# Patient Record
Sex: FEMALE | Race: OTHER | Employment: OTHER | URBAN - NONMETROPOLITAN AREA
[De-identification: names, ages, dates, MRNs, and addresses within clinical notes are randomized per-mention and may not be internally consistent; named-entity substitution may affect disease eponyms.]

---

## 2017-01-10 ENCOUNTER — OFFICE VISIT (OUTPATIENT)
Dept: PHYSICAL MEDICINE AND REHAB | Age: 52
End: 2017-01-10

## 2017-01-10 VITALS
HEIGHT: 59 IN | SYSTOLIC BLOOD PRESSURE: 110 MMHG | HEART RATE: 84 BPM | WEIGHT: 150 LBS | BODY MASS INDEX: 30.24 KG/M2 | DIASTOLIC BLOOD PRESSURE: 72 MMHG

## 2017-01-10 DIAGNOSIS — N31.9 NEUROGENIC BLADDER: ICD-10-CM

## 2017-01-10 DIAGNOSIS — G37.3 TRANSVERSE MYELITIS (HCC): Primary | ICD-10-CM

## 2017-01-10 PROCEDURE — 64642 CHEMODENERV 1 EXTREMITY 1-4: CPT | Performed by: PHYSICAL MEDICINE & REHABILITATION

## 2017-01-10 PROCEDURE — 99213 OFFICE O/P EST LOW 20 MIN: CPT | Performed by: PHYSICAL MEDICINE & REHABILITATION

## 2017-01-10 RX ORDER — GABAPENTIN 800 MG/1
800 TABLET ORAL 3 TIMES DAILY
Qty: 90 TABLET | Refills: 5 | Status: SHIPPED | OUTPATIENT
Start: 2017-01-10 | End: 2017-06-29 | Stop reason: SDUPTHER

## 2017-02-07 ENCOUNTER — TELEPHONE (OUTPATIENT)
Dept: UROLOGY | Age: 52
End: 2017-02-07

## 2017-03-22 RX ORDER — HYDROCODONE BITARTRATE AND ACETAMINOPHEN 5; 325 MG/1; MG/1
1 TABLET ORAL 2 TIMES DAILY PRN
Qty: 60 TABLET | Refills: 0 | Status: SHIPPED | OUTPATIENT
Start: 2017-03-22 | End: 2017-04-17 | Stop reason: SDUPTHER

## 2017-03-31 ENCOUNTER — OFFICE VISIT (OUTPATIENT)
Dept: UROLOGY | Age: 52
End: 2017-03-31

## 2017-03-31 VITALS
HEIGHT: 60 IN | BODY MASS INDEX: 29.45 KG/M2 | DIASTOLIC BLOOD PRESSURE: 60 MMHG | WEIGHT: 150 LBS | SYSTOLIC BLOOD PRESSURE: 100 MMHG

## 2017-03-31 DIAGNOSIS — N31.9 NEUROGENIC BLADDER: Primary | ICD-10-CM

## 2017-03-31 DIAGNOSIS — R33.9 URINARY RETENTION: ICD-10-CM

## 2017-03-31 LAB
BILIRUBIN URINE: NEGATIVE
BLOOD URINE, POC: ABNORMAL
CHARACTER, URINE: CLEAR
COLOR, URINE: YELLOW
GLUCOSE URINE: 500 MG/DL
KETONES, URINE: NEGATIVE
LEUKOCYTE CLUMPS, URINE: ABNORMAL
NITRITE, URINE: POSITIVE
PH, URINE: 5.5
POST VOID RESIDUAL (PVR): 360 ML
PROTEIN, URINE: NEGATIVE MG/DL
SPECIFIC GRAVITY, URINE: 1.02 (ref 1–1.03)
UROBILINOGEN, URINE: 0.2 EU/DL

## 2017-03-31 PROCEDURE — 51798 US URINE CAPACITY MEASURE: CPT | Performed by: NURSE PRACTITIONER

## 2017-03-31 PROCEDURE — 99213 OFFICE O/P EST LOW 20 MIN: CPT | Performed by: NURSE PRACTITIONER

## 2017-03-31 PROCEDURE — 81003 URINALYSIS AUTO W/O SCOPE: CPT | Performed by: NURSE PRACTITIONER

## 2017-03-31 ASSESSMENT — ENCOUNTER SYMPTOMS
NAUSEA: 0
VOMITING: 0
ABDOMINAL PAIN: 0

## 2017-04-03 LAB
ORGANISM: ABNORMAL
ORGANISM: ABNORMAL
URINE CULTURE, ROUTINE: ABNORMAL
URINE CULTURE, ROUTINE: ABNORMAL

## 2017-04-04 ENCOUNTER — TELEPHONE (OUTPATIENT)
Dept: UROLOGY | Age: 52
End: 2017-04-04

## 2017-04-04 RX ORDER — NITROFURANTOIN 25; 75 MG/1; MG/1
100 CAPSULE ORAL 2 TIMES DAILY
Qty: 10 CAPSULE | Refills: 0 | Status: SHIPPED | OUTPATIENT
Start: 2017-04-04 | End: 2017-04-09

## 2017-04-17 ENCOUNTER — OFFICE VISIT (OUTPATIENT)
Dept: PHYSICAL MEDICINE AND REHAB | Age: 52
End: 2017-04-17

## 2017-04-17 VITALS
SYSTOLIC BLOOD PRESSURE: 98 MMHG | HEART RATE: 77 BPM | WEIGHT: 149.91 LBS | DIASTOLIC BLOOD PRESSURE: 62 MMHG | BODY MASS INDEX: 29.43 KG/M2 | HEIGHT: 60 IN

## 2017-04-17 DIAGNOSIS — G82.20 PARAPLEGIA (HCC): Primary | ICD-10-CM

## 2017-04-17 DIAGNOSIS — G37.3 TRANSVERSE MYELITIS (HCC): ICD-10-CM

## 2017-04-17 PROCEDURE — 64642 CHEMODENERV 1 EXTREMITY 1-4: CPT | Performed by: PHYSICAL MEDICINE & REHABILITATION

## 2017-04-17 PROCEDURE — 99213 OFFICE O/P EST LOW 20 MIN: CPT | Performed by: PHYSICAL MEDICINE & REHABILITATION

## 2017-04-17 RX ORDER — HYDROCODONE BITARTRATE AND ACETAMINOPHEN 5; 325 MG/1; MG/1
1 TABLET ORAL 2 TIMES DAILY PRN
Qty: 60 TABLET | Refills: 0 | Status: SHIPPED | OUTPATIENT
Start: 2017-04-17 | End: 2017-05-02 | Stop reason: SDUPTHER

## 2017-04-17 RX ORDER — HYDROCODONE BITARTRATE AND ACETAMINOPHEN 5; 325 MG/1; MG/1
1 TABLET ORAL 2 TIMES DAILY PRN
Qty: 60 TABLET | Refills: 0 | Status: SHIPPED | OUTPATIENT
Start: 2017-04-17 | End: 2017-07-18 | Stop reason: SDUPTHER

## 2017-04-17 RX ORDER — LIFITEGRAST 50 MG/ML
SOLUTION/ DROPS OPHTHALMIC
Refills: 0 | COMMUNITY
Start: 2017-03-10 | End: 2020-03-05

## 2017-04-19 ENCOUNTER — TELEPHONE (OUTPATIENT)
Dept: PHYSICAL MEDICINE AND REHAB | Age: 52
End: 2017-04-19

## 2017-04-19 ENCOUNTER — TELEPHONE (OUTPATIENT)
Dept: UROLOGY | Age: 52
End: 2017-04-19

## 2017-04-19 DIAGNOSIS — N39.0 URINARY TRACT INFECTION WITHOUT HEMATURIA, SITE UNSPECIFIED: Primary | ICD-10-CM

## 2017-04-21 ENCOUNTER — TELEPHONE (OUTPATIENT)
Dept: UROLOGY | Age: 52
End: 2017-04-21

## 2017-04-21 DIAGNOSIS — N31.9 NEUROGENIC BLADDER: Primary | ICD-10-CM

## 2017-04-23 ENCOUNTER — TELEPHONE (OUTPATIENT)
Dept: UROLOGY | Age: 52
End: 2017-04-23

## 2017-04-23 RX ORDER — CIPROFLOXACIN 500 MG/1
500 TABLET, FILM COATED ORAL 2 TIMES DAILY
Qty: 14 TABLET | Refills: 0 | Status: SHIPPED | OUTPATIENT
Start: 2017-04-23 | End: 2017-04-30

## 2017-04-26 ENCOUNTER — TELEPHONE (OUTPATIENT)
Dept: PHYSICAL MEDICINE AND REHAB | Age: 52
End: 2017-04-26

## 2017-04-27 ENCOUNTER — TELEPHONE (OUTPATIENT)
Dept: UROLOGY | Age: 52
End: 2017-04-27

## 2017-05-02 ENCOUNTER — OFFICE VISIT (OUTPATIENT)
Dept: UROLOGY | Age: 52
End: 2017-05-02

## 2017-05-02 VITALS
BODY MASS INDEX: 29.45 KG/M2 | WEIGHT: 150 LBS | SYSTOLIC BLOOD PRESSURE: 102 MMHG | DIASTOLIC BLOOD PRESSURE: 68 MMHG | HEIGHT: 60 IN

## 2017-05-02 DIAGNOSIS — N31.9 NEUROGENIC BLADDER: Primary | ICD-10-CM

## 2017-05-02 LAB — POST VOID RESIDUAL (PVR): 11 ML

## 2017-05-02 PROCEDURE — 99213 OFFICE O/P EST LOW 20 MIN: CPT | Performed by: NURSE PRACTITIONER

## 2017-05-02 PROCEDURE — 51798 US URINE CAPACITY MEASURE: CPT | Performed by: NURSE PRACTITIONER

## 2017-06-19 RX ORDER — BACLOFEN 10 MG/1
15 TABLET ORAL 3 TIMES DAILY
Qty: 135 TABLET | Refills: 5 | Status: SHIPPED | OUTPATIENT
Start: 2017-06-19 | End: 2017-10-09 | Stop reason: SDUPTHER

## 2017-06-29 RX ORDER — GABAPENTIN 800 MG/1
800 TABLET ORAL 3 TIMES DAILY
Qty: 90 TABLET | Refills: 5 | Status: SHIPPED | OUTPATIENT
Start: 2017-06-29 | End: 2017-10-09 | Stop reason: SDUPTHER

## 2017-07-12 ENCOUNTER — TELEPHONE (OUTPATIENT)
Dept: PHYSICAL MEDICINE AND REHAB | Age: 52
End: 2017-07-12

## 2017-07-18 ENCOUNTER — OFFICE VISIT (OUTPATIENT)
Dept: PHYSICAL MEDICINE AND REHAB | Age: 52
End: 2017-07-18
Payer: COMMERCIAL

## 2017-07-18 ENCOUNTER — TELEPHONE (OUTPATIENT)
Dept: PHYSICAL MEDICINE AND REHAB | Age: 52
End: 2017-07-18

## 2017-07-18 VITALS
BODY MASS INDEX: 29.45 KG/M2 | HEIGHT: 60 IN | DIASTOLIC BLOOD PRESSURE: 66 MMHG | WEIGHT: 150 LBS | HEART RATE: 85 BPM | SYSTOLIC BLOOD PRESSURE: 102 MMHG

## 2017-07-18 DIAGNOSIS — G82.20 PARAPLEGIA (HCC): ICD-10-CM

## 2017-07-18 DIAGNOSIS — G37.3 TRANSVERSE MYELITIS (HCC): Primary | ICD-10-CM

## 2017-07-18 PROCEDURE — 99214 OFFICE O/P EST MOD 30 MIN: CPT | Performed by: NURSE PRACTITIONER

## 2017-07-18 RX ORDER — HYDROCODONE BITARTRATE AND ACETAMINOPHEN 5; 325 MG/1; MG/1
1 TABLET ORAL 2 TIMES DAILY PRN
Qty: 60 TABLET | Refills: 0 | Status: SHIPPED | OUTPATIENT
Start: 2017-07-22 | End: 2017-08-08 | Stop reason: SDUPTHER

## 2017-07-18 RX ORDER — HYDROCODONE BITARTRATE AND ACETAMINOPHEN 5; 325 MG/1; MG/1
1 TABLET ORAL 2 TIMES DAILY PRN
Qty: 60 TABLET | Refills: 0 | Status: SHIPPED | OUTPATIENT
Start: 2017-09-22 | End: 2017-10-09 | Stop reason: SDUPTHER

## 2017-07-18 RX ORDER — HYDROCODONE BITARTRATE AND ACETAMINOPHEN 5; 325 MG/1; MG/1
1 TABLET ORAL 2 TIMES DAILY PRN
Qty: 60 TABLET | Refills: 0 | Status: SHIPPED | OUTPATIENT
Start: 2017-08-22 | End: 2017-08-08 | Stop reason: SDUPTHER

## 2017-08-08 ENCOUNTER — TELEPHONE (OUTPATIENT)
Dept: UROLOGY | Age: 52
End: 2017-08-08

## 2017-08-08 ENCOUNTER — OFFICE VISIT (OUTPATIENT)
Dept: UROLOGY | Age: 52
End: 2017-08-08
Payer: COMMERCIAL

## 2017-08-08 VITALS
BODY MASS INDEX: 29.45 KG/M2 | WEIGHT: 150 LBS | DIASTOLIC BLOOD PRESSURE: 60 MMHG | HEIGHT: 60 IN | SYSTOLIC BLOOD PRESSURE: 110 MMHG

## 2017-08-08 DIAGNOSIS — N31.9 NEUROGENIC BLADDER: Primary | ICD-10-CM

## 2017-08-08 LAB
BILIRUBIN URINE: NEGATIVE
BLOOD URINE, POC: ABNORMAL
CHARACTER, URINE: CLEAR
COLOR, URINE: YELLOW
GLUCOSE URINE: 500 MG/DL
KETONES, URINE: ABNORMAL
LEUKOCYTE CLUMPS, URINE: ABNORMAL
NITRITE, URINE: POSITIVE
PH, URINE: 6
POST VOID RESIDUAL (PVR): 160 ML
PROTEIN, URINE: NEGATIVE MG/DL
SPECIFIC GRAVITY, URINE: 1.01 (ref 1–1.03)
UROBILINOGEN, URINE: 1 EU/DL

## 2017-08-08 PROCEDURE — 99999 POST VOID RESIDUAL (PVR): CPT | Performed by: NURSE PRACTITIONER

## 2017-08-08 PROCEDURE — 99213 OFFICE O/P EST LOW 20 MIN: CPT | Performed by: NURSE PRACTITIONER

## 2017-08-08 PROCEDURE — 81003 URINALYSIS AUTO W/O SCOPE: CPT | Performed by: NURSE PRACTITIONER

## 2017-08-08 RX ORDER — NYSTATIN 100000 [USP'U]/G
POWDER TOPICAL
Qty: 1 BOTTLE | Refills: 1 | Status: SHIPPED | OUTPATIENT
Start: 2017-08-08 | End: 2020-03-05

## 2017-08-08 ASSESSMENT — ENCOUNTER SYMPTOMS
VOMITING: 0
NAUSEA: 0
ABDOMINAL PAIN: 0

## 2017-08-09 ENCOUNTER — TELEPHONE (OUTPATIENT)
Dept: UROLOGY | Age: 52
End: 2017-08-09

## 2017-08-09 RX ORDER — DOXYCYCLINE HYCLATE 100 MG
100 TABLET ORAL 2 TIMES DAILY
Qty: 14 TABLET | Refills: 0 | Status: SHIPPED | OUTPATIENT
Start: 2017-08-09 | End: 2017-08-16

## 2017-08-10 LAB
ORGANISM: ABNORMAL
URINE CULTURE, ROUTINE: ABNORMAL

## 2017-08-23 ENCOUNTER — HOSPITAL ENCOUNTER (OUTPATIENT)
Dept: ULTRASOUND IMAGING | Age: 52
Discharge: HOME | End: 2017-08-23
Payer: COMMERCIAL

## 2017-08-23 DIAGNOSIS — N31.9 NEUROGENIC BLADDER: ICD-10-CM

## 2017-08-23 PROCEDURE — 76775 US EXAM ABDO BACK WALL LIM: CPT

## 2017-08-30 ENCOUNTER — NURSE ONLY (OUTPATIENT)
Dept: UROLOGY | Age: 52
End: 2017-08-30

## 2017-08-30 DIAGNOSIS — N31.9 NEUROGENIC BLADDER: Primary | ICD-10-CM

## 2017-08-30 LAB — POST VOID RESIDUAL (PVR): NORMAL ML

## 2017-08-30 PROCEDURE — 99999 POST VOID RESIDUAL (PVR): CPT | Performed by: NURSE PRACTITIONER

## 2017-08-30 PROCEDURE — 99999 PR OFFICE/OUTPT VISIT,PROCEDURE ONLY: CPT | Performed by: NURSE PRACTITIONER

## 2017-09-12 ENCOUNTER — TELEPHONE (OUTPATIENT)
Dept: UROLOGY | Age: 52
End: 2017-09-12

## 2017-10-05 ENCOUNTER — TELEPHONE (OUTPATIENT)
Dept: UROLOGY | Age: 52
End: 2017-10-05

## 2017-10-05 NOTE — TELEPHONE ENCOUNTER
Patient is calling in regarding his myrebetriq medication. Her pharmacy, [facility] [address] told her that it needs prior authorized. They told her they would be sending a form also.

## 2017-10-09 ENCOUNTER — TELEPHONE (OUTPATIENT)
Dept: PHYSICAL MEDICINE AND REHAB | Age: 52
End: 2017-10-09

## 2017-10-09 ENCOUNTER — OFFICE VISIT (OUTPATIENT)
Dept: PHYSICAL MEDICINE AND REHAB | Age: 52
End: 2017-10-09
Payer: COMMERCIAL

## 2017-10-09 VITALS
SYSTOLIC BLOOD PRESSURE: 98 MMHG | HEIGHT: 60 IN | HEART RATE: 83 BPM | DIASTOLIC BLOOD PRESSURE: 70 MMHG | WEIGHT: 149.91 LBS | BODY MASS INDEX: 29.43 KG/M2

## 2017-10-09 DIAGNOSIS — G82.20 PARAPLEGIA (HCC): ICD-10-CM

## 2017-10-09 DIAGNOSIS — G95.11 SPINAL CORD INFARCTION (HCC): ICD-10-CM

## 2017-10-09 DIAGNOSIS — M48.061 LUMBAR CANAL STENOSIS: Primary | ICD-10-CM

## 2017-10-09 DIAGNOSIS — G89.29 OTHER CHRONIC PAIN: ICD-10-CM

## 2017-10-09 DIAGNOSIS — M24.561 FLEXION CONTRACTURE OF RIGHT KNEE: ICD-10-CM

## 2017-10-09 DIAGNOSIS — G37.3 TRANSVERSE MYELITIS (HCC): Primary | ICD-10-CM

## 2017-10-09 PROCEDURE — 99214 OFFICE O/P EST MOD 30 MIN: CPT | Performed by: NURSE PRACTITIONER

## 2017-10-09 RX ORDER — HYDROCODONE BITARTRATE AND ACETAMINOPHEN 5; 325 MG/1; MG/1
1 TABLET ORAL 2 TIMES DAILY PRN
Qty: 60 TABLET | Refills: 0 | Status: SHIPPED | OUTPATIENT
Start: 2017-10-09 | End: 2017-11-08 | Stop reason: SDUPTHER

## 2017-10-09 RX ORDER — HYDROCODONE BITARTRATE AND ACETAMINOPHEN 5; 325 MG/1; MG/1
1 TABLET ORAL 2 TIMES DAILY PRN
Qty: 60 TABLET | Refills: 0 | Status: SHIPPED | OUTPATIENT
Start: 2017-10-09 | End: 2017-12-04 | Stop reason: SDUPTHER

## 2017-10-09 RX ORDER — AMITRIPTYLINE HYDROCHLORIDE 25 MG/1
TABLET, FILM COATED ORAL
Qty: 30 TABLET | Refills: 11 | Status: SHIPPED | OUTPATIENT
Start: 2017-10-09 | End: 2018-09-20 | Stop reason: SDUPTHER

## 2017-10-09 RX ORDER — BACLOFEN 10 MG/1
15 TABLET ORAL 3 TIMES DAILY
Qty: 135 TABLET | Refills: 5 | Status: SHIPPED | OUTPATIENT
Start: 2017-10-09 | End: 2018-04-02 | Stop reason: SDUPTHER

## 2017-10-09 RX ORDER — GABAPENTIN 800 MG/1
800 TABLET ORAL 3 TIMES DAILY
Qty: 90 TABLET | Refills: 5 | Status: SHIPPED | OUTPATIENT
Start: 2017-10-09 | End: 2018-04-02 | Stop reason: SDUPTHER

## 2017-10-09 NOTE — TELEPHONE ENCOUNTER
Prior Auth initiated for [employer] . PA sent to [email or web address]. Should receive response in 3-5 days.

## 2017-10-09 NOTE — PROGRESS NOTES
abnormal - 2-3 out of 4 in right leg/ankle  Muscle bulk: within normal limits  Sensory:  Stable decreased sensation  Gait: wheelchair bound    Skin: warm and dry, no rash or erythema. Small areas of bruising noted in left ankle and right foot. Peripheral vascular: Pulses: Normal upper and lower extremity pulses; Edema: trace pedal bilateral     Impression:  1. Multiple sclerosis  2. Transverse myelitis  3. Lupus  4. Neurogenic bowel and bladder  5. Spastic paraplegia  6. Bilateral lower limb neuralgia  7. Moderate bilateral carpal tunnel syndrome     Plan:  1. Send approval for botox injections with Mari  2. Continue Norco twice daily as needed for pain. Limit opiate use as much as possible. Rx  3. Continue gabapentin 800mg TID, Rx  4. Continue amitriptyline 25 mg nightly, Rx  5. Continue baclofen, Rx  6. Trial 0.5 - 1 tab benadryl nightly PRN for sleep, OTC  7. Continue bowel and bladder program  8. Home Health for PT      Will continue to monitor any benefits vs side effects of the medications as prescribed. The patient has been warned about the risk of operating machinery including driving if impaired in any way by these medications. The patient also accepts the risks of tolerance, dependency, or addiction related to the prescribed medications. All questions were answered. Reevaluation as planned, or sooner if requested. Return in about 4 weeks (around 11/6/2017) for Botox 300 units - Dr Daniela Johnson. It was my pleasure to evaluate Yfn Mir today. Please call with any concerns or questions.   25 minutes spent in evaluation efforts    Deepali oMntana CNP

## 2017-10-16 ENCOUNTER — TELEPHONE (OUTPATIENT)
Dept: UROLOGY | Age: 52
End: 2017-10-16

## 2017-10-16 NOTE — TELEPHONE ENCOUNTER
Patient states she takes myrbetriq. She said her insurance wont cover it. She wants to know if there is something else she can try?    Pharmacy is [facility] [address]  dolv 8/8/17  Donv 11/6/17

## 2017-10-17 NOTE — TELEPHONE ENCOUNTER
Pt has a hx of elevated PVR and retention. Other formulary meds are contraindicated as they will likely cause worsening retention.

## 2017-10-17 NOTE — TELEPHONE ENCOUNTER
Myrbetriq is denied per insurance. Pt must try two formulary meds and failed them. Pt has tried oxybuynin.

## 2017-10-18 ENCOUNTER — TELEPHONE (OUTPATIENT)
Dept: UROLOGY | Age: 52
End: 2017-10-18

## 2017-10-18 NOTE — TELEPHONE ENCOUNTER
Patient is calling and asking if we can get a p/a for Myrbetriq. Per [employer] last note, patient has a hx of elevated PVR and retention. Other formulary meds are contraindicated as they will likely cause worsening retention. Will we be able to use this to help her get the medicine approved? Please advise. Thank you.

## 2017-10-19 NOTE — TELEPHONE ENCOUNTER
Expedited Appeal letter drafted and sent to [address] [place] [address] along with office notes asking for reconsideration.

## 2017-10-24 ENCOUNTER — TELEPHONE (OUTPATIENT)
Dept: PHYSICAL MEDICINE AND REHAB | Age: 52
End: 2017-10-24

## 2017-10-24 NOTE — TELEPHONE ENCOUNTER
Appeal letter for Thor Dmitry was overturned and is now approved. Approved until October 2018. Pt notified.

## 2017-11-02 ENCOUNTER — TELEPHONE (OUTPATIENT)
Dept: UROLOGY | Age: 52
End: 2017-11-02

## 2017-11-08 ENCOUNTER — OFFICE VISIT (OUTPATIENT)
Dept: UROLOGY | Age: 52
End: 2017-11-08
Payer: COMMERCIAL

## 2017-11-08 VITALS — HEART RATE: 92 BPM | SYSTOLIC BLOOD PRESSURE: 104 MMHG | DIASTOLIC BLOOD PRESSURE: 70 MMHG | TEMPERATURE: 97.6 F

## 2017-11-08 DIAGNOSIS — Z87.440 HISTORY OF UTI: Primary | ICD-10-CM

## 2017-11-08 PROCEDURE — 4004F PT TOBACCO SCREEN RCVD TLK: CPT | Performed by: NURSE PRACTITIONER

## 2017-11-08 PROCEDURE — 3017F COLORECTAL CA SCREEN DOC REV: CPT | Performed by: NURSE PRACTITIONER

## 2017-11-08 PROCEDURE — G8484 FLU IMMUNIZE NO ADMIN: HCPCS | Performed by: NURSE PRACTITIONER

## 2017-11-08 PROCEDURE — G8417 CALC BMI ABV UP PARAM F/U: HCPCS | Performed by: NURSE PRACTITIONER

## 2017-11-08 PROCEDURE — G8428 CUR MEDS NOT DOCUMENT: HCPCS | Performed by: NURSE PRACTITIONER

## 2017-11-08 PROCEDURE — 99213 OFFICE O/P EST LOW 20 MIN: CPT | Performed by: NURSE PRACTITIONER

## 2017-11-08 RX ORDER — METHENAMINE HIPPURATE 1000 MG/1
1 TABLET ORAL 2 TIMES DAILY WITH MEALS
Qty: 60 TABLET | Refills: 5 | Status: SHIPPED | OUTPATIENT
Start: 2017-11-08 | End: 2018-03-09

## 2017-11-08 NOTE — PROGRESS NOTES
Agapito Matos is a 50 y.o. female was seen in follow up for history of frequent UTI. She states she took D-mannose however she can no longer afford it. She feels she has an infection currently as she is noting an odor to her urine and it appears darker. She denies dysuria, hesitancy, weak stream, urgency, frequency, gross hematuria, flank pain, fever, chills, suprapubic pain, and feeling of incomplete emptying. Past Medical History:   Diagnosis Date    Anxiety     Anxiety and depression     Asthma     Bipolar 1 disorder ([facility]     Bipolar 1 disorder with moderate sourav ([facility])     Blood circulation, collateral     Cancer ([facility])     Depression     Endometriosis     GERD (gastroesophageal reflux disease)     Kidney stones     Lupus     Movement disorder     MS (multiple sclerosis) ([facility])     [facility]     Thyroid disease     Type 2 diabetes mellitus without complication ([facility]     Unspecified cerebral artery occlusion with cerebral infarction        Past Surgical History:   Procedure Laterality Date    CHOLECYSTECTOMY      COLONOSCOPY      DILATION AND CURETTAGE OF UTERUS      TUBAL LIGATION      10 years ago       Current Outpatient Prescriptions on File Prior to Visit   Medication Sig Dispense Refill    amitriptyline (ELAVIL) 25 MG tablet take 1 tablet by mouth at bedtime 30 tablet 11    baclofen (LIORESAL) 10 MG tablet Take 1.5 tablets by mouth 3 times daily 135 tablet 5    gabapentin (NEURONTIN) 800 MG tablet Take 1 tablet by mouth 3 times daily 90 tablet 5    HYDROcodone-acetaminophen (NORCO) 5-325 MG per tablet Take 1 tablet by mouth 2 times daily as needed for Pain  Fill on or after 11/22/17. 60 tablet 0    nystatin (MYCOSTATIN) 496657 UNIT/GM powder Apply 3 times daily to groin folds.  1 Bottle 1    Mirabegron ER (MYRBETRIQ) 25 MG TB24 Take 1 tablet by mouth daily 30 tablet 6    XIIDRA 5 % SOLN   0    azelastine (OPTIVAR) 0.05 % ophthalmic solution instill 1 drop into both eyes twice a day  0    montelukast (SINGULAIR) 10 MG tablet Take 10 mg by mouth nightly   0    terbinafine (LAMISIL) 250 MG tablet take 1 tablet by mouth once daily  0    [employer] [facility]) [address] [facility] Wheelchair repairs- Seat, left armrest, brakes    Current body weight: Weight: 140 lb (63.5 kg)  Current patient height: Height: 5' (152.4 cm)  Diagnosis: paraplegia due to MS  Length of need: Lifetime 1 each 0    SPIRIVA HANDIHALER 18 MCG inhalation capsule Inhale 18 mcg into the lungs daily  0    artificial tears (ARTIFICIAL TEARS) OINT as needed      Calcium Carbonate (CALCIUM 600 PO) Take 1 tablet by mouth 2 times daily      Multiple Vitamin (TAB-A-ISAAC PO) Take 1 tablet by mouth daily      PRISTIQ 100 MG TB24 Take 100 mg by mouth daily. 0    loratadine (CLARITIN) 10 MG tablet Take 1 tablet by mouth daily as needed. 0    PROAIR  (90 BASE) MCG/ACT inhaler Inhale 2 puffs into the lungs every 6 hours as needed. 0    Incontinence Supply Disposable (UNDERPADS) MISC Cloth chux pads  Diagnosis: Paraplegia 344. 1 100 Bottle 11    hydrOXYzine (VISTARIL) 50 MG capsule Take 50 mg by mouth 3 times daily as needed for Itching.  omeprazole (PRILOSEC) 20 MG capsule Take 20 mg by mouth daily.  paliperidone (INVEGA) 6 MG ER tablet Take 6 mg by mouth nightly Take 2 tablets at bedtime      beclomethasone (QVAR) 80 MCG/ACT inhaler Inhale 1 puff into the lungs 2 times daily. No current facility-administered medications on file prior to visit.         Allergies   Allergen Reactions    Penicillins      \"I almost \"    Seasonal Itching       Social History   Substance Use Topics    Smoking status: Current Every Day Smoker     Packs/day: 0.50     Types: Cigarettes     Start date: 1979    Smokeless tobacco: Never Used    Alcohol use 0.0 oz/week      Comment: social drinker       Family History   Problem Relation Age of Onset    Stroke Mother     Asthma Mother    [facility] Other Mother Review of Systems  ROS  No problems with ears, nose or throat. No problems with eyes. No chest pain, shortness of breath, abdominal pain, extremity pain or weakness, and no neurological deficits. No rashes. No swollen glands or lymph nodes.  symptoms per HPI. The remainder of the review of symptoms is negative. Exam  Vitals:    11/08/17 1455   BP: 104/70   Pulse: 92   Temp: 97.6 °F (36.4 °C)     Nursing note and vitals reviewed. Constitutional: Alert and oriented times 3, no acute distress and cooperative to examination with appropriate mood and affect. HENT:   Head:        Normocephalic and atraumatic. Mouth/Throat:         Mucous membranes are normal.   Eyes:         EOM are normal. No scleral icterus. PERRLA. Pulmonary/Chest:      Chest symmetric with normal A/P diameter,  No audible wheezes, rales, or rhonchi noted. Normal respiratory rate and rhthym. No use of accessory muscles. Abdominal:         Soft. No tenderness. No rebound, no guarding and no CVA tenderness. Bowel sounds present. Psychiatric:        Normal mood and affect. Labs   Urine dip demonstrates   No results found for this visit on 11/08/17.       Assessment & Plan  History of Frequent UTI  Multiple Sclerosis    She was unable to void, an order was placed to obtain at an outpatient lab    She will obtain LFT and BMP and will start Hiprex-instructed to not take while on antibiotics     Follow-up 4 months

## 2017-11-30 ENCOUNTER — TELEPHONE (OUTPATIENT)
Dept: UROLOGY | Age: 52
End: 2017-11-30

## 2017-11-30 NOTE — TELEPHONE ENCOUNTER
I called and spoke with the patient asking her if she had the lab work done after her visit on 11/08/17. She stated that she didn't remember to have the lab work done. She stated that she will go to the lab to have the labs drawn. Patient also stated that she is still having a yeast infection, white discharge and strong odor. Please advise . Thank you.

## 2017-12-04 ENCOUNTER — OFFICE VISIT (OUTPATIENT)
Dept: PHYSICAL MEDICINE AND REHAB | Age: 52
End: 2017-12-04
Payer: COMMERCIAL

## 2017-12-04 VITALS
DIASTOLIC BLOOD PRESSURE: 67 MMHG | HEART RATE: 97 BPM | SYSTOLIC BLOOD PRESSURE: 102 MMHG | BODY MASS INDEX: 29.43 KG/M2 | HEIGHT: 60 IN | WEIGHT: 149.91 LBS

## 2017-12-04 DIAGNOSIS — G89.29 OTHER CHRONIC PAIN: ICD-10-CM

## 2017-12-04 DIAGNOSIS — G82.20 PARAPLEGIA (HCC): ICD-10-CM

## 2017-12-04 PROCEDURE — 99213 OFFICE O/P EST LOW 20 MIN: CPT | Performed by: NURSE PRACTITIONER

## 2017-12-04 PROCEDURE — 3017F COLORECTAL CA SCREEN DOC REV: CPT | Performed by: PHYSICAL MEDICINE & REHABILITATION

## 2017-12-04 PROCEDURE — 64642 CHEMODENERV 1 EXTREMITY 1-4: CPT | Performed by: PHYSICAL MEDICINE & REHABILITATION

## 2017-12-04 PROCEDURE — 4004F PT TOBACCO SCREEN RCVD TLK: CPT | Performed by: PHYSICAL MEDICINE & REHABILITATION

## 2017-12-04 PROCEDURE — G8417 CALC BMI ABV UP PARAM F/U: HCPCS | Performed by: PHYSICAL MEDICINE & REHABILITATION

## 2017-12-04 PROCEDURE — G8427 DOCREV CUR MEDS BY ELIG CLIN: HCPCS | Performed by: PHYSICAL MEDICINE & REHABILITATION

## 2017-12-04 PROCEDURE — G8484 FLU IMMUNIZE NO ADMIN: HCPCS | Performed by: PHYSICAL MEDICINE & REHABILITATION

## 2017-12-04 RX ORDER — GLUCOSAM/CHON-MSM1/C/MANG/BOSW 500-416.6
TABLET ORAL
Refills: 1 | COMMUNITY
Start: 2017-11-30 | End: 2021-01-19

## 2017-12-04 RX ORDER — HYDROCODONE BITARTRATE AND ACETAMINOPHEN 5; 325 MG/1; MG/1
1 TABLET ORAL 2 TIMES DAILY PRN
Qty: 60 TABLET | Refills: 0 | Status: SHIPPED | OUTPATIENT
Start: 2017-12-04 | End: 2018-04-02 | Stop reason: SDUPTHER

## 2017-12-04 RX ORDER — DEXTROSE 4 G
TABLET,CHEWABLE ORAL
Refills: 1 | COMMUNITY
Start: 2017-11-30 | End: 2021-01-19

## 2017-12-04 RX ORDER — GLIPIZIDE 5 MG/1
TABLET ORAL
Refills: 0 | COMMUNITY
Start: 2017-11-30 | End: 2021-01-20 | Stop reason: ALTCHOICE

## 2017-12-04 RX ORDER — CALCIUM CITRATE/VITAMIN D3 200MG-6.25
TABLET ORAL
Refills: 0 | COMMUNITY
Start: 2017-11-22 | End: 2021-01-19

## 2017-12-04 RX ORDER — SYRINGE-NEEDLE,INSULIN,0.5 ML 30 GX5/16"
SYRINGE, EMPTY DISPOSABLE MISCELLANEOUS
Refills: 1 | COMMUNITY
Start: 2017-11-02 | End: 2021-01-19

## 2017-12-04 RX ORDER — HYDROCODONE BITARTRATE AND ACETAMINOPHEN 5; 325 MG/1; MG/1
1 TABLET ORAL 2 TIMES DAILY PRN
Qty: 60 TABLET | Refills: 0 | Status: SHIPPED | OUTPATIENT
Start: 2017-12-04 | End: 2018-03-22 | Stop reason: SDUPTHER

## 2017-12-04 RX ORDER — INSULIN GLARGINE 100 [IU]/ML
INJECTION, SOLUTION SUBCUTANEOUS
Refills: 0 | COMMUNITY
Start: 2017-11-30 | End: 2018-07-09 | Stop reason: ALTCHOICE

## 2017-12-04 NOTE — PROGRESS NOTES
Physical Medicine and Rehabilitation  Procedure Note    Verbal consent obtained for botulinum toxin injections after risks versus benefits discussed. 300 Units of Botox were reconstituted using preservative-free normal saline in a 100 unit to 2 mL solution. Alcohol swabs were used to cleanse the skin before injections were performed. Back pressure was placed on the syringe during injections to avoid any intravascular injection. EMG guidance was not used during procedure. Botulinum toxin was injected in the following muscles of the right lower limb:  Lateral hamstring 100 units, medial hamstring 100 units, lateral gastroc 50 units, medial gastroc 50 units    The patient tolerated the procedure well with no immediate complications. The patient should call with concerns or questions over the coming days.     Nohemy Bourne MD

## 2017-12-26 RX ORDER — MIRABEGRON 25 MG/1
TABLET, FILM COATED, EXTENDED RELEASE ORAL
Qty: 30 TABLET | Refills: 6 | Status: SHIPPED | OUTPATIENT
Start: 2017-12-26 | End: 2018-03-09

## 2018-02-02 ENCOUNTER — TELEPHONE (OUTPATIENT)
Dept: PHYSICAL MEDICINE AND REHAB | Age: 53
End: 2018-02-02

## 2018-03-06 ENCOUNTER — TELEPHONE (OUTPATIENT)
Dept: PHYSICAL MEDICINE AND REHAB | Age: 53
End: 2018-03-06

## 2018-03-09 ENCOUNTER — OFFICE VISIT (OUTPATIENT)
Dept: UROLOGY | Age: 53
End: 2018-03-09
Payer: COMMERCIAL

## 2018-03-09 VITALS — HEIGHT: 60 IN | BODY MASS INDEX: 29.45 KG/M2 | WEIGHT: 150 LBS

## 2018-03-09 DIAGNOSIS — R82.90 FOUL SMELLING URINE: Primary | ICD-10-CM

## 2018-03-09 PROCEDURE — 4004F PT TOBACCO SCREEN RCVD TLK: CPT | Performed by: NURSE PRACTITIONER

## 2018-03-09 PROCEDURE — G8417 CALC BMI ABV UP PARAM F/U: HCPCS | Performed by: NURSE PRACTITIONER

## 2018-03-09 PROCEDURE — 3017F COLORECTAL CA SCREEN DOC REV: CPT | Performed by: NURSE PRACTITIONER

## 2018-03-09 PROCEDURE — G8484 FLU IMMUNIZE NO ADMIN: HCPCS | Performed by: NURSE PRACTITIONER

## 2018-03-09 PROCEDURE — G8427 DOCREV CUR MEDS BY ELIG CLIN: HCPCS | Performed by: NURSE PRACTITIONER

## 2018-03-09 PROCEDURE — 99214 OFFICE O/P EST MOD 30 MIN: CPT | Performed by: NURSE PRACTITIONER

## 2018-03-09 ASSESSMENT — ENCOUNTER SYMPTOMS
NAUSEA: 0
VOMITING: 0
ABDOMINAL PAIN: 0

## 2018-03-09 NOTE — PROGRESS NOTES
Retention  Multiple Sclerosis      Plan:      Patient does not have a medication list. She has no idea what she is taking. She does not think she is taking Hiprex. Stop Hiprex if not already stopped. She is non-compliant with blood work and too risky. Increase Myrbetriq to 50 mg daily for incontinence. Order given for UA reflex culture since she was unable to go today and reports foul smelling urine for several months.     Follow-up in 6 months with PVR on arrival.

## 2018-03-13 ENCOUNTER — TELEPHONE (OUTPATIENT)
Dept: UROLOGY | Age: 53
End: 2018-03-13

## 2018-03-13 NOTE — TELEPHONE ENCOUNTER
Patient called in stating that her insurance will not cover for her Myrbetriq. Message being sent to [facility] for Prior Auth.

## 2018-03-14 ENCOUNTER — TELEPHONE (OUTPATIENT)
Dept: UROLOGY | Age: 53
End: 2018-03-14

## 2018-03-15 LAB
APPEARANCE: ABNORMAL
BACTERIA: ABNORMAL PER HPF
BILIRUBIN: NEGATIVE
COLOR: YELLOW
EPITHELIAL CELLS: ABNORMAL PER HPF
GLUCOSE BLD-MCNC: NEGATIVE MG/DL
KETONES, URINE: NEGATIVE
LEUKOCYTE ESTERASE, URINE: ABNORMAL
NITRITE, URINE: NEGATIVE
OCCULT BLOOD,URINE: NEGATIVE
PH: 7.5 (ref 5–9)
PROTEIN, URINE: NEGATIVE
RBC: 0 PER HPF (ref 0–5)
SP GRAVITY MISCELLANEOUS: 1.02 (ref 1–1.03)
UROBILINOGEN, URINE: 4
WBC: ABNORMAL PER HPF (ref 0–5)

## 2018-03-16 LAB — URINE CULTURE, ROUTINE: ABNORMAL

## 2018-03-18 ENCOUNTER — TELEPHONE (OUTPATIENT)
Dept: UROLOGY | Age: 53
End: 2018-03-18

## 2018-03-20 RX ORDER — NITROFURANTOIN 25; 75 MG/1; MG/1
100 CAPSULE ORAL 2 TIMES DAILY
Qty: 10 CAPSULE | Refills: 0 | Status: SHIPPED | OUTPATIENT
Start: 2018-03-20 | End: 2018-03-25

## 2018-03-22 DIAGNOSIS — G89.29 OTHER CHRONIC PAIN: ICD-10-CM

## 2018-03-22 DIAGNOSIS — G82.20 PARAPLEGIA (HCC): ICD-10-CM

## 2018-03-22 RX ORDER — HYDROCODONE BITARTRATE AND ACETAMINOPHEN 5; 325 MG/1; MG/1
1 TABLET ORAL 2 TIMES DAILY PRN
Qty: 60 TABLET | Refills: 0 | Status: SHIPPED | OUTPATIENT
Start: 2018-03-24 | End: 2018-04-02 | Stop reason: SDUPTHER

## 2018-03-26 ENCOUNTER — TELEPHONE (OUTPATIENT)
Dept: PHYSICAL MEDICINE AND REHAB | Age: 53
End: 2018-03-26

## 2018-03-27 NOTE — TELEPHONE ENCOUNTER
Received denial requesting more information. Denial is scanned in. 3/27/18. Appeal is faxed in with office notes on 3/27/18.

## 2018-04-02 ENCOUNTER — OFFICE VISIT (OUTPATIENT)
Dept: PHYSICAL MEDICINE AND REHAB | Age: 53
End: 2018-04-02
Payer: COMMERCIAL

## 2018-04-02 VITALS
SYSTOLIC BLOOD PRESSURE: 94 MMHG | HEIGHT: 60 IN | HEART RATE: 94 BPM | WEIGHT: 150 LBS | BODY MASS INDEX: 29.45 KG/M2 | DIASTOLIC BLOOD PRESSURE: 61 MMHG

## 2018-04-02 DIAGNOSIS — G82.20 PARAPLEGIA (HCC): Primary | ICD-10-CM

## 2018-04-02 DIAGNOSIS — G89.29 OTHER CHRONIC PAIN: ICD-10-CM

## 2018-04-02 DIAGNOSIS — M24.561 FLEXION CONTRACTURE OF RIGHT KNEE: ICD-10-CM

## 2018-04-02 PROCEDURE — 64642 CHEMODENERV 1 EXTREMITY 1-4: CPT | Performed by: PHYSICAL MEDICINE & REHABILITATION

## 2018-04-02 PROCEDURE — 99213 OFFICE O/P EST LOW 20 MIN: CPT | Performed by: NURSE PRACTITIONER

## 2018-04-02 PROCEDURE — 4004F PT TOBACCO SCREEN RCVD TLK: CPT | Performed by: PHYSICAL MEDICINE & REHABILITATION

## 2018-04-02 PROCEDURE — 3017F COLORECTAL CA SCREEN DOC REV: CPT | Performed by: PHYSICAL MEDICINE & REHABILITATION

## 2018-04-02 PROCEDURE — G8427 DOCREV CUR MEDS BY ELIG CLIN: HCPCS | Performed by: PHYSICAL MEDICINE & REHABILITATION

## 2018-04-02 PROCEDURE — G8417 CALC BMI ABV UP PARAM F/U: HCPCS | Performed by: PHYSICAL MEDICINE & REHABILITATION

## 2018-04-02 RX ORDER — BACLOFEN 10 MG/1
15 TABLET ORAL 3 TIMES DAILY
Qty: 135 TABLET | Refills: 5 | Status: SHIPPED | OUTPATIENT
Start: 2018-04-02 | End: 2018-10-08 | Stop reason: SDUPTHER

## 2018-04-02 RX ORDER — HYDROCODONE BITARTRATE AND ACETAMINOPHEN 5; 325 MG/1; MG/1
1 TABLET ORAL 2 TIMES DAILY PRN
Qty: 60 TABLET | Refills: 0 | Status: SHIPPED | OUTPATIENT
Start: 2018-04-02 | End: 2018-05-02

## 2018-04-02 RX ORDER — GABAPENTIN 800 MG/1
800 TABLET ORAL 3 TIMES DAILY
Qty: 90 TABLET | Refills: 5 | Status: SHIPPED | OUTPATIENT
Start: 2018-04-02 | End: 2018-09-27 | Stop reason: SDUPTHER

## 2018-04-12 DIAGNOSIS — G89.29 OTHER CHRONIC PAIN: ICD-10-CM

## 2018-04-16 RX ORDER — HYDROCODONE BITARTRATE AND ACETAMINOPHEN 5; 325 MG/1; MG/1
1 TABLET ORAL 2 TIMES DAILY PRN
Qty: 60 TABLET | Refills: 0 | OUTPATIENT
Start: 2018-04-16 | End: 2018-05-16

## 2018-04-23 ENCOUNTER — TELEPHONE (OUTPATIENT)
Dept: PHYSICAL MEDICINE AND REHAB | Age: 53
End: 2018-04-23

## 2018-05-31 ENCOUNTER — TELEPHONE (OUTPATIENT)
Dept: PHYSICAL MEDICINE AND REHAB | Age: 53
End: 2018-05-31

## 2018-06-04 RX ORDER — WHEELCHAIR
EACH MISCELLANEOUS
Qty: 1 EACH | Refills: 0 | Status: SHIPPED | OUTPATIENT
Start: 2018-06-04 | End: 2019-02-07

## 2018-06-12 ENCOUNTER — TELEPHONE (OUTPATIENT)
Dept: PHYSICAL MEDICINE AND REHAB | Age: 53
End: 2018-06-12

## 2018-06-28 ENCOUNTER — TELEPHONE (OUTPATIENT)
Dept: PHYSICAL MEDICINE AND REHAB | Age: 53
End: 2018-06-28

## 2018-07-09 ENCOUNTER — OFFICE VISIT (OUTPATIENT)
Dept: PHYSICAL MEDICINE AND REHAB | Age: 53
End: 2018-07-09
Payer: COMMERCIAL

## 2018-07-09 VITALS
HEIGHT: 60 IN | DIASTOLIC BLOOD PRESSURE: 69 MMHG | BODY MASS INDEX: 29.45 KG/M2 | SYSTOLIC BLOOD PRESSURE: 105 MMHG | HEART RATE: 80 BPM | WEIGHT: 150 LBS

## 2018-07-09 DIAGNOSIS — R26.9 GAIT DISTURBANCE: ICD-10-CM

## 2018-07-09 DIAGNOSIS — G89.29 OTHER CHRONIC PAIN: ICD-10-CM

## 2018-07-09 PROCEDURE — 4004F PT TOBACCO SCREEN RCVD TLK: CPT | Performed by: NURSE PRACTITIONER

## 2018-07-09 PROCEDURE — G8417 CALC BMI ABV UP PARAM F/U: HCPCS | Performed by: PHYSICAL MEDICINE & REHABILITATION

## 2018-07-09 PROCEDURE — 4004F PT TOBACCO SCREEN RCVD TLK: CPT | Performed by: PHYSICAL MEDICINE & REHABILITATION

## 2018-07-09 PROCEDURE — 64642 CHEMODENERV 1 EXTREMITY 1-4: CPT | Performed by: PHYSICAL MEDICINE & REHABILITATION

## 2018-07-09 PROCEDURE — 3017F COLORECTAL CA SCREEN DOC REV: CPT | Performed by: PHYSICAL MEDICINE & REHABILITATION

## 2018-07-09 PROCEDURE — G8427 DOCREV CUR MEDS BY ELIG CLIN: HCPCS | Performed by: PHYSICAL MEDICINE & REHABILITATION

## 2018-07-09 PROCEDURE — 99213 OFFICE O/P EST LOW 20 MIN: CPT | Performed by: NURSE PRACTITIONER

## 2018-07-09 PROCEDURE — G8417 CALC BMI ABV UP PARAM F/U: HCPCS | Performed by: NURSE PRACTITIONER

## 2018-07-09 PROCEDURE — G8427 DOCREV CUR MEDS BY ELIG CLIN: HCPCS | Performed by: NURSE PRACTITIONER

## 2018-07-09 PROCEDURE — 3017F COLORECTAL CA SCREEN DOC REV: CPT | Performed by: NURSE PRACTITIONER

## 2018-07-09 RX ORDER — HYDROCODONE BITARTRATE AND ACETAMINOPHEN 5; 325 MG/1; MG/1
1 TABLET ORAL 2 TIMES DAILY PRN
Qty: 60 TABLET | Refills: 0 | Status: SHIPPED | OUTPATIENT
Start: 2018-07-09 | End: 2018-08-08

## 2018-07-09 RX ORDER — HYDROCODONE BITARTRATE AND ACETAMINOPHEN 5; 325 MG/1; MG/1
1 TABLET ORAL 2 TIMES DAILY PRN
Qty: 60 TABLET | Refills: 0 | Status: SHIPPED | OUTPATIENT
Start: 2018-07-09 | End: 2018-10-08 | Stop reason: SDUPTHER

## 2018-07-09 NOTE — PROGRESS NOTES
right knee flexion  Tone:  abnormal - 2-3/4 right leg/ankle  Muscle bulk: within normal limits  Sensory:  Decreased sensation    Skin: warm and dry, no rash or erythema  Peripheral vascular: Pulses: Normal upper and lower extremity pulses; Edema: no      Impression:  · Multiple sclerosis  · Transverse myelitis  · Lupus  · Neurogenic bowel and bladder  · Spastic paraplegia  · Bilateral lower limb neuralgia  · Moderate bilateral carpal tunnel syndrome    Plan:  · Repeat botulinum toxin injections performed by Dr. Andi Dejesus. See procedure note. · Referral to physical therapy for stretching and KAFO use. · Continue Norco PRN for pain- refills sent  · Continue gabapentin  · Continue amitriptyline  · Continue baclofen  · Continue benadryl 0.5- 1 tab for sleep  · Continue with bowel and bladder program    Return in about 3 months (around 10/9/2018) for Botox- 500 units right lower limb. It was my pleasure to evaluate Kodak Sher today. Please call with any concerns or questions.   15 minutes spent in evaluation efforts    Eli Spann, CHAS - CNP

## 2018-07-30 ENCOUNTER — HOSPITAL ENCOUNTER (OUTPATIENT)
Dept: PHYSICAL THERAPY | Age: 53
Setting detail: THERAPIES SERIES
Discharge: HOME | End: 2018-07-30
Payer: COMMERCIAL

## 2018-07-30 PROCEDURE — 97162 PT EVAL MOD COMPLEX 30 MIN: CPT

## 2018-07-30 NOTE — PROGRESS NOTES
Yes  Family / Caregiver Present: No  Comments: Follow up with Jackie Boxer. 10/8/18 with Dr. Joana Jacques. Other (Comment): History of MS, transverse myelitis. Spastic paraplegia. Right lower extremity stretching, received Botox injections w/c transfers and KAFO      Subjective: Patient has been a paraplegic for past 4 years. Patient states that she is able to transfers herself from w/c <>bed by herself. She seldom gets help for transfers. Patient has KAFO that she wears during day if sitting at home or when she goes somewhere to keep ankle and foot straight. She does not walk. Patient states that she does have movement in her left leg but not her right leg. She states that she has sensation changes from left upper thigh and down her leg . Sensation okay in arms and left leg. Patient uses manual w/c in her home. Then uses power w/c for community outings. Pain:  Patient Currently in Pain: No        Social/Functional History:    Lives With: Other (comment) (boyfriend lives with her)  Type of Home: Apartment  Home Layout: One level  Home Access: Ramped entrance  Home Equipment: Wheelchair-electric, [employer], Standard walker     Bathroom Shower/Tub: Tub/Shower unit, Shower chair with back  Bathroom Toilet: Standard  Bathroom Equipment: Grab bars in shower  Bathroom Accessibility: Accessible       ADL Assistance: Independent     Transfer Assistance: Independent    Active : No  Occupation: On disability  Leisure & Hobbies: watch TV, home activities-laundry, dishes    Objective        Observation/Palpation  Observation: Patient presents to PT in power w/c with hand control. Patient has spasticity noted RLE with right ankle in plantarflexionand knee flexed. Note flexion contracture at right knee. ROM RLE: Right hip ROM: passive SKTC WNLS, SLR passively to 60 degrees, knee 30 degrees to 135 degrees flexion, ankle dorsiflexion -20 degrees from neutral df.  abduction WNLS.  Hip flexor

## 2018-08-01 ENCOUNTER — HOSPITAL ENCOUNTER (OUTPATIENT)
Dept: PHYSICAL THERAPY | Age: 53
Setting detail: THERAPIES SERIES
Discharge: HOME | End: 2018-08-01
Payer: COMMERCIAL

## 2018-08-01 PROCEDURE — 97110 THERAPEUTIC EXERCISES: CPT

## 2018-08-01 PROCEDURE — 97112 NEUROMUSCULAR REEDUCATION: CPT

## 2018-08-01 NOTE — PROGRESS NOTES
[place]     Time In: 6929  Time Out: [place]  Minutes: 60  Timed Code Treatment Minutes: 60 Minutes     Date: 2018  Patient Name: Dominique Aragon,  Gender:  female        CSN: [id number]   : 1965  (50 y.o.)       Referring Practitioner: Jody Reina CNP       Diagnosis: G82.20 (ICD-10-CM) - Spastic paraplegia (HCC)R26.9 (ICD-10-CM) - Gait disturbance  Treatment Diagnosis: paraplegia with difficulty with transfers,decreased ROM RLE, LLE, and decreased sitting balance. Additional Pertinent Hx: comorbidities: transverse myelitis, MS, Paraplegia, biploar with sourav,lupus schizophrenia, DM Type II, COPD, incontinence of urine: Patient wears adult undergarments. Recent Botox injection: RLE for spasticity. General:  PT Visit Information  Onset Date: 18  PT Insurance Information: [place] Medicaid no precert is needed. PT, OT, ST allowed 30 visits each/calendar year. Aquatic therapy yes, modalties yes, iontophoresis, HP and CP are not covered. Total # of Visits to Date: 2  Plan of Care/Certification Expiration Date: 18  Progress Note Counter: 2/10 for PN              Subjective:  Chart Reviewed: Yes  Family / Caregiver Present: No  Comments: Follow up with Cathy Meier 10/8/18 with Dr. Ra Richardson. Other (Comment): History of MS, transverse myelitis. Spastic paraplegia. Right lower extremity stretching, received Botox injections w/c transfers and KAFO      Subjective: Patietn states that she has numbness in her Right foot. Patient brought her KAFO today. States she is wearing it a home sporadically.       Pain:  Patient Currently in Pain: No     Objective         Exercises  Exercise 1: PROM Bilateral LEs: hamstring stretch, single knee to chest, lower trunk rotation, knee to opposite shoulder, calf, ankle inversion/eversion 3x 20 seconds each   Exercise 2: Positional stretching with

## 2018-08-07 ENCOUNTER — HOSPITAL ENCOUNTER (OUTPATIENT)
Dept: PHYSICAL THERAPY | Age: 53
Setting detail: THERAPIES SERIES
Discharge: HOME | End: 2018-08-07
Payer: COMMERCIAL

## 2018-08-07 NOTE — PROGRESS NOTES
[facility]  PHYSICAL THERAPY MISSED TREATMENT NOTE  OUTPATIENT REHABILITATION    Date: 2018  Patient Name: Lisa Blackburn        MRN: [id number]   : 1965  (48 y.o.)  Gender: female   Referring Practitioner: Trevon Lord CNP   Diagnosis: G82.20 (ICD-10-CM) - Spastic paraplegia (HCC)R26.9 (ICD-10-CM) - Gait disturbance    PT Visit Information  Canceled Appointment: 1    REASON FOR MISSED TREATMENT:  Upon calling patient as she didn't show for her appointment, she reports she called and left a message to cancel d/t landlord being at her apartment to take care of some things. Pt aware and plans to be at next appointment.       Teryl Bernheim DPT, #[id number]

## 2018-08-09 ENCOUNTER — HOSPITAL ENCOUNTER (OUTPATIENT)
Dept: PHYSICAL THERAPY | Age: 53
Setting detail: THERAPIES SERIES
Discharge: HOME | End: 2018-08-09
Payer: COMMERCIAL

## 2018-08-09 PROCEDURE — 97110 THERAPEUTIC EXERCISES: CPT

## 2018-08-09 PROCEDURE — 97010 HOT OR COLD PACKS THERAPY: CPT

## 2018-08-09 NOTE — PROGRESS NOTES
support, core strengthening, Biodex for endurance. UE strength. Current Treatment Recommendations: Strengthening, ROM, Balance Training, Functional Mobility Training, Wheelchair Mobility Training, Home Exercise Program, Safety Education & Training, Transfer Training, Endurance Training  Plan Comment: Continue with current POC    Goals:  Patient goals : Patient would like to be able to bear weight on her legs and walk. Lessen stiffness in right leg. Short term goals  Time Frame for Short term goals: 4 weeks  Short term goal 1: Patient to demonstrate transfers from w/c <>mat table modified independently with and without sliding board. Short term goal 2: Patient to demonstrate increased RLE ROM with SLR 90 degrees, knee extension 30 to 135 degrees to 15 degrees to 135 degrees flexion. right ankle dorsiflexion from -20 degrees to -10 degrees  with increased ease with transfers. and long sitting on mat table. Short term goal 3: Patient to demonstrate increased LLE ROM with SLR to 90 degrees in order to long sit on mat table in order to dress lower body and improve bed mobility. Short term goal 4: Patient to demonstrate decreased hip flexor tightness from 20 degrees from neutral to extension at neutral with patient to tolerate pronelying. Short term goal 5: Patient to dmeonstrate increased strength at core musculature with increased sitting balance reaching outside base of support and to improve transfers. Long term goals  Time Frame for Long term goals : 8 weeks  Long term goal 1: Patient independent in home ex program in order to meet above goals.           Tigre Verduzco

## 2018-08-14 ENCOUNTER — HOSPITAL ENCOUNTER (OUTPATIENT)
Dept: PHYSICAL THERAPY | Age: 53
Setting detail: THERAPIES SERIES
Discharge: HOME | End: 2018-08-14
Payer: COMMERCIAL

## 2018-08-14 ENCOUNTER — HOSPITAL ENCOUNTER (OUTPATIENT)
Dept: PHYSICAL THERAPY | Age: 53
Setting detail: THERAPIES SERIES
End: 2018-08-14
Payer: COMMERCIAL

## 2018-08-14 PROCEDURE — 97110 THERAPEUTIC EXERCISES: CPT

## 2018-08-14 ASSESSMENT — PAIN DESCRIPTION - PAIN TYPE: TYPE: ACUTE PAIN

## 2018-08-14 ASSESSMENT — PAIN DESCRIPTION - ORIENTATION: ORIENTATION: RIGHT;LEFT

## 2018-08-14 ASSESSMENT — PAIN DESCRIPTION - LOCATION: LOCATION: LEG

## 2018-08-14 NOTE — PROGRESS NOTES
each   Exercise 2: Prone with moist hot packs on back of HS to assist with positional stretch of HS for 10 min. Exercise 3: Supine abdominal bracing 10x 5 seconds, while marching, UE flexion/ext., reciprocal x10 reps  Exercise 4: Seated edge of mat with step under feet: abdominal bracing 10x 5 seconds, horizontal abduction, shoulder rows, trunk rotation with therapy ball x10 each   Exercise 7: Patient transfered from wheelchair to mat table at SBA and mat table to wheelchair @ SBA         Activity Tolerance:  Activity Tolerance: Patient Tolerated treatment well    Assessment: Body structures, Functions, Activity limitations: Decreased functional mobility , Decreased ADL status, Decreased ROM, Decreased strength, Decreased safe awareness, Decreased endurance, Decreased balance  Assessment: Unable to complete all exercises due to patient showed up 45 min late to session. Patient still with increased tightness more in right leg. Prognosis: Fair  Discharge Recommendations: Continue to assess pending progress    Patient Education:  Patient Education: HEP, there. ex, stretches. Look into night splint for right foot. Do not bring leg brace in until get more range in right leg - per evaluating therapist.                      Plan:  Times per week: 2x  Plan weeks: 8 weeks  Specific instructions for Next Treatment: Spasticity RLE: PROM hip flexor stretches pronelying, hamstring stretches bilaterally, RLE knee and ankle PROM. Sitting balance dynamic reaching within and outside base of support, core strengthening, Biodex for endurance. UE strength. Current Treatment Recommendations: Strengthening, ROM, Balance Training, Functional Mobility Training, Wheelchair Mobility Training, Home Exercise Program, Safety Education & Training, Transfer Training, Endurance Training  Plan Comment: Continue with current POC    Goals:  Patient goals : Patient would like to be able to bear weight on her legs and walk.  Lessen stiffness in

## 2018-08-16 ENCOUNTER — HOSPITAL ENCOUNTER (OUTPATIENT)
Dept: PHYSICAL THERAPY | Age: 53
Setting detail: THERAPIES SERIES
End: 2018-08-16
Payer: COMMERCIAL

## 2018-08-20 ENCOUNTER — HOSPITAL ENCOUNTER (OUTPATIENT)
Dept: PHYSICAL THERAPY | Age: 53
Setting detail: THERAPIES SERIES
Discharge: HOME | End: 2018-08-20
Payer: COMMERCIAL

## 2018-08-20 PROCEDURE — 97110 THERAPEUTIC EXERCISES: CPT

## 2018-08-20 PROCEDURE — 97112 NEUROMUSCULAR REEDUCATION: CPT

## 2018-08-24 ENCOUNTER — HOSPITAL ENCOUNTER (OUTPATIENT)
Dept: PHYSICAL THERAPY | Age: 53
Setting detail: THERAPIES SERIES
Discharge: HOME | End: 2018-08-24
Payer: COMMERCIAL

## 2018-08-24 PROCEDURE — 97110 THERAPEUTIC EXERCISES: CPT

## 2018-08-24 ASSESSMENT — PAIN SCALES - GENERAL: PAINLEVEL_OUTOF10: 0

## 2018-08-24 NOTE — PROGRESS NOTES
[place]     Time In: 2727  Time Out: 4976  Minutes: 48  Timed Code Treatment Minutes: 48 Minutes     Date: 2018  Patient Name: Andrew Bonilla,  Gender:  female        CSN: [id number]   : 1965  (48 y.o.)       Referring Practitioner: Patton Scheuermann, CNP       Diagnosis: G82.20 (ICD-10-CM) - Spastic paraplegia (HCC)R26.9 (ICD-10-CM) - Gait disturbance  Treatment Diagnosis: paraplegia with difficulty with transfers,decreased ROM RLE, LLE, and decreased sitting balance. Additional Pertinent Hx: comorbidities: transverse myelitis, MS, Paraplegia, biploar with sourav,lupus schizophrenia, DM Type II, COPD, incontinence of urine: Patient wears adult undergarments. Recent Botox injection: RLE for spasticity. General:  PT Visit Information  Onset Date: 18  PT Insurance Information: [place] Medicaid no precert is needed. PT, OT, ST allowed 30 visits each/calendar year. Aquatic therapy yes, modalties yes, iontophoresis, HP and CP are not covered. Total # of Visits to Date: 6  Plan of Care/Certification Expiration Date: 18  Progress Note Counter:  for PN  for next PN               Subjective:  Chart Reviewed: Yes  Family / Caregiver Present: No  Comments: Follow up with Kennedi Whitaker. 10/8/18 with Dr. Lorna Tan. Other (Comment): History of MS, transverse myelitis. Spastic paraplegia. Right lower extremity stretching, received Botox injections w/c transfers and KAFO      Subjective: Patient states that she feels she has not changed much with ROM and continues to note tightness at back of legs, knees and right ankle. Patient does ROM/stretching ex 1x per day at home. Transfers and sitting balance remain the same as when she first started PT.        Pain:  Patient Currently in Pain: No  Pain Assessment: 0-10  Pain Level: 0      Objective knee, and hip flexors. Note unable to work on wb activites through LEs and wear KAFO due to decreased ROM RLE. Will continue in PT to further address transfers, sitting balance, ROM LEs, 2x per week. for 3 more weeks. Prognosis: Fair  Discharge Recommendations: Continue to assess pending progress    Patient Education:  Patient Education: Continue HEP of ROM Lie on stomach 3x per day for 15 min to 30 minutes for hip flexor stretching, knee extension. Patient to get out of w/c more during day due to stating she is in w/c 80 to 90% of day. Plan:  Times per week: 2x  Plan weeks: 8 weeks  Specific instructions for Next Treatment: Spasticity RLE: PROM hip flexor stretches pronelying, hamstring stretches bilaterally, RLE knee and ankle PROM. Sitting balance dynamic reaching within and outside base of support, core strengthening, Biodex for endurance. UE strength. Current Treatment Recommendations: Strengthening, ROM, Balance Training, Functional Mobility Training, Wheelchair Mobility Training, Home Exercise Program, Safety Education & Training, Transfer Training, Endurance Training  Plan Comment: Continue with current POC    Goals:  Patient goals : Patient would like to be able to bear weight on her legs and walk. Lessen stiffness in right leg. GOAL NOT MET Mahdurin is unable to bear weight and wear KAFO at this time due to tone and limited ROM at RLE . Stiffness remains at right leg. Short term goals  Time Frame for Short term goals: 4 weeks 8/24/18   Short term goal 1: Patient to demonstrate transfers from w/c <>mat table modified independently with and without sliding board. GOAL NOT MET Patient requires CGA with transfers during PT sessions with lateral transfers from w/c <>mat. Short term goal 2: Patient to demonstrate increased RLE ROM with SLR 90 degrees, knee extension 30 to 135 degrees to 15 degrees to 135 degrees flexion.  right ankle dorsiflexion from -20 degrees to -10 degrees with increased ease with transfers. and long sitting on mat table. GOAL NOT MET SLR 75 degrees, Knee extension 35 degrees from neutral 0 degrees, right ankle dorsiflexion remains restricted 20 degrees . Short term goal 3: Patient to demonstrate increased LLE ROM with SLR to 90 degrees in order to long sit on mat table in order to dress lower body and improve bed mobility. GOAL NOT MET SLR 80 degrees. Short term goal 4: Patient to demonstrate decreased hip flexor tightness from 20 degrees from neutral to extension at neutral with patient to tolerate pronelying. GOAL NOT MET right hip flexor tightness with hip extension 30 degrees , left hip flexor tightness with hip extension to 25 degrees. Short term goal 5: Patient to dmeonstrate increased strength at core musculature with increased sitting balance reaching outside base of support and to improve transfers. GOAL  MET Usha is able to reach within base of support and outside base of support approximately 6 inches with sitting balance ex.  but decreased balance with reaching outside base of support. Long term goals  Time Frame for Long term goals : 8 weeks (7 session)  8/24/18. Long term goal 1: Patient independent in home ex program in order to meet above goals.           Mitzi Rivero, [address] [facility]

## 2018-08-29 ENCOUNTER — HOSPITAL ENCOUNTER (OUTPATIENT)
Dept: PHYSICAL THERAPY | Age: 53
Setting detail: THERAPIES SERIES
Discharge: HOME | End: 2018-08-29
Payer: COMMERCIAL

## 2018-08-29 PROCEDURE — 97110 THERAPEUTIC EXERCISES: CPT

## 2018-08-29 NOTE — PROGRESS NOTES
[place]     Time In: 9570  Time Out: 9768  Minutes: 43  Timed Code Treatment Minutes: 43 Minutes     Date: 2018  Patient Name: Samual Councilman,  Gender:  female        CSN: [id number]   : 1965  (48 y.o.)       Referring Practitioner: Kwan Turner CNP       Diagnosis: G82.20 (ICD-10-CM) - Spastic paraplegia (HCC)R26.9 (ICD-10-CM) - Gait disturbance  Treatment Diagnosis: paraplegia with difficulty with transfers,decreased ROM RLE, LLE, and decreased sitting balance. Additional Pertinent Hx: comorbidities: transverse myelitis, MS, Paraplegia, biploar with sourav,lupus schizophrenia, DM Type II, COPD, incontinence of urine: Patient wears adult undergarments. Recent Botox injection: RLE for spasticity. General:  PT Visit Information  Onset Date: 18  PT Insurance Information: [place] Medicaid no precert is needed. PT, OT, ST allowed 30 visits each/calendar year. Aquatic therapy yes, modalties yes, iontophoresis, HP and CP are not covered. Total # of Visits to Date: 7  Plan of Care/Certification Expiration Date: 18  Progress Note Counter:  for next PN               Subjective:  Chart Reviewed: Yes  Patient assessed for rehabilitation services?: Yes  Response To Previous Treatment: Patient with no complaints from previous session. Family / Caregiver Present: No  Comments: Follow up with Rossy Casas. 10/8/18 with Dr. Sammy Mcnulty. Other (Comment): History of MS, transverse myelitis. Spastic paraplegia. Right lower extremity stretching, received Botox injections w/c transfers and KAFO      Subjective: Pt states \" doing some ex.s and some stretching.  \"     Pain:  Patient Currently in Pain: No         Objective               Exercises  Exercise 4: Seated edge of mat with step under feet: abdominal bracing with  overhead sh flex, trunk rotation and Diag B  with therapy ball Education & Training, Transfer Training, Endurance Training  Plan Comment: Continue with current POC    Goals:       Short term goals  Short term goal 5: Patient to dmeonstrate increased strength at core musculature with increased sitting balance reaching outside base of support and to improve transfers. GOAL  MET Usha is able to reach within base of support and outside base of support approximately 6 inches with sitting balance ex.  but decreased balance with reaching outside base of support.                Odalis Ortiz  [id number]

## 2018-08-31 ENCOUNTER — HOSPITAL ENCOUNTER (OUTPATIENT)
Dept: PHYSICAL THERAPY | Age: 53
Setting detail: THERAPIES SERIES
Discharge: HOME | End: 2018-08-31
Payer: COMMERCIAL

## 2018-08-31 PROCEDURE — 97110 THERAPEUTIC EXERCISES: CPT

## 2018-08-31 NOTE — PROGRESS NOTES
[facility]     Time In: 6085  Time Out: 6160  Minutes: 45  Timed Code Treatment Minutes: 45 Minutes     Date: 2018  Patient Name: Marylene Bulls,  Gender:  female        CSN: [id number]   : 1965  (48 y.o.)       Referring Practitioner: Moshe Womack CNP       Diagnosis: G82.20 (ICD-10-CM) - Spastic paraplegia (HCC)R26.9 (ICD-10-CM) - Gait disturbance  Treatment Diagnosis: paraplegia with difficulty with transfers,decreased ROM RLE, LLE, and decreased sitting balance. Additional Pertinent Hx: comorbidities: transverse myelitis, MS, Paraplegia, biploar with sourav,lupus schizophrenia, DM Type II, COPD, incontinence of urine: Patient wears adult undergarments. Recent Botox injection: RLE for spasticity. General:  PT Visit Information  Onset Date: 18  PT Insurance Information: [place] Medicaid no precert is needed. PT, OT, ST allowed 30 visits each/calendar year. Aquatic therapy yes, modalties yes, iontophoresis, HP and CP are not covered. Total # of Visits to Date: 8  Plan of Care/Certification Expiration Date: 18  Progress Note Counter:  for next PN               Subjective:  Chart Reviewed: Yes  Patient assessed for rehabilitation services?: Yes  Response To Previous Treatment: Patient with no complaints from previous session. Family / Caregiver Present: No  Comments: Follow up with Torito Rodriguez. 10/8/18 with Dr. Moshe Morley. Other (Comment): History of MS, transverse myelitis. Spastic paraplegia. Right lower extremity stretching, received Botox injections w/c transfers and KAFO      Subjective: Pt arrived in power chair today. \" I drove myself here in the chair. \"  Doing HEP-pt has help with strteches     Pain:  Patient Currently in Pain: No         Objective       Exercises  Exercise 4: Seated edge of mat with step under feet: abdominal bracing with  overhead sh strength at core musculature with increased sitting balance reaching outside base of support and to improve transfers. GOAL  MET Usha is able to reach within base of support and outside base of support approximately 6 inches with sitting balance ex.  but decreased balance with reaching outside base of support.                Jack Su  [id number]

## 2018-09-05 ENCOUNTER — HOSPITAL ENCOUNTER (OUTPATIENT)
Dept: PHYSICAL THERAPY | Age: 53
Setting detail: THERAPIES SERIES
Discharge: HOME | End: 2018-09-05
Payer: COMMERCIAL

## 2018-09-05 PROCEDURE — 97110 THERAPEUTIC EXERCISES: CPT

## 2018-09-07 ENCOUNTER — APPOINTMENT (OUTPATIENT)
Dept: PHYSICAL THERAPY | Age: 53
End: 2018-09-07
Payer: COMMERCIAL

## 2018-09-10 ENCOUNTER — HOSPITAL ENCOUNTER (OUTPATIENT)
Dept: PHYSICAL THERAPY | Age: 53
Setting detail: THERAPIES SERIES
Discharge: HOME | End: 2018-09-10
Payer: COMMERCIAL

## 2018-09-10 NOTE — PROGRESS NOTES
[facility]  PHYSICAL THERAPY MISSED TREATMENT NOTE  OUTPATIENT REHABILITATION    Date: 9/10/2018  Patient Name: Marylene Bulls        MRN: [id number]   : 1965  (48 y.o.)  Gender: female           PT Visit Information  No Show: 2    REASON FOR MISSED TREATMENT:  No Show/No Call. Attempted to contact pt concerning 2nd no show, but pt's rang several times and  then clicked off- no answer. Unable to leave message.      Jose Vicente [id number]

## 2018-09-12 ENCOUNTER — HOSPITAL ENCOUNTER (OUTPATIENT)
Dept: PHYSICAL THERAPY | Age: 53
Setting detail: THERAPIES SERIES
Discharge: HOME | End: 2018-09-12
Payer: COMMERCIAL

## 2018-09-12 PROCEDURE — 97112 NEUROMUSCULAR REEDUCATION: CPT

## 2018-09-12 PROCEDURE — 97110 THERAPEUTIC EXERCISES: CPT

## 2018-09-12 ASSESSMENT — PAIN SCALES - GENERAL: PAINLEVEL_OUTOF10: 0

## 2018-09-12 NOTE — PROGRESS NOTES
[facility] [place]     Time In: 9979  Time Out: 6392  Minutes: 56  Timed Code Treatment Minutes: 56 Minutes     Date: 2018  Patient Name: Tammy Bradley,  Gender:  female        CSN: [id number]   : 1965  (48 y.o.)       Referring Practitioner: Amber Aguilera CNP       Diagnosis: G82.20 (ICD-10-CM) - Spastic paraplegia (HCC)R26.9 (ICD-10-CM) - Gait disturbance  Treatment Diagnosis: paraplegia with difficulty with transfers,decreased ROM RLE, LLE, and decreased sitting balance. Additional Pertinent Hx: comorbidities: transverse myelitis, MS, Paraplegia, biploar with sourav,lupus schizophrenia, DM Type II, COPD, incontinence of urine: Patient wears adult undergarments. Recent Botox injection: RLE for spasticity. General:  PT Visit Information  Onset Date: 18  PT Insurance Information: [place] Medicaid no precert is needed. PT, OT, ST allowed 30 visits each/calendar year. Aquatic therapy yes, modalties yes, iontophoresis, HP and CP are not covered. Total # of Visits to Date: 10  Plan of Care/Certification Expiration Date: 18  Progress Note Counter:  for next PN. Subjective:  Chart Reviewed: Yes  Response To Previous Treatment: Patient with no complaints from previous session. Family / Caregiver Present: No  Comments: Follow up with Dyan Cerrato. 10/8/18 with Dr. Richa Powers. Other (Comment): History of MS, transverse myelitis. Spastic paraplegia. Right lower extremity stretching, received Botox injections w/c transfers and KAFO      Subjective: Patient wearing tennis shoes today instead of her flip flops. Patient report of little bit of tightness in both legs today. Transfers going well at home and able to do them on her own. Patient continues with seated hamstrings and heelcord stretch.    Laying on stomach is not working well due to feeling like she is smothering in this position. Pain:  Patient Currently in Pain: No  Pain Assessment: 0-10  Pain Level: 0      Objective              Exercises  Exercise 1: PROM Bilateral LEs: hamstring stretch, single knee to chest, calf, ankle inversion/eversion 3x 20 seconds each   Exercise 2: supine on large wedge with legs off mat table to stretch hip flexors 5 minutes. Exercise 4: seated at edge of mat table with 2 inch step under feet. yellow resistance band x15 reps with horizontal abduction/adduction, shoulder extension and rows. D2F patterns right/left UE x15. Exercise 7: Patient transfered from wheelchair to mat table SBA to mat lateral transfers. SBA from mat to w/c. Exercise 8: Biodex in OT area seated in w/c resistance 85 for 4 minutes  switch from retro to forward every 1 minute   Exercise 10: NuStep today L-2 x 10 min  New machine  Seat 6/arms 10 Min  A sit pivot w/c > NuStep and CGA<>close SBA NuSteo> w/c    Exercise 11: Seated B hamstring stretches    seated heelcord stretches with strap LEs on box x 3 each hold 10 sec          Activity Tolerance:  Activity Tolerance: Patient Tolerated treatment well  Activity Tolerance: Noting increased spasticity anad stiffness today in LEs. Assessment: Body structures, Functions, Activity limitations: Decreased functional mobility , Decreased ADL status, Decreased ROM, Decreased strength, Decreased safe awareness, Decreased endurance, Decreased balance  Assessment: Progressed with sitting balance and core strengthening sitting at edge of mat with yellow resistance band with UEs. Did hip flexor stretch supine with Legs off edge of bed.,  Note increased spasticity RLE today with ROM. Continue to note hip flexor and knee flexor contractures. as well as at ankle plantarflexors.     Prognosis: Fair  Discharge Recommendations: Continue to assess pending progress    Patient Education:  Patient Education: HEP, try hip flexor stretch supine with legs off edge of bed

## 2018-09-13 ENCOUNTER — HOSPITAL ENCOUNTER (OUTPATIENT)
Dept: PHYSICAL THERAPY | Age: 53
Setting detail: THERAPIES SERIES
Discharge: HOME | End: 2018-09-13
Payer: COMMERCIAL

## 2018-09-13 PROCEDURE — 97112 NEUROMUSCULAR REEDUCATION: CPT

## 2018-09-13 PROCEDURE — 97110 THERAPEUTIC EXERCISES: CPT

## 2018-09-13 ASSESSMENT — PAIN SCALES - GENERAL: PAINLEVEL_OUTOF10: 0

## 2018-09-13 NOTE — PROGRESS NOTES
Exercises  Exercise 1: PROM Bilateral LEs: hamstring stretch, single knee to chest, calf, ankle inversion/eversion 3x 20 seconds each   Exercise 2: supine on large wedge with legs off mat table to stretch hip flexors 5 minutes. Exercise 4: seated at edge of mat table with 2 inch step under feet. yellow resistance band x15 reps with horizontal abduction/adduction, shoulder extension and rows. D2F patterns right/left UE x15. Exercise 7: Patient transfered from wheelchair to mat table modified independently  to mat lateral transfers. Modified independently  from mat to w/c. Exercise 8: Biodex in OT area seated in w/c resistance 85 for 4 minutes  switch from retro to forward every 1 minute   Exercise 9: Reassessment 09/13/18. Refer to goal summary   Exercise 10: NuStep  L-2 x 10 min  New machine  Seat 6/arms 10 Min  A sit pivot w/c > NuStep and CGA<>close SBA NuSteo> w/c    Exercise 11: Seated B hamstring stretches    seated heelcord stretches with strap LEs on box x 3 each hold 10 sec          Activity Tolerance:  Activity Tolerance: Patient Tolerated treatment well  Activity Tolerance: Noting less spasticity RLE as compared to yesterday. Assessment:  Assessment: Reassessment for discharge. Refer to goal summary. Continue to note contractures at right knee and ankle. Tone is decreased and ROM 5 degrees more to 30 degrees from full extension but moderate restriction remains with ROM with limited hip flexors flexibility with hip extension 30 degrees  and 20 degrees. Patient has been instructed in home ex program for ROM, sitting balance and UE strengthening. Patient Education:  Patient Education: HEP, try hip flexor stretch supine with legs off edge of bed instead of prone. Seated LE ROM for hamstring stretch and heelcord stretch for plantarflexors. Seated UE exs. ( bicep curls, tricep ext, SH ER/IR, trunk rotation, diag UE and over head flexion, overhead press, rows Plan:  Plan Comment: Discharge from PT with patient continuing home ex program. Patient to follow up in October with physician. Goals:  Patient goals : Patient would like to be able to bear weight on her legs and walk. Lessen stiffness in right leg. GOAL NOT MET Usha is unable to bear weight and wear KAFO at this time due to tone and limited ROM at RLE . Stiffness remains at right LE at knee and ankle. Short term goals  Time Frame for Short term goals: 4 weeks 9/13/18   Short term goal 1: Patient to demonstrate transfers from w/c <>mat table modified independently with and without sliding board. GOAL  MET Patient transfers without sliding board from w/c<>mat table modified independently . (lateral transfers)  Patient modified independent with transfers at home per patient. Short term goal 2: Patient to demonstrate increased RLE ROM with SLR 90 degrees, knee extension 30 to 135 degrees to 15 degrees to 135 degrees flexion. right ankle dorsiflexion from -20 degrees to -10 degrees  with increased ease with transfers. and long sitting on mat table. GOAL NOT MET SLR right  80 degrees, Knee extension 30 degrees from neutral 0 degrees, right ankle dorsiflexion remains restricted 20 degrees . Short term goal 3: Patient to demonstrate increased LLE ROM with SLR to 90 degrees in order to long sit on mat table in order to dress lower body and improve bed mobility. GOAL NOT MET SLR 80 degrees. Short term goal 4: Patient to demonstrate decreased hip flexor tightness from 20 degrees from neutral to extension at neutral with patient to tolerate pronelying. GOAL NOT MET right hip flexor tightness with hip extension 30 degrees , left hip flexor tightness with hip extension to 25 degrees. Short term goal 5: Patient to demonstrate increased strength at core musculature with increased sitting balance reaching outside base of support and to improve transfers.  GOAL  MET Usha is able to reach within

## 2018-09-14 ENCOUNTER — OFFICE VISIT (OUTPATIENT)
Dept: UROLOGY | Age: 53
End: 2018-09-14
Payer: COMMERCIAL

## 2018-09-14 VITALS
WEIGHT: 155 LBS | DIASTOLIC BLOOD PRESSURE: 62 MMHG | SYSTOLIC BLOOD PRESSURE: 118 MMHG | HEIGHT: 60 IN | BODY MASS INDEX: 30.43 KG/M2

## 2018-09-14 DIAGNOSIS — N31.9 NEUROGENIC BLADDER: ICD-10-CM

## 2018-09-14 DIAGNOSIS — N39.0 URINARY TRACT INFECTION WITHOUT HEMATURIA, SITE UNSPECIFIED: Primary | ICD-10-CM

## 2018-09-14 LAB
BILIRUBIN URINE: ABNORMAL
BLOOD URINE, POC: ABNORMAL
CHARACTER, URINE: CLEAR
COLOR, URINE: YELLOW
GLUCOSE URINE: 100 MG/DL
KETONES, URINE: NEGATIVE
LEUKOCYTE CLUMPS, URINE: ABNORMAL
NITRITE, URINE: POSITIVE
PH, URINE: 6
POST VOID RESIDUAL (PVR): 9 ML
PROTEIN, URINE: 30 MG/DL
SPECIFIC GRAVITY, URINE: >= 1.03 (ref 1–1.03)
UROBILINOGEN, URINE: 4 EU/DL

## 2018-09-14 PROCEDURE — 51798 US URINE CAPACITY MEASURE: CPT | Performed by: NURSE PRACTITIONER

## 2018-09-14 PROCEDURE — G8417 CALC BMI ABV UP PARAM F/U: HCPCS | Performed by: NURSE PRACTITIONER

## 2018-09-14 PROCEDURE — G8428 CUR MEDS NOT DOCUMENT: HCPCS | Performed by: NURSE PRACTITIONER

## 2018-09-14 PROCEDURE — 4004F PT TOBACCO SCREEN RCVD TLK: CPT | Performed by: NURSE PRACTITIONER

## 2018-09-14 PROCEDURE — 99214 OFFICE O/P EST MOD 30 MIN: CPT | Performed by: NURSE PRACTITIONER

## 2018-09-14 PROCEDURE — 3017F COLORECTAL CA SCREEN DOC REV: CPT | Performed by: NURSE PRACTITIONER

## 2018-09-14 PROCEDURE — 81003 URINALYSIS AUTO W/O SCOPE: CPT | Performed by: NURSE PRACTITIONER

## 2018-09-14 ASSESSMENT — ENCOUNTER SYMPTOMS
NAUSEA: 0
ABDOMINAL PAIN: 0
VOMITING: 0

## 2018-09-14 NOTE — PROGRESS NOTES
placed in visit on 09/14/18   POCT Urinalysis No Micro (Auto)   Result Value Ref Range    Glucose, Ur 100 (A) NEGATIVE mg/dl    Bilirubin Urine Small (A)     Ketones, Urine Negative NEGATIVE    Specific Gravity, Urine >= 1.030 1.002 - 1.03    Blood, UA POC Trace-intact NEGATIVE    pH, Urine 6.00 5.0 - 9.0    Protein, Urine 30 (A) NEGATIVE mg/dl    Urobilinogen, Urine 4.00 0.0 - 1.0 eu/dl    Nitrite, Urine Positive (A) NEGATIVE    Leukocyte Clumps, Urine Small (A) NEGATIVE    Color, Urine Yellow YELLOW-STR    Character, Urine Clear [email or web address]   poct post void residual   Result Value Ref Range    post void residual 9 ml       Assessment:      Recurrent UTI  Mixed Incontinence  OAB  History of Urinary Retention  Multiple Sclerosis      Plan:      Continue Myrbetriq 50 mg daily for now. She continues to have severe incontinence and OAB. We discussed Interstim and she would liek to try this. Follow-up in a few weeks for Interstim Phase 1 trial in the office.

## 2018-09-18 ENCOUNTER — TELEPHONE (OUTPATIENT)
Dept: UROLOGY | Age: 53
End: 2018-09-18

## 2018-09-18 DIAGNOSIS — N32.81 OVERACTIVE BLADDER: Primary | ICD-10-CM

## 2018-09-20 DIAGNOSIS — G89.29 OTHER CHRONIC PAIN: ICD-10-CM

## 2018-09-21 RX ORDER — AMITRIPTYLINE HYDROCHLORIDE 25 MG/1
TABLET, FILM COATED ORAL
Qty: 30 TABLET | Refills: 0 | Status: SHIPPED | OUTPATIENT
Start: 2018-09-21 | End: 2018-10-08 | Stop reason: SDUPTHER

## 2018-09-26 ENCOUNTER — TELEPHONE (OUTPATIENT)
Dept: PHYSICAL MEDICINE AND REHAB | Age: 53
End: 2018-09-26

## 2018-09-27 ENCOUNTER — TELEPHONE (OUTPATIENT)
Dept: PHYSICAL MEDICINE AND REHAB | Age: 53
End: 2018-09-27

## 2018-09-27 ENCOUNTER — TELEPHONE (OUTPATIENT)
Dept: UROLOGY | Age: 53
End: 2018-09-27

## 2018-09-27 RX ORDER — CIPROFLOXACIN 500 MG/1
500 TABLET, FILM COATED ORAL 2 TIMES DAILY
Qty: 20 TABLET | Refills: 0 | Status: SHIPPED | OUTPATIENT
Start: 2018-09-27 | End: 2018-10-07

## 2018-09-27 RX ORDER — GABAPENTIN 800 MG/1
TABLET ORAL
Qty: 90 TABLET | Refills: 5 | Status: SHIPPED | OUTPATIENT
Start: 2018-09-27 | End: 2019-02-07 | Stop reason: SDUPTHER

## 2018-09-27 NOTE — TELEPHONE ENCOUNTER
Patient is calling office stating that on 9/14/18 she was supposed to start an antibiotic for infection? Never had anything sent to the pharmacy. Her symptoms are still present.

## 2018-09-27 NOTE — TELEPHONE ENCOUNTER
I never got the culture result. Did it get sent from the office? Can you check on this? I will send Cipro while we await the results and figure it out. She will need to give another if we lost it.

## 2018-09-28 NOTE — TELEPHONE ENCOUNTER
Contacted patient, she states that the urine culture was supposed to be sent out at her last office visit. It looks like it did not get wrote down to be sent. She is going to have another culture done 10/1/18 for the interstim trial she is set up for on 10/8. She said she is in a wheelchair so she isn't sure she will be able to get it done before that. I told her Cipro was sent, but we really need her to get the urine culture done prior to that to make sure we are treating it correctly. She voiced understanding and said she would try.

## 2018-10-02 ENCOUNTER — HOSPITAL ENCOUNTER (OUTPATIENT)
Age: 53
Discharge: HOME | End: 2018-10-02
Payer: COMMERCIAL

## 2018-10-02 DIAGNOSIS — N32.81 OVERACTIVE BLADDER: ICD-10-CM

## 2018-10-02 LAB
BACTERIA: ABNORMAL /HPF
BILIRUBIN URINE: ABNORMAL
BLOOD, URINE: NEGATIVE
CASTS 2: ABNORMAL /LPF
CASTS UA: ABNORMAL /LPF
CHARACTER, URINE: CLEAR
COLOR: ABNORMAL
CRYSTALS, UA: ABNORMAL
EPITHELIAL CELLS, UA: ABNORMAL /HPF
GLUCOSE URINE: NEGATIVE MG/DL
ICTOTEST: NEGATIVE
KETONES, URINE: NEGATIVE
LEUKOCYTE ESTERASE, URINE: NEGATIVE
MISCELLANEOUS 2: ABNORMAL
NITRITE, URINE: NEGATIVE
PH UA: 6.5
PROTEIN UA: ABNORMAL
RBC URINE: ABNORMAL /HPF
RENAL EPITHELIAL, UA: ABNORMAL
SPECIFIC GRAVITY, URINE: 1.02 (ref 1–1.03)
UROBILINOGEN, URINE: 4 EU/DL
WBC UA: ABNORMAL /HPF
YEAST: ABNORMAL

## 2018-10-02 PROCEDURE — 81001 URINALYSIS AUTO W/SCOPE: CPT

## 2018-10-08 ENCOUNTER — PROCEDURE VISIT (OUTPATIENT)
Dept: UROLOGY | Age: 53
End: 2018-10-08
Payer: COMMERCIAL

## 2018-10-08 ENCOUNTER — OFFICE VISIT (OUTPATIENT)
Dept: PHYSICAL MEDICINE AND REHAB | Age: 53
End: 2018-10-08
Payer: COMMERCIAL

## 2018-10-08 VITALS
DIASTOLIC BLOOD PRESSURE: 68 MMHG | HEIGHT: 60 IN | WEIGHT: 150 LBS | BODY MASS INDEX: 29.45 KG/M2 | SYSTOLIC BLOOD PRESSURE: 102 MMHG

## 2018-10-08 VITALS
BODY MASS INDEX: 29.45 KG/M2 | SYSTOLIC BLOOD PRESSURE: 105 MMHG | HEIGHT: 60 IN | HEART RATE: 88 BPM | DIASTOLIC BLOOD PRESSURE: 68 MMHG | WEIGHT: 150 LBS

## 2018-10-08 DIAGNOSIS — G82.20 PARAPLEGIA (HCC): Primary | ICD-10-CM

## 2018-10-08 DIAGNOSIS — G89.29 OTHER CHRONIC PAIN: ICD-10-CM

## 2018-10-08 DIAGNOSIS — N39.0 URINARY TRACT INFECTION WITHOUT HEMATURIA, SITE UNSPECIFIED: Primary | ICD-10-CM

## 2018-10-08 DIAGNOSIS — M24.561 FLEXION CONTRACTURE OF RIGHT KNEE: ICD-10-CM

## 2018-10-08 DIAGNOSIS — G37.3 TRANSVERSE MYELITIS (HCC): ICD-10-CM

## 2018-10-08 DIAGNOSIS — N31.9 NEUROGENIC BLADDER: ICD-10-CM

## 2018-10-08 DIAGNOSIS — R25.2 SPASTICITY: ICD-10-CM

## 2018-10-08 LAB
BILIRUBIN URINE: ABNORMAL
BLOOD URINE, POC: NEGATIVE
CHARACTER, URINE: CLEAR
COLOR, URINE: YELLOW
GLUCOSE URINE: 100 MG/DL
KETONES, URINE: ABNORMAL
LEUKOCYTE CLUMPS, URINE: NEGATIVE
NITRITE, URINE: NEGATIVE
PH, URINE: 6
PROTEIN, URINE: 30 MG/DL
SPECIFIC GRAVITY, URINE: >= 1.03 (ref 1–1.03)
UROBILINOGEN, URINE: >= 8 EU/DL

## 2018-10-08 PROCEDURE — 81003 URINALYSIS AUTO W/O SCOPE: CPT | Performed by: UROLOGY

## 2018-10-08 PROCEDURE — 64561 IMPLANT NEUROELECTRODES: CPT | Performed by: UROLOGY

## 2018-10-08 PROCEDURE — 3017F COLORECTAL CA SCREEN DOC REV: CPT | Performed by: PHYSICAL MEDICINE & REHABILITATION

## 2018-10-08 PROCEDURE — 64642 CHEMODENERV 1 EXTREMITY 1-4: CPT | Performed by: PHYSICAL MEDICINE & REHABILITATION

## 2018-10-08 PROCEDURE — G8427 DOCREV CUR MEDS BY ELIG CLIN: HCPCS | Performed by: PHYSICAL MEDICINE & REHABILITATION

## 2018-10-08 PROCEDURE — 99999 PR OFFICE/OUTPT VISIT,PROCEDURE ONLY: CPT | Performed by: UROLOGY

## 2018-10-08 PROCEDURE — G8417 CALC BMI ABV UP PARAM F/U: HCPCS | Performed by: PHYSICAL MEDICINE & REHABILITATION

## 2018-10-08 PROCEDURE — 4004F PT TOBACCO SCREEN RCVD TLK: CPT | Performed by: PHYSICAL MEDICINE & REHABILITATION

## 2018-10-08 PROCEDURE — 99213 OFFICE O/P EST LOW 20 MIN: CPT | Performed by: PHYSICAL MEDICINE & REHABILITATION

## 2018-10-08 PROCEDURE — G8484 FLU IMMUNIZE NO ADMIN: HCPCS | Performed by: PHYSICAL MEDICINE & REHABILITATION

## 2018-10-08 RX ORDER — BACLOFEN 10 MG/1
15 TABLET ORAL 3 TIMES DAILY
Qty: 135 TABLET | Refills: 5 | Status: SHIPPED | OUTPATIENT
Start: 2018-10-08 | End: 2019-02-07 | Stop reason: SDUPTHER

## 2018-10-08 RX ORDER — AMITRIPTYLINE HYDROCHLORIDE 25 MG/1
25 TABLET, FILM COATED ORAL NIGHTLY
Qty: 30 TABLET | Refills: 5 | Status: SHIPPED | OUTPATIENT
Start: 2018-10-08 | End: 2019-02-07 | Stop reason: SDUPTHER

## 2018-10-08 RX ORDER — HYDROCODONE BITARTRATE AND ACETAMINOPHEN 5; 325 MG/1; MG/1
1 TABLET ORAL 2 TIMES DAILY PRN
Qty: 60 TABLET | Refills: 0 | Status: SHIPPED | OUTPATIENT
Start: 2018-10-08 | End: 2018-11-07

## 2018-10-08 NOTE — PROGRESS NOTES
Pamela Alvarado was seen in follow up for InterStim trial in the office. Review of Systems  No problems with ears, nose or throat. No problems with eyes. No chest pain, shortness of breath, abdominal pain, extremity pain or weakness, and no neurological deficits. No rashes. No swollen glands or lymph nodes.  symptoms per HPI. The remainder of the review of symptoms is negative. Exam  General: alert and oriented. Cooperative. HENT: Normocephalic, Atraumatic. Eyes: No scleral icterus, mucous membranes moist.  Respiratory: Effort normal.   Skin: No rashes or obvious lesions. Procedure note   After explaining what we would be doing, Pamela Alvarado was placed in prone position on the procedure table. Her lower back and upper gluteal region was prepped and she was draped in the standard fashion for surgery. Using sterile technique the position of needle placement was marked out and then the area where the needles would be advanced was instilled with local anesthetic. The location of needle placement was determined by using the standard length needle as a guide and then going 2 cm lateral to the midline on either side. Once we instilled local anesthetic I began advancing the standard needles on a 60° angle toward the S3 foramen. As the needle was initially advanced I bumped into bone and the needle was carefully pulled back and reinserted several times making very small adjustments in order to probe to find the S3 foramen. Once I was able to get into the S3 foramen on one side I then used this angulation as a guide in order to advance the needle on the opposite side. Once we were into the S3 foramen testing was performed and a good S3 response found. This was repeated on both sides with both needles and once we were comfortable that both needles were in the S3 foramen in good position we turned our attention to advancement of the leads.     The leads were removed from their sheaths and one at a time were passed

## 2018-10-08 NOTE — PROGRESS NOTES
[address] [facility]  Outpatient progress note    Chief Complaint:   Chief Complaint   Patient presents with    Botox Injection        Subjective: Mireya Pantoja is a 48 y.o. female who returns to the office today for further follow up. Ongoing right lower limb spasticity. Botox injections continue to be beneficial. They have been wearing off for the last 2 weeks. Would like repeat injections today. Neuropathic pain well controlled. She is on gabapentin and amitriptyline with good results. Also continues to take Norco for bilateral leg pain. ROM of lower limb improved. Continues with ROM program at home. Review of Systems:  CONSTITUTIONAL:  negative  EYES:  negative  HEENT:  negative  RESPIRATORY:  negative  CARDIOVASCULAR:  negative  GASTROINTESTINAL:  positive for neurogenic bowel  GENITOURINARY:  positive for neurogenic bladder  SKIN:  negative  HEMATOLOGIC/LYMPHATIC:  negative  MUSCULOSKELETAL:  negative  NEUROLOGICAL:  positive for gait problems, weakness, pain, tingling and spasticity  BEHAVIOR/PSYCH:  negative  All other review of systems otherwise negative    Physical Exam:  /68 (Site: Left Upper Arm, Position: Sitting, Cuff Size: Medium Adult)   Pulse 88   Ht 5' (1.524 m)   Wt 150 lb (68 kg)   BMI 29.29 kg/m²     awake  Orientation:   person, place, time  Mood: within normal limits  Affect: calm  General appearance: Patient is well nourished, well developed, well groomed and in no acute distress, appearing stated age    ROM:  abnormal - right knee and ankle restricted due to spasticity, severe-improving  Motor Exam:  2/5 right knee extension and ADF.  3/5 right knee flexion  Tone:  abnormal - 1-2/4 right leg/ankle  Muscle bulk: within normal limits  Sensory:  Decreased sensation    Skin: warm and dry, no rash or erythema  Peripheral vascular: Pulses: Normal upper and lower extremity pulses; Edema:

## 2018-10-08 NOTE — PROGRESS NOTES
Physical Medicine and Rehabilitation  Procedure Note    Verbal consent obtained for botulinum toxin injections after risks versus benefits discussed. 500 Units of Botox were reconstituted using preservative-free normal saline in a 100 unit to 2 mL solution. Alcohol swabs were used to cleanse the skin before injections were performed. Back pressure was placed on the syringe during injections to avoid any intravascular injection. EMG guidance was not used during procedure. Botulinum toxin was injected in the following muscles of the right lower limb:  Lateral hamstring 150 units, medial hamstring 150 units, lateral gastroc 100 units, medial gastroc 100 units    The patient tolerated the procedure well with no immediate complications. The patient should call with concerns or questions over the coming days.     CPT: 50020    Tanmay Silva MD

## 2018-10-12 ENCOUNTER — NURSE ONLY (OUTPATIENT)
Dept: UROLOGY | Age: 53
End: 2018-10-12

## 2018-10-12 ENCOUNTER — TELEPHONE (OUTPATIENT)
Dept: UROLOGY | Age: 53
End: 2018-10-12

## 2018-10-12 ENCOUNTER — TELEPHONE (OUTPATIENT)
Dept: PHYSICAL MEDICINE AND REHAB | Age: 53
End: 2018-10-12

## 2018-10-12 VITALS — SYSTOLIC BLOOD PRESSURE: 126 MMHG | DIASTOLIC BLOOD PRESSURE: 70 MMHG

## 2018-10-12 DIAGNOSIS — G82.22 CHRONIC INCOMPLETE SPASTIC PARAPLEGIA (HCC): Primary | ICD-10-CM

## 2018-10-12 DIAGNOSIS — R26.9 GAIT DISTURBANCE: ICD-10-CM

## 2018-10-12 DIAGNOSIS — N32.81 OVERACTIVE BLADDER: Primary | ICD-10-CM

## 2018-10-12 DIAGNOSIS — N31.9 NEUROGENIC BLADDER: ICD-10-CM

## 2018-10-12 DIAGNOSIS — Z01.818 PREOPERATIVE TESTING: ICD-10-CM

## 2018-10-12 PROCEDURE — 99999 PR OFFICE/OUTPT VISIT,PROCEDURE ONLY: CPT | Performed by: NURSE PRACTITIONER

## 2018-10-19 NOTE — TELEPHONE ENCOUNTER
SURGERY 826  [facility] [address] [facility], [address]      Phone *[phone] *[phone]   Surgical Scheduling Direct Line Phone *[phone] Fax *[phone]      Claudine Moore 1965 female    [address] [facility],[address] [facility]   Marital Status:          Home Phone: [phone]      Cell Phone:    Telephone Information:   Mobile [phone]          Surgeon: Dr. Obrien December  Surgery Date: 11-   Time: 1:00pm    Procedure: Placement of InterStim device    Diagnosis: Neurogenic bladder/ Overactive bladder     Important Medical History: In [facility]    Special Inst/Equip: Regular Room    Jovon @ [employer] notified    CPT Codes:    27405  Latex Allergy:  No     Cardiac Device:  No     Anesthesia:  Anesthesiologist (General/Spinal)          Admission Type:  Same Day                             Admit Prior to Day of Surgery: No    Case Location:  Main OR           Preadmission Testing:Phone Call              PAT Date and Time:______________________________________________________    PAT Confirmation #: ______________________________________________________    Post Op Visit: ___________________________________________________________    Need Preop Cardiac Clearance: No    Does Patient have Cardiologist/physician?      None    Surgery Confirmation #: __________________________________________________    : ________________________   Date: __________________________     [employer] Name: Tara

## 2018-10-22 ENCOUNTER — HOSPITAL ENCOUNTER (OUTPATIENT)
Dept: PHYSICAL THERAPY | Age: 53
Setting detail: THERAPIES SERIES
Discharge: HOME | End: 2018-10-22
Payer: COMMERCIAL

## 2018-10-30 ENCOUNTER — TELEPHONE (OUTPATIENT)
Dept: UROLOGY | Age: 53
End: 2018-10-30

## 2018-10-31 ENCOUNTER — HOSPITAL ENCOUNTER (OUTPATIENT)
Dept: PHYSICAL THERAPY | Age: 53
Setting detail: THERAPIES SERIES
Discharge: HOME | End: 2018-10-31
Payer: COMMERCIAL

## 2018-10-31 PROCEDURE — 97161 PT EVAL LOW COMPLEX 20 MIN: CPT

## 2018-10-31 PROCEDURE — 97110 THERAPEUTIC EXERCISES: CPT

## 2018-10-31 NOTE — PROGRESS NOTES
2x  Plan weeks: 8 weeks  Specific instructions for Next Treatment: LE stretches, strengthening, dynamic sitting balance, Nustep  Current Treatment Recommendations: Strengthening, ROM, Balance Training, Transfer Training, Endurance Training, Manual Therapy - Soft Tissue Mobilization, Patient/Caregiver Education & Training, Home Exercise Program, Safety Education & Training  Plan Comment: POC initiated. History: Personal factors or comorbidities that impact plan of care - High Complexity: 3 or more personal factors or comorbidities. See history section above for details. Examination: Body structures and functions, activity limitations, participation restrictions; using standardized tests and measures - High Complexity: 4 or more body structures and functional, activity limitations and/or participation restrictions. See restrictions and objective section above for details. Clinical Presentation: Low - Stable and Uncomplicated: chronic presentation without evolving characteristics    Decision Making: Moderate Complexity due to multiple medical issues and above reaosns. Decision making was based on patient assessment and decision making process in determining plan of care and establishing reasonable expectations for measurable functional outcomes. Evaluation Complexity: Based on the findings of patient history, examination, clinical presentation, and decision making during this evaluation, the evaluation of Sumaya Nova  is of low complexity.     Goals:  Patient goals : Get strength back in legs    Short term goals  Time Frame for Short term goals: see LTGs d/t short plan of care    Long term goals  Time Frame for Long term goals : 8 weeks  Long term goal 1: Pt will safely transfer wheelchair to/from mat table mod I.   Long term goal 2: Patient will increase hamstring 90/90 right to 40 deg and left to 50 deg, knee extension right from 34 to 20 degrees, and right ankle df from 25 degrees to 15 degrees

## 2018-11-06 ENCOUNTER — HOSPITAL ENCOUNTER (OUTPATIENT)
Dept: PHYSICAL THERAPY | Age: 53
Setting detail: THERAPIES SERIES
Discharge: HOME | End: 2018-11-06
Payer: COMMERCIAL

## 2018-11-06 PROCEDURE — 97110 THERAPEUTIC EXERCISES: CPT

## 2018-11-06 ASSESSMENT — PAIN DESCRIPTION - ORIENTATION: ORIENTATION: RIGHT;LEFT

## 2018-11-06 ASSESSMENT — PAIN DESCRIPTION - LOCATION: LOCATION: LEG

## 2018-11-06 ASSESSMENT — PAIN SCALES - GENERAL: PAINLEVEL_OUTOF10: 6

## 2018-11-08 ENCOUNTER — HOSPITAL ENCOUNTER (OUTPATIENT)
Age: 53
Discharge: HOME | End: 2018-11-08
Payer: COMMERCIAL

## 2018-11-08 ENCOUNTER — HOSPITAL ENCOUNTER (OUTPATIENT)
Dept: GENERAL RADIOLOGY | Age: 53
Discharge: HOME | End: 2018-11-08
Payer: COMMERCIAL

## 2018-11-08 ENCOUNTER — HOSPITAL ENCOUNTER (OUTPATIENT)
Dept: PHYSICAL THERAPY | Age: 53
Setting detail: THERAPIES SERIES
Discharge: HOME | End: 2018-11-08
Payer: COMMERCIAL

## 2018-11-08 DIAGNOSIS — N31.9 NEUROGENIC BLADDER: ICD-10-CM

## 2018-11-08 DIAGNOSIS — Z01.818 PREOPERATIVE TESTING: ICD-10-CM

## 2018-11-08 DIAGNOSIS — N32.81 OVERACTIVE BLADDER: ICD-10-CM

## 2018-11-08 LAB
ANION GAP SERPL CALCULATED.3IONS-SCNC: 12 MEQ/L (ref 8–16)
BACTERIA: ABNORMAL /HPF
BASOPHILS # BLD: 0.6 %
BASOPHILS ABSOLUTE: 0 THOU/MM3 (ref 0–0.1)
BILIRUBIN URINE: ABNORMAL
BLOOD, URINE: NEGATIVE
BUN BLDV-MCNC: 10 MG/DL (ref 7–22)
CALCIUM SERPL-MCNC: 9.1 MG/DL (ref 8.5–10.5)
CASTS UA: ABNORMAL /LPF
CHARACTER, URINE: CLEAR
CHLORIDE BLD-SCNC: 105 MEQ/L (ref 98–111)
CO2: 26 MEQ/L (ref 23–33)
COLOR: ABNORMAL
CREAT SERPL-MCNC: 0.3 MG/DL (ref 0.4–1.2)
CRYSTALS, UA: ABNORMAL
EKG ATRIAL RATE: 83 BPM
EKG P AXIS: 41 DEGREES
EKG P-R INTERVAL: 160 MS
EKG Q-T INTERVAL: 422 MS
EKG QRS DURATION: 88 MS
EKG QTC CALCULATION (BAZETT): 495 MS
EKG R AXIS: 35 DEGREES
EKG T AXIS: 34 DEGREES
EKG VENTRICULAR RATE: 83 BPM
EOSINOPHIL # BLD: 1.7 %
EOSINOPHILS ABSOLUTE: 0.1 THOU/MM3 (ref 0–0.4)
EPITHELIAL CELLS, UA: ABNORMAL /HPF
ERYTHROCYTE [DISTWIDTH] IN BLOOD BY AUTOMATED COUNT: 15.9 % (ref 11.5–14.5)
ERYTHROCYTE [DISTWIDTH] IN BLOOD BY AUTOMATED COUNT: 45.8 FL (ref 35–45)
GFR SERPL CREATININE-BSD FRML MDRD: > 90 ML/MIN/1.73M2
GLUCOSE BLD-MCNC: 122 MG/DL (ref 70–108)
GLUCOSE URINE: NEGATIVE MG/DL
HCT VFR BLD CALC: 39.8 % (ref 37–47)
HEMOGLOBIN: 14.5 GM/DL (ref 12–16)
ICTOTEST: NEGATIVE
IMMATURE GRANS (ABS): 0.03 THOU/MM3 (ref 0–0.07)
IMMATURE GRANULOCYTES: 0.5 %
KETONES, URINE: NEGATIVE
LEUKOCYTE ESTERASE, URINE: NEGATIVE
LYMPHOCYTES # BLD: 41.1 %
LYMPHOCYTES ABSOLUTE: 2.7 THOU/MM3 (ref 1–4.8)
MCH RBC QN AUTO: 29.5 PG (ref 26–33)
MCHC RBC AUTO-ENTMCNC: 36.4 GM/DL (ref 32.2–35.5)
MCV RBC AUTO: 80.9 FL (ref 81–99)
MONOCYTES # BLD: 7 %
MONOCYTES ABSOLUTE: 0.5 THOU/MM3 (ref 0.4–1.3)
NITRITE, URINE: NEGATIVE
NUCLEATED RED BLOOD CELLS: 0 /100 WBC
PH UA: 7.5
PLATELET # BLD: 122 THOU/MM3 (ref 130–400)
PMV BLD AUTO: 10.2 FL (ref 9.4–12.4)
POTASSIUM SERPL-SCNC: 3.4 MEQ/L (ref 3.5–5.2)
PROTEIN UA: NEGATIVE
RBC # BLD: 4.92 MILL/MM3 (ref 4.2–5.4)
RBC URINE: ABNORMAL /HPF
SEG NEUTROPHILS: 49.1 %
SEGMENTED NEUTROPHILS ABSOLUTE COUNT: 3.2 THOU/MM3 (ref 1.8–7.7)
SODIUM BLD-SCNC: 143 MEQ/L (ref 135–145)
SPECIFIC GRAVITY, URINE: 1.02 (ref 1–1.03)
UROBILINOGEN, URINE: 4 EU/DL
WBC # BLD: 6.6 THOU/MM3 (ref 4.8–10.8)
WBC UA: ABNORMAL /HPF

## 2018-11-08 PROCEDURE — 81001 URINALYSIS AUTO W/SCOPE: CPT

## 2018-11-08 PROCEDURE — 80048 BASIC METABOLIC PNL TOTAL CA: CPT

## 2018-11-08 PROCEDURE — 97110 THERAPEUTIC EXERCISES: CPT

## 2018-11-08 PROCEDURE — 71046 X-RAY EXAM CHEST 2 VIEWS: CPT

## 2018-11-08 PROCEDURE — 36415 COLL VENOUS BLD VENIPUNCTURE: CPT

## 2018-11-08 PROCEDURE — 85025 COMPLETE CBC W/AUTO DIFF WBC: CPT

## 2018-11-08 ASSESSMENT — PAIN SCALES - GENERAL: PAINLEVEL_OUTOF10: 6

## 2018-11-08 ASSESSMENT — PAIN DESCRIPTION - PAIN TYPE: TYPE: CHRONIC PAIN

## 2018-11-08 ASSESSMENT — PAIN DESCRIPTION - LOCATION: LOCATION: LEG

## 2018-11-08 ASSESSMENT — PAIN DESCRIPTION - ORIENTATION: ORIENTATION: RIGHT;LEFT

## 2018-11-13 ENCOUNTER — TELEPHONE (OUTPATIENT)
Dept: UROLOGY | Age: 53
End: 2018-11-13

## 2018-11-13 ENCOUNTER — HOSPITAL ENCOUNTER (OUTPATIENT)
Dept: PHYSICAL THERAPY | Age: 53
Setting detail: THERAPIES SERIES
Discharge: HOME | End: 2018-11-13
Payer: COMMERCIAL

## 2018-11-13 PROCEDURE — 97110 THERAPEUTIC EXERCISES: CPT

## 2018-11-13 ASSESSMENT — PAIN SCALES - GENERAL: PAINLEVEL_OUTOF10: 6

## 2018-11-13 ASSESSMENT — PAIN DESCRIPTION - PAIN TYPE: TYPE: CHRONIC PAIN

## 2018-11-13 NOTE — TELEPHONE ENCOUNTER
I called the patient she stated her urine is tea colored with a strong odor. She stated she the Cipro several weeks ago. She denies having any fever, Chills, frequency, urgency, or dysuria. The prior auth was approved by [employer] on 3/16/18. I spoke to [place] regarding if another prior auth needed completed and advised the patient of this. The patient would like the urine results and questions if she needs more antibiotics. Please advise.

## 2018-11-13 NOTE — PROGRESS NOTES
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 General:  PT Visit Information  Onset Date: 10/31/18  PT Insurance Information: [place] Medicaid no precert is needed. PT, OT, ST allowed 30 visits each/calendar year, 12 used . Aquatic therapy yes, modalties yes, iontophoresis, HP and CP are not covered. Total # of Visits Approved: 18  Total # of Visits to Date: 4  Plan of Care/Certification Expiration Date: 18  Progress Note Counter:  for PN. Subjective:  Chart Reviewed: Yes  Patient assessed for rehabilitation services?: Yes  Response To Previous Treatment: Patient with no complaints from previous session. Comments: Follow up with Dr. Elinda Aase 19. Other (Comment): History of MS, transverse myelitis. Spastic paraplegia. Right lower extremity stretching, received Botox injections w/c transfers and KAFO      Subjective: Patient reports feeling not to great today, \"feels like my bones are aching really bad today, yesterday was really bad was feeling dizzy\". Reports the pain is all over today.       Pain:  Patient Currently in Pain: Yes  Pain Assessment: 0-10  Pain Level: 6  Pain Type: Chronic pain      Objective  Exercises  Exercise 1: PROM Bilateral LEs: hamstring stretch, single knee to chest, goals : Get strength back in legs    Short term goals  Time Frame for Short term goals: see LTGs d/t short plan of care  Short term goal 1: Patient to demonstrate transfers from w/c <>mat table modified independently with and without sliding board. GOAL  MET Patient transfers without sliding board from w/c<>mat table modified independently . (lateral transfers)  Patient modified independent with transfers at home per patient. Short term goal 2: Patient to demonstrate increased RLE ROM with SLR 90 degrees, knee extension 30 to 135 degrees to 15 degrees to 135 degrees flexion. right ankle dorsiflexion from -20 degrees to -10 degrees  with increased ease with transfers. and long sitting on mat table. GOAL NOT MET SLR right  80 degrees, Knee extension 30 degrees from neutral 0 degrees, right ankle dorsiflexion remains restricted 20 degrees . Short term goal 3: Patient to demonstrate increased LLE ROM with SLR to 90 degrees in order to long sit on mat table in order to dress lower body and improve bed mobility. GOAL NOT MET SLR 80 degrees. Short term goal 4: Patient to demonstrate decreased hip flexor tightness from 20 degrees from neutral to extension at neutral with patient to tolerate pronelying. GOAL NOT MET right hip flexor tightness with hip extension 30 degrees , left hip flexor tightness with hip extension to 25 degrees. Short term goal 5: Patient to demonstrate increased strength at core musculature with increased sitting balance reaching outside base of support and to improve transfers. GOAL  MET Usha is able to reach within base of support and outside base of support approximately >12  inches with sitting balance ex.      Long term goals  Time Frame for Long term goals : 8 weeks  Long term goal 1: Pt will safely transfer wheelchair to/from mat table mod I.   Long term goal 2: Patient will increase hamstring 90/90 right to 40 deg and left to 50 deg, knee extension right from 34 to 20 degrees, and

## 2018-11-15 ENCOUNTER — HOSPITAL ENCOUNTER (OUTPATIENT)
Dept: PHYSICAL THERAPY | Age: 53
Setting detail: THERAPIES SERIES
Discharge: HOME | End: 2018-11-15
Payer: COMMERCIAL

## 2018-11-15 PROCEDURE — 97110 THERAPEUTIC EXERCISES: CPT

## 2018-11-15 ASSESSMENT — PAIN SCALES - GENERAL: PAINLEVEL_OUTOF10: 7

## 2018-11-15 ASSESSMENT — PAIN DESCRIPTION - LOCATION: LOCATION: GENERALIZED

## 2018-11-15 ASSESSMENT — PAIN DESCRIPTION - PAIN TYPE: TYPE: CHRONIC PAIN

## 2018-11-15 NOTE — PROGRESS NOTES
demonstrate transfers from w/c <>mat table modified independently with and without sliding board. GOAL  MET Patient transfers without sliding board from w/c<>mat table modified independently . (lateral transfers)  Patient modified independent with transfers at home per patient. Short term goal 2: Patient to demonstrate increased RLE ROM with SLR 90 degrees, knee extension 30 to 135 degrees to 15 degrees to 135 degrees flexion. right ankle dorsiflexion from -20 degrees to -10 degrees  with increased ease with transfers. and long sitting on mat table. GOAL NOT MET SLR right  80 degrees, Knee extension 30 degrees from neutral 0 degrees, right ankle dorsiflexion remains restricted 20 degrees . Short term goal 3: Patient to demonstrate increased LLE ROM with SLR to 90 degrees in order to long sit on mat table in order to dress lower body and improve bed mobility. GOAL NOT MET SLR 80 degrees. Short term goal 4: Patient to demonstrate decreased hip flexor tightness from 20 degrees from neutral to extension at neutral with patient to tolerate pronelying. GOAL NOT MET right hip flexor tightness with hip extension 30 degrees , left hip flexor tightness with hip extension to 25 degrees. Short term goal 5: Patient to demonstrate increased strength at core musculature with increased sitting balance reaching outside base of support and to improve transfers. GOAL  MET Usha is able to reach within base of support and outside base of support approximately >12  inches with sitting balance ex. Long term goals  Time Frame for Long term goals : 8 weeks  Long term goal 1: Pt will safely transfer wheelchair to/from mat table mod I.   Long term goal 2: Patient will increase hamstring 90/90 right to 40 deg and left to 50 deg, knee extension right from 34 to 20 degrees, and right ankle df from 25 degrees to 15 degrees from neutral for improved transfers and long sitting on mat table.    Long term goal 3: Patient to demo good core strength to improve sitting balance reaching outside base of support and to improve transfers. Long term goal 4: Pt will improve R hip strength to 3+/5 and L hip strength to 4-/5, hamstring R to 3+/5, L, to 4/5, and quad R to 3/5, L to 4-/5 for ease with transfers. Long term goal 5: Pt will be independent and compliant with HEP to achieve above goals.           Charley Score  [id number]

## 2018-11-19 NOTE — H&P
Social History     Social History    Marital status:      Spouse name: N/A    Number of children: N/A    Years of education: N/A     Occupational History    Not on file. Social History Main Topics    Smoking status: Current Every Day Smoker     Packs/day: 0.50     Types: Cigarettes     Start date: 12/6/1979    Smokeless tobacco: Never Used    Alcohol use 0.0 oz/week      Comment: social drinker    Drug use: No    Sexual activity: No     Other Topics Concern    Not on file     Social History Narrative    ** Merged History Encounter **            Family History   Problem Relation Age of Onset    Stroke Mother     Asthma Mother     Other Mother         Review of Systems  No problems with ears, nose or throat. No problems with eyes. No chest pain, shortness of breath, abdominal pain, extremity pain or weakness, and no neurological deficits. No rashes. No swollen glands or lymph nodes.  symptoms per HPI. The remainder of the review of symptoms is negative.     Exam  General: alert and oriented. Cooperative. HENT: Normocephalic, Atraumatic. Eyes: No scleral icterus, mucous membranes moist.  Respiratory: Effort normal.   Skin: No rashes or obvious lesions.        Procedure note   After explaining what we would be doing, Kylie Marc was placed in prone position on the procedure table. Her lower back and upper gluteal region was prepped and she was draped in the standard fashion for surgery. Using sterile technique the position of needle placement was marked out and then the area where the needles would be advanced was instilled with local anesthetic. The location of needle placement was determined by using the standard length needle as a guide and then going 2 cm lateral to the midline on either side.     Once we instilled local anesthetic I began advancing the standard needles on a 60° angle toward the S3 foramen.  As the needle was initially advanced I bumped into bone and the needle

## 2018-11-20 ENCOUNTER — HOSPITAL ENCOUNTER (OUTPATIENT)
Dept: PHYSICAL THERAPY | Age: 53
Setting detail: THERAPIES SERIES
Discharge: HOME | End: 2018-11-20
Payer: COMMERCIAL

## 2018-11-20 PROCEDURE — 97530 THERAPEUTIC ACTIVITIES: CPT

## 2018-11-20 PROCEDURE — 97110 THERAPEUTIC EXERCISES: CPT

## 2018-11-20 NOTE — DISCHARGE SUMMARY
term goal 4: Pt will improve R hip strength to 3+/5 and L hip strength to 4-/5, hamstring R to 3+/5, L, to 4/5, and quad R to 3/5, L to 4-/5 for ease with transfers. NOT MET- B hip flexion 3/5, hamstring 3+/5, quad R 2/5, L 4-/5. Long term goal 5: Pt will be independent and compliant with HEP to achieve above goals. MET- Pt verbalizes compliance with HEP at least once daily.           Cecilia Damian, PT

## 2018-11-21 ENCOUNTER — ANESTHESIA EVENT (OUTPATIENT)
Dept: OPERATING ROOM | Age: 53
End: 2018-11-21
Payer: COMMERCIAL

## 2018-11-21 ENCOUNTER — APPOINTMENT (OUTPATIENT)
Dept: GENERAL RADIOLOGY | Age: 53
End: 2018-11-21
Attending: UROLOGY
Payer: COMMERCIAL

## 2018-11-21 ENCOUNTER — HOSPITAL ENCOUNTER (OUTPATIENT)
Age: 53
Setting detail: OUTPATIENT SURGERY
Discharge: HOME | End: 2018-11-21
Attending: UROLOGY | Admitting: UROLOGY
Payer: COMMERCIAL

## 2018-11-21 ENCOUNTER — ANESTHESIA (OUTPATIENT)
Dept: OPERATING ROOM | Age: 53
End: 2018-11-21
Payer: COMMERCIAL

## 2018-11-21 VITALS
TEMPERATURE: 97.1 F | RESPIRATION RATE: 16 BRPM | BODY MASS INDEX: 29.45 KG/M2 | OXYGEN SATURATION: 93 % | WEIGHT: 150 LBS | DIASTOLIC BLOOD PRESSURE: 58 MMHG | HEIGHT: 60 IN | HEART RATE: 78 BPM | SYSTOLIC BLOOD PRESSURE: 129 MMHG

## 2018-11-21 VITALS
DIASTOLIC BLOOD PRESSURE: 62 MMHG | TEMPERATURE: 97.2 F | RESPIRATION RATE: 5 BRPM | SYSTOLIC BLOOD PRESSURE: 109 MMHG | OXYGEN SATURATION: 94 %

## 2018-11-21 LAB — GLUCOSE BLD-MCNC: 196 MG/DL (ref 70–108)

## 2018-11-21 PROCEDURE — 3700000000 HC ANESTHESIA ATTENDED CARE: Performed by: UROLOGY

## 2018-11-21 PROCEDURE — 2709999900 HC NON-CHARGEABLE SUPPLY: Performed by: UROLOGY

## 2018-11-21 PROCEDURE — 76000 FLUOROSCOPY <1 HR PHYS/QHP: CPT | Performed by: UROLOGY

## 2018-11-21 PROCEDURE — C1767 GENERATOR, NEURO NON-RECHARG: HCPCS | Performed by: UROLOGY

## 2018-11-21 PROCEDURE — 72170 X-RAY EXAM OF PELVIS: CPT

## 2018-11-21 PROCEDURE — 3209999900 FLUORO FOR SURGICAL PROCEDURES

## 2018-11-21 PROCEDURE — 7100000010 HC PHASE II RECOVERY - FIRST 15 MIN: Performed by: UROLOGY

## 2018-11-21 PROCEDURE — 2500000003 HC RX 250 WO HCPCS: Performed by: UROLOGY

## 2018-11-21 PROCEDURE — C1883 ADAPT/EXT, PACING/NEURO LEAD: HCPCS | Performed by: UROLOGY

## 2018-11-21 PROCEDURE — 3700000001 HC ADD 15 MINUTES (ANESTHESIA): Performed by: UROLOGY

## 2018-11-21 PROCEDURE — 7100000000 HC PACU RECOVERY - FIRST 15 MIN: Performed by: UROLOGY

## 2018-11-21 PROCEDURE — 82948 REAGENT STRIP/BLOOD GLUCOSE: CPT

## 2018-11-21 PROCEDURE — 7100000001 HC PACU RECOVERY - ADDTL 15 MIN: Performed by: UROLOGY

## 2018-11-21 PROCEDURE — 64590 INS/RPL PRPH SAC/GSTR NPG/R: CPT | Performed by: UROLOGY

## 2018-11-21 PROCEDURE — 2580000003 HC RX 258

## 2018-11-21 PROCEDURE — 3600000003 HC SURGERY LEVEL 3 BASE: Performed by: UROLOGY

## 2018-11-21 PROCEDURE — 7100000011 HC PHASE II RECOVERY - ADDTL 15 MIN: Performed by: UROLOGY

## 2018-11-21 PROCEDURE — 95972 ALYS CPLX SP/PN NPGT W/PRGRM: CPT | Performed by: UROLOGY

## 2018-11-21 PROCEDURE — 6370000000 HC RX 637 (ALT 250 FOR IP): Performed by: REGISTERED NURSE

## 2018-11-21 PROCEDURE — 6370000000 HC RX 637 (ALT 250 FOR IP): Performed by: NURSE PRACTITIONER

## 2018-11-21 PROCEDURE — 64581 OPN IMPLTJ NEA SACRAL NERVE: CPT | Performed by: UROLOGY

## 2018-11-21 PROCEDURE — 3600000013 HC SURGERY LEVEL 3 ADDTL 15MIN: Performed by: UROLOGY

## 2018-11-21 PROCEDURE — 6360000002 HC RX W HCPCS: Performed by: REGISTERED NURSE

## 2018-11-21 DEVICE — KIT NEUROSTIMULATOR LD L28CM DIA1.27MM ELECTRD SPC 1.5MM: Type: IMPLANTABLE DEVICE | Site: BACK | Status: FUNCTIONAL

## 2018-11-21 DEVICE — Z DUP USE 2628873 GENERATOR NEUROSTIMULATOR H1.7XL2IN THK3IN TORQ WRNCH PROD: Type: IMPLANTABLE DEVICE | Site: BACK | Status: FUNCTIONAL

## 2018-11-21 RX ORDER — HYDROCODONE BITARTRATE AND ACETAMINOPHEN 5; 325 MG/1; MG/1
2 TABLET ORAL EVERY 4 HOURS PRN
Status: DISCONTINUED | OUTPATIENT
Start: 2018-11-21 | End: 2018-11-21 | Stop reason: HOSPADM

## 2018-11-21 RX ORDER — DIPHENHYDRAMINE HYDROCHLORIDE 50 MG/ML
INJECTION INTRAMUSCULAR; INTRAVENOUS PRN
Status: DISCONTINUED | OUTPATIENT
Start: 2018-11-21 | End: 2018-11-21 | Stop reason: SDUPTHER

## 2018-11-21 RX ORDER — FENTANYL CITRATE 50 UG/ML
25 INJECTION, SOLUTION INTRAMUSCULAR; INTRAVENOUS EVERY 5 MIN PRN
Status: DISCONTINUED | OUTPATIENT
Start: 2018-11-21 | End: 2018-11-21 | Stop reason: HOSPADM

## 2018-11-21 RX ORDER — KETOROLAC TROMETHAMINE 30 MG/ML
30 INJECTION, SOLUTION INTRAMUSCULAR; INTRAVENOUS EVERY 6 HOURS PRN
Status: DISCONTINUED | OUTPATIENT
Start: 2018-11-21 | End: 2018-11-21 | Stop reason: HOSPADM

## 2018-11-21 RX ORDER — MORPHINE SULFATE 2 MG/ML
2 INJECTION, SOLUTION INTRAMUSCULAR; INTRAVENOUS
Status: DISCONTINUED | OUTPATIENT
Start: 2018-11-21 | End: 2018-11-21 | Stop reason: HOSPADM

## 2018-11-21 RX ORDER — BUPIVACAINE HYDROCHLORIDE 5 MG/ML
INJECTION, SOLUTION EPIDURAL; INTRACAUDAL PRN
Status: DISCONTINUED | OUTPATIENT
Start: 2018-11-21 | End: 2018-11-21 | Stop reason: HOSPADM

## 2018-11-21 RX ORDER — MORPHINE SULFATE 2 MG/ML
4 INJECTION, SOLUTION INTRAMUSCULAR; INTRAVENOUS
Status: DISCONTINUED | OUTPATIENT
Start: 2018-11-21 | End: 2018-11-21 | Stop reason: HOSPADM

## 2018-11-21 RX ORDER — FENTANYL CITRATE 50 UG/ML
50 INJECTION, SOLUTION INTRAMUSCULAR; INTRAVENOUS EVERY 5 MIN PRN
Status: DISCONTINUED | OUTPATIENT
Start: 2018-11-21 | End: 2018-11-21 | Stop reason: HOSPADM

## 2018-11-21 RX ORDER — SULFAMETHOXAZOLE AND TRIMETHOPRIM 400; 80 MG/1; MG/1
1 TABLET ORAL 2 TIMES DAILY
Qty: 10 TABLET | Refills: 0 | Status: SHIPPED | OUTPATIENT
Start: 2018-11-21 | End: 2018-11-26

## 2018-11-21 RX ORDER — DEXAMETHASONE SODIUM PHOSPHATE 4 MG/ML
INJECTION, SOLUTION INTRA-ARTICULAR; INTRALESIONAL; INTRAMUSCULAR; INTRAVENOUS; SOFT TISSUE PRN
Status: DISCONTINUED | OUTPATIENT
Start: 2018-11-21 | End: 2018-11-21 | Stop reason: SDUPTHER

## 2018-11-21 RX ORDER — ONDANSETRON 2 MG/ML
4 INJECTION INTRAMUSCULAR; INTRAVENOUS EVERY 4 HOURS PRN
Status: DISCONTINUED | OUTPATIENT
Start: 2018-11-21 | End: 2018-11-21 | Stop reason: HOSPADM

## 2018-11-21 RX ORDER — PROPOFOL 10 MG/ML
INJECTION, EMULSION INTRAVENOUS PRN
Status: DISCONTINUED | OUTPATIENT
Start: 2018-11-21 | End: 2018-11-21 | Stop reason: SDUPTHER

## 2018-11-21 RX ORDER — ONDANSETRON 2 MG/ML
4 INJECTION INTRAMUSCULAR; INTRAVENOUS
Status: DISCONTINUED | OUTPATIENT
Start: 2018-11-21 | End: 2018-11-21 | Stop reason: HOSPADM

## 2018-11-21 RX ORDER — ATROPA BELLADONNA AND OPIUM 16.2; 3 MG/1; MG/1
30 SUPPOSITORY RECTAL EVERY 8 HOURS PRN
Status: DISCONTINUED | OUTPATIENT
Start: 2018-11-21 | End: 2018-11-21 | Stop reason: HOSPADM

## 2018-11-21 RX ORDER — FENTANYL CITRATE 50 UG/ML
INJECTION, SOLUTION INTRAMUSCULAR; INTRAVENOUS PRN
Status: DISCONTINUED | OUTPATIENT
Start: 2018-11-21 | End: 2018-11-21 | Stop reason: SDUPTHER

## 2018-11-21 RX ORDER — PROMETHAZINE HYDROCHLORIDE 25 MG/ML
6.25 INJECTION, SOLUTION INTRAMUSCULAR; INTRAVENOUS
Status: DISCONTINUED | OUTPATIENT
Start: 2018-11-21 | End: 2018-11-21 | Stop reason: HOSPADM

## 2018-11-21 RX ORDER — SODIUM CHLORIDE 9 MG/ML
INJECTION, SOLUTION INTRAVENOUS CONTINUOUS
Status: DISCONTINUED | OUTPATIENT
Start: 2018-11-21 | End: 2018-11-21 | Stop reason: HOSPADM

## 2018-11-21 RX ORDER — HYDRALAZINE HYDROCHLORIDE 20 MG/ML
5 INJECTION INTRAMUSCULAR; INTRAVENOUS EVERY 10 MIN PRN
Status: DISCONTINUED | OUTPATIENT
Start: 2018-11-21 | End: 2018-11-21 | Stop reason: HOSPADM

## 2018-11-21 RX ORDER — LIDOCAINE HYDROCHLORIDE 40 MG/ML
SOLUTION TOPICAL PRN
Status: DISCONTINUED | OUTPATIENT
Start: 2018-11-21 | End: 2018-11-21 | Stop reason: SDUPTHER

## 2018-11-21 RX ORDER — LABETALOL HYDROCHLORIDE 5 MG/ML
5 INJECTION, SOLUTION INTRAVENOUS EVERY 10 MIN PRN
Status: DISCONTINUED | OUTPATIENT
Start: 2018-11-21 | End: 2018-11-21 | Stop reason: HOSPADM

## 2018-11-21 RX ORDER — ONDANSETRON 2 MG/ML
INJECTION INTRAMUSCULAR; INTRAVENOUS PRN
Status: DISCONTINUED | OUTPATIENT
Start: 2018-11-21 | End: 2018-11-21 | Stop reason: SDUPTHER

## 2018-11-21 RX ORDER — MEPERIDINE HYDROCHLORIDE 25 MG/ML
12.5 INJECTION INTRAMUSCULAR; INTRAVENOUS; SUBCUTANEOUS EVERY 5 MIN PRN
Status: DISCONTINUED | OUTPATIENT
Start: 2018-11-21 | End: 2018-11-21 | Stop reason: HOSPADM

## 2018-11-21 RX ORDER — KETOROLAC TROMETHAMINE 10 MG/1
10 TABLET, FILM COATED ORAL EVERY 6 HOURS PRN
Qty: 20 TABLET | Refills: 0 | Status: SHIPPED | OUTPATIENT
Start: 2018-11-21 | End: 2019-11-21

## 2018-11-21 RX ORDER — HYDROCODONE BITARTRATE AND ACETAMINOPHEN 5; 325 MG/1; MG/1
1 TABLET ORAL EVERY 4 HOURS PRN
Status: DISCONTINUED | OUTPATIENT
Start: 2018-11-21 | End: 2018-11-21 | Stop reason: HOSPADM

## 2018-11-21 RX ADMIN — PROPOFOL 200 MG: 10 INJECTION, EMULSION INTRAVENOUS at 14:01

## 2018-11-21 RX ADMIN — DEXAMETHASONE SODIUM PHOSPHATE 4 MG: 4 INJECTION, SOLUTION INTRAMUSCULAR; INTRAVENOUS at 14:06

## 2018-11-21 RX ADMIN — FENTANYL CITRATE 150 MCG: 50 INJECTION INTRAMUSCULAR; INTRAVENOUS at 14:01

## 2018-11-21 RX ADMIN — ONDANSETRON HYDROCHLORIDE 4 MG: 4 INJECTION, SOLUTION INTRAMUSCULAR; INTRAVENOUS at 14:06

## 2018-11-21 RX ADMIN — SODIUM CHLORIDE: 9 INJECTION, SOLUTION INTRAVENOUS at 13:40

## 2018-11-21 RX ADMIN — FENTANYL CITRATE 50 MCG: 50 INJECTION INTRAMUSCULAR; INTRAVENOUS at 14:25

## 2018-11-21 RX ADMIN — HYDROCODONE BITARTRATE AND ACETAMINOPHEN 1 TABLET: 5; 325 TABLET ORAL at 16:30

## 2018-11-21 RX ADMIN — DIPHENHYDRAMINE HYDROCHLORIDE 12.5 MG: 50 INJECTION, SOLUTION INTRAMUSCULAR; INTRAVENOUS at 14:06

## 2018-11-21 RX ADMIN — LIDOCAINE HYDROCHLORIDE 4 ML: 40 SOLUTION TOPICAL at 14:05

## 2018-11-21 ASSESSMENT — PULMONARY FUNCTION TESTS
PIF_VALUE: 21
PIF_VALUE: 21
PIF_VALUE: 22
PIF_VALUE: 1
PIF_VALUE: 22
PIF_VALUE: 1
PIF_VALUE: 21
PIF_VALUE: 1
PIF_VALUE: 9
PIF_VALUE: 1
PIF_VALUE: 2
PIF_VALUE: 21
PIF_VALUE: 1
PIF_VALUE: 21
PIF_VALUE: 22
PIF_VALUE: 1
PIF_VALUE: 21
PIF_VALUE: 1
PIF_VALUE: 21
PIF_VALUE: 26
PIF_VALUE: 23
PIF_VALUE: 26
PIF_VALUE: 10
PIF_VALUE: 21
PIF_VALUE: 18
PIF_VALUE: 21
PIF_VALUE: 15
PIF_VALUE: 20
PIF_VALUE: 21
PIF_VALUE: 1
PIF_VALUE: 21
PIF_VALUE: 19
PIF_VALUE: 21
PIF_VALUE: 22
PIF_VALUE: 1
PIF_VALUE: 20
PIF_VALUE: 22
PIF_VALUE: 21
PIF_VALUE: 18
PIF_VALUE: 1
PIF_VALUE: 21
PIF_VALUE: 19
PIF_VALUE: 21
PIF_VALUE: 21
PIF_VALUE: 22
PIF_VALUE: 21
PIF_VALUE: 23
PIF_VALUE: 26
PIF_VALUE: 4
PIF_VALUE: 22
PIF_VALUE: 21
PIF_VALUE: 15
PIF_VALUE: 1
PIF_VALUE: 21
PIF_VALUE: 24
PIF_VALUE: 30
PIF_VALUE: 21
PIF_VALUE: 22

## 2018-11-21 ASSESSMENT — PAIN SCALES - GENERAL
PAINLEVEL_OUTOF10: 6
PAINLEVEL_OUTOF10: 4
PAINLEVEL_OUTOF10: 6
PAINLEVEL_OUTOF10: 0
PAINLEVEL_OUTOF10: 6

## 2018-11-21 ASSESSMENT — PAIN DESCRIPTION - PAIN TYPE
TYPE: SURGICAL PAIN

## 2018-11-21 ASSESSMENT — PAIN DESCRIPTION - FREQUENCY
FREQUENCY: CONTINUOUS

## 2018-11-21 ASSESSMENT — PAIN DESCRIPTION - LOCATION
LOCATION: BACK

## 2018-11-21 ASSESSMENT — PAIN DESCRIPTION - PROGRESSION: CLINICAL_PROGRESSION: GRADUALLY IMPROVING

## 2018-11-21 ASSESSMENT — PAIN DESCRIPTION - ORIENTATION
ORIENTATION: LEFT;LOWER
ORIENTATION: LEFT;LOWER

## 2018-11-21 ASSESSMENT — PAIN DESCRIPTION - DESCRIPTORS
DESCRIPTORS: ACHING

## 2018-11-21 ASSESSMENT — LIFESTYLE VARIABLES: SMOKING_STATUS: 1

## 2018-11-21 NOTE — PROGRESS NOTES
1510- Pt to PACU, pt hooked up to monitor, VSS, pt breathing deeply on 02.   1515- Pt turned back assessed dressing to right side dry clean and intact. 1521- PT denies pain, states she is warm enough. 1531- 02 taken off.   1536- 02 94% on room air. 1540- Pt meets discharge criteria, no pain awake alert oriented, VSS. Pt to [facility], report to RN, family called back to room.

## 2018-11-21 NOTE — ANESTHESIA PRE PROCEDURE
Start date: 12/6/1979    Smokeless tobacco: Never Used    Alcohol use 0.0 oz/week      Comment: social drinker                                Ready to quit: Not Answered  Counseling given: Not Answered      Vital Signs (Current):   Vitals:    11/21/18 1316   BP: 130/63   Pulse: 87   Resp: 16   Temp: 97.8 °F (36.6 °C)   TempSrc: Temporal   SpO2: 93%   Weight: 150 lb (68 kg)                                              BP Readings from Last 3 Encounters:   11/21/18 130/63   10/12/18 126/70   10/08/18 102/68       NPO Status:                                                                                 BMI:   Wt Readings from Last 3 Encounters:   11/21/18 150 lb (68 kg)   10/08/18 150 lb (68 kg)   10/08/18 150 lb (68 kg)     Body mass index is 29.79 kg/m². CBC:   Lab Results   Component Value Date    WBC 6.6 11/08/2018    RBC 4.92 11/08/2018    RBC 0 03/14/2018    HGB 14.5 11/08/2018    HCT 39.8 11/08/2018    MCV 80.9 11/08/2018    RDW 17.8 09/23/2016     11/08/2018       CMP:   Lab Results   Component Value Date     11/08/2018    K 3.4 11/08/2018     11/08/2018    CO2 26 11/08/2018    BUN 10 11/08/2018    CREATININE 0.3 11/08/2018    GFRAA >60 10/10/2014    LABGLOM >90 11/08/2018    GLUCOSE 122 11/08/2018    PROT 7.3 09/23/2016    CALCIUM 9.1 11/08/2018    BILITOT NEGATIVE 03/14/2018    ALKPHOS 124 09/23/2016    AST 22 09/23/2016    ALT 20 09/23/2016       POC Tests: No results for input(s): POCGLU, POCNA, POCK, POCCL, POCBUN, POCHEMO, POCHCT in the last 72 hours.     Coags:   Lab Results   Component Value Date    PROTIME 12.6 10/09/2014    INR 1.2 10/09/2014    APTT 40.7 10/09/2014       HCG (If Applicable):   Lab Results   Component Value Date    PREGTESTUR NEGATIVE 12/16/2013    PREGSERUM NEGATIVE 09/13/2014        ABGs: No results found for: PHART, PO2ART, UUR1CEI, KPY1VPR, BEART, I8JFIMAQ     Type & Screen (If Applicable):  No results found for: ROSS [facility]    Anesthesia Evaluation   no

## 2018-11-24 NOTE — BRIEF OP NOTE
Brief Postoperative Note  ______________________________________________________________    Patient: Maged Hernandez  YOB: 1965  MRN: [id number]  Date of Procedure: 11/21/2018    Pre-Op Diagnosis: NEUROGENIC BLADDER, OVERACTIVE BLADDER    Post-Op Diagnosis: Same       Procedure(s):  INTERSTIM DEVICE PLACEMENT -- STAGE 1 AND 2    Anesthesia: General    Surgeon(s):  Juan Gomez MD    Assistant: none    Estimated Blood Loss (mL): 5 cc    Complications: None    Specimens:   * No specimens in log *    Implants:    Implant Name Type Inv.  Item Serial No.  Lot No. LRB No. Used   KIT LEAD EXTENSION NEUROSTIMULATOR PAIN THERAPY Urological/Gyn KIT LEAD EXTENSION NEUROSTIMULATOR PAIN THERAPY  [facility] [id number] N/A 1   Dedrick Calle - [id number] Neuro/Shunt Dedrick Calle [id number] [facility]   N/A 1         Drains:      Findings: good placement and response    Juan Gomez MD

## 2018-11-24 NOTE — OP NOTE
Caitlin Donald 62  RECORD OF OPERATION     PATIENT NAME: Vanesa Gutierrez RECORD NO. [id number]                DATE OF PROCEDURE: 11/21/2018  SURGEON: Tay Nelson MD  PRIMARY CARE PHYSICIAN: CHAS Cloud - JOSE        PREOPERATIVE DIAGNOSIS: Urinary incontinence, urgency and frequency     POSTOPERATIVE DIAGNOSIS: same     PROCEDURE PERFORMED: placement of permanent sacral nerve stimulator with leads (stage 1 and 2)     SURGEON: Tay Nelson MD    ASSISTANT(S): none     ANESTHESIA: general     BLOOD LOSS:  5 cc     SPECIMENS: none     COMPLICATIONS:  None immediately appreciated. DISCUSSION:  Noel Burns is a 48y.o.-year-old female who has a diagnosis of urinary incontinence and urgency / frequency. Office testing of InterStim sacral nerve stimulation had been performed with good results. After a history and physical examination was performed, potential diagnostic and therapeutic modalities were discussed with the patient. Placement of permanent neurostimulator and leads was recommended and a discussion of the risks, possible complications, possible side effects, along with the anticipated benefits were reviewed. She was given the opportunity to ask questions. Once answered, informed consent was obtained. She was brought to the operating room on 11/21/2018 for this procedure. The patient was brought to the operating room, properly identified and placed in the prone position on the operating room table after initiation of general anesthesia. Pillows and blankets were placed under the lower abdomen to flatten the sacrum and the toes were allowed to dangle freely. The anus was exposed by using tape in order to help identify a \"maxi\" response to confirm proper lead placement. The C-arm was draped and moved into position to provide fluoroscopic mapping of the sacral region.     Once we were ready to go a foramen needle was introduced approximately 2 cm above good position to provide adequate neurostimulator placement in a position where would not be felt with sitting. An incision was made and carried down through the underlying soft tissues and through Kyler's fascia creating a pocket underneath Kyler's fascia. Using the lead introducer sheath with its obturator the sheath was advanced from the small incision made initially for advancement of the lead introducer sheath, deep under the skin, and into the pocket which had been formed for the neurostimulator. The end of the lead introducer sheath was then cut leaving only a plastic tube which was then used to advance the lead and direct it toward the neurostimulator pocket. The plastic sheath was then removed with the lead now transferred into the pocket. The neurostimulator was then attached to the leads as per 's direction. The lead was placed into the neurostimulator so that the blue lead tip was clearly visible in the distal portion of the neurostimulator header. The single set screw was then tightened using the ratcheting hex wrench which was provided with the kit. This locked the lead into the neurostimulator. With the lead attached the neurostimulator was then directed into the previously formed pocket and the overlying Kyler's closed essentially burying the lead and the neurostimulator. Fluoroscopy was used throughout the procedure in order to guide needle placement and in order to make sure that leads were placed in good position. A small medial incision which had been used for advancing the lead introducer sheath was closed using Dermabond and the larger incision through which the neurostimulator had been placed was closed with a subcuticular closure. Steri-Strips and dressings were then applied. Agustina Corona was then awakened in the operating room and transferred from the operating room table to a stretcher and then to the PACU.  She tolerated the procedure well there were no

## 2018-12-05 ENCOUNTER — OFFICE VISIT (OUTPATIENT)
Dept: UROLOGY | Age: 53
End: 2018-12-05

## 2018-12-05 VITALS
WEIGHT: 150 LBS | SYSTOLIC BLOOD PRESSURE: 118 MMHG | HEIGHT: 60 IN | BODY MASS INDEX: 29.45 KG/M2 | DIASTOLIC BLOOD PRESSURE: 72 MMHG

## 2018-12-05 DIAGNOSIS — N39.0 RECURRENT UTI: Primary | ICD-10-CM

## 2018-12-05 DIAGNOSIS — R33.9 URINARY RETENTION: ICD-10-CM

## 2018-12-05 DIAGNOSIS — N39.46 MIXED STRESS AND URGE URINARY INCONTINENCE: ICD-10-CM

## 2018-12-05 DIAGNOSIS — G35 MULTIPLE SCLEROSIS (HCC): ICD-10-CM

## 2018-12-05 PROCEDURE — 99024 POSTOP FOLLOW-UP VISIT: CPT | Performed by: NURSE PRACTITIONER

## 2018-12-05 ASSESSMENT — ENCOUNTER SYMPTOMS
VOMITING: 0
ABDOMINAL PAIN: 0
NAUSEA: 0

## 2018-12-05 NOTE — PROGRESS NOTES
Subjective:      Patient ID: Treri Snider is a 48 y.o. female. MEREDITH Gomes was seen in follow-up for recurrent UTI, urinary retention, and incontinence. She underwent placement of permanent sacral nerve stimulator with leads (stage 1 and 2) on 11/21/18 per Dr. Jeri Serna. She reports that she is doing great. Her urinary symptoms are at least 70% improved. She is down to 3 depends per day (previously 9 daily). She denies any nausea, vomiting, fever, or chills. She states she took D-mannose, however, this was stopped due to cost. Hiprex was also stopped. Prior to surgery she failed multiple anti-cholinergics & Myrbetriq   On 3/31/17 she was found to have a significant PVR of 360 cc so a gibbs catheter was placed due to patient being unable to straight catheterize. PVR has been monitored since that time. She has been taking Myrbetriq 50 mg daily for symptom control. She denies any dysuria, urgency, or frequency. She feels that she is emptying better. She underwent urodynamic studies on 4/1/2015. Patient had first urge to void at 111 cc and with a cough the patient leaked all the urine and the gibbs came out as well.          Review of Systems   Constitutional: Negative for chills, fatigue and fever. Gastrointestinal: Negative for abdominal pain, nausea and vomiting. Genitourinary: Positive for frequency and urgency. Negative for difficulty urinating, dysuria, flank pain and hematuria. History of recurrent UTI, incontinence. Objective:   Physical Exam   Constitutional: She is oriented to person, place, and time. She appears well-developed and well-nourished. HENT:   Head: Normocephalic and atraumatic. Right Ear: External ear normal.   Left Ear: External ear normal.   Nose: Nose normal.   Eyes: Conjunctivae are normal.   Pulmonary/Chest: Effort normal.   Abdominal: Soft. Musculoskeletal: Normal range of motion. Neurological: She is alert and oriented to person, place, and time.

## 2019-01-02 ENCOUNTER — TELEPHONE (OUTPATIENT)
Dept: PHYSICAL MEDICINE AND REHAB | Age: 54
End: 2019-01-02

## 2019-01-28 DIAGNOSIS — G89.29 OTHER CHRONIC PAIN: Primary | ICD-10-CM

## 2019-01-29 RX ORDER — HYDROCODONE BITARTRATE AND ACETAMINOPHEN 5; 325 MG/1; MG/1
1 TABLET ORAL 2 TIMES DAILY PRN
Qty: 60 TABLET | Refills: 0 | Status: SHIPPED | OUTPATIENT
Start: 2019-01-29 | End: 2019-02-07

## 2019-02-07 ENCOUNTER — OFFICE VISIT (OUTPATIENT)
Dept: PHYSICAL MEDICINE AND REHAB | Age: 54
End: 2019-02-07
Payer: COMMERCIAL

## 2019-02-07 VITALS
SYSTOLIC BLOOD PRESSURE: 118 MMHG | HEIGHT: 60 IN | BODY MASS INDEX: 30.43 KG/M2 | DIASTOLIC BLOOD PRESSURE: 75 MMHG | WEIGHT: 155 LBS | HEART RATE: 89 BPM

## 2019-02-07 DIAGNOSIS — R26.9 GAIT DISTURBANCE: ICD-10-CM

## 2019-02-07 DIAGNOSIS — G89.29 OTHER CHRONIC PAIN: ICD-10-CM

## 2019-02-07 DIAGNOSIS — M24.561 FLEXION CONTRACTURE OF RIGHT KNEE: ICD-10-CM

## 2019-02-07 DIAGNOSIS — G37.3 TRANSVERSE MYELITIS (HCC): ICD-10-CM

## 2019-02-07 DIAGNOSIS — G82.22 CHRONIC INCOMPLETE SPASTIC PARAPLEGIA (HCC): Primary | ICD-10-CM

## 2019-02-07 PROCEDURE — 99213 OFFICE O/P EST LOW 20 MIN: CPT | Performed by: NURSE PRACTITIONER

## 2019-02-07 PROCEDURE — 3017F COLORECTAL CA SCREEN DOC REV: CPT | Performed by: NURSE PRACTITIONER

## 2019-02-07 PROCEDURE — 4004F PT TOBACCO SCREEN RCVD TLK: CPT | Performed by: NURSE PRACTITIONER

## 2019-02-07 PROCEDURE — G8417 CALC BMI ABV UP PARAM F/U: HCPCS | Performed by: NURSE PRACTITIONER

## 2019-02-07 PROCEDURE — G8427 DOCREV CUR MEDS BY ELIG CLIN: HCPCS | Performed by: NURSE PRACTITIONER

## 2019-02-07 PROCEDURE — 64642 CHEMODENERV 1 EXTREMITY 1-4: CPT | Performed by: PHYSICAL MEDICINE & REHABILITATION

## 2019-02-07 PROCEDURE — G8484 FLU IMMUNIZE NO ADMIN: HCPCS | Performed by: NURSE PRACTITIONER

## 2019-02-07 RX ORDER — BACLOFEN 10 MG/1
15 TABLET ORAL 3 TIMES DAILY
Qty: 135 TABLET | Refills: 5 | Status: SHIPPED | OUTPATIENT
Start: 2019-02-07 | End: 2019-05-07 | Stop reason: SDUPTHER

## 2019-02-07 RX ORDER — AMITRIPTYLINE HYDROCHLORIDE 25 MG/1
25 TABLET, FILM COATED ORAL NIGHTLY
Qty: 30 TABLET | Refills: 5 | Status: SHIPPED | OUTPATIENT
Start: 2019-02-07 | End: 2019-05-07 | Stop reason: SDUPTHER

## 2019-02-07 RX ORDER — GABAPENTIN 800 MG/1
TABLET ORAL
Qty: 90 TABLET | Refills: 5 | Status: SHIPPED | OUTPATIENT
Start: 2019-02-07 | End: 2019-05-07 | Stop reason: SDUPTHER

## 2019-02-13 ENCOUNTER — TELEPHONE (OUTPATIENT)
Dept: PHYSICAL MEDICINE AND REHAB | Age: 54
End: 2019-02-13

## 2019-03-04 ENCOUNTER — TELEPHONE (OUTPATIENT)
Dept: PHYSICAL MEDICINE AND REHAB | Age: 54
End: 2019-03-04

## 2019-03-06 ENCOUNTER — TELEPHONE (OUTPATIENT)
Dept: PHYSICAL MEDICINE AND REHAB | Age: 54
End: 2019-03-06

## 2019-03-11 ENCOUNTER — TELEPHONE (OUTPATIENT)
Dept: PHYSICAL MEDICINE AND REHAB | Age: 54
End: 2019-03-11

## 2019-03-19 ENCOUNTER — TELEPHONE (OUTPATIENT)
Dept: PHYSICAL MEDICINE AND REHAB | Age: 54
End: 2019-03-19

## 2019-03-28 ENCOUNTER — TELEPHONE (OUTPATIENT)
Dept: PHYSICAL MEDICINE AND REHAB | Age: 54
End: 2019-03-28

## 2019-03-28 DIAGNOSIS — G35 MULTIPLE SCLEROSIS (HCC): Primary | ICD-10-CM

## 2019-03-28 NOTE — TELEPHONE ENCOUNTER
Pt. States she is having increased numbness in her feet and hands, she noticed started about 2-3 weeks ago. Also, in the morning she finds it hard to pivot out of bed due to pain in her knees and ankles. Gabapentin 800 mg TID  Baclofen 15 mg TID  Elavil 25 mg nightly    Please advise. Also, I faxed the wheelchair order over to [address] [place] [address] (along with office note and demographics).

## 2019-03-29 NOTE — TELEPHONE ENCOUNTER
Has the increased pain in knees and ankles been since stopping the Norco? Is she still following with rheumatologist?    Any weakness in arms or legs?

## 2019-04-01 NOTE — TELEPHONE ENCOUNTER
Yes, the pain started 3-4 days after stopping the Norco. She does not see a rheumatologist. Yes, she is experiencing weakness in bilateral upper er and lower extremities which started at the same time as the pain. Please advise.

## 2019-04-16 ENCOUNTER — TELEPHONE (OUTPATIENT)
Dept: PHYSICAL MEDICINE AND REHAB | Age: 54
End: 2019-04-16

## 2019-04-16 ENCOUNTER — INITIAL CONSULT (OUTPATIENT)
Dept: NEUROLOGY | Age: 54
End: 2019-04-16
Payer: COMMERCIAL

## 2019-04-16 VITALS
SYSTOLIC BLOOD PRESSURE: 100 MMHG | RESPIRATION RATE: 18 BRPM | BODY MASS INDEX: 30.27 KG/M2 | DIASTOLIC BLOOD PRESSURE: 62 MMHG | HEIGHT: 60 IN | HEART RATE: 84 BPM

## 2019-04-16 DIAGNOSIS — G82.20 PARAPARESIS (HCC): Primary | ICD-10-CM

## 2019-04-16 DIAGNOSIS — G82.22 CHRONIC INCOMPLETE SPASTIC PARAPLEGIA (HCC): Primary | ICD-10-CM

## 2019-04-16 DIAGNOSIS — G35 MULTIPLE SCLEROSIS (HCC): ICD-10-CM

## 2019-04-16 DIAGNOSIS — R29.898 RIGHT LEG WEAKNESS: ICD-10-CM

## 2019-04-16 DIAGNOSIS — R26.9 GAIT DISTURBANCE: ICD-10-CM

## 2019-04-16 DIAGNOSIS — G37.3 TRANSVERSE MYELITIS (HCC): ICD-10-CM

## 2019-04-16 DIAGNOSIS — R25.2 SPASTICITY: ICD-10-CM

## 2019-04-16 PROCEDURE — G8417 CALC BMI ABV UP PARAM F/U: HCPCS | Performed by: PSYCHIATRY & NEUROLOGY

## 2019-04-16 PROCEDURE — G8427 DOCREV CUR MEDS BY ELIG CLIN: HCPCS | Performed by: PSYCHIATRY & NEUROLOGY

## 2019-04-16 PROCEDURE — 99244 OFF/OP CNSLTJ NEW/EST MOD 40: CPT | Performed by: PSYCHIATRY & NEUROLOGY

## 2019-04-16 NOTE — PATIENT INSTRUCTIONS
1. MRI thoracic spine W/WO contrast  2. MRI lumbar spine W/WO contrast  3. Call with any new symptoms or concerns. 4. Follow up in 1 months.

## 2019-04-17 RX ORDER — WHEELCHAIR
EACH MISCELLANEOUS
Qty: 1 EACH | Refills: 0 | Status: SHIPPED | OUTPATIENT
Start: 2019-04-17 | End: 2020-10-22

## 2019-04-24 ENCOUNTER — HOSPITAL ENCOUNTER (OUTPATIENT)
Age: 54
Setting detail: SPECIMEN
Discharge: HOME | End: 2019-04-24
Payer: COMMERCIAL

## 2019-04-29 LAB
HPV SAMPLE: NORMAL
HPV, GENOTYPE 16: NOT DETECTED
HPV, GENOTYPE 18: NOT DETECTED
HPV, HIGH RISK OTHER: NOT DETECTED
HPV, INTERPRETATION: NORMAL
SPECIMEN DESCRIPTION: NORMAL

## 2019-04-29 NOTE — PROGRESS NOTES
Chief Complaint   Patient presents with    Consultation   ? ? Multiple Sclerosis   ? ? Lynnie Apley is a 48 y.o. female who presents today for evaluation of multiple sclerosis for the past 6 years. She reports the diagnosis was made using MRI and spinal tap. She did have a spinal tap in September 2014 that showed 0 oligoclonal bands. Initial present symptoms was bilateral leg weakness worse on the right than the left. She does report bladder incontinence. She reports numbness in her bilateral legs from her knees down. She is wheelchair bound. She has never been on any immune modifying medications in the past. She was seen at [facility] for these symptoms and She was diagnosed as possible transverse myelitis and treated for first few days with IV steroids with minimal response to therapy. She then received plasmapheresis for 5 times with mild clinical improvement. She had an MRI brain done 9/23/14 showed very few white matter lesions with a broad differential including chronic microvascular ischemic disease with lacunar infarctions and multiple sclerosis among others. Otherwise unremarkable combined MRI cranium. MRI cervical spine done 9/23/14 Multilevel severe degenerative change with multilevel canal stenosis. and probable cord compression at C5-C6 without definite evidence of myelopathy. MRI thoracic spine done 9/23/14 showed Probable normal ventriculus terminalis of the distal spinal cord Probable hemangioma L2 vertebral body. MRI lumbar spine done 9/23/14 showed Increased cord T2 signal lower cord extending to the conus medullaris. There may be subtle enhancement in this area. Primary considerations are those of cord edema possibly related to myelitis vs. a cord infarction. Cord infarct is favored. Continued MR follow up with repeat study in 3-4 days recommended for surveillance as clinically warranted. No significant lumbar spinal stenosis.  She reports she has never been on any immune modifying medications for her multiple sclerosis. She has not followed with neurology. She reports she has not had any MRI imaging since initial diagnosis. Upon further questioning she has a bladder stimulator in place, she is unsure if it is MR compatible. She does not feel she has continued to lose ground with her symptoms. She denies chest pain. No shortness of breath, no neck pain. No vision changes. No dysphagia. No fever. No rash. No weight loss. History provided by patient and review of medical record. Past Medical History:   Diagnosis Date    Anxiety ?  Anxiety and depression ?  Asthma ?  Bipolar 1 disorder ([address] ?  Bipolar 1 disorder with moderate sourav ([address] ?  Blood circulation, collateral ?    Cancer ([address] ?  Depression ?  Endometriosis ?  GERD (gastroesophageal reflux disease) ?  Kidney stones ?  Lupus ?  Movement disorder ?  MS (multiple sclerosis) ([address] ?  Schizophrenia ([address] ?  Thyroid disease ?  Type 2 diabetes mellitus without complication ([address] ?  Unspecified cerebral artery occlusion with cerebral infarction ? ? Patient Active Problem List   Diagnosis    Spinal cord infarction ([address]    Leg weakness, bilateral    Hypokalemia    Depression    Bipolar 1 disorder (HCC)    Asthma    Hyperglycemia    Multiple sclerosis ([address]    Myelopathy (HCC)    Transverse myelitis (HCC)    Hyponatremia    Lupus    Lupus (systemic lupus erythematosus) (HCC)    Paraplegia (HCC)    Neurogenic bladder    Neurogenic bowel    Fatigue    Spinal cord infarction (HCC)    Depression    Bipolar 1 disorder (HCC)    Asthma    Skin ulcer of multiple sites ([address] bilateral inner thighs    History of cancer, cervical    Anxiety    Transverse myelitis (HCC)    Flexion contracture of right knee    Overactive bladder    Urgency-frequency syndrome   ? Allergies   Allergen Reactions    Penicillins ? ? ? \"I almost \"    Seasonal Itching   ?   Current Outpatient Medications   Medication Sig Dispense Refill    gabapentin (NEURONTIN) 800 MG tablet take 1 tablet by mouth three times a day. 90 tablet 5    baclofen (LIORESAL) 10 MG tablet Take 1.5 tablets by mouth 3 times daily (Patient taking differently: Take 20 mg by mouth 3 times daily ) 135 tablet 5    amitriptyline (ELAVIL) 25 MG tablet Take 1 tablet by mouth nightly 30 tablet 5    Blood Glucose Monitoring Suppl (TRUE METRIX METER) w/Device KIT use as directed ? 0    TRUE METRIX BLOOD GLUCOSE TEST strip ? ? 0    glipiZIDE (GLUCOTROL) 5 MG tablet ? ? 0    RA ALCOHOL SWABS 70 % PADS ? ? 1    TRUEPLUS LANCETS 33G MISC ? ? 1    RA PEN NEEDLES 31G X 8 MM MISC use as directed ? 1    nystatin (MYCOSTATIN) 762272 UNIT/GM powder Apply 3 times daily to groin folds. 1 Bottle 1    XIIDRA 5 % SOLN ? ? 0    azelastine (OPTIVAR) 0.05 % ophthalmic solution instill 1 drop into both eyes twice a day ? 0    montelukast (SINGULAIR) 10 MG tablet Take 10 mg by mouth nightly  ? 0    SPIRIVA HANDIHALER 18 MCG inhalation capsule Inhale 18 mcg into the lungs daily ? 0    artificial tears (ARTIFICIAL TEARS) OINT as needed ? ?    Calcium Carbonate (CALCIUM 600 PO) Take 1 tablet by mouth 2 times daily ? ?    Multiple Vitamin (TAB-A-ISAAC PO) Take 1 tablet by mouth daily ? ?    PRISTIQ 100 MG TB24 Take 100 mg by mouth daily. ? 0    loratadine (CLARITIN) 10 MG tablet Take 1 tablet by mouth daily as needed. ? 0    PROAIR  (90 BASE) MCG/ACT inhaler Inhale 2 puffs into the lungs every 6 hours as needed. ? 0    Incontinence Supply Disposable (UNDERPADS) MISC Cloth chux pads  Diagnosis: Paraplegia 344. 1 100 Bottle 11    hydrOXYzine (VISTARIL) 50 MG capsule Take 50 mg by mouth 3 times daily as needed for Itching. ? ?    omeprazole (PRILOSEC) 20 MG capsule Take 20 mg by mouth daily. ? ?    paliperidone (INVEGA) 6 MG ER tablet Take 6 mg by mouth nightly Take 2 tablets at bedtime ?  ?    beclomethasone (QVAR) 80 MCG/ACT inhaler Inhale 1 puff into the lungs 2 times daily. ? ?    ketorolac (TORADOL) 10 MG tablet Take 1 tablet by mouth every 6 hours as needed for Pain 20 tablet 0    terbinafine (LAMISIL) 250 MG tablet take 1 tablet by mouth once daily ? 0     No current facility-administered medications for this visit. ? Social History     Socioeconomic History    Marital status:    ? ? Spouse name: None    Number of children: None    Years of education: None    Highest education level: None   Occupational History    None   Social Needs    Financial resource strain: None    Food insecurity:   ? ? Worry: None   ? ? Inability: None    Transportation needs:   ? ? Medical: None   ? ? Non-medical: None   Tobacco Use    Smoking status: Current Every Day Smoker   ? ? Packs/day: 1.00   ? ? Types: Cigarettes   ? ? Start date: 12/6/1979    Smokeless tobacco: Never Used   Substance and Sexual Activity    Alcohol use: Yes   ? ? Alcohol/week: 0.0 oz   ? ? Comment: social drinker    Drug use: No    Sexual activity: Never   ? ? Partners: Male   Lifestyle    Physical activity:   ? ? Days per week: None   ? ? Minutes per session: None    Stress: None   Relationships    Social connections:   ? ? Talks on phone: None   ? ? Gets together: None   ? ? Attends Hoah[employer] service: None   ? ? Active member of club or organization: None   ? ? Attends meetings of clubs or organizations: None   ? ? Relationship status: None    Intimate partner violence:   ? ? Fear of current or ex partner: None   ? ? Emotionally abused: None   ? ? Physically abused: None   ? ? Forced sexual activity: None   Other Topics Concern    None   Social History Narrative   ? ** Merged History Encounter **   ?    ? Family History   Problem Relation Age of Onset    Stroke Mother ?  Asthma Mother ?  Other Mother ?  No Known Problems Sister ?  Asthma Brother ?  No Known Problems Brother ? ?   Review of Systems   Complete review of systems is negative other than what is mentioned in HPI  ? ? Vitals:   ? 04/16/19 1337   BP: 100/62   Site: Left Upper Arm   Position: Sitting   Cuff Size: Medium Adult   Pulse: 84   Resp: 18   Height: 5' (1.524 m)   ? Physical Examination:  General appearance - alert, well appearing, and in no distress, oriented to person, place, and time and over weight, she is sitting in a wheel chair  Mental status- Level of Alertness: awake  Orientation: person, place, time  Memory: normal  Fund of Knowledge: normal  Attention/Concentration: normal  Language: normal. Mood is normal.   Neck - supple, no significant adenopathy, carotids upstroke normal bilaterally,   There is no carotid bruit. No neck lymphadenopathy. No thyroid enlargement  Neurological -   Cranial Rrlleo-AK-AHN:.   Cranial nerve II: Normal   Cranial nerve III: Pupils: equal, round, reactive to light  Cranial nerves III, IV, VI: Extraocular Movements: intact   Cranial nerve V: Facial sensation: intact   Cranial nerve VII:Facial strength: intact   Cranial nerve VIII: Hearing: intact  Cranial nerve IX: Palate Elevation intact bilaterally  Cranial nerve XI: Shoulder shrug intact bilaterally  Cranial nerve XII: Tongue midline   neck supple without rigidity, there is no limitation of range of motion of the neck. DTR's are absent  Motor exam is 5/5 in the left upper and lower extremities, 5/5 in the right upper extremity, 3/5 in the right lower extremity. Spasticity in the right lower extremity. There is no muscle atrophy. Sensory is intact for light touch, cortical sensation   Coordination: finger to nose, intact  Gait and station not tested, she is in wheel chair  Abnormal movement none, vibration normal, proprioception normal  Skin - normal coloration, no rashes, no suspicious skin lesions  Superficial temporal artery pulses are normal.   There is no limitation of range of motion of the neck.   There is no resting tremor, no pin rolling, no bradykinesia, no Hypohonia, normal blink rate. Musculoskeletal: Has no hand arthritis, no limitation of ROM in any of the four extremities, except right lower extremity  There is no leg edema. The Heart was regular in rate and rhythm. No heart murmur  Chest Clear  Abdomen was soft. ?  We reviewed the patient records from referring provider and available information in the EHR   ?  ?  ASSESSMENT:   ?  ? Diagnosis Orders   1. Paraparesis ([address]  MRI THORACIC SPINE W WO CONTRAST   ? MRI LUMBAR SPINE W WO CONTRAST   2. Right leg weakness  MRI THORACIC SPINE W WO CONTRAST   ? MRI LUMBAR SPINE W WO CONTRAST   3. Spasticity  ? This is a 48year old female who presents with paraparesis, right leg weakness for the past 6 years. Symptoms were sudden in onset. She reports she was evaluated initially at [facility] in [place] for her symptoms. She states she was told she had multiple sclerosis. Upon review of the records she did undergo a spinal tap in September 2014 that showed zero oligoclonal bands. She was diagnosed as possible transverse myelitis and treated for first few days with IV steroids with minimal response to therapy. She then received plasmapheresis for 5 times with mild clinical improvement. She had MRI brain done showing showed very few white matter lesions with a broad differential including chronic microvascular ischemic disease with lacunar infarctions and multiple sclerosis among others. Otherwise unremarkable combined MRI cranium. She also underwent MRI cervical spine and MRI thoracic spine that did not show any lesions. MRI lumbar spine done 9/23/14 showed Increased cord T2 signal lower cord extending to the conus medullaris. There may be subtle enhancement in this area. Primary considerations are those of cord edema possibly related to myelitis vs. a cord infarction. Cord infarct is favored. She does not feel she has continued to lose ground with her symptoms.  She feels she had the initial hit of the onset of her symptoms, but

## 2019-04-30 LAB — CYTOLOGY REPORT: NORMAL

## 2019-05-07 ENCOUNTER — OFFICE VISIT (OUTPATIENT)
Dept: PHYSICAL MEDICINE AND REHAB | Age: 54
End: 2019-05-07
Payer: COMMERCIAL

## 2019-05-07 VITALS
WEIGHT: 155 LBS | BODY MASS INDEX: 30.43 KG/M2 | SYSTOLIC BLOOD PRESSURE: 108 MMHG | HEART RATE: 101 BPM | DIASTOLIC BLOOD PRESSURE: 72 MMHG | HEIGHT: 60 IN

## 2019-05-07 DIAGNOSIS — G35 MULTIPLE SCLEROSIS (HCC): ICD-10-CM

## 2019-05-07 DIAGNOSIS — M24.561 FLEXION CONTRACTURE OF RIGHT KNEE: ICD-10-CM

## 2019-05-07 DIAGNOSIS — G82.22 CHRONIC INCOMPLETE SPASTIC PARAPLEGIA (HCC): Primary | ICD-10-CM

## 2019-05-07 DIAGNOSIS — G89.29 OTHER CHRONIC PAIN: ICD-10-CM

## 2019-05-07 PROCEDURE — 3017F COLORECTAL CA SCREEN DOC REV: CPT | Performed by: PHYSICAL MEDICINE & REHABILITATION

## 2019-05-07 PROCEDURE — G8427 DOCREV CUR MEDS BY ELIG CLIN: HCPCS | Performed by: PHYSICAL MEDICINE & REHABILITATION

## 2019-05-07 PROCEDURE — 99213 OFFICE O/P EST LOW 20 MIN: CPT | Performed by: PHYSICAL MEDICINE & REHABILITATION

## 2019-05-07 PROCEDURE — 4004F PT TOBACCO SCREEN RCVD TLK: CPT | Performed by: PHYSICAL MEDICINE & REHABILITATION

## 2019-05-07 PROCEDURE — 64642 CHEMODENERV 1 EXTREMITY 1-4: CPT | Performed by: PHYSICAL MEDICINE & REHABILITATION

## 2019-05-07 PROCEDURE — G8417 CALC BMI ABV UP PARAM F/U: HCPCS | Performed by: PHYSICAL MEDICINE & REHABILITATION

## 2019-05-07 RX ORDER — BACLOFEN 10 MG/1
15 TABLET ORAL 3 TIMES DAILY
Qty: 135 TABLET | Refills: 5 | Status: SHIPPED | OUTPATIENT
Start: 2019-05-07 | End: 2019-11-21 | Stop reason: SDUPTHER

## 2019-05-07 RX ORDER — GABAPENTIN 800 MG/1
TABLET ORAL
Qty: 90 TABLET | Refills: 5 | Status: SHIPPED | OUTPATIENT
Start: 2019-05-07 | End: 2019-10-18 | Stop reason: SDUPTHER

## 2019-05-07 RX ORDER — AMITRIPTYLINE HYDROCHLORIDE 25 MG/1
25 TABLET, FILM COATED ORAL NIGHTLY
Qty: 30 TABLET | Refills: 5 | Status: SHIPPED | OUTPATIENT
Start: 2019-05-07 | End: 2019-11-21 | Stop reason: SDUPTHER

## 2019-05-07 NOTE — PROGRESS NOTES
Physical Medicine and Rehabilitation  Procedure Note    Verbal consent obtained for botulinum toxin injections after risks versus benefits discussed. 500 Units of Botox were reconstituted using preservative-free normal saline in a 100 unit to 2 mL solution. Alcohol swabs were used to cleanse the skin before injections were performed. Back pressure was placed on the syringe during injections to avoid any intravascular injection. EMG guidance was not used during procedure. Botulinum toxin was injected in the following muscles of the right lower limb:  Lateral hamstring 150 units, medial hamstring 150 units, lateral gastroc 100 units, medial gastroc 100 units    The patient tolerated the procedure well with no immediate complications. The patient should call with concerns or questions over the coming days.     CPT: 86042    Ileana Cohn MD

## 2019-05-07 NOTE — PROGRESS NOTES
[address] [facility]  Outpatient progress note    Chief Complaint:   Chief Complaint   Patient presents with    Botox Injection     500 units right lower limb         Subjective: Ambika Domingo is a 48 y.o. female who returns to the office today for further follow up. Ongoing right lower limb spasticity. Botox injections continue to be beneficial. They have been wearing off for the last 2 weeks. Denies any issues with injections. Would like repeat injections today. Neuropathic pain well controlled. She is on gabapentin and amitriptyline with good results. She reports she would like her Norco back. Discussed THC and not able to prescribe narcotics with it. Patient states she stopped smoking marijuana, last time was 1 month ago. Discussed doing UDS today, and if clean can prescribe Norco and she has to stay clean from [facility] from here on out. Patient is agreeable. Review of Systems:  CONSTITUTIONAL:  negative  EYES:  negative  HEENT:  negative  RESPIRATORY:  negative  CARDIOVASCULAR:  negative  GASTROINTESTINAL:  positive for neurogenic bowel  GENITOURINARY:  positive for neurogenic bladder  SKIN:  negative  HEMATOLOGIC/LYMPHATIC:  negative  MUSCULOSKELETAL:  negative  NEUROLOGICAL:  positive for gait problems, weakness, pain, tingling and spasticity  BEHAVIOR/PSYCH:  negative  All other review of systems otherwise negative    Physical Exam:  /72 (Site: Right Upper Arm, Position: Sitting, Cuff Size: Medium Adult)   Pulse 101   Ht 5' (1.524 m)   Wt 155 lb (70.3 kg)   BMI 30.27 kg/m²     awake  Orientation:   person, place, time  Mood: within normal limits  Affect: calm  General appearance: Patient is well nourished, well developed, well groomed and in no acute distress, appearing stated age    ROM:  abnormal - right knee and ankle restricted due to spasticity, severe-improving  Motor Exam:  2/5 right knee extension and ADF.  3/5 right knee flexion  Tone:  abnormal - 2-3/4 right leg/ankle  Muscle bulk: within normal limits  Sensory:  Decreased sensation    Skin: warm and dry, no rash or erythema  Peripheral vascular: Pulses: Normal upper and lower extremity pulses; Edema: no      Impression:  · Multiple sclerosis  · Transverse myelitis  · Lupus  · Neurogenic bowel and bladder  · Spastic paraplegia  · Bilateral lower limb neuralgia  · Moderate bilateral carpal tunnel syndrome    Plan:  · Repeat botulinum toxin injections performed by Dr. Neli Bills. See procedure note. · UDS today  · Continue gabapentin  · Continue amitriptyline  · Continue baclofen  · Continue benadryl 0.5- 1 tab for sleep  · Continue with bowel and bladder program    Return in about 3 months (around 8/7/2019) for Botox- 500 units right lower limb. It was my pleasure to evaluate Jo Moore today. Please call with any concerns or questions.   15 minutes spent in evaluation efforts    CHAS Johnson - CNP

## 2019-05-24 ENCOUNTER — OFFICE VISIT (OUTPATIENT)
Dept: NEUROLOGY | Age: 54
End: 2019-05-24
Payer: COMMERCIAL

## 2019-05-24 VITALS
DIASTOLIC BLOOD PRESSURE: 68 MMHG | BODY MASS INDEX: 30.43 KG/M2 | WEIGHT: 155 LBS | SYSTOLIC BLOOD PRESSURE: 116 MMHG | HEIGHT: 60 IN | HEART RATE: 70 BPM

## 2019-05-24 DIAGNOSIS — R25.2 SPASTICITY: ICD-10-CM

## 2019-05-24 DIAGNOSIS — R29.898 RIGHT LEG WEAKNESS: ICD-10-CM

## 2019-05-24 DIAGNOSIS — G82.20 PARAPARESIS (HCC): Primary | ICD-10-CM

## 2019-05-24 PROCEDURE — 4004F PT TOBACCO SCREEN RCVD TLK: CPT | Performed by: NURSE PRACTITIONER

## 2019-05-24 PROCEDURE — 99213 OFFICE O/P EST LOW 20 MIN: CPT | Performed by: NURSE PRACTITIONER

## 2019-05-24 PROCEDURE — 3017F COLORECTAL CA SCREEN DOC REV: CPT | Performed by: NURSE PRACTITIONER

## 2019-05-24 PROCEDURE — G8417 CALC BMI ABV UP PARAM F/U: HCPCS | Performed by: NURSE PRACTITIONER

## 2019-05-24 PROCEDURE — G8427 DOCREV CUR MEDS BY ELIG CLIN: HCPCS | Performed by: NURSE PRACTITIONER

## 2019-05-24 NOTE — PATIENT INSTRUCTIONS
1. Referral to neurology at higher level of care  2. Follow up in as needed. 3. Call if any questions or concerns.

## 2019-05-24 NOTE — PROGRESS NOTES
NEUROLOGY OUT PATIENT FOLLOW UP NOTE:  20198:52 AM    Wicho Purcell is here for follow up for paraparesis and right leg weakness. Symptoms are the same since last evaluation. She denies any new symptoms. She is unable to have MRI due to bladder stimulator. She is here to discuss the plan of care going forward. ROS:  Respiratory : no cough, no shortness of breath  Cardiac: no chest pain. No palpitations. Renal : no flank pain, no hematuria    Skin: no rash      Allergies   Allergen Reactions    Penicillins      \"I almost \"    Seasonal Itching       Current Outpatient Medications:     gabapentin (NEURONTIN) 800 MG tablet, take 1 tablet by mouth three times a day, Disp: 90 tablet, Rfl: 5    baclofen (LIORESAL) 10 MG tablet, Take 1.5 tablets by mouth 3 times daily, Disp: 135 tablet, Rfl: 5    amitriptyline (ELAVIL) 25 MG tablet, Take 1 tablet by mouth nightly, Disp: 30 tablet, Rfl: 5    Misc. Devices [facility]) MISC, Wheelchair repairs- seat and right arm rest  Current body weight:  70.3 kg Current patient height:  1.524 m Diagnosis: spastic paraplegia, transverse myelitis, gait disturbance Length of need: Lifetime, Disp: 1 each, Rfl: 0    ketorolac (TORADOL) 10 MG tablet, Take 1 tablet by mouth every 6 hours as needed for Pain, Disp: 20 tablet, Rfl: 0    Blood Glucose Monitoring Suppl (TRUE METRIX METER) w/Device KIT, use as directed, Disp: , Rfl: 0    TRUE METRIX BLOOD GLUCOSE TEST strip, , Disp: , Rfl: 0    glipiZIDE (GLUCOTROL) 5 MG tablet, , Disp: , Rfl: 0    RA ALCOHOL SWABS 70 % PADS, , Disp: , Rfl: 1    TRUEPLUS LANCETS 33G MISC, , Disp: , Rfl: 1    RA PEN NEEDLES 31G X 8 MM MISC, use as directed, Disp: , Rfl: 1    nystatin (MYCOSTATIN) 170888 UNIT/GM powder, Apply 3 times daily to groin folds. , Disp: 1 Bottle, Rfl: 1    XIIDRA 5 % SOLN, , Disp: , Rfl: 0    azelastine (OPTIVAR) 0.05 % ophthalmic solution, instill 1 drop into both eyes twice a day, Disp: , Rfl: 0   montelukast (SINGULAIR) 10 MG tablet, Take 10 mg by mouth nightly , Disp: , Rfl: 0    terbinafine (LAMISIL) 250 MG tablet, take 1 tablet by mouth once daily, Disp: , Rfl: 0    SPIRIVA HANDIHALER 18 MCG inhalation capsule, Inhale 18 mcg into the lungs daily, Disp: , Rfl: 0    artificial tears (ARTIFICIAL TEARS) OINT, as needed, Disp: , Rfl:     Calcium Carbonate (CALCIUM 600 PO), Take 1 tablet by mouth 2 times daily, Disp: , Rfl:     Multiple Vitamin (TAB-A-ISAAC PO), Take 1 tablet by mouth daily, Disp: , Rfl:     PRISTIQ 100 MG TB24, Take 100 mg by mouth daily. , Disp: , Rfl: 0    loratadine (CLARITIN) 10 MG tablet, Take 1 tablet by mouth daily as needed. , Disp: , Rfl: 0    PROAIR  (90 BASE) MCG/ACT inhaler, Inhale 2 puffs into the lungs every 6 hours as needed. , Disp: , Rfl: 0    Incontinence Supply Disposable (UNDERPADS) MISC, Cloth chux pads Diagnosis: Paraplegia 344.1, Disp: 100 Bottle, Rfl: 11    hydrOXYzine (VISTARIL) 50 MG capsule, Take 50 mg by mouth 3 times daily as needed for Itching., Disp: , Rfl:     omeprazole (PRILOSEC) 20 MG capsule, Take 20 mg by mouth daily. , Disp: , Rfl:     paliperidone (INVEGA) 6 MG ER tablet, Take 6 mg by mouth nightly Take 2 tablets at bedtime, Disp: , Rfl:     beclomethasone (QVAR) 80 MCG/ACT inhaler, Inhale 1 puff into the lungs 2 times daily. , Disp: , Rfl:       PE:   Vitals:    05/24/19 0842   BP: 116/68   Site: Right Upper Arm   Position: Sitting   Cuff Size: Medium Adult   Pulse: 70   Weight: 155 lb (70.3 kg)   Height: 5' (1.524 m)     General Appearance:  awake, alert, oriented, in no acute distress  Gen: NAD, Language is Intact. Head: no rash, no icterus  Neck: There is no carotid bruits. The Neck is supple. Neuro: CN 2-12: Motor exam is 5/5 in the left upper and lower extremities, 5/5 in the right upper extremity, 3/5 in the right lower extremity. Spasticity in the right lower extremity. Reflexes are absent. Long tracts are intact.  Cerebellar exam is Intact. Sensory exam is intact to light touch. Gait is not tested she is in a wheelchair  Musculoskeletal:  Has no hand arthritis, no limitation of ROM in any of the four extremities, except right lower extremity. Lower extremities no edema        DATA:  Results for orders placed or performed during the hospital encounter of 04/24/19   Human papillomavirus (HPV) DNA probe thin prep high risk   Result Value Ref Range    Specimen Description . GENITAL - NOT SPECIFIED     HPV Sample . THIN PREP     HPV, Genotype 16 Not Detected Not Detected    HPV, Genotype 18 Not Detected Not Detected    HPV, High Risk Other Not Detected Not Detected    HPV, Interpretation         GYN Cytology   Result Value Ref Range    Cytology Report       [id number]  [employer]  [facility]  ANATOMIC PATHOLOGY  [address] [place] [address],  [address] [place], [address]  [phone]  Fax: [phone]  GYNECOLOGIC CYTOLOGY REPORT    Patient Name: Nancy Genao  MR#: [id number]  Specimen #[id number]  Source:  1: Cervical-Endocervical material, (Thin prep vial, Imaging-assisted  review)    Clinical History  Co-Test:  ThinPrep Pap with high risk HPV testing    INTERPRETATION    Cervical-Endocervical material, (Thin prep vial, Imaging-assisted  review):  Specimen Adequacy:       Satisfactory for evaluation.       - Endocervical/transformation zone component present. Descriptive Diagnosis:       Negative for intraepithelial lesion or malignancy. Comments: High Risk HPV testing was ordered.       Cytotechnologist:   Jessica Shipman M.D.  **Electronically Signed Out**         [facility]/4/30/2019          Procedure/Addendum  HPV Procedure Report     Date Ordered:     4/26/2019     Status:  Signed Out       Date Complet e:     4/26/2019     By: TESSA Vo(ASCP)       Date Reported:     5/1/2019       INTERPRETATION  Roche HPV DNA High Risk                                  HPV Sample               Thin Prep

## 2019-06-12 ENCOUNTER — OFFICE VISIT (OUTPATIENT)
Dept: UROLOGY | Age: 54
End: 2019-06-12
Payer: COMMERCIAL

## 2019-06-12 VITALS
BODY MASS INDEX: 29.45 KG/M2 | HEIGHT: 60 IN | SYSTOLIC BLOOD PRESSURE: 110 MMHG | WEIGHT: 150 LBS | DIASTOLIC BLOOD PRESSURE: 70 MMHG

## 2019-06-12 DIAGNOSIS — N39.0 RECURRENT UTI: Primary | ICD-10-CM

## 2019-06-12 DIAGNOSIS — R33.9 URINARY RETENTION: ICD-10-CM

## 2019-06-12 LAB
BILIRUBIN URINE: ABNORMAL
BLOOD URINE, POC: NEGATIVE
CHARACTER, URINE: CLEAR
COLOR, URINE: YELLOW
GLUCOSE URINE: NEGATIVE MG/DL
KETONES, URINE: ABNORMAL
LEUKOCYTE CLUMPS, URINE: ABNORMAL
NITRITE, URINE: NEGATIVE
PH, URINE: 6 (ref 5–9)
POST VOID RESIDUAL (PVR): 205 ML
PROTEIN, URINE: 30 MG/DL
SPECIFIC GRAVITY, URINE: >= 1.03 (ref 1–1.03)
UROBILINOGEN, URINE: 4 EU/DL (ref 0–1)

## 2019-06-12 PROCEDURE — 81003 URINALYSIS AUTO W/O SCOPE: CPT | Performed by: NURSE PRACTITIONER

## 2019-06-12 PROCEDURE — 4004F PT TOBACCO SCREEN RCVD TLK: CPT | Performed by: NURSE PRACTITIONER

## 2019-06-12 PROCEDURE — G8427 DOCREV CUR MEDS BY ELIG CLIN: HCPCS | Performed by: NURSE PRACTITIONER

## 2019-06-12 PROCEDURE — 3017F COLORECTAL CA SCREEN DOC REV: CPT | Performed by: NURSE PRACTITIONER

## 2019-06-12 PROCEDURE — G8417 CALC BMI ABV UP PARAM F/U: HCPCS | Performed by: NURSE PRACTITIONER

## 2019-06-12 PROCEDURE — 51798 US URINE CAPACITY MEASURE: CPT | Performed by: NURSE PRACTITIONER

## 2019-06-12 PROCEDURE — 99213 OFFICE O/P EST LOW 20 MIN: CPT | Performed by: NURSE PRACTITIONER

## 2019-06-12 ASSESSMENT — ENCOUNTER SYMPTOMS
VOMITING: 0
NAUSEA: 0
ABDOMINAL PAIN: 0

## 2019-06-12 NOTE — PROGRESS NOTES
Subjective:      Patient ID: Cesar Barajas is a 48 y.o. female. MEREDITH Clay was seen in follow-up for recurrent UTI, urinary retention, and incontinence. She underwent placement of permanent sacral nerve stimulator with leads (stage 1 and 2) on 11/21/18 per Dr. Rosi Hill. She reports that she is doing great. Her urinary symptoms are at least 70% improved. She is down to 3 depends per day (previously 9 daily). She denies any nausea, vomiting, fever, or chills. She reports that she \"feels like she has a UTI right now. \"    She states she took D-mannose, however, this was stopped due to cost. Hiprex was also stopped. Prior to surgery she failed multiple anti-cholinergics & Myrbetriq   On 3/31/17 she was found to have a significant PVR of 360 cc so a gibbs catheter was placed due to patient being unable to straight catheterize. PVR has been monitored since that time. She has been taking Myrbetriq 50 mg daily for symptom control. She denies any dysuria, urgency, or frequency. She feels that she is emptying better. She underwent urodynamic studies on 4/1/2015. Patient had first urge to void at 111 cc and with a cough the patient leaked all the urine and the gibbs came out as well.          Review of Systems   Constitutional: Negative for chills, fatigue and fever. Gastrointestinal: Negative for abdominal pain, nausea and vomiting. Genitourinary: Positive for frequency and urgency. Negative for difficulty urinating, dysuria, flank pain and hematuria. History of recurrent UTI, incontinence. Objective:   Physical Exam   Constitutional: She is oriented to person, place, and time. She appears well-developed and well-nourished. HENT:   Head: Normocephalic and atraumatic. Right Ear: External ear normal.   Left Ear: External ear normal.   Nose: Nose normal.   Eyes: Conjunctivae are normal.   Pulmonary/Chest: Effort normal.   Abdominal: Soft. Musculoskeletal: Normal range of motion. Neurological: She is alert and oriented to person, place, and time. Skin: Skin is warm and dry. Psychiatric: She has a normal mood and affect. Her behavior is normal. Judgment and thought content normal.     Results for POC orders placed in visit on 06/12/19   POCT Urinalysis No Micro (Auto)   Result Value Ref Range    Glucose, Ur Negative NEGATIVE mg/dl    Bilirubin Urine Small (A)     Ketones, Urine Trace (A) NEGATIVE    Specific Gravity, Urine >= 1.030 1.002 - 1.03    Blood, UA POC Negative NEGATIVE    pH, Urine 6.00 5.0 - 9.0    Protein, Urine 30 (A) NEGATIVE mg/dl    Urobilinogen, Urine 4.00 0.0 - 1.0 eu/dl    Nitrite, Urine Negative NEGATIVE    Leukocyte Clumps, Urine Trace (A) NEGATIVE    Color, Urine Yellow YELLOW-STR    Character, Urine Clear [id number]   poct post void residual   Result Value Ref Range    post void residual 205 ml       Assessment:      Recurrent UTI  Mixed Incontinence  OAB  Urinary Retention  Multiple Sclerosis      Plan:      Interstim is going well. She feels like she has a UTI although she cannot give me any definitive symptoms. Send urine for culture and we will treat accordingly.

## 2019-06-14 ENCOUNTER — TELEPHONE (OUTPATIENT)
Dept: UROLOGY | Age: 54
End: 2019-06-14

## 2019-06-14 DIAGNOSIS — N39.0 RECURRENT UTI: Primary | ICD-10-CM

## 2019-06-14 DIAGNOSIS — N30.00 ACUTE CYSTITIS WITHOUT HEMATURIA: Primary | ICD-10-CM

## 2019-06-14 LAB
ORGANISM: ABNORMAL
URINE CULTURE, ROUTINE: ABNORMAL

## 2019-06-14 RX ORDER — SULFAMETHOXAZOLE AND TRIMETHOPRIM 800; 160 MG/1; MG/1
1 TABLET ORAL 2 TIMES DAILY
Qty: 20 TABLET | Refills: 0 | Status: SHIPPED | OUTPATIENT
Start: 2019-06-14 | End: 2019-06-24

## 2019-06-14 RX ORDER — DOXYCYCLINE HYCLATE 100 MG
100 TABLET ORAL 2 TIMES DAILY
Qty: 10 TABLET | Refills: 0 | Status: SHIPPED | OUTPATIENT
Start: 2019-06-14 | End: 2019-06-19

## 2019-06-14 NOTE — TELEPHONE ENCOUNTER
Per Rebekah Borges, this prescription is cancelled due to bactrim already being called in. Spoke with Dian Brennan, at [facility] and discontinued the doxycycline.

## 2019-06-14 NOTE — TELEPHONE ENCOUNTER
Patient contacted and notified of urine culture results. She voiced understanding. Patient will  bactrim from [facility].

## 2019-06-14 NOTE — TELEPHONE ENCOUNTER
Please call Rosalva Morgan and let her know that her urine was positive for infection.   I will send in some Doxy to her pharmacy    Thanks  Hailee Negrete

## 2019-06-20 ENCOUNTER — TELEPHONE (OUTPATIENT)
Dept: OPERATING ROOM | Age: 54
End: 2019-06-20

## 2019-06-20 DIAGNOSIS — G89.29 OTHER CHRONIC PAIN: Primary | ICD-10-CM

## 2019-06-20 RX ORDER — HYDROCODONE BITARTRATE AND ACETAMINOPHEN 5; 325 MG/1; MG/1
1 TABLET ORAL 2 TIMES DAILY PRN
Qty: 60 TABLET | Refills: 0 | Status: SHIPPED | OUTPATIENT
Start: 2019-06-20 | End: 2019-08-21 | Stop reason: SDUPTHER

## 2019-06-20 RX ORDER — HYDROCODONE BITARTRATE AND ACETAMINOPHEN 5; 325 MG/1; MG/1
1 TABLET ORAL 2 TIMES DAILY PRN
Qty: 60 TABLET | Refills: 0 | Status: SHIPPED | OUTPATIENT
Start: 2019-06-20 | End: 2019-07-20

## 2019-06-20 NOTE — TELEPHONE ENCOUNTER
Pt called asking about getting her pain medications back. Last OV with Dr Suma Linder stated \"Patient states she stopped smoking marijuana, last time was 1 month ago. Discussed doing UDS today, and if clean can prescribe Norco and she has to stay [facility] from here on out. Patient is agreeable. \" please advise    OARRS reviewed. UDS: + for  Gabapentin and ETOH. Last seen: 05/06/19.  Follow-up: 08/08/19

## 2019-06-26 ENCOUNTER — TELEPHONE (OUTPATIENT)
Dept: PHYSICAL MEDICINE AND REHAB | Age: 54
End: 2019-06-26

## 2019-07-08 ENCOUNTER — TELEPHONE (OUTPATIENT)
Dept: UROLOGY | Age: 54
End: 2019-07-08

## 2019-07-19 ENCOUNTER — TELEPHONE (OUTPATIENT)
Dept: PHYSICAL MEDICINE AND REHAB | Age: 54
End: 2019-07-19

## 2019-08-08 ENCOUNTER — OFFICE VISIT (OUTPATIENT)
Dept: PHYSICAL MEDICINE AND REHAB | Age: 54
End: 2019-08-08
Payer: COMMERCIAL

## 2019-08-08 VITALS
WEIGHT: 149.91 LBS | BODY MASS INDEX: 29.43 KG/M2 | HEART RATE: 60 BPM | HEIGHT: 60 IN | SYSTOLIC BLOOD PRESSURE: 100 MMHG | DIASTOLIC BLOOD PRESSURE: 60 MMHG

## 2019-08-08 DIAGNOSIS — M70.62 GREATER TROCHANTERIC BURSITIS OF BOTH HIPS: ICD-10-CM

## 2019-08-08 DIAGNOSIS — G82.22 CHRONIC INCOMPLETE SPASTIC PARAPLEGIA (HCC): Primary | ICD-10-CM

## 2019-08-08 DIAGNOSIS — M70.61 GREATER TROCHANTERIC BURSITIS OF BOTH HIPS: ICD-10-CM

## 2019-08-08 PROCEDURE — 3017F COLORECTAL CA SCREEN DOC REV: CPT | Performed by: PHYSICAL MEDICINE & REHABILITATION

## 2019-08-08 PROCEDURE — G8427 DOCREV CUR MEDS BY ELIG CLIN: HCPCS | Performed by: PHYSICAL MEDICINE & REHABILITATION

## 2019-08-08 PROCEDURE — 4004F PT TOBACCO SCREEN RCVD TLK: CPT | Performed by: PHYSICAL MEDICINE & REHABILITATION

## 2019-08-08 PROCEDURE — 64642 CHEMODENERV 1 EXTREMITY 1-4: CPT | Performed by: PHYSICAL MEDICINE & REHABILITATION

## 2019-08-08 PROCEDURE — 99213 OFFICE O/P EST LOW 20 MIN: CPT | Performed by: PHYSICAL MEDICINE & REHABILITATION

## 2019-08-08 PROCEDURE — G8417 CALC BMI ABV UP PARAM F/U: HCPCS | Performed by: PHYSICAL MEDICINE & REHABILITATION

## 2019-08-13 ENCOUNTER — TELEPHONE (OUTPATIENT)
Dept: UROLOGY | Age: 54
End: 2019-08-13

## 2019-08-13 DIAGNOSIS — R82.90 FOUL SMELLING URINE: Primary | ICD-10-CM

## 2019-08-13 RX ORDER — CIPROFLOXACIN 500 MG/1
500 TABLET, FILM COATED ORAL 2 TIMES DAILY
Qty: 14 TABLET | Refills: 0 | Status: SHIPPED | OUTPATIENT
Start: 2019-08-13 | End: 2019-08-20

## 2019-08-20 ENCOUNTER — TELEPHONE (OUTPATIENT)
Dept: PHYSICAL MEDICINE AND REHAB | Age: 54
End: 2019-08-20

## 2019-08-20 DIAGNOSIS — G89.29 OTHER CHRONIC PAIN: ICD-10-CM

## 2019-08-20 NOTE — TELEPHONE ENCOUNTER
OARRS reviewed. Last seen: 8/8/2019.  Follow-up:   Future Appointments   Date Time Provider Rashard Blank   11/21/2019  2:00 PM Remy Michel MD [facility] [place]

## 2019-08-21 RX ORDER — HYDROCODONE BITARTRATE AND ACETAMINOPHEN 5; 325 MG/1; MG/1
1 TABLET ORAL 2 TIMES DAILY PRN
Qty: 60 TABLET | Refills: 0 | Status: SHIPPED | OUTPATIENT
Start: 2019-08-21 | End: 2019-10-22 | Stop reason: SDUPTHER

## 2019-10-18 RX ORDER — GABAPENTIN 800 MG/1
TABLET ORAL
Qty: 90 TABLET | Refills: 5 | Status: SHIPPED | OUTPATIENT
Start: 2019-10-20 | End: 2019-11-21

## 2019-10-21 DIAGNOSIS — G89.29 OTHER CHRONIC PAIN: ICD-10-CM

## 2019-10-22 RX ORDER — HYDROCODONE BITARTRATE AND ACETAMINOPHEN 5; 325 MG/1; MG/1
1 TABLET ORAL 2 TIMES DAILY PRN
Qty: 60 TABLET | Refills: 0 | Status: SHIPPED | OUTPATIENT
Start: 2019-10-22 | End: 2019-11-21 | Stop reason: SDUPTHER

## 2019-10-28 ENCOUNTER — TELEPHONE (OUTPATIENT)
Dept: PHYSICAL MEDICINE AND REHAB | Age: 54
End: 2019-10-28

## 2019-10-29 ENCOUNTER — TELEPHONE (OUTPATIENT)
Dept: PHYSICAL MEDICINE AND REHAB | Age: 54
End: 2019-10-29

## 2019-10-29 DIAGNOSIS — M70.61 GREATER TROCHANTERIC BURSITIS OF BOTH HIPS: ICD-10-CM

## 2019-10-29 DIAGNOSIS — G82.22 CHRONIC INCOMPLETE SPASTIC PARAPLEGIA (HCC): Primary | ICD-10-CM

## 2019-10-29 DIAGNOSIS — M70.62 GREATER TROCHANTERIC BURSITIS OF BOTH HIPS: ICD-10-CM

## 2019-10-31 ENCOUNTER — TELEPHONE (OUTPATIENT)
Dept: PHYSICAL MEDICINE AND REHAB | Age: 54
End: 2019-10-31

## 2019-11-13 ENCOUNTER — TELEPHONE (OUTPATIENT)
Dept: PHYSICAL MEDICINE AND REHAB | Age: 54
End: 2019-11-13

## 2019-11-21 ENCOUNTER — OFFICE VISIT (OUTPATIENT)
Dept: PHYSICAL MEDICINE AND REHAB | Age: 54
End: 2019-11-21
Payer: COMMERCIAL

## 2019-11-21 VITALS
DIASTOLIC BLOOD PRESSURE: 70 MMHG | BODY MASS INDEX: 29.43 KG/M2 | SYSTOLIC BLOOD PRESSURE: 104 MMHG | HEIGHT: 60 IN | HEART RATE: 85 BPM | WEIGHT: 149.91 LBS

## 2019-11-21 DIAGNOSIS — G82.22 CHRONIC INCOMPLETE SPASTIC PARAPLEGIA (HCC): Primary | ICD-10-CM

## 2019-11-21 DIAGNOSIS — G89.29 OTHER CHRONIC PAIN: ICD-10-CM

## 2019-11-21 DIAGNOSIS — R26.9 GAIT DISTURBANCE: ICD-10-CM

## 2019-11-21 PROCEDURE — 64642 CHEMODENERV 1 EXTREMITY 1-4: CPT | Performed by: PHYSICAL MEDICINE & REHABILITATION

## 2019-11-21 PROCEDURE — 4004F PT TOBACCO SCREEN RCVD TLK: CPT | Performed by: NURSE PRACTITIONER

## 2019-11-21 PROCEDURE — G8427 DOCREV CUR MEDS BY ELIG CLIN: HCPCS | Performed by: NURSE PRACTITIONER

## 2019-11-21 PROCEDURE — 3017F COLORECTAL CA SCREEN DOC REV: CPT | Performed by: NURSE PRACTITIONER

## 2019-11-21 PROCEDURE — G8417 CALC BMI ABV UP PARAM F/U: HCPCS | Performed by: NURSE PRACTITIONER

## 2019-11-21 PROCEDURE — 99213 OFFICE O/P EST LOW 20 MIN: CPT | Performed by: NURSE PRACTITIONER

## 2019-11-21 PROCEDURE — G8484 FLU IMMUNIZE NO ADMIN: HCPCS | Performed by: NURSE PRACTITIONER

## 2019-11-21 RX ORDER — HYDROCODONE BITARTRATE AND ACETAMINOPHEN 5; 325 MG/1; MG/1
1 TABLET ORAL 2 TIMES DAILY PRN
Qty: 60 TABLET | Refills: 0 | Status: SHIPPED | OUTPATIENT
Start: 2019-11-21 | End: 2020-02-24 | Stop reason: SDUPTHER

## 2019-11-21 RX ORDER — AMITRIPTYLINE HYDROCHLORIDE 25 MG/1
25 TABLET, FILM COATED ORAL NIGHTLY
Qty: 30 TABLET | Refills: 5 | Status: SHIPPED | OUTPATIENT
Start: 2019-11-21 | End: 2020-05-28 | Stop reason: SDUPTHER

## 2019-11-21 RX ORDER — HYDROCODONE BITARTRATE AND ACETAMINOPHEN 5; 325 MG/1; MG/1
1 TABLET ORAL 2 TIMES DAILY PRN
Qty: 60 TABLET | Refills: 0 | Status: SHIPPED | OUTPATIENT
Start: 2019-11-21 | End: 2019-12-21

## 2019-11-21 RX ORDER — PREGABALIN 50 MG/1
50 CAPSULE ORAL 2 TIMES DAILY
Qty: 60 CAPSULE | Refills: 5 | Status: SHIPPED | OUTPATIENT
Start: 2019-11-21 | End: 2020-02-27 | Stop reason: SDUPTHER

## 2019-11-21 RX ORDER — BACLOFEN 10 MG/1
15 TABLET ORAL 3 TIMES DAILY
Qty: 135 TABLET | Refills: 5 | Status: SHIPPED | OUTPATIENT
Start: 2019-11-21 | End: 2020-05-28 | Stop reason: SDUPTHER

## 2019-12-04 ENCOUNTER — TELEPHONE (OUTPATIENT)
Dept: RHEUMATOLOGY | Age: 54
End: 2019-12-04

## 2019-12-05 ENCOUNTER — TELEPHONE (OUTPATIENT)
Dept: PHYSICAL MEDICINE AND REHAB | Age: 54
End: 2019-12-05

## 2020-01-06 ENCOUNTER — TELEPHONE (OUTPATIENT)
Dept: UROLOGY | Age: 55
End: 2020-01-06

## 2020-01-06 RX ORDER — CIPROFLOXACIN 500 MG/1
500 TABLET, FILM COATED ORAL 2 TIMES DAILY
Qty: 10 TABLET | Refills: 0 | Status: SHIPPED | OUTPATIENT
Start: 2020-01-06 | End: 2020-01-11

## 2020-01-06 NOTE — TELEPHONE ENCOUNTER
Ok to get urine culture. Start cipro, needs 6 month appt if possible. Let her know to watch her blood sugars closely while on cipro, can interact with glipizide.

## 2020-01-06 NOTE — TELEPHONE ENCOUNTER
The patient is w/c bound and has another infection. The urine is foul smelling and she has frequency. She denies burning, fever, or chills. I placed an order for a urine. Please advise. Thank you.

## 2020-01-30 ENCOUNTER — TELEPHONE (OUTPATIENT)
Dept: PHYSICAL MEDICINE AND REHAB | Age: 55
End: 2020-01-30

## 2020-01-30 NOTE — TELEPHONE ENCOUNTER
Patient called stating she needs a note for the person that will prepare her income tax stating she is unable to walk and in a wheelchair. Please advise.

## 2020-02-05 ENCOUNTER — TELEPHONE (OUTPATIENT)
Dept: UROLOGY | Age: 55
End: 2020-02-05

## 2020-02-05 NOTE — TELEPHONE ENCOUNTER
The patient states she has another infection. She has burning all the time. She denies fever, chills, urgency and frequency. She has an interstim in. She last seen Ankush Figueroa CNP on 06/12/2019. She stopped taking the D-Mannose and cranberry. She could not afford them. She does not have a way to get to the lab to check her urine. Please advise. Thank you.

## 2020-02-21 ENCOUNTER — TELEPHONE (OUTPATIENT)
Dept: PHYSICAL MEDICINE AND REHAB | Age: 55
End: 2020-02-21

## 2020-02-21 NOTE — TELEPHONE ENCOUNTER
Rachelle Licea called requesting a refill on the following medications:  Requested Prescriptions     Pending Prescriptions Disp Refills    HYDROcodone-acetaminophen (NORCO) 5-325 MG per tablet 60 tablet 0     Sig: Take 1 tablet by mouth 2 times daily as needed for Pain for up to 30 days. Fill on/after 1/20/2020     Pharmacy verified: [facility]  . [facility]      Date of last visit: 11/21/19  Date of next visit (if applicable): 1/37/3006

## 2020-02-24 RX ORDER — HYDROCODONE BITARTRATE AND ACETAMINOPHEN 5; 325 MG/1; MG/1
1 TABLET ORAL 2 TIMES DAILY PRN
Qty: 60 TABLET | Refills: 0 | Status: SHIPPED | OUTPATIENT
Start: 2020-02-24 | End: 2020-02-27 | Stop reason: SDUPTHER

## 2020-02-24 NOTE — TELEPHONE ENCOUNTER
OARRS reviewed. UDS: + for gabapentin - consistent   EtS, EtG - inconsistent       Last seen: 11/21/2019.        Follow-up:   Future Appointments   Date Time Provider Rashard Blank   2/27/2020  2:00 PM Rocio Yost MD [facility] - [place]

## 2020-02-26 NOTE — TELEPHONE ENCOUNTER
PA approved through [employer] for BOTOX injections. 02/21/2020 through 05/15/2020 (1visit)    Approval scanned into media tab.

## 2020-02-27 ENCOUNTER — OFFICE VISIT (OUTPATIENT)
Dept: PHYSICAL MEDICINE AND REHAB | Age: 55
End: 2020-02-27
Payer: COMMERCIAL

## 2020-02-27 VITALS
HEIGHT: 60 IN | BODY MASS INDEX: 29.43 KG/M2 | DIASTOLIC BLOOD PRESSURE: 70 MMHG | SYSTOLIC BLOOD PRESSURE: 110 MMHG | WEIGHT: 149.91 LBS

## 2020-02-27 PROCEDURE — 4004F PT TOBACCO SCREEN RCVD TLK: CPT | Performed by: NURSE PRACTITIONER

## 2020-02-27 PROCEDURE — 64642 CHEMODENERV 1 EXTREMITY 1-4: CPT | Performed by: PHYSICAL MEDICINE & REHABILITATION

## 2020-02-27 PROCEDURE — 3046F HEMOGLOBIN A1C LEVEL >9.0%: CPT | Performed by: NURSE PRACTITIONER

## 2020-02-27 PROCEDURE — G8484 FLU IMMUNIZE NO ADMIN: HCPCS | Performed by: NURSE PRACTITIONER

## 2020-02-27 PROCEDURE — 99213 OFFICE O/P EST LOW 20 MIN: CPT | Performed by: NURSE PRACTITIONER

## 2020-02-27 PROCEDURE — 2022F DILAT RTA XM EVC RTNOPTHY: CPT | Performed by: NURSE PRACTITIONER

## 2020-02-27 PROCEDURE — G8417 CALC BMI ABV UP PARAM F/U: HCPCS | Performed by: NURSE PRACTITIONER

## 2020-02-27 PROCEDURE — 3017F COLORECTAL CA SCREEN DOC REV: CPT | Performed by: NURSE PRACTITIONER

## 2020-02-27 PROCEDURE — G8427 DOCREV CUR MEDS BY ELIG CLIN: HCPCS | Performed by: NURSE PRACTITIONER

## 2020-02-27 RX ORDER — PREGABALIN 100 MG/1
100 CAPSULE ORAL 2 TIMES DAILY
Qty: 60 CAPSULE | Refills: 5 | Status: SHIPPED | OUTPATIENT
Start: 2020-02-27 | End: 2020-09-10 | Stop reason: SDUPTHER

## 2020-02-27 RX ORDER — HYDROCODONE BITARTRATE AND ACETAMINOPHEN 5; 325 MG/1; MG/1
1 TABLET ORAL 2 TIMES DAILY PRN
Qty: 18 TABLET | Refills: 0 | Status: SHIPPED | OUTPATIENT
Start: 2020-02-27 | End: 2020-03-05 | Stop reason: SDUPTHER

## 2020-02-27 RX ORDER — HYDROCODONE BITARTRATE AND ACETAMINOPHEN 5; 325 MG/1; MG/1
1 TABLET ORAL 2 TIMES DAILY PRN
Qty: 60 TABLET | Refills: 0 | Status: SHIPPED | OUTPATIENT
Start: 2020-02-27 | End: 2020-03-23 | Stop reason: SDUPTHER

## 2020-02-27 RX ORDER — HYDROCODONE BITARTRATE AND ACETAMINOPHEN 5; 325 MG/1; MG/1
1 TABLET ORAL 2 TIMES DAILY PRN
Qty: 60 TABLET | Refills: 0 | Status: SHIPPED | OUTPATIENT
Start: 2020-02-27 | End: 2020-03-05 | Stop reason: SDUPTHER

## 2020-02-27 NOTE — PROGRESS NOTES
Physical Medicine and Rehabilitation  Procedure Note    Verbal consent obtained for botulinum toxin injections after risks versus benefits discussed. 500 Units of Botox were reconstituted using preservative-free normal saline in a 100 unit to 2 mL solution. Alcohol swabs were used to cleanse the skin before injections were performed. Back pressure was placed on the syringe during injections to avoid any intravascular injection. EMG guidance was not used during procedure. Botulinum toxin was injected in the following muscles of the right lower limb:  Lateral hamstring 150 units, medial hamstring 150 units, lateral gastroc 100 units, medial gastroc 100 units    The patient tolerated the procedure well with no immediate complications. The patient should call with concerns or questions over the coming days.     CPT: 41292    Shanon Franco MD

## 2020-02-27 NOTE — PROGRESS NOTES
[address] [facility]  Outpatient progress note    Chief Complaint:   Chief Complaint   Patient presents with    Follow-up     3 month follow up         Subjective: Fatimah Kwan is a 48 y.o. female who returns to the office today for further follow up. 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 Botox injections remain beneficial. Spasticity worse in the cold weather. Injections wore off about 4 weeks ago. Would like repeat injections today. Reports pharmacy would not give her Lyrica. Apparently she still had rx she was filling for gabapentin. Remains on amitriptyline. On Norco with good benefits. Complains of increased shooting pains in bilateral lower limbs. Has been an ongoing issue. Due to complex medication history, I referred her to neurology last year. They were unable to get imaging due to bladder stimulator and ordered lab testing that patient did not get done. She was then referred to neurology at [facility]. Patient never went. Discussed importance of following with neurology several times with patient. Will order HbA1c and CMP for patient to have done to check on diabetes as patient has not been to PCP in quite some time. Medications reviewed. Patient denies side effects with medications. Patient states she is taking medications as prescribed. She denies receiving pain medications from other sources. She denies any ER visits since last visit.     Pain scale with out pain medications or at its worst is 7/10. Pain scale with pain medications or at its best is 4/10. Last dose of [place] was last night or this morning- she is not sure  Drug screen reviewed from 11/21/2019 and + ETOH- pt counseled- 2nd time. If happens again will not be able to prescribe. Patient voices understanding.    Patient was offered naloxone RX for home and declined in the past.     Review of Systems:  CONSTITUTIONAL:  negative  EYES: negative  HEENT:  negative  RESPIRATORY:  negative  CARDIOVASCULAR:  negative  GASTROINTESTINAL:  positive for neurogenic bowel  GENITOURINARY:  positive for neurogenic bladder  SKIN:  negative  HEMATOLOGIC/LYMPHATIC:  negative  MUSCULOSKELETAL:  negative  NEUROLOGICAL:  positive for gait problems, weakness, pain, tingling and spasticity  BEHAVIOR/PSYCH:  negative  All other review of systems otherwise negative    Physical Exam:  /70 (Site: Left Upper Arm, Position: Sitting, Cuff Size: Medium Adult)   Ht 5' (1.524 m)   Wt 149 lb 14.6 oz (68 kg)   BMI 29.28 kg/m²     awake  Orientation:   person, place, time  Mood: within normal limits  Affect: calm  General appearance: Patient is well nourished, well developed, well groomed and in no acute distress, appearing stated age    ROM:  abnormal - right knee and ankle restricted due to spasticity, severe-improving  Motor Exam:  2/5 right knee extension and ADF. 3/5 right knee flexion  Tone:  abnormal - 2-3/4 right leg/ankle  Muscle bulk: within normal limits  Sensory:  Decreased sensation    Skin: warm and dry, no rash or erythema  Peripheral vascular: Pulses: Normal upper and lower extremity pulses; Edema: no      Impression:  · Multiple sclerosis  · Transverse myelitis  · Lupus  · Neurogenic bowel and bladder  · Spastic paraplegia  · Bilateral lower limb neuralgia  · Moderate bilateral carpal tunnel syndrome    Plan:  · Repeat botulinum toxin injections performed by Dr. Talia Patten. See procedure note. · Check HbA1c and CMP due to increase in neuropathic pain  · Patient needs to follow through with neurology referral to [facility]  · Conitnue Lyrica 50 mg BID  · Continue amitriptyline  · Continue baclofen  · Continue benadryl 0.5- 1 tab for sleep  · Continue with bowel and bladder program  · OARRS reviewed. Current MED: 10  · Patient was offered naloxone for home.  Patient declined in the past.   · Discussed long term side effects of medications, tolerance, dependency and

## 2020-03-05 ENCOUNTER — OFFICE VISIT (OUTPATIENT)
Dept: UROLOGY | Age: 55
End: 2020-03-05
Payer: COMMERCIAL

## 2020-03-05 VITALS
SYSTOLIC BLOOD PRESSURE: 114 MMHG | BODY MASS INDEX: 29.77 KG/M2 | HEART RATE: 88 BPM | HEIGHT: 60 IN | DIASTOLIC BLOOD PRESSURE: 70 MMHG

## 2020-03-05 LAB
BACTERIA: ABNORMAL /HPF
BILIRUBIN URINE: ABNORMAL
BILIRUBIN URINE: ABNORMAL
BLOOD URINE, POC: ABNORMAL
BLOOD, URINE: ABNORMAL
CASTS 2: ABNORMAL /LPF
CASTS UA: ABNORMAL /LPF
CHARACTER, URINE: ABNORMAL
CHARACTER, URINE: CLEAR
COLOR, URINE: YELLOW
COLOR: ABNORMAL
CRYSTALS, UA: ABNORMAL
EPITHELIAL CELLS, UA: ABNORMAL /HPF
GLUCOSE URINE: >= 1000 MG/DL
GLUCOSE URINE: >= 1000 MG/DL
ICTOTEST: NEGATIVE
KETONES, URINE: 15
KETONES, URINE: 15
LEUKOCYTE CLUMPS, URINE: NEGATIVE
LEUKOCYTE ESTERASE, URINE: ABNORMAL
MISCELLANEOUS 2: ABNORMAL
NITRITE, URINE: POSITIVE
NITRITE, URINE: POSITIVE
PH UA: 8 (ref 5–9)
PH, URINE: 7.5 (ref 5–9)
POST VOID RESIDUAL (PVR): 45 ML
PROTEIN UA: ABNORMAL
PROTEIN, URINE: 30 MG/DL
RBC URINE: > 200 /HPF
RENAL EPITHELIAL, UA: ABNORMAL
SPECIFIC GRAVITY, URINE: 1.02 (ref 1–1.03)
SPECIFIC GRAVITY, URINE: > 1.03 (ref 1–1.03)
UROBILINOGEN, URINE: 2 EU/DL (ref 0–1)
UROBILINOGEN, URINE: >= 8 EU/DL (ref 0–1)
WBC UA: ABNORMAL /HPF
YEAST: ABNORMAL

## 2020-03-05 PROCEDURE — 51798 US URINE CAPACITY MEASURE: CPT | Performed by: NURSE PRACTITIONER

## 2020-03-05 PROCEDURE — 99214 OFFICE O/P EST MOD 30 MIN: CPT | Performed by: NURSE PRACTITIONER

## 2020-03-05 PROCEDURE — G8484 FLU IMMUNIZE NO ADMIN: HCPCS | Performed by: NURSE PRACTITIONER

## 2020-03-05 PROCEDURE — G8417 CALC BMI ABV UP PARAM F/U: HCPCS | Performed by: NURSE PRACTITIONER

## 2020-03-05 PROCEDURE — G8427 DOCREV CUR MEDS BY ELIG CLIN: HCPCS | Performed by: NURSE PRACTITIONER

## 2020-03-05 PROCEDURE — 3017F COLORECTAL CA SCREEN DOC REV: CPT | Performed by: NURSE PRACTITIONER

## 2020-03-05 PROCEDURE — 4004F PT TOBACCO SCREEN RCVD TLK: CPT | Performed by: NURSE PRACTITIONER

## 2020-03-05 ASSESSMENT — ENCOUNTER SYMPTOMS
VOMITING: 0
NAUSEA: 0
ABDOMINAL PAIN: 0

## 2020-03-07 LAB
ORGANISM: ABNORMAL
URINE CULTURE REFLEX: ABNORMAL

## 2020-03-09 ENCOUNTER — TELEPHONE (OUTPATIENT)
Dept: UROLOGY | Age: 55
End: 2020-03-09

## 2020-03-09 RX ORDER — SULFAMETHOXAZOLE AND TRIMETHOPRIM 800; 160 MG/1; MG/1
1 TABLET ORAL 2 TIMES DAILY
Qty: 10 TABLET | Refills: 0 | Status: SHIPPED | OUTPATIENT
Start: 2020-03-09 | End: 2020-03-14

## 2020-03-10 NOTE — TELEPHONE ENCOUNTER
Urine culture positive for infection. Please let them know I have sent bactrim to their pharmacy.  Thanks

## 2020-03-10 NOTE — TELEPHONE ENCOUNTER
Patient was urine culture positive for infection. Bactrim was sent to the pharmacy. She voiced understanding.

## 2020-03-23 ENCOUNTER — TELEPHONE (OUTPATIENT)
Dept: PHYSICAL MEDICINE AND REHAB | Age: 55
End: 2020-03-23

## 2020-03-23 RX ORDER — HYDROCODONE BITARTRATE AND ACETAMINOPHEN 5; 325 MG/1; MG/1
1 TABLET ORAL 2 TIMES DAILY PRN
Qty: 60 TABLET | Refills: 0 | Status: SHIPPED | OUTPATIENT
Start: 2020-03-23 | End: 2020-04-22

## 2020-03-23 NOTE — TELEPHONE ENCOUNTER
Patient questioning norco rx refill. I see it was sent 2/27/2020 - last filled 2/24/2020 on OARRS. I called the pharmacy and they did not receive the Rx for March and April -can you please re-send.  Set up for fill on 3/24/2020 and 4/23/2020

## 2020-03-25 PROBLEM — G95.11 SPINAL CORD INFARCTION (HCC): Status: RESOLVED | Noted: 2020-03-25 | Resolved: 2020-03-24

## 2020-04-20 DIAGNOSIS — G89.29 OTHER CHRONIC PAIN: ICD-10-CM

## 2020-04-23 RX ORDER — HYDROCODONE BITARTRATE AND ACETAMINOPHEN 5; 325 MG/1; MG/1
1 TABLET ORAL 2 TIMES DAILY PRN
Qty: 60 TABLET | Refills: 0 | OUTPATIENT
Start: 2020-04-23 | End: 2020-05-23

## 2020-05-28 ENCOUNTER — OFFICE VISIT (OUTPATIENT)
Dept: PHYSICAL MEDICINE AND REHAB | Age: 55
End: 2020-05-28
Payer: COMMERCIAL

## 2020-05-28 VITALS
BODY MASS INDEX: 29.45 KG/M2 | TEMPERATURE: 98 F | DIASTOLIC BLOOD PRESSURE: 58 MMHG | SYSTOLIC BLOOD PRESSURE: 110 MMHG | HEIGHT: 60 IN | HEART RATE: 74 BPM | WEIGHT: 150 LBS

## 2020-05-28 PROCEDURE — 64642 CHEMODENERV 1 EXTREMITY 1-4: CPT | Performed by: PHYSICAL MEDICINE & REHABILITATION

## 2020-05-28 PROCEDURE — 4004F PT TOBACCO SCREEN RCVD TLK: CPT | Performed by: NURSE PRACTITIONER

## 2020-05-28 PROCEDURE — G8417 CALC BMI ABV UP PARAM F/U: HCPCS | Performed by: NURSE PRACTITIONER

## 2020-05-28 PROCEDURE — 3017F COLORECTAL CA SCREEN DOC REV: CPT | Performed by: NURSE PRACTITIONER

## 2020-05-28 PROCEDURE — 99213 OFFICE O/P EST LOW 20 MIN: CPT | Performed by: NURSE PRACTITIONER

## 2020-05-28 PROCEDURE — G8427 DOCREV CUR MEDS BY ELIG CLIN: HCPCS | Performed by: NURSE PRACTITIONER

## 2020-05-28 RX ORDER — AMITRIPTYLINE HYDROCHLORIDE 25 MG/1
25 TABLET, FILM COATED ORAL NIGHTLY
Qty: 30 TABLET | Refills: 5 | Status: SHIPPED | OUTPATIENT
Start: 2020-05-28 | End: 2021-01-07 | Stop reason: SDUPTHER

## 2020-05-28 RX ORDER — HYDROCODONE BITARTRATE AND ACETAMINOPHEN 5; 325 MG/1; MG/1
1 TABLET ORAL 2 TIMES DAILY PRN
Qty: 60 TABLET | Refills: 0 | Status: SHIPPED | OUTPATIENT
Start: 2020-05-28 | End: 2020-06-27

## 2020-05-28 RX ORDER — BACLOFEN 10 MG/1
15 TABLET ORAL 3 TIMES DAILY
Qty: 135 TABLET | Refills: 5 | Status: SHIPPED | OUTPATIENT
Start: 2020-05-28 | End: 2021-01-07 | Stop reason: SDUPTHER

## 2020-05-28 RX ORDER — HYDROCODONE BITARTRATE AND ACETAMINOPHEN 5; 325 MG/1; MG/1
1 TABLET ORAL 2 TIMES DAILY PRN
Qty: 60 TABLET | Refills: 0 | Status: SHIPPED | OUTPATIENT
Start: 2020-05-28 | End: 2020-06-04

## 2020-05-28 NOTE — PROGRESS NOTES
[address] [facility]  Outpatient progress note    Chief Complaint:   Chief Complaint   Patient presents with    Follow-up     Botox 500units RLE        Subjective: Wilner Coon is a 48 y.o. female who returns to the office today for further follow up. 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 Botox injections remain beneficial. Would like repeat botox injections today. Remains on amitriptyline and Lyrica. On Norco with good benefits. Reports her pain is overall stable, no new issues. Did not get labs done. Still not seeing neurologist.     Medications reviewed. Patient denies side effects with medications. Patient states she is taking medications as prescribed. She denies receiving pain medications from other sources. She denies any ER visits since last visit.     Pain scale with out pain medications or at its worst is 6/10. Pain scale with pain medications or at its best is 2/10.   Last dose of [place] was this morning  Drug screen reviewed  Patient was offered naloxone RX for home and declined in the past.     Review of Systems:  CONSTITUTIONAL:  negative  EYES:  negative  HEENT:  negative  RESPIRATORY:  negative  CARDIOVASCULAR:  negative  GASTROINTESTINAL:  positive for neurogenic bowel  GENITOURINARY:  positive for neurogenic bladder  SKIN:  negative  HEMATOLOGIC/LYMPHATIC:  negative  MUSCULOSKELETAL:  negative  NEUROLOGICAL:  positive for gait problems, weakness, pain, tingling and spasticity  BEHAVIOR/PSYCH:  negative  All other review of systems otherwise negative    Physical Exam:  BP (!) 110/58 (Site: Right Upper Arm, Position: Sitting, Cuff Size: Medium Adult)   Pulse 74   Temp 98 °F (36.7 °C) (Oral)   Ht 4' 11.5\" (1.511 m)   Wt 150 lb (68 kg)   BMI 29.79 kg/m²     awake  Orientation:   person, place, time  Mood: within normal limits  Affect: calm  General appearance: Patient is well nourished, well developed, well groomed and in no acute distress, appearing stated age    ROM:  abnormal - right knee and ankle restricted due to spasticity, severe-improving  Motor Exam:  2/5 right knee extension and ADF. 3/5 right knee flexion  Tone:  abnormal - 2-3/4 right leg/ankle  Muscle bulk: within normal limits  Sensory:  Decreased sensation    Skin: warm and dry, no rash or erythema  Peripheral vascular: Pulses: Normal upper and lower extremity pulses; Edema: no      Impression:  · Multiple sclerosis  · Transverse myelitis  · Lupus  · Neurogenic bowel and bladder  · Spastic paraplegia  · Bilateral lower limb neuralgia  · Moderate bilateral carpal tunnel syndrome    Plan:  · Repeat botulinum toxin injections performed by Dr. Cosme Miller. See procedure note. · Conitnue Lyrica 50 mg BID  · Continue amitriptyline  · Continue baclofen  · Continue benadryl 0.5- 1 tab for sleep  · Continue with bowel and bladder program  · OARRS reviewed. Current MED: 10  · Patient was offered naloxone for home. Patient declined in the past.   · Discussed long term side effects of medications, tolerance, dependency and addiction. · Previous UDS reviewed  · UDS performed today for compliance  · Patient told can not receive any pain medications from any other source. · No evidence of abuse, diversion or aberrant behavior. · Medications and/or procedures to improve function and quality of life- patient understanding with this and that may not be pain free  · Discussed with patient about safe storage of medications at home    Return in about 3 months (around 8/28/2020) for Botox- 500 units right lower limb. It was my pleasure to evaluate Tiff Salguero today. Please call with any concerns or questions. 15 minutes spent in evaluation efforts    Rajesh Limon, APRN - [address] [facility] [place] requires a shower chair to complete bathing and grooming tasks.   Patient requires a shower chair due to Upper Extremity/Lower Extremity Weakness

## 2020-06-04 ENCOUNTER — TELEPHONE (OUTPATIENT)
Dept: UROLOGY | Age: 55
End: 2020-06-04

## 2020-06-04 ENCOUNTER — VIRTUAL VISIT (OUTPATIENT)
Dept: UROLOGY | Age: 55
End: 2020-06-04
Payer: COMMERCIAL

## 2020-06-04 PROCEDURE — 99212 OFFICE O/P EST SF 10 MIN: CPT | Performed by: NURSE PRACTITIONER

## 2020-06-04 RX ORDER — INSULIN GLARGINE 100 [IU]/ML
60 INJECTION, SOLUTION SUBCUTANEOUS NIGHTLY
Status: ON HOLD | COMMUNITY
End: 2022-02-20 | Stop reason: HOSPADM

## 2020-06-04 ASSESSMENT — ENCOUNTER SYMPTOMS
CHEST TIGHTNESS: 0
COLOR CHANGE: 0
EYE PAIN: 0
ABDOMINAL PAIN: 0
SHORTNESS OF BREATH: 0
BACK PAIN: 1
NAUSEA: 0
FACIAL SWELLING: 0
EYE REDNESS: 0

## 2020-06-04 NOTE — PROGRESS NOTES
2020    TELEHEALTH EVALUATION -- Audio/Visual (During [id number] public health emergency)    HPI:    Seen 3 months ago for OAB. PVR was acceptable at 45 ml. Pt has interstim in place and didn't think it was working at that time because she couldn't feel the tingling. The device had turned out to be off. She did have UTI at that visit and was treated with Bactrim. Feelings of incomplete bladder emptying, changing pullup 4 to 5x a day now, she still reports waking up with pull up wet. She reports she called Jovno a month ago and he told her to turn settings up. She can't feel tingling sensation so she doesn't think its working. No dysuria , or hematuria or fevers or chills    Summary of old records:                 [facility] is here today for OAB follow up. Reports her Interstim is no longer working because she can't feel the \"tingling\". She couldn't recall how long its not been working, she denies any drastic changes in her voiding habits, except for enuresis occasionally. She underwent placement of permanent sacral nerve stimulator with leads (stage 1 and 2) on 18 per Dr. Jimenez Sim. She is down to 3 depends per day (previously 9 daily). Prior to surgery she failed multiple anti-cholinergics & Myrbetriq              She underwent urodynamic studies on 2015. Patient had first urge to void at 111 cc and with a cough the patient leaked all the urine and the gibbs came out as well.        Zuleyma Reed (:  1965) has requested an audio/video evaluation for the following concern(s):    OAB    Review of Systems    Prior to Visit Medications    Medication Sig Taking?  Authorizing Provider   tiotropium (SPIRIVA) 18 MCG inhalation capsule Inhale 18 mcg into the lungs daily 2 puffs Yes Historical Provider, MD   insulin glargine (LANTUS SOLOSTAR) 100 UNIT/ML injection pen Inject 10 Units into the skin nightly Yes Historical Provider, MD   amitriptyline (ELAVIL) 25 MG tablet Take 1 tablet by mouth nightly Yes CHAS Medina CNP   HYDROcodone-acetaminophen (NORCO) 5-325 MG per tablet Take 1 tablet by mouth 2 times daily as needed for Pain for up to 30 days. Fill on/after 8/21/2020 Yes CHAS Medina CNP   Umeclidinium Bromide (INCRUSE ELLIPTA IN) Inhale into the lungs Yes Historical Provider, MD   pregabalin (LYRICA) 100 MG capsule Take 1 capsule by mouth 2 times daily for 180 days. Yes CHAS Medina CNP   Handicap Placard MISC by Does not apply route EXP 10/2024 Yes CHAS Medina CNP   TRUE METRIX BLOOD GLUCOSE TEST strip  Yes Historical Provider, MD   glipiZIDE (GLUCOTROL) 5 MG tablet  Yes Historical Provider, MD ABBOTT ALCOHOL SWABS 70 % PADS  Yes Historical Provider, MD   TRUEPLUS LANCETS 33G [address] [facility]  Yes Historical Provider, MD ABBOTT PEN NEEDLES 31G X 8 MM MISC use as directed Yes Historical Provider, MD   azelastine (OPTIVAR) 0.05 % ophthalmic solution instill 1 drop into both eyes twice a day Yes Historical Provider, MD   montelukast (SINGULAIR) 10 MG tablet Take 10 mg by mouth nightly  Yes Historical Provider, MD   artificial tears (ARTIFICIAL TEARS) Ozie Even as needed Yes Historical Provider, MD   Calcium Carbonate (CALCIUM 600 PO) Take 1 tablet by mouth 2 times daily Yes Historical Provider, MD   Multiple Vitamin (TAB-A-ISAAC PO) Take 1 tablet by mouth daily Yes Historical Provider, MD   PRISTIQ 100 MG TB24 Take 100 mg by mouth daily. Yes Historical Provider, MD   loratadine (CLARITIN) 10 MG tablet Take 1 tablet by mouth daily as needed. Yes Historical Provider, MD   PROAIR  (90 BASE) MCG/ACT inhaler Inhale 2 puffs into the lungs every 6 hours as needed. Yes Historical Provider, MD   hydrOXYzine (VISTARIL) 50 MG capsule Take 50 mg by mouth 3 times daily as needed for Itching. Yes Historical Provider, MD   omeprazole (PRILOSEC) 20 MG capsule Take 20 mg by mouth daily.  Yes Historical Provider, MD   paliperidone (INVEGA) 6 MG ER tablet Take 6 mg by mouth nightly Take 2 tablets at bedtime Yes Historical Provider, MD   baclofen (LIORESAL) 10 MG tablet Take 1.5 tablets by mouth 3 times daily  Patient not taking: Reported on 6/4/2020  CHAS Naik CNP   [facility]) [address] [facility] Wheelchair repairs- seat and right arm rest    Current body weight:  70.3 kg  Current patient height:  1.524 m  Diagnosis: spastic paraplegia, transverse myelitis, gait disturbance  Length of need: Lifetime  CHAS Naik CNP   Blood Glucose Monitoring Suppl (TRUE METRIX METER) w/Device KIT use as directed  Historical Provider, MD   Incontinence Supply Disposable (UNDERPADS) MISC Cloth chux pads  Diagnosis: Paraplegia 344.1  Kodak Adair MD       Social History     Tobacco Use    Smoking status: Current Every Day Smoker     Packs/day: 1.00     Types: Cigarettes     Start date: 12/6/1979    Smokeless tobacco: Never Used   Substance Use Topics    Alcohol use:  Yes     Alcohol/week: 0.0 standard drinks     Comment: social drinker    Drug use: No        Past Medical History:   Diagnosis Date    Anxiety     Anxiety and depression     Asthma     Bipolar 1 disorder ([address]     Bipolar 1 disorder with moderate sourav ([address]     Blood circulation, collateral     Cancer ([address]     Depression     Endometriosis     GERD (gastroesophageal reflux disease)     Kidney stones     Lupus ([address]     Movement disorder     MS (multiple sclerosis) ([address]     Schizophrenia ([address]     Thyroid disease     Type 2 diabetes mellitus without complication ([address]     Unspecified cerebral artery occlusion with cerebral infarction    ,   Past Surgical History:   Procedure Laterality Date    COLONOSCOPY      DILATION AND CURETTAGE OF UTERUS      AR OFFICE/OUTPT VISIT,PROCEDURE ONLY N/A 11/21/2018    INTERSTIM DEVICE PLACEMENT MODEL 3058, ONLY HEAD ELIGIBLE FOR MRI WITH TRANSMIT/RECEIVE HEAD COIL    TUBAL LIGATION      10 years ago   ,   Social History     Tobacco Use    Smoking status: Current Every Day Smoker     Packs/day: 1.00     Types: Cigarettes     Start date: 12/6/1979    Smokeless tobacco: Never Used   Substance Use Topics    Alcohol use: Yes     Alcohol/week: 0.0 standard drinks     Comment: social drinker    Drug use: No   ,   Family History   Problem Relation Age of Onset    Stroke Mother     Asthma Mother     Other Mother     No Known Problems Sister     Asthma Brother     No Known Problems Brother        PHYSICAL EXAMINATION:  [ INSTRUCTIONS:  \"[x]\" Indicates a positive item  \"[]\" Indicates a negative item  -- DELETE ALL ITEMS NOT EXAMINED]  Vital Signs: (As obtained by patient/caregiver or practitioner observation)    Blood pressure-  Heart rate-    Respiratory rate-    Temperature-  Pulse oximetry-     Constitutional: [x] Appears well-developed and well-nourished [] No apparent distress      [] Abnormal-   Mental status  [x] Alert and awake  [] Oriented to person/place/time []Able to follow commands      Eyes:  EOM    [x]  Normal  [] Abnormal-  Sclera  [x]  Normal  [] Abnormal -         Discharge []  None visible  [] Abnormal -    HENT:   [x] Normocephalic, atraumatic.   [] Abnormal   [] Mouth/Throat: Mucous membranes are moist.     External Ears [x] Normal  [] Abnormal-     Neck: [x] No visualized mass     Pulmonary/Chest: [x] Respiratory effort normal.  [] No visualized signs of difficulty breathing or respiratory distress        [] Abnormal-      Musculoskeletal:   [] Normal gait with no signs of ataxia         [x] Normal range of motion of neck        [] Abnormal-       Neurological:        [] No Facial Asymmetry (Cranial nerve 7 motor function) (limited exam to video visit)          [] No gaze palsy        [] Abnormal-         Skin:        [] No significant exanthematous lesions or discoloration noted on facial skin         [] Abnormal-            Psychiatric:       [x] Normal Affect [] No Hallucinations        [] Abnormal-

## 2020-06-04 NOTE — TELEPHONE ENCOUNTER
Gilford Conch is scheduled to come to the office this Thursday, 06-11, so I went ahead and brought her in for a lab/ma on Odalys's schedule for UA/PVR and for Gilford Conch to see. Is that okay?

## 2020-06-10 ENCOUNTER — TELEPHONE (OUTPATIENT)
Dept: PHYSICAL MEDICINE AND REHAB | Age: 55
End: 2020-06-10

## 2020-06-10 RX ORDER — WHEELCHAIR
EACH MISCELLANEOUS
Qty: 1 EACH | Refills: 0 | Status: SHIPPED | OUTPATIENT
Start: 2020-06-10 | End: 2020-10-22

## 2020-07-09 ENCOUNTER — NURSE ONLY (OUTPATIENT)
Dept: UROLOGY | Age: 55
End: 2020-07-09
Payer: COMMERCIAL

## 2020-07-09 LAB
BILIRUBIN URINE: NEGATIVE
BLOOD URINE, POC: NEGATIVE
CHARACTER, URINE: CLEAR
COLOR, URINE: YELLOW
GLUCOSE URINE: NEGATIVE MG/DL
KETONES, URINE: NEGATIVE
LEUKOCYTE CLUMPS, URINE: NEGATIVE
NITRITE, URINE: NEGATIVE
PH, URINE: 7 (ref 5–9)
POST VOID RESIDUAL (PVR): 29 ML
PROTEIN, URINE: NEGATIVE MG/DL
SPECIFIC GRAVITY, URINE: 1.01 (ref 1–1.03)
UROBILINOGEN, URINE: 1 EU/DL (ref 0–1)

## 2020-07-09 PROCEDURE — 81003 URINALYSIS AUTO W/O SCOPE: CPT | Performed by: UROLOGY

## 2020-07-09 PROCEDURE — 51798 US URINE CAPACITY MEASURE: CPT | Performed by: UROLOGY

## 2020-07-09 RX ORDER — GREEN TEA/HOODIA GORDONII 315-12.5MG
1 CAPSULE ORAL DAILY
Qty: 30 TABLET | Refills: 11 | Status: SHIPPED | OUTPATIENT
Start: 2020-07-09 | End: 2021-01-20 | Stop reason: ALTCHOICE

## 2020-07-09 NOTE — PROGRESS NOTES
Increase fluid intake. Pt c/o darker urine, and discharge. No dysuria, fevers or chills. Started on probiotic. Urine today negative for infection. Jovon from [facility] saw patient. Battery life ok, she will call him in 1 month to see how things are going. Fu in 2 months with me.

## 2020-07-09 NOTE — PROGRESS NOTES
Patient here today for UA/ PVR interstim check with Jovon. 29 PVR. Patient states that she keeps getting UTIs and interstim is not working. Patient does not feel the interstim.

## 2020-07-27 RX ORDER — HYDROCODONE BITARTRATE AND ACETAMINOPHEN 5; 325 MG/1; MG/1
1 TABLET ORAL 2 TIMES DAILY PRN
Qty: 60 TABLET | Refills: 0 | OUTPATIENT
Start: 2020-07-27 | End: 2020-08-26

## 2020-07-27 NOTE — TELEPHONE ENCOUNTER
OARRS reviewed. UDS: + for  Taylor Mcqueen - consistent      Last seen: 5/28/2020.      Follow-up:   Future Appointments   Date Time Provider Rashard Blank   9/9/2020  2:45 PM Kaleigh Peres Urology [facility]   9/10/2020  2:20 PM Lauren Saravia MD [facility]

## 2020-07-27 NOTE — TELEPHONE ENCOUNTER
Darryl Jordan called requesting a refill on the following medications:  Requested Prescriptions     Pending Prescriptions Disp Refills    HYDROcodone-acetaminophen (NORCO) 5-325 MG per tablet 60 tablet 0     Sig: Take 1 tablet by mouth 2 times daily as needed for Pain for up to 30 days.  Fill on/after 8/21/2020     Pharmacy verified:  .pv    [facility] [address]    Date of last visit: 5/28/20  Date of next visit (if applicable): 7/18/2807

## 2020-07-28 RX ORDER — HYDROCODONE BITARTRATE AND ACETAMINOPHEN 5; 325 MG/1; MG/1
1 TABLET ORAL 2 TIMES DAILY PRN
Qty: 60 TABLET | Refills: 0 | Status: SHIPPED | OUTPATIENT
Start: 2020-07-28 | End: 2020-08-27

## 2020-07-28 NOTE — TELEPHONE ENCOUNTER
OARRS reviewed. UDS: + for  Norco, Lyrica consistent. Narcan offered: Offered  Last seen: 5/25/2020. Follow-up: 09/10/2020    Called and spoke with pharmacy they state that they do not have one dated for 07/22/2020 but they do have the one for 8/21/2020. Please sign.

## 2020-09-09 ENCOUNTER — OFFICE VISIT (OUTPATIENT)
Dept: UROLOGY | Age: 55
End: 2020-09-09
Payer: COMMERCIAL

## 2020-09-09 VITALS — BODY MASS INDEX: 30.3 KG/M2 | TEMPERATURE: 98.4 F | HEIGHT: 59 IN

## 2020-09-09 LAB — POST VOID RESIDUAL (PVR): 90 ML

## 2020-09-09 PROCEDURE — G8417 CALC BMI ABV UP PARAM F/U: HCPCS | Performed by: NURSE PRACTITIONER

## 2020-09-09 PROCEDURE — 3017F COLORECTAL CA SCREEN DOC REV: CPT | Performed by: NURSE PRACTITIONER

## 2020-09-09 PROCEDURE — 4004F PT TOBACCO SCREEN RCVD TLK: CPT | Performed by: NURSE PRACTITIONER

## 2020-09-09 PROCEDURE — G8427 DOCREV CUR MEDS BY ELIG CLIN: HCPCS | Performed by: NURSE PRACTITIONER

## 2020-09-09 PROCEDURE — 99213 OFFICE O/P EST LOW 20 MIN: CPT | Performed by: NURSE PRACTITIONER

## 2020-09-09 PROCEDURE — 51798 US URINE CAPACITY MEASURE: CPT | Performed by: NURSE PRACTITIONER

## 2020-09-09 ASSESSMENT — ENCOUNTER SYMPTOMS
VOMITING: 0
NAUSEA: 0
ABDOMINAL PAIN: 0

## 2020-09-09 NOTE — PROGRESS NOTES
Subjective:      Patient ID: Juju Jean is a 50 y.o. female. MEREDITH Garcia is here today for OAB follow up. She was seen 2 months ago as her Interstim wasn't working, she didn't feel the \"tingling. \" she consulted with Jovon from [facility] over the phone and made some adjustments. She reports she felt it for 2 hours then it went away. She reports waking up wet some nights. She reports having incontinence intermittently, not every day. She reports frequency every hour. She denies dysuria, flank pain, or hematuria. Thinks she has UTI because her urine has foul odor. Unable to give urine specimen today. Her battery life 6 months ago was 50-89 months at that time. She underwent placement of permanent sacral nerve stimulator with leads (stage 1 and 2) on 11/21/18 per Dr. Elsy López. She is down to 2 depends per day (previously 9 daily). Prior to surgery she failed multiple anti-cholinergics & Myrbetriq   She underwent urodynamic studies on 4/1/2015. Patient had first urge to void at 111 cc and with a cough the patient leaked all the urine and the gibbs came out as well.          Review of Systems   Constitutional: Negative for chills, fatigue and fever. Gastrointestinal: Negative for abdominal pain, nausea and vomiting. Genitourinary: Positive for enuresis, frequency and urgency. Negative for difficulty urinating, dysuria, flank pain and hematuria. History of recurrent UTI, incontinence. Objective:   Physical Exam   Constitutional: She is oriented to person, place, and time. She appears well-developed and well-nourished. HENT:   Head: Normocephalic and atraumatic. Right Ear: External ear normal.   Left Ear: External ear normal.   Nose: Nose normal.   Eyes: Conjunctivae are normal.   Pulmonary/Chest: Effort normal.   Abdominal: Soft. Musculoskeletal:      Comments: In wheelchair   Neurological: She is alert and oriented to person, place, and time. Skin: Skin is warm and dry. Psychiatric: She has a normal mood and affect. Her behavior is normal. Judgment and thought content normal.     Results for POC orders placed in visit on 09/09/20   poct post void residual   Result Value Ref Range    post void residual 90 ml         Assessment:      Recurrent UTI  Mixed Incontinence  OAB  Urinary Retention  Multiple Sclerosis      Plan:      She denies symptoms of dysuria, fever or chills of flank pain although she reports discharge, and this usually occurs with UTI and malodorous urine. Unable to give urine today. Will give her cup and order and she can obtain at home. Last office visit, urine was negative. She reports Interstim not working because she cant feel tingling. We discussed that it may still be working, its just her body adapts to device and she may not feel it all of the time. She will call Johnny Zavala if she has issues and to check battery life at home. If less than 12 months will get her set up for battery change. Jovon mentioned inserting rechargeable battery the next time. Send urine for culture and we will treat accordingly. PVR acceptable. Follow up in 6 months. pvr check, interstim.

## 2020-09-10 ENCOUNTER — OFFICE VISIT (OUTPATIENT)
Dept: PHYSICAL MEDICINE AND REHAB | Age: 55
End: 2020-09-10
Payer: COMMERCIAL

## 2020-09-10 VITALS
DIASTOLIC BLOOD PRESSURE: 78 MMHG | BODY MASS INDEX: 30.24 KG/M2 | HEIGHT: 59 IN | WEIGHT: 150 LBS | SYSTOLIC BLOOD PRESSURE: 118 MMHG

## 2020-09-10 PROCEDURE — G8417 CALC BMI ABV UP PARAM F/U: HCPCS | Performed by: PHYSICAL MEDICINE & REHABILITATION

## 2020-09-10 PROCEDURE — 99213 OFFICE O/P EST LOW 20 MIN: CPT | Performed by: PHYSICAL MEDICINE & REHABILITATION

## 2020-09-10 PROCEDURE — 3017F COLORECTAL CA SCREEN DOC REV: CPT | Performed by: PHYSICAL MEDICINE & REHABILITATION

## 2020-09-10 PROCEDURE — 4004F PT TOBACCO SCREEN RCVD TLK: CPT | Performed by: PHYSICAL MEDICINE & REHABILITATION

## 2020-09-10 PROCEDURE — G8427 DOCREV CUR MEDS BY ELIG CLIN: HCPCS | Performed by: PHYSICAL MEDICINE & REHABILITATION

## 2020-09-10 PROCEDURE — 64642 CHEMODENERV 1 EXTREMITY 1-4: CPT | Performed by: PHYSICAL MEDICINE & REHABILITATION

## 2020-09-10 RX ORDER — HYDROCODONE BITARTRATE AND ACETAMINOPHEN 5; 325 MG/1; MG/1
1 TABLET ORAL 2 TIMES DAILY PRN
Qty: 60 TABLET | Refills: 0 | Status: SHIPPED | OUTPATIENT
Start: 2020-09-10 | End: 2020-12-31

## 2020-09-10 RX ORDER — PREGABALIN 150 MG/1
150 CAPSULE ORAL 2 TIMES DAILY
Qty: 60 CAPSULE | Refills: 5 | Status: SHIPPED | OUTPATIENT
Start: 2020-09-10 | End: 2021-03-18

## 2020-09-10 RX ORDER — HYDROCODONE BITARTRATE AND ACETAMINOPHEN 5; 325 MG/1; MG/1
1 TABLET ORAL 2 TIMES DAILY PRN
Qty: 60 TABLET | Refills: 0 | Status: SHIPPED | OUTPATIENT
Start: 2020-09-10 | End: 2020-10-10

## 2020-09-10 NOTE — PROGRESS NOTES
Physical Medicine and Rehabilitation  Procedure Note    Verbal consent obtained for botulinum toxin injections after risks versus benefits discussed. 500 Units of Botox were reconstituted using preservative-free normal saline in a 100 unit to 2 mL solution. Alcohol swabs were used to cleanse the skin before injections were performed. Back pressure was placed on the syringe during injections to avoid any intravascular injection. EMG guidance was not used during procedure. Botulinum toxin was injected in the following muscles of the right lower limb:  Lateral hamstring 150 units, medial hamstring 150 units, lateral gastroc 100 units, medial gastroc 100 units    The patient tolerated the procedure well with no immediate complications. The patient should call with concerns or questions over the coming days.     CPT: 49539    Kt Amos MD

## 2020-09-10 NOTE — PROGRESS NOTES
[facility]  Outpatient progress note    Chief Complaint:   Chief Complaint   Patient presents with    Follow-up     3 mo fu botox 500 units         Subjective: Prakash Coombs is a 50 y.o. female who returns to the office today for further follow up. 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 Botox injections remain beneficial. Would like repeat botox injections today. Remains on amitriptyline and Lyrica. Neurogenic pain at night is worse. On Norco with good benefits. Reports her pain is overall stable, no new issues. Did not get labs done. Medications reviewed. Patient denies side effects with medications. Patient states she is taking medications as prescribed. She denies receiving pain medications from other sources. She denies any ER visits since last visit.     Pain scale with out pain medications or at its worst is 8/10. Pain scale with pain medications or at its best is 2/10.   Last dose of [place] was this morning  Drug screen reviewed  Patient was offered naloxone RX for home and declined in the past.     Review of Systems:  CONSTITUTIONAL:  negative  EYES:  negative  HEENT:  negative  RESPIRATORY:  negative  CARDIOVASCULAR:  negative  GASTROINTESTINAL:  positive for neurogenic bowel  GENITOURINARY:  positive for neurogenic bladder  SKIN:  negative  HEMATOLOGIC/LYMPHATIC:  negative  MUSCULOSKELETAL:  negative  NEUROLOGICAL:  positive for gait problems, weakness, pain, tingling and spasticity  BEHAVIOR/PSYCH:  negative  All other review of systems otherwise negative    Physical Exam:  /78   Ht 4' 11\" (1.499 m)   Wt 150 lb (68 kg)   BMI 30.30 kg/m²     awake  Orientation:   person, place, time  Mood: within normal limits  Affect: calm  General appearance: Patient is well nourished, well developed, well groomed and in no acute distress, appearing stated age    ROM:  abnormal - right knee and concerns or questions.   15 minutes spent in evaluation efforts    Lyle Haider MD

## 2020-09-14 ENCOUNTER — TELEPHONE (OUTPATIENT)
Dept: UROLOGY | Age: 55
End: 2020-09-14

## 2020-09-14 NOTE — TELEPHONE ENCOUNTER
Patient spoke to [facility] and he would like her to be scheduled for appointment in November because the battery is low. Please advise. Thank you.

## 2020-09-14 NOTE — TELEPHONE ENCOUNTER
Spoke with pranav. Not sure who told her she only had 8-9 months left. Fu in November and she needs to bring remote and we will see what her battery life is.

## 2020-09-16 NOTE — TELEPHONE ENCOUNTER
Attempted to call the patient to schedule the appointment. Voicemail left to return the call to the office.

## 2020-09-17 NOTE — TELEPHONE ENCOUNTER
Patient advised to bring the remote with her to the appointment. Appointment scheduled 11/04/2020. She voiced understanding.

## 2020-10-22 ENCOUNTER — TELEPHONE (OUTPATIENT)
Dept: PHYSICAL MEDICINE AND REHAB | Age: 55
End: 2020-10-22

## 2020-10-22 RX ORDER — WHEELCHAIR
EACH MISCELLANEOUS
Qty: 1 EACH | Refills: 0 | Status: SHIPPED | OUTPATIENT
Start: 2020-10-22

## 2020-10-22 NOTE — TELEPHONE ENCOUNTER
Pt needs to have her wheelchair fixed; she needs a new seat cover, and the right side armrest needs replaced. [facility]    Pt states that Dr Michael Cornell has always taken care of her wheelchair issues in the past.  [phone]

## 2020-11-04 ENCOUNTER — OFFICE VISIT (OUTPATIENT)
Dept: UROLOGY | Age: 55
End: 2020-11-04
Payer: COMMERCIAL

## 2020-11-04 VITALS — WEIGHT: 150 LBS | TEMPERATURE: 97.3 F | HEIGHT: 59 IN | BODY MASS INDEX: 30.24 KG/M2

## 2020-11-04 PROCEDURE — G8417 CALC BMI ABV UP PARAM F/U: HCPCS | Performed by: NURSE PRACTITIONER

## 2020-11-04 PROCEDURE — 3017F COLORECTAL CA SCREEN DOC REV: CPT | Performed by: NURSE PRACTITIONER

## 2020-11-04 PROCEDURE — 4004F PT TOBACCO SCREEN RCVD TLK: CPT | Performed by: NURSE PRACTITIONER

## 2020-11-04 PROCEDURE — 99213 OFFICE O/P EST LOW 20 MIN: CPT | Performed by: NURSE PRACTITIONER

## 2020-11-04 PROCEDURE — G8427 DOCREV CUR MEDS BY ELIG CLIN: HCPCS | Performed by: NURSE PRACTITIONER

## 2020-11-04 PROCEDURE — G8484 FLU IMMUNIZE NO ADMIN: HCPCS | Performed by: NURSE PRACTITIONER

## 2020-11-04 ASSESSMENT — ENCOUNTER SYMPTOMS
VOMITING: 0
NAUSEA: 0
ABDOMINAL PAIN: 0

## 2020-11-04 NOTE — PROGRESS NOTES
Subjective:      Patient ID: Alfonso Carson is a 50 y.o. female. [facility]      Leanne Mcclelland is here today for OAB follow up. She underwent placement of permanent sacral nerve stimulator with leads (stage 1 and 2) on 11/21/18 per Dr. Mallory Zhao. She is down to 2-3 depends per day (previously 9 daily). She still reports frequency every 1 hour, sometimes every 30 minutes. Prior to surgery she failed multiple anti-cholinergics & Myrbetriq. Hx of DM, MS, and recurrent UTI. Wheelchair bound. Old notes   She underwent urodynamic studies on 4/1/2015. Patient had first urge to void at 111 cc and with a cough the patient leaked all the urine and the gibbs came out as well.      She was seen 2 months ago as her Interstim wasn't working, she didn't feel the \"tingling. \" she consulted with Jovon from [facility] over the phone and made some adjustments. She reports she felt it for 2 hours then it went away. She reports waking up wet some nights. She reports having incontinence intermittently, not every day. She reports frequency every hour. She denies dysuria, flank pain, or hematuria. Thinks she has UTI because her urine has foul odor. Unable to give urine specimen today. Her battery life 6 months ago was 50-89 months at that time. Review of Systems   Constitutional: Negative for chills, fatigue and fever. Gastrointestinal: Negative for abdominal pain, nausea and vomiting. Genitourinary: Positive for enuresis, frequency and urgency. Negative for difficulty urinating, dysuria, flank pain and hematuria. History of recurrent UTI, incontinence. Objective:   Physical Exam   Constitutional: She is oriented to person, place, and time. She appears well-developed and well-nourished. HENT:   Head: Normocephalic and atraumatic. Right Ear: External ear normal.   Left Ear: External ear normal.   Nose: Nose normal.   Eyes: Conjunctivae are normal.   Pulmonary/Chest: Effort normal.   Abdominal: Soft. Musculoskeletal:      Comments: In wheelchair   Neurological: She is alert and oriented to person, place, and time. Skin: Skin is warm and dry. Psychiatric: She has a normal mood and affect. Her behavior is normal. Judgment and thought content normal.         Assessment:      Recurrent UTI  Mixed Incontinence  OAB  Urinary Retention  Multiple Sclerosis      Plan:      She denies symptoms of dysuria, fever or chills of flank pain. Unable to give urine today. She reports Interstim not working because she cant feel tingling. Still with frequency every hour, but incontinence is less than prior to having Interstim. Interstim was checked today, it was not on. Battery life was noted to be 6-12 months. She was on program 4, at 4.4    Discussed follow up in next few months, to discuss possible Interstim battery change with physician.      Electronically signed by CHAS Lay CNP on 11/4/2020 at 4:34 PM

## 2020-11-23 ENCOUNTER — TELEPHONE (OUTPATIENT)
Dept: PHYSICAL MEDICINE AND REHAB | Age: 55
End: 2020-11-23

## 2020-11-23 NOTE — TELEPHONE ENCOUNTER
Patient asking for a prescription for a handicap placard. Last one sent they are saying  for some reason. Mail to patient.

## 2020-12-17 ENCOUNTER — OFFICE VISIT (OUTPATIENT)
Dept: UROLOGY | Age: 55
End: 2020-12-17
Payer: COMMERCIAL

## 2020-12-17 VITALS — HEIGHT: 59 IN | WEIGHT: 150 LBS | TEMPERATURE: 97.3 F | BODY MASS INDEX: 30.24 KG/M2

## 2020-12-17 PROCEDURE — 4004F PT TOBACCO SCREEN RCVD TLK: CPT | Performed by: UROLOGY

## 2020-12-17 PROCEDURE — 99214 OFFICE O/P EST MOD 30 MIN: CPT | Performed by: UROLOGY

## 2020-12-17 PROCEDURE — 3017F COLORECTAL CA SCREEN DOC REV: CPT | Performed by: UROLOGY

## 2020-12-17 PROCEDURE — G8484 FLU IMMUNIZE NO ADMIN: HCPCS | Performed by: UROLOGY

## 2020-12-17 PROCEDURE — G8427 DOCREV CUR MEDS BY ELIG CLIN: HCPCS | Performed by: UROLOGY

## 2020-12-17 PROCEDURE — G8417 CALC BMI ABV UP PARAM F/U: HCPCS | Performed by: UROLOGY

## 2020-12-17 NOTE — PROGRESS NOTES
Bradley Camejo MD  Urology Clinic Progress Note      Patient:  Asad Montez  YOB: 1965  Date: 12/17/2020    HISTORY OF PRESENT ILLNESS:   The patient is a 50 y.o. female who presents today for follow-up for the following problem(s):      1. Urinary frequency    2. Urinary incontinence, unspecified type    3. OAB (overactive bladder)    4. Frequency of urination         Overall the problem(s) : are worsening. Associated Symptoms: No dysuria, gross hematuria. Pain Severity:  1/10    Summary of old records:   [facility] is here today for OAB follow up. She underwent placement of permanent sacral nerve stimulator with leads (stage 1 and 2) on 11/21/18 per Dr. Lillian Jerome. She is down to 2-3 depends per day (previously 9 daily). She still reports frequency every 1 hour, sometimes every 30 minutes. Prior to surgery she failed multiple anti-cholinergics & Myrbetriq. Hx of DM, MS, and recurrent UTI. Wheelchair bound. Additional History: Onset many years  Worsening of her mixed incontinence and urinary symptoms. Her device was recently interrogated which showed low battery life. Recommended to have the battery replaced. She is here to discuss the procedure  Severity is described as moderate. The course of symptoms over time is chronic. Alleviating factors: none  Worsening factors: none  Lower urinary tract symptoms: Mixed urinary symptoms complicated by diabetes and multiple sclerosis. Interstim was checked 11/4/2020, it was not on. Battery life was noted to be 6-12 months. Imaging Reviewed during this Office Visit:   (results were independently reviewed by physician and radiology report verified)    Urinalysis today:  No results found for this visit on 12/17/20.     Last BUN and creatinine:  Lab Results   Component Value Date    BUN 10 11/08/2018     Lab Results   Component Value Date    CREATININE 0.3 (L) 11/08/2018       PAST MEDICAL, FAMILY AND SOCIAL HISTORY UPDATE: Past Medical History:   Diagnosis Date    Anxiety     Anxiety and depression     Asthma     Bipolar [facility] disorder ([facility]     Bipolar 1 disorder with moderate sourav (HCC)     Blood circulation, collateral     Cancer (HCC)     Depression     Endometriosis     GERD (gastroesophageal reflux disease)     Kidney stones     Lupus (HCC)     Movement disorder     MS (multiple sclerosis) (HCC)     Schizophrenia ([facility]     Thyroid disease     Type 2 diabetes mellitus without complication ([facility]     Unspecified cerebral artery occlusion with cerebral infarction      Past Surgical History:   Procedure Laterality Date    COLONOSCOPY      DILATION AND CURETTAGE OF UTERUS      ND OFFICE/OUTPT VISIT,PROCEDURE ONLY N/A 11/21/2018    INTERSTIM DEVICE PLACEMENT MODEL 3058, ONLY HEAD ELIGIBLE FOR MRI WITH TRANSMIT/RECEIVE HEAD COIL    TUBAL LIGATION      10 years ago     Family History   Problem Relation Age of Onset    Stroke Mother     Asthma Mother     Other Mother     No Known Problems Sister     Asthma Brother     No Known Problems Brother      Outpatient Medications Marked as Taking for the 12/17/20 encounter (Office Visit) with Marleni Navarrete MD   Medication Sig Dispense Refill    Handicap Placard [address] [place] [address] by Does not apply route EXP 11/1/2023 1 each 0    Misc. Devices [facility]) [address] [place] [address] Wheelchair repairs- new seat cover, right side armrest replacement    Current body weight:  68 kg  Diagnosis: gait disturbance, chronic incomplete spastic paraplegia  Length of need: Lifetime 1 each 0    pregabalin (LYRICA) 150 MG capsule Take 1 capsule by mouth 2 times daily for 180 days.  60 capsule 5    Probiotic Product (PROBIOTIC DAILY PO) Take by mouth daily      Probiotic Acidophilus (FLORANEX) TABS Take 1 tablet by mouth daily 30 tablet 11    tiotropium (SPIRIVA) 18 MCG inhalation capsule Inhale 18 mcg into the lungs daily 2 puffs      insulin glargine (LANTUS SOLOSTAR) 100 UNIT/ML injection pen Inject 10 Units Eyes: negative  Respiratory: negative  Cardiovascular: negative  Gastrointestinal: negative  Genitourinary: negative except for what is in HPI  Musculoskeletal: negative  Skin: negative   Neurological: negative  Hematological/Lymphatic: negative  Psychological: negative    Physical Exam:      Vitals:    12/17/20 1431   Temp: 97.3 °F (36.3 °C)     Patient is a 50 y.o. female in no acute distress and alert and oriented to person, place and time. NAD, Alert  Non labored respiration  Normal peripheral pulses  Soft, non tender  Skin-warm and dry  Psych- normal mood and affect    Assessment and Plan      1. Urinary frequency    2. Urinary incontinence, unspecified type    3. OAB (overactive bladder)    4. Frequency of urination           Plan:        Exchange of interstim battery under MAC  Risks: I discussed all the risks, benefits, alternatives and possible complications or surgery as well as expectations and post-op recovery.              Samia Price MD  [facility] Urology

## 2020-12-17 NOTE — PATIENT INSTRUCTIONS
How can you care for yourself at home? · Ask your family, friends, and coworkers for support. You have a better chance of quitting if you have help and support. · Join a support group, such as Nicotine Anonymous, for people who are trying to quit smoking. · Consider signing up for a smoking cessation program, such as the American Lung Association's Freedom from Smoking program.  · Get text messaging support. Go to the website at www.smokefree. gov to sign up for the [facility] program.  · Set a quit date. Pick your date carefully so that it is not right in the middle of a big deadline or stressful time. Once you quit, do not even take a puff. Get rid of all ashtrays and lighters after your last cigarette. Clean your house and your clothes so that they do not smell of smoke. · Learn how to be a nonsmoker. Think about ways you can avoid those things that make you reach for a cigarette. ? Avoid situations that put you at greatest risk for smoking. For some people, it is hard to have a drink with friends without smoking. For others, they might skip a coffee break with coworkers who smoke. ? Change your daily routine. Take a different route to work or eat a meal in a different place. · Cut down on stress. Calm yourself or release tension by doing an activity you enjoy, such as reading a book, taking a hot bath, or gardening. · Talk to your doctor or pharmacist about nicotine replacement therapy, which replaces the nicotine in your body. You still get nicotine but you do not use tobacco. Nicotine replacement products help you slowly reduce the amount of nicotine you need. These products come in several forms, many of them available over-the-counter:  ? Nicotine patches  ? Nicotine gum and lozenges  ?  Nicotine inhaler · Ask your doctor about bupropion (Wellbutrin) or varenicline (Chantix), which are prescription medicines. They do not contain nicotine. They help you by reducing withdrawal symptoms, such as stress and anxiety. · Some people find hypnosis, acupuncture, and massage helpful for ending the smoking habit. · Eat a healthy diet and get regular exercise. Having healthy habits will help your body move past its craving for nicotine. · Be prepared to keep trying. Most people are not successful the first few times they try to quit. Do not get mad at yourself if you smoke again. Make a list of things you learned and think about when you want to try again, such as next week, next month, or next year. Where can you learn more? Go to https://MarkLogic.KARALIT. org and sign in to your Recognition PRO account. Enter T997 in the ElectraTherm box to learn more about \"Stopping Smoking: Care Instructions. \"     If you do not have an account, please click on the \"Sign Up Now\" link. Current as of: March 12, 2020               Content Version: 12.6  © 2880-8549 [employer]. Care instructions adapted under license by [facility] If you have questions about a medical condition or this instruction, always ask your healthcare professional. [address] any warranty or liability for your use of this information.

## 2020-12-22 ENCOUNTER — TELEPHONE (OUTPATIENT)
Dept: UROLOGY | Age: 55
End: 2020-12-22

## 2020-12-22 NOTE — TELEPHONE ENCOUNTER
DO NOT TAKE ASPIRIN, PLAVIX, FISH OIL, COUMADIN, IBUPROFEN, MOTRIN-LIKE DRUGS AND ANY MULTIVITAMINS OR OVER THE COUNTER SUPPLEMENTS 5 DAYS PRIOR TO SURGERY AND 3 DAYS FOLLOWING     Asad Montez 1965 Diagnosis:     Surgical Physician: Dr. Divina Lewis have been scheduled for the procedure marked below:      Surgery: Exchange of interstim battery            Date: 1/26/2021     Anesthesia: MAC     Place of Service: [facility] Second Floor Same Day Surgery           Arrive to same day surgery by:  9:00 am  (Surgery time is subject to change)        INSTRUCTIONS AS MARKED BELOW:    1.  DO NOT eat or drink anything after midnight before surgery. 2.  We prefer you shower or bathe with an antibacterial soap (Dial) the morning of surgery. 3.  Please ensure to have a  with you to transport you home. 4.  Please bring a current medication list, photo ID and insurance card(s) with you  5. Okay to take Tylenol  6. If you take Glucophage, Metformin or Janumet, hold 48-hours prior to surgery  7. Hold the insulin and the Glipizide the morning of surgery. 8.  Take blood pressure or heart medication as directed, if taken in the morning take with a small sip of water  9. Please do the pre op fasting labs,chest xray and ekg as soon as possible. Orders included. 10. Please do the pre op urinalysis on 1/15/21. This is a date specific order. Order included. 11. Please do the Covid 19 test on 1/19/21. This is a date specific order so the results are back or surgery can be cancelled. Order included. 12.The office will call you in 1-2 days after your procedure to schedule a follow up.       (Covid-19 screening is date sensitive and can only be done 5 to 7 days prior to your procedure)    Date: 12/22/2020

## 2020-12-22 NOTE — TELEPHONE ENCOUNTER
SURGERY [address]  [address] [place] [address] [address] [facility] [address] [facility], [address]      Phone *[phone] *[phone]   Surgical Scheduling Direct Line Phone *[phone] Fax *[phone]      Sundeep German 1965 female    [address] [facility]   Marital Status:          Home Phone: [phone]      Cell Phone:    Telephone Information:   Mobile [phone]          Surgeon: Dr. Morenita Hebert  Surgery Date: 1/26/2021   Time: 11:15 am    Procedure: Exchange of Interstim battery    Diagnosis: Overactive Bladder     Important Medical History: In Epic    Special Inst/Equip: Regular Room                               Jovon Notified    CPT Codes:    55513  Latex Allergy:  No     Cardiac Device:  No     Anesthesia:  MAC          Admission Type:  Same Day                             Admit Prior to Day of Surgery: No    Case Location:  Main OR           Preadmission Testing:Phone Call              PAT Date and Time:______________________________________________________    PAT Confirmation #: ______________________________________________________    Post Op Visit: ___________________________________________________________    Need Preop Cardiac Clearance: No    Does Patient have Cardiologist/physician?      None    Surgery Confirmation #: __________________________________________________    : ________________________   Date: __________________________     [employer] Name: Melonie Jin

## 2020-12-28 DIAGNOSIS — G89.4 CHRONIC PAIN SYNDROME: ICD-10-CM

## 2020-12-28 RX ORDER — HYDROCODONE BITARTRATE AND ACETAMINOPHEN 5; 325 MG/1; MG/1
1 TABLET ORAL 2 TIMES DAILY PRN
Qty: 60 TABLET | Refills: 0 | OUTPATIENT
Start: 2020-12-28 | End: 2021-01-27

## 2020-12-28 NOTE — TELEPHONE ENCOUNTER
Jesse Solon called requesting a refill on the following medications:  Requested Prescriptions     Pending Prescriptions Disp Refills    HYDROcodone-acetaminophen (NORCO) 5-325 MG per tablet 60 tablet 0     Sig: Take 1 tablet by mouth 2 times daily as needed for Pain for up to 30 days. Fill on/after 9/28/2020     Pharmacy verified:  .pv    [facility]    Date of last visit: 09/10/20  Date of next visit (if applicable): 99/02/57              .

## 2020-12-29 ENCOUNTER — PREP FOR PROCEDURE (OUTPATIENT)
Dept: UROLOGY | Age: 55
End: 2020-12-29

## 2020-12-29 DIAGNOSIS — G89.4 CHRONIC PAIN SYNDROME: ICD-10-CM

## 2020-12-29 RX ORDER — SODIUM CHLORIDE 9 MG/ML
INJECTION, SOLUTION INTRAVENOUS CONTINUOUS
Status: CANCELLED | OUTPATIENT
Start: 2021-01-26

## 2020-12-31 RX ORDER — HYDROCODONE BITARTRATE AND ACETAMINOPHEN 5; 325 MG/1; MG/1
1 TABLET ORAL 2 TIMES DAILY
Qty: 14 TABLET | Refills: 0 | Status: SHIPPED | OUTPATIENT
Start: 2020-12-31 | End: 2021-01-07 | Stop reason: SDUPTHER

## 2020-12-31 NOTE — TELEPHONE ENCOUNTER
OARRS reviewed. UDS: + for  Hydrocodone -consistent. Last seen: 12/10/2020    Follow-up: 1/7/2021    Pt notified that she needs 3 mo fu d/t has not been seen since 9/10/2020, voiced understanding.      7 day refill pending

## 2021-01-04 ENCOUNTER — HOSPITAL ENCOUNTER (OUTPATIENT)
Dept: WOMENS IMAGING | Age: 56
Discharge: HOME | End: 2021-01-04
Payer: COMMERCIAL

## 2021-01-04 DIAGNOSIS — N63.0 BREAST LUMP: ICD-10-CM

## 2021-01-04 DIAGNOSIS — N63.0 LUMP, BREAST: ICD-10-CM

## 2021-01-04 PROCEDURE — 76882 US LMTD JT/FCL EVL NVASC XTR: CPT

## 2021-01-04 PROCEDURE — G0279 TOMOSYNTHESIS, MAMMO: HCPCS

## 2021-01-04 PROCEDURE — 76642 ULTRASOUND BREAST LIMITED: CPT

## 2021-01-07 ENCOUNTER — OFFICE VISIT (OUTPATIENT)
Dept: PHYSICAL MEDICINE AND REHAB | Age: 56
End: 2021-01-07
Payer: COMMERCIAL

## 2021-01-07 ENCOUNTER — NURSE ONLY (OUTPATIENT)
Dept: LAB | Age: 56
End: 2021-01-07

## 2021-01-07 VITALS
DIASTOLIC BLOOD PRESSURE: 80 MMHG | HEIGHT: 59 IN | TEMPERATURE: 96.6 F | SYSTOLIC BLOOD PRESSURE: 116 MMHG | WEIGHT: 150 LBS | BODY MASS INDEX: 30.24 KG/M2

## 2021-01-07 DIAGNOSIS — G89.4 CHRONIC PAIN SYNDROME: ICD-10-CM

## 2021-01-07 DIAGNOSIS — G89.29 OTHER CHRONIC PAIN: ICD-10-CM

## 2021-01-07 DIAGNOSIS — G82.22 CHRONIC INCOMPLETE SPASTIC PARAPLEGIA (HCC): Primary | ICD-10-CM

## 2021-01-07 LAB
ALBUMIN SERPL-MCNC: 3.6 G/DL (ref 3.5–5.1)
ALP BLD-CCNC: 121 U/L (ref 38–126)
ALT SERPL-CCNC: 17 U/L (ref 11–66)
ANION GAP SERPL CALCULATED.3IONS-SCNC: 8 MEQ/L (ref 8–16)
AST SERPL-CCNC: 27 U/L (ref 5–40)
BASOPHILS # BLD: 0.5 %
BASOPHILS ABSOLUTE: 0 THOU/MM3 (ref 0–0.1)
BILIRUB SERPL-MCNC: 0.7 MG/DL (ref 0.3–1.2)
BUN BLDV-MCNC: 7 MG/DL (ref 7–22)
CALCIUM SERPL-MCNC: 9.1 MG/DL (ref 8.5–10.5)
CHLORIDE BLD-SCNC: 107 MEQ/L (ref 98–111)
CHOLESTEROL, TOTAL: 172 MG/DL (ref 100–199)
CO2: 27 MEQ/L (ref 23–33)
CREAT SERPL-MCNC: 0.4 MG/DL (ref 0.4–1.2)
EOSINOPHIL # BLD: 1.6 %
EOSINOPHILS ABSOLUTE: 0.1 THOU/MM3 (ref 0–0.4)
ERYTHROCYTE [DISTWIDTH] IN BLOOD BY AUTOMATED COUNT: 16.8 % (ref 11.5–14.5)
ERYTHROCYTE [DISTWIDTH] IN BLOOD BY AUTOMATED COUNT: 51.1 FL (ref 35–45)
GFR SERPL CREATININE-BSD FRML MDRD: > 90 ML/MIN/1.73M2
GLUCOSE BLD-MCNC: 144 MG/DL (ref 70–108)
HCT VFR BLD CALC: 40.1 % (ref 37–47)
HDLC SERPL-MCNC: 38 MG/DL
HEMOGLOBIN: 13.8 GM/DL (ref 12–16)
IMMATURE GRANS (ABS): 0.01 THOU/MM3 (ref 0–0.07)
IMMATURE GRANULOCYTES: 0.3 %
LDL CHOLESTEROL CALCULATED: 115 MG/DL
LYMPHOCYTES # BLD: 44.7 %
LYMPHOCYTES ABSOLUTE: 1.7 THOU/MM3 (ref 1–4.8)
MCH RBC QN AUTO: 29.1 PG (ref 26–33)
MCHC RBC AUTO-ENTMCNC: 34.4 GM/DL (ref 32.2–35.5)
MCV RBC AUTO: 84.6 FL (ref 81–99)
MONOCYTES # BLD: 6.4 %
MONOCYTES ABSOLUTE: 0.2 THOU/MM3 (ref 0.4–1.3)
NUCLEATED RED BLOOD CELLS: 0 /100 WBC
PLATELET # BLD: 67 THOU/MM3 (ref 130–400)
PLATELET ESTIMATE: ABNORMAL
PMV BLD AUTO: ABNORMAL FL (ref 9.4–12.4)
POTASSIUM SERPL-SCNC: 3.6 MEQ/L (ref 3.5–5.2)
RBC # BLD: 4.74 MILL/MM3 (ref 4.2–5.4)
SCAN OF BLOOD SMEAR: NORMAL
SEG NEUTROPHILS: 46.5 %
SEGMENTED NEUTROPHILS ABSOLUTE COUNT: 1.8 THOU/MM3 (ref 1.8–7.7)
SODIUM BLD-SCNC: 142 MEQ/L (ref 135–145)
TOTAL PROTEIN: 7.1 G/DL (ref 6.1–8)
TRIGL SERPL-MCNC: 97 MG/DL (ref 0–199)
TSH SERPL DL<=0.05 MIU/L-ACNC: 2.99 UIU/ML (ref 0.4–4.2)
WBC # BLD: 3.8 THOU/MM3 (ref 4.8–10.8)

## 2021-01-07 PROCEDURE — 99213 OFFICE O/P EST LOW 20 MIN: CPT | Performed by: NURSE PRACTITIONER

## 2021-01-07 PROCEDURE — G8427 DOCREV CUR MEDS BY ELIG CLIN: HCPCS | Performed by: NURSE PRACTITIONER

## 2021-01-07 PROCEDURE — 3017F COLORECTAL CA SCREEN DOC REV: CPT | Performed by: NURSE PRACTITIONER

## 2021-01-07 PROCEDURE — 64642 CHEMODENERV 1 EXTREMITY 1-4: CPT | Performed by: PHYSICAL MEDICINE & REHABILITATION

## 2021-01-07 PROCEDURE — G8417 CALC BMI ABV UP PARAM F/U: HCPCS | Performed by: NURSE PRACTITIONER

## 2021-01-07 PROCEDURE — 4004F PT TOBACCO SCREEN RCVD TLK: CPT | Performed by: NURSE PRACTITIONER

## 2021-01-07 PROCEDURE — G8484 FLU IMMUNIZE NO ADMIN: HCPCS | Performed by: NURSE PRACTITIONER

## 2021-01-07 RX ORDER — HYDROCODONE BITARTRATE AND ACETAMINOPHEN 5; 325 MG/1; MG/1
1 TABLET ORAL 2 TIMES DAILY
Qty: 60 TABLET | Refills: 0 | Status: SHIPPED | OUTPATIENT
Start: 2021-01-07 | End: 2021-01-20 | Stop reason: SDUPTHER

## 2021-01-07 RX ORDER — AMITRIPTYLINE HYDROCHLORIDE 25 MG/1
25 TABLET, FILM COATED ORAL NIGHTLY
Qty: 30 TABLET | Refills: 5 | Status: SHIPPED | OUTPATIENT
Start: 2021-01-07 | End: 2021-04-08 | Stop reason: DRUGHIGH

## 2021-01-07 RX ORDER — BACLOFEN 10 MG/1
15 TABLET ORAL 3 TIMES DAILY
Qty: 135 TABLET | Refills: 5 | Status: SHIPPED | OUTPATIENT
Start: 2021-01-07 | End: 2021-04-08 | Stop reason: DRUGHIGH

## 2021-01-07 RX ORDER — HYDROCODONE BITARTRATE AND ACETAMINOPHEN 5; 325 MG/1; MG/1
1 TABLET ORAL 2 TIMES DAILY PRN
Qty: 60 TABLET | Refills: 0 | Status: SHIPPED | OUTPATIENT
Start: 2021-01-07 | End: 2021-02-06

## 2021-01-07 RX ORDER — HYDROCODONE BITARTRATE AND ACETAMINOPHEN 5; 325 MG/1; MG/1
1 TABLET ORAL 2 TIMES DAILY PRN
Qty: 60 TABLET | Refills: 0 | Status: SHIPPED | OUTPATIENT
Start: 2021-01-07 | End: 2021-01-20 | Stop reason: SDUPTHER

## 2021-01-07 NOTE — PROGRESS NOTES
[address] [facility]  Outpatient progress note    Chief Complaint:   Chief Complaint   Patient presents with    Follow-up     Botox- 500 unit right lower limb        Subjective: Som Gentile is a 50 y.o. female who returns to the office today for further follow up. 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 Botox injections remain beneficial. Currently behind about 1 month on injections- having increased pain due to this. Would like repeat botox injections today. Remains on amitriptyline and Lyrica. Dose of Lyrica was increased during last visit, however patient is unsure if she is taking once or twice daily- will check bottle when she gets home as she has not noticed a difference. On Norco with good benefits. Reports her pain is overall stable, no new issues. Did not get labs done. Still not seeing neurologist.     Medications reviewed. Patient denies side effects with medications. Patient states she is taking medications as prescribed. She denies receiving pain medications from other sources. She denies any ER visits since last visit.     Pain scale with out pain medications or at its worst is 6/10. Pain scale with pain medications or at its best is 2/10.   Last dose of Norco was \"a few days ago\"  Drug screen reviewed  Patient was offered naloxone RX for home and declined in the past.     Review of Systems:  CONSTITUTIONAL:  negative  EYES:  negative  HEENT:  negative  RESPIRATORY:  negative  CARDIOVASCULAR:  negative  GASTROINTESTINAL:  positive for neurogenic bowel  GENITOURINARY:  positive for neurogenic bladder  SKIN:  negative  HEMATOLOGIC/LYMPHATIC:  negative  MUSCULOSKELETAL:  negative  NEUROLOGICAL:  positive for gait problems, weakness, pain, tingling and spasticity  BEHAVIOR/PSYCH:  negative  All other review of systems otherwise negative    Physical Exam:  /80   Temp 96.6 °F (35.9 °C)   Ht 4' 11\" (1.499 m)   Wt 150 lb (68 kg)   BMI 30.30 kg/m²     awake  Orientation:   person, place, time  Mood: within normal limits  Affect: calm  General appearance: Patient is well nourished, well developed, well groomed and in no acute distress, appearing stated age    ROM:  abnormal - right knee and ankle restricted due to spasticity, severe-improving  Motor Exam:  2/5 right knee extension and ADF. 3/5 right knee flexion  Tone:  abnormal - 2-3/4 right leg/ankle  Muscle bulk: within normal limits  Sensory:  Decreased sensation    Skin: warm and dry, no rash or erythema  Peripheral vascular: Pulses: Normal upper and lower extremity pulses; Edema: no      Impression:  · Multiple sclerosis  · Transverse myelitis  · Lupus  · Neurogenic bowel and bladder  · Spastic paraplegia  · Bilateral lower limb neuralgia  · Moderate bilateral carpal tunnel syndrome    Plan:  · Repeat botulinum toxin injections performed by Dr. Melany Ortiz. See procedure note. · Conitnue Lyrica 150 mg twice daily  · Continue amitriptyline  · Continue baclofen  · Continue benadryl 0.5- 1 tab for sleep  · Continue with bowel and bladder program  · OARRS reviewed. Current MED: 10  · Patient was offered naloxone for home. Patient declined in the past.   · Discussed long term side effects of medications, tolerance, dependency and addiction. · Previous UDS reviewed  · UDS performed today for compliance  · Patient told can not receive any pain medications from any other source. · No evidence of abuse, diversion or aberrant behavior. · Medications and/or procedures to improve function and quality of life- patient understanding with this and that may not be pain free  · Discussed with patient about safe storage of medications at home    Return in about 3 months (around 4/7/2021) for Botox- 500 units right lower limb. It was my pleasure to evaluate Susy Shown today. Please call with any concerns or questions.   15 minutes spent in evaluation efforts Jonn Hernandez, APRN - CNP

## 2021-01-07 NOTE — PROGRESS NOTES
[facility]  Procedure Note    Verbal consent obtained for botulinum toxin injections after risks versus benefits discussed. 500 Units of Botox were reconstituted using preservative-free normal saline in a 100 unit to 2 mL solution. Alcohol swabs were used to cleanse the skin before injections were performed. Back pressure was placed on the syringe during injections to avoid any intravascular injection. EMG guidance was not used during procedure. Botulinum toxin was injected in the following muscles of the right lower limb:  Lateral hamstring 150 units, medial hamstring 150 units, lateral gastroc 100 units, medial gastroc 100 units    The patient tolerated the procedure well with no immediate complications. The patient should call with concerns or questions over the coming days.     CPT: 41592    Paris Cantu MD

## 2021-01-14 ENCOUNTER — HOSPITAL ENCOUNTER (OUTPATIENT)
Dept: WOMENS IMAGING | Age: 56
Discharge: HOME | End: 2021-01-14
Payer: COMMERCIAL

## 2021-01-14 DIAGNOSIS — R59.9 ENLARGED LYMPH NODE: ICD-10-CM

## 2021-01-14 DIAGNOSIS — N63.0 BREAST MASS: ICD-10-CM

## 2021-01-14 PROCEDURE — 88305 TISSUE EXAM BY PATHOLOGIST: CPT

## 2021-01-14 PROCEDURE — 2709999900 MAM US GUID NDL BIOPSY LEFT

## 2021-01-14 PROCEDURE — 88360 TUMOR IMMUNOHISTOCHEM/MANUAL: CPT

## 2021-01-14 PROCEDURE — 76942 ECHO GUIDE FOR BIOPSY: CPT

## 2021-01-14 PROCEDURE — 77065 DX MAMMO INCL CAD UNI: CPT

## 2021-01-14 NOTE — PROGRESS NOTES
Breast Biopsy Flowsheet/Post-Operative Care    Date of Procedure: 1/14/2021  Physician: Dr. Lisa Massey  Technologist: Augusta Salomon RT(R)(M)    Biopsy:ultrasound guided breast biopsy  Lesion type: Palpable  Breast: left    Clock face position: Site #1: UIQ     Site #2: axilla         Primary Method of Detection: Palpation      Microcalcification's: no   Distribution: N/A    Asymmetry: asymmetric    Biopsy Method:   Sertera:    Site # 1    Gauge: 14    # of Passes: 4     Clip: Barbell    Sertera:    Site # 2    Gauge: 14    # of Passes: 4     Clip:  Tribell          Pre-Op Assessment: (BI-RADS)   4. Suspicious Abnormality    Patient Tolerated Procedure: good  Complications: N/A  Comments: N/A    Post Operative Care  Steri strips: Yes  Dressing: Gauze, Tape   Ice Applied to Site:  Yes  Evidence of Bleeding:  Yes  Initially in axilla. Ice and pressure applied. Pain Verbalized: No      Written Discharge Instructions: Yes  Condition at Discharge: needs assistance Patient is in a wheelchair.    Time of Discharge: [place]    Electronically signed by Shelley Howard on 1/14/2021 at 10:16 AM

## 2021-01-14 NOTE — PROGRESS NOTES
[facility] [address]  Pre-Biopsy Assessment      Patient Education    Written information about procedure Yes  left   Procedural steps explained Yes Ultrasound Biopsy   Post-op potential: bruising, hematoma, pain Yes    Self-care: activity, care of dressing Yes    Patient verbalized understanding Yes    Consent signed and witnessed Yes          Recent Medication: N/A Last Dose: N/A                                     Hormone Replacement Therapy: no    Previous Breast Biopsy: no    Previous Diagnosis Cancer: yes - cervical cancer 1982    Hysterectomy:no    Emotional Status: Calm    Language or Physical Barriers: In a wheelchair.     Comments: N/A      Electronically signed by She Aden on 1/14/2021 at 9:32 AM

## 2021-01-14 NOTE — PROGRESS NOTES
Formulation and discussion of sedation / procedure plans, risks, benefits, side effects and alternatives with patient and/or responsible adult completed.     Electronically signed by Rosa Hartley MD on 1/14/2021 at 9:26 AM

## 2021-01-15 ENCOUNTER — CLINICAL DOCUMENTATION (OUTPATIENT)
Dept: WOMENS IMAGING | Age: 56
End: 2021-01-15

## 2021-01-18 ENCOUNTER — TELEPHONE (OUTPATIENT)
Dept: SURGERY | Age: 56
End: 2021-01-18

## 2021-01-18 ENCOUNTER — CLINICAL DOCUMENTATION (OUTPATIENT)
Dept: WOMENS IMAGING | Age: 56
End: 2021-01-18

## 2021-01-18 ENCOUNTER — TELEPHONE (OUTPATIENT)
Dept: CASE MANAGEMENT | Age: 56
End: 2021-01-18

## 2021-01-18 NOTE — TELEPHONE ENCOUNTER
Ref from [facility] for invasive ductal carcinoma left breast.    LM asking pt to call office to schedule an appointment with Dr. Jarod Bal

## 2021-01-18 NOTE — TELEPHONE ENCOUNTER
Name: Mary Anne Portillo  : 1965  MRN: [id number]    Oncology Navigation Follow-Up Note    Contact Type:  Telephone    Notes: Attempted to contact patient to arrange teaching for newly diagnosed left breast cancer. Left message requesting her to call back.     Electronically signed by Kristie Sanchez RN on 2021 at 2:01 PM

## 2021-01-18 NOTE — PROGRESS NOTES
Patient called back in. She wanted to know about clinic again and what that entailed. Her daughter wants to come in but is only available on Thursdays and Monday. She wants a referral to Dr. Jarod Bal. I called Alexa for the referral.   I told her maybe her daughter could come to the surgical appointment or the appointment with the Nurse Navigators. Pt voiced understanding.

## 2021-01-19 ENCOUNTER — TELEPHONE (OUTPATIENT)
Dept: UROLOGY | Age: 56
End: 2021-01-19

## 2021-01-19 ENCOUNTER — TELEPHONE (OUTPATIENT)
Dept: WOMENS IMAGING | Age: 56
End: 2021-01-19

## 2021-01-19 NOTE — TELEPHONE ENCOUNTER
[address] [facility] Preop Check Off    Preop testing- done 2 weeks prior and covid testing 1 week prior to surgery date. Continue to give arrival times and inform patients time is subject to change that day as it gets closer to the day of surgery.     PREOP check list      Surgeon Dr. Graciela May       Patient Name  Rony Monzon     1965   MRN   [id number]     DOS   21  Diagnosis/Indication for Surgery   Overactive Bladder   Procedure Exchange of Interstim battery    Allergies     Allergies   Allergen Reactions    Penicillins      \"I almost \"    Seasonal Itching      UA  Ordered on arrival as Outpatient did not do   Urine Culture    Blood thinners  No     EKG. 21  Abnormal   Ok by Malaika Vasques in PAT  CXR- 21  Normal    COVID  21  Pending    Clearance:None  Cardiac-   Medical -       Pre-Admission to surgery- No

## 2021-01-19 NOTE — PROGRESS NOTES
PAT call attempted, patient unavailable, left message to please call us back at your earliest convenience; [phone]

## 2021-01-19 NOTE — TELEPHONE ENCOUNTER
3rd attempt to reach patient to schedule appointment with Dr. Helen Stallings, no answer, left another voicemail

## 2021-01-19 NOTE — TELEPHONE ENCOUNTER
Name: Jaylin Cody  : 1965  MRN: [id number]    Oncology Navigation Follow-Up Note    Contact Type:  Telephone    Notes: Contacted patient to arrange initial teaching for left breast cancer. Encouraged teaching to be completed prior to breast clinic appointment. Pt declines at this time. States she will be at clinic on Friday at 0730.     Electronically signed by Yolis Vargas RN on 2021 at 10:42 AM

## 2021-01-19 NOTE — TELEPHONE ENCOUNTER
Left message that the patient has not had any pre op testing done and to call the office as soon as possible.

## 2021-01-20 RX ORDER — PALIPERIDONE 3 MG/1
3 TABLET, EXTENDED RELEASE ORAL NIGHTLY
COMMUNITY

## 2021-01-20 RX ORDER — DESVENLAFAXINE 50 MG/1
50 TABLET, EXTENDED RELEASE ORAL DAILY
COMMUNITY
Start: 2020-12-16

## 2021-01-20 RX ORDER — TRAZODONE HYDROCHLORIDE 50 MG/1
50 TABLET ORAL NIGHTLY
COMMUNITY
Start: 2021-01-10 | End: 2021-11-11 | Stop reason: DRUGHIGH

## 2021-01-20 RX ORDER — GUAIFENESIN 1200 MG/1
1 TABLET, EXTENDED RELEASE ORAL 2 TIMES DAILY
COMMUNITY
Start: 2021-01-11

## 2021-01-20 RX ORDER — L. ACIDOPHILUS/PECTIN, CITRUS 25MM-100MG
1 TABLET ORAL DAILY
COMMUNITY
Start: 2020-12-08 | End: 2021-07-28

## 2021-01-20 RX ORDER — LIFITEGRAST 50 MG/ML
1 SOLUTION/ DROPS OPHTHALMIC DAILY
COMMUNITY

## 2021-01-20 NOTE — PROGRESS NOTES
Following instructions given to patient, who states understanding:     NPO after midnight  [employer] and 's license  Wear comfortable clean clothing  Do not bring jewelry   Shower night before and morning of surgery with a liquid antibacterial soap  Bring medications in original bottles  Follow all instructions given by your physician   needed at discharge  Call [phone] for any questions  Report to [facility] on 2nd floor  If you would become ill prior to surgery, please call the surgeon  May have only 1 visitor accompany you for surgery  Please bring and wear mask  You will be receiving a phone call one or two days before surgery to review covid screening exposure     Covid screening questionnaire complete and negative for symptoms or exposure see chart for documentation. I instructed patient to come in for her pre op testing today or tomorrow days- she plans to cone to [address] [place] [address] [facility].     Please limit your exposure to the public after you have your covid test  Please call your doctor immediately if you develop any symptoms of covid prior to your surgery

## 2021-01-20 NOTE — PROGRESS NOTES
Kris Christy MA in Dr. Jovana Craig office know that Joanna's platelets in CBC done 1/7/21 are quite low at 67. She will let Dr. Matthias Hamm know. Thank you.

## 2021-01-20 NOTE — PROGRESS NOTES
In preparation for their surgical procedure above patient was screened for Obstructive Sleep Apnea (LEYDI) using the STOP-Bang Questionnaire by the Pre-Admission Testing department. This is a pre-surgical screening tool for patient safety and serves as a recommendation, this WILL NOT cause cancellation of surgery. STOP-Bang Questionnaire  * Do you currently see a pulmonologist?  No     If yes STOP, do not complete. Patient follows with Dr.     1.  Do you snore loudly (able to be heard in the next room)? No    2. Do you often feel tired or sleepy during the daytime? No       3. Has anyone ever told you that you stop breathing during your sleep? No    4. Do you have or are you being treated for high blood pressure? No      5. BMI more than 35? BMI (Calculated): 30.4        No    6. Age over 48 years? 50 y.o. Yes    7. Neck Circumference greater than 17 inches for male or 16 inches for female? Measured           (visits only)            Not Applicable    8. Gender Male? No      TOTAL SCORE: 1    LEYDI - Low Risk : Yes to 0 - 2 questions  LEYDI - Intermediate Risk : Yes to 3 - 4 questions  LEYDI - High Risk : Yes to 5 - 8 questions    Adapted from:   STOP Questionnaire: A Tool to Screen Patients for Obstructive Sleep Apnea   [facility] KAL Michel.B.B.S., Maria Luz Agustin M.D., Lowell Silveira. Jennifer Murphy, Ph.D., KAL Mendenhall.B.B.S., Bettina Rivera, M.Sc., Pancho Sotelo M.D., [facility]. [facility]    Anesthesiology 2008; 087:116-58 Copyright 2008, the [address] of Anesthesiologists, [facility].   ----------------------------------------------------------------------------------------------------------------

## 2021-01-21 ENCOUNTER — HOSPITAL ENCOUNTER (OUTPATIENT)
Age: 56
Discharge: HOME | End: 2021-01-21
Payer: COMMERCIAL

## 2021-01-21 ENCOUNTER — TELEPHONE (OUTPATIENT)
Dept: UROLOGY | Age: 56
End: 2021-01-21

## 2021-01-21 ENCOUNTER — HOSPITAL ENCOUNTER (OUTPATIENT)
Dept: GENERAL RADIOLOGY | Age: 56
Discharge: HOME | End: 2021-01-21
Payer: COMMERCIAL

## 2021-01-21 DIAGNOSIS — N32.81 OAB (OVERACTIVE BLADDER): ICD-10-CM

## 2021-01-21 DIAGNOSIS — Z01.818 PRE-OP TESTING: ICD-10-CM

## 2021-01-21 DIAGNOSIS — Z01.818 PRE-OP TESTING: Primary | ICD-10-CM

## 2021-01-21 LAB
EKG ATRIAL RATE: 92 BPM
EKG P AXIS: 43 DEGREES
EKG P-R INTERVAL: 170 MS
EKG Q-T INTERVAL: 412 MS
EKG QRS DURATION: 88 MS
EKG QTC CALCULATION (BAZETT): 509 MS
EKG R AXIS: 49 DEGREES
EKG T AXIS: 29 DEGREES
EKG VENTRICULAR RATE: 92 BPM

## 2021-01-21 PROCEDURE — 71046 X-RAY EXAM CHEST 2 VIEWS: CPT

## 2021-01-21 PROCEDURE — 93005 ELECTROCARDIOGRAM TRACING: CPT | Performed by: UROLOGY

## 2021-01-21 PROCEDURE — U0003 INFECTIOUS AGENT DETECTION BY NUCLEIC ACID (DNA OR RNA); SEVERE ACUTE RESPIRATORY SYNDROME CORONAVIRUS 2 (SARS-COV-2) (CORONAVIRUS DISEASE [COVID-19]), AMPLIFIED PROBE TECHNIQUE, MAKING USE OF HIGH THROUGHPUT TECHNOLOGIES AS DESCRIBED BY CMS-2020-01-R: HCPCS

## 2021-01-21 NOTE — TELEPHONE ENCOUNTER
Ordered 1 bag platelets on standby for surgery with Dr Chelsie August on 1/26/21. Platelet count 67. Spoke with Andree Allison with the blood bank . Patient to have platelet count done on arrival at [facility]. MATT Workman put in the orders.

## 2021-01-22 ENCOUNTER — CLINICAL DOCUMENTATION (OUTPATIENT)
Dept: CASE MANAGEMENT | Age: 56
End: 2021-01-22

## 2021-01-22 ENCOUNTER — PREP FOR PROCEDURE (OUTPATIENT)
Dept: UROLOGY | Age: 56
End: 2021-01-22

## 2021-01-22 DIAGNOSIS — C50.912 INVASIVE DUCTAL CARCINOMA OF LEFT BREAST (HCC): Primary | ICD-10-CM

## 2021-01-22 LAB
PERFORMING LAB: NORMAL
REPORT: NORMAL
SARS-COV-2: NOT DETECTED

## 2021-01-22 NOTE — PROGRESS NOTES
Name: Kylie Barragan  : 1965  MRN: [id number]    Oncology Navigation- Initial Note:    Intake-  Contact Type: Medical Oncology, Integrated Breast Clinic    Diagnosis: Breast-malignant    Home Disposition: Lives with other who is able to assist, SO Mark. Pt is in a wheel chair due to [address]. Patient needs and barriers to care: Coordination of Care, Knowledge deficit, Emotional Issues/ Fear/ Anxiety, Financial Concerns/ Disability, Practical needs/ Family problems (housing/ living alone) and Symptom Management     Referral Source: Outpatient    Receptive to Advanced Care Planning/ Palliative Care:  NA      Interventions-   General Interventions: Patient and Ama Marie arrived to breast clinic. Treatment plan discussed with breast team, Elias Esquivel. Questions addressed. Emotional support given. Education/Screenings: Breast Clinic Recommendation form completed, discussed, and copy given to patient. Referrals: referred to breast prehab,  consult with Dr. Az Schroeder on 21 at 1430. Pt's mother passed away this morning, Dr. Terrell Shepherd office notified she may need to reschedule. Referral information give to [address] [place] [address] for Dr. Criss Reyes. Declined referral to  at this time     Continuum of Care: Diagnosis/Active Treatment      ** Note: [address] [facility] [address] DESAI AT THAT TIME. Maragret May in pathology, no results in her system from 18. However, 1999 GYN Cytology report states \"High grade squamous intraepithelial lesion. Moderate dysplasia (RAKESH II). Called OB-GYN, last saw Dr. Daryle Lesches , no mention of cervical cancer in her chart. Cytology report (Thin prep) 2019 states \"Negative for intraepithelial lesion or malignancy\". Teaching Note     Genetics/Family Hx: No family history of breast, ovarian, colon, or pancreatic cancer. Does not qualify for genetic testing. Education/Screenings:  yes -  Breast cancer information packet and navigation welcome packet reviewed. Printed material provided and reviewed. Patient is thinking she wants a mastectomy to avoid radiation. Printed material provided and reviewed on lumpectomy vs mastectomy, hormone receptors, needle localization of biopsied lymph node, sentinel lymph node, ACS post mastectomy book, post-op exercises when OK by Dr. Manuela Montez, lymphedema risks and prevention. Post op pillow provided. Currently on HRT: no     Continuum of Care: Diagnosis/Active Treatment    Notes: Teaching completed, questions addressed, emotional support provided. Contact information provided and patient encouraged to call with any questions or concerns. Will follow through continuum of care.       Electronically signed by Harry Graham RN on 1/22/2021 at 10:19 AM

## 2021-01-25 ENCOUNTER — TELEPHONE (OUTPATIENT)
Dept: SURGERY | Age: 56
End: 2021-01-25

## 2021-01-26 ENCOUNTER — TELEPHONE (OUTPATIENT)
Dept: UROLOGY | Age: 56
End: 2021-01-26

## 2021-01-26 ENCOUNTER — APPOINTMENT (OUTPATIENT)
Dept: GENERAL RADIOLOGY | Age: 56
End: 2021-01-26
Attending: UROLOGY
Payer: COMMERCIAL

## 2021-01-26 ENCOUNTER — HOSPITAL ENCOUNTER (OUTPATIENT)
Age: 56
Setting detail: OUTPATIENT SURGERY
Discharge: HOME | End: 2021-01-26
Attending: UROLOGY | Admitting: UROLOGY
Payer: COMMERCIAL

## 2021-01-26 ENCOUNTER — ANESTHESIA (OUTPATIENT)
Dept: OPERATING ROOM | Age: 56
End: 2021-01-26
Payer: COMMERCIAL

## 2021-01-26 ENCOUNTER — ANESTHESIA EVENT (OUTPATIENT)
Dept: OPERATING ROOM | Age: 56
End: 2021-01-26
Payer: COMMERCIAL

## 2021-01-26 VITALS — TEMPERATURE: 97 F | DIASTOLIC BLOOD PRESSURE: 105 MMHG | OXYGEN SATURATION: 100 % | SYSTOLIC BLOOD PRESSURE: 143 MMHG

## 2021-01-26 VITALS
BODY MASS INDEX: 31.69 KG/M2 | WEIGHT: 157.2 LBS | HEART RATE: 86 BPM | SYSTOLIC BLOOD PRESSURE: 132 MMHG | DIASTOLIC BLOOD PRESSURE: 81 MMHG | HEIGHT: 59 IN | OXYGEN SATURATION: 92 % | RESPIRATION RATE: 18 BRPM | TEMPERATURE: 96.2 F

## 2021-01-26 DIAGNOSIS — L76.82 INCISIONAL PAIN: Primary | ICD-10-CM

## 2021-01-26 DIAGNOSIS — G89.4 CHRONIC PAIN SYNDROME: ICD-10-CM

## 2021-01-26 LAB
ABO: NORMAL
ANTIBODY SCREEN: NORMAL
BACTERIA: ABNORMAL
BILIRUBIN URINE: ABNORMAL
BLOOD, URINE: NEGATIVE
CASTS: ABNORMAL /LPF
CASTS: ABNORMAL /LPF
CHARACTER, URINE: ABNORMAL
COLOR: ABNORMAL
CRYSTALS: ABNORMAL
EPITHELIAL CELLS, UA: ABNORMAL /HPF
GLUCOSE BLD-MCNC: 173 MG/DL (ref 70–108)
GLUCOSE, URINE: NEGATIVE MG/DL
ICTOTEST: NEGATIVE
KETONES, URINE: 80
LEUKOCYTE EST, POC: NEGATIVE
MISCELLANEOUS LAB TEST RESULT: ABNORMAL
MUCUS: ABNORMAL
NITRITE, URINE: NEGATIVE
PH UA: 5.5 (ref 5–9)
PLATELET # BLD: 84 THOU/MM3 (ref 130–400)
PROTEIN UA: ABNORMAL MG/DL
RBC URINE: ABNORMAL /HPF
RENAL EPITHELIAL, UA: ABNORMAL
RH FACTOR: NORMAL
SPECIFIC GRAVITY UA: 1.02 (ref 1–1.03)
UROBILINOGEN, URINE: 1 EU/DL (ref 0–1)
WBC UA: ABNORMAL /HPF
YEAST: ABNORMAL

## 2021-01-26 PROCEDURE — 2580000003 HC RX 258: Performed by: NURSE PRACTITIONER

## 2021-01-26 PROCEDURE — 2709999900 HC NON-CHARGEABLE SUPPLY: Performed by: UROLOGY

## 2021-01-26 PROCEDURE — 36430 TRANSFUSION BLD/BLD COMPNT: CPT

## 2021-01-26 PROCEDURE — 3700000001 HC ADD 15 MINUTES (ANESTHESIA): Performed by: UROLOGY

## 2021-01-26 PROCEDURE — 2580000003 HC RX 258

## 2021-01-26 PROCEDURE — 36415 COLL VENOUS BLD VENIPUNCTURE: CPT

## 2021-01-26 PROCEDURE — 3209999900 FLUORO FOR SURGICAL PROCEDURES

## 2021-01-26 PROCEDURE — 2720000010 HC SURG SUPPLY STERILE: Performed by: UROLOGY

## 2021-01-26 PROCEDURE — 6360000002 HC RX W HCPCS

## 2021-01-26 PROCEDURE — 3600000003 HC SURGERY LEVEL 3 BASE: Performed by: UROLOGY

## 2021-01-26 PROCEDURE — 7100000001 HC PACU RECOVERY - ADDTL 15 MIN: Performed by: UROLOGY

## 2021-01-26 PROCEDURE — 86850 RBC ANTIBODY SCREEN: CPT

## 2021-01-26 PROCEDURE — 2580000003 HC RX 258: Performed by: UROLOGY

## 2021-01-26 PROCEDURE — C1778 LEAD, NEUROSTIMULATOR: HCPCS | Performed by: UROLOGY

## 2021-01-26 PROCEDURE — 2500000003 HC RX 250 WO HCPCS

## 2021-01-26 PROCEDURE — 86900 BLOOD TYPING SEROLOGIC ABO: CPT

## 2021-01-26 PROCEDURE — 3600000013 HC SURGERY LEVEL 3 ADDTL 15MIN: Performed by: UROLOGY

## 2021-01-26 PROCEDURE — C1820 GENERATOR NEURO RECHG BAT SY: HCPCS | Performed by: UROLOGY

## 2021-01-26 PROCEDURE — 6360000002 HC RX W HCPCS: Performed by: ANESTHESIOLOGY

## 2021-01-26 PROCEDURE — 86901 BLOOD TYPING SEROLOGIC RH(D): CPT

## 2021-01-26 PROCEDURE — 82948 REAGENT STRIP/BLOOD GLUCOSE: CPT

## 2021-01-26 PROCEDURE — 3700000000 HC ANESTHESIA ATTENDED CARE: Performed by: UROLOGY

## 2021-01-26 PROCEDURE — 72220 X-RAY EXAM SACRUM TAILBONE: CPT

## 2021-01-26 PROCEDURE — 81001 URINALYSIS AUTO W/SCOPE: CPT

## 2021-01-26 PROCEDURE — 7100000010 HC PHASE II RECOVERY - FIRST 15 MIN: Performed by: UROLOGY

## 2021-01-26 PROCEDURE — P9035 PLATELET PHERES LEUKOREDUCED: HCPCS

## 2021-01-26 PROCEDURE — 7100000000 HC PACU RECOVERY - FIRST 15 MIN: Performed by: UROLOGY

## 2021-01-26 PROCEDURE — 7100000011 HC PHASE II RECOVERY - ADDTL 15 MIN: Performed by: UROLOGY

## 2021-01-26 PROCEDURE — 85049 AUTOMATED PLATELET COUNT: CPT

## 2021-01-26 PROCEDURE — 6370000000 HC RX 637 (ALT 250 FOR IP): Performed by: UROLOGY

## 2021-01-26 DEVICE — STIMULATOR NEURO MICRO INTRSTM RECHG: Type: IMPLANTABLE DEVICE | Status: FUNCTIONAL

## 2021-01-26 DEVICE — KIT LEAD SURE SCAN INTERSTIM MRI: Type: IMPLANTABLE DEVICE | Status: FUNCTIONAL

## 2021-01-26 RX ORDER — FENTANYL CITRATE 50 UG/ML
INJECTION, SOLUTION INTRAMUSCULAR; INTRAVENOUS PRN
Status: DISCONTINUED | OUTPATIENT
Start: 2021-01-26 | End: 2021-01-26 | Stop reason: SDUPTHER

## 2021-01-26 RX ORDER — LIDOCAINE HYDROCHLORIDE 20 MG/ML
INJECTION, SOLUTION EPIDURAL; INFILTRATION; INTRACAUDAL; PERINEURAL PRN
Status: DISCONTINUED | OUTPATIENT
Start: 2021-01-26 | End: 2021-01-26 | Stop reason: SDUPTHER

## 2021-01-26 RX ORDER — SODIUM CHLORIDE, SODIUM LACTATE, POTASSIUM CHLORIDE, CALCIUM CHLORIDE 600; 310; 30; 20 MG/100ML; MG/100ML; MG/100ML; MG/100ML
INJECTION, SOLUTION INTRAVENOUS CONTINUOUS PRN
Status: DISCONTINUED | OUTPATIENT
Start: 2021-01-26 | End: 2021-01-26 | Stop reason: SDUPTHER

## 2021-01-26 RX ORDER — MEPERIDINE HYDROCHLORIDE 25 MG/ML
12.5 INJECTION INTRAMUSCULAR; INTRAVENOUS; SUBCUTANEOUS EVERY 5 MIN PRN
Status: DISCONTINUED | OUTPATIENT
Start: 2021-01-26 | End: 2021-01-26 | Stop reason: HOSPADM

## 2021-01-26 RX ORDER — FENTANYL CITRATE 50 UG/ML
50 INJECTION, SOLUTION INTRAMUSCULAR; INTRAVENOUS EVERY 5 MIN PRN
Status: DISCONTINUED | OUTPATIENT
Start: 2021-01-26 | End: 2021-01-26 | Stop reason: HOSPADM

## 2021-01-26 RX ORDER — SUCCINYLCHOLINE/SOD CL,ISO/PF 200MG/10ML
SYRINGE (ML) INTRAVENOUS PRN
Status: DISCONTINUED | OUTPATIENT
Start: 2021-01-26 | End: 2021-01-26 | Stop reason: SDUPTHER

## 2021-01-26 RX ORDER — FENTANYL CITRATE 50 UG/ML
25 INJECTION, SOLUTION INTRAMUSCULAR; INTRAVENOUS EVERY 5 MIN PRN
Status: DISCONTINUED | OUTPATIENT
Start: 2021-01-26 | End: 2021-01-26 | Stop reason: HOSPADM

## 2021-01-26 RX ORDER — CLINDAMYCIN PHOSPHATE 150 MG/ML
INJECTION, SOLUTION INTRAVENOUS PRN
Status: DISCONTINUED | OUTPATIENT
Start: 2021-01-26 | End: 2021-01-26 | Stop reason: SDUPTHER

## 2021-01-26 RX ORDER — PROMETHAZINE HYDROCHLORIDE 25 MG/ML
6.25 INJECTION, SOLUTION INTRAMUSCULAR; INTRAVENOUS
Status: DISCONTINUED | OUTPATIENT
Start: 2021-01-26 | End: 2021-01-26 | Stop reason: HOSPADM

## 2021-01-26 RX ORDER — SODIUM CHLORIDE 9 MG/ML
INJECTION, SOLUTION INTRAVENOUS PRN
Status: COMPLETED | OUTPATIENT
Start: 2021-01-26 | End: 2021-01-26

## 2021-01-26 RX ORDER — ONDANSETRON 2 MG/ML
4 INJECTION INTRAMUSCULAR; INTRAVENOUS
Status: DISCONTINUED | OUTPATIENT
Start: 2021-01-26 | End: 2021-01-26 | Stop reason: HOSPADM

## 2021-01-26 RX ORDER — DOXYCYCLINE 100 MG/1
100 CAPSULE ORAL 2 TIMES DAILY
Qty: 14 CAPSULE | Refills: 0 | Status: SHIPPED | OUTPATIENT
Start: 2021-01-26 | End: 2021-02-04 | Stop reason: ALTCHOICE

## 2021-01-26 RX ORDER — OXYCODONE HYDROCHLORIDE AND ACETAMINOPHEN 5; 325 MG/1; MG/1
1 TABLET ORAL EVERY 4 HOURS PRN
Qty: 30 TABLET | Refills: 0 | Status: SHIPPED | OUTPATIENT
Start: 2021-01-26 | End: 2021-01-29

## 2021-01-26 RX ORDER — ONDANSETRON 2 MG/ML
INJECTION INTRAMUSCULAR; INTRAVENOUS PRN
Status: DISCONTINUED | OUTPATIENT
Start: 2021-01-26 | End: 2021-01-26 | Stop reason: SDUPTHER

## 2021-01-26 RX ORDER — PROPOFOL 10 MG/ML
INJECTION, EMULSION INTRAVENOUS CONTINUOUS PRN
Status: DISCONTINUED | OUTPATIENT
Start: 2021-01-26 | End: 2021-01-26 | Stop reason: SDUPTHER

## 2021-01-26 RX ORDER — MIDAZOLAM HYDROCHLORIDE 1 MG/ML
INJECTION INTRAMUSCULAR; INTRAVENOUS PRN
Status: DISCONTINUED | OUTPATIENT
Start: 2021-01-26 | End: 2021-01-26 | Stop reason: SDUPTHER

## 2021-01-26 RX ORDER — LABETALOL 20 MG/4 ML (5 MG/ML) INTRAVENOUS SYRINGE
5 EVERY 10 MIN PRN
Status: DISCONTINUED | OUTPATIENT
Start: 2021-01-26 | End: 2021-01-26 | Stop reason: HOSPADM

## 2021-01-26 RX ORDER — SODIUM CHLORIDE 9 MG/ML
INJECTION, SOLUTION INTRAVENOUS CONTINUOUS
Status: DISCONTINUED | OUTPATIENT
Start: 2021-01-26 | End: 2021-01-26 | Stop reason: HOSPADM

## 2021-01-26 RX ORDER — OXYCODONE HYDROCHLORIDE AND ACETAMINOPHEN 5; 325 MG/1; MG/1
1 TABLET ORAL ONCE
Status: COMPLETED | OUTPATIENT
Start: 2021-01-26 | End: 2021-01-26

## 2021-01-26 RX ORDER — DEXAMETHASONE SODIUM PHOSPHATE 10 MG/ML
INJECTION, EMULSION INTRAMUSCULAR; INTRAVENOUS PRN
Status: DISCONTINUED | OUTPATIENT
Start: 2021-01-26 | End: 2021-01-26 | Stop reason: SDUPTHER

## 2021-01-26 RX ADMIN — PHENYLEPHRINE HYDROCHLORIDE 100 MCG: 10 INJECTION INTRAVENOUS at 14:02

## 2021-01-26 RX ADMIN — FENTANYL CITRATE 50 MCG: 50 INJECTION, SOLUTION INTRAMUSCULAR; INTRAVENOUS at 15:29

## 2021-01-26 RX ADMIN — SODIUM CHLORIDE: 9 INJECTION, SOLUTION INTRAVENOUS at 10:22

## 2021-01-26 RX ADMIN — DEXAMETHASONE SODIUM PHOSPHATE 5 MG: 10 INJECTION, EMULSION INTRAMUSCULAR; INTRAVENOUS at 13:04

## 2021-01-26 RX ADMIN — FENTANYL CITRATE 50 MCG: 50 INJECTION, SOLUTION INTRAMUSCULAR; INTRAVENOUS at 13:26

## 2021-01-26 RX ADMIN — Medication 100 MG: at 12:56

## 2021-01-26 RX ADMIN — LIDOCAINE HYDROCHLORIDE 80 MG: 20 INJECTION, SOLUTION EPIDURAL; INFILTRATION; INTRACAUDAL; PERINEURAL at 12:56

## 2021-01-26 RX ADMIN — PROPOFOL 20 MG: 10 INJECTION, EMULSION INTRAVENOUS at 13:41

## 2021-01-26 RX ADMIN — SODIUM CHLORIDE: 9 INJECTION, SOLUTION INTRAVENOUS at 13:30

## 2021-01-26 RX ADMIN — OXYCODONE HYDROCHLORIDE AND ACETAMINOPHEN 1 TABLET: 5; 325 TABLET ORAL at 16:25

## 2021-01-26 RX ADMIN — ONDANSETRON HYDROCHLORIDE 4 MG: 4 INJECTION, SOLUTION INTRAMUSCULAR; INTRAVENOUS at 14:31

## 2021-01-26 RX ADMIN — PHENYLEPHRINE HYDROCHLORIDE 50 MCG: 10 INJECTION INTRAVENOUS at 13:43

## 2021-01-26 RX ADMIN — PROPOFOL 150 MG: 10 INJECTION, EMULSION INTRAVENOUS at 12:56

## 2021-01-26 RX ADMIN — PROPOFOL 100 MCG/KG/MIN: 10 INJECTION, EMULSION INTRAVENOUS at 13:41

## 2021-01-26 RX ADMIN — FENTANYL CITRATE 25 MCG: 50 INJECTION, SOLUTION INTRAMUSCULAR; INTRAVENOUS at 14:19

## 2021-01-26 RX ADMIN — PROPOFOL 50 MCG/KG/MIN: 10 INJECTION, EMULSION INTRAVENOUS at 13:04

## 2021-01-26 RX ADMIN — PHENYLEPHRINE HYDROCHLORIDE 100 MCG: 10 INJECTION INTRAVENOUS at 13:46

## 2021-01-26 RX ADMIN — SODIUM CHLORIDE, POTASSIUM CHLORIDE, SODIUM LACTATE AND CALCIUM CHLORIDE: 600; 310; 30; 20 INJECTION, SOLUTION INTRAVENOUS at 12:50

## 2021-01-26 RX ADMIN — FENTANYL CITRATE 50 MCG: 50 INJECTION, SOLUTION INTRAMUSCULAR; INTRAVENOUS at 13:39

## 2021-01-26 RX ADMIN — PROPOFOL 30 MG: 10 INJECTION, EMULSION INTRAVENOUS at 13:39

## 2021-01-26 RX ADMIN — FENTANYL CITRATE 50 MCG: 50 INJECTION, SOLUTION INTRAMUSCULAR; INTRAVENOUS at 12:56

## 2021-01-26 RX ADMIN — HYDROMORPHONE HYDROCHLORIDE 0.5 MG: 1 INJECTION, SOLUTION INTRAMUSCULAR; INTRAVENOUS; SUBCUTANEOUS at 15:30

## 2021-01-26 RX ADMIN — CLINDAMYCIN PHOSPHATE 300 MG: 150 INJECTION, SOLUTION INTRAVENOUS at 13:05

## 2021-01-26 RX ADMIN — MIDAZOLAM HYDROCHLORIDE 2 MG: 1 INJECTION, SOLUTION INTRAMUSCULAR; INTRAVENOUS at 12:48

## 2021-01-26 ASSESSMENT — PULMONARY FUNCTION TESTS
PIF_VALUE: 22
PIF_VALUE: 24
PIF_VALUE: 24
PIF_VALUE: 25
PIF_VALUE: 23
PIF_VALUE: 24
PIF_VALUE: 25
PIF_VALUE: 23
PIF_VALUE: 27
PIF_VALUE: 24
PIF_VALUE: 24
PIF_VALUE: 26
PIF_VALUE: 25
PIF_VALUE: 24
PIF_VALUE: 29
PIF_VALUE: 26
PIF_VALUE: 25
PIF_VALUE: 1
PIF_VALUE: 2
PIF_VALUE: 23
PIF_VALUE: 0
PIF_VALUE: 28
PIF_VALUE: 25
PIF_VALUE: 2
PIF_VALUE: 22
PIF_VALUE: 24
PIF_VALUE: 25
PIF_VALUE: 23
PIF_VALUE: 1
PIF_VALUE: 24
PIF_VALUE: 25
PIF_VALUE: 24
PIF_VALUE: 26
PIF_VALUE: 25
PIF_VALUE: 25
PIF_VALUE: 24
PIF_VALUE: 2
PIF_VALUE: 23
PIF_VALUE: 23
PIF_VALUE: 24
PIF_VALUE: 24
PIF_VALUE: 0
PIF_VALUE: 24
PIF_VALUE: 24
PIF_VALUE: 30
PIF_VALUE: 26
PIF_VALUE: 35
PIF_VALUE: 27
PIF_VALUE: 25
PIF_VALUE: 1
PIF_VALUE: 24
PIF_VALUE: 26
PIF_VALUE: 24
PIF_VALUE: 24
PIF_VALUE: 22
PIF_VALUE: 24
PIF_VALUE: 29
PIF_VALUE: 31
PIF_VALUE: 23
PIF_VALUE: 24
PIF_VALUE: 22
PIF_VALUE: 25
PIF_VALUE: 2
PIF_VALUE: 25
PIF_VALUE: 25
PIF_VALUE: 30
PIF_VALUE: 25
PIF_VALUE: 24
PIF_VALUE: 24
PIF_VALUE: 23
PIF_VALUE: 30
PIF_VALUE: 25
PIF_VALUE: 24
PIF_VALUE: 24
PIF_VALUE: 25
PIF_VALUE: 10
PIF_VALUE: 24
PIF_VALUE: 29
PIF_VALUE: 24
PIF_VALUE: 25

## 2021-01-26 ASSESSMENT — PAIN SCALES - GENERAL
PAINLEVEL_OUTOF10: 8
PAINLEVEL_OUTOF10: 7
PAINLEVEL_OUTOF10: 8

## 2021-01-26 ASSESSMENT — LIFESTYLE VARIABLES: SMOKING_STATUS: 1

## 2021-01-26 ASSESSMENT — PAIN DESCRIPTION - DESCRIPTORS: DESCRIPTORS: ACHING

## 2021-01-26 NOTE — TELEPHONE ENCOUNTER
Attempted to call the patient. After checking HIPPA, voicemail left stating covid test was negative.

## 2021-01-26 NOTE — H&P
History and Physical    Patient:  Darren Swartz  MRN: [id number]  YOB: 1965    CHIEF COMPLAINT: Overactive bladder    HISTORY OF PRESENT ILLNESS:   The patient is a 50 y.o. female who presents with overactive bladder here for InterStim battery exchange    Patient's old records, notes and chart reviewed and summarized above. Past Medical History:    Past Medical History:   Diagnosis Date    Anxiety     Anxiety and depression     Asthma     Bipolar 1 disorder ([address]     Bipolar 1 disorder with moderate sourav ([address]     Blood circulation, collateral     Cancer ([address]      ? cervical \"long time ago\"    Cancer ([address] 01/15/2021    Left Breast    Depression     Endometriosis     GERD (gastroesophageal reflux disease)     Kidney stones     Lupus ([address]     Movement disorder     MS (multiple sclerosis) ([address]     Schizophrenia ([address]     Thyroid disease     Type 2 diabetes mellitus without complication ([address]     Unspecified cerebral artery occlusion with cerebral infarction 2014       Past Surgical History:    Past Surgical History:   Procedure Laterality Date    COLONOSCOPY      DILATION AND CURETTAGE OF UTERUS      BEN US GUID NDL BIOPSY LEFT Left 1/14/2021    BEN US GUID NDL BIOPSY LEFT 1/14/2021 [facility] Cardenas MD [facility]    [place] OFFICE/OUTPT VISIT,PROCEDURE ONLY N/A 11/21/2018    INTERSTIM DEVICE PLACEMENT MODEL 3058, ONLY HEAD ELIGIBLE FOR MRI WITH TRANSMIT/RECEIVE HEAD COIL    TUBAL LIGATION      10 years ago   [place] BIOPSY  1/14/2021    US LYMPH NODE BIOPSY 1/14/2021 Montana Cardenas MD [facility]     Medications Prior to Admission:    Prior to Admission medications    Medication Sig Start Date End Date Taking?  Authorizing Provider   metFORMIN (GLUCOPHAGE) 1000 MG tablet Take 1,000 mg by mouth 2 times daily 12/7/20  Yes Historical Provider, MD Multiple Vitamin (TAB-A-ISAAC PO) Take 1 tablet by mouth daily   Yes Historical Provider, MD   loratadine (CLARITIN) 10 MG tablet Take 1 tablet by mouth daily as needed. 2/24/15  Yes Historical Provider, MD   PROAIR  (90 BASE) MCG/ACT inhaler Inhale 2 puffs into the lungs every 6 hours as needed. 1/6/15  Yes Historical Provider, MD   hydrOXYzine (VISTARIL) 50 MG capsule Take 50 mg by mouth 3 times daily as needed for Itching. Yes Historical Provider, MD   omeprazole (PRILOSEC) 20 MG capsule Take 20 mg by mouth daily. Yes Historical Provider, MD   Misc. Devices [facility] [address] [facility] Wheelchair repairs- new seat cover, right side armrest replacement    Current body weight:  68 kg  Diagnosis: gait disturbance, chronic incomplete spastic paraplegia  Length of need: Lifetime 10/22/20   Ulysses Maidens, APRN - CNP   Incontinence Supply Disposable (UNDERPADS) MISC Cloth chux pads  Diagnosis: Paraplegia 344.1 3/10/15   Wesley Haider MD       Allergies:  Penicillins and Seasonal    Social History:    Social History     Socioeconomic History    Marital status:      Spouse name: Not on file    Number of children: Not on file    Years of education: Not on file    Highest education level: Not on file   Occupational History    Not on file   Social Needs    Financial resource strain: Not on file    Food insecurity     Worry: Not on file     Inability: Not on file    Transportation needs     Medical: Not on file     Non-medical: Not on file   Tobacco Use    Smoking status: Current Every Day Smoker     Packs/day: 1.00     Types: Cigarettes     Start date: 12/6/1979    Smokeless tobacco: Never Used   Substance and Sexual Activity    Alcohol use:  Yes     Alcohol/week: 0.0 standard drinks     Comment: social drinker    Drug use: No    Sexual activity: Never     Partners: Male   Lifestyle    Physical activity     Days per week: Not on file     Minutes per session: Not on file    Stress: Not on file Relationships    Social connections     Talks on phone: Not on file     Gets together: Not on file     Attends Hoahaoism service: Not on file     Active member of club or organization: Not on file     Attends meetings of clubs or organizations: Not on file     Relationship status: Not on file    Intimate partner violence     Fear of current or ex partner: Not on file     Emotionally abused: Not on file     Physically abused: Not on file     Forced sexual activity: Not on file   Other Topics Concern    Not on file   Social History Narrative    ** Merged History Encounter **            Family History:    Family History   Problem Relation Age of Onset    Stroke Mother     Asthma Mother     Other Mother     No Known Problems Sister     Asthma Brother     No Known Problems Brother        REVIEW OF SYSTEMS:  Constitutional: negative  Eyes: negative  Respiratory: negative  Cardiovascular: negative  Gastrointestinal: negative  Genitourinary: see HPI  Musculoskeletal: negative  Skin: negative   Neurological: negative  Hematological/Lymphatic: negative  Psychological: negative    Physical Exam:      Patient Vitals for the past 24 hrs:   BP Temp Temp src Pulse Resp SpO2 Height Weight   01/26/21 0926 119/63 97.8 °F (36.6 °C) Temporal 105 16 95 % 4' 11\" (1.499 m) 157 lb 3.2 oz (71.3 kg)     Constitutional: Patient in no acute distress; Neuro: alert and oriented to person place and time. Psych: Mood and affect normal.  Skin: Normal  Lungs: Respiratory effort normal, CTA  Cardiovascular:  Normal peripheral pulses; no murmur  Abdomen: Soft, non-tender, non-distended with no CVA, flank pain, hepatosplenomegaly or hernia. Kidneys normal.  Bladder non-tender and not distended. LABS:   No results for input(s): WBC, HGB, HCT, MCV, PLT in the last 72 hours. No results for input(s): NA, K, CL, CO2, PHOS, BUN, CREATININE in the last 72 hours.     Invalid input(s): CA  No results found for: PSA Urinalysis: No results for input(s): COLORU, PHUR, LABCAST, WBCUA, RBCUA, MUCUS, TRICHOMONAS, YEAST, BACTERIA, CLARITYU, SPECGRAV, LEUKOCYTESUR, UROBILINOGEN, Silvia Vonda in the last 72 hours. Invalid input(s): NITRATE, GLUCOSEUKETONESUAMORPHOUS     -----------------------------------------------------------------      Assessment and Plan   Impression:    Patient Active Problem List   Diagnosis    Leg weakness, bilateral    Hypokalemia    Hyperglycemia    Multiple sclerosis ([address]    Myelopathy ([address]    Hyponatremia    Lupus ([address]    Lupus (systemic lupus erythematosus) (HCC)    Paraplegia (HCC)    Neurogenic bladder    Neurogenic bowel    Fatigue    Spinal cord infarction (HCC)    Depression    Bipolar 1 disorder (HCC)    Asthma    Skin ulcer of multiple sites ([address] bilateral inner thighs    History of cancer, cervical    Anxiety    Transverse myelitis (HCC)    Flexion contracture of right knee    Overactive bladder    Urgency-frequency syndrome       Plan:     Consent obtained;  InterStim battery exchange in OR today    Tamiko Redding M.D  11:04 AM 1/26/2021

## 2021-01-26 NOTE — ANESTHESIA POSTPROCEDURE EVALUATION
Department of Anesthesiology  Postprocedure Note    Patient: Arpita Chavis  MRN: [id number]  YOB: 1965  Date of evaluation: 1/26/2021  Time:  3:42 PM     Procedure Summary     Date: 01/26/21 Room / Location: [facility] / [facility]    Anesthesia Start: 1250 Anesthesia Stop: 5684    Procedure: EXCHANGE OF INTERSTIM SYSTEM (N/A ) Diagnosis: (OVERACTIVE BLADDER)    Surgeons: Toyin Jones MD Responsible Provider: Marylene Ore, MD    Anesthesia Type: general ASA Status: 3          Anesthesia Type: general    Cristine Phase I: Cristine Score: 9    Cristine Phase II:      Last vitals: Reviewed and per EMR flowsheets.        Anesthesia Post Evaluation    Patient location during evaluation: PACU  Patient participation: complete - patient participated  Level of consciousness: awake  Airway patency: patent  Nausea & Vomiting: no vomiting and no nausea  Complications: no  Cardiovascular status: hemodynamically stable  Respiratory status: acceptable  Hydration status: stable

## 2021-01-26 NOTE — OP NOTE
Dr. Katie Ceballos MD      01/26/21    Patient:  Livier Beltrán  MRN: [id number]  YOB: 1965    Surgeon: Katie Ceballos MD    Pre-op Diagnosis: Neurogenic bladder  Post-op Diagnosis: Same    Procedure:   Explant of Insterstim, excision of device capsule and washout of wound. InterStim Sacral Neuromodulation System, Stage 1&2 Procedure, placement of rechargeable device. Anesthesia: General  Complications: none  OR Blood Loss: Minimal  Fluids: Cystalloids  Specimens: None    Indication: Patient with history of multiple sclerosis and neurogenic bladder. She has had the InterStim for 2 to 3 years now and her battery is running low and she is on high settings. She is here today for removal of her old device and implant of a new rechargeable device. Narrative of the Procedure: The patient was properly identified and placed in prone position. Pillows were placed under lower abdomen to  flatten sacrum and under shins to allow the toes to dangle freely. A ground pad was placed on the bottom of the patients foot and the long test stimulation cable was connected to the ground pad and the external test stimulator. The patient was prepped and draped in usual sterile fashion. A timeout occurred in which two patient identifiers were used. The sciatic notches and sacral midline were identified using fluroscopy. Local injection was administered around the atticipated foramen needle entry point which was 2cm lateral to the sacral midline and 2cm cephalad of sciatic notch level. A foramen needle was introduced at an approximate 60 degree angle, feeling for the foraminal margins until S3 was identified. Proper needle position was confirmed by stimulating the needle and observing a bellowing response, and also plantar flexion of the big toe. In addition, fluoroscopic images demonstrated appropriate placement. The foramen needle stylet was removed and a directional guide was placed through the needle. The foramen needle was removed by sliding it over the directional guide. A small incision was made next to the directional guide through the skin with a 15-blade knife. The lead introducer with dilator was placed over the directional guide. Utilizing fluoroscopic guidance the lead introducer was advanced until the radiopaque aruna was half-way through the foramen. The dilator was removed along with the directional guide. Using fluoroscopy, the tined lead with the angled stylet was placed through the introducer until electrodes two and three straddled the anterior edge of the sacrum. All four electrodes were tested, observing maxi. All 4 electrodes demonstrated excellent responses. After satisfactory lead positioning was confirmed, the introducer was retracted over the lead under continuous fluoroscopy, deploying the tines into the presacral tissues. Re-testing after removal of the introducer re-confirmed the aforementioned responses. The potential internal neurostimulator pocket site was identified on the patient's right side below the iliac crest and lateral to the sacrum. Local was administered and an incision was made into the subcutaneous tissue creating a connection site. Blunt dissection was used to create a small pocket with hemostasis achieved. A tunneling tool with sheath was placed from the lead exit site subcutaneously to the small incised pocket site. The tunneling tool was removed and the lead was fed through the sheath, exiting at the pocket site. The sheath was removed. The lead was cleaned and dried. The battery was attached using the quick-connection wrench. The battery was placed into the wound. The device was then interrogated. All resistances were within normal limits. The dermis was re-approximated with 2-0 Vicryl. The skin was closed with a 4-0 Monocryl in a subcuticular manner. I then proceeded on the left side by making an incision over the old scar. I dissected down using blunt and sharp dissection all the way down to the InterStim battery pocket. I was able to remove the battery. I then pulled on the lead until I was able to note the entry point of the lead. I made a counterincision over the right side of the midline sacrum. I was able to feel the lead and remove it intact out of the S3 foramen. The lead and the battery was then removed. The capsule pocket and the incision was then excised completely. Hemostasis in the subcutaneous tissue was then obtained. I then irrigated the wounds copiously with antibiotic irrigation. I then closed the wound in multilayer 3-0 Vicryl fashion and then the skin was closed with 4-0 Monocryl suture. The counterincision was also closed in similar fashion. All the incisions were dressed with Steri-Strips. The patient was then awakened and transferred to the PACU in good and stable condition. In the PACU, using the external test stimulator, the patient was programmed to the electrode of optimum sensation and provided utilization instructions prior to discharge. Follow-Up: Patient will complete a voiding diary during the testing period. Should they have an appropriate response, the patient will present for a stage 2 procedure in the coming weeks.  In addition, the patient will be discharged on 5 days of doxycycling       Cruz Galarza M.D  Electronically signed on 1/26/2021 at 3:41 PM

## 2021-01-26 NOTE — PROGRESS NOTES
Pt returned to [facility] room 10. Vitals and assessment as charted. 0.9 infusing, @750ml to count from PACU. Pt has pop and muffin. Family at the bedside. Pt and family verbalized understanding of discharge criteria and call light use. Call light in reach.

## 2021-01-26 NOTE — PROGRESS NOTES
1503 pt arrived to PACU, awake and alert. Denies pain at this time. VSS, breathing on her own  1518 pt awake in bed, denies pain. VSS  1529 c/o pain 8/10, medicated with 50 mcg fentanyl  1530 medicated with 0.5 mg dilaudid  1540 pt resting, resp easy  1550 pt states pain is 3/10 and tolerable.  Meets criteria for discharge from PACU, transported to [facility]

## 2021-01-26 NOTE — PROGRESS NOTES
Pt and family oriented to [facility] 7 and unit. Pt verbalized approval for first name, last initial and physician name on unit whiteboard. Fall band applied. Vaccine information given. Plan of care reviewed. Arrived per Gustabo parkinson steady transfer to bed.

## 2021-01-26 NOTE — ANESTHESIA PRE PROCEDURE
Department of Anesthesiology  Preprocedure Note       Name:  Carlota Hanson   Age:  50 y.o.  :  1965                                          MRN:  [id number]         Date:  2021      Surgeon: Anne Weber):  Yecenia Mayer MD    Procedure: EXCHANGE OF INTERSTIM BATTERY (N/A )    Medications prior to admission:   Prior to Admission medications    Medication Sig Start Date End Date Taking? Authorizing Provider   metFORMIN (GLUCOPHAGE) 1000 MG tablet Take 1,000 mg by mouth 2 times daily 20   Historical Provider, MD   desvenlafaxine succinate (PRISTIQ) 50 MG TB24 extended release tablet Take 50 mg by mouth daily 20   Historical Provider, MD   traZODone (DESYREL) 50 MG tablet Take 50 mg by mouth nightly 1/10/21   Historical Provider, MD [address] 1200 MG TB12 Take 1 tablet by mouth 2 times daily 21   Historical Provider, MD   Lactobacillus Acid-Pectin (ACIDOPHILUS/CITRUS PECTIN) TABS Take 1 tablet by mouth daily 20   Historical Provider, MD   paliperidone (INVEGA) 3 MG extended release tablet Take 3 mg by mouth nightly    Historical Provider, MD   SITagliptin (JANUVIA) 100 MG tablet Take 100 mg by mouth daily    Historical Provider, MD   Lifitegrast (XIIDRA) 5 % SOLN Place 1 drop into both eyes daily    Historical Provider, MD   baclofen (LIORESAL) 10 MG tablet Take 1.5 tablets by mouth 3 times daily 21   CHAS Queen CNP   amitriptyline (ELAVIL) 25 MG tablet Take 1 tablet by mouth nightly 21   CHAS Queen CNP   HYDROcodone-acetaminophen (NORCO) 5-325 MG per tablet Take 1 tablet by mouth 2 times daily as needed for Pain for up to 30 days. Fill on/after 2021  CHAS Queen CNP   [facility] [address] [facility] Wheelchair repairs- new seat cover, right side armrest replacement    Current body weight:  68 kg  Diagnosis: gait disturbance, chronic incomplete spastic paraplegia Length of need: Lifetime 10/22/20   CHAS Moses CNP   pregabalin (LYRICA) 150 MG capsule Take 1 capsule by mouth 2 times daily for 180 days. 9/10/20 3/9/21  Cole Reyes MD   tiotropium (SPIRIVA) 18 MCG inhalation capsule Inhale 18 mcg into the lungs daily 2 puffs    Historical Provider, MD   insulin glargine (LANTUS SOLOSTAR) 100 UNIT/ML injection pen Inject 50 Units into the skin nightly     Historical Provider, MD   montelukast (SINGULAIR) 10 MG tablet Take 10 mg by mouth nightly  10/26/16   Historical Provider, MD   artificial tears (ARTIFICIAL TEARS) OINT as needed    Historical Provider, MD   Calcium Carbonate (CALCIUM 600 PO) Take 1 tablet by mouth 2 times daily    Historical Provider, MD   Multiple Vitamin (TAB-A-ISAAC PO) Take 1 tablet by mouth daily    Historical Provider, MD   loratadine (CLARITIN) 10 MG tablet Take 1 tablet by mouth daily as needed. 2/24/15   Historical Provider, MD   PROAIR  (90 BASE) MCG/ACT inhaler Inhale 2 puffs into the lungs every 6 hours as needed. 1/6/15   Historical Provider, MD   Incontinence Supply Disposable (UNDERPADS) MISC Cloth chux pads  Diagnosis: Paraplegia 344.1 3/10/15   Jackie Mart MD   hydrOXYzine (VISTARIL) 50 MG capsule Take 50 mg by mouth 3 times daily as needed for Itching. Historical Provider, MD   omeprazole (PRILOSEC) 20 MG capsule Take 20 mg by mouth daily. Historical Provider, MD       Current medications:    No current facility-administered medications for this visit. No current outpatient medications on file.      Facility-Administered Medications Ordered in Other Visits   Medication Dose Route Frequency Provider Last Rate Last Admin    0.9 % sodium chloride infusion   Intravenous PRN CHAS Aponte CNP        0.9 % sodium chloride infusion   Intravenous Continuous Betty Escobar MD        ceFAZolin (ANCEF) 2000 mg in dextrose 5 % 50 mL IVPB  2 g Intravenous 30 Janel Whitten MD Allergies: Allergies   Allergen Reactions    Penicillins Shortness Of Breath     \"I almost \"    Seasonal Itching       Problem List:    Patient Active Problem List   Diagnosis Code    Leg weakness, bilateral R29.898    Hypokalemia E87.6    Hyperglycemia R73.9    Multiple sclerosis ([facility]) G35    Myelopathy ([facility]) G95.9    Hyponatremia E87.1    Lupus ([address] M32.9    Lupus (systemic lupus erythematosus) ([facility]) M32.9    Paraplegia ([facility]) G82.20    Neurogenic bladder N31.9    Neurogenic bowel K59.2    Fatigue R53.83    Spinal cord infarction ([facility]) G95.11    Depression F32.9    Bipolar 1 disorder ([facility]) F31.9    Asthma J45.909    Skin ulcer of multiple sites ([address] bilateral inner thighs L98.499    History of cancer, cervical Z85.9    Anxiety F41.9    Transverse myelitis ([facility]) G37.3    Flexion contracture of right knee M24.561    Overactive bladder N32.81    Urgency-frequency syndrome N32.81       Past Medical History:        Diagnosis Date    Anxiety     Anxiety and depression     Asthma     Bipolar 1 disorder ([facility])     Bipolar 1 disorder with moderate sourav ([address]     Blood circulation, collateral     Cancer ([address]      ?  cervical \"long time ago\"    Cancer ([address] 01/15/2021    Left Breast    Depression     Endometriosis     GERD (gastroesophageal reflux disease)     Kidney stones     Lupus ([address]     Movement disorder     MS (multiple sclerosis) ([address]     Schizophrenia ([address]     Thyroid disease     Type 2 diabetes mellitus without complication ([address]     Unspecified cerebral artery occlusion with cerebral infarction        Past Surgical History:        Procedure Laterality Date    COLONOSCOPY      DILATION AND CURETTAGE OF UTERUS      [facility] US GUID NDL BIOPSY LEFT Left 2021    BEN US GUID NDL BIOPSY LEFT 2021 Maddie Domingo MD [facility]    NC OFFICE/OUTPT VISIT,PROCEDURE ONLY N/A 2018 INTERSTIM DEVICE PLACEMENT MODEL 3058, ONLY HEAD ELIGIBLE FOR MRI WITH TRANSMIT/RECEIVE HEAD COIL    TUBAL LIGATION      10 years ago   [address]  1/14/2021    US LYMPH NODE BIOPSY 1/14/2021 Sandy Vee MD [facility]       Social History:    Social History     Tobacco Use    Smoking status: Current Every Day Smoker     Packs/day: 1.00     Types: Cigarettes     Start date: 12/6/1979    Smokeless tobacco: Never Used   Substance Use Topics    Alcohol use: Yes     Alcohol/week: 0.0 standard drinks     Comment: social drinker                                Ready to quit: Not Answered  Counseling given: Not Answered      Vital Signs (Current): There were no vitals filed for this visit. BP Readings from Last 3 Encounters:   01/26/21 119/63   01/07/21 116/80   09/10/20 118/78       NPO Status:                                                                                 BMI:   Wt Readings from Last 3 Encounters:   01/20/21 150 lb (68 kg)   01/07/21 150 lb (68 kg)   12/17/20 150 lb (68 kg)     There is no height or weight on file to calculate BMI.    CBC:   Lab Results   Component Value Date    WBC 3.8 01/07/2021    RBC 4.74 01/07/2021    RBC 0 03/14/2018    HGB 13.8 01/07/2021    HCT 40.1 01/07/2021    MCV 84.6 01/07/2021    RDW 17.8 09/23/2016    PLT 67 01/07/2021       CMP:   Lab Results   Component Value Date     01/07/2021    K 3.6 01/07/2021     01/07/2021    CO2 27 01/07/2021    BUN 7 01/07/2021    CREATININE 0.4 01/07/2021    GFRAA >60 10/10/2014    LABGLOM >90 01/07/2021    GLUCOSE 144 01/07/2021    PROT 7.1 01/07/2021    CALCIUM 9.1 01/07/2021    BILITOT 0.7 01/07/2021    BILITOT NEGATIVE 03/14/2018    ALKPHOS 121 01/07/2021    AST 27 01/07/2021    ALT 17 01/07/2021       POC Tests: No results for input(s): POCGLU, POCNA, POCK, POCCL, POCBUN, POCHEMO, POCHCT in the last 72 hours.     Coags:   Lab Results   Component Value Date PROTIME 12.6 10/09/2014    INR 1.2 10/09/2014    APTT 40.7 10/09/2014       HCG (If Applicable):   Lab Results   Component Value Date    PREGTESTUR NEGATIVE 12/16/2013    PREGSERUM NEGATIVE 09/13/2014        ABGs: No results found for: PHART, PO2ART, FMH5WVH, AHC1SGV, BEART, U3UZUOTG     Type & Screen (If Applicable):  No results found for: LABABO, LABRH    Anesthesia Evaluation   no history of anesthetic complications:   Airway: Mallampati: II  TM distance: >3 FB     Mouth opening: > = 3 FB Dental:          Pulmonary:normal exam    (+) asthma: current smoker          Patient did not smoke on day of surgery. Cardiovascular:  Exercise tolerance: poor (<4 METS),                     Neuro/Psych:   (+) CVA:, psychiatric history:             ROS comment: Multiple sclerosis, paraplegia GI/Hepatic/Renal:   (+) GERD: well controlled,           Endo/Other:    (+) Diabetes, : arthritis:., .          Pt had no PAT visit       Abdominal:           Vascular: negative vascular ROS. Anesthesia Plan      general     ASA 3       Induction: intravenous. MIPS: Postoperative opioids intended and Prophylactic antiemetics administered. Anesthetic plan and risks discussed with patient. Plan discussed with CRNA.                   Ronald Meraz MD   1/26/2021

## 2021-01-27 ENCOUNTER — TELEPHONE (OUTPATIENT)
Dept: UROLOGY | Age: 56
End: 2021-01-27

## 2021-01-27 NOTE — TELEPHONE ENCOUNTER
Sent message to Dr. Joao Meyer via perfect serve. \"Hemoise Meyer,     Its [place] . Ladonna [facility] [id number] is asking for care instructions post interstim. Patient underwent Explant of Insterstim, excision of device capsule and washout of wound. InterStim Sacral Neuromodulation System, Stage 1&2 Procedure, placement of rechargeable device yesterday with you. When can she shower and when can the dressing be changed? Per Elvin Pathak, she advised the following but wanted to confirm with you. 1. Keep area clean and dry. Apply triple antibiotic ointment to surgical site as needed. Cover with gauze to protect from rubbing on clothing. May be left open to air if no risk of trauma to site. 2. Sutures will dissolve. 3. Do not submerge in water (bath, swimming, etc.) for at least 10 days. Get further instruction at your follow-up appointment. Above are typical postop site care instructions. Please confirm with Dr Joao Meyer. \"

## 2021-01-27 NOTE — TELEPHONE ENCOUNTER
Received message from [address] [place], [place] [address] [place] stating he agrees with postop instructions and May start showering tomorrow. Patient advised of instructions and voiced understanding.

## 2021-01-27 NOTE — TELEPHONE ENCOUNTER
Patient underwent Explant of Insterstim, excision of device capsule and washout of wound. InterStim Sacral Neuromodulation System, Stage 1&2 Procedure, placement of rechargeable device yesterday with Dr Angus Andres. When can she shower and when can the dressing be changed? Please advise.  Thank you

## 2021-01-27 NOTE — TELEPHONE ENCOUNTER
Called pt to r/s her appointment she missed with Dr. Jarod Bal, she declines wanting an appointment tomorrow 1/28 would like to wait until 2/1 in afternoon.    Appointment made for 2:30 pm.

## 2021-02-03 ENCOUNTER — TELEPHONE (OUTPATIENT)
Dept: UROLOGY | Age: 56
End: 2021-02-03

## 2021-02-03 NOTE — TELEPHONE ENCOUNTER
Patient states that she just had a surgery done with Dr. Sg Tinoco and is having some seeping from her incision site and is requesting a call back. Please advise.   [phone]

## 2021-02-03 NOTE — TELEPHONE ENCOUNTER
Patient underwent Explant of Insterstim, excision of device capsule and washout of wound. InterStim Sacral Neuromodulation System, Stage 1&2 Procedure, placement of rechargeable device on 01/26/2021 with Dr Chelsie August. The small incision on the left and right buttocks have increase redness and warmth. She does not know how much drainage there is or the color. Her family told her how it looked and it was seeping. She c/o a lot of pain and increases with activity rated 8-8 on scale of 0-10. The other incision sites are healing fine. She denies fever or chills. She has a telephone visit on 03/04/2021 with Dr Chelsie August. Please advise. Thank you.

## 2021-02-04 ENCOUNTER — NURSE ONLY (OUTPATIENT)
Dept: UROLOGY | Age: 56
End: 2021-02-04

## 2021-02-04 ENCOUNTER — TELEPHONE (OUTPATIENT)
Dept: SPIRITUAL SERVICES | Facility: CLINIC | Age: 56
End: 2021-02-04

## 2021-02-04 ENCOUNTER — OFFICE VISIT (OUTPATIENT)
Dept: SURGERY | Age: 56
End: 2021-02-04
Payer: COMMERCIAL

## 2021-02-04 VITALS
SYSTOLIC BLOOD PRESSURE: 124 MMHG | OXYGEN SATURATION: 96 % | RESPIRATION RATE: 14 BRPM | WEIGHT: 157 LBS | TEMPERATURE: 97 F | HEIGHT: 59 IN | BODY MASS INDEX: 31.65 KG/M2 | HEART RATE: 91 BPM | DIASTOLIC BLOOD PRESSURE: 74 MMHG

## 2021-02-04 VITALS — TEMPERATURE: 97 F

## 2021-02-04 DIAGNOSIS — M32.9 SYSTEMIC LUPUS ERYTHEMATOSUS, UNSPECIFIED SLE TYPE, UNSPECIFIED ORGAN INVOLVEMENT STATUS (HCC): ICD-10-CM

## 2021-02-04 DIAGNOSIS — Z01.818 PRE-OP TESTING: ICD-10-CM

## 2021-02-04 DIAGNOSIS — N32.81 OVERACTIVE BLADDER: ICD-10-CM

## 2021-02-04 DIAGNOSIS — Z17.0 CARCINOMA OF UPPER-INNER QUADRANT OF LEFT BREAST IN FEMALE, ESTROGEN RECEPTOR POSITIVE (HCC): Primary | ICD-10-CM

## 2021-02-04 DIAGNOSIS — N31.9 NEUROGENIC BLADDER: ICD-10-CM

## 2021-02-04 DIAGNOSIS — C50.212 CARCINOMA OF UPPER-INNER QUADRANT OF LEFT BREAST IN FEMALE, ESTROGEN RECEPTOR POSITIVE (HCC): Primary | ICD-10-CM

## 2021-02-04 PROCEDURE — 4004F PT TOBACCO SCREEN RCVD TLK: CPT | Performed by: SURGERY

## 2021-02-04 PROCEDURE — G8484 FLU IMMUNIZE NO ADMIN: HCPCS | Performed by: SURGERY

## 2021-02-04 PROCEDURE — 99024 POSTOP FOLLOW-UP VISIT: CPT | Performed by: NURSE PRACTITIONER

## 2021-02-04 PROCEDURE — 99204 OFFICE O/P NEW MOD 45 MIN: CPT | Performed by: SURGERY

## 2021-02-04 PROCEDURE — G8427 DOCREV CUR MEDS BY ELIG CLIN: HCPCS | Performed by: SURGERY

## 2021-02-04 PROCEDURE — G8417 CALC BMI ABV UP PARAM F/U: HCPCS | Performed by: SURGERY

## 2021-02-04 PROCEDURE — 3017F COLORECTAL CA SCREEN DOC REV: CPT | Performed by: SURGERY

## 2021-02-04 RX ORDER — SULFAMETHOXAZOLE AND TRIMETHOPRIM 800; 160 MG/1; MG/1
1 TABLET ORAL 2 TIMES DAILY
Qty: 14 TABLET | Refills: 0 | Status: SHIPPED | OUTPATIENT
Start: 2021-02-04 | End: 2021-02-11

## 2021-02-04 SDOH — ECONOMIC STABILITY: FOOD INSECURITY: WITHIN THE PAST 12 MONTHS, YOU WORRIED THAT YOUR FOOD WOULD RUN OUT BEFORE YOU GOT MONEY TO BUY MORE.: NOT ASKED

## 2021-02-04 SDOH — ECONOMIC STABILITY: FOOD INSECURITY: WITHIN THE PAST 12 MONTHS, THE FOOD YOU BOUGHT JUST DIDN'T LAST AND YOU DIDN'T HAVE MONEY TO GET MORE.: NOT ASKED

## 2021-02-04 NOTE — TELEPHONE ENCOUNTER
[place] [address] PROGRESS NOTE      Patient: Samaria Riley           YOB: 1965  Age: 50 y.o. Gender: female            Assessment:  Luc Matute is a 50years old who was referred for spiritual care services [facility]. This  called her number. There was no answer. Interventions:  A message was left on her answering machine    Outcomes:  Left our phone number also. Plan:    1. Follow up call.     Electronically signed by Pineda Guadarrama on 2/4/2021 at 4:58 PM.  [address] [place] [address]  [phone]

## 2021-02-04 NOTE — LETTER
[address] Surgery  [place] [address] [address] [facility] [address]  Phone: [phone]  Fax: [phone]    Mejia Auguste MD        February 5, 2021    Tl Johnson, [address] [address]    Patient: Darren Swartz   MR Number: [id number]   YOB: 1965   Date of Visit: 2/4/2021     Dear Tl Johnson,    Thank you for the request for consultation for Darren Swartz to me for the evaluation of left breast cancer. Below are the relevant portions of my assessment and plan of care. 1. 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 Pre-op testing    3. Overactive bladder    4. Systemic lupus erythematosus, unspecified SLE type, unspecified organ involvement status ([address]    3. Multiple sclerosis  4. Thrombocytopenia    1. Surgical treatment of hormone receptor positive invasive breast cancer discussed at length with patient and her significant other. Breast conservation and mastectomy with and without reconstruction were discussed. Need to remove previously biopsied lymph node as well as sentinel lymph node mapping and biopsy discussed as well. Patient expressed understanding. She is opting to proceed with mastectomy without reconstruction. She feels it would be difficult for her to get in for radiation therapy and placement and to be able to tolerate treatments. 2.  Schedule patient for left mastectomy, needle localization previously biopsied left axillary lymph node, sentinel lymph node mapping and biopsy. Risks of procedure were discussed at length. They include but not limited to bleeding, infection, lymphedema, failure of sentinel lymph node to localize as well as bleeding with history of thrombocytopenia as well as recurrent cancer. Patient expressed understanding all questions answered. She wished to proceed.   3.  General anesthesia 4. Preoperative testing per anesthesia guidelines including COVID-19 screen. Repeat check platelets. May need transfused intraoperatively. If you have questions, please do not hesitate to call me. I look forward to following Yasir Medina along with you.     Sincerely,          Michel Lomas MD

## 2021-02-04 NOTE — PROGRESS NOTES
Pt is s/p explant of Interstim, excision of device capsule and washout of wound  Interstim sacral neuromodulation system stage 1 and 2 procedure, placement of rechargeable device on 1- by Dr. Joao Meyer. All incisions are healed except one. Has small dehiscence, minimal erythema. Could not express any drainage. No fevers or chills. She finished doxycycline. Will start on Bactrim. Discussed wit Dr Joao Meyer, placed one steri strip over incision. Keep dry dressing over incision. Fu in 2 weeks for incision check    Call for worsening symptoms.

## 2021-02-04 NOTE — PROGRESS NOTES
Patient has drainage and it is red from her interstim site as per a family member notifying her x 1 day. Patient's incision site looks red and there is some white pus in one area. I spoke with Akua GUTIERREZ to have her look at the site. The site is open some as the where the other three sites are closed. Per Akua MCCAULEY I placed steri strips over the site.

## 2021-02-04 NOTE — PROGRESS NOTES
Artemio Nunez MD   General Surgery  New Patient Evaluation in Office  Pt Name: Ronel Ontiveros  Date of Birth 1965   Today's Date: 2/4/2021  Medical Record Number: [id number]  Referring Provider: Vinicius Ching  Primary Care Provider: Katlin Malloy MD  Chief Complaint:  Chief Complaint   Patient presents with    Surgical Consult     NP ref [facility] Left breast invasive ductal cancer     Clinical stage Ib  ASSESSMENT      1. 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 Pre-op testing    3. Overactive bladder    4. Systemic lupus erythematosus, unspecified SLE type, unspecified organ involvement status ([address]    3. Multiple sclerosis  4. Thrombocytopenia   5. Asthma  PLANS      1. Surgical treatment of hormone receptor positive invasive breast cancer discussed at length with patient and her significant other. Breast conservation and mastectomy with and without reconstruction were discussed. Need to remove previously biopsied lymph node as well as sentinel lymph node mapping and biopsy discussed as well. Patient expressed understanding. She is opting to proceed with mastectomy without reconstruction. She feels it would be difficult for her to get in for radiation therapy and placement and to be able to tolerate treatments. 2.  Schedule patient for left mastectomy, needle localization previously biopsied left axillary lymph node, sentinel lymph node mapping and biopsy. Risks of procedure were discussed at length. They include but not limited to bleeding, infection, lymphedema, failure of sentinel lymph node to localize as well as bleeding with history of thrombocytopenia as well as recurrent cancer. Patient expressed understanding all questions answered. She wished to proceed.   3.  General anesthesia 4. Preoperative testing per anesthesia guidelines including COVID-19 screen. Repeat check platelets. May need transfused intraoperatively. Tiffany Ceja is a 50y.o. year old female who is presenting today in the office for evaluation of left breast cancer. Holley Lay presented for diagnostic breast imaging mass was felt in the left breast.  She had numerous lymph nodes seen on mammography but not enlarged in both axilla. There is an irregular high density mass in the left breast central to the nipple retroareolar. Ultrasounds of both axilla and the breast were performed and ultimately ultrasound-guided biopsies of the mass in the lymph node on the left. On ultrasound of the right axilla there were no abnormalities seen and no abnormal lymph nodes. Ultrasound imaging revealed a 2.2 x 2.2 x 2.9 cm mass in the left breast in the retroareolar area. A single lymph node in the left axilla appeared to have a moderate suspicion for malignancy. Ultrasound-guided biopsies were performed. Pathology revealed a grade 1 well-differentiated invasive adenocarcinoma with mucinous features the lymph node was negative for malignancy. Estrogen receptor 100% positive. Progesterone receptor was 80% positive. Ki-67 was 6%. And HER-2 by IHC was negative. Patient denies prior breast biopsies. She denies any family history of breast or ovarian malignancy. She denies any nipple discharge. She has no dermal changes on clinical examination. Luana Allen has multiple medical comorbidities. She is wheelchair-bound due to her MS. She gets Botox injections for contractures in her lower extremities. Recent onset of thrombocytopenia. Past Medical History  Past Medical History:   Diagnosis Date    Anxiety     Anxiety and depression     Asthma     Bipolar 1 disorder ([address]     Bipolar 1 disorder with moderate sourav ([address]     Blood circulation, collateral     Cancer ([address]      ?  cervical \"long time ago\"  amitriptyline (ELAVIL) 25 MG tablet Take 1 tablet by mouth nightly 30 tablet 5    HYDROcodone-acetaminophen (NORCO) 5-325 MG per tablet Take 1 tablet by mouth 2 times daily as needed for Pain for up to 30 days. Fill on/after 2/6/2021 60 tablet 0    pregabalin (LYRICA) 150 MG capsule Take 1 capsule by mouth 2 times daily for 180 days. 60 capsule 5    tiotropium (SPIRIVA) 18 MCG inhalation capsule Inhale 18 mcg into the lungs daily 2 puffs      insulin glargine (LANTUS SOLOSTAR) 100 UNIT/ML injection pen Inject 50 Units into the skin nightly       montelukast (SINGULAIR) 10 MG tablet Take 10 mg by mouth nightly   0    artificial tears (ARTIFICIAL TEARS) OINT as needed      Calcium Carbonate (CALCIUM 600 PO) Take 1 tablet by mouth 2 times daily      Multiple Vitamin (TAB-A-ISAAC PO) Take 1 tablet by mouth daily      loratadine (CLARITIN) 10 MG tablet Take 1 tablet by mouth daily as needed. 0    PROAIR  (90 BASE) MCG/ACT inhaler Inhale 2 puffs into the lungs every 6 hours as needed. 0    hydrOXYzine (VISTARIL) 50 MG capsule Take 50 mg by mouth 3 times daily as needed for Itching.  omeprazole (PRILOSEC) 20 MG capsule Take 20 mg by mouth daily.  sulfamethoxazole-trimethoprim (BACTRIM DS;SEPTRA DS) 800-160 MG per tablet Take 1 tablet by mouth 2 times daily for 7 days (Patient not taking: Reported on 2/4/2021) 14 tablet 0    Misc. Devices [facility] [address] [facility] Wheelchair repairs- new seat cover, right side armrest replacement    Current body weight:  68 kg  Diagnosis: gait disturbance, chronic incomplete spastic paraplegia  Length of need: Lifetime (Patient not taking: Reported on 2/4/2021) 1 each 0    Incontinence Supply Disposable (UNDERPADS) MISC Cloth chux pads  Diagnosis: Paraplegia 344.1 (Patient not taking: Reported on 2/4/2021) 100 Bottle 11     No current facility-administered medications for this visit.       Allergies     Allergies   Allergen Reactions  Penicillins Shortness Of Breath     \"I almost \"    Seasonal Itching       Family History  Family History   Problem Relation Age of Onset    Stroke Mother     Asthma Mother     Other Mother     No Known Problems Sister     Asthma Brother     No Known Problems Brother        SocialHistory  Social History     Socioeconomic History    Marital status:      Spouse name: 4    Number of children: Not on file    Years of education: Not on file    Highest education level: Not on file   Occupational History    Not on file   Social Needs    Financial resource strain: Not on file    Food insecurity     Worry: Not on file     Inability: Not on file    Transportation needs     Medical: Not on file     Non-medical: Not on file   Tobacco Use    Smoking status: Current Every Day Smoker     Packs/day: 1.00     Types: Cigarettes     Start date: 1979    Smokeless tobacco: Never Used   Substance and Sexual Activity    Alcohol use:  Yes     Alcohol/week: 0.0 standard drinks     Comment: social drinker    Drug use: No    Sexual activity: Never     Partners: Male   Lifestyle    Physical activity     Days per week: Not on file     Minutes per session: Not on file    Stress: Not on file   Relationships    Social connections     Talks on phone: Not on file     Gets together: Not on file     Attends Nondenominational service: Not on file     Active member of club or organization: Not on file     Attends meetings of clubs or organizations: Not on file     Relationship status: Not on file    Intimate partner violence     Fear of current or ex partner: Not on file     Emotionally abused: Not on file     Physically abused: Not on file     Forced sexual activity: Not on file   Other Topics Concern    Not on file   Social History Narrative    ** Merged History Encounter **                Review of Systems  Review of Systems Constitutional: Positive for fatigue. Negative for chills, fever and unexpected weight change. HENT: Negative for sore throat, trouble swallowing and voice change. Eyes: Negative for visual disturbance. Respiratory: Negative for cough, shortness of breath and wheezing. Cardiovascular: Negative for chest pain and palpitations. Gastrointestinal: Negative for abdominal pain, blood in stool, nausea and vomiting. Endocrine: Negative for cold intolerance, heat intolerance and polydipsia. Genitourinary: Positive for difficulty urinating and urgency. Negative for dysuria, flank pain, hematuria, vaginal bleeding and vaginal discharge. Musculoskeletal: Negative for arthralgias, gait problem, joint swelling and myalgias. Skin: Negative for color change and rash. Allergic/Immunologic: Negative for immunocompromised state. Neurological: Positive for tremors and weakness. Negative for dizziness, seizures and speech difficulty. Hematological: Does not bruise/bleed easily. Psychiatric/Behavioral: Negative for behavioral problems, confusion and suicidal ideas. OBJECTIVE     /74 (Site: Left Upper Arm, Position: Sitting, Cuff Size: Medium Adult)   Pulse 91   Temp 97 °F (36.1 °C) (Temporal)   Resp 14   Ht 4' 11\" (1.499 m)   Wt 157 lb (71.2 kg)   SpO2 96%   BMI 31.71 kg/m²      Physical Exam  Constitutional:       General: She is not in acute distress. Appearance: She is well-developed. She is not toxic-appearing or diaphoretic. Comments: Wheelchair   HENT:      Head: Normocephalic and atraumatic. Eyes:      General: No scleral icterus. Extraocular Movements: Extraocular movements intact. Pupils: Pupils are equal, round, and reactive to light. Neck:      Musculoskeletal: Neck supple. No muscular tenderness. Vascular: No JVD. Trachea: No tracheal deviation. Cardiovascular:      Rate and Rhythm: Normal rate. Heart sounds: Normal heart sounds. No murmur. Pulmonary:      Effort: Pulmonary effort is normal. No respiratory distress. Breath sounds: No wheezing. Chest:      Chest wall: No tenderness. Breasts:         Right: No inverted nipple, mass, nipple discharge, skin change or tenderness. Left: Mass present. No inverted nipple, nipple discharge, skin change or tenderness. Abdominal:      General: There is no distension. Palpations: There is no mass. Tenderness: There is no abdominal tenderness. Musculoskeletal:         General: No deformity. Lymphadenopathy:      Cervical: No cervical adenopathy. Right cervical: No superficial, deep or posterior cervical adenopathy. Left cervical: No superficial, deep or posterior cervical adenopathy. Upper Body:      Right upper body: No supraclavicular or pectoral adenopathy. Left upper body: No supraclavicular, axillary or pectoral adenopathy. Skin:     General: Skin is warm and dry. Coloration: Skin is not pale. Findings: Bruising present. No rash. Neurological:      Mental Status: She is alert and oriented to person, place, and time. Mental status is at baseline. Cranial Nerves: No cranial nerve deficit. Motor: Weakness present. Gait: Gait abnormal.      Comments: Weakness contractures lower extremities. Muscular wasting   Psychiatric:         Behavior: Behavior normal.         Thought Content:  Thought content normal.         Lab Results   Component Value Date    WBC 3.8 (L) 01/07/2021    HGB 13.8 01/07/2021    HCT 40.1 01/07/2021    PLT 84 (L) 01/26/2021    CHOL 172 01/07/2021    TRIG 97 01/07/2021    HDL 38 01/07/2021    ALT 17 01/07/2021    AST 27 01/07/2021     01/07/2021    K 3.6 01/07/2021     01/07/2021    CREATININE 0.4 01/07/2021    BUN 7 01/07/2021    CO2 27 01/07/2021    TSH 2.990 01/07/2021    INR 1.2 10/09/2014    LABA1C 6.2 09/23/2016 [address]     Adrianne Malik                [id number]   Assoc.                                              Page 1 of 0 [phone]   [address] [facility] [address]                                                       PROC: 2021   [facility]                                    RECV: 2021   [address]                                    RPTD: 01/15/2021   [address] [facility] [address]                       MRN:  [id number]     LOC: [facility]                       ACCT: [de-identified]  SEX: F                       : 1965  AGE: 50 Y                          PATHOLOGY REPORT                       ATTN: CARLO ROMO                       REQ: Leslie Corral       Copies To:   GRETA FERNANDEZ       Clinical Information: LEFT BREAST MASS UIQ, ENLARGED LYMPH NODE LEFT   AXILLA     ADDENDUM REPORT 1/15/2021:     FINAL DIAGNOSIS:   A.  Breast, left, upper inner quadrant, barbell clip, core needle   biopsies:    Well-differentiated invasive adenocarcinoma with mucinous features.    Lynn score 1 of 3. B.  Lymph node, left axilla, tribell clip, core needle biopsies:    No evidence of malignancy.      BREAST BIOMARKERS*  1/15/2021   Estrogen Receptor: (Clone SP1), [employer]       ( X ) Positive 100% of cells (>10% of cells)   Intensity of Staining:       ( X ) Strong     Progesterone Receptor: (Clone 1E2), [employer]       ( X ) Positive 80% of cells   Intensity of Staining:       ( X ) Strong     Ki-67 (clone 30-9)       Percentage of positive nuclei:  6%               Favorable <10%               Borderline 10-20%               Unfavorable >20%     HER2 Immunohistochemistry (Clone 4B5, [employer])       ( X ) Negative (1+) - Incomplete faint/barely perceptible membrane       staining in >10% of invasive tumor       External Controls:  ( X )  Adequate    (  ) Inadequate   Internal Controls:  ( X )  Adequate     (  ) No internal control (ER is 0 - <10%).  Recommend repeat testing on another specimen. Standard Assay Conditions:  ( X )  Met   (  ) Not Met (Cold ischemic   time>1 hr., and/or fixation time<6 or>72 hrs. for hormone receptors). Staining Method Used:    Formalin fixation    Antigen retrieval type:  Cell Conditioning 1, mild gordon    Time in antigen retrieval:  30 minutes    Detection system type:   DAB Ultraview kit     Specimen:   A) CORE NEEDLE BREAST BIOPSY, LEFT MASS, UIQ, BARBELL CLIP   B) CORE NEEDLE BREAST BIOPSY, ENLARGED LYMPH NODE, LT AXILLA, TRIBELL   CLIP       Gross Examination:   A - The container is labeled Aura Shad, left breast mass, upper   inner quadrant, barbell clip.  Received in formalin are four   cylindrical fragments of white tissue, each 0.1 cm in diameter and   varying in length from 1.1 cm up to 1.5 cm.  1 ns. Fixative:  10% neutral buffered formalin   Tissue removal time:  09:45   Tissue fixation time:  09:46   Total fixation time:  10 hours, 14 minutes     B - The container is labeled Aura Blitz, enlarged lymph node, left   axilla, tribell clip.  Received in formalin are several cylindrical and   fragmented bits of red-white tissue aggregating to 1 x 0.5 x 0.1 cm.  1   ns.  ALP/DKR:[id number]     Fixative:  10% neutral buffered formalin   Tissue removal time:  09:49   Tissue fixation time:  09:50   Total fixation time: 10 hours, 10 minutes       Microscopic Examination:   A. Histologic Type: Invasive with mucinous features     Histologic grade:               Tubule Formation: 10-75%               Nuclear Grade: I               Mitotic Count: 0-5/10 HPF               Lynn Score: I (3-5 pts)     Tumor Size: Tumor involves 4 core needle biopsies.  The largest focus   spans 1.3 cm     Angiolymphatic invasion: Absent     DCIS: Absent         B.  Sections demonstrate core needle biopsies of lymph node having open   sinuses.  There is no evidence of malignancy. *This test was developed and its performance characteristics determined   by [address] [place] [address] has not been cleared or   approved by the U.S. Food and Drug Administration. Pursuant to the   requirements of CLIA, this laboratory has established and verified the   test's accuracy and precision.  Additional information about this type   of test is available upon request.     [id number]   [id number]                                                     <Sign Out Dr. Mira Thompson D.O., F.C.A.P. [facility]/ [address] [address]  Printed on:  1/15/2021   [address]   [facility] BRADY RAMIREZ II.YAQUELIN, [address]   Original print date: 01/15/2021             IMPRESSION: Mammogram BI-RADS: Category 0: Incomplete: Needs    Additional Imaging Evaluation       1. The numerous lymph nodes in the right axilla seen on the    mediolateral oblique view only appears indeterminate.  An    ultrasound is recommended.         2. The irregular high density mass in the left breast central to    the nipple in the retroareolar region appears indeterminate.  An    ultrasound is recommended.         3. The numerous lymph nodes in the left axilla seen on the    mediolateral oblique view only appears indeterminate.  An    ultrasound is recommended.         4.  A targeted ultrasound was performed.           #[id number] - BEN VIRI DIGITAL DIAGNOSTIC BILATERAL   BILATERAL DIGITAL DIAGNOSTIC MAMMOGRAM 3D/2D WITH CAD: 1/4/2021   CLINICAL: Tomosynthesis.  Left breast lump.         Comparison is made to exams dated:  4/16/2014 mammogram,    3/20/2013 mammogram, and 6/1/2006 mammogram - [address]     There are scattered fibroglandular elements in both breasts that    could obscure a lesion on mammography.     Current study was also evaluated with a Computer Aided Detection    (CAD) system.     There are benign scattered calcifications both breasts.   2. The 0.7 cm x 0.9 cm x 1 cm oval lymph node in the left axilla    appears to have a moderate suspicion for malignancy.  3. An    ultrasound guided biopsy is recommended.     4.  The results were reviewed with the patient and the biopsy was    scheduled.           #[id number] - US BREAST LIMITED LEFT   ULTRASOUND OF LEFT BREAST: 1/4/2021   CLINICAL: Breast lump.         Comparison is made to exam dated:  1/4/2021 mammogram - [address]     Ultrasound of the left breast was performed on the areas of    interest.  Gray scale images of the real-time examination were    reviewed.     There is a 2.2 cm x 2.2 cm x 2.9 cm irregular mass with an    indistinct and spiculated margin in the left breast central to    the nipple in the retroareolar region.  This irregular mass is    hypoechoic.  This correlates as palpated and with mammography    findings.     There also is a 0.7 cm x 0.9 cm x 1 cm oval lymph node with    cortical thickening and a circumscribed margin in the left    axilla.  This oval lymph node is hypoechoic with fatty hilum.             Leyla Pierce M.D.     pgk/:1/4/2021 14:28:53         Imaging Technologist: Wilton Hernandez RT(R)(M, [address] [facility]   letter sent: Additional Tests Needed        [id number]           All imaging reviewed

## 2021-02-04 NOTE — TELEPHONE ENCOUNTER
[place] [address] PROGRESS NOTE      Patient: Ronel Ontiveros          YOB: 1965  Age: 50 y.o. Gender: female            Assessment:  Cynthea Saxon called back to spiritual care to talk with this . She was referred for spiritual care by her doctor. Jerrica Dean talked about having many things happen in one week. She heard about her diagnosis of cancer, lost her mom, (75yrs old) and had to have surgery. This  listened and shared some of my own experience with having too much to handle all at once. Interventions: Words of encouragement given. We talked about loss and her grieving. She is trying to take it all in. May still be in shock. Jerrica Dean will have surgery on the 19th. She was offered spiritual care via a phone call in two weeks    Outcomes:  Encouragement and she was thankful for the call    Plan:    1.follow up in two weeks.      Electronically signed by Reese Gilliam, on 2/4/2021 at 5:17 PM.  [facility] [facility]  [phone]

## 2021-02-05 PROBLEM — C50.212 CARCINOMA OF UPPER-INNER QUADRANT OF LEFT BREAST IN FEMALE, ESTROGEN RECEPTOR POSITIVE (HCC): Status: ACTIVE | Noted: 2021-02-05

## 2021-02-05 PROBLEM — Z17.0 CARCINOMA OF UPPER-INNER QUADRANT OF LEFT BREAST IN FEMALE, ESTROGEN RECEPTOR POSITIVE (HCC): Status: ACTIVE | Noted: 2021-02-05

## 2021-02-05 ASSESSMENT — ENCOUNTER SYMPTOMS
SORE THROAT: 0
SHORTNESS OF BREATH: 0
COUGH: 0
ABDOMINAL PAIN: 0
VOMITING: 0
COLOR CHANGE: 0
TROUBLE SWALLOWING: 0
BLOOD IN STOOL: 0
VOICE CHANGE: 0
NAUSEA: 0
WHEEZING: 0

## 2021-02-08 ENCOUNTER — HOSPITAL ENCOUNTER (OUTPATIENT)
Age: 56
Discharge: HOME | End: 2021-02-08
Payer: COMMERCIAL

## 2021-02-08 DIAGNOSIS — Z01.818 PRE-OP TESTING: ICD-10-CM

## 2021-02-08 DIAGNOSIS — N32.81 OAB (OVERACTIVE BLADDER): ICD-10-CM

## 2021-02-08 DIAGNOSIS — C50.212 CARCINOMA OF UPPER-INNER QUADRANT OF LEFT BREAST IN FEMALE, ESTROGEN RECEPTOR POSITIVE (HCC): ICD-10-CM

## 2021-02-08 DIAGNOSIS — Z17.0 CARCINOMA OF UPPER-INNER QUADRANT OF LEFT BREAST IN FEMALE, ESTROGEN RECEPTOR POSITIVE (HCC): ICD-10-CM

## 2021-02-08 LAB
BASOPHILS # BLD: 0.5 %
BASOPHILS ABSOLUTE: 0 THOU/MM3 (ref 0–0.1)
EOSINOPHIL # BLD: 1.3 %
EOSINOPHILS ABSOLUTE: 0 THOU/MM3 (ref 0–0.4)
ERYTHROCYTE [DISTWIDTH] IN BLOOD BY AUTOMATED COUNT: 16.6 % (ref 11.5–14.5)
ERYTHROCYTE [DISTWIDTH] IN BLOOD BY AUTOMATED COUNT: 50.1 FL (ref 35–45)
HCT VFR BLD CALC: 36.1 % (ref 37–47)
HEMOGLOBIN: 12.5 GM/DL (ref 12–16)
IMMATURE GRANS (ABS): 0.01 THOU/MM3 (ref 0–0.07)
IMMATURE GRANULOCYTES: 0.3 %
LYMPHOCYTES # BLD: 35.9 %
LYMPHOCYTES ABSOLUTE: 1.4 THOU/MM3 (ref 1–4.8)
MCH RBC QN AUTO: 28.9 PG (ref 26–33)
MCHC RBC AUTO-ENTMCNC: 34.6 GM/DL (ref 32.2–35.5)
MCV RBC AUTO: 83.6 FL (ref 81–99)
MONOCYTES # BLD: 7.1 %
MONOCYTES ABSOLUTE: 0.3 THOU/MM3 (ref 0.4–1.3)
NUCLEATED RED BLOOD CELLS: 0 /100 WBC
PLATELET # BLD: 75 THOU/MM3 (ref 130–400)
PLATELET ESTIMATE: ABNORMAL
PMV BLD AUTO: ABNORMAL FL (ref 9.4–12.4)
RBC # BLD: 4.32 MILL/MM3 (ref 4.2–5.4)
SCAN OF BLOOD SMEAR: NORMAL
SEG NEUTROPHILS: 54.9 %
SEGMENTED NEUTROPHILS ABSOLUTE COUNT: 2.1 THOU/MM3 (ref 1.8–7.7)
WBC # BLD: 3.8 THOU/MM3 (ref 4.8–10.8)

## 2021-02-08 PROCEDURE — 36415 COLL VENOUS BLD VENIPUNCTURE: CPT

## 2021-02-08 PROCEDURE — 85025 COMPLETE CBC W/AUTO DIFF WBC: CPT

## 2021-02-09 ENCOUNTER — TELEPHONE (OUTPATIENT)
Dept: SURGERY | Age: 56
End: 2021-02-09

## 2021-02-09 ENCOUNTER — PREP FOR PROCEDURE (OUTPATIENT)
Dept: SURGERY | Age: 56
End: 2021-02-09

## 2021-02-09 NOTE — TELEPHONE ENCOUNTER
Called and spoke to blood bank regarding having 1 unit of platelets on hold for day of surgery which is 2/19/2021. They are aware and will be available. Type and screen ordered for day of surgery.

## 2021-02-13 ENCOUNTER — HOSPITAL ENCOUNTER (OUTPATIENT)
Age: 56
Discharge: HOME | End: 2021-02-13
Payer: COMMERCIAL

## 2021-02-13 PROCEDURE — U0003 INFECTIOUS AGENT DETECTION BY NUCLEIC ACID (DNA OR RNA); SEVERE ACUTE RESPIRATORY SYNDROME CORONAVIRUS 2 (SARS-COV-2) (CORONAVIRUS DISEASE [COVID-19]), AMPLIFIED PROBE TECHNIQUE, MAKING USE OF HIGH THROUGHPUT TECHNOLOGIES AS DESCRIBED BY CMS-2020-01-R: HCPCS

## 2021-02-16 LAB — SARS-COV-2: NOT DETECTED

## 2021-02-18 ENCOUNTER — ANESTHESIA EVENT (OUTPATIENT)
Dept: OPERATING ROOM | Age: 56
End: 2021-02-18
Payer: COMMERCIAL

## 2021-02-19 ENCOUNTER — ANESTHESIA (OUTPATIENT)
Dept: OPERATING ROOM | Age: 56
End: 2021-02-19
Payer: COMMERCIAL

## 2021-02-19 ENCOUNTER — HOSPITAL ENCOUNTER (OUTPATIENT)
Dept: WOMENS IMAGING | Age: 56
Discharge: HOME | End: 2021-02-19
Attending: SURGERY
Payer: COMMERCIAL

## 2021-02-19 ENCOUNTER — HOSPITAL ENCOUNTER (OUTPATIENT)
Dept: WOMENS IMAGING | Age: 56
Discharge: HOME | End: 2021-02-19
Payer: COMMERCIAL

## 2021-02-19 ENCOUNTER — HOSPITAL ENCOUNTER (OUTPATIENT)
Dept: NUCLEAR MEDICINE | Age: 56
Discharge: HOME | End: 2021-02-19
Payer: COMMERCIAL

## 2021-02-19 ENCOUNTER — HOSPITAL ENCOUNTER (OUTPATIENT)
Age: 56
Discharge: HOME | End: 2021-02-20
Attending: SURGERY | Admitting: SURGERY
Payer: COMMERCIAL

## 2021-02-19 VITALS — SYSTOLIC BLOOD PRESSURE: 187 MMHG | DIASTOLIC BLOOD PRESSURE: 125 MMHG | OXYGEN SATURATION: 94 % | TEMPERATURE: 97.9 F

## 2021-02-19 DIAGNOSIS — Z17.0 CARCINOMA OF UPPER-INNER QUADRANT OF LEFT BREAST IN FEMALE, ESTROGEN RECEPTOR POSITIVE (HCC): ICD-10-CM

## 2021-02-19 DIAGNOSIS — C50.212 CARCINOMA OF UPPER-INNER QUADRANT OF LEFT BREAST IN FEMALE, ESTROGEN RECEPTOR POSITIVE (HCC): ICD-10-CM

## 2021-02-19 DIAGNOSIS — C50.912 CARCINOMA OF LEFT FEMALE BREAST, UNSPECIFIED ESTROGEN RECEPTOR STATUS, UNSPECIFIED SITE OF BREAST (HCC): ICD-10-CM

## 2021-02-19 DIAGNOSIS — C50.912 INVASIVE DUCTAL CARCINOMA OF LEFT BREAST (HCC): Primary | ICD-10-CM

## 2021-02-19 LAB
ABO: NORMAL
ANION GAP SERPL CALCULATED.3IONS-SCNC: 7 MEQ/L (ref 8–16)
ANTIBODY SCREEN: NORMAL
BUN BLDV-MCNC: 11 MG/DL (ref 7–22)
CALCIUM SERPL-MCNC: 9.1 MG/DL (ref 8.5–10.5)
CHLORIDE BLD-SCNC: 108 MEQ/L (ref 98–111)
CO2: 28 MEQ/L (ref 23–33)
CREAT SERPL-MCNC: 0.3 MG/DL (ref 0.4–1.2)
ERYTHROCYTE [DISTWIDTH] IN BLOOD BY AUTOMATED COUNT: 16.2 % (ref 11.5–14.5)
ERYTHROCYTE [DISTWIDTH] IN BLOOD BY AUTOMATED COUNT: 48.3 FL (ref 35–45)
GFR SERPL CREATININE-BSD FRML MDRD: > 90 ML/MIN/1.73M2
GLUCOSE BLD-MCNC: 278 MG/DL (ref 70–108)
GLUCOSE BLD-MCNC: 293 MG/DL (ref 70–108)
HCT VFR BLD CALC: 39 % (ref 37–47)
HEMOGLOBIN: 13.1 GM/DL (ref 12–16)
MCH RBC QN AUTO: 27.5 PG (ref 26–33)
MCHC RBC AUTO-ENTMCNC: 33.6 GM/DL (ref 32.2–35.5)
MCV RBC AUTO: 81.8 FL (ref 81–99)
PLATELET # BLD: 94 THOU/MM3 (ref 130–400)
PMV BLD AUTO: 10.8 FL (ref 9.4–12.4)
POTASSIUM REFLEX MAGNESIUM: 4.2 MEQ/L (ref 3.5–5.2)
RBC # BLD: 4.77 MILL/MM3 (ref 4.2–5.4)
RH FACTOR: NORMAL
SODIUM BLD-SCNC: 143 MEQ/L (ref 135–145)
WBC # BLD: 3.5 THOU/MM3 (ref 4.8–10.8)

## 2021-02-19 PROCEDURE — 36415 COLL VENOUS BLD VENIPUNCTURE: CPT

## 2021-02-19 PROCEDURE — 3700000001 HC ADD 15 MINUTES (ANESTHESIA): Performed by: SURGERY

## 2021-02-19 PROCEDURE — 88307 TISSUE EXAM BY PATHOLOGIST: CPT

## 2021-02-19 PROCEDURE — 19303 MAST SIMPLE COMPLETE: CPT | Performed by: SURGERY

## 2021-02-19 PROCEDURE — 7100000001 HC PACU RECOVERY - ADDTL 15 MIN: Performed by: SURGERY

## 2021-02-19 PROCEDURE — 6360000002 HC RX W HCPCS: Performed by: SURGERY

## 2021-02-19 PROCEDURE — 2580000003 HC RX 258: Performed by: SURGERY

## 2021-02-19 PROCEDURE — 86900 BLOOD TYPING SEROLOGIC ABO: CPT

## 2021-02-19 PROCEDURE — 85027 COMPLETE CBC AUTOMATED: CPT

## 2021-02-19 PROCEDURE — 6370000000 HC RX 637 (ALT 250 FOR IP): Performed by: SURGERY

## 2021-02-19 PROCEDURE — 7100000000 HC PACU RECOVERY - FIRST 15 MIN: Performed by: SURGERY

## 2021-02-19 PROCEDURE — 77065 DX MAMMO INCL CAD UNI: CPT

## 2021-02-19 PROCEDURE — 80048 BASIC METABOLIC PNL TOTAL CA: CPT

## 2021-02-19 PROCEDURE — 94760 N-INVAS EAR/PLS OXIMETRY 1: CPT

## 2021-02-19 PROCEDURE — 6370000000 HC RX 637 (ALT 250 FOR IP)

## 2021-02-19 PROCEDURE — 88305 TISSUE EXAM BY PATHOLOGIST: CPT

## 2021-02-19 PROCEDURE — 10035 PLMT SFT TISS LOCLZJ DEV 1ST: CPT | Performed by: SURGERY

## 2021-02-19 PROCEDURE — 3430000000 HC RX DIAGNOSTIC RADIOPHARMACEUTICAL: Performed by: SURGERY

## 2021-02-19 PROCEDURE — 3600000002 HC SURGERY LEVEL 2 BASE: Performed by: SURGERY

## 2021-02-19 PROCEDURE — 2500000003 HC RX 250 WO HCPCS: Performed by: NURSE ANESTHETIST, CERTIFIED REGISTERED

## 2021-02-19 PROCEDURE — 86901 BLOOD TYPING SEROLOGIC RH(D): CPT

## 2021-02-19 PROCEDURE — 76098 X-RAY EXAM SURGICAL SPECIMEN: CPT

## 2021-02-19 PROCEDURE — 19285 PERQ DEV BREAST 1ST US IMAG: CPT

## 2021-02-19 PROCEDURE — 86850 RBC ANTIBODY SCREEN: CPT

## 2021-02-19 PROCEDURE — 82948 REAGENT STRIP/BLOOD GLUCOSE: CPT

## 2021-02-19 PROCEDURE — 38792 RA TRACER ID OF SENTINL NODE: CPT

## 2021-02-19 PROCEDURE — 3700000000 HC ANESTHESIA ATTENDED CARE: Performed by: SURGERY

## 2021-02-19 PROCEDURE — A9541 TC99M SULFUR COLLOID: HCPCS | Performed by: SURGERY

## 2021-02-19 PROCEDURE — 3600000012 HC SURGERY LEVEL 2 ADDTL 15MIN: Performed by: SURGERY

## 2021-02-19 PROCEDURE — 6360000002 HC RX W HCPCS: Performed by: NURSE ANESTHETIST, CERTIFIED REGISTERED

## 2021-02-19 PROCEDURE — 2709999900 HC NON-CHARGEABLE SUPPLY: Performed by: SURGERY

## 2021-02-19 PROCEDURE — 88331 PATH CONSLTJ SURG 1 BLK 1SPC: CPT

## 2021-02-19 PROCEDURE — 38525 BIOPSY/REMOVAL LYMPH NODES: CPT | Performed by: SURGERY

## 2021-02-19 RX ORDER — SODIUM CHLORIDE 0.9 % (FLUSH) 0.9 %
10 SYRINGE (ML) INJECTION EVERY 12 HOURS SCHEDULED
Status: DISCONTINUED | OUTPATIENT
Start: 2021-02-19 | End: 2021-02-19 | Stop reason: HOSPADM

## 2021-02-19 RX ORDER — MORPHINE SULFATE 2 MG/ML
4 INJECTION, SOLUTION INTRAMUSCULAR; INTRAVENOUS
Status: DISCONTINUED | OUTPATIENT
Start: 2021-02-19 | End: 2021-02-20 | Stop reason: HOSPADM

## 2021-02-19 RX ORDER — PROMETHAZINE HYDROCHLORIDE 25 MG/ML
12.5 INJECTION, SOLUTION INTRAMUSCULAR; INTRAVENOUS
Status: DISCONTINUED | OUTPATIENT
Start: 2021-02-19 | End: 2021-02-19 | Stop reason: HOSPADM

## 2021-02-19 RX ORDER — GLYCOPYRROLATE 1 MG/5 ML
SYRINGE (ML) INTRAVENOUS PRN
Status: DISCONTINUED | OUTPATIENT
Start: 2021-02-19 | End: 2021-02-19 | Stop reason: SDUPTHER

## 2021-02-19 RX ORDER — SODIUM CHLORIDE 9 MG/ML
INJECTION, SOLUTION INTRAVENOUS CONTINUOUS
Status: DISCONTINUED | OUTPATIENT
Start: 2021-02-19 | End: 2021-02-19

## 2021-02-19 RX ORDER — FENTANYL CITRATE 50 UG/ML
50 INJECTION, SOLUTION INTRAMUSCULAR; INTRAVENOUS EVERY 5 MIN PRN
Status: DISCONTINUED | OUTPATIENT
Start: 2021-02-19 | End: 2021-02-19 | Stop reason: HOSPADM

## 2021-02-19 RX ORDER — FENTANYL CITRATE 50 UG/ML
INJECTION, SOLUTION INTRAMUSCULAR; INTRAVENOUS PRN
Status: DISCONTINUED | OUTPATIENT
Start: 2021-02-19 | End: 2021-02-19 | Stop reason: SDUPTHER

## 2021-02-19 RX ORDER — TRAMADOL HYDROCHLORIDE 50 MG/1
100 TABLET ORAL EVERY 6 HOURS PRN
Status: DISCONTINUED | OUTPATIENT
Start: 2021-02-19 | End: 2021-02-20 | Stop reason: HOSPADM

## 2021-02-19 RX ORDER — SODIUM CHLORIDE 9 MG/ML
INJECTION, SOLUTION INTRAVENOUS CONTINUOUS
Status: DISCONTINUED | OUTPATIENT
Start: 2021-02-19 | End: 2021-02-20 | Stop reason: HOSPADM

## 2021-02-19 RX ORDER — DEXAMETHASONE SODIUM PHOSPHATE 10 MG/ML
INJECTION, EMULSION INTRAMUSCULAR; INTRAVENOUS PRN
Status: DISCONTINUED | OUTPATIENT
Start: 2021-02-19 | End: 2021-02-19 | Stop reason: SDUPTHER

## 2021-02-19 RX ORDER — ONDANSETRON 2 MG/ML
4 INJECTION INTRAMUSCULAR; INTRAVENOUS EVERY 6 HOURS PRN
Status: DISCONTINUED | OUTPATIENT
Start: 2021-02-19 | End: 2021-02-20 | Stop reason: HOSPADM

## 2021-02-19 RX ORDER — SODIUM CHLORIDE 0.9 % (FLUSH) 0.9 %
10 SYRINGE (ML) INJECTION PRN
Status: DISCONTINUED | OUTPATIENT
Start: 2021-02-19 | End: 2021-02-20 | Stop reason: HOSPADM

## 2021-02-19 RX ORDER — IPRATROPIUM BROMIDE AND ALBUTEROL SULFATE 2.5; .5 MG/3ML; MG/3ML
1 SOLUTION RESPIRATORY (INHALATION) ONCE
Status: COMPLETED | OUTPATIENT
Start: 2021-02-19 | End: 2021-02-19

## 2021-02-19 RX ORDER — TRAMADOL HYDROCHLORIDE 50 MG/1
TABLET ORAL
Status: COMPLETED
Start: 2021-02-19 | End: 2021-02-19

## 2021-02-19 RX ORDER — LABETALOL 20 MG/4 ML (5 MG/ML) INTRAVENOUS SYRINGE
10 EVERY 10 MIN PRN
Status: DISCONTINUED | OUTPATIENT
Start: 2021-02-19 | End: 2021-02-19 | Stop reason: HOSPADM

## 2021-02-19 RX ORDER — TRAMADOL HYDROCHLORIDE 50 MG/1
50 TABLET ORAL EVERY 6 HOURS PRN
Status: DISCONTINUED | OUTPATIENT
Start: 2021-02-19 | End: 2021-02-20 | Stop reason: HOSPADM

## 2021-02-19 RX ORDER — MIDAZOLAM HYDROCHLORIDE 1 MG/ML
INJECTION INTRAMUSCULAR; INTRAVENOUS PRN
Status: DISCONTINUED | OUTPATIENT
Start: 2021-02-19 | End: 2021-02-19 | Stop reason: SDUPTHER

## 2021-02-19 RX ORDER — NEOSTIGMINE METHYLSULFATE 5 MG/5 ML
SYRINGE (ML) INTRAVENOUS PRN
Status: DISCONTINUED | OUTPATIENT
Start: 2021-02-19 | End: 2021-02-19 | Stop reason: SDUPTHER

## 2021-02-19 RX ORDER — PROMETHAZINE HYDROCHLORIDE 25 MG/1
12.5 TABLET ORAL EVERY 6 HOURS PRN
Status: DISCONTINUED | OUTPATIENT
Start: 2021-02-19 | End: 2021-02-20 | Stop reason: HOSPADM

## 2021-02-19 RX ORDER — PROPOFOL 10 MG/ML
INJECTION, EMULSION INTRAVENOUS PRN
Status: DISCONTINUED | OUTPATIENT
Start: 2021-02-19 | End: 2021-02-19 | Stop reason: SDUPTHER

## 2021-02-19 RX ORDER — ONDANSETRON 2 MG/ML
INJECTION INTRAMUSCULAR; INTRAVENOUS PRN
Status: DISCONTINUED | OUTPATIENT
Start: 2021-02-19 | End: 2021-02-19 | Stop reason: SDUPTHER

## 2021-02-19 RX ORDER — IPRATROPIUM BROMIDE AND ALBUTEROL SULFATE 2.5; .5 MG/3ML; MG/3ML
SOLUTION RESPIRATORY (INHALATION)
Status: COMPLETED
Start: 2021-02-19 | End: 2021-02-19

## 2021-02-19 RX ORDER — LIDOCAINE HYDROCHLORIDE 20 MG/ML
INJECTION, SOLUTION EPIDURAL; INFILTRATION; INTRACAUDAL; PERINEURAL PRN
Status: DISCONTINUED | OUTPATIENT
Start: 2021-02-19 | End: 2021-02-19 | Stop reason: SDUPTHER

## 2021-02-19 RX ORDER — MORPHINE SULFATE 2 MG/ML
2 INJECTION, SOLUTION INTRAMUSCULAR; INTRAVENOUS
Status: DISCONTINUED | OUTPATIENT
Start: 2021-02-19 | End: 2021-02-20 | Stop reason: HOSPADM

## 2021-02-19 RX ORDER — ROCURONIUM BROMIDE 10 MG/ML
INJECTION, SOLUTION INTRAVENOUS PRN
Status: DISCONTINUED | OUTPATIENT
Start: 2021-02-19 | End: 2021-02-19 | Stop reason: SDUPTHER

## 2021-02-19 RX ORDER — SODIUM CHLORIDE 0.9 % (FLUSH) 0.9 %
10 SYRINGE (ML) INJECTION EVERY 12 HOURS SCHEDULED
Status: DISCONTINUED | OUTPATIENT
Start: 2021-02-19 | End: 2021-02-20 | Stop reason: HOSPADM

## 2021-02-19 RX ORDER — POLYETHYLENE GLYCOL 3350 17 G/17G
17 POWDER, FOR SOLUTION ORAL DAILY
Status: DISCONTINUED | OUTPATIENT
Start: 2021-02-19 | End: 2021-02-20 | Stop reason: HOSPADM

## 2021-02-19 RX ORDER — SODIUM CHLORIDE 0.9 % (FLUSH) 0.9 %
10 SYRINGE (ML) INJECTION PRN
Status: DISCONTINUED | OUTPATIENT
Start: 2021-02-19 | End: 2021-02-19 | Stop reason: HOSPADM

## 2021-02-19 RX ADMIN — CEFAZOLIN 2000 MG: 10 INJECTION, POWDER, FOR SOLUTION INTRAVENOUS at 20:07

## 2021-02-19 RX ADMIN — MIDAZOLAM HYDROCHLORIDE 2 MG: 1 INJECTION, SOLUTION INTRAMUSCULAR; INTRAVENOUS at 11:14

## 2021-02-19 RX ADMIN — FENTANYL CITRATE 50 MCG: 50 INJECTION, SOLUTION INTRAMUSCULAR; INTRAVENOUS at 12:51

## 2021-02-19 RX ADMIN — TRAMADOL HYDROCHLORIDE 100 MG: 50 TABLET, FILM COATED ORAL at 20:15

## 2021-02-19 RX ADMIN — TRAMADOL HYDROCHLORIDE 100 MG: 50 TABLET, FILM COATED ORAL at 13:00

## 2021-02-19 RX ADMIN — DEXAMETHASONE SODIUM PHOSPHATE 10 MG: 10 INJECTION, EMULSION INTRAMUSCULAR; INTRAVENOUS at 12:11

## 2021-02-19 RX ADMIN — ROCURONIUM BROMIDE 30 MG: 10 INJECTION INTRAVENOUS at 11:22

## 2021-02-19 RX ADMIN — IPRATROPIUM BROMIDE AND ALBUTEROL SULFATE 1 AMPULE: .5; 3 SOLUTION RESPIRATORY (INHALATION) at 13:00

## 2021-02-19 RX ADMIN — ONDANSETRON HYDROCHLORIDE 4 MG: 4 INJECTION, SOLUTION INTRAMUSCULAR; INTRAVENOUS at 12:47

## 2021-02-19 RX ADMIN — FENTANYL CITRATE 100 MCG: 50 INJECTION, SOLUTION INTRAMUSCULAR; INTRAVENOUS at 11:22

## 2021-02-19 RX ADMIN — ENOXAPARIN SODIUM 40 MG: 40 INJECTION SUBCUTANEOUS at 20:11

## 2021-02-19 RX ADMIN — LIDOCAINE HYDROCHLORIDE 60 MG: 20 INJECTION, SOLUTION EPIDURAL; INFILTRATION; INTRACAUDAL; PERINEURAL at 11:22

## 2021-02-19 RX ADMIN — PROPOFOL 130 MG: 10 INJECTION, EMULSION INTRAVENOUS at 11:22

## 2021-02-19 RX ADMIN — SODIUM CHLORIDE: 9 INJECTION, SOLUTION INTRAVENOUS at 20:06

## 2021-02-19 RX ADMIN — CEFAZOLIN 2 G: 10 INJECTION, POWDER, FOR SOLUTION INTRAVENOUS at 11:29

## 2021-02-19 RX ADMIN — FENTANYL CITRATE 50 MCG: 50 INJECTION, SOLUTION INTRAMUSCULAR; INTRAVENOUS at 11:38

## 2021-02-19 RX ADMIN — Medication 0.8 MG: at 12:36

## 2021-02-19 RX ADMIN — IPRATROPIUM BROMIDE AND ALBUTEROL SULFATE 1 AMPULE: 2.5; .5 SOLUTION RESPIRATORY (INHALATION) at 13:00

## 2021-02-19 RX ADMIN — MORPHINE SULFATE 4 MG: 2 INJECTION, SOLUTION INTRAMUSCULAR; INTRAVENOUS at 14:59

## 2021-02-19 RX ADMIN — FENTANYL CITRATE 50 MCG: 50 INJECTION, SOLUTION INTRAMUSCULAR; INTRAVENOUS at 12:02

## 2021-02-19 RX ADMIN — Medication 4 MG: at 12:36

## 2021-02-19 RX ADMIN — FENTANYL CITRATE 50 MCG: 50 INJECTION, SOLUTION INTRAMUSCULAR; INTRAVENOUS at 12:59

## 2021-02-19 RX ADMIN — Medication 1 MILLICURIE: at 09:20

## 2021-02-19 RX ADMIN — SODIUM CHLORIDE: 9 INJECTION, SOLUTION INTRAVENOUS at 10:31

## 2021-02-19 ASSESSMENT — PULMONARY FUNCTION TESTS
PIF_VALUE: 27
PIF_VALUE: 1
PIF_VALUE: 23
PIF_VALUE: 1
PIF_VALUE: 29
PIF_VALUE: 20
PIF_VALUE: 27
PIF_VALUE: 1
PIF_VALUE: 3
PIF_VALUE: 13
PIF_VALUE: 15
PIF_VALUE: 27
PIF_VALUE: 25
PIF_VALUE: 19
PIF_VALUE: 23
PIF_VALUE: 26
PIF_VALUE: 25
PIF_VALUE: 26
PIF_VALUE: 1
PIF_VALUE: 5
PIF_VALUE: 26
PIF_VALUE: 24
PIF_VALUE: 22
PIF_VALUE: 25
PIF_VALUE: 27
PIF_VALUE: 0
PIF_VALUE: 25
PIF_VALUE: 25
PIF_VALUE: 29
PIF_VALUE: 22
PIF_VALUE: 26
PIF_VALUE: 20
PIF_VALUE: 26
PIF_VALUE: 25
PIF_VALUE: 27
PIF_VALUE: 21
PIF_VALUE: 18
PIF_VALUE: 1
PIF_VALUE: 22
PIF_VALUE: 25
PIF_VALUE: 25
PIF_VALUE: 24
PIF_VALUE: 27
PIF_VALUE: 25
PIF_VALUE: 18
PIF_VALUE: 27
PIF_VALUE: 26
PIF_VALUE: 27
PIF_VALUE: 24
PIF_VALUE: 1
PIF_VALUE: 22
PIF_VALUE: 25
PIF_VALUE: 27
PIF_VALUE: 24
PIF_VALUE: 21
PIF_VALUE: 23

## 2021-02-19 ASSESSMENT — PAIN DESCRIPTION - ORIENTATION: ORIENTATION: LEFT

## 2021-02-19 ASSESSMENT — PAIN SCALES - GENERAL
PAINLEVEL_OUTOF10: 4
PAINLEVEL_OUTOF10: 6
PAINLEVEL_OUTOF10: 4
PAINLEVEL_OUTOF10: 1
PAINLEVEL_OUTOF10: 7

## 2021-02-19 ASSESSMENT — PAIN DESCRIPTION - PAIN TYPE
TYPE: SURGICAL PAIN
TYPE: SURGICAL PAIN

## 2021-02-19 ASSESSMENT — PAIN DESCRIPTION - FREQUENCY: FREQUENCY: INTERMITTENT

## 2021-02-19 ASSESSMENT — PAIN - FUNCTIONAL ASSESSMENT: PAIN_FUNCTIONAL_ASSESSMENT: ACTIVITIES ARE NOT PREVENTED

## 2021-02-19 ASSESSMENT — PAIN DESCRIPTION - LOCATION: LOCATION: BREAST

## 2021-02-19 NOTE — ANESTHESIA PRE PROCEDURE
Department of Anesthesiology  Preprocedure Note       Name:  Trevon Wheat   Age:  50 y.o.  :  1965                                          MRN:  [id number]         Date:  2021      Surgeon: Mark Morgan):  Amari Suárez MD    Procedure: Procedure(s):  LEFT BREAST MASTECTOMY, SENTINAL LYMPH NODE BIOPSY, PREOP NEEDLE LOC    Medications prior to admission:   Prior to Admission medications    Medication Sig Start Date End Date Taking? Authorizing Provider   desvenlafaxine succinate (PRISTIQ) 50 MG TB24 extended release tablet Take 50 mg by mouth daily 20  Yes Historical Provider, MD   traZODone (DESYREL) 50 MG tablet Take 50 mg by mouth nightly 1/10/21  Yes Historical Provider, MD   MUCINEX MAXIMUM STRENGTH 1200 MG TB12 Take 1 tablet by mouth 2 times daily 21  Yes Historical Provider, MD   Lactobacillus Acid-Pectin (ACIDOPHILUS/CITRUS PECTIN) TABS Take 1 tablet by mouth daily 20  Yes Historical Provider, MD   paliperidone (INVEGA) 3 MG extended release tablet Take 3 mg by mouth nightly   Yes Historical Provider, MD   SITagliptin (JANUVIA) 100 MG tablet Take 100 mg by mouth daily   Yes Historical Provider, MD   Lifitegrast (XIIDRA) 5 % SOLN Place 1 drop into both eyes daily   Yes Historical Provider, MD   baclofen (LIORESAL) 10 MG tablet Take 1.5 tablets by mouth 3 times daily 21  Yes CHAS Carter CNP   amitriptyline (ELAVIL) 25 MG tablet Take 1 tablet by mouth nightly 21  Yes CHAS Carter CNP   pregabalin (LYRICA) 150 MG capsule Take 1 capsule by mouth 2 times daily for 180 days.  9/10/20 3/9/21 Yes Jimmy Acuña MD   tiotropium (SPIRIVA) 18 MCG inhalation capsule Inhale 18 mcg into the lungs daily 2 puffs   Yes Historical Provider, MD   insulin glargine (LANTUS SOLOSTAR) 100 UNIT/ML injection pen Inject 50 Units into the skin nightly    Yes Historical Provider, MD  ceFAZolin (ANCEF) 2000 mg in dextrose 5 % 50 mL IVPB  2,000 mg Intravenous On Call to [address] [place] [address]           Allergies: Allergies   Allergen Reactions    Penicillins Shortness Of Breath     \"I almost \"    Seasonal Itching       Problem List:    Patient Active Problem List   Diagnosis Code    Leg weakness, bilateral R29.898    Hypokalemia E87.6    Hyperglycemia R73.9    Multiple sclerosis ([facility]) G35    Myelopathy ([facility]) G95.9    Hyponatremia E87.1    Lupus ([address] M32.9    Lupus (systemic lupus erythematosus) ([facility]) M32.9    Paraplegia ([facility]) G82.20    Neurogenic bladder N31.9    Neurogenic bowel K59.2    Fatigue R53.83    Spinal cord infarction ([facility]) G95.11    Depression F32.9    Bipolar 1 disorder ([facility]) F31.9    Asthma J45.909    Skin ulcer of multiple sites ([address] bilateral inner thighs L98.499    History of cancer, cervical Z85.9    Anxiety F41.9    Transverse myelitis ([facility]) G37.3    Flexion contracture of right knee M24.561    Overactive bladder N32.81    Urgency-frequency syndrome N32.81    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  cervical \"long time ago\"    Cancer ([address] 01/15/2021    Left Breast    Depression     Endometriosis     GERD (gastroesophageal reflux disease)     Kidney stones     Lupus ([address]     Movement disorder     MS (multiple sclerosis) ([address]     Schizophrenia ([address]     Thyroid disease     Type 2 diabetes mellitus without complication ([address]     Unspecified cerebral artery occlusion with cerebral infarction        Past Surgical History:        Procedure Laterality Date    COLONOSCOPY  2020    DILATION AND CURETTAGE OF UTERUS  BEN US GUID NDL BIOPSY LEFT Left 01/14/2021    BEN US GUID NDL BIOPSY LEFT 1/14/2021 Dk Bello MD [facility] NERVE SURGERY N/A 01/26/2021    EXCHANGE OF INTERSTIM SYSTEM performed by Blake Ames MD at [facility] OFFICE/OUTPT [facility] N/A 11/21/2018    INTERSTIM DEVICE PLACEMENT MODEL 3058, ONLY HEAD ELIGIBLE FOR MRI WITH TRANSMIT/RECEIVE HEAD COIL    TUBAL LIGATION      10 years ago    US GUIDED NEEDLE LOC OF LEFT BREAST Left 2/19/2021    US GUIDED NEEDLE LOC OF LEFT BREAST 2/19/2021 [address] LYMPH NODE BIOPSY  01/14/2021    US LYMPH NODE BIOPSY 1/14/2021 Dk Bello MD [facility]       Social History:    Social History     Tobacco Use    Smoking status: Current Every Day Smoker     Packs/day: 1.00     Types: Cigarettes     Start date: 12/6/1979    Smokeless tobacco: Never Used   Substance Use Topics    Alcohol use: Yes     Alcohol/week: 0.0 standard drinks     Comment: social drinker                                Ready to quit: Not Answered  Counseling given: Not Answered      Vital Signs (Current):   Vitals:    02/19/21 1014 02/19/21 1015   BP:  (!) 172/79   Pulse:  79   Resp:  18   Temp:  97.4 °F (36.3 °C)   SpO2:  97%   Weight: 160 lb (72.6 kg)    Height: 4' 11.5\" (1.511 m)                                               BP Readings from Last 3 Encounters:   02/19/21 (!) 172/79   02/04/21 124/74   01/26/21 132/81       NPO Status: Time of last liquid consumption: 2030                        Time of last solid consumption: 2030                        Date of last liquid consumption: 02/18/21                        Date of last solid food consumption: 02/18/21    BMI:   Wt Readings from Last 3 Encounters:   02/19/21 160 lb (72.6 kg)   02/04/21 157 lb (71.2 kg)   01/26/21 157 lb 3.2 oz (71.3 kg)     Body mass index is 31.78 kg/m².     CBC:   Lab Results   Component Value Date    WBC 3.8 02/08/2021    RBC 4.32 02/08/2021    RBC 0 03/14/2018 HGB 12.5 02/08/2021    HCT 36.1 02/08/2021    MCV 83.6 02/08/2021    RDW 17.8 09/23/2016    PLT 75 02/08/2021       CMP:   Lab Results   Component Value Date     01/07/2021    K 3.6 01/07/2021     01/07/2021    CO2 27 01/07/2021    BUN 7 01/07/2021    CREATININE 0.4 01/07/2021    GFRAA >60 10/10/2014    LABGLOM >90 01/07/2021    GLUCOSE 144 01/07/2021    PROT 7.1 01/07/2021    CALCIUM 9.1 01/07/2021    BILITOT 0.7 01/07/2021    BILITOT NEGATIVE 03/14/2018    ALKPHOS 121 01/07/2021    AST 27 01/07/2021    ALT 17 01/07/2021       POC Tests: No results for input(s): POCGLU, POCNA, POCK, POCCL, POCBUN, POCHEMO, POCHCT in the last 72 hours. Coags:   Lab Results   Component Value Date    PROTIME 12.6 10/09/2014    INR 1.2 10/09/2014    APTT 40.7 10/09/2014       HCG (If Applicable):   Lab Results   Component Value Date    PREGTESTUR NEGATIVE 12/16/2013    PREGSERUM NEGATIVE 09/13/2014        ABGs: No results found for: PHART, PO2ART, ODK2ORU, KPX4RCG, BEART, D4HLRNJR     Type & Screen (If Applicable):  Lab Results   Component Value Date    LABRH POS 01/26/2021       Drug/Infectious Status (If Applicable):  No results found for: HIV, HEPCAB    COVID-19 Screening (If Applicable):   Lab Results   Component Value Date    COVID19 Not Detected 02/13/2021         Anesthesia Evaluation  Patient summary reviewed  Airway: Mallampati: III  TM distance: >3 FB   Neck ROM: full  Mouth opening: > = 3 FB Dental:          Pulmonary:   (+) asthma:                            Cardiovascular:          ECG reviewed                        Neuro/Psych:   (+) CVA: residual symptoms, psychiatric history:            GI/Hepatic/Renal:   (+) GERD:,           Endo/Other:    (+) Diabetes, : arthritis:., .                 Abdominal:           Vascular:                                        Anesthesia Plan      general     ASA 3       Induction: rapid sequence. MIPS: Postoperative opioids intended and Prophylactic antiemetics administered. Anesthetic plan and risks discussed with patient and spouse. Plan discussed with CRNA. Alaina Morales.  [facility],    2/19/2021

## 2021-02-19 NOTE — PROGRESS NOTES
Contact Type: [facility]    Contact Information: Arrived with [place] for needle localization. Having breast surgery today with Dr. Helen Stallings. Diagnosis: Invasive adenocarcinoma with mucinous features. Patient Expresses Need(s) For: Mark's help getting on and off the bed in ultrasound. Notes: Question addressed as needed and procedure explained. Dr. Esteban Manzano placed wire. Secured with gauze and tape per protocol. Transported patient with belongings to Nuclear Medicine via wheelchair at 0900.

## 2021-02-19 NOTE — ANESTHESIA POSTPROCEDURE EVALUATION
Department of Anesthesiology  Postprocedure Note    Patient: Janey Adan  MRN: [id number]  YOB: 1965  Date of evaluation: 2/19/2021  Time:  1:49 PM     Procedure Summary     Date: 02/19/21 Room / Location: [address] LAUREN Hollis    Anesthesia Start: 1114 Anesthesia Stop: 1301    Procedure: LEFT BREAST MASTECTOMY, SENTINAL LYMPH NODE BIOPSY, PREOP NEEDLE LOC (Left Breast) Diagnosis: (INVASIVE DUCTAL CARCINOMA LEFT BREAST)    Surgeons: Kelsie Beckman MD Responsible Provider: Yfn Renee DO    Anesthesia Type: general ASA Status: 3          Anesthesia Type: general    Cristine Phase I: Cristine Score: 10    Cristine Phase II:      Last vitals: Reviewed and per EMR flowsheets. Anesthesia Post Evaluation    Comments: Caitlin Donald 60  POST-ANESTHESIA NOTE       Name:  Janey Adan                                         Age:  50 y.o.   MRN:  [id number]      Last Vitals:  /66   Pulse 93   Temp 98.4 °F (36.9 °C) (Temporal)   Resp 21   Ht 4' 11.5\" (1.511 m)   Wt 160 lb (72.6 kg)   SpO2 96%   BMI 31.78 kg/m²   Patient Vitals in the past 4 hrs:  02/19/21 1345, BP:134/66, Pulse:93, Resp:21, SpO2:96 %  02/19/21 1340, BP:138/68, Pulse:93, Resp:18, SpO2:97 %  02/19/21 1335, BP:132/65, Pulse:93, Resp:14, SpO2:100 %  02/19/21 1330, BP:133/68, Pulse:94, Resp:14, SpO2:100 %  02/19/21 1325, BP:134/71, Pulse:93, Resp:22, SpO2:100 %  02/19/21 1320, BP:134/66, Pulse:87, Resp:12, SpO2:100 %  02/19/21 1315, BP:(!) 132/59, Pulse:89, Resp:15, SpO2:100 %  02/19/21 1310, BP:(!) 122/57, Pulse:91, Resp:14, SpO2:99 %  02/19/21 1305, BP:(!) 121/55, Pulse:93, Resp:13, SpO2:99 %  02/19/21 1300, BP:(!) 126/59, Pulse:98, Resp:20, SpO2:99 %  02/19/21 1256, BP:(!) 121/56, Temp:98.4 °F (36.9 °C), Temp src:Temporal, Pulse:93, Resp:14  02/19/21 1015, BP:(!) 172/79, Temp:97.4 °F (36.3 °C), Pulse:79, Resp:18, SpO2:97 %  02/19/21 1014, Height:4' 11.5\" (1.511 m), Weight:160 lb (72.6 kg) Level of Consciousness:  Awake    Respiratory:  Stable    Oxygen Saturation:  Stable    Cardiovascular:  Stable    Hydration:  Adequate    PONV:  Stable    Post-op Pain:  Adequate analgesia    Post-op Assessment:  No apparent anesthetic complications    Additional Follow-Up / Treatment / Comment:  None    Sumaya Astorga DO  February 19, 2021   1:49 PM

## 2021-02-19 NOTE — H&P
[address]  History and Physical Update    Pt Name: Rony Monzon  MRN: [id number]  YOB: 1965  Date of evaluation: 2/19/2021    [x] I have examined the patient and reviewed the H&P/Consult and there are no changes to the patient or plans. [] I have examined the patient and reviewed the H&P/Consult and have noted the following changes:        Patrick Don MD  Electronically signed 2/19/2021 at 10:49 AM    Patrick Don MD   General Surgery   New Patient Evaluation in Office   Pt Name: Rony Monzon   Date of Birth 1965   Today's Date: 2/4/2021   Medical Record Number: [id number]   Referring Provider: Cristopher Anaya   Primary Care Provider: Darien Washington MD   Chief Complaint:        Chief Complaint   Patient presents with    Surgical Consult     NP ref [facility] Left breast invasive ductal cancer     Clinical stage Ib   ASSESSMENT     1. 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 Pre-op testing    3. Overactive bladder    4. Systemic lupus erythematosus, unspecified SLE type, unspecified organ involvement status ([address]    3. Multiple sclerosis   4. Thrombocytopenia   5. Asthma   PLANS     1. Surgical treatment of hormone receptor positive invasive breast cancer discussed at length with patient and her significant other. Breast conservation and mastectomy with and without reconstruction were discussed. Need to remove previously biopsied lymph node as well as sentinel lymph node mapping and biopsy discussed as well. Patient expressed understanding. She is opting to proceed with mastectomy without reconstruction. She feels it would be difficult for her to get in for radiation therapy and placement and to be able to tolerate treatments. 2. Schedule patient for left mastectomy, needle localization previously biopsied left axillary lymph node, sentinel lymph node mapping and biopsy. Risks of procedure were discussed at length. They include but not limited to bleeding, infection, lymphedema, failure of sentinel lymph node to localize as well as bleeding with history of thrombocytopenia as well as recurrent cancer. Patient expressed understanding all questions answered. She wished to proceed. 3. General anesthesia   4. Preoperative testing per anesthesia guidelines including COVID-19 screen. Repeat check platelets. May need transfused intraoperatively. Itzel Valencia is a 50y.o. year old female who is presenting today in the office for evaluation of left breast cancer. Weston Shin presented for diagnostic breast imaging mass was felt in the left breast. She had numerous lymph nodes seen on mammography but not enlarged in both axilla. There is an irregular high density mass in the left breast central to the nipple retroareolar. Ultrasounds of both axilla and the breast were performed and ultimately ultrasound-guided biopsies of the mass in the lymph node on the left. On ultrasound of the right axilla there were no abnormalities seen and no abnormal lymph nodes. Ultrasound imaging revealed a 2.2 x 2.2 x 2.9 cm mass in the left breast in the retroareolar area. A single lymph node in the left axilla appeared to have a moderate suspicion for malignancy. Ultrasound-guided biopsies were performed. Pathology revealed a grade 1 well-differentiated invasive adenocarcinoma with mucinous features the lymph node was negative for malignancy. Estrogen receptor 100% positive. Progesterone receptor was 80% positive. Ki-67 was 6%. And HER-2 by IHC was negative. Patient denies prior breast biopsies. She denies any family history of breast or ovarian malignancy. She denies any nipple discharge. She has no dermal changes on clinical examination. Genitourinary: Positive for difficulty urinating and urgency. Negative for dysuria, flank pain, hematuria, vaginal bleeding and vaginal discharge. Musculoskeletal: Negative for arthralgias, gait problem, joint swelling and myalgias. Skin: Negative for color change and rash. Allergic/Immunologic: Negative for immunocompromised state. Neurological: Positive for tremors and weakness. Negative for dizziness, seizures and speech difficulty. Hematological: Does not bruise/bleed easily. Psychiatric/Behavioral: Negative for behavioral problems, confusion and suicidal ideas. OBJECTIVE   /74 (Site: Left Upper Arm, Position: Sitting, Cuff Size: Medium Adult)  Pulse 91  Temp 97 °F (36.1 °C) (Temporal)  Resp 14  Ht 4' 11\" (1.499 m)  Wt 157 lb (71.2 kg)  SpO2 96%  BMI 31.71 kg/m²   Physical Exam   Constitutional:   General: She is not in acute distress. Appearance: She is well-developed. She is not toxic-appearing or diaphoretic. Comments: Wheelchair   HENT:   Head: Normocephalic and atraumatic. Eyes:   General: No scleral icterus. Extraocular Movements: Extraocular movements intact. Pupils: Pupils are equal, round, and reactive to light. Neck:   Musculoskeletal: Neck supple. No muscular tenderness. Vascular: No JVD. Trachea: No tracheal deviation. Cardiovascular:   Rate and Rhythm: Normal rate. Heart sounds: Normal heart sounds. No murmur. Pulmonary:   Effort: Pulmonary effort is normal. No respiratory distress. Breath sounds: No wheezing. Chest:   Chest wall: No tenderness. Breasts:   Right: No inverted nipple, mass, nipple discharge, skin change or tenderness. Left: Mass present. No inverted nipple, nipple discharge, skin change or tenderness. Abdominal:   General: There is no distension. Palpations: There is no mass. Tenderness: There is no abdominal tenderness. Musculoskeletal:   General: No deformity.    Lymphadenopathy: Cervical: No cervical adenopathy. Right cervical: No superficial, deep or posterior cervical adenopathy. Left cervical: No superficial, deep or posterior cervical adenopathy. Upper Body:   Right upper body: No supraclavicular or pectoral adenopathy. Left upper body: No supraclavicular, axillary or pectoral adenopathy. Skin:   General: Skin is warm and dry. Coloration: Skin is not pale. Findings: Bruising present. No rash. Neurological:   Mental Status: She is alert and oriented to person, place, and time. Mental status is at baseline. Cranial Nerves: No cranial nerve deficit. Motor: Weakness present. Gait: Gait abnormal.   Comments: Weakness contractures lower extremities. Muscular wasting   Psychiatric:   Behavior: Behavior normal.   Thought Content: Thought content normal.           Lab Results   Component Value Date    WBC 3.8 (L) 2021    HGB 13.8 2021    HCT 40.1 2021    PLT 84 (L) 2021    CHOL 172 2021    TRIG 97 2021    HDL 38 2021    ALT 17 2021    AST 27 2021     2021    K 3.6 2021     2021    CREATININE 0.4 2021    BUN 7 2021    CO2 27 2021    TSH 2.990 2021    INR 1.2 10/09/2014    LABA1C 6.2 2016     KALPANA RAMIREZ II.YAQUELIN Pathology Isac Wheat [id number]   [facility], [address]   PROC: 2021   [facility] RECV: 2021   [address] [employer] [address] RPTD: 01/15/2021   KALPANA RAMIREZ II.MAURO, [address]   MRN: [id number] LOC: [facility]   ACCT: [de-identified] SEX: F   : 1965 AGE: 50 Y     PATHOLOGY REPORT   ATTN: Maureen Babin   REQ: Maureen Babin       Copies To: Peña FERNANDEZ       Clinical Information: LEFT BREAST MASS UIQ, ENLARGED LYMPH NODE LEFT   AXILLA     ADDENDUM REPORT 1/15/2021:     FINAL DIAGNOSIS:   A. Breast, left, upper inner quadrant, barbell clip, core needle   biopsies:   Well-differentiated invasive adenocarcinoma with mucinous features. mediolateral oblique view only appears indeterminate. An    ultrasound is recommended. 4. A targeted ultrasound was performed. #[id number] - [facility] VIRI DIGITAL DIAGNOSTIC BILATERAL   BILATERAL DIGITAL DIAGNOSTIC MAMMOGRAM 3D/2D WITH CAD: 1/4/2021   CLINICAL: Tomosynthesis. Left breast lump. Comparison is made to exams dated: 4/16/2014 mammogram,    3/20/2013 mammogram, and 6/1/2006 mammogram - [facility]. There are scattered fibroglandular elements in both breasts that    could obscure a lesion on mammography. Current study was also evaluated with a Computer Aided Detection    (CAD) system. There are benign scattered calcifications both breasts. There are numerous lymph nodes in the right axilla seen on the    mediolateral oblique view only. There is an irregular high density mass with a spiculated margin    in the left breast central to the nipple in the retroareolar    region. This correlates as palpated. There are numerous lymph nodes in the left axilla seen on the    mediolateral oblique view only. No other significant masses or calcifications are seen in either    breast.             Lorelei Hernandez M.D.    pgk/:1/4/2021 13:47:17       Imaging Technologist: Niraj SCHROEDER(KERWIN)(M), [facility]                    IMPRESSION: Ultrasound BI-RADS: Category 1: Negative   1. There is no sonographic evidence of malignancy. #[id number] - US EXTREMITY RIGHT NON VASC LIMITED   ULTRASOUND OF RIGHT BREAST AND RIGHT AXILLA: 1/4/2021   CLINICAL: Breast lump. Comparison is made to exam dated: 1/4/2021 mammogram - [address] Ultrasound of the right breast and axilla was performed on the    areas of interest. Gray scale images of the real-time    examination were reviewed. No abnormalities were seen sonographically in the right axilla.           Lorelei Nagy 92:69:56 Imaging Technologist: Lore Aguirre RT(R)(M), [address] [facility] [address]      [id number]              THIS REPORT HAS BEEN AMENDED. AMENDMENT: 1/14/2021 Jerod Mention    On real time imagaing, the left breast mass is in the upper inner   quadrant (12:00), anterior depth. Amended BI-RADS: Category 5: Highly Suggestive of Malignancy          IMPRESSION: Ultrasound BI-RADS: Category 5: Highly Suggestive of    Malignancy   1. The 2.2 cm x 2.2 cm x 2.9 cm irregular mass in the left breast   central to the nipple in the retroareolar region appears highly    suggestive of malignancy. An ultrasound guided biopsy is    recommended. 2. The 0.7 cm x 0.9 cm x 1 cm oval lymph node in the left axilla    appears to have a moderate suspicion for malignancy. 3. An    ultrasound guided biopsy is recommended. 4. The results were reviewed with the patient and the biopsy was    scheduled. #[id number] - US BREAST LIMITED LEFT   ULTRASOUND OF LEFT BREAST: 1/4/2021   CLINICAL: Breast lump. Comparison is made to exam dated: 1/4/2021 mammogram - [address] Ultrasound of the left breast was performed on the areas of    interest. Gray scale images of the real-time examination were    reviewed. There is a 2.2 cm x 2.2 cm x 2.9 cm irregular mass with an    indistinct and spiculated margin in the left breast central to    the nipple in the retroareolar region. This irregular mass is    hypoechoic. This correlates as palpated and with mammography    findings. There also is a 0.7 cm x 0.9 cm x 1 cm oval lymph node with    cortical thickening and a circumscribed margin in the left    axilla. This oval lymph node is hypoechoic with fatty hilum.           Rian Brown M.D.    pgk/:1/4/2021 56:67:96       Imaging Technologist: Lore Aguirre RT(R)(M), [address]   letter sent: Additional Tests Needed    [id number]

## 2021-02-19 NOTE — PROGRESS NOTES
ADMITTED TO \[facility]\"" AND ORIENTED TO UNIT. SCDS ON. FALL AND ALLERGY BANDS ON. PT VERBALIZED APPROVAL FOR FIRST NAME, LAST INITIAL AND PHYSICIAN NAME ON UNIT WHITEBOARD.

## 2021-02-19 NOTE — PROGRESS NOTES
1256 Awake and oriented on arrival to PACU , HOB elevated , pt states pain minimal but states  Lungs feels tight , exp wheeze noted   1300 pt medicated with Tramadol 100 mg PO and duo neb aeresosol Tx started   1305 tx complete , lungs now just diminished , pt states she feel better  1310 O2 off  1320 resting resp easy ,  1335 awakens on own states sshe feels good and drifts back to sleep   1345 Pt states pain tolerable and breathing feels ok, states  went home and will be back later  1348 meets criteria for discharge , transported to [facility]

## 2021-02-19 NOTE — BRIEF OP NOTE
Brief Postoperative Note      Patient: Taylor Stratton  YOB: 1965  MRN: [id number]    Date of Procedure: 2/19/2021    Pre-Op Diagnosis: INVASIVE DUCTAL CARCINOMA LEFT BREAST    Post-Op Diagnosis: Same       Procedure(s):  LEFT BREAST MASTECTOMY, SENTINAL LYMPH NODE BIOPSY, PREOP NEEDLE LOC    Surgeon(s):  Vinnie Walters MD    Assistant:  First Assistant: Charlene Wiley RN    Anesthesia: General    Estimated Blood Loss (mL): Minimal    Complications: None    Specimens:   ID Type Source Tests Collected by Time Destination   A : left breast mass Tissue Breast SURGICAL PATHOLOGY Vinnie Walters MD 2/19/2021 1137    B : left sentinel lymph node Tissue Lymph Node SURGICAL PATHOLOGY Vinnie Walters MD 2/19/2021 1137    C : LEFT AXILLARY LYMPH NODE Tissue Lymph Node 1 [place] Jolanta Givens MD 2/19/2021 1203        Implants:  * No implants in log *      Drains:   Closed/Suction Drain Inferior; Left Breast Bulb (Active)           Electronically signed by Vinnie Walters MD on 2/19/2021 at 12:38 PM

## 2021-02-20 VITALS
OXYGEN SATURATION: 93 % | HEART RATE: 84 BPM | SYSTOLIC BLOOD PRESSURE: 117 MMHG | DIASTOLIC BLOOD PRESSURE: 66 MMHG | RESPIRATION RATE: 18 BRPM | BODY MASS INDEX: 31.41 KG/M2 | WEIGHT: 160 LBS | TEMPERATURE: 98.5 F | HEIGHT: 60 IN

## 2021-02-20 LAB
BASOPHILS # BLD: 0.3 %
BASOPHILS ABSOLUTE: 0 THOU/MM3 (ref 0–0.1)
EOSINOPHIL # BLD: 0 %
EOSINOPHILS ABSOLUTE: 0 THOU/MM3 (ref 0–0.4)
ERYTHROCYTE [DISTWIDTH] IN BLOOD BY AUTOMATED COUNT: 16.8 % (ref 11.5–14.5)
ERYTHROCYTE [DISTWIDTH] IN BLOOD BY AUTOMATED COUNT: 50.9 FL (ref 35–45)
HCT VFR BLD CALC: 35.2 % (ref 37–47)
HEMOGLOBIN: 11.6 GM/DL (ref 12–16)
IMMATURE GRANS (ABS): 0.02 THOU/MM3 (ref 0–0.07)
IMMATURE GRANULOCYTES: 0.3 %
LYMPHOCYTES # BLD: 20 %
LYMPHOCYTES ABSOLUTE: 1.3 THOU/MM3 (ref 1–4.8)
MCH RBC QN AUTO: 28.3 PG (ref 26–33)
MCHC RBC AUTO-ENTMCNC: 33 GM/DL (ref 32.2–35.5)
MCV RBC AUTO: 85.9 FL (ref 81–99)
MONOCYTES # BLD: 9.2 %
MONOCYTES ABSOLUTE: 0.6 THOU/MM3 (ref 0.4–1.3)
NUCLEATED RED BLOOD CELLS: 0 /100 WBC
PLATELET # BLD: 94 THOU/MM3 (ref 130–400)
PMV BLD AUTO: 11.1 FL (ref 9.4–12.4)
RBC # BLD: 4.1 MILL/MM3 (ref 4.2–5.4)
SEG NEUTROPHILS: 70.2 %
SEGMENTED NEUTROPHILS ABSOLUTE COUNT: 4.5 THOU/MM3 (ref 1.8–7.7)
WBC # BLD: 6.4 THOU/MM3 (ref 4.8–10.8)

## 2021-02-20 PROCEDURE — 2580000003 HC RX 258: Performed by: SURGERY

## 2021-02-20 PROCEDURE — 6370000000 HC RX 637 (ALT 250 FOR IP): Performed by: SURGERY

## 2021-02-20 PROCEDURE — 85025 COMPLETE CBC W/AUTO DIFF WBC: CPT

## 2021-02-20 PROCEDURE — 36415 COLL VENOUS BLD VENIPUNCTURE: CPT

## 2021-02-20 PROCEDURE — 6360000002 HC RX W HCPCS: Performed by: SURGERY

## 2021-02-20 PROCEDURE — 99024 POSTOP FOLLOW-UP VISIT: CPT | Performed by: SURGERY

## 2021-02-20 RX ORDER — TRAMADOL HYDROCHLORIDE 50 MG/1
50 TABLET ORAL EVERY 6 HOURS PRN
Qty: 28 TABLET | Refills: 0 | Status: SHIPPED | OUTPATIENT
Start: 2021-02-20 | End: 2021-02-27

## 2021-02-20 RX ORDER — PREGABALIN 50 MG/1
150 CAPSULE ORAL 2 TIMES DAILY
Status: DISCONTINUED | OUTPATIENT
Start: 2021-02-20 | End: 2021-02-20 | Stop reason: HOSPADM

## 2021-02-20 RX ORDER — ALOGLIPTIN 25 MG/1
25 TABLET, FILM COATED ORAL DAILY
Status: DISCONTINUED | OUTPATIENT
Start: 2021-02-20 | End: 2021-02-20 | Stop reason: HOSPADM

## 2021-02-20 RX ORDER — PALIPERIDONE 3 MG/1
3 TABLET, EXTENDED RELEASE ORAL NIGHTLY
Status: DISCONTINUED | OUTPATIENT
Start: 2021-02-20 | End: 2021-02-20 | Stop reason: HOSPADM

## 2021-02-20 RX ORDER — DESVENLAFAXINE 50 MG/1
50 TABLET, EXTENDED RELEASE ORAL DAILY
Status: DISCONTINUED | OUTPATIENT
Start: 2021-02-20 | End: 2021-02-20 | Stop reason: HOSPADM

## 2021-02-20 RX ORDER — BACLOFEN 10 MG/1
15 TABLET ORAL 3 TIMES DAILY
Status: DISCONTINUED | OUTPATIENT
Start: 2021-02-20 | End: 2021-02-20 | Stop reason: HOSPADM

## 2021-02-20 RX ADMIN — CEFAZOLIN 2000 MG: 10 INJECTION, POWDER, FOR SOLUTION INTRAVENOUS at 03:13

## 2021-02-20 RX ADMIN — MORPHINE SULFATE 2 MG: 2 INJECTION, SOLUTION INTRAMUSCULAR; INTRAVENOUS at 00:13

## 2021-02-20 RX ADMIN — TRAMADOL HYDROCHLORIDE 100 MG: 50 TABLET, FILM COATED ORAL at 07:39

## 2021-02-20 RX ADMIN — SODIUM CHLORIDE, PRESERVATIVE FREE 10 ML: 5 INJECTION INTRAVENOUS at 00:13

## 2021-02-20 RX ADMIN — SODIUM CHLORIDE: 9 INJECTION, SOLUTION INTRAVENOUS at 08:19

## 2021-02-20 ASSESSMENT — PAIN SCALES - GENERAL
PAINLEVEL_OUTOF10: 7
PAINLEVEL_OUTOF10: 0
PAINLEVEL_OUTOF10: 7
PAINLEVEL_OUTOF10: 6

## 2021-02-20 ASSESSMENT — PAIN DESCRIPTION - LOCATION: LOCATION: BREAST

## 2021-02-20 ASSESSMENT — PAIN DESCRIPTION - PAIN TYPE
TYPE: ACUTE PAIN
TYPE: SURGICAL PAIN
TYPE: ACUTE PAIN
TYPE: SURGICAL PAIN

## 2021-02-20 ASSESSMENT — PAIN DESCRIPTION - ORIENTATION: ORIENTATION: LEFT

## 2021-02-20 ASSESSMENT — PAIN DESCRIPTION - DESCRIPTORS: DESCRIPTORS: CONSTANT;THROBBING

## 2021-02-20 ASSESSMENT — PAIN DESCRIPTION - ONSET: ONSET: ON-GOING

## 2021-02-20 ASSESSMENT — PAIN DESCRIPTION - FREQUENCY: FREQUENCY: CONTINUOUS

## 2021-02-20 NOTE — PLAN OF CARE
Problem: Falls - Risk of:  Goal: Will remain free from falls  Description: Will remain free from falls  Outcome: Ongoing  Note: Patient remains free from falls this shift. Fall risk assessment complete. Fall sign posted. Refusing non-skid socks. Bed in lowest position, 2/4 siderails up. Bed alarm on. Call light and all possessions within reach. Rounding hourly       Problem: Skin Integrity:  Goal: Will show no infection signs and symptoms  Description: Will show no infection signs and symptoms  Outcome: Ongoing  Note: No s/s of infection noted this shift. Afebrile. Surgical site dressing and PAGE dressing remain clean, dry and intact with no drainage. Previous surgical site to lower back remains free from s/s of infection. Receiving IV ancef. Educated on importance of daily CHG bath to prevent infection, patient refusing at this time, but is agreeable to bath later this AM. Incentive spirometer at bedside and patient instructed on how to use. Goal: Absence of new skin breakdown  Description: Absence of new skin breakdown  Outcome: Ongoing  Note: No new skin breakdown. Patient refusing to turn. Will allow staff to change incontinent chucks pad and apply EPC however states she will only lay on her right side. Patient educated on importance of repositioning q2h to prevent pressure injuries. Patient voices that is how she sleeps at home and is fine     Problem: Pain:  Goal: Control of acute pain  Description: Control of acute pain  Outcome: Ongoing  Note: Pain Assessment: 0-10  Pain Level: 0   Patient's Stated Pain Goal: 7   Is pain goal met at this time? Yes     Non-Pharmaceutical Pain Intervention(s): Rest, Repositioned, Environmental changes  Pain controlled with PRN morphine and tramadol. Problem: Discharge Planning:  Goal: Discharged to appropriate level of care  Description: Discharged to appropriate level of care  Outcome: Ongoing  Note: Plan to discharge home with significant other. Problem: Coping:  Goal: Ability to cope will improve  Description: Ability to cope will improve  Outcome: Ongoing  Note: Patient here for left mastectomy. Appears to be coping well. Emotional support offered. Care plan reviewed with patient. Patient verbalize understanding of the plan of care and contribute to goal setting.

## 2021-02-20 NOTE — FLOWSHEET NOTE
Pt was anointed. 02/19/21 1907   Encounter Summary   Services provided to: Patient   Referral/Consult From: [facility] [place]   Place [facility]   Continue Visiting Yes  (2/19)   Complexity of Encounter Low   Length of Encounter 15 minutes   Routine   Type Initial   Assessment Approachable;Calm   Intervention Achille;Prayer;Nurtured hope   Outcome Acceptance;Expressed gratitude;Encouraged; Hopeful   Sacraments   Sacrament of Sick-Anointing Anointed

## 2021-02-20 NOTE — PROGRESS NOTES
Reported off to primary RN, Salvador Lepe. Patient resting in wheelchair- ready for discharge. Family in room with patient. Call light within reach. Salvador Cushing, SN/STEW.

## 2021-02-20 NOTE — PROGRESS NOTES
[facility]  Postoperative Progress Note    Pt Name: Rebekah Ball  Medical Record Number: [id number]  Date of Birth 1965   Today's Date: 2/20/2021    Sotero Guido is hemodynamically stable. Wound looks good. She has some incisional pain but has not taken any pain medications in several hours. Drain output acceptable. Serosanguineous      OBJECTIVE  VITALS: VITALS:  /61   Pulse 93   Temp 98.1 °F (36.7 °C) (Oral)   Resp 16   Ht 4' 11.5\" (1.511 m)   Wt 160 lb (72.6 kg)   SpO2 93%   BMI 31.78 kg/m²   GENERAL: alert, cooperative, no acute distress  LUNGS: clear to ausculation, without wheezes, rales or rhonci  HEART: heart regular rate and rythym  ABDOMEN: Soft nondistended   INCISION: Clean dry and intact  EXTREMITY: No edema   INTAKE/OUTPUT :   INTAKE/OUTPUT:    Intake/Output Summary (Last 24 hours) at 2/20/2021 0844  Last data filed at 2/20/2021 0737  Gross per 24 hour   Intake 3602.19 ml   Output 100 ml   Net 3502.19 ml        LABS  CBC:   Lab Results   Component Value Date    WBC 6.4 02/20/2021    RBC 4.10 02/20/2021    RBC 0 03/14/2018    HGB 11.6 02/20/2021    HCT 35.2 02/20/2021    MCV 85.9 02/20/2021    MCH 28.3 02/20/2021    MCHC 33.0 02/20/2021    RDW 17.8 09/23/2016    PLT 94 02/20/2021    MPV 11.1 02/20/2021             ASSESSMENT  1. POD #left mastectomy invasive ductal carcinoma the left breast with enlarged lymph node  2. Hemodynamically stable    PLAN  1. continue activity as tolerated. Instruct family and drain care and emptying  2. VTE: SCDs hold Lovenox risk of bleeding  3. Discharge today. Follow-up office next week.     Ramandeep Pacheco MD  Electronically signed 2/20/2021 at 8:41 AM

## 2021-02-20 NOTE — OP NOTE
[address] [place] [address]                                OPERATIVE REPORT    PATIENT NAME: Danay Robin                  :        1965  MED REC NO:   [id number]                           ROOM:       0017  ACCOUNT NO:   [de-identified]                           ADMIT DATE: 2021  PROVIDER:     Bouchra Neal M.D.    DATE OF PROCEDURE:  2021    PREOPERATIVE DIAGNOSES:  Clinical stage IB invasive ductal carcinoma,  left breast, ER/ME positive, HER2 negative. SECONDARY DIAGNOSES:  Multiple sclerosis, thrombocytopenia, systemic  lupus erythematosus. POSTOPERATIVE DIAGNOSES:  Multiple sclerosis, thrombocytopenia, systemic  lupus erythematosus. PROCEDURES:  Left complete mastectomy, sentinel lymph node biopsy with  needle localization of previously biopsied enlarged lymph node which was  also level 1 axillary sentinel lymph node, removal of adjacent firmer  lymph nodes x2. SURGEON:  Bouchra Neal MD    ANESTHESIA:  General.    ESTIMATED BLOOD LOSS:  Less than 20 mL. INDICATIONS:  See history and physical examination. The patient  presented with palpable mass just superior and beneath the nipple on the  left. The patient underwent diagnostic imaging and biopsies revealed  invasive ductal carcinoma which was ER positive, ME positive, and HER2  negative. The patient was seen and counseled, seen in multidisciplinary  breast clinic. Breast conservation therapy versus mastectomy was  discussed. The patient opted to proceed with mastectomy as she felt it  would be difficult for her to proceed with radiation. We discussed  reconstructive options and she may desire to proceed with reconstruction  in a delayed fashion.     OPERATIVE PROCEDURE:  The patient was brought to the operating suite,  placed supine on the operating table after needle localization procedure was performed in the [facility]. She also was injected  with technetium sulfur colloid in the nuclear medicine department. The  patient had high counts over the breast and some lower counts within the  left axilla. After induction of general anesthesia, Ancef was given. Left breast was prepped and draped and time-out was performed. Elliptical incision was made about the left breast which extended  towards the axilla. Superior and inferior skin flaps were raised with  cautery. The left axilla was entered and the localized node could be  palpated. It was enlarged. It was excised. A few adjacent nodes came  with to remove the wire. There were no further palpable enlarged lymph  nodes within the left axilla and no further high counts. Ex vivo counts  on the removed node were about 58. Breast tissue and pectoralis fascia were dissected off the pectoralis  muscle. The breast was amputated out of the axilla. Specimen was  oriented with a long suture lateral.  Frozen section analysis of the  enlarged and sentinel node revealed no obvious evidence of metastatic  disease. Wound was irrigated with saline. A 15-mm Andrew drain was  placed into the wound through a stab incision in the inferolateral flap. It was secured to skin using a silk suture. It was placed into the  axilla and along the chest wall. Dermis was closed with interrupted  Vicryl suture. Skin was closed running subcuticular 4-0 Monocryl  suture. Skin glue was applied. The patient was placed in a surgical  bra and transported to the recovery area. Delaney Bender M.D.    D: 02/19/2021 12:46:36       T: 02/19/2021 12:57:05     [id number]  Job#: [id number]     Doc#: [id number]    CC:  Margarito Barahona.  Olivia Hill M.D.

## 2021-02-20 NOTE — PROGRESS NOTES
All discharge instructions given to patient and family with no further questions at this time. Patient discharged off unit via wheelchair per LACP. Pt educated on use of CHG soap and educated on drain care. Chart contents placed in yellow bin.

## 2021-02-22 ENCOUNTER — TELEPHONE (OUTPATIENT)
Dept: SURGERY | Age: 56
End: 2021-02-22

## 2021-02-22 DIAGNOSIS — C50.212 CARCINOMA OF UPPER-INNER QUADRANT OF LEFT BREAST IN FEMALE, ESTROGEN RECEPTOR POSITIVE (HCC): Primary | ICD-10-CM

## 2021-02-22 DIAGNOSIS — Z17.0 CARCINOMA OF UPPER-INNER QUADRANT OF LEFT BREAST IN FEMALE, ESTROGEN RECEPTOR POSITIVE (HCC): Primary | ICD-10-CM

## 2021-02-22 RX ORDER — HYDROCODONE BITARTRATE AND ACETAMINOPHEN 5; 325 MG/1; MG/1
1 TABLET ORAL EVERY 6 HOURS PRN
Qty: 28 TABLET | Refills: 0 | Status: SHIPPED | OUTPATIENT
Start: 2021-02-22 | End: 2021-03-08 | Stop reason: SDUPTHER

## 2021-02-22 NOTE — TELEPHONE ENCOUNTER
Patient is s/p Left complete mastectomy done on 2/19/2021 states she is having severe pain in her breast area and the Ultram is not helping. She is emptying her drain daily she is getting 25ml, 25ml, 22ml. Patient requesting something stronger.

## 2021-02-22 NOTE — TELEPHONE ENCOUNTER
Spoke with Dr Kosta Mayo for Standard [place] sent to pharmacy. Instructed patient to take Ultram to pharmacy and exchange for N[employer]. Verbalizes understanding.

## 2021-02-23 ENCOUNTER — TELEPHONE (OUTPATIENT)
Dept: SURGERY | Age: 56
End: 2021-02-23

## 2021-02-23 DIAGNOSIS — Z17.0 CARCINOMA OF UPPER-INNER QUADRANT OF LEFT BREAST IN FEMALE, ESTROGEN RECEPTOR POSITIVE (HCC): Primary | ICD-10-CM

## 2021-02-23 DIAGNOSIS — C50.212 CARCINOMA OF UPPER-INNER QUADRANT OF LEFT BREAST IN FEMALE, ESTROGEN RECEPTOR POSITIVE (HCC): Primary | ICD-10-CM

## 2021-02-23 NOTE — TELEPHONE ENCOUNTER
Called and spoke with patient this am. States pain is much better than yesterday. She is not having much swelling and drain is functioning fine. She is following up her Thursday for a post op appt.

## 2021-02-24 NOTE — PROGRESS NOTES
Americo Back MD   General Surgery  Postprocedure Evaluation in Office  Pt Name: Lorrie Olmos  Date of Birth 1965   Today's Date: 2/25/2021  Medical Record Number: [id number]  Primary Care Provider: Elvis Jimenez MD  Chief Complaint   Patient presents with    Post-Op Check      s/p 2/19 Left complete mastectomy, sentinel lymph node biopsy with needle localization of previously biopsied enlarged lymph node which was also level 1 axillary sentinel lymph node, removal of adjacent firmer lymph nodes x2. ASSESSMENT      1. Invasive ductal carcinoma of left breast ([facility] [address]    2. Encounter for postoperative care    3. Multiple sclerosis ([facility] [address]       Pathologic stage Ib  PLAN       1. Drain removed  2. Submit Oncotype DX score  3. Follow wound closely. Recheck in 1 week some blistering medial aspect mastectomy  4. Therapy        SUBJECTIVE     Amanda Thurman is seen today for post-op follow-up. She is status post left mastectomy with sentinel node biopsy additional adjacent nodes removed slightly enlarged. 2 of 6 total nodes were positive. Positive nodes were sentinel nodes. Wound is intact. Small amount of hematoma below her flaps. She does have chronic thrombocytopenia. Not enough to evacuate at this point. Drain removed. Minimal output draining around drain. Few bloody superficial blisters medial aspect of incision. Postoperative pain has resolved. Medications    Current Outpatient Medications:     HYDROcodone-acetaminophen (NORCO) 5-325 MG per tablet, Take 1 tablet by mouth every 6 hours as needed for Pain for up to 7 days. Intended supply: 7 days.  Take lowest dose possible to manage pain, Disp: 28 tablet, Rfl: 0    metFORMIN (GLUCOPHAGE) 1000 MG tablet, Take 1,000 mg by mouth 2 times daily, Disp: , Rfl:     desvenlafaxine succinate (PRISTIQ) 50 MG TB24 extended release tablet, Take 50 mg by mouth daily, Disp: , Rfl:   traZODone (DESYREL) 50 MG tablet, Take 50 mg by mouth nightly, Disp: , Rfl:     MUCINEX MAXIMUM STRENGTH 1200 MG TB12, Take 1 tablet by mouth 2 times daily, Disp: , Rfl:     Lactobacillus Acid-Pectin (ACIDOPHILUS/CITRUS PECTIN) TABS, Take 1 tablet by mouth daily, Disp: , Rfl:     paliperidone (INVEGA) 3 MG extended release tablet, Take 3 mg by mouth nightly, Disp: , Rfl:     SITagliptin (JANUVIA) 100 MG tablet, Take 100 mg by mouth daily, Disp: , Rfl:     Lifitegrast (XIIDRA) 5 % SOLN, Place 1 drop into both eyes daily, Disp: , Rfl:     baclofen (LIORESAL) 10 MG tablet, Take 1.5 tablets by mouth 3 times daily, Disp: 135 tablet, Rfl: 5    amitriptyline (ELAVIL) 25 MG tablet, Take 1 tablet by mouth nightly, Disp: 30 tablet, Rfl: 5    Misc. Devices [facility]) MISC, Wheelchair repairs- new seat cover, right side armrest replacement  Current body weight:  68 kg Diagnosis: gait disturbance, chronic incomplete spastic paraplegia Length of need: Lifetime, Disp: 1 each, Rfl: 0    pregabalin (LYRICA) 150 MG capsule, Take 1 capsule by mouth 2 times daily for 180 days. , Disp: 60 capsule, Rfl: 5    tiotropium (SPIRIVA) 18 MCG inhalation capsule, Inhale 18 mcg into the lungs daily 2 puffs, Disp: , Rfl:     insulin glargine (LANTUS SOLOSTAR) 100 UNIT/ML injection pen, Inject 50 Units into the skin nightly , Disp: , Rfl:     montelukast (SINGULAIR) 10 MG tablet, Take 10 mg by mouth nightly , Disp: , Rfl: 0    artificial tears (ARTIFICIAL TEARS) OINT, as needed, Disp: , Rfl:     Calcium Carbonate (CALCIUM 600 PO), Take 1 tablet by mouth 2 times daily, Disp: , Rfl:     Multiple Vitamin (TAB-A-ISAAC PO), Take 1 tablet by mouth daily, Disp: , Rfl:     loratadine (CLARITIN) 10 MG tablet, Take 1 tablet by mouth daily as needed. , Disp: , Rfl: 0    PROAIR  (90 BASE) MCG/ACT inhaler, Inhale 2 puffs into the lungs every 6 hours as needed. , Disp: , Rfl: 0   Incontinence Supply Disposable (UNDERPADS) MISC, Cloth chux pads Diagnosis: Paraplegia 344.1, Disp: 100 Bottle, Rfl: 11    hydrOXYzine (VISTARIL) 50 MG capsule, Take 50 mg by mouth 3 times daily as needed for Itching., Disp: , Rfl:     omeprazole (PRILOSEC) 20 MG capsule, Take 20 mg by mouth daily. , Disp: , Rfl:     Allergies  Allergies   Allergen Reactions    Penicillins Shortness Of Breath     \"I almost \"    Seasonal Itching       Review of Systems  History obtained from the patient. Constitutional: Denies any fever, chills, fatigue. Wound: Denies any rash, skin color changes or wound problems. Resp: Denies any cough, shortness of breath. CV: Denies any chest pain, orthopnea or syncope. GI: Positive for incisional discomfort only. Denies any nausea, vomiting, blood in the stool, constipation or diarrhea. : Denies any hematuria, hesitancy or dysuria. OBJECTIVE     VITALS: /60 (Site: Right Upper Arm, Position: Sitting, Cuff Size: Medium Adult)   Pulse 91   Temp 97.9 °F (36.6 °C) (Temporal)   Resp 18   Ht 4' 11.5\" (1.511 m)   Wt 160 lb (72.6 kg)   SpO2 98%   BMI 31.78 kg/m²     CONSTITUTIONAL: Alert and oriented times 3, no acute distress and cooperative to examination. SKIN: Skin color, texture, turgor normal. .  INCISION: Ostectomy wound intact. Some super Rusty ecchymotic blister superior medial flap. Small amount of hematoma upper lateral flap.   Flaps are viable  LUNGS: Lungs Clear  CARDIOVASCULAR: Normal Rate  ABDOMEN: nondistended  NEUROLOGIC: Alert and oriented        Performed by: [address] [place] [address]. Pathology     Angelica Kelsey [facility]                [id number]   Assoc.                                              Page 1 of [phone], [place], [place] [address]                                                       PROC: 2021   [facility]                                    RECV: 2021 [address]                                    RPTD: 2021   [facility], [place] [address]                       MRN:  [id number]     LOC: 5KS                       ACCT: [de-identified]  SEX: F                       : 1965  AGE: 50 Y                          PATHOLOGY REPORT                       ATTN: AIDE ESCAMILLA   [facility]                  REQ: Jacob Mcdermott ANDI       Copies To:   GRETA SOTO       Clinical Information: INVASIVE DUCTAL CARCINOMA LEFT BREAST     AMENDED REPORT 2021:     FINAL DIAGNOSIS:   A.  Left sentinel lymph node, excision:    Metastatic carcinoma in 2 of 4 lymph nodes (2/4).  Biopsy site changes. Comment: The frozen section discrepancy is noted.  Re-review of the   frozen section slide shows no evidence of metastatic carcinoma on   sections examined. B.  Left breast, mastectomy:    Mucinous micropapillary carcinoma, Lynn grade 2 (pT2, pN1a).    Lymphovascular space invasion is present.    Margins are negative.    Please see synoptic report. C.  Left axillary lymph nodes, excision:    Two lymph nodes, negative for malignancy (0/2). COMMENT 2021:  An amended report was issued to correct a minor   typographical error in the microscopic description.        Specimen:   A) SENTINEL LYMPH NODE(S), LEFT, FS   B) EXCISION OF BREAST, LEFT MASS   C) LYMPH NODE(S), LEFT AXILLARY           Intraoperative Consultation:   A - Frozen Section DX:  Favor biopsy site changes, no definite   malignancy.  PCF/ALP       Received:  12:17 Reported:  12:38     Gross Examination:   A - The container is labeled [facility], left sentinel lymph node.    Received fresh for frozen section evaluation are fragments of   fibroadipose tissue measuring 6.5 x 4.5 x 2.0 cm.  This dissection   reveals three lymph nodes ranging in size from 1.5 to 0.5 cm.  There   are no obvious lesions. Daved Jubilee is evidence of previous biopsy site in   the largest node.  A representative section of the largest lymph node size from 0.9 to 0.4 cm in greatest dimension.  Each lymph node is   serially sectioned and entirely submitted in one cassette each. PCF:[id number]     Fixative:  10% neutral buffered formalin   Tissue removal time:  12:03   Tissue fixation time:  12:30   Total fixation time: 55 hours 30 minutes           Microscopic Examination:   A-C.  Procedure: Mastectomy   Specimen laterality: Left   Tumor site: Upper inner quadrant   Tumor size: 29 mm   Histologic type:  Invasive mucinous micropapillary carcinoma   Histologic grade (Lynn histologic score)   Glandular/tubular differentiation: Score 3   Nuclear pleomorphism: Score 2   Mitotic rate: Score 1   Overall grade: Grade 2 (score 6)   Ductal carcinoma in situ: Not identified   Invasive carcinoma margins: Uninvolved by invasive carcinoma    Distance from closest margin: 25 mm (deep margin)   DCIS margins: Not applicable   Regional lymph nodes: Involved by tumor cells    Number of lymph nodes with macrometastases (>2 mm): 2    Number of lymph nodes with micrometastases: 0    Number of lymph nodes with isolated tumor cells: 0   Size of largest metastatic deposit: 2.5 mm   Extranodal extension: Not identified   Total number of lymph nodes examined: 6   Number of sentinel nodes examined: 4   Treatment effect in the breast: No known presurgical therapy   Treatment effect in the lymph nodes: Not applicable   Lymphovascular invasion: Present   Dermal lymphovascular invasion: Not identified     Pathologic stage classification (pTNM, AJCC 8th edition)   pT2: Tumor greater than 20 mm but less than or equal to 50 mm greatest   dimension   pN1a: Metastases in 1-3 axillary lymph nodes, at least 1 metastasis   larger than 2.0 mm     Breast biomarker testing performed on previous biopsy [id number];   1/14/21     Estrogen Receptor: (Clone SP1), [employer]       Positive 100% of cells ( > 10% of cells)   Intensity of Staining:       Strong Progesterone Receptor: (Clone 1E2), [employer]       Positive 80% of cells   Intensity of Staining:       Strong           Ki-67 (clone 30-9)       Percentage of positive nuclei:  6%               Favorable  < 10%               Borderline 10-20%               Unfavorable  > 20%     HER2 Immunohistochemistry (Clone 4B5, [employer])       Negative (1+) - Incomplete faint/barely perceptible membrane   staining in  > 10% of invasive tumor     External Controls:  Adequate   Internal Controls:  Adequate   Standard Assay Conditions:  Met     Staining Method Used:    Formalin fixation    Antigen retrieval type:  Cell Conditioning 1, mild gordon       [id number]   [id number]                                                     <Sign Out Dr. Mira Marcelo M.D., F.C. A. P       NVML/ [address] [address]  Printed on:  2/23/2021   Ashley Angulo [address] [facility], [facility]   Original print date: 02/23/2021   Lab and Collection

## 2021-02-25 ENCOUNTER — OFFICE VISIT (OUTPATIENT)
Dept: SURGERY | Age: 56
End: 2021-02-25

## 2021-02-25 VITALS
DIASTOLIC BLOOD PRESSURE: 60 MMHG | OXYGEN SATURATION: 98 % | HEIGHT: 60 IN | HEART RATE: 91 BPM | WEIGHT: 160 LBS | BODY MASS INDEX: 31.41 KG/M2 | SYSTOLIC BLOOD PRESSURE: 120 MMHG | TEMPERATURE: 97.9 F | RESPIRATION RATE: 18 BRPM

## 2021-02-25 DIAGNOSIS — Z48.89 ENCOUNTER FOR POSTOPERATIVE CARE: ICD-10-CM

## 2021-02-25 DIAGNOSIS — G35 MULTIPLE SCLEROSIS (HCC): ICD-10-CM

## 2021-02-25 DIAGNOSIS — C50.912 INVASIVE DUCTAL CARCINOMA OF LEFT BREAST (HCC): Primary | ICD-10-CM

## 2021-02-25 PROCEDURE — 99024 POSTOP FOLLOW-UP VISIT: CPT | Performed by: SURGERY

## 2021-03-01 ENCOUNTER — OFFICE VISIT (OUTPATIENT)
Dept: SURGERY | Age: 56
End: 2021-03-01
Payer: COMMERCIAL

## 2021-03-01 VITALS
SYSTOLIC BLOOD PRESSURE: 128 MMHG | OXYGEN SATURATION: 96 % | WEIGHT: 160 LBS | HEART RATE: 93 BPM | RESPIRATION RATE: 18 BRPM | TEMPERATURE: 96.3 F | HEIGHT: 60 IN | DIASTOLIC BLOOD PRESSURE: 60 MMHG | BODY MASS INDEX: 31.41 KG/M2

## 2021-03-01 DIAGNOSIS — Z48.89 ENCOUNTER FOR POSTOPERATIVE CARE: Primary | ICD-10-CM

## 2021-03-01 DIAGNOSIS — C50.212 CARCINOMA OF UPPER-INNER QUADRANT OF LEFT BREAST IN FEMALE, ESTROGEN RECEPTOR POSITIVE (HCC): ICD-10-CM

## 2021-03-01 DIAGNOSIS — M32.9 SYSTEMIC LUPUS ERYTHEMATOSUS, UNSPECIFIED SLE TYPE, UNSPECIFIED ORGAN INVOLVEMENT STATUS (HCC): ICD-10-CM

## 2021-03-01 DIAGNOSIS — N64.89 SEROMA OF BREAST: ICD-10-CM

## 2021-03-01 DIAGNOSIS — G35 MULTIPLE SCLEROSIS (HCC): ICD-10-CM

## 2021-03-01 DIAGNOSIS — Z17.0 CARCINOMA OF UPPER-INNER QUADRANT OF LEFT BREAST IN FEMALE, ESTROGEN RECEPTOR POSITIVE (HCC): ICD-10-CM

## 2021-03-01 PROCEDURE — 10160 PNXR ASPIR ABSC HMTMA BULLA: CPT | Performed by: SURGERY

## 2021-03-01 PROCEDURE — 99024 POSTOP FOLLOW-UP VISIT: CPT | Performed by: SURGERY

## 2021-03-01 NOTE — PROGRESS NOTES
Alexi Benson MD   General Surgery  Postprocedure Evaluation in Office  Pt Name: Elvi Sanchez  Date of Birth 1965   Today's Date: 3/1/2021  Medical Record Number: [id number]  Primary Care Provider: Baron Black MD  Chief Complaint   Patient presents with   [facility] Post-Op Check     4 day f/u--s/p Left complete mastectomy 2/19/21-check hematoma left breast     ASSESSMENT      1. Encounter for postoperative care    2. 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 Multiple sclerosis ([facility]    4. Systemic lupus erythematosus, unspecified SLE type, unspecified organ involvement status ([address]    5. Seroma of breast       Pathologic stage Ib  PLAN       1. Small seroma aspirated left mastectomy site  2. Oncotype DX score pending   3. Recheck wound in 1 week   4. Physical therapy  5. Oncology consultation pending      Sharda Dale is seen today for post-op follow-up. She is status post left mastectomy with sentinel node biopsy additional adjacent nodes removed slightly enlarged. 2 of 6 total nodes were positive. Positive nodes were sentinel nodes. Wound is intact. Small amount of fluid below her flaps. She does have chronic thrombocytopenia. Patient consents to evacuate at this point. Drain removed at last visit. Few bloody superficial blisters medial aspect of incision have spontaneously drained. Postoperative pain has resolved. Medications    Current Outpatient Medications:     HYDROcodone-acetaminophen (NORCO) 5-325 MG per tablet, Take 1 tablet by mouth every 6 hours as needed for Pain for up to 7 days. Intended supply: 7 days.  Take lowest dose possible to manage pain, Disp: 28 tablet, Rfl: 0    metFORMIN (GLUCOPHAGE) 1000 MG tablet, Take 1,000 mg by mouth 2 times daily, Disp: , Rfl:     desvenlafaxine succinate (PRISTIQ) 50 MG TB24 extended release tablet, Take 50 mg by mouth daily, Disp: , Rfl:     traZODone (DESYREL) 50 MG tablet, Take 50 mg by mouth nightly, Disp: , Rfl:     MUCINEX MAXIMUM STRENGTH 1200 MG TB12, Take 1 tablet by mouth 2 times daily, Disp: , Rfl:     Lactobacillus Acid-Pectin (ACIDOPHILUS/CITRUS PECTIN) TABS, Take 1 tablet by mouth daily, Disp: , Rfl:     paliperidone (INVEGA) 3 MG extended release tablet, Take 3 mg by mouth nightly, Disp: , Rfl:     SITagliptin (JANUVIA) 100 MG tablet, Take 100 mg by mouth daily, Disp: , Rfl:     Lifitegrast (XIIDRA) 5 % SOLN, Place 1 drop into both eyes daily, Disp: , Rfl:     baclofen (LIORESAL) 10 MG tablet, Take 1.5 tablets by mouth 3 times daily, Disp: 135 tablet, Rfl: 5    amitriptyline (ELAVIL) 25 MG tablet, Take 1 tablet by mouth nightly, Disp: 30 tablet, Rfl: 5    Misc. Devices [facility]) MISC, Wheelchair repairs- new seat cover, right side armrest replacement  Current body weight:  68 kg Diagnosis: gait disturbance, chronic incomplete spastic paraplegia Length of need: Lifetime, Disp: 1 each, Rfl: 0    pregabalin (LYRICA) 150 MG capsule, Take 1 capsule by mouth 2 times daily for 180 days. , Disp: 60 capsule, Rfl: 5    tiotropium (SPIRIVA) 18 MCG inhalation capsule, Inhale 18 mcg into the lungs daily 2 puffs, Disp: , Rfl:     insulin glargine (LANTUS SOLOSTAR) 100 UNIT/ML injection pen, Inject 50 Units into the skin nightly , Disp: , Rfl:     montelukast (SINGULAIR) 10 MG tablet, Take 10 mg by mouth nightly , Disp: , Rfl: 0    artificial tears (ARTIFICIAL TEARS) OINT, as needed, Disp: , Rfl:     Calcium Carbonate (CALCIUM 600 PO), Take 1 tablet by mouth 2 times daily, Disp: , Rfl:     Multiple Vitamin (TAB-A-ISAAC PO), Take 1 tablet by mouth daily, Disp: , Rfl:     loratadine (CLARITIN) 10 MG tablet, Take 1 tablet by mouth daily as needed. , Disp: , Rfl: 0    PROAIR  (90 BASE) MCG/ACT inhaler, Inhale 2 puffs into the lungs every 6 hours as needed. , Disp: , Rfl: 0    Incontinence Supply Disposable (UNDERPADS) MISC, Cloth chux pads Diagnosis: Paraplegia 344.1, Disp: 100 Bottle, Rfl: 11    hydrOXYzine (VISTARIL) 50 MG capsule, Take 50 mg by mouth 3 times daily as needed for Itching., Disp: , Rfl:     omeprazole (PRILOSEC) 20 MG capsule, Take 20 mg by mouth daily. , Disp: , Rfl:     Allergies  Allergies   Allergen Reactions    Penicillins Shortness Of Breath     \"I almost \"    Seasonal Itching       Review of Systems  History obtained from the patient. Constitutional: Denies any fever, chills, fatigue. Wound: Denies any rash, skin color changes or wound problems. Resp: Denies any cough, shortness of breath. CV: Denies any chest pain, orthopnea or syncope. GI:  Denies any nausea, vomiting, blood in the stool, constipation or diarrhea. : Denies any hematuria, hesitancy or dysuria. OBJECTIVE     VITALS: /60 (Site: Right Upper Arm, Position: Sitting, Cuff Size: Medium Adult)   Pulse 93   Temp 96.3 °F (35.7 °C)   Resp 18   Ht 4' 11.5\" (1.511 m)   Wt 160 lb (72.6 kg)   SpO2 96%   BMI 31.78 kg/m²     CONSTITUTIONAL: Alert and oriented times 3, no acute distress and cooperative to examination. SKIN: Skin color, texture, turgor normal. .  INCISION: Mastectomy wound intact. Some superficial ecchymotic blister  medial flap spontaneously drained. Small amount of fluid upper lateral flap.   Flaps are viable  LUNGS: Lungs Clear  CARDIOVASCULAR: Normal Rate  ABDOMEN: nondistended  NEUROLOGIC: Alert and oriented        Performed by: [facility] Gerald Guillory. Pathology     [place], [place]                [id number]   Assoc.                                              Page 1 of 2 [id number]   [place], [place] [address]                                                       PROC: 2021   [place]                                    RECV: 2021   [address]                                    RPTD: 2021   [place], [place] [address]                       MRN:  [id number]     LOC: 5KS                       ACCT: [id number]  SEX: F                       : 1965  AGE: 50 Y                          PATHOLOGY REPORT                       ATTN: AIDE ANDI                       REQ: Hayden Charles ANDI       Copies To:   GRETA CHARLES       Clinical Information: INVASIVE DUCTAL CARCINOMA LEFT BREAST     AMENDED REPORT 2021:     FINAL DIAGNOSIS:   A.  Left sentinel lymph node, excision:    Metastatic carcinoma in 2 of 4 lymph nodes (2/4).  Biopsy site changes. Comment: The frozen section discrepancy is noted.  Re-review of the   frozen section slide shows no evidence of metastatic carcinoma on   sections examined. B.  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    Lymphovascular space invasion is present.    Margins are negative.    Please see synoptic report. C.  Left axillary lymph nodes, excision:    Two lymph nodes, negative for malignancy (0/2). COMMENT 2021:  An amended report was issued to correct a minor   typographical error in the microscopic description.        Specimen:   A) SENTINEL LYMPH NODE(S), LEFT, FS   B) EXCISION OF BREAST, LEFT MASS   C) LYMPH NODE(S), LEFT AXILLARY           Intraoperative Consultation:   A - Frozen Section DX:  Favor biopsy site changes, no definite   malignancy.  PCF/ALP       Received:  12:17 Reported:  12:38     Gross Examination:   A - The container is labeled Claudia Mario, left sentinel lymph node.    Received fresh for frozen section evaluation are fragments of   fibroadipose tissue measuring 6.5 x 4.5 x 2.0 cm.  This dissection   reveals three lymph nodes ranging in size from 1.5 to 0.5 cm.  There   are no obvious lesions. Carlie Hives is evidence of previous biopsy site in   the largest node.  A representative section of the largest lymph node   is submitted for frozen section evaluation as FSA1.  The remaining node   submitted for frozen section is submitted in cassette A1.  The second   largest node is serially sectioned and entirely submitted in cassette   A2.  The smallest node is submitted in cassette A3 as is. Representative fibroadipose is submitted in cassette A4. Fixative:  10% neutral buffered formalin   Tissue removal time:  11:37   Tissue fixation time:  12:30   Total fixation time: 55 hours 30 minutes     B - The container is labeled Adria Moreau, left breast mass. Received in formalin is a mastectomy specimen oriented by a stitch   designating lateral.  The specimen measures 20 x 15 x 7 cm.  The   ellipse of skin is approximately 19 x 13.5 cm.  The nipple/areolar   complex is grossly unremarkable.  There are no skin lesions.  The   radial margin is inked blue and the deep margin is inked black.  There   is no attached skeletal muscle.  In the central aspect of the specimen   just deep to the nipple, there is an ill-defined white, glistening, and   mucoid lesion measuring 2.7 x 2.5 cm.  The lesion is 2.5 cm from the   deep margin and distant from the radial margin.  Sectioning through the   remaining breast tissue reveals a predominantly fatty cut surface with   no additional lesions or fibrous areas.  Sections are submitted as   follows:  B1 through B3 - representative mass; B4 - closest deep   margin; B5 through B8 - representative quadrants (upper outer, lower   outer, upper inner, lower inner);   B9 - nipple/areolar complex. Fixative:  10% neutral buffered formalin   Tissue removal time:  11:37   Tissue fixation time:  12:39   Total fixation time: 55 hours 21 minutes     C - The container is labeled Adria Moreau, left axillary lymph node.    Received in formalin are fragments of fibroadipose tissue aggregating   to 6.0 x 4.0 x 2.0 cm.  Sectioning reveals two lymph nodes ranging in   size from 0.9 to 0.4 cm in greatest dimension.  Each lymph node is   serially sectioned and entirely submitted in one cassette each.    PCF:[id number]     Fixative:  10% neutral buffered formalin   Tissue removal time:  12:03 Tissue fixation time:  12:30   Total fixation time: 55 hours 30 minutes           Microscopic Examination:   A-C.  Procedure: Mastectomy   Specimen laterality: Left   Tumor site: Upper inner quadrant   Tumor size: 29 mm   Histologic type:  Invasive mucinous micropapillary carcinoma   Histologic grade (Lynn histologic score)   Glandular/tubular differentiation: Score 3   Nuclear pleomorphism: Score 2   Mitotic rate: Score 1   Overall grade: Grade 2 (score 6)   Ductal carcinoma in situ: Not identified   Invasive carcinoma margins: Uninvolved by invasive carcinoma    Distance from closest margin: 25 mm (deep margin)   DCIS margins: Not applicable   Regional lymph nodes: Involved by tumor cells    Number of lymph nodes with macrometastases (>2 mm): 2    Number of lymph nodes with micrometastases: 0    Number of lymph nodes with isolated tumor cells: 0   Size of largest metastatic deposit: 2.5 mm   Extranodal extension: Not identified   Total number of lymph nodes examined: 6   Number of sentinel nodes examined: 4   Treatment effect in the breast: No known presurgical therapy   Treatment effect in the lymph nodes: Not applicable   Lymphovascular invasion: Present   Dermal lymphovascular invasion: Not identified     Pathologic stage classification (pTNM, AJCC 8th edition)   pT2: Tumor greater than 20 mm but less than or equal to 50 mm greatest   dimension   pN1a: Metastases in 1-3 axillary lymph nodes, at least 1 metastasis   larger than 2.0 mm     Breast biomarker testing performed on previous biopsy [id number];   1/14/21     Estrogen Receptor: (Clone SP1), [employer]       Positive 100% of cells ( > 10% of cells)   Intensity of Staining:       Strong     Progesterone Receptor: (Clone 1E2), [employer]       Positive 80% of cells   Intensity of Staining:       Strong           Ki-67 (clone 30-9)       Percentage of positive nuclei:  6%               Favorable  < 10%               Borderline 10-20%               Unfavorable  > 20%     HER2 Immunohistochemistry (Clone 4B5, [facility])       Negative (1+) - Incomplete faint/barely perceptible membrane   staining in  > 10% of invasive tumor     External Controls:  Adequate   Internal Controls:  Adequate   Standard Assay Conditions:  Met     Staining Method Used:    Formalin fixation    Antigen retrieval type:  Cell Conditioning 1, mild gordon       [id number]   [id number]                                                     <Sign Out Dr. Va Miner M.D., F.C. A. P       [facility]/ [facility]  Printed on:  2/23/2021   [place], [address]   Original print date: 02/23/2021   Lab and Collection            Ambulatory Procedure Note    Patient name: Livier Beltrán  Medical Record Number: [id number]  Primary Care Physician: Cash Fitzgerald MD    Date of Procedure: 3/1/2021    Pre-operative Diagnosis: Seroma left mastectomy site    Post-operative Diagnosis: Same    Procedure: Puncture aspiration seroma left mastectomy site    Anesthesia: Local     Surgeons/Assistants: Olt    Estimated Blood Loss: Less than 3 ml    Complications: none immediately appreciated    Specimens: 60 mL bloody seroma fluid disposed    Procedure: In the office, the pt was placed supine on the procedure table. Consent was given by the patient. The left chest wall was prepped and draped. With the prep to superficial blisters drained. Utilizing an 18-gauge needle the inferior flap was punctured over the fluctuant area. Bloody thin fluid was aspirated without complication. Patient tolerated well.         Janet Wright MD

## 2021-03-04 ENCOUNTER — VIRTUAL VISIT (OUTPATIENT)
Dept: UROLOGY | Age: 56
End: 2021-03-04

## 2021-03-04 DIAGNOSIS — R32 URINARY INCONTINENCE, UNSPECIFIED TYPE: ICD-10-CM

## 2021-03-04 DIAGNOSIS — R35.0 URINARY FREQUENCY: ICD-10-CM

## 2021-03-04 DIAGNOSIS — R35.0 FREQUENCY OF URINATION: ICD-10-CM

## 2021-03-04 DIAGNOSIS — N31.9 NEUROGENIC BLADDER: Primary | ICD-10-CM

## 2021-03-04 DIAGNOSIS — N32.81 OAB (OVERACTIVE BLADDER): ICD-10-CM

## 2021-03-04 PROCEDURE — 99024 POSTOP FOLLOW-UP VISIT: CPT | Performed by: UROLOGY

## 2021-03-04 NOTE — PROGRESS NOTES
Fox Durham MD  Urology Clinic Progress Note  Telephone encounter      Patient:  Darren Swartz  YOB: 1965  Date: 3/4/2021    HISTORY OF PRESENT ILLNESS:   The patient is a 50 y.o. female who presents today for follow-up for the following problem(s):      1. Neurogenic bladder    2. OAB (overactive bladder)    3. Urinary incontinence, unspecified type    4. Frequency of urination    5. Urinary frequency         Overall the problem(s) : are improving  Associated Symptoms: No dysuria, gross hematuria. Pain Severity:  1/10    Summary of old records:      She underwent placement of permanent sacral nerve stimulator with leads (stage 1 and 2) on 11/21/18 per Dr. Noble Sargent. She is down to 2-3 depends per day (previously 9 daily). She still reports frequency every 1 hour, sometimes every 30 minutes. Prior to surgery she failed multiple anti-cholinergics & Myrbetriq. Hx of DM, MS, and recurrent UTI. Wheelchair bound. Additional History: Onset many years  Worsening of her mixed incontinence and urinary symptoms. Her device was recently interrogated which showed low battery life. Recommended to have the battery replaced. She is here to discuss the procedure  Severity is described as moderate. The course of symptoms over time is chronic. Alleviating factors: none  Worsening factors: none  Lower urinary tract symptoms: Mixed urinary symptoms complicated by diabetes and multiple sclerosis. Interstim was checked 11/4/2020, it was not on. Battery life was noted to be 6-12 months.  1/26/2021  Procedure:   Explant of Insterstim, excision of device capsule and washout of wound. InterStim Sacral Neuromodulation System, Stage 1&2 Procedure, placement of rechargeable device.     She reports LUTS are much improved, frequency is improved, less depends usage  Nocturia is improved with night time fluid managment    Imaging Reviewed during this Office Visit:   (results were independently Age of Onset    Stroke Mother     Asthma Mother     Other Mother     No Known Problems Sister     Asthma Brother     No Known Problems Brother      Outpatient Medications Marked as Taking for the 3/4/21 encounter (Virtual Visit) with Eryn Parks MD   Medication Sig Dispense Refill    metFORMIN (GLUCOPHAGE) 1000 MG tablet Take 1,000 mg by mouth 2 times daily      desvenlafaxine succinate (PRISTIQ) 50 MG TB24 extended release tablet Take 50 mg by mouth daily      traZODone (DESYREL) 50 MG tablet Take 50 mg by mouth nightly      MUCINEX MAXIMUM STRENGTH 1200 MG TB12 Take 1 tablet by mouth 2 times daily      Lactobacillus Acid-Pectin (ACIDOPHILUS/CITRUS PECTIN) TABS Take 1 tablet by mouth daily      paliperidone (INVEGA) 3 MG extended release tablet Take 3 mg by mouth nightly      SITagliptin (JANUVIA) 100 MG tablet Take 100 mg by mouth daily      Lifitegrast (XIIDRA) 5 % SOLN Place 1 drop into both eyes daily      baclofen (LIORESAL) 10 MG tablet Take 1.5 tablets by mouth 3 times daily 135 tablet 5    amitriptyline (ELAVIL) 25 MG tablet Take 1 tablet by mouth nightly 30 tablet 5    Misc. Devices [facility]) [facility] Wheelchair repairs- new seat cover, right side armrest replacement    Current body weight:  68 kg  Diagnosis: gait disturbance, chronic incomplete spastic paraplegia  Length of need: Lifetime 1 each 0    pregabalin (LYRICA) 150 MG capsule Take 1 capsule by mouth 2 times daily for 180 days.  60 capsule 5    tiotropium (SPIRIVA) 18 MCG inhalation capsule Inhale 18 mcg into the lungs daily 2 puffs      insulin glargine (LANTUS SOLOSTAR) 100 UNIT/ML injection pen Inject 50 Units into the skin nightly       montelukast (SINGULAIR) 10 MG tablet Take 10 mg by mouth nightly   0    artificial tears (ARTIFICIAL TEARS) OINT as needed      Calcium Carbonate (CALCIUM 600 PO) Take 1 tablet by mouth 2 times daily      Multiple Vitamin (TAB-A-ISAAC PO) Take 1 tablet by mouth daily      loratadine (CLARITIN) 10 MG tablet Take 1 tablet by mouth daily as needed. 0    PROAIR  (90 BASE) MCG/ACT inhaler Inhale 2 puffs into the lungs every 6 hours as needed. 0    Incontinence Supply Disposable (UNDERPADS) MISC Cloth chux pads  Diagnosis: Paraplegia 344. 1 100 Bottle 11    hydrOXYzine (VISTARIL) 50 MG capsule Take 50 mg by mouth 3 times daily as needed for Itching.  omeprazole (PRILOSEC) 20 MG capsule Take 20 mg by mouth daily. Penicillins and Seasonal  Social History     Tobacco Use   Smoking Status Current Every Day Smoker    Packs/day: 1.00    Types: Cigarettes    Start date: 12/6/1979   Smokeless Tobacco Never Used       Social History     Substance and Sexual Activity   Alcohol Use Yes    Alcohol/week: 0.0 standard drinks    Comment: social drinker       REVIEW OF SYSTEMS:  Constitutional: negative  Eyes: negative  Respiratory: negative  Cardiovascular: negative  Gastrointestinal: negative  Genitourinary: negative except for what is in HPI  Musculoskeletal: negative  Skin: negative   Neurological: negative  Hematological/Lymphatic: negative  Psychological: negative    Physical Exam:      There were no vitals filed for this visit. Assessment and Plan      1. Neurogenic bladder    2. OAB (overactive bladder)    3. Urinary incontinence, unspecified type    4. Frequency of urination    5. Urinary frequency           Plan:        Status post Procedure Explant of Insterstim, excision of device capsule and washout of wound. InterStim Sacral Neuromodulation System, Stage 1&2 Procedure, placement of rechargeable device. She is much happier with her symptoms    Follow up 3 months         Som Gentile is a 50 y.o. female evaluated via telephone on 3/4/2021.       Consent:  She and/or health care decision maker is aware that that she may receive a bill for this telephone service, depending on her insurance coverage, and has provided verbal consent to proceed: No - Not billable Documentation:  I communicated with the patient and/or health care decision maker about see above. Details of this discussion including any medical advice provided: see above      I affirm this is a Patient Initiated Episode with a Patient who has not had a related appointment within my department in the past 7 days or scheduled within the next 24 hours. Patient identification was verified at the start of the visit: Yes    Total Time: minutes: 5-10 minutes    The visit was conducted pursuant to the emergency declaration under the [address] [place] [address], [address] [place] [address] authority and the [employer] Act. Patient identification was verified, and a caregiver was present when appropriate. The patient was located in a state where the provider was credentialed to provide care.     Note: not billable if this call serves to triage the patient into an appointment for the relevant concern      Felix Gay MD  [facility] Urology

## 2021-03-08 ENCOUNTER — OFFICE VISIT (OUTPATIENT)
Dept: SURGERY | Age: 56
End: 2021-03-08
Payer: COMMERCIAL

## 2021-03-08 VITALS
HEIGHT: 60 IN | WEIGHT: 160 LBS | TEMPERATURE: 95.9 F | SYSTOLIC BLOOD PRESSURE: 128 MMHG | RESPIRATION RATE: 18 BRPM | OXYGEN SATURATION: 95 % | HEART RATE: 89 BPM | BODY MASS INDEX: 31.41 KG/M2 | DIASTOLIC BLOOD PRESSURE: 60 MMHG

## 2021-03-08 DIAGNOSIS — G89.29 OTHER CHRONIC PAIN: Primary | ICD-10-CM

## 2021-03-08 DIAGNOSIS — Z17.0 CARCINOMA OF UPPER-INNER QUADRANT OF LEFT BREAST IN FEMALE, ESTROGEN RECEPTOR POSITIVE (HCC): ICD-10-CM

## 2021-03-08 DIAGNOSIS — C50.212 CARCINOMA OF UPPER-INNER QUADRANT OF LEFT BREAST IN FEMALE, ESTROGEN RECEPTOR POSITIVE (HCC): ICD-10-CM

## 2021-03-08 DIAGNOSIS — N64.89 SEROMA OF BREAST: ICD-10-CM

## 2021-03-08 DIAGNOSIS — G35 MULTIPLE SCLEROSIS (HCC): ICD-10-CM

## 2021-03-08 DIAGNOSIS — Z48.89 ENCOUNTER FOR POSTOPERATIVE CARE: ICD-10-CM

## 2021-03-08 DIAGNOSIS — C50.212 CARCINOMA OF UPPER-INNER QUADRANT OF LEFT BREAST IN FEMALE, ESTROGEN RECEPTOR POSITIVE (HCC): Primary | ICD-10-CM

## 2021-03-08 DIAGNOSIS — Z17.0 CARCINOMA OF UPPER-INNER QUADRANT OF LEFT BREAST IN FEMALE, ESTROGEN RECEPTOR POSITIVE (HCC): Primary | ICD-10-CM

## 2021-03-08 PROCEDURE — 10160 PNXR ASPIR ABSC HMTMA BULLA: CPT | Performed by: SURGERY

## 2021-03-08 NOTE — TELEPHONE ENCOUNTER
Ben Meehan called requesting a refill on the following medications:  Requested Prescriptions     Pending Prescriptions Disp Refills    HYDROcodone-acetaminophen (NORCO) 5-325 MG per tablet 28 tablet 0     Sig: Take 1 tablet by mouth every 6 hours as needed for Pain for up to 7 days. Intended supply: 7 days.  Take lowest dose possible to manage pain     Pharmacy verified: [facility] on [facility]  .pv      Date of last visit: 9/10/2020  Date of next visit (if applicable): 3/7/3522

## 2021-03-08 NOTE — TELEPHONE ENCOUNTER
OARRS reviewed. UDS: Inconsistent Pregabalin  Last seen: 1/7/2021. Follow-up:4/8/21    OARRS last Norco was prescribed Gwynneth V[facility] 3/2/21 #28 7 days.

## 2021-03-09 RX ORDER — HYDROCODONE BITARTRATE AND ACETAMINOPHEN 5; 325 MG/1; MG/1
1 TABLET ORAL 2 TIMES DAILY PRN
Qty: 60 TABLET | Refills: 0 | Status: ON HOLD | OUTPATIENT
Start: 2021-03-09 | End: 2021-04-06 | Stop reason: HOSPADM

## 2021-03-09 NOTE — TELEPHONE ENCOUNTER
I called patient and explained what Tania stated. She said the Norco she got from [facility] was for her breast cancer. I told her I would let Tania know but she needs to call the office before filling narcotics from another provider. She voiced understanding.

## 2021-03-09 NOTE — TELEPHONE ENCOUNTER
Please remind patient that if she fills narcotics from other providers she needs to notify our office prior to doing so due to pain contract.

## 2021-03-09 NOTE — PROGRESS NOTES
Nora Martínez MD   General Surgery  Postprocedure Evaluation in Office  Pt Name: Marco A Snowden  Date of Birth 1965   Today's Date: 3/8/2021  Medical Record Number: [id number]  Primary Care Provider: Lizzy Ramires MD  Chief Complaint   Patient presents with   Charmaine Plunkett Post-Op Check     1 week f/u--s/p Left complete mastectomy 2/19/21-check hematoma left breast-appt with Dr. Nataliia Candelario 3/10     ASSESSMENT      1. 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 Seroma of breast    3. Multiple sclerosis ([facility] [address]    4. Encounter for postoperative care       Pathologic stage Ib  PLAN       1. seroma aspirated left mastectomy site  2. Oncotype DX score 8  3. Recheck wound in 1 week and possible aspiration  4. Physical therapy  5. Oncology consultation pending      Anita Farrell is seen today for post-op follow-up. She is status post left mastectomy with sentinel node biopsy additional adjacent nodes removed slightly enlarged. 2 of 6 total nodes were positive. Positive nodes were sentinel nodes. Wound is intact. Small amount of fluid below her flaps. She does have chronic thrombocytopenia. Seroma drained last visit. Patient consents to evacuate at this point. Few bloody superficial blisters medial aspect of incision have spontaneously drained and are healing. Postoperative pain has resolved.   Medications    Current Outpatient Medications:     metFORMIN (GLUCOPHAGE) 1000 MG tablet, Take 1,000 mg by mouth 2 times daily, Disp: , Rfl:     desvenlafaxine succinate (PRISTIQ) 50 MG TB24 extended release tablet, Take 50 mg by mouth daily, Disp: , Rfl:     traZODone (DESYREL) 50 MG tablet, Take 50 mg by mouth nightly, Disp: , Rfl:     MUCINEX MAXIMUM STRENGTH 1200 MG TB12, Take 1 tablet by mouth 2 times daily, Disp: , Rfl:     Lactobacillus Acid-Pectin (ACIDOPHILUS/CITRUS PECTIN) TABS, Take 1 tablet by mouth daily, Disp: , Rfl:     paliperidone (INVEGA) 3 MG extended release tablet, Take 3 mg by mouth nightly, Disp: , Rfl:     SITagliptin (JANUVIA) 100 MG tablet, Take 100 mg by mouth daily, Disp: , Rfl:     Lifitegrast (XIIDRA) 5 % SOLN, Place 1 drop into both eyes daily, Disp: , Rfl:     baclofen (LIORESAL) 10 MG tablet, Take 1.5 tablets by mouth 3 times daily, Disp: 135 tablet, Rfl: 5    amitriptyline (ELAVIL) 25 MG tablet, Take 1 tablet by mouth nightly, Disp: 30 tablet, Rfl: 5    Misc. Devices [facility]) MISC, Wheelchair repairs- new seat cover, right side armrest replacement  Current body weight:  68 kg Diagnosis: gait disturbance, chronic incomplete spastic paraplegia Length of need: Lifetime, Disp: 1 each, Rfl: 0    pregabalin (LYRICA) 150 MG capsule, Take 1 capsule by mouth 2 times daily for 180 days. , Disp: 60 capsule, Rfl: 5    tiotropium (SPIRIVA) 18 MCG inhalation capsule, Inhale 18 mcg into the lungs daily 2 puffs, Disp: , Rfl:     insulin glargine (LANTUS SOLOSTAR) 100 UNIT/ML injection pen, Inject 50 Units into the skin nightly , Disp: , Rfl:     montelukast (SINGULAIR) 10 MG tablet, Take 10 mg by mouth nightly , Disp: , Rfl: 0    artificial tears (ARTIFICIAL TEARS) OINT, as needed, Disp: , Rfl:     Calcium Carbonate (CALCIUM 600 PO), Take 1 tablet by mouth 2 times daily, Disp: , Rfl:     Multiple Vitamin (TAB-A-ISAAC PO), Take 1 tablet by mouth daily, Disp: , Rfl:     loratadine (CLARITIN) 10 MG tablet, Take 1 tablet by mouth daily as needed. , Disp: , Rfl: 0    PROAIR  (90 BASE) MCG/ACT inhaler, Inhale 2 puffs into the lungs every 6 hours as needed. , Disp: , Rfl: 0    Incontinence Supply Disposable (UNDERPADS) MISC, Cloth chux pads Diagnosis: Paraplegia 344.1, Disp: 100 Bottle, Rfl: 11    hydrOXYzine (VISTARIL) 50 MG capsule, Take 50 mg by mouth 3 times daily as needed for Itching., Disp: , Rfl:     omeprazole (PRILOSEC) 20 MG capsule, Take 20 mg by mouth daily. , Disp: , Rfl:     Allergies  Allergies   Allergen Reactions    Penicillins Shortness Of Breath     \"I almost \"    Seasonal Itching       Review of Systems  History obtained from the patient. Constitutional: Denies any fever, chills, fatigue. Wound: Denies any rash, skin color changes or wound problems. Resp: Denies any cough, shortness of breath. CV: Denies any chest pain, orthopnea or syncope. GI:  Denies any nausea, vomiting, blood in the stool, constipation or diarrhea. : Denies any hematuria, hesitancy or dysuria. OBJECTIVE     VITALS: /60 (Site: Right Upper Arm, Position: Sitting, Cuff Size: Medium Adult)   Pulse 89   Temp 95.9 °F (35.5 °C) (Temporal)   Resp 18   Ht 4' 11.5\" (1.511 m)   Wt 160 lb (72.6 kg)   SpO2 95%   BMI 31.78 kg/m²     CONSTITUTIONAL: Alert and oriented times 3, no acute distress and cooperative to examination. SKIN: Skin color, texture, turgor normal. .  INCISION: Mastectomy wound intact. Some superficial ecchymotic blister  medial flap spontaneously drained. Small amount of fluid upper lateral flap.   Flaps are viable  LUNGS: Lungs Clear  CARDIOVASCULAR: Normal Rate  ABDOMEN: nondistended  NEUROLOGIC: Alert and oriented        Performed by: [facility] Gerald Guillory. Pathology     [place], [place]                [id number]   Assoc.                                              Page 1 of 1   [facility] [address]   [address] [place], [place] [address]                                                       PROC: 2021   [facility]                                    RECV: 2021   [facility]                                    RPTD: 2021   [address] [place], [place] [address]                       MRN:  [id number]     LOC: 5KS                       ACCT: [de-identified]  SEX: F                       : 1965  AGE: 50 Y                          PATHOLOGY REPORT                       ATTN: AIDE ESCAMILLA   [de-identified]                  REQ: AIDE ESCAMILLA       Copies To:   GRETA SOTO       Clinical Information: INVASIVE DUCTAL CARCINOMA LEFT BREAST     AMENDED REPORT 2/23/2021:     FINAL DIAGNOSIS:   A.  Left sentinel lymph node, excision:    Metastatic carcinoma in 2 of 4 lymph nodes (2/4).  Biopsy site changes. Comment: The frozen section discrepancy is noted.  Re-review of the   frozen section slide shows no evidence of metastatic carcinoma on   sections examined. B.  Left breast, mastectomy:    Mucinous micropapillary carcinoma, Lynn grade 2 (pT2, pN1a).    Lymphovascular space invasion is present.    Margins are negative.    Please see synoptic report. C.  Left axillary lymph nodes, excision:    Two lymph nodes, negative for malignancy (0/2). COMMENT 2/23/2021:  An amended report was issued to correct a minor   typographical error in the microscopic description. Specimen:   A) SENTINEL LYMPH NODE(S), LEFT, FS   B) EXCISION OF BREAST, LEFT MASS   C) LYMPH NODE(S), LEFT AXILLARY           Intraoperative Consultation:   A - Frozen Section DX:  Favor biopsy site changes, no definite   malignancy.  PCF/ALP       Received:  12:17 Reported:  12:38     Gross Examination:   A - The container is labeled Earl Arm, left sentinel lymph node.    Received fresh for frozen section evaluation are fragments of   fibroadipose tissue measuring 6.5 x 4.5 x 2.0 cm.  This dissection   reveals three lymph nodes ranging in size from 1.5 to 0.5 cm.  There   are no obvious lesions. Balinda Calico is evidence of previous biopsy site in   the largest node.  A representative section of the largest lymph node   is submitted for frozen section evaluation as FSA1.  The remaining node   submitted for frozen section is submitted in cassette A1.  The second   largest node is serially sectioned and entirely submitted in cassette   A2.  The smallest node is submitted in cassette A3 as is. Representative fibroadipose is submitted in cassette A4.      Fixative:  10% neutral buffered formalin   Tissue removal time:  11:37   Tissue fixation time:  12:30 Total fixation time: 55 hours 30 minutes     B - The container is labeled faheem Wan breast mass. Received in formalin is a mastectomy specimen oriented by a stitch   designating lateral.  The specimen measures 20 x 15 x 7 cm.  The   ellipse of skin is approximately 19 x 13.5 cm.  The nipple/areolar   complex is grossly unremarkable.  There are no skin lesions.  The   radial margin is inked blue and the deep margin is inked black.  There   is no attached skeletal muscle.  In the central aspect of the specimen   just deep to the nipple, there is an ill-defined white, glistening, and   mucoid lesion measuring 2.7 x 2.5 cm.  The lesion is 2.5 cm from the   deep margin and distant from the radial margin.  Sectioning through the   remaining breast tissue reveals a predominantly fatty cut surface with   no additional lesions or fibrous areas.  Sections are submitted as   follows:  B1 through B3 - representative mass; B4 - closest deep   margin; B5 through B8 - representative quadrants (upper outer, lower   outer, upper inner, lower inner);   B9 - nipple/areolar complex. Fixative:  10% neutral buffered formalin   Tissue removal time:  11:37   Tissue fixation time:  12:39   Total fixation time: 55 hours 21 minutes     C - The container is labeled Alfredo Georges left axillary lymph node.    Received in formalin are fragments of fibroadipose tissue aggregating   to 6.0 x 4.0 x 2.0 cm.  Sectioning reveals two lymph nodes ranging in   size from 0.9 to 0.4 cm in greatest dimension.  Each lymph node is   serially sectioned and entirely submitted in one cassette each. PCF:[id number]     Fixative:  10% neutral buffered formalin   Tissue removal time:  12:03   Tissue fixation time:  12:30   Total fixation time: 55 hours 30 minutes           Microscopic Examination:   A-C.  Procedure: Mastectomy   Specimen laterality: Left   Tumor site: Upper inner quadrant   Tumor size: 29 mm   Histologic type:  Invasive mucinous micropapillary carcinoma   Histologic grade (Lynn histologic score)   Glandular/tubular differentiation: Score 3   Nuclear pleomorphism: Score 2   Mitotic rate: Score 1   Overall grade: Grade 2 (score 6)   Ductal carcinoma in situ: Not identified   Invasive carcinoma margins: Uninvolved by invasive carcinoma    Distance from closest margin: 25 mm (deep margin)   DCIS margins: Not applicable   Regional lymph nodes: Involved by tumor cells    Number of lymph nodes with macrometastases (>2 mm): 2    Number of lymph nodes with micrometastases: 0    Number of lymph nodes with isolated tumor cells: 0   Size of largest metastatic deposit: 2.5 mm   Extranodal extension: Not identified   Total number of lymph nodes examined: 6   Number of sentinel nodes examined: 4   Treatment effect in the breast: No known presurgical therapy   Treatment effect in the lymph nodes: Not applicable   Lymphovascular invasion: Present   Dermal lymphovascular invasion: Not identified     Pathologic stage classification (pTNM, AJCC 8th edition)   pT2: Tumor greater than 20 mm but less than or equal to 50 mm greatest   dimension   pN1a: Metastases in 1-3 axillary lymph nodes, at least 1 metastasis   larger than 2.0 mm     Breast biomarker testing performed on previous biopsy [id number];   1/14/21     Estrogen Receptor: (Clone SP1), [employer]       Positive 100% of cells ( > 10% of cells)   Intensity of Staining:       Strong     Progesterone Receptor: (Clone 1E2), [employer]       Positive 80% of cells   Intensity of Staining:       Strong           Ki-67 (clone 30-9)       Percentage of positive nuclei:  6%               Favorable  < 10%               Borderline 10-20%               Unfavorable  > 20%     HER2 Immunohistochemistry (Clone 4B5, [employer])       Negative (1+) - Incomplete faint/barely perceptible membrane   staining in  > 10% of invasive tumor     External Controls:  Adequate   Internal Controls:  Adequate   Standard Assay Conditions:  Met     Staining Method Used:    Formalin fixation    Antigen retrieval type:  Cell Conditioning 1, mild gordon       [id number]   [id number]                                                     <Sign Out Dr. Satinder Evans M.D., F.C. A. P       [facility]/ [facility]  Printed on:  2/23/2021   [place], [address]   Original print date: 02/23/2021   Lab and Collection    Oncotype DX 8        Ambulatory Procedure Note    Patient name: Jesse Mauro  Medical Record Number: [id number]  Primary Care Physician: Karyna Benavides MD    Date of Procedure: 3/8/2021    Pre-operative Diagnosis: Seroma left mastectomy site    Post-operative Diagnosis: Same    Procedure: Puncture aspiration seroma left mastectomy site    Anesthesia: Local     Surgeons/Assistants: Olt    Estimated Blood Loss: Less than 3 ml    Complications: none immediately appreciated    Specimens: 100 mL bloody seroma fluid disposed    Procedure: In the office, the pt was placed supine on the procedure table. Consent was given by the patient. The left chest wall was prepped and draped. With the prep to superficial blisters drained. Utilizing an 18-gauge needle the inferior flap was punctured over the fluctuant area. Bloody thin fluid was aspirated without complication. Patient tolerated well.         Leigh Cowan MD

## 2021-03-10 ENCOUNTER — CLINICAL DOCUMENTATION (OUTPATIENT)
Dept: CASE MANAGEMENT | Age: 56
End: 2021-03-10

## 2021-03-10 ENCOUNTER — OFFICE VISIT (OUTPATIENT)
Dept: ONCOLOGY | Age: 56
End: 2021-03-10
Payer: COMMERCIAL

## 2021-03-10 ENCOUNTER — HOSPITAL ENCOUNTER (OUTPATIENT)
Dept: INFUSION THERAPY | Age: 56
Discharge: HOME | End: 2021-03-10
Payer: COMMERCIAL

## 2021-03-10 VITALS
SYSTOLIC BLOOD PRESSURE: 119 MMHG | OXYGEN SATURATION: 97 % | WEIGHT: 157 LBS | RESPIRATION RATE: 18 BRPM | BODY MASS INDEX: 30.82 KG/M2 | HEIGHT: 60 IN | DIASTOLIC BLOOD PRESSURE: 56 MMHG | HEART RATE: 84 BPM | TEMPERATURE: 96.8 F

## 2021-03-10 DIAGNOSIS — C50.912 INVASIVE DUCTAL CARCINOMA OF LEFT BREAST (HCC): Primary | ICD-10-CM

## 2021-03-10 DIAGNOSIS — C77.3 BREAST CANCER METASTASIZED TO AXILLARY LYMPH NODE, LEFT (HCC): ICD-10-CM

## 2021-03-10 DIAGNOSIS — Z17.0 ESTROGEN RECEPTOR POSITIVE STATUS (ER+): ICD-10-CM

## 2021-03-10 DIAGNOSIS — Z90.12 S/P LEFT MASTECTOMY: ICD-10-CM

## 2021-03-10 DIAGNOSIS — C50.912 BREAST CANCER METASTASIZED TO AXILLARY LYMPH NODE, LEFT (HCC): ICD-10-CM

## 2021-03-10 PROCEDURE — G8484 FLU IMMUNIZE NO ADMIN: HCPCS | Performed by: INTERNAL MEDICINE

## 2021-03-10 PROCEDURE — G8427 DOCREV CUR MEDS BY ELIG CLIN: HCPCS | Performed by: INTERNAL MEDICINE

## 2021-03-10 PROCEDURE — 99211 OFF/OP EST MAY X REQ PHY/QHP: CPT

## 2021-03-10 PROCEDURE — G9899 SCRN MAM PERF RSLTS DOC: HCPCS | Performed by: INTERNAL MEDICINE

## 2021-03-10 PROCEDURE — 99205 OFFICE O/P NEW HI 60 MIN: CPT | Performed by: INTERNAL MEDICINE

## 2021-03-10 PROCEDURE — G8417 CALC BMI ABV UP PARAM F/U: HCPCS | Performed by: INTERNAL MEDICINE

## 2021-03-10 PROCEDURE — 3017F COLORECTAL CA SCREEN DOC REV: CPT | Performed by: INTERNAL MEDICINE

## 2021-03-10 PROCEDURE — 4004F PT TOBACCO SCREEN RCVD TLK: CPT | Performed by: INTERNAL MEDICINE

## 2021-03-10 NOTE — PROGRESS NOTES
Name: Mary Anne Portillo  : 1965  MRN: [id number]    Oncology Navigation Follow-Up Note    Contact Type:  Medical Oncology    Notes: Patient seen prior to initial consult with Dr. Lizette Murdock. SO Mark with pt. Voices post op discomfort but states she is very independent (wheelchair bound). Able to transfer to toilet. Inquired about wearing a bra. States the post-op bra is very uncomfortable. Suggested an open-in front sports bra. Prothesis discussed, Knitted Knocker and information provided. Denies any other needs at this. Will follow through continuum of care.     Electronically signed by Kristie Sanchez RN on 3/10/2021 at 3:47 PM

## 2021-03-10 NOTE — PATIENT INSTRUCTIONS
1.  Referral to radiation oncology for postmastectomy radiation treatment  2.   RTC to see me in 8 weeks

## 2021-03-15 ENCOUNTER — OFFICE VISIT (OUTPATIENT)
Dept: SURGERY | Age: 56
End: 2021-03-15
Payer: COMMERCIAL

## 2021-03-15 ENCOUNTER — TELEPHONE (OUTPATIENT)
Dept: SPIRITUAL SERVICES | Facility: CLINIC | Age: 56
End: 2021-03-15

## 2021-03-15 VITALS
WEIGHT: 157 LBS | OXYGEN SATURATION: 97 % | HEART RATE: 90 BPM | RESPIRATION RATE: 14 BRPM | TEMPERATURE: 97.2 F | SYSTOLIC BLOOD PRESSURE: 120 MMHG | DIASTOLIC BLOOD PRESSURE: 75 MMHG | HEIGHT: 60 IN | BODY MASS INDEX: 30.82 KG/M2

## 2021-03-15 DIAGNOSIS — Z17.0 CARCINOMA OF UPPER-INNER QUADRANT OF LEFT BREAST IN FEMALE, ESTROGEN RECEPTOR POSITIVE (HCC): Primary | ICD-10-CM

## 2021-03-15 DIAGNOSIS — G35 MULTIPLE SCLEROSIS (HCC): ICD-10-CM

## 2021-03-15 DIAGNOSIS — N64.89 SEROMA OF BREAST: ICD-10-CM

## 2021-03-15 DIAGNOSIS — Z48.89 ENCOUNTER FOR POSTOPERATIVE CARE: ICD-10-CM

## 2021-03-15 DIAGNOSIS — C50.212 CARCINOMA OF UPPER-INNER QUADRANT OF LEFT BREAST IN FEMALE, ESTROGEN RECEPTOR POSITIVE (HCC): Primary | ICD-10-CM

## 2021-03-15 DIAGNOSIS — M32.9 SYSTEMIC LUPUS ERYTHEMATOSUS, UNSPECIFIED SLE TYPE, UNSPECIFIED ORGAN INVOLVEMENT STATUS (HCC): ICD-10-CM

## 2021-03-15 PROCEDURE — 99024 POSTOP FOLLOW-UP VISIT: CPT | Performed by: SURGERY

## 2021-03-15 PROCEDURE — 10160 PNXR ASPIR ABSC HMTMA BULLA: CPT | Performed by: SURGERY

## 2021-03-15 NOTE — TELEPHONE ENCOUNTER
[facility] PROGRESS NOTE      Patient: Taylor Stratton  Room #: Room/bed info not found            YOB: 1965  Age: 50 y.o. Gender: female            Assessment:  Yasir Medina  was referred for out pt spiritual care services. This is a follow up call to see how she is doing. Yasir Medina said:  \"My surgery was February 19 and I will not have do to any chemo only radiation. \"      When asked about how she was feeling about radiation, she admitted that she was scared. This is normal.  We spoke of spiritual support, that God is by her side and walks with her every step of the way. Sulaiman Liang stated she has her boyfriend and family and feels supported and not in need of spiritual out pt services.      Interventions: Words of encouragement were given. Yasir Medina is not needing spiritual care but stated she will call us if she does.      Outcomes: Our contact information was given so that she can call us if needed.      Plan:     1.Care Plan:  Continue spiritual and emotional care for patient and family. Including prayers.      Electronically signed by Tonny Moritz, on 3/15/2021 at 6:45 PM.  [facility]  [phone]

## 2021-03-15 NOTE — PROGRESS NOTES
Johanne Castrejon MD   General Surgery  Postprocedure Evaluation in Office  Pt Name: Livier Beltrán  Date of Birth 1965   Today's Date: 3/15/2021  Medical Record Number: [id number]  Primary Care Provider: Cash Fitzgerald MD  Chief Complaint   Patient presents with    Post-Op Check     1 week f/u--s/p Left complete mastectomy 2/19/21-check seroma     ASSESSMENT      1. 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 Seroma of breast    3. Encounter for postoperative care    4. Multiple sclerosis ([facility] [address] [facility]    5. Systemic lupus erythematosus, unspecified SLE type, unspecified organ involvement status ([facility] [address]       Pathologic stage Ib  PLAN       1. seroma aspirated left mastectomy site  2. Oncotype DX score 8  3. Recheck wound in 1 week and possible aspiration  4. Physical therapy  5. Oncology consultation pending note    6. Family/sister asking about genetic testing and counseling. Discussed again with patient. Referral for genetic counseling when if patient desires. Cipriano Ceja is seen today for post-op follow-up. She is status post left mastectomy with sentinel node biopsy additional adjacent nodes removed slightly enlarged. 2 of 6 total nodes were positive. Positive nodes were sentinel nodes. Wound is intact. Small amount of fluid below her flaps. She does have chronic thrombocytopenia. Seroma drained last visit. Patient consents to evacuate at this point. Few bloody superficial blisters medial aspect of incision have spontaneously drained and are healing. Postoperative pain has resolved. Oncotype DX score is 8. Patient reports she has seen medical oncology. Note not completed. Plan for antiestrogen no chemotherapy, per patient. .  Referral to radiation oncology control of axilla.   Medications    Current Outpatient Medications:     HYDROcodone-acetaminophen (NORCO) 5-325 MG per tablet, Take 1 tablet by mouth 2 times daily as needed for Pain for up to 30 days. , Disp: 60 tablet, Rfl: 0    metFORMIN (GLUCOPHAGE) 1000 MG tablet, Take 1,000 mg by mouth 2 times daily, Disp: , Rfl:     desvenlafaxine succinate (PRISTIQ) 50 MG TB24 extended release tablet, Take 50 mg by mouth daily, Disp: , Rfl:     traZODone (DESYREL) 50 MG tablet, Take 50 mg by mouth nightly, Disp: , Rfl:     MUCINEX MAXIMUM STRENGTH 1200 MG TB12, Take 1 tablet by mouth 2 times daily, Disp: , Rfl:     Lactobacillus Acid-Pectin (ACIDOPHILUS/CITRUS PECTIN) TABS, Take 1 tablet by mouth daily, Disp: , Rfl:     paliperidone (INVEGA) 3 MG extended release tablet, Take 3 mg by mouth nightly, Disp: , Rfl:     SITagliptin (JANUVIA) 100 MG tablet, Take 100 mg by mouth daily, Disp: , Rfl:     Lifitegrast (XIIDRA) 5 % SOLN, Place 1 drop into both eyes daily, Disp: , Rfl:     baclofen (LIORESAL) 10 MG tablet, Take 1.5 tablets by mouth 3 times daily, Disp: 135 tablet, Rfl: 5    amitriptyline (ELAVIL) 25 MG tablet, Take 1 tablet by mouth nightly, Disp: 30 tablet, Rfl: 5    Misc. Devices [facility]) MISC, Wheelchair repairs- new seat cover, right side armrest replacement  Current body weight:  68 kg Diagnosis: gait disturbance, chronic incomplete spastic paraplegia Length of need: Lifetime, Disp: 1 each, Rfl: 0    pregabalin (LYRICA) 150 MG capsule, Take 1 capsule by mouth 2 times daily for 180 days. , Disp: 60 capsule, Rfl: 5    tiotropium (SPIRIVA) 18 MCG inhalation capsule, Inhale 18 mcg into the lungs daily 2 puffs, Disp: , Rfl:     insulin glargine (LANTUS SOLOSTAR) 100 UNIT/ML injection pen, Inject 50 Units into the skin nightly , Disp: , Rfl:     montelukast (SINGULAIR) 10 MG tablet, Take 10 mg by mouth nightly , Disp: , Rfl: 0    artificial tears (ARTIFICIAL TEARS) OINT, as needed, Disp: , Rfl:     Calcium Carbonate (CALCIUM 600 PO), Take 1 tablet by mouth 2 times daily, Disp: , Rfl:     Multiple Vitamin (TAB-A-ISAAC PO), Take 1 tablet by mouth daily, Disp: , Rfl:   loratadine (CLARITIN) 10 MG tablet, Take 1 tablet by mouth daily as needed. , Disp: , Rfl: 0    PROAIR  (90 BASE) MCG/ACT inhaler, Inhale 2 puffs into the lungs every 6 hours as needed. , Disp: , Rfl: 0    Incontinence Supply Disposable (UNDERPADS) MISC, Cloth chux pads Diagnosis: Paraplegia 344.1, Disp: 100 Bottle, Rfl: 11    hydrOXYzine (VISTARIL) 50 MG capsule, Take 50 mg by mouth 3 times daily as needed for Itching., Disp: , Rfl:     omeprazole (PRILOSEC) 20 MG capsule, Take 20 mg by mouth daily. , Disp: , Rfl:     Allergies  Allergies   Allergen Reactions    Penicillins Shortness Of Breath     \"I almost \"    Seasonal Itching       Review of Systems  History obtained from the patient. Constitutional: Denies any fever, chills, fatigue. Wound: Denies any rash, skin color changes or wound problems. Resp: Denies any cough, shortness of breath. CV: Denies any chest pain, orthopnea or syncope. GI:  Denies any nausea, vomiting, blood in the stool, constipation or diarrhea. : Denies any hematuria, hesitancy or dysuria. OBJECTIVE     VITALS: /75 (Site: Right Upper Arm, Position: Sitting, Cuff Size: Medium Adult)   Pulse 90   Temp 97.2 °F (36.2 °C) (Tympanic)   Resp 14   Ht 4' 11.5\" (1.511 m)   Wt 157 lb (71.2 kg)   SpO2 97%   BMI 31.18 kg/m²     CONSTITUTIONAL: Alert and oriented times 3, no acute distress and cooperative to examination. SKIN: Skin color, texture, turgor normal. .  INCISION: Mastectomy wound intact. Some superficial dry eschar medial flap . No infection small amount of fluid upper lateral flap.   Flaps are overall viable  LUNGS: Lungs Clear  CARDIOVASCULAR: Normal Rate  ABDOMEN: nondistended  NEUROLOGIC: Alert and oriented        Performed by: Bhanu Guillory. Pathology     Finesse Atkins                [id number]   Assoc.                                              Page 1 of 7 [phone], [place] [id number]                                                       PROC: 2021   [place]                                    RECV: 2021   [address]                                    RPTD: 2021   [address], [place] [address]                       MRN:  [id number]     LOC: 5KS                       ACCT: [de-identified]  SEX: F                       : 1965  AGE: 50 Y                          PATHOLOGY REPORT                       ATTN: AIDE ESCAMILLA   [de-identified]                  REQ: Joseph Valadez ANDI       Copies To:   GRETA SOTO       Clinical Information: INVASIVE DUCTAL CARCINOMA LEFT BREAST     AMENDED REPORT 2021:     FINAL DIAGNOSIS:   A.  Left sentinel lymph node, excision:    Metastatic carcinoma in 2 of 4 lymph nodes (2/4).  Biopsy site changes. Comment: The frozen section discrepancy is noted.  Re-review of the   frozen section slide shows no evidence of metastatic carcinoma on   sections examined. B.  Left breast, mastectomy:    Mucinous micropapillary carcinoma, Lynn grade 2 (pT2, pN1a).    Lymphovascular space invasion is present.    Margins are negative.    Please see synoptic report. C.  Left axillary lymph nodes, excision:    Two lymph nodes, negative for malignancy (0/2). COMMENT 2021:  An amended report was issued to correct a minor   typographical error in the microscopic description.        Specimen:   A) SENTINEL LYMPH NODE(S), LEFT, FS   B) EXCISION OF BREAST, LEFT MASS   C) LYMPH NODE(S), LEFT AXILLARY           Intraoperative Consultation:   A - Frozen Section DX:  Favor biopsy site changes, no definite   malignancy.  PCF/ALP       Received:  12:17 Reported:  12:38     Gross Examination:   A - The container is labeled Susy Ackerman, left sentinel lymph node.    Received fresh for frozen section evaluation are fragments of   fibroadipose tissue measuring 6.5 x 4.5 x 2.0 cm.  This dissection   reveals three lymph nodes ranging in size from 1.5 to 0.5 cm.  There   are no obvious lesions. Junior Martínez is evidence of previous biopsy site in   the largest node.  A representative section of the largest lymph node   is submitted for frozen section evaluation as FSA1.  The remaining node   submitted for frozen section is submitted in cassette A1.  The second   largest node is serially sectioned and entirely submitted in cassette   A2.  The smallest node is submitted in cassette A3 as is. Representative fibroadipose is submitted in cassette A4. Fixative:  10% neutral buffered formalin   Tissue removal time:  11:37   Tissue fixation time:  12:30   Total fixation time: 55 hours 30 minutes     B - The container is labeled Ann Duffy, left breast mass. Received in formalin is a mastectomy specimen oriented by a stitch   designating lateral.  The specimen measures 20 x 15 x 7 cm.  The   ellipse of skin is approximately 19 x 13.5 cm.  The nipple/areolar   complex is grossly unremarkable.  There are no skin lesions.  The   radial margin is inked blue and the deep margin is inked black.  There   is no attached skeletal muscle.  In the central aspect of the specimen   just deep to the nipple, there is an ill-defined white, glistening, and   mucoid lesion measuring 2.7 x 2.5 cm.  The lesion is 2.5 cm from the   deep margin and distant from the radial margin.  Sectioning through the   remaining breast tissue reveals a predominantly fatty cut surface with   no additional lesions or fibrous areas.  Sections are submitted as   follows:  B1 through B3 - representative mass; B4 - closest deep   margin; B5 through B8 - representative quadrants (upper outer, lower   outer, upper inner, lower inner);   B9 - nipple/areolar complex.      Fixative:  10% neutral buffered formalin   Tissue removal time:  11:37   Tissue fixation time:  12:39   Total fixation time: 55 hours 21 minutes     C - The container is labeled Ann Duffy, left axillary lymph node.    Received in formalin are fragments of fibroadipose tissue aggregating   to 6.0 x 4.0 x 2.0 cm.  Sectioning reveals two lymph nodes ranging in   size from 0.9 to 0.4 cm in greatest dimension.  Each lymph node is   serially sectioned and entirely submitted in one cassette each. PCF:[id number]     Fixative:  10% neutral buffered formalin   Tissue removal time:  12:03   Tissue fixation time:  12:30   Total fixation time: 55 hours 30 minutes           Microscopic Examination:   A-C.  Procedure: Mastectomy   Specimen laterality: Left   Tumor site: Upper inner quadrant   Tumor size: 29 mm   Histologic type:  Invasive mucinous micropapillary carcinoma   Histologic grade (Lynn histologic score)   Glandular/tubular differentiation: Score 3   Nuclear pleomorphism: Score 2   Mitotic rate: Score 1   Overall grade: Grade 2 (score 6)   Ductal carcinoma in situ: Not identified   Invasive carcinoma margins: Uninvolved by invasive carcinoma    Distance from closest margin: 25 mm (deep margin)   DCIS margins: Not applicable   Regional lymph nodes: Involved by tumor cells    Number of lymph nodes with macrometastases (>2 mm): 2    Number of lymph nodes with micrometastases: 0    Number of lymph nodes with isolated tumor cells: 0   Size of largest metastatic deposit: 2.5 mm   Extranodal extension: Not identified   Total number of lymph nodes examined: 6   Number of sentinel nodes examined: 4   Treatment effect in the breast: No known presurgical therapy   Treatment effect in the lymph nodes: Not applicable   Lymphovascular invasion: Present   Dermal lymphovascular invasion: Not identified     Pathologic stage classification (pTNM, AJCC 8th edition)   pT2: Tumor greater than 20 mm but less than or equal to 50 mm greatest   dimension   pN1a: Metastases in 1-3 axillary lymph nodes, at least 1 metastasis   larger than 2.0 mm     Breast biomarker testing performed on previous biopsy [id number];   1/14/21     Estrogen Receptor: (Clone SP1), [employer]       Positive 100% of cells ( > 10% of cells) Intensity of Staining:       Strong     Progesterone Receptor: (Clone 1E2), [employer]       Positive 80% of cells   Intensity of Staining:       Strong           Ki-67 (clone 30-9)       Percentage of positive nuclei:  6%               Favorable  < 10%               Borderline 10-20%               Unfavorable  > 20%     HER2 Immunohistochemistry (Clone 4B5, [employer])       Negative (1+) - Incomplete faint/barely perceptible membrane   staining in  > 10% of invasive tumor     External Controls:  Adequate   Internal Controls:  Adequate   Standard Assay Conditions:  Met     Staining Method Used:    Formalin fixation    Antigen retrieval type:  Cell Conditioning 1, mild gordon       [id number]   [id number]                                                     <Sign Out Dr. Yvonne Walter M.D., F.C. A. P       [facility]/ [address] [place] [address]  Printed on:  2/23/2021   [place], [address]   Original print date: 02/23/2021   Lab and Collection    Oncotype DX 8        Ambulatory Procedure Note    Patient name: Janey Adan  Medical Record Number: [id number]  Primary Care Physician: Luz Mejia MD    Date of Procedure: 3/15/2021    Pre-operative Diagnosis: Seroma left mastectomy site    Post-operative Diagnosis: Same    Procedure: Puncture aspiration seroma left mastectomy site    Anesthesia: Local     Surgeons/Assistants: Olt    Estimated Blood Loss: Less than 5 ml    Complications: none immediately appreciated    Specimens: 100 mL bloody seroma fluid disposed    Procedure: In the office, the pt was placed supine on the procedure table. Consent was given by the patient. The left chest wall was prepped and draped. to superficial  Utilizing an 18-gauge needle the inferior flap was punctured over the fluctuant area. 55 mL bloody thin fluid was aspirated without complication. Patient tolerated well.         Misti Jade MD

## 2021-03-17 DIAGNOSIS — G89.29 OTHER CHRONIC PAIN: ICD-10-CM

## 2021-03-18 RX ORDER — PREGABALIN 150 MG/1
150 CAPSULE ORAL 2 TIMES DAILY
Qty: 60 CAPSULE | Refills: 5 | Status: SHIPPED | OUTPATIENT
Start: 2021-03-18 | End: 2021-09-27

## 2021-03-22 ENCOUNTER — OFFICE VISIT (OUTPATIENT)
Dept: SURGERY | Age: 56
End: 2021-03-22

## 2021-03-22 VITALS
WEIGHT: 157 LBS | DIASTOLIC BLOOD PRESSURE: 62 MMHG | OXYGEN SATURATION: 99 % | HEART RATE: 93 BPM | HEIGHT: 59 IN | TEMPERATURE: 96.9 F | SYSTOLIC BLOOD PRESSURE: 120 MMHG | BODY MASS INDEX: 31.65 KG/M2 | RESPIRATION RATE: 20 BRPM

## 2021-03-22 DIAGNOSIS — Z17.0 CARCINOMA OF UPPER-INNER QUADRANT OF LEFT BREAST IN FEMALE, ESTROGEN RECEPTOR POSITIVE (HCC): Primary | ICD-10-CM

## 2021-03-22 DIAGNOSIS — G35 MULTIPLE SCLEROSIS (HCC): ICD-10-CM

## 2021-03-22 DIAGNOSIS — Z48.89 ENCOUNTER FOR POSTOPERATIVE CARE: ICD-10-CM

## 2021-03-22 DIAGNOSIS — T81.31XS DEHISCENCE OF OPERATIVE WOUND, SEQUELA: ICD-10-CM

## 2021-03-22 DIAGNOSIS — C50.212 CARCINOMA OF UPPER-INNER QUADRANT OF LEFT BREAST IN FEMALE, ESTROGEN RECEPTOR POSITIVE (HCC): Primary | ICD-10-CM

## 2021-03-22 DIAGNOSIS — M32.9 SYSTEMIC LUPUS ERYTHEMATOSUS, UNSPECIFIED SLE TYPE, UNSPECIFIED ORGAN INVOLVEMENT STATUS (HCC): ICD-10-CM

## 2021-03-22 PROCEDURE — 99024 POSTOP FOLLOW-UP VISIT: CPT | Performed by: SURGERY

## 2021-03-23 ENCOUNTER — PROCEDURE VISIT (OUTPATIENT)
Dept: SURGERY | Age: 56
End: 2021-03-23
Payer: COMMERCIAL

## 2021-03-23 VITALS
DIASTOLIC BLOOD PRESSURE: 80 MMHG | RESPIRATION RATE: 18 BRPM | TEMPERATURE: 96.7 F | OXYGEN SATURATION: 98 % | WEIGHT: 157 LBS | HEIGHT: 59 IN | SYSTOLIC BLOOD PRESSURE: 120 MMHG | BODY MASS INDEX: 31.65 KG/M2 | HEART RATE: 88 BPM

## 2021-03-23 DIAGNOSIS — L24.A9 WOUND DRAINAGE: Primary | ICD-10-CM

## 2021-03-23 PROCEDURE — 12020 TX SUPFC WND DEHSN SMPL CLSR: CPT | Performed by: SURGERY

## 2021-03-23 PROCEDURE — 99024 POSTOP FOLLOW-UP VISIT: CPT | Performed by: SURGERY

## 2021-03-23 RX ORDER — SULFAMETHOXAZOLE AND TRIMETHOPRIM 800; 160 MG/1; MG/1
1 TABLET ORAL 2 TIMES DAILY
Qty: 20 TABLET | Refills: 0 | Status: SHIPPED | OUTPATIENT
Start: 2021-03-23 | End: 2021-04-02

## 2021-03-23 NOTE — PROGRESS NOTES
Ambulatory Procedure Note    Patient name: Darren Swartz  Medical Record Number: [id number]  Primary Care Physician: Tl Johnson MD    Date of Procedure: 3/23/2021    Pre-operative Diagnosis:1. Fat necrosis 4 cm medial aspect mastectomy site. 2. Open clean wound lateral aspect mastectomy site, 4 cm    Post-operative Diagnosis: Same    Procedure: Excisional debridement fat necrosis skin subcutaneous fat at medial aspect of mastectomy wound 12 cm² debrided with intermediate wound closure. 2.  Excisional debridement skin edges lateral mastectomy wound with intermediate closure. Total closure measured 8 cm. Anesthesia: Local 1% lidocaine with epinephrine 7 mL    Surgeons/Assistants: Olt    Estimated Blood Loss: 3 ml    Complications: none immediately appreciated    Specimens: Debrided tissue disposed    Procedure: In the office, the pt was placed supine on the procedure table. Consent was given by the patient. The mastectomy site was prepped and draped. Open wounds were irrigated with half-strength peroxide. Debridement was performed of necrotic skin and subcutaneous fat at the medial aspect. 12 cm² debrided. At the lateral aspect only skin edges were debrided and intermediate closure of 8 cm of wound performed. Dermis closed with interrupted Vicryl suture skin closed laterally with subcuticular 4-0 Vicryl suture followed by skin glue. Medial skin was reapproximated after deeper closure performed with interrupted 3-0 nylon vertical mattress sutures. Gauze dressing applied. Patient has some eschar and we will leave that on as it is dry to protect the underlying healing area. Reevaluate office next week.         Mejia Auguste MD

## 2021-03-23 NOTE — PROGRESS NOTES
Karina Larkin MD   General Surgery  Postprocedure Evaluation in Office  Pt Name: Alex Franco  Date of Birth 1965   Today's Date: 3/22/2021  Medical Record Number: [id number]  Primary Care Provider: Deandra Wilson MD  Chief Complaint   Patient presents with   Nury Barber Post-Op Check     1 week f/u--s/p Left complete mastectomy 2/19/21-check seroma     ASSESSMENT      1. 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 Encounter for postoperative care    3. Multiple sclerosis ([facility] [address]    4. Systemic lupus erythematosus, unspecified SLE type, unspecified organ involvement status ([facility] [address]    5. Dehiscence of operative wound, sequela       Pathologic stage Ib  PLAN       1.  Schedule patient for office procedure with medial and lateral wound debridement and secondary closure. 2.   Schedule office procedure. Local anesthesia  3. Procedure discussed with patient include but not limited to need for further debridement and or wound issues. 4.  Physical therapy, continue  5. Genetic testing discussed at prior visits. Refer on demand. Zoey Vargas is seen today for post-op follow-up. She is status post left mastectomy with sentinel node biopsy additional adjacent nodes removed slightly enlarged. 2 of 6 total nodes were positive. Positive nodes were sentinel nodes. Wound is intact. Small amount of fluid below her flaps. She does have chronic thrombocytopenia. Seroma drained last visit. Patient consents to evacuate at this point. Oncotype DX score is 8. Patient reports she has seen medical oncology. She has an appointment later this week with radiation oncology. Medial and lateral aspect of mastectomy has opened. Recommend debridement of eschar medially and secondary wound closure of medial and lateral aspect of wound. Patient desires to have this performed as an office procedure. She denies any fever chills or purulent drainage. Medications    Current Outpatient Medications:     pregabalin (LYRICA) 150 MG capsule, Take 1 capsule by mouth 2 times daily for 180 days. , Disp: 60 capsule, Rfl: 5    HYDROcodone-acetaminophen (NORCO) 5-325 MG per tablet, Take 1 tablet by mouth 2 times daily as needed for Pain for up to 30 days. , Disp: 60 tablet, Rfl: 0    metFORMIN (GLUCOPHAGE) 1000 MG tablet, Take 1,000 mg by mouth 2 times daily, Disp: , Rfl:     desvenlafaxine succinate (PRISTIQ) 50 MG TB24 extended release tablet, Take 50 mg by mouth daily, Disp: , Rfl:     traZODone (DESYREL) 50 MG tablet, Take 50 mg by mouth nightly, Disp: , Rfl:     MUCINEX MAXIMUM STRENGTH 1200 MG TB12, Take 1 tablet by mouth 2 times daily, Disp: , Rfl:     Lactobacillus Acid-Pectin (ACIDOPHILUS/CITRUS PECTIN) TABS, Take 1 tablet by mouth daily, Disp: , Rfl:     paliperidone (INVEGA) 3 MG extended release tablet, Take 3 mg by mouth nightly, Disp: , Rfl:     SITagliptin (JANUVIA) 100 MG tablet, Take 100 mg by mouth daily, Disp: , Rfl:     Lifitegrast (XIIDRA) 5 % SOLN, Place 1 drop into both eyes daily, Disp: , Rfl:     baclofen (LIORESAL) 10 MG tablet, Take 1.5 tablets by mouth 3 times daily, Disp: 135 tablet, Rfl: 5    amitriptyline (ELAVIL) 25 MG tablet, Take 1 tablet by mouth nightly, Disp: 30 tablet, Rfl: 5    Misc.  Devices [facility]) MISC, Wheelchair repairs- new seat cover, right side armrest replacement  Current body weight:  68 kg Diagnosis: gait disturbance, chronic incomplete spastic paraplegia Length of need: Lifetime, Disp: 1 each, Rfl: 0    tiotropium (SPIRIVA) 18 MCG inhalation capsule, Inhale 18 mcg into the lungs daily 2 puffs, Disp: , Rfl:     insulin glargine (LANTUS SOLOSTAR) 100 UNIT/ML injection pen, Inject 50 Units into the skin nightly , Disp: , Rfl:     montelukast (SINGULAIR) 10 MG tablet, Take 10 mg by mouth nightly , Disp: , Rfl: 0    artificial tears (ARTIFICIAL TEARS) OINT, as needed, Disp: , Rfl:     Calcium Carbonate Pathology     Jr Vargas                [id number]   Assoc.                                              Page 1 of 2 [id number]   [facility], [place] [address]                                                       PROC: 2021   NVJOSHUA/ Jose Angels                                    RECV: 2021   [address]                                    RPTD: 2021   [facility], [place] [address]                       MRN:  [id number]     LOC: 5KS                       ACCT: [de-identified]  SEX: F                       : 1965  AGE: 50 Y                          PATHOLOGY REPORT                       ATTN: AIDE ESCAMILLA   [facility]                  REQ: Kim Like OLT       Copies To:   GRETA CHARLES       Clinical Information: INVASIVE DUCTAL CARCINOMA LEFT BREAST     AMENDED REPORT 2021:     FINAL DIAGNOSIS:   A.  Left sentinel lymph node, excision:    Metastatic carcinoma in 2 of 4 lymph nodes (2/4).  Biopsy site changes. Comment: The frozen section discrepancy is noted.  Re-review of the   frozen section slide shows no evidence of metastatic carcinoma on   sections examined. B.  Left breast, mastectomy:    Mucinous micropapillary carcinoma, Lynn grade 2 (pT2, pN1a).    Lymphovascular space invasion is present.    Margins are negative.    Please see synoptic report. C.  Left axillary lymph nodes, excision:    Two lymph nodes, negative for malignancy (0/2). COMMENT 2021:  An amended report was issued to correct a minor   typographical error in the microscopic description.        Specimen:   A) SENTINEL LYMPH NODE(S), LEFT, FS   B) EXCISION OF BREAST, LEFT MASS   C) LYMPH NODE(S), LEFT AXILLARY           Intraoperative Consultation:   A - Frozen Section DX:  Favor biopsy site changes, no definite   malignancy.  PCF/ALP       Received:  12:17 Reported:  12:38     Gross Examination:   A - The container is labeled Oral Calii, left sentinel lymph node.    Received fresh for frozen section evaluation are fixation time: 55 hours 21 minutes     C - The container is labeled Ladonna Boles, left axillary lymph node.    Received in formalin are fragments of fibroadipose tissue aggregating   to 6.0 x 4.0 x 2.0 cm.  Sectioning reveals two lymph nodes ranging in   size from 0.9 to 0.4 cm in greatest dimension.  Each lymph node is   serially sectioned and entirely submitted in one cassette each. PCF:[id number]     Fixative:  10% neutral buffered formalin   Tissue removal time:  12:03   Tissue fixation time:  12:30   Total fixation time: 55 hours 30 minutes           Microscopic Examination:   A-C.  Procedure: Mastectomy   Specimen laterality: Left   Tumor site: Upper inner quadrant   Tumor size: 29 mm   Histologic type:  Invasive mucinous micropapillary carcinoma   Histologic grade (Ainsworth histologic score)   Glandular/tubular differentiation: Score 3   Nuclear pleomorphism: Score 2   Mitotic rate: Score 1   Overall grade: Grade 2 (score 6)   Ductal carcinoma in situ: Not identified   Invasive carcinoma margins: Uninvolved by invasive carcinoma    Distance from closest margin: 25 mm (deep margin)   DCIS margins: Not applicable   Regional lymph nodes: Involved by tumor cells    Number of lymph nodes with macrometastases (>2 mm): 2    Number of lymph nodes with micrometastases: 0    Number of lymph nodes with isolated tumor cells: 0   Size of largest metastatic deposit: 2.5 mm   Extranodal extension: Not identified   Total number of lymph nodes examined: 6   Number of sentinel nodes examined: 4   Treatment effect in the breast: No known presurgical therapy   Treatment effect in the lymph nodes: Not applicable   Lymphovascular invasion: Present   Dermal lymphovascular invasion: Not identified     Pathologic stage classification (pTNM, AJCC 8th edition)   pT2: Tumor greater than 20 mm but less than or equal to 50 mm greatest   dimension   pN1a: Metastases in 1-3 axillary lymph nodes, at least 1 metastasis   larger than 2.0 mm     Breast biomarker testing performed on previous biopsy [id number];   1/14/21     Estrogen Receptor: (Clone SP1), [employer]       Positive 100% of cells ( > 10% of cells)   Intensity of Staining:       Strong     Progesterone Receptor: (Clone 1E2), [employer]       Positive 80% of cells   Intensity of Staining:       Strong           Ki-67 (clone 30-9)       Percentage of positive nuclei:  6%               Favorable  < 10%               Borderline 10-20%               Unfavorable  > 20%     HER2 Immunohistochemistry (Clone 4B5, [employer])       Negative (1+) - Incomplete faint/barely perceptible membrane   staining in  > 10% of invasive tumor     External Controls:  Adequate   Internal Controls:  Adequate   Standard Assay Conditions:  Met     Staining Method Used:    Formalin fixation    Antigen retrieval type:  Cell Conditioning 1, mild gordon       [id number]   [id number]                                                     <Sign Out Dr. Jodine Brooklyn Beryle Melbourne, M.D., F.C. A. P       NVML/ [address] [address]  Printed on:  2/23/2021   [address]   [facility], [facility]   Original print date: 02/23/2021   Lab and Collection    Oncotype DX 8

## 2021-03-23 NOTE — PATIENT INSTRUCTIONS
Keep area clean and dry May shower in the am. Pearly Beech up your antibiotics at the [employer] on [address] and start taking today.

## 2021-03-24 ENCOUNTER — HOSPITAL ENCOUNTER (OUTPATIENT)
Dept: RADIATION ONCOLOGY | Age: 56
Discharge: HOME | End: 2021-03-24
Payer: COMMERCIAL

## 2021-03-24 VITALS
WEIGHT: 150 LBS | HEART RATE: 84 BPM | OXYGEN SATURATION: 97 % | HEIGHT: 59 IN | DIASTOLIC BLOOD PRESSURE: 55 MMHG | RESPIRATION RATE: 16 BRPM | SYSTOLIC BLOOD PRESSURE: 110 MMHG | BODY MASS INDEX: 30.24 KG/M2 | TEMPERATURE: 98.2 F

## 2021-03-24 PROCEDURE — 99204 OFFICE O/P NEW MOD 45 MIN: CPT | Performed by: RADIOLOGY

## 2021-03-24 PROCEDURE — 99202 OFFICE O/P NEW SF 15 MIN: CPT | Performed by: RADIOLOGY

## 2021-03-24 ASSESSMENT — PAIN DESCRIPTION - FREQUENCY: FREQUENCY: CONTINUOUS

## 2021-03-24 ASSESSMENT — PAIN DESCRIPTION - PAIN TYPE: TYPE: ACUTE PAIN

## 2021-03-24 ASSESSMENT — PAIN DESCRIPTION - ONSET: ONSET: ON-GOING

## 2021-03-24 NOTE — PROGRESS NOTES
 NC OFFICE/OUTPT VISIT,PROCEDURE ONLY N/A 2018    INTERSTIM DEVICE PLACEMENT MODEL 3058, ONLY HEAD ELIGIBLE FOR MRI WITH TRANSMIT/RECEIVE HEAD COIL    TUBAL LIGATION      10 years ago    US GUIDED NEEDLE LOC OF LEFT BREAST Left 2021    US GUIDED NEEDLE LOC OF LEFT BREAST 2021 [facility]    US LYMPH NODE BIOPSY  2021    US LYMPH NODE BIOPSY 2021 Enedelia Reyna MD [facility]       Allergies   Allergen Reactions    Penicillins Shortness Of Breath     \"I almost \"    Seasonal Itching          Current Outpatient Medications:     sulfamethoxazole-trimethoprim (BACTRIM DS) 800-160 MG per tablet, Take 1 tablet by mouth 2 times daily for 10 days, Disp: 20 tablet, Rfl: 0    pregabalin (LYRICA) 150 MG capsule, Take 1 capsule by mouth 2 times daily for 180 days. , Disp: 60 capsule, Rfl: 5    HYDROcodone-acetaminophen (NORCO) 5-325 MG per tablet, Take 1 tablet by mouth 2 times daily as needed for Pain for up to 30 days. , Disp: 60 tablet, Rfl: 0    metFORMIN (GLUCOPHAGE) 1000 MG tablet, Take 1,000 mg by mouth 2 times daily, Disp: , Rfl:     desvenlafaxine succinate (PRISTIQ) 50 MG TB24 extended release tablet, Take 50 mg by mouth daily, Disp: , Rfl:     traZODone (DESYREL) 50 MG tablet, Take 50 mg by mouth nightly, Disp: , Rfl:     MUCINEX MAXIMUM STRENGTH 1200 MG TB12, Take 1 tablet by mouth 2 times daily, Disp: , Rfl:     Lactobacillus Acid-Pectin (ACIDOPHILUS/CITRUS PECTIN) TABS, Take 1 tablet by mouth daily, Disp: , Rfl:     paliperidone (INVEGA) 3 MG extended release tablet, Take 3 mg by mouth nightly, Disp: , Rfl:     SITagliptin (JANUVIA) 100 MG tablet, Take 100 mg by mouth daily, Disp: , Rfl:     Lifitegrast (XIIDRA) 5 % SOLN, Place 1 drop into both eyes daily, Disp: , Rfl:     baclofen (LIORESAL) 10 MG tablet, Take 1.5 tablets by mouth 3 times daily, Disp: 135 tablet, Rfl: 5    amitriptyline (ELAVIL) 25 MG tablet, Take 1 tablet by mouth nightly, Disp: 30 tablet, Rfl: 5    Misc. Devices [facility]) MISC, Wheelchair repairs- new seat cover, right side armrest replacement  Current body weight:  68 kg Diagnosis: gait disturbance, chronic incomplete spastic paraplegia Length of need: Lifetime, Disp: 1 each, Rfl: 0    tiotropium (SPIRIVA) 18 MCG inhalation capsule, Inhale 18 mcg into the lungs daily 2 puffs, Disp: , Rfl:     insulin glargine (LANTUS SOLOSTAR) 100 UNIT/ML injection pen, Inject 50 Units into the skin nightly , Disp: , Rfl:     montelukast (SINGULAIR) 10 MG tablet, Take 10 mg by mouth nightly , Disp: , Rfl: 0    artificial tears (ARTIFICIAL TEARS) OINT, as needed, Disp: , Rfl:     Calcium Carbonate (CALCIUM 600 PO), Take 1 tablet by mouth 2 times daily, Disp: , Rfl:     Multiple Vitamin (TAB-A-IASAC PO), Take 1 tablet by mouth daily, Disp: , Rfl:     loratadine (CLARITIN) 10 MG tablet, Take 1 tablet by mouth daily as needed. , Disp: , Rfl: 0    PROAIR  (90 BASE) MCG/ACT inhaler, Inhale 2 puffs into the lungs every 6 hours as needed. , Disp: , Rfl: 0    Incontinence Supply Disposable (UNDERPADS) MISC, Cloth chux pads Diagnosis: Paraplegia 344.1, Disp: 100 Bottle, Rfl: 11    hydrOXYzine (VISTARIL) 50 MG capsule, Take 50 mg by mouth 3 times daily as needed for Itching., Disp: , Rfl:     omeprazole (PRILOSEC) 20 MG capsule, Take 20 mg by mouth daily. , Disp: , Rfl:       ADDITIONAL COMMENTS: Seen in Consultation with Dr. Elizabeth hopper. Wheelchair bound.        Roro Conrad BSN, RN

## 2021-03-26 ENCOUNTER — TELEPHONE (OUTPATIENT)
Dept: SURGERY | Age: 56
End: 2021-03-26

## 2021-03-26 NOTE — TELEPHONE ENCOUNTER
Pt calling the office stating she is having thicker drainage with a bit of an odor coming from her incision. She has been taking antibiotic as prescribed. No fever, no increased redness or tenderness. Offered appt on Monday for wound check but pt does not think she can get a ride. Discussed monitoring at this time until her appt 4/1, pt in agreement and will call the office if any changes.

## 2021-03-27 ASSESSMENT — ENCOUNTER SYMPTOMS
VOMITING: 0
ABDOMINAL DISTENTION: 0
BACK PAIN: 0
RECTAL PAIN: 0
COUGH: 0
WHEEZING: 0
DIARRHEA: 0
ABDOMINAL PAIN: 0
TROUBLE SWALLOWING: 0
FACIAL SWELLING: 0
NAUSEA: 0
SHORTNESS OF BREATH: 0
CONSTIPATION: 0
EYE DISCHARGE: 0
BLOOD IN STOOL: 0
COLOR CHANGE: 0
CHEST TIGHTNESS: 0
SORE THROAT: 0

## 2021-03-27 NOTE — PROGRESS NOTES
[facility] [address] [facility], [address] [facility] [address]  [place] [facility]  Dept: [phone]  Dept Fax: [phone]: [phone]    Visit Date:3/10/2021     Ben Meehan is a 50 y.o. female who presents today for:   Chief Complaint   Patient presents with    New Patient     Left breast invasive adrenocarcinoma        HPI:   This is a 50-year old postmenopausal patient with newly diagnosed left breast cancer. She presents to the medical oncology clinic to discussed postsurgical treatment. 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 She gets Botox injections for contractures in her lower extremities. She has also multiple sclerosis, neurogenic bowel and bladder, bipolar disorder. She carries history of cervical cancer. Also has mild chronic thrombocytopenia going back to 2014. Micheal Jarvis presented for diagnostic breast imaging after she felt a mass in the left breast.  Mammogram on January 4, 2021 showed irregular high density mass in the left breast central to the nipple and numerous lymph nodes in the right axilla. Ultrasounds of both axilla and the breast were performed and ultimately ultrasound-guided biopsies of the mass in the lymph node on the left. On ultrasound of the right axilla there were no abnormalities seen and no abnormal lymph nodes. Ultrasound imaging revealed a 2.2 x 2.2 x 2.9 cm mass in the left breast in the retroareolar area. A single lymph node in the left axilla appeared to have a moderate suspicion for malignancy. Ultrasound-guided biopsies were performed on January 14, 2021. Pathology revealed a grade 1 well-differentiated invasive adenocarcinoma with mucinous features, the lymph node was negative for malignancy. Breast cancer cells were estrogen receptor 100% positive, progesterone receptor 80% positive. Ki-67 was 6%,  HER-2 by IHC was negative.     Sequently she met with the surgeon Dr. Romulo gross and after discussing her surgical treatment options she decided to proceed with a left breast mastectomy and axillary lymph node excision. She had her surgery on February 19, 2021 final pathology report showed: She gets Botox injections for contractures in her lower extremities. Recent onset of thrombocytopenia. A.  Left sentinel lymph node, excision:    Metastatic carcinoma in 2 of 4 lymph nodes (2/4). B.  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  Lymphovascular space invasion is present.    Margins are negative.    C.  Left axillary lymph nodes, excision:    Two lymph nodes, negative for malignancy (0/2). Oncotype DX Breast Cancer Assay (RT-PCR) performed by [employer]   Breast Cancer Recurrence Score:   8     Her post mastectomy incision is complicated by seroma formation. The patient required aspiration. HPI   Past Medical History:   Diagnosis Date    Anxiety     Anxiety and depression     Asthma     Bipolar 1 disorder ([address]     Bipolar 1 disorder with moderate sourav ([address]     Blood circulation, collateral     Cancer ([address]      ?  cervical \"long time ago\"    Cancer ([address] 01/15/2021    Left Breast    Depression     Endometriosis     GERD (gastroesophageal reflux disease)     Kidney stones     Lupus (HCC)     Movement disorder     MS (multiple sclerosis) ([address]     Schizophrenia ([address]     Thyroid disease     Type 2 diabetes mellitus without complication ([address]     Unspecified cerebral artery occlusion with cerebral infarction 2014      Past Surgical History:   Procedure Laterality Date    BREAST LUMPECTOMY Left 2/19/2021    LEFT BREAST MASTECTOMY, SENTINAL LYMPH NODE BIOPSY, PREOP NEEDLE LOC performed by Nora Martínez MD at [facility]  01/2020    DILATION AND CURETTAGE OF UTERUS      [facility] US GUID NDL BIOPSY LEFT Left 01/14/2021    [facility] US GUID [address] LEFT 1/14/2021 Antoine Carpenter MD [facility] [address]    NERVE SURGERY N/A 01/26/2021    EXCHANGE OF INTERSTIM SYSTEM performed by Betty Escobar MD at [address] OFFICE/OUTPT [facility] N/A 11/21/2018    INTERSTIM [address] [facility], ONLY HEAD ELIGIBLE FOR MRI WITH TRANSMIT/RECEIVE HEAD COIL    TUBAL LIGATION      10 years ago    US GUIDED NEEDLE LOC OF LEFT BREAST Left 2/19/2021    US GUIDED NEEDLE LOC OF LEFT BREAST 2/19/2021 [address] LYMPH NODE BIOPSY  01/14/2021    US LYMPH NODE BIOPSY 1/14/2021 Sharonda Matute MD [facility]      Family History   Problem Relation Age of Onset   Anali Cushing Stroke Mother     Asthma Mother     Other Mother     No Known Problems Sister     Asthma Brother     No Known Problems Brother       Social History     Tobacco Use    Smoking status: Current Every Day Smoker     Packs/day: 1.00     Types: Cigarettes     Start date: 12/6/1979    Smokeless tobacco: Never Used   Substance Use Topics    Alcohol use: Yes     Alcohol/week: 0.0 standard drinks     Comment: social drinker      Current Outpatient Medications   Medication Sig Dispense Refill    HYDROcodone-acetaminophen (NORCO) 5-325 MG per tablet Take 1 tablet by mouth 2 times daily as needed for Pain for up to 30 days.  60 tablet 0    metFORMIN (GLUCOPHAGE) 1000 MG tablet Take 1,000 mg by mouth 2 times daily      desvenlafaxine succinate (PRISTIQ) 50 MG TB24 extended release tablet Take 50 mg by mouth daily      traZODone (DESYREL) 50 MG tablet Take 50 mg by mouth nightly      MUCINEX MAXIMUM STRENGTH 1200 MG TB12 Take 1 tablet by mouth 2 times daily      Lactobacillus Acid-Pectin (ACIDOPHILUS/CITRUS PECTIN) TABS Take 1 tablet by mouth daily      paliperidone (INVEGA) 3 MG extended release tablet Take 3 mg by mouth nightly      SITagliptin (JANUVIA) 100 MG tablet Take 100 mg by mouth daily      Lifitegrast (XIIDRA) 5 % SOLN Place 1 drop into both eyes daily      baclofen (LIORESAL) 10 MG tablet Take 1.5 tablets by mouth 3 times daily 135 tablet 5    amitriptyline (ELAVIL) 25 MG tablet Take 1 tablet by mouth nightly 30 tablet 5    [facility] [address] [facility] Wheelchair repairs- new seat cover, right side armrest replacement    Current body weight:  68 kg  Diagnosis: gait disturbance, chronic incomplete spastic paraplegia  Length of need: Lifetime 1 each 0    tiotropium (SPIRIVA) 18 MCG inhalation capsule Inhale 18 mcg into the lungs daily 2 puffs      insulin glargine (LANTUS SOLOSTAR) 100 UNIT/ML injection pen Inject 50 Units into the skin nightly       montelukast (SINGULAIR) 10 MG tablet Take 10 mg by mouth nightly   0    artificial tears (ARTIFICIAL TEARS) OINT as needed      Calcium Carbonate (CALCIUM 600 PO) Take 1 tablet by mouth 2 times daily      Multiple Vitamin (TAB-A-ISAAC PO) Take 1 tablet by mouth daily      loratadine (CLARITIN) 10 MG tablet Take 1 tablet by mouth daily as needed. 0    PROAIR  (90 BASE) MCG/ACT inhaler Inhale 2 puffs into the lungs every 6 hours as needed. 0    Incontinence Supply Disposable (UNDERPADS) MISC Cloth chux pads  Diagnosis: Paraplegia 344. 1 100 Bottle 11    hydrOXYzine (VISTARIL) 50 MG capsule Take 50 mg by mouth 3 times daily as needed for Itching.  omeprazole (PRILOSEC) 20 MG capsule Take 20 mg by mouth daily.  sulfamethoxazole-trimethoprim (BACTRIM DS) 800-160 MG per tablet Take 1 tablet by mouth 2 times daily for 10 days 20 tablet 0    pregabalin (LYRICA) 150 MG capsule Take 1 capsule by mouth 2 times daily for 180 days. 60 capsule 5     No current facility-administered medications for this visit.        Allergies   Allergen Reactions    Penicillins Shortness Of Breath     \"I almost \"    Seasonal Itching      Health Maintenance   Topic Date Due    Hepatitis C screen  Never done    HIV screen  Never done    COVID-19 Vaccine (1) Never done    Shingles Vaccine (1 of 2) Never done    Colon cancer screen colonoscopy  Never done    A1C test (Diabetic or Prediabetic)  09/23/2017    Pneumococcal 0-64 years Vaccine (2 of 3 - PPSV23) 01/28/2021    Breast cancer screen  02/19/2022    Cervical cancer screen  04/24/2024    DTaP/Tdap/Td vaccine (2 - Td) 08/03/2024    Lipid screen  01/07/2026    Flu vaccine  Completed    Hepatitis A vaccine  Aged Out    Hepatitis B vaccine  Aged Out    Hib vaccine  Aged Out    Meningococcal (ACWY) vaccine  Aged Out        Subjective:   Review of Systems   Constitutional: Negative for activity change, appetite change, fatigue and fever. HENT: Negative for congestion, dental problem, facial swelling, hearing loss, mouth sores, nosebleeds, sore throat, tinnitus and trouble swallowing. Eyes: Negative for discharge and visual disturbance. Respiratory: Negative for cough, chest tightness, shortness of breath and wheezing. Cardiovascular: Negative for chest pain, palpitations and leg swelling. Gastrointestinal: Negative for abdominal distention, abdominal pain, blood in stool, constipation, diarrhea, nausea, rectal pain and vomiting. Endocrine: Negative for cold intolerance, polydipsia and polyuria. Genitourinary: Positive for difficulty urinating. Negative for decreased urine volume, dysuria, flank pain, hematuria and urgency. Musculoskeletal: Negative for arthralgias, back pain, gait problem, joint swelling, myalgias and neck stiffness. Skin: Negative for color change, rash and wound. Neurological: Negative for dizziness, tremors, seizures, speech difficulty, weakness (The patient has spastic contractures in the lower extremities), light-headedness, numbness and headaches. Hematological: Negative for adenopathy. Bruises/bleeds easily. Psychiatric/Behavioral: Positive for behavioral problems. Negative for confusion and sleep disturbance. The patient is nervous/anxious. Objective:   Physical Exam  Vitals signs reviewed. Constitutional:       General: She is not in acute distress. Appearance: She is well-developed. HENT:      Head: Normocephalic. Mouth/Throat:      Pharynx: No oropharyngeal exudate. Eyes:      General: No scleral icterus. Right eye: No discharge. Left eye: No discharge. Pupils: Pupils are equal, round, and reactive to light. Neck:      Musculoskeletal: Normal range of motion and neck supple. Thyroid: No thyromegaly. Vascular: No JVD. Trachea: No tracheal deviation. Cardiovascular:      Rate and Rhythm: Normal rate. Heart sounds: Normal heart sounds. No murmur. No friction rub. No gallop. Pulmonary:      Effort: Pulmonary effort is normal. No respiratory distress. Breath sounds: Normal breath sounds. No stridor. No wheezing or rales. Chest:      Chest wall: No tenderness. Abdominal:      General: Bowel sounds are normal. There is no distension. Palpations: Abdomen is soft. There is no mass. Tenderness: There is no abdominal tenderness. There is no rebound. Musculoskeletal: Normal range of motion. Comments: Good range of motion in all four extremities. Lymphadenopathy:      Cervical: No cervical adenopathy. Skin:     General: Skin is warm. Findings: No erythema or rash. Neurological:      Mental Status: She is alert and oriented to person, place, and time. Cranial Nerves: No cranial nerve deficit. Motor: No abnormal muscle tone. Deep Tendon Reflexes: Reflexes are normal and symmetric. Psychiatric:         Behavior: Behavior normal.         Thought Content:  Thought content normal.         Judgment: Judgment normal.         BP (!) 119/56 (Site: Right Upper Arm, Position: Sitting, Cuff Size: Medium Adult)   Pulse 84   Temp 96.8 °F (36 °C) (Infrared)   Resp 18   Ht 4' 11.5\" (1.511 m)   Wt 157 lb (71.2 kg)   SpO2 97%   BMI 31.18 kg/m²      ECOG status is 2    Imaging studies and labs:     Lab Results   Component Value Date    WBC 6.4 02/20/2021    HGB 11.6 (L) involvement shoulder no overall benefit from adjuvant chemotherapy for postmenopausal women with recurrence score between 0 and 25. In addition due to patient's multiple comorbidities she is poor candidate for systemic chemotherapy. As such my recommendation is to proceed with postmastectomy radiation treatment. Referral to radiation oncology is made. I will see the patient again in 8 weeks. At that time we will discuss adjuvant hormonal therapy    Return in about 8 weeks (around 5/5/2021). Orders Placed:   Orders Placed This Encounter   Procedures    Ambulatory referral to Radiation Oncology     Referral Priority:   Routine     Referral Type:   Consult for Advice and Opinion     Referral Reason:   Specialty Services Required     Requested Specialty:   Radiation Oncology     Number of Visits Requested:   1        Medications Prescribed:   No orders of the defined types were placed in this encounter. Discussed use, benefit, and side effectsof prescribed medications. All patient questions answered. Pt voiced understanding. Instructed to continue current medications, diet and exercise. Patient agreed with treatment plan. Follow up as directed.     Electronically signed by Renny Huber MD on 3/27/21 at 10:39 AM EDT

## 2021-04-01 ENCOUNTER — HOSPITAL ENCOUNTER (OUTPATIENT)
Age: 56
Discharge: HOME | End: 2021-04-01
Payer: COMMERCIAL

## 2021-04-01 ENCOUNTER — PROCEDURE VISIT (OUTPATIENT)
Dept: SURGERY | Age: 56
End: 2021-04-01

## 2021-04-01 VITALS
BODY MASS INDEX: 30.24 KG/M2 | SYSTOLIC BLOOD PRESSURE: 110 MMHG | WEIGHT: 150 LBS | DIASTOLIC BLOOD PRESSURE: 60 MMHG | HEIGHT: 59 IN | OXYGEN SATURATION: 97 % | HEART RATE: 88 BPM | TEMPERATURE: 97 F | RESPIRATION RATE: 20 BRPM

## 2021-04-01 DIAGNOSIS — Z01.818 PRE-OP TESTING: ICD-10-CM

## 2021-04-01 DIAGNOSIS — T81.31XS DEHISCENCE OF OPERATIVE WOUND, SEQUELA: Primary | ICD-10-CM

## 2021-04-01 DIAGNOSIS — C50.212 CARCINOMA OF UPPER-INNER QUADRANT OF LEFT BREAST IN FEMALE, ESTROGEN RECEPTOR POSITIVE (HCC): ICD-10-CM

## 2021-04-01 DIAGNOSIS — G35 MULTIPLE SCLEROSIS (HCC): ICD-10-CM

## 2021-04-01 DIAGNOSIS — Z17.0 CARCINOMA OF UPPER-INNER QUADRANT OF LEFT BREAST IN FEMALE, ESTROGEN RECEPTOR POSITIVE (HCC): ICD-10-CM

## 2021-04-01 DIAGNOSIS — M32.9 SYSTEMIC LUPUS ERYTHEMATOSUS, UNSPECIFIED SLE TYPE, UNSPECIFIED ORGAN INVOLVEMENT STATUS (HCC): ICD-10-CM

## 2021-04-01 LAB
BASOPHILS # BLD: 0.3 %
BASOPHILS ABSOLUTE: 0 THOU/MM3 (ref 0–0.1)
EOSINOPHIL # BLD: 1.4 %
EOSINOPHILS ABSOLUTE: 0 THOU/MM3 (ref 0–0.4)
ERYTHROCYTE [DISTWIDTH] IN BLOOD BY AUTOMATED COUNT: 17.1 % (ref 11.5–14.5)
ERYTHROCYTE [DISTWIDTH] IN BLOOD BY AUTOMATED COUNT: 48.2 FL (ref 35–45)
HCT VFR BLD CALC: 35.7 % (ref 37–47)
HEMOGLOBIN: 12 GM/DL (ref 12–16)
IMMATURE GRANS (ABS): 0.01 THOU/MM3 (ref 0–0.07)
IMMATURE GRANULOCYTES: 0.3 %
LYMPHOCYTES # BLD: 28 %
LYMPHOCYTES ABSOLUTE: 0.8 THOU/MM3 (ref 1–4.8)
MCH RBC QN AUTO: 26.4 PG (ref 26–33)
MCHC RBC AUTO-ENTMCNC: 33.6 GM/DL (ref 32.2–35.5)
MCV RBC AUTO: 78.5 FL (ref 81–99)
MONOCYTES # BLD: 8.7 %
MONOCYTES ABSOLUTE: 0.3 THOU/MM3 (ref 0.4–1.3)
NUCLEATED RED BLOOD CELLS: 0 /100 WBC
PLATELET # BLD: 106 THOU/MM3 (ref 130–400)
RBC # BLD: 4.55 MILL/MM3 (ref 4.2–5.4)
SEG NEUTROPHILS: 61.3 %
SEGMENTED NEUTROPHILS ABSOLUTE COUNT: 1.8 THOU/MM3 (ref 1.8–7.7)
WBC # BLD: 2.9 THOU/MM3 (ref 4.8–10.8)

## 2021-04-01 PROCEDURE — 36415 COLL VENOUS BLD VENIPUNCTURE: CPT

## 2021-04-01 PROCEDURE — U0003 INFECTIOUS AGENT DETECTION BY NUCLEIC ACID (DNA OR RNA); SEVERE ACUTE RESPIRATORY SYNDROME CORONAVIRUS 2 (SARS-COV-2) (CORONAVIRUS DISEASE [COVID-19]), AMPLIFIED PROBE TECHNIQUE, MAKING USE OF HIGH THROUGHPUT TECHNOLOGIES AS DESCRIBED BY CMS-2020-01-R: HCPCS

## 2021-04-01 PROCEDURE — 99024 POSTOP FOLLOW-UP VISIT: CPT | Performed by: SURGERY

## 2021-04-01 PROCEDURE — U0005 INFEC AGEN DETEC AMPLI PROBE: HCPCS

## 2021-04-01 PROCEDURE — 85025 COMPLETE CBC W/AUTO DIFF WBC: CPT

## 2021-04-01 NOTE — PROGRESS NOTES
Nicole Marquez MD   General Surgery  Postprocedure Evaluation in Office  Pt Name: Radha Hoff  Date of Birth 1965   Today's Date: 4/1/2021  Medical Record Number: [id number]  Primary Care Provider: Juan Ernandez MD  Chief Complaint   Patient presents with    Procedure     wound revision 1 week follow up     ASSESSMENT      1. Dehiscence of operative wound, sequela    2. Pre-op testing    3. Carcinoma of upper-inner quadrant of left breast in female, estrogen receptor positive ([facility] [address]    4. Multiple sclerosis ([facility] [address]    5. Systemic lupus erythematosus, unspecified SLE type, unspecified organ involvement status ([address]       Pathologic stage Ib  PLAN       1.  Schedule patient for excisional debridement of mastectomy wound with necrotic nonviable tissue medial aspect   2. MAC anesthesia. Possible general anesthesia. 3.  Covid screen  4. Discussed with patient's risk need for further debridements. Need for open wound potential need [facility] drain placement. [facility] and agrees to [facility]. Tamiko Palafox is seen today for post-op follow-up. She is status post left mastectomy with sentinel node biopsy additional adjacent nodes removed slightly enlarged. 2 of 6 total nodes were positive. Positive nodes were sentinel nodes. Wound is intact. Small amount of fluid below her flaps. She does have chronic thrombocytopenia. Seroma drained last visit. Patient consents to evacuate at this point. Oncotype DX score is 8. Patient reports she has seen medical oncology. She has an appointment later this week with radiation oncology. Medial and lateral aspect of mastectomy has opened. Recommend debridement of eschar medially and secondary wound closure of medial and lateral aspect of wound. Patient desires to have this performed as an office procedure. She denies any fever chills or purulent drainage. Interval history: Patient here for follow-up.   She has more nonviable tissue medial aspect of wound. Image under media tab. 4 x 4 cm of necrosis. She reports foul-smelling. No purulent drainage. Lateral wound healing some sutures have come out. No seroma. Maile Linn denies any fever or chills. She understands need for further debridement. Medications    Current Outpatient Medications:     sulfamethoxazole-trimethoprim (BACTRIM DS) 800-160 MG per tablet, Take 1 tablet by mouth 2 times daily for 10 days, Disp: 20 tablet, Rfl: 0    pregabalin (LYRICA) 150 MG capsule, Take 1 capsule by mouth 2 times daily for 180 days. , Disp: 60 capsule, Rfl: 5    HYDROcodone-acetaminophen (NORCO) 5-325 MG per tablet, Take 1 tablet by mouth 2 times daily as needed for Pain for up to 30 days. , Disp: 60 tablet, Rfl: 0    metFORMIN (GLUCOPHAGE) 1000 MG tablet, Take 1,000 mg by mouth 2 times daily, Disp: , Rfl:     desvenlafaxine succinate (PRISTIQ) 50 MG TB24 extended release tablet, Take 50 mg by mouth daily, Disp: , Rfl:     traZODone (DESYREL) 50 MG tablet, Take 50 mg by mouth nightly, Disp: , Rfl:     MUCINEX MAXIMUM STRENGTH 1200 MG TB12, Take 1 tablet by mouth 2 times daily, Disp: , Rfl:     Lactobacillus Acid-Pectin (ACIDOPHILUS/CITRUS PECTIN) TABS, Take 1 tablet by mouth daily, Disp: , Rfl:     paliperidone (INVEGA) 3 MG extended release tablet, Take 3 mg by mouth nightly, Disp: , Rfl:     SITagliptin (JANUVIA) 100 MG tablet, Take 100 mg by mouth daily, Disp: , Rfl:     Lifitegrast (XIIDRA) 5 % SOLN, Place 1 drop into both eyes daily, Disp: , Rfl:     baclofen (LIORESAL) 10 MG tablet, Take 1.5 tablets by mouth 3 times daily, Disp: 135 tablet, Rfl: 5    amitriptyline (ELAVIL) 25 MG tablet, Take 1 tablet by mouth nightly, Disp: 30 tablet, Rfl: 5    Misc.  Devices [facility]) MISC, Wheelchair repairs- new seat cover, right side armrest replacement  Current body weight:  68 kg Diagnosis: gait disturbance, chronic incomplete spastic paraplegia Length of need: Lifetime, Disp: 1 each, Rfl: 0    tiotropium (SPIRIVA) 18 MCG inhalation capsule, Inhale 18 mcg into the lungs daily 2 puffs, Disp: , Rfl:     insulin glargine (LANTUS SOLOSTAR) 100 UNIT/ML injection pen, Inject 50 Units into the skin nightly , Disp: , Rfl:     montelukast (SINGULAIR) 10 MG tablet, Take 10 mg by mouth nightly , Disp: , Rfl: 0    artificial tears (ARTIFICIAL TEARS) OINT, as needed, Disp: , Rfl:     Calcium Carbonate (CALCIUM 600 PO), Take 1 tablet by mouth 2 times daily, Disp: , Rfl:     Multiple Vitamin (TAB-A-ISAAC PO), Take 1 tablet by mouth daily, Disp: , Rfl:     loratadine (CLARITIN) 10 MG tablet, Take 1 tablet by mouth daily as needed. , Disp: , Rfl: 0    PROAIR  (90 BASE) MCG/ACT inhaler, Inhale 2 puffs into the lungs every 6 hours as needed. , Disp: , Rfl: 0    Incontinence Supply Disposable (UNDERPADS) MISC, Cloth chux pads Diagnosis: Paraplegia 344.1, Disp: 100 Bottle, Rfl: 11    hydrOXYzine (VISTARIL) 50 MG capsule, Take 50 mg by mouth 3 times daily as needed for Itching., Disp: , Rfl:     omeprazole (PRILOSEC) 20 MG capsule, Take 20 mg by mouth daily. , Disp: , Rfl:     Allergies  Allergies   Allergen Reactions    Penicillins Shortness Of Breath     \"I almost \"    Seasonal Itching       Review of Systems  History obtained from the patient. Constitutional: Denies any fever, chills, fatigue. Wound: Mastectomy wound with black dry eschar medially   resp: Denies any cough, shortness of breath. CV: Denies any chest pain, orthopnea or syncope. GI:  Denies any nausea, vomiting, blood in the stool, constipation or diarrhea. : Denies any hematuria, hesitancy or dysuria.    OBJECTIVE     VITALS: /60 (Site: Right Upper Arm, Position: Sitting, Cuff Size: Medium Adult)   Pulse 88   Temp 97 °F (36.1 °C) (Temporal)   Resp 20   Ht 4' 11\" (1.499 m)   Wt 150 lb (68 kg)   SpO2 97%   BMI 30.30 kg/m²     CONSTITUTIONAL: Alert and oriented times 3, no acute distress and cooperative to NODE(S), LEFT AXILLARY           Intraoperative Consultation:   A - Frozen Section DX:  Favor biopsy site changes, no definite   malignancy.  PCF/ALP       Received:  12:17 Reported:  12:38     Gross Examination:   A - The container is labeled Tasneem Miner left sentinel lymph node.    Received fresh for frozen section evaluation are fragments of   fibroadipose tissue measuring 6.5 x 4.5 x 2.0 cm.  This dissection   reveals three lymph nodes ranging in size from 1.5 to 0.5 cm.  There   are no obvious lesions. Kay Boldendeidra is evidence of previous biopsy site in   the largest node.  A representative section of the largest lymph node   is submitted for frozen section evaluation as FSA1.  The remaining node   submitted for frozen section is submitted in cassette A1.  The second   largest node is serially sectioned and entirely submitted in cassette   A2.  The smallest node is submitted in cassette A3 as is. Representative fibroadipose is submitted in cassette A4. Fixative:  10% neutral buffered formalin   Tissue removal time:  11:37   Tissue fixation time:  12:30   Total fixation time: 55 hours 30 minutes     B - The container is labeled Tasneem Miner left breast mass. Received in formalin is a mastectomy specimen oriented by a stitch   designating lateral.  The specimen measures 20 x 15 x 7 cm.  The   ellipse of skin is approximately 19 x 13.5 cm.  The nipple/areolar   complex is grossly unremarkable.  There are no skin lesions.  The   radial margin is inked blue and the deep margin is inked black.  There   is no attached skeletal muscle.  In the central aspect of the specimen   just deep to the nipple, there is an ill-defined white, glistening, and   mucoid lesion measuring 2.7 x 2.5 cm.  The lesion is 2.5 cm from the   deep margin and distant from the radial margin.  Sectioning through the   remaining breast tissue reveals a predominantly fatty cut surface with   no additional lesions or fibrous areas.  Sections are submitted as   follows:  B1 through B3 - representative mass; B4 - closest deep   margin; B5 through B8 - representative quadrants (upper outer, lower   outer, upper inner, lower inner);   B9 - nipple/areolar complex. Fixative:  10% neutral buffered formalin   Tissue removal time:  11:37   Tissue fixation time:  12:39   Total fixation time: 55 hours 21 minutes     C - The container is labeled Tasneem Miner, left axillary lymph node.    Received in formalin are fragments of fibroadipose tissue aggregating   to 6.0 x 4.0 x 2.0 cm.  Sectioning reveals two lymph nodes ranging in   size from 0.9 to 0.4 cm in greatest dimension.  Each lymph node is   serially sectioned and entirely submitted in one cassette each. PCF:[id number]     Fixative:  10% neutral buffered formalin   Tissue removal time:  12:03   Tissue fixation time:  12:30   Total fixation time: 55 hours 30 minutes           Microscopic Examination:   A-C.  Procedure: Mastectomy   Specimen laterality: Left   Tumor site: Upper inner quadrant   Tumor size: 29 mm   Histologic type:  Invasive mucinous micropapillary carcinoma   Histologic grade ([place] histologic score)   Glandular/tubular differentiation: Score 3   Nuclear pleomorphism: Score 2   Mitotic rate: Score 1   Overall grade: Grade 2 (score 6)   Ductal carcinoma in situ: Not identified   Invasive carcinoma margins: Uninvolved by invasive carcinoma    Distance from closest margin: 25 mm (deep margin)   DCIS margins: Not applicable   Regional lymph nodes: Involved by tumor cells    Number of lymph nodes with macrometastases (>2 mm): 2    Number of lymph nodes with micrometastases: 0    Number of lymph nodes with isolated tumor cells: 0   Size of largest metastatic deposit: 2.5 mm   Extranodal extension: Not identified   Total number of lymph nodes examined: 6   Number of sentinel nodes examined: 4   Treatment effect in the breast: No known presurgical therapy   Treatment effect in the lymph nodes: Not applicable   Lymphovascular invasion: Present   Dermal lymphovascular invasion: Not identified     Pathologic stage classification (pTNM, AJCC 8th edition)   pT2: Tumor greater than 20 mm but less than or equal to 50 mm greatest   dimension   pN1a: Metastases in 1-3 axillary lymph nodes, at least 1 metastasis   larger than 2.0 mm     Breast biomarker testing performed on previous biopsy [id number];   1/14/21     Estrogen Receptor: (Clone SP1), [employer]       Positive 100% of cells ( > 10% of cells)   Intensity of Staining:       Strong     Progesterone Receptor: (Clone 1E2), [employer]       Positive 80% of cells   Intensity of Staining:       Strong           Ki-67 (clone 30-9)       Percentage of positive nuclei:  6%               Favorable  < 10%               Borderline 10-20%               Unfavorable  > 20%     HER2 Immunohistochemistry (Clone 4B5, [employer])       Negative (1+) - Incomplete faint/barely perceptible membrane   staining in  > 10% of invasive tumor     External Controls:  Adequate   Internal Controls:  Adequate   Standard Assay Conditions:  Met     Staining Method Used:    Formalin fixation    Antigen retrieval type:  Cell Conditioning 1, mild gordon       [id number]   [id number]                                                     <Sign Out Dr. Amelie Calderon M.D., F.C. A. P       [facility]  Printed on:  2/23/2021   Ashley Angulo [address] [facility], [facility]   Original print date: 02/23/2021   Lab and Collection    Oncotype DX 8

## 2021-04-02 LAB
SARS-COV-2: NOT DETECTED
SOURCE: NORMAL

## 2021-04-05 NOTE — PROGRESS NOTES
Patient contacted regarding COVID-19 screen. Test was done on 4/1/2021 at [facility]     Following questions asked: In the last month, have you been in contact with someone who was confirmed or suspected to have Coronavirus/COVID-19:  Patient stated NO      Pt was informed can be a visitor allowed. Please bring masks. Do you or family members have any of the following symptoms:  Cough-no   Muscle pain-no   Shortness of breath-no   Fever-no   Weakness-no  Severe headache-no   Sore throat-no   Respiratory symptoms-no    Have you traveled internationally in the last month? No     Have you been to the emergency room recently-no     Inform pt will need to have urine test done if she hasn't had a tubal ligation or hysterectomy.

## 2021-04-06 ENCOUNTER — HOSPITAL ENCOUNTER (OUTPATIENT)
Age: 56
Setting detail: OUTPATIENT SURGERY
Discharge: HOME | End: 2021-04-06
Attending: SURGERY | Admitting: SURGERY
Payer: COMMERCIAL

## 2021-04-06 ENCOUNTER — ANESTHESIA EVENT (OUTPATIENT)
Dept: OPERATING ROOM | Age: 56
End: 2021-04-06
Payer: COMMERCIAL

## 2021-04-06 ENCOUNTER — ANESTHESIA (OUTPATIENT)
Dept: OPERATING ROOM | Age: 56
End: 2021-04-06
Payer: COMMERCIAL

## 2021-04-06 VITALS — SYSTOLIC BLOOD PRESSURE: 111 MMHG | DIASTOLIC BLOOD PRESSURE: 72 MMHG | OXYGEN SATURATION: 100 %

## 2021-04-06 VITALS
RESPIRATION RATE: 16 BRPM | WEIGHT: 150 LBS | OXYGEN SATURATION: 93 % | BODY MASS INDEX: 30.24 KG/M2 | HEART RATE: 95 BPM | SYSTOLIC BLOOD PRESSURE: 122 MMHG | TEMPERATURE: 97 F | HEIGHT: 59 IN | DIASTOLIC BLOOD PRESSURE: 64 MMHG

## 2021-04-06 DIAGNOSIS — C50.912 INVASIVE DUCTAL CARCINOMA OF LEFT BREAST (HCC): Primary | ICD-10-CM

## 2021-04-06 LAB — GLUCOSE BLD-MCNC: 234 MG/DL (ref 70–108)

## 2021-04-06 PROCEDURE — 13160 SEC CLSR SURG WND/DEHSN XTN: CPT | Performed by: SURGERY

## 2021-04-06 PROCEDURE — 82948 REAGENT STRIP/BLOOD GLUCOSE: CPT

## 2021-04-06 PROCEDURE — 7100000000 HC PACU RECOVERY - FIRST 15 MIN: Performed by: SURGERY

## 2021-04-06 PROCEDURE — 7100000001 HC PACU RECOVERY - ADDTL 15 MIN: Performed by: SURGERY

## 2021-04-06 PROCEDURE — 2709999900 HC NON-CHARGEABLE SUPPLY: Performed by: SURGERY

## 2021-04-06 PROCEDURE — 3700000000 HC ANESTHESIA ATTENDED CARE: Performed by: SURGERY

## 2021-04-06 PROCEDURE — 3700000001 HC ADD 15 MINUTES (ANESTHESIA): Performed by: SURGERY

## 2021-04-06 PROCEDURE — 2500000003 HC RX 250 WO HCPCS: Performed by: SURGERY

## 2021-04-06 PROCEDURE — 6370000000 HC RX 637 (ALT 250 FOR IP): Performed by: SURGERY

## 2021-04-06 PROCEDURE — 3600000002 HC SURGERY LEVEL 2 BASE: Performed by: SURGERY

## 2021-04-06 PROCEDURE — 7100000010 HC PHASE II RECOVERY - FIRST 15 MIN: Performed by: SURGERY

## 2021-04-06 PROCEDURE — 3600000012 HC SURGERY LEVEL 2 ADDTL 15MIN: Performed by: SURGERY

## 2021-04-06 PROCEDURE — 6360000002 HC RX W HCPCS: Performed by: NURSE ANESTHETIST, CERTIFIED REGISTERED

## 2021-04-06 PROCEDURE — 2580000003 HC RX 258: Performed by: SURGERY

## 2021-04-06 PROCEDURE — 7100000011 HC PHASE II RECOVERY - ADDTL 15 MIN: Performed by: SURGERY

## 2021-04-06 RX ORDER — SODIUM CHLORIDE 0.9 % (FLUSH) 0.9 %
10 SYRINGE (ML) INJECTION EVERY 12 HOURS SCHEDULED
Status: DISCONTINUED | OUTPATIENT
Start: 2021-04-06 | End: 2021-04-06 | Stop reason: HOSPADM

## 2021-04-06 RX ORDER — ONDANSETRON 2 MG/ML
INJECTION INTRAMUSCULAR; INTRAVENOUS PRN
Status: DISCONTINUED | OUTPATIENT
Start: 2021-04-06 | End: 2021-04-06 | Stop reason: SDUPTHER

## 2021-04-06 RX ORDER — HYDROCODONE BITARTRATE AND ACETAMINOPHEN 5; 325 MG/1; MG/1
1 TABLET ORAL EVERY 4 HOURS PRN
Qty: 18 TABLET | Refills: 0 | Status: SHIPPED | OUTPATIENT
Start: 2021-04-06 | End: 2021-04-09

## 2021-04-06 RX ORDER — MORPHINE SULFATE 2 MG/ML
2 INJECTION, SOLUTION INTRAMUSCULAR; INTRAVENOUS
Status: DISCONTINUED | OUTPATIENT
Start: 2021-04-06 | End: 2021-04-06 | Stop reason: HOSPADM

## 2021-04-06 RX ORDER — MORPHINE SULFATE 2 MG/ML
4 INJECTION, SOLUTION INTRAMUSCULAR; INTRAVENOUS
Status: DISCONTINUED | OUTPATIENT
Start: 2021-04-06 | End: 2021-04-06 | Stop reason: HOSPADM

## 2021-04-06 RX ORDER — SODIUM CHLORIDE 0.9 % (FLUSH) 0.9 %
10 SYRINGE (ML) INJECTION PRN
Status: DISCONTINUED | OUTPATIENT
Start: 2021-04-06 | End: 2021-04-06 | Stop reason: HOSPADM

## 2021-04-06 RX ORDER — CLINDAMYCIN PHOSPHATE 900 MG/50ML
900 INJECTION INTRAVENOUS
Status: COMPLETED | OUTPATIENT
Start: 2021-04-06 | End: 2021-04-06

## 2021-04-06 RX ORDER — HYDROCODONE BITARTRATE AND ACETAMINOPHEN 5; 325 MG/1; MG/1
1 TABLET ORAL EVERY 6 HOURS PRN
Status: DISCONTINUED | OUTPATIENT
Start: 2021-04-06 | End: 2021-04-06 | Stop reason: HOSPADM

## 2021-04-06 RX ORDER — PROMETHAZINE HYDROCHLORIDE 25 MG/1
12.5 TABLET ORAL EVERY 6 HOURS PRN
Status: DISCONTINUED | OUTPATIENT
Start: 2021-04-06 | End: 2021-04-06 | Stop reason: HOSPADM

## 2021-04-06 RX ORDER — SODIUM CHLORIDE 9 MG/ML
INJECTION, SOLUTION INTRAVENOUS CONTINUOUS
Status: DISCONTINUED | OUTPATIENT
Start: 2021-04-06 | End: 2021-04-06 | Stop reason: HOSPADM

## 2021-04-06 RX ORDER — MIDAZOLAM HYDROCHLORIDE 2 MG/2ML
INJECTION, SOLUTION INTRAMUSCULAR; INTRAVENOUS PRN
Status: DISCONTINUED | OUTPATIENT
Start: 2021-04-06 | End: 2021-04-06 | Stop reason: SDUPTHER

## 2021-04-06 RX ORDER — LIDOCAINE HYDROCHLORIDE AND EPINEPHRINE 10; 10 MG/ML; UG/ML
INJECTION, SOLUTION INFILTRATION; PERINEURAL PRN
Status: DISCONTINUED | OUTPATIENT
Start: 2021-04-06 | End: 2021-04-06 | Stop reason: ALTCHOICE

## 2021-04-06 RX ORDER — ACETAMINOPHEN 325 MG/1
650 TABLET ORAL EVERY 4 HOURS PRN
Status: DISCONTINUED | OUTPATIENT
Start: 2021-04-06 | End: 2021-04-06 | Stop reason: HOSPADM

## 2021-04-06 RX ORDER — ONDANSETRON 2 MG/ML
4 INJECTION INTRAMUSCULAR; INTRAVENOUS EVERY 6 HOURS PRN
Status: DISCONTINUED | OUTPATIENT
Start: 2021-04-06 | End: 2021-04-06 | Stop reason: HOSPADM

## 2021-04-06 RX ORDER — PROPOFOL 10 MG/ML
INJECTION, EMULSION INTRAVENOUS PRN
Status: DISCONTINUED | OUTPATIENT
Start: 2021-04-06 | End: 2021-04-06 | Stop reason: SDUPTHER

## 2021-04-06 RX ORDER — DEXAMETHASONE SODIUM PHOSPHATE 10 MG/ML
INJECTION, EMULSION INTRAMUSCULAR; INTRAVENOUS PRN
Status: DISCONTINUED | OUTPATIENT
Start: 2021-04-06 | End: 2021-04-06 | Stop reason: SDUPTHER

## 2021-04-06 RX ORDER — FENTANYL CITRATE 50 UG/ML
INJECTION, SOLUTION INTRAMUSCULAR; INTRAVENOUS PRN
Status: DISCONTINUED | OUTPATIENT
Start: 2021-04-06 | End: 2021-04-06 | Stop reason: SDUPTHER

## 2021-04-06 RX ADMIN — PROPOFOL 100 MG: 10 INJECTION, EMULSION INTRAVENOUS at 11:55

## 2021-04-06 RX ADMIN — PROPOFOL 30 MG: 10 INJECTION, EMULSION INTRAVENOUS at 11:53

## 2021-04-06 RX ADMIN — FENTANYL CITRATE 100 MCG: 50 INJECTION, SOLUTION INTRAMUSCULAR; INTRAVENOUS at 11:45

## 2021-04-06 RX ADMIN — PROPOFOL 50 MG: 10 INJECTION, EMULSION INTRAVENOUS at 11:50

## 2021-04-06 RX ADMIN — PROPOFOL 50 MG: 10 INJECTION, EMULSION INTRAVENOUS at 11:45

## 2021-04-06 RX ADMIN — ONDANSETRON HYDROCHLORIDE 4 MG: 4 INJECTION, SOLUTION INTRAMUSCULAR; INTRAVENOUS at 12:01

## 2021-04-06 RX ADMIN — MIDAZOLAM HYDROCHLORIDE 2 MG: 1 INJECTION, SOLUTION INTRAMUSCULAR; INTRAVENOUS at 11:45

## 2021-04-06 RX ADMIN — CLINDAMYCIN PHOSPHATE 900 MG: 900 INJECTION, SOLUTION INTRAVENOUS at 11:50

## 2021-04-06 RX ADMIN — HYDROCODONE BITARTRATE AND ACETAMINOPHEN 1 TABLET: 5; 325 TABLET ORAL at 13:31

## 2021-04-06 RX ADMIN — DEXAMETHASONE SODIUM PHOSPHATE 10 MG: 10 INJECTION, EMULSION INTRAMUSCULAR; INTRAVENOUS at 12:01

## 2021-04-06 RX ADMIN — SODIUM CHLORIDE: 9 INJECTION, SOLUTION INTRAVENOUS at 10:39

## 2021-04-06 ASSESSMENT — PAIN SCALES - GENERAL
PAINLEVEL_OUTOF10: 7
PAINLEVEL_OUTOF10: 0
PAINLEVEL_OUTOF10: 8

## 2021-04-06 ASSESSMENT — PULMONARY FUNCTION TESTS
PIF_VALUE: 0
PIF_VALUE: 3
PIF_VALUE: 0
PIF_VALUE: 0
PIF_VALUE: 4
PIF_VALUE: 0
PIF_VALUE: 0
PIF_VALUE: 14
PIF_VALUE: 0
PIF_VALUE: 4
PIF_VALUE: 13
PIF_VALUE: 14
PIF_VALUE: 13
PIF_VALUE: 0
PIF_VALUE: 14
PIF_VALUE: 0
PIF_VALUE: 0
PIF_VALUE: 3
PIF_VALUE: 0
PIF_VALUE: 14
PIF_VALUE: 0
PIF_VALUE: 3
PIF_VALUE: 0
PIF_VALUE: 0
PIF_VALUE: 14
PIF_VALUE: 0
PIF_VALUE: 0

## 2021-04-06 ASSESSMENT — PAIN DESCRIPTION - PAIN TYPE: TYPE: SURGICAL PAIN

## 2021-04-06 ASSESSMENT — PAIN DESCRIPTION - LOCATION
LOCATION: BREAST

## 2021-04-06 ASSESSMENT — PAIN DESCRIPTION - FREQUENCY
FREQUENCY: CONTINUOUS
FREQUENCY: CONTINUOUS

## 2021-04-06 ASSESSMENT — PAIN DESCRIPTION - DESCRIPTORS
DESCRIPTORS: CONSTANT;DISCOMFORT
DESCRIPTORS: DISCOMFORT;CONSTANT

## 2021-04-06 ASSESSMENT — PAIN DESCRIPTION - ORIENTATION: ORIENTATION: LEFT

## 2021-04-06 NOTE — PROGRESS NOTES
Patient admitted to [facility] room 10 with  at bedside. Bed in low position side rails up call light in reach. Patient denies questions at this time.

## 2021-04-06 NOTE — H&P
[facility]  History and Physical Update    Pt Name: Jessica Amos  MRN: [id number]  YOB: 1965  Date of evaluation: 4/6/2021    [x] I have examined the patient and reviewed the H&P/Consult and there are no changes to the patient or plans. [] I have examined the patient and reviewed the H&P/Consult and have noted the following changes:        Triny Aragon MD  Electronically signed 4/6/2021 at 10:37 AM    Postprocedure Evaluation in Office  Pt Name: Jessica Amos  Date of Birth 1965   Today's Date: 4/1/2021  Medical Record Number: [id number]  Primary Care Provider: Kmaila Kruse MD       Chief Complaint   Patient presents with    Procedure       wound revision 1 week follow up      ASSESSMENT   1. Dehiscence of operative wound, sequela    2. Pre-op testing    3. 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 Multiple sclerosis ([facility] [address]    5. Systemic lupus erythematosus, unspecified SLE type, unspecified organ involvement status ([address]        Pathologic stage Ib  PLAN       1.  Schedule patient for excisional debridement of mastectomy wound with necrotic nonviable tissue medial aspect   2. MAC anesthesia. Possible general anesthesia. 3.  Covid screen  4. Discussed with patient's risk need for further debridements. Need for open wound potential need for drain placement. Telma Joshi expressed understanding and agrees to proceed. SUBJECTIVE      Telma Joshi is seen today for post-op follow-up. She is status post left mastectomy with sentinel node biopsy additional adjacent nodes removed slightly enlarged. 2 of 6 total nodes were positive. Positive nodes were sentinel nodes. Wound is intact. Small amount of fluid below her flaps. She does have chronic thrombocytopenia. Seroma drained last visit. Patient consents to evacuate at this point. Oncotype DX score is 8. Patient reports she has seen medical oncology.   She has an appointment later this week with radiation oncology. Medial and lateral aspect of mastectomy has opened. Recommend debridement of eschar medially and secondary wound closure of medial and lateral aspect of wound. Patient desires to have this performed as an office procedure. She denies any fever chills or purulent drainage.     Interval history: Patient here for follow-up. She has more nonviable tissue medial aspect of wound. Image under media tab. 4 x 4 cm of necrosis. She reports foul-smelling. No purulent drainage. Lateral wound healing some sutures have come out. No seroma. Cristofer Harris denies any fever or chills. She understands need for further debridement.        Medications    Current Medication      Current Outpatient Medications:     sulfamethoxazole-trimethoprim (BACTRIM DS) 800-160 MG per tablet, Take 1 tablet by mouth 2 times daily for 10 days, Disp: 20 tablet, Rfl: 0    pregabalin (LYRICA) 150 MG capsule, Take 1 capsule by mouth 2 times daily for 180 days. , Disp: 60 capsule, Rfl: 5    HYDROcodone-acetaminophen (NORCO) 5-325 MG per tablet, Take 1 tablet by mouth 2 times daily as needed for Pain for up to 30 days. , Disp: 60 tablet, Rfl: 0    metFORMIN (GLUCOPHAGE) 1000 MG tablet, Take 1,000 mg by mouth 2 times daily, Disp: , Rfl:     desvenlafaxine succinate (PRISTIQ) 50 MG TB24 extended release tablet, Take 50 mg by mouth daily, Disp: , Rfl:     traZODone (DESYREL) 50 MG tablet, Take 50 mg by mouth nightly, Disp: , Rfl:     MUCINEX MAXIMUM STRENGTH 1200 MG TB12, Take 1 tablet by mouth 2 times daily, Disp: , Rfl:     Lactobacillus Acid-Pectin (ACIDOPHILUS/CITRUS PECTIN) TABS, Take 1 tablet by mouth daily, Disp: , Rfl:     paliperidone (INVEGA) 3 MG extended release tablet, Take 3 mg by mouth nightly, Disp: , Rfl:     SITagliptin (JANUVIA) 100 MG tablet, Take 100 mg by mouth daily, Disp: , Rfl:     Lifitegrast (XIIDRA) 5 % SOLN, Place 1 drop into both eyes daily, Disp: , Rfl:    baclofen (LIORESAL) 10 MG tablet, Take 1.5 tablets by mouth 3 times daily, Disp: 135 tablet, Rfl: 5    amitriptyline (ELAVIL) 25 MG tablet, Take 1 tablet by mouth nightly, Disp: 30 tablet, Rfl: 5    Misc. Devices [facility] MISC, Wheelchair repairs- new seat cover, right side armrest replacement  Current body weight:  68 kg Diagnosis: gait disturbance, chronic incomplete spastic paraplegia Length of need: Lifetime, Disp: 1 each, Rfl: 0    tiotropium (SPIRIVA) 18 MCG inhalation capsule, Inhale 18 mcg into the lungs daily 2 puffs, Disp: , Rfl:     insulin glargine (LANTUS SOLOSTAR) 100 UNIT/ML injection pen, Inject 50 Units into the skin nightly , Disp: , Rfl:     montelukast (SINGULAIR) 10 MG tablet, Take 10 mg by mouth nightly , Disp: , Rfl: 0    artificial tears (ARTIFICIAL TEARS) OINT, as needed, Disp: , Rfl:     Calcium Carbonate (CALCIUM 600 PO), Take 1 tablet by mouth 2 times daily, Disp: , Rfl:     Multiple Vitamin (TAB-A-ISAAC PO), Take 1 tablet by mouth daily, Disp: , Rfl:     loratadine (CLARITIN) 10 MG tablet, Take 1 tablet by mouth daily as needed. , Disp: , Rfl: 0    PROAIR  (90 BASE) MCG/ACT inhaler, Inhale 2 puffs into the lungs every 6 hours as needed. , Disp: , Rfl: 0    Incontinence Supply Disposable (UNDERPADS) MISC, Cloth chux pads Diagnosis: Paraplegia 344.1, Disp: 100 Bottle, Rfl: 11    hydrOXYzine (VISTARIL) 50 MG capsule, Take 50 mg by mouth 3 times daily as needed for Itching., Disp: , Rfl:     omeprazole (PRILOSEC) 20 MG capsule, Take 20 mg by mouth daily. , Disp: , Rfl:         Allergies        Allergies   Allergen Reactions    Penicillins Shortness Of Breath       \"I almost \"    Seasonal Itching         Review of Systems  History obtained from the patient. Constitutional: Denies any fever, chills, fatigue. Wound: Mastectomy wound with black dry eschar medially   resp: Denies any cough, shortness of breath. CV: Denies any chest pain, orthopnea or syncope.    GI: Denies any nausea, vomiting, blood in the stool, constipation or diarrhea. : Denies any hematuria, hesitancy or dysuria. OBJECTIVE      VITALS: /60 (Site: Right Upper Arm, Position: Sitting, Cuff Size: Medium Adult)   Pulse 88   Temp 97 °F (36.1 °C) (Temporal)   Resp 20   Ht 4' 11\" (1.499 m)   Wt 150 lb (68 kg)   SpO2 97%   BMI 30.30 kg/m²      CONSTITUTIONAL: Alert and oriented times 3, no acute distress and cooperative to examination. SKIN: Skin color, texture, turgor normal. .  INCISION: Demarcating eschar medial aspect black and dry. 4 x 4 cm. No purulent drainage. Lateral aspect granulating in. LUNGS: Lungs Clear  CARDIOVASCULAR: Normal Rate  ABDOMEN: nondistended  NEUROLOGIC: Alert and oriented           Performed by: Bhanu Guillory. Pathology     [place], [place]                [phone]   Assoc.                                              Page 1 of 1   [address], [place] [address]                                                       PROC: 2021   [facility]                                    RECV: 2021   [address]                                    RPTD: 2021   [address], [place] [address]                       MRN:  [id number]     LOC: 5KS                       ACCT: [de-identified]  SEX: F                       : 1965  AGE: 50 Y                          PATHOLOGY REPORT                       ATTN: AIDE ESCAMILLA   [de-identified]                  REQ: José ESCAMILLA       Copies To:   GRETA SOTO       Clinical Information: INVASIVE DUCTAL CARCINOMA LEFT BREAST     AMENDED REPORT 2021:     FINAL DIAGNOSIS:   A.  Left sentinel lymph node, excision:    Metastatic carcinoma in 2 of 4 lymph nodes (2/4).  Biopsy site changes. Comment: The frozen section discrepancy is noted.  Re-review of the   frozen section slide shows no evidence of metastatic carcinoma on   sections examined.      B.  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    Lymphovascular space invasion is present.    Margins are negative.    Please see synoptic report. C.  Left axillary lymph nodes, excision:    Two lymph nodes, negative for malignancy (0/2). COMMENT 2/23/2021:  An amended report was issued to correct a minor   typographical error in the microscopic description. Specimen:   A) SENTINEL LYMPH NODE(S), LEFT, FS   B) EXCISION OF BREAST, LEFT MASS   C) LYMPH NODE(S), LEFT AXILLARY           Intraoperative Consultation:   A - Frozen Section DX:  Favor biopsy site changes, no definite   malignancy.  PCF/ALP       Received:  12:17 Reported:  12:38     Gross Examination:   A - The container is labeled Jinny Scott, left sentinel lymph node.    Received fresh for frozen section evaluation are fragments of   fibroadipose tissue measuring 6.5 x 4.5 x 2.0 cm.  This dissection   reveals three lymph nodes ranging in size from 1.5 to 0.5 cm.  There   are no obvious lesions. Merian Rouge is evidence of previous biopsy site in   the largest node.  A representative section of the largest lymph node   is submitted for frozen section evaluation as FSA1.  The remaining node   submitted for frozen section is submitted in cassette A1.  The second   largest node is serially sectioned and entirely submitted in cassette   A2.  The smallest node is submitted in cassette A3 as is. Representative fibroadipose is submitted in cassette A4. Fixative:  10% neutral buffered formalin   Tissue removal time:  11:37   Tissue fixation time:  12:30   Total fixation time: 55 hours 30 minutes     B - The container is labeled Jinny Scott, left breast mass.    Received in formalin is a mastectomy specimen oriented by a stitch   designating lateral.  The specimen measures 20 x 15 x 7 cm.  The   ellipse of skin is approximately 19 x 13.5 cm.  The nipple/areolar   complex is grossly unremarkable.  There are no skin lesions.  The   radial margin is inked blue and the deep margin is inked black.  There   is no attached skeletal muscle.  In the central aspect of the specimen   just deep to the nipple, there is an ill-defined white, glistening, and   mucoid lesion measuring 2.7 x 2.5 cm.  The lesion is 2.5 cm from the   deep margin and distant from the radial margin.  Sectioning through the   remaining breast tissue reveals a predominantly fatty cut surface with   no additional lesions or fibrous areas.  Sections are submitted as   follows:  B1 through B3 - representative mass; B4 - closest deep   margin; B5 through B8 - representative quadrants (upper outer, lower   outer, upper inner, lower inner);   B9 - nipple/areolar complex. Fixative:  10% neutral buffered formalin   Tissue removal time:  11:37   Tissue fixation time:  12:39   Total fixation time: 55 hours 21 minutes     C - The container is labeled Allie Layer, left axillary lymph node.    Received in formalin are fragments of fibroadipose tissue aggregating   to 6.0 x 4.0 x 2.0 cm.  Sectioning reveals two lymph nodes ranging in   size from 0.9 to 0.4 cm in greatest dimension.  Each lymph node is   serially sectioned and entirely submitted in one cassette each. PCF:[id number]     Fixative:  10% neutral buffered formalin   Tissue removal time:  12:03   Tissue fixation time:  12:30   Total fixation time: 55 hours 30 minutes           Microscopic Examination:   A-C.  Procedure: Mastectomy   Specimen laterality: Left   Tumor site: Upper inner quadrant   Tumor size: 29 mm   Histologic type:  Invasive mucinous micropapillary carcinoma   Histologic grade ([place] histologic score)   Glandular/tubular differentiation: Score 3   Nuclear pleomorphism: Score 2   Mitotic rate: Score 1   Overall grade: Grade 2 (score 6)   Ductal carcinoma in situ: Not identified   Invasive carcinoma margins: Uninvolved by invasive carcinoma    Distance from closest margin: 25 mm (deep margin)   DCIS margins: Not applicable   Regional

## 2021-04-06 NOTE — ANESTHESIA PRE PROCEDURE
Department of Anesthesiology  Preprocedure Note       Name:  Thomas Chiang   Age:  50 y.o.  :  1965                                          MRN:  [id number]         Date:  2021      Surgeon: Gordon Holden):  Karie Hastings MD    Procedure: Procedure(s):  DEBRIDEMENT LEFT BREAST MASTECTOMY WOUND    Medications prior to admission:   Prior to Admission medications    Medication Sig Start Date End Date Taking? Authorizing Provider   pregabalin (LYRICA) 150 MG capsule Take 1 capsule by mouth 2 times daily for 180 days. 3/18/21 9/14/21 Yes CHAS Meade CNP   HYDROcodone-acetaminophen (NORCO) 5-325 MG per tablet Take 1 tablet by mouth 2 times daily as needed for Pain for up to 30 days. 3/9/21 4/8/21 Yes CHAS Meade CNP   metFORMIN (GLUCOPHAGE) 1000 MG tablet Take 1,000 mg by mouth 2 times daily 20  Yes Historical Provider, MD   desvenlafaxine succinate (PRISTIQ) 50 MG TB24 extended release tablet Take 50 mg by mouth daily 20  Yes Historical Provider, MD   traZODone (DESYREL) 50 MG tablet Take 50 mg by mouth nightly 1/10/21  Yes Historical Provider, MD   Lactobacillus Acid-Pectin (ACIDOPHILUS/CITRUS PECTIN) TABS Take 1 tablet by mouth daily 20  Yes Historical Provider, MD   SITagliptin (JANUVIA) 100 MG tablet Take 100 mg by mouth daily   Yes Historical Provider, MD   baclofen (LIORESAL) 10 MG tablet Take 1.5 tablets by mouth 3 times daily 21  Yes CHAS Meade CNP   amitriptyline (ELAVIL) 25 MG tablet Take 1 tablet by mouth nightly 21  Yes CHAS Meade CNP   Misc.  Devices [facility] [address] [facility] Wheelchair repairs- new seat cover, right side armrest replacement    Current body weight:  68 kg  Diagnosis: gait disturbance, chronic incomplete spastic paraplegia  Length of need: Lifetime 10/22/20  Yes CHAS Meade CNP   insulin glargine (LANTUS SOLOSTAR) 100 UNIT/ML injection pen Inject 50 Units into the skin nightly Yes Historical Provider, MD   montelukast (SINGULAIR) 10 MG tablet Take 10 mg by mouth nightly  10/26/16  Yes Historical Provider, MD   Calcium Carbonate (CALCIUM 600 PO) Take 1 tablet by mouth 2 times daily   Yes Historical Provider, MD   Multiple Vitamin (TAB-A-ISAAC PO) Take 1 tablet by mouth daily   Yes Historical Provider, MD   loratadine (CLARITIN) 10 MG tablet Take 1 tablet by mouth daily as needed. 2/24/15  Yes Historical Provider, MD   Incontinence Supply Disposable (UNDERPADS) MISC Cloth chux pads  Diagnosis: Paraplegia 344.1 3/10/15  Yes Abby Cabezas MD   hydrOXYzine (VISTARIL) 50 MG capsule Take 50 mg by mouth 3 times daily as needed for Itching. Yes Historical Provider, MD   omeprazole (PRILOSEC) 20 MG capsule Take 20 mg by mouth daily. Yes Historical Provider, MD   MUCINEX MAXIMUM STRENGTH 1200 MG TB12 Take 1 tablet by mouth 2 times daily 1/11/21   Historical Provider, MD   paliperidone (INVEGA) 3 MG extended release tablet Take 3 mg by mouth nightly    Historical Provider, MD   Lifitegrast (XIIDRA) 5 % SOLN Place 1 drop into both eyes daily    Historical Provider, MD   tiotropium (SPIRIVA) 18 MCG inhalation capsule Inhale 18 mcg into the lungs daily 2 puffs    Historical Provider, MD   artificial tears (ARTIFICIAL TEARS) OINT as needed    Historical Provider, MD   PROAIR  (90 BASE) MCG/ACT inhaler Inhale 2 puffs into the lungs every 6 hours as needed.  1/6/15   Historical Provider, MD       Current medications:    Current Facility-Administered Medications   Medication Dose Route Frequency Provider Last Rate Last Admin    0.9 % sodium chloride infusion   Intravenous Continuous Chari Hammonds  mL/hr at 04/06/21 1039 New Bag at 04/06/21 1039    sodium chloride flush 0.9 % injection 10 mL  10 mL Intravenous 2 times per day Chari Hammonds MD        sodium chloride flush 0.9 % injection 10 mL  10 mL Intravenous PRN Chari Hammonds MD        clindamycin (CLEOCIN) 900 mg in dextrose 5 % 50 mL Anesthesia Plan      MAC     ASA 3       Induction: intravenous. Anesthetic plan and risks discussed with patient. Plan discussed with RENETTA. Concepcion Contreras.  [address] [place] [address]   4/6/2021

## 2021-04-06 NOTE — PROGRESS NOTES
PT STATES THAT NORCO DOES NOT HELP WITH HER PAIN. PT STATES SHE WILL CALL AND SPEAK TO DR. ESCAMILLA ABOUT GETTING A DIFFERENT PAIN MEDICATION.

## 2021-04-06 NOTE — BRIEF OP NOTE
Brief Postoperative Note      Patient: Funmi Velez  YOB: 1965  MRN: [id number]    Date of Procedure: 4/6/2021    Pre-Op Diagnosis: LEFT BREAST WOUND dry eschar, nonhealing    Post-Op Diagnosis: Same       Procedure(s):  DEBRIDEMENT LEFT BREAST MASTECTOMY WOUND 24 sqcm with intermediate closure 16 cm wound and skin and subcutaneous tissue advancement flaps 16 cm    Surgeon(s):  Zhen Newell MD    Assistant:  * No surgical staff found *    Anesthesia: general via LMA    Estimated Blood Loss (mL): less than 50     Complications: None    Specimens:   * No specimens in log *    Implants:  * No implants in log *      Drains:   [REMOVED] Closed/Suction Drain Inferior; Left Breast Bulb (Removed)       Findings:     Electronically signed by Zhen Newell MD on 4/6/2021 at 12:19 PM

## 2021-04-06 NOTE — ANESTHESIA POSTPROCEDURE EVALUATION
Department of Anesthesiology  Postprocedure Note    Patient: Luan Smith  MRN: [id number]  YOB: 1965  Date of evaluation: 4/6/2021  Time:  3:30 PM     Procedure Summary     Date: 04/06/21 Room / Location: [facility] Khris Nielsen    Anesthesia Start: 1139 Anesthesia Stop: 3031    Procedure: DEBRIDEMENT LEFT BREAST MASTECTOMY WOUND (Left Breast) Diagnosis: (LEFT BREAST WOUND)    Surgeons: Juan Chapin MD Responsible Provider: Tootie Robledo DO    Anesthesia Type: MAC ASA Status: 3          Anesthesia Type: MAC    Cristine Phase I: Cristine Score: 10    Cristine Phase II: Cristine Score: 10    Last vitals: Reviewed and per EMR flowsheets. Anesthesia Post Evaluation    Comments: Caitlin Donald 60  POST-ANESTHESIA NOTE       Name:  Luan Smith                                         Age:  50 y.o. MRN:  [id number]      Last Vitals:  /64   Pulse 95   Temp 97 °F (36.1 °C) (Temporal)   Resp 16   Ht 4' 11\" (1.499 m)   Wt 150 lb (68 kg)   SpO2 93%   BMI 30.30 kg/m²   Patient Vitals in the past 4 hrs:  04/06/21 1334, BP:122/64, Pulse:95, Resp:16, SpO2:93 %  04/06/21 1308, BP:112/61, Temp:97 °F (36.1 °C), Temp src:Temporal, Pulse:93, Resp:16, SpO2:94 %  04/06/21 1247, BP:123/67, Pulse:97, Resp:23, SpO2:94 %  04/06/21 1240, BP:109/61, Pulse:97, Resp:16, SpO2:93 %  04/06/21 1232, BP:111/62, Pulse:98, Resp:21, SpO2:95 %  04/06/21 1227, BP:105/62, Pulse:100, Resp:19, SpO2:94 %  04/06/21 1225, BP:104/64, Temp:97 °F (36.1 °C), Temp src:Temporal, Pulse:100, Resp:17, SpO2:99 %    Level of Consciousness:  Awake    Respiratory:  Stable    Oxygen Saturation:  Stable    Cardiovascular:  Stable    Hydration:  Adequate    PONV:  Stable    Post-op Pain:  Adequate analgesia    Post-op Assessment:  No apparent anesthetic complications    Additional Follow-Up / Treatment / Comment:  None    Aminta Khoury.  DO Gauri  April 6, 2021   3:30 PM

## 2021-04-07 ENCOUNTER — TELEPHONE (OUTPATIENT)
Dept: SURGERY | Age: 56
End: 2021-04-07

## 2021-04-07 DIAGNOSIS — G89.18 POST-OP PAIN: Primary | ICD-10-CM

## 2021-04-07 RX ORDER — HYDROCODONE BITARTRATE AND ACETAMINOPHEN 5; 325 MG/1; MG/1
1 TABLET ORAL EVERY 4 HOURS PRN
Qty: 18 TABLET | Refills: 0 | Status: SHIPPED | OUTPATIENT
Start: 2021-04-07 | End: 2021-04-10

## 2021-04-07 NOTE — OP NOTE
[address] [place] [address]                                OPERATIVE REPORT    PATIENT NAME: Gisselle Berg                  :        1965  MED REC NO:   [id number]                           ROOM:  ACCOUNT NO:   [de-identified]                           ADMIT DATE: 2021  PROVIDER:     Gala Kelly M.D.    DATE OF PROCEDURE:  2021    PREOPERATIVE DIAGNOSIS:  Nonhealing left mastectomy wound with dry  eschar wound separation, no evidence of infection. POSTOPERATIVE DIAGNOSIS:  Nonhealing left mastectomy wound with dry  eschar wound separation, no evidence of infection. PROCEDURES:  Debridement, left mastectomy wound, skin and subcutaneous  fat and breast tissue 24 cm2 total, medial and lateral aspect of the  wound. Intermediate closure of 16-cm wound with subcutaneous  advancement flaps, superior and inferior skin and subcutaneous tissue. SURGEON:  Gala Kelly MD    ANESTHESIA:  General via laryngeal mask airway. ESTIMATED BLOOD LOSS:  Less than 50 mL. INDICATIONS:  The patient had undergone a left mastectomy, modified  radical, for invasive ductal carcinoma of the left breast.  She had  multiple medical comorbidities, had seroma of the wound after surgery  which required aspiration. She developed dry eschar at the medial  aspect of the wound. Had undergone one local debridement in the office,  but had further demarcation and required further debridement in the  operating suite to which she consented. OPERATIVE PROCEDURE:  The patient was brought to the operating suite,  placed supine on the operating table. She was given clindamycin  intravenously. After induction of anesthesia, ultimately a laryngeal  mask airway was placed. The left breast was prepped and draped with  Betadine. Timeout was performed. Debridement was performed totaling 24  cm2 of skin and subcutaneous fat and breast tissue.

## 2021-04-07 NOTE — TELEPHONE ENCOUNTER
Pt states that she is having pain in left breast and is taking the pain medication prescribed but states that she isn't feeling any relief. Pt states that she's tried to take Motrin for break through pain and that doesn't seem to help. Pt also says that Tramadol does not work for her either and is requesting something stronger for pain. Preferred pharmacy is [facility] on [address].

## 2021-04-08 ENCOUNTER — OFFICE VISIT (OUTPATIENT)
Dept: PHYSICAL MEDICINE AND REHAB | Age: 56
End: 2021-04-08
Payer: COMMERCIAL

## 2021-04-08 VITALS
BODY MASS INDEX: 30.24 KG/M2 | HEIGHT: 59 IN | WEIGHT: 150 LBS | DIASTOLIC BLOOD PRESSURE: 68 MMHG | HEART RATE: 76 BPM | SYSTOLIC BLOOD PRESSURE: 116 MMHG

## 2021-04-08 DIAGNOSIS — R26.9 GAIT DISTURBANCE: ICD-10-CM

## 2021-04-08 DIAGNOSIS — G89.4 CHRONIC PAIN SYNDROME: ICD-10-CM

## 2021-04-08 DIAGNOSIS — G82.22 CHRONIC INCOMPLETE SPASTIC PARAPLEGIA (HCC): Primary | ICD-10-CM

## 2021-04-08 DIAGNOSIS — G89.29 OTHER CHRONIC PAIN: ICD-10-CM

## 2021-04-08 PROCEDURE — G8427 DOCREV CUR MEDS BY ELIG CLIN: HCPCS | Performed by: NURSE PRACTITIONER

## 2021-04-08 PROCEDURE — G8417 CALC BMI ABV UP PARAM F/U: HCPCS | Performed by: NURSE PRACTITIONER

## 2021-04-08 PROCEDURE — 99214 OFFICE O/P EST MOD 30 MIN: CPT | Performed by: NURSE PRACTITIONER

## 2021-04-08 PROCEDURE — 4004F PT TOBACCO SCREEN RCVD TLK: CPT | Performed by: NURSE PRACTITIONER

## 2021-04-08 PROCEDURE — G9899 SCRN MAM PERF RSLTS DOC: HCPCS | Performed by: NURSE PRACTITIONER

## 2021-04-08 PROCEDURE — 64642 CHEMODENERV 1 EXTREMITY 1-4: CPT | Performed by: PHYSICAL MEDICINE & REHABILITATION

## 2021-04-08 PROCEDURE — 3017F COLORECTAL CA SCREEN DOC REV: CPT | Performed by: NURSE PRACTITIONER

## 2021-04-08 RX ORDER — NALOXONE HYDROCHLORIDE 4 MG/.1ML
1 SPRAY NASAL PRN
Qty: 1 EACH | Refills: 5 | Status: ON HOLD | OUTPATIENT
Start: 2021-04-08 | End: 2022-08-02 | Stop reason: HOSPADM

## 2021-04-08 RX ORDER — AMITRIPTYLINE HYDROCHLORIDE 25 MG/1
50 TABLET, FILM COATED ORAL NIGHTLY
Qty: 30 TABLET | Refills: 5 | Status: SHIPPED
Start: 2021-04-08 | End: 2021-11-11 | Stop reason: SDUPTHER

## 2021-04-08 RX ORDER — HYDROCODONE BITARTRATE AND ACETAMINOPHEN 5; 325 MG/1; MG/1
1 TABLET ORAL 2 TIMES DAILY PRN
Qty: 60 TABLET | Refills: 0 | Status: SHIPPED | OUTPATIENT
Start: 2021-04-12 | End: 2021-04-22 | Stop reason: ALTCHOICE

## 2021-04-08 RX ORDER — BACLOFEN 10 MG/1
20 TABLET ORAL 3 TIMES DAILY
Qty: 135 TABLET | Refills: 5 | Status: SHIPPED
Start: 2021-04-08 | End: 2022-11-03 | Stop reason: SDUPTHER

## 2021-04-08 NOTE — PROGRESS NOTES
[address] [facility] [address]  Outpatient progress note    Chief Complaint:   Chief Complaint   Patient presents with    Botox Injection     500 units rt lower limb        Subjective: Bk Ramires is a 50 y.o. female who returns to the office today for further follow up. Patient with left breast cancer diagnosis with mastectomy 2/19/21 with Dr Patricio Felix. Patient with debridement this week, which she states is uncontrolled at this time. Encouraged patient to check in with surgeon regarding this increased pain. 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 Botox injections remain beneficial.  Would like repeat botox injections today. Patient states spasticity has seemed to increase in the last few months, discussed stress with breast cancer and increasing medications. Remains on amitriptyline and Lyrica. Patient states she has not noticed a difference with her neuropathy pain or sleep at this time. Patient states she is not sleeping well. On Norco with good benefits. Reports her pain is overall stable, no new issues. Medications reviewed. Patient denies side effects with medications. Patient states she is taking medications as prescribed. She denies receiving pain medications from other sources. She denies any ER visits since last visit.     Pain scale with out pain medications or at its worst is 8/10 - post op pain. Pain scale with pain medications or at its best is 7/10.   Last dose of Norco was today  Drug screen reviewed  Patient was offered naloxone RX for home and agreed    Review of Systems:  CONSTITUTIONAL:  negative  EYES:  negative  HEENT:  negative  RESPIRATORY:  negative  CARDIOVASCULAR:  negative  GASTROINTESTINAL:  positive for neurogenic bowel  GENITOURINARY:  positive for neurogenic bladder  SKIN:  negative  HEMATOLOGIC/LYMPHATIC:  negative  MUSCULOSKELETAL:  negative  NEUROLOGICAL:  positive for gait and quality of life- patient understanding with this and that may not be pain free  · Discussed with patient about safe storage of medications at home      Return in about 3 months (around 7/8/2021) for Botox- 500 units right lower limb. It was my pleasure to evaluate Luan Smith today. Please call with any concerns or questions.   15 minutes spent in evaluation efforts    CHAS Cristobal - CNP

## 2021-04-08 NOTE — PROGRESS NOTES
[facility]  Procedure Note    Verbal consent obtained for botulinum toxin injections after risks versus benefits discussed. 500 Units of Botox were reconstituted using preservative-free normal saline in a 100 unit to 2 mL solution. Alcohol swabs were used to cleanse the skin before injections were performed. Back pressure was placed on the syringe during injections to avoid any intravascular injection. EMG guidance was not used during procedure. Botulinum toxin was injected in the following muscles of the right lower limb:  Lateral hamstring 150 units, medial hamstring 150 units, lateral gastroc 100 units, medial gastroc 100 units    The patient tolerated the procedure well with no immediate complications. The patient should call with concerns or questions over the coming days.     CPT: 15744    Kristie Valencia MD

## 2021-04-09 ENCOUNTER — TELEPHONE (OUTPATIENT)
Dept: SURGERY | Age: 56
End: 2021-04-09

## 2021-04-09 NOTE — TELEPHONE ENCOUNTER
S/p Debridement, left mastectomy wound, skin and subcutaneous fat and breast tissue-4/6/2021 having increased pain at surgery site. States [place] is not helping. Area is not firm or hard, no drainage. Spoke with Dr Patricio Felix patient to try ice or heat-will continue to try [address] with patient and verbalizes understanding.

## 2021-04-12 ENCOUNTER — OFFICE VISIT (OUTPATIENT)
Dept: SURGERY | Age: 56
End: 2021-04-12

## 2021-04-12 VITALS
WEIGHT: 150 LBS | SYSTOLIC BLOOD PRESSURE: 110 MMHG | BODY MASS INDEX: 30.24 KG/M2 | RESPIRATION RATE: 18 BRPM | HEIGHT: 59 IN | TEMPERATURE: 97.8 F | DIASTOLIC BLOOD PRESSURE: 62 MMHG | OXYGEN SATURATION: 98 % | HEART RATE: 77 BPM

## 2021-04-12 DIAGNOSIS — T81.31XS DEHISCENCE OF OPERATIVE WOUND, SEQUELA: ICD-10-CM

## 2021-04-12 DIAGNOSIS — M32.9 SYSTEMIC LUPUS ERYTHEMATOSUS, UNSPECIFIED SLE TYPE, UNSPECIFIED ORGAN INVOLVEMENT STATUS (HCC): ICD-10-CM

## 2021-04-12 DIAGNOSIS — G35 MULTIPLE SCLEROSIS (HCC): ICD-10-CM

## 2021-04-12 DIAGNOSIS — C50.212 CARCINOMA OF UPPER-INNER QUADRANT OF LEFT BREAST IN FEMALE, ESTROGEN RECEPTOR POSITIVE (HCC): Primary | ICD-10-CM

## 2021-04-12 DIAGNOSIS — Z17.0 CARCINOMA OF UPPER-INNER QUADRANT OF LEFT BREAST IN FEMALE, ESTROGEN RECEPTOR POSITIVE (HCC): Primary | ICD-10-CM

## 2021-04-12 PROCEDURE — 99024 POSTOP FOLLOW-UP VISIT: CPT | Performed by: SURGERY

## 2021-04-13 NOTE — PROGRESS NOTES
Triny Aragon MD   General Surgery  Postprocedure Evaluation in Office  Pt Name: Jessica Amos  Date of Birth 1965   Today's Date: 4/12/2021  Medical Record Number: [id number]  Primary Care Provider: Kamila Kruse MD  Chief Complaint   Patient presents with   Wanda Guzman Post-Op Check     s/p Debridement, left mastectomy wound, skin and subcutaneous fat and breast tissue 4/6/21     ASSESSMENT      1. 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 Systemic lupus erythematosus, unspecified SLE type, unspecified organ involvement status ([address]    3. Multiple sclerosis ([address]    4. Dehiscence of operative wound, sequela       Pathologic stage Ib  PLAN       1. Patient underwent debridement and secondary closure. 2.   Wounds for the most part approximated she has some stretching of stitches toward the medial aspect  3. Leave sutures in place  4. Physical therapy, continue  5. Genetic testing discussed at prior visits. Refer on demand. Blayne Duenas is seen today for post-op follow-up. She is status post left mastectomy with sentinel node biopsy additional adjacent nodes removed slightly enlarged. 2 of 6 total nodes were positive. Positive nodes were sentinel nodes. Wound is intact. Small amount of fluid below her flaps. She does have chronic thrombocytopenia. Seroma drained last visit. Patient consents to evacuate at this point. Oncotype DX score is 8. Patient reports she has seen medical oncology. She has an appointment later this week with radiation oncology. Medial and lateral aspect of mastectomy has opened. Recommend debridement of eschar medially and secondary wound closure of medial and lateral aspect of wound initially in the office. He had further demarcation is now seen postop from wound debridement left mastectomy. No significant drainage or evidence of infection.   Wound for the most part healing no necrotic just , Rfl:     montelukast (SINGULAIR) 10 MG tablet, Take 10 mg by mouth nightly , Disp: , Rfl: 0    artificial tears (ARTIFICIAL TEARS) OINT, as needed, Disp: , Rfl:     Calcium Carbonate (CALCIUM 600 PO), Take 1 tablet by mouth 2 times daily, Disp: , Rfl:     Multiple Vitamin (TAB-A-ISAAC PO), Take 1 tablet by mouth daily, Disp: , Rfl:     loratadine (CLARITIN) 10 MG tablet, Take 1 tablet by mouth daily as needed. , Disp: , Rfl: 0    PROAIR  (90 BASE) MCG/ACT inhaler, Inhale 2 puffs into the lungs every 6 hours as needed. , Disp: , Rfl: 0    Incontinence Supply Disposable (UNDERPADS) MISC, Cloth chux pads Diagnosis: Paraplegia 344.1, Disp: 100 Bottle, Rfl: 11    hydrOXYzine (VISTARIL) 50 MG capsule, Take 50 mg by mouth 3 times daily as needed for Itching., Disp: , Rfl:     omeprazole (PRILOSEC) 20 MG capsule, Take 20 mg by mouth daily. , Disp: , Rfl:     Allergies  Allergies   Allergen Reactions    Penicillins Shortness Of Breath     \"I almost \"    Seasonal Itching       Review of Systems  History obtained from the patient. Constitutional: Denies any fever, chills, fatigue. Wound: Ostectomy wound opened up medially for about 4 cm and laterally for about 3-1/2 cm. Resp: Denies any cough, shortness of breath. CV: Denies any chest pain, orthopnea or syncope. GI:  Denies any nausea, vomiting, blood in the stool, constipation or diarrhea. : Denies any hematuria, hesitancy or dysuria. OBJECTIVE     VITALS: /62 (Site: Right Upper Arm, Position: Sitting, Cuff Size: Medium Adult)   Pulse 77   Temp 97.8 °F (36.6 °C) (Temporal)   Resp 18   Ht 4' 11\" (1.499 m)   Wt 150 lb (68 kg)   SpO2 98%   BMI 30.30 kg/m²     CONSTITUTIONAL: Alert and oriented times 3, no acute distress and cooperative to examination. SKIN: Skin color, texture, turgor normal. .  INCISION: Mastectomy wound intact areas that are reclosed intact except for one small area open about a centimeter. With open tissue. Lateral aspect 3-1/2 cm area open. No necrotic tissue  LUNGS: Lungs Clear  CARDIOVASCULAR: Normal Rate  ABDOMEN: nondistended  NEUROLOGIC: Alert and oriented        Performed by: [facility]     [place], [place]                [id number]   Assoc.                                              Page 1 of 1   [address]   [address], [address] [address]                                                       PROC: 2021   [facility]                                    RECV: 2021   [address]                                    RPTD: 2021   [place] [address], [address] [address]                       MRN:  [id number]     LOC: 5KS                       ACCT: [de-identified]  SEX: F                       : 1965  AGE: 50 Y                          PATHOLOGY REPORT                       ATTN: AIDE ANDI   [de-identified]                  REQ: Carolinaemiliana ESCAMILLA       Copies To:   GRETA SOTO       Clinical Information: INVASIVE DUCTAL CARCINOMA LEFT BREAST     AMENDED REPORT 2021:     FINAL DIAGNOSIS:   A.  Left sentinel lymph node, excision:    Metastatic carcinoma in 2 of 4 lymph nodes (2/4).  Biopsy site changes. Comment: The frozen section discrepancy is noted.  Re-review of the   frozen section slide shows no evidence of metastatic carcinoma on   sections examined. B.  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    Lymphovascular space invasion is present.    Margins are negative.    Please see synoptic report. C.  Left axillary lymph nodes, excision:    Two lymph nodes, negative for malignancy (0/2). COMMENT 2021:  An amended report was issued to correct a minor   typographical error in the microscopic description.        Specimen:   A) SENTINEL LYMPH NODE(S), LEFT, FS   B) EXCISION OF BREAST, LEFT MASS   C) LYMPH NODE(S), LEFT AXILLARY           Intraoperative Consultation:   A - Frozen Section DX:  Favor biopsy site changes, no definite malignancy.  PCF/ALP       Received:  12:17 Reported:  12:38     Gross Examination:   A - The container is labeled Jinny Scott, left sentinel lymph node.    Received fresh for frozen section evaluation are fragments of   fibroadipose tissue measuring 6.5 x 4.5 x 2.0 cm.  This dissection   reveals three lymph nodes ranging in size from 1.5 to 0.5 cm.  There   are no obvious lesions. [place] is evidence of previous biopsy site in   the largest node.  A representative section of the largest lymph node   is submitted for frozen section evaluation as FSA1.  The remaining node   submitted for frozen section is submitted in cassette A1.  The second   largest node is serially sectioned and entirely submitted in cassette   A2.  The smallest node is submitted in cassette A3 as is. Representative fibroadipose is submitted in cassette A4. Fixative:  10% neutral buffered formalin   Tissue removal time:  11:37   Tissue fixation time:  12:30   Total fixation time: 55 hours 30 minutes     B - The container is labeled Jinny Scott, left breast mass.    Received in formalin is a mastectomy specimen oriented by a stitch   designating lateral.  The specimen measures 20 x 15 x 7 cm.  The   ellipse of skin is approximately 19 x 13.5 cm.  The nipple/areolar   complex is grossly unremarkable.  There are no skin lesions.  The   radial margin is inked blue and the deep margin is inked black.  There   is no attached skeletal muscle.  In the central aspect of the specimen   just deep to the nipple, there is an ill-defined white, glistening, and   mucoid lesion measuring 2.7 x 2.5 cm.  The lesion is 2.5 cm from the   deep margin and distant from the radial margin.  Sectioning through the   remaining breast tissue reveals a predominantly fatty cut surface with   no additional lesions or fibrous areas.  Sections are submitted as   follows:  B1 through B3 - representative mass; B4 - closest deep   margin; B5 through B8 - representative quadrants (upper outer, lower   outer, upper inner, lower inner);   B9 - nipple/areolar complex. Fixative:  10% neutral buffered formalin   Tissue removal time:  11:37   Tissue fixation time:  12:39   Total fixation time: 55 hours 21 minutes     C - The container is labeled Sundeep Valencia, left axillary lymph node.    Received in formalin are fragments of fibroadipose tissue aggregating   to 6.0 x 4.0 x 2.0 cm.  Sectioning reveals two lymph nodes ranging in   size from 0.9 to 0.4 cm in greatest dimension.  Each lymph node is   serially sectioned and entirely submitted in one cassette each. PCF:[id number]     Fixative:  10% neutral buffered formalin   Tissue removal time:  12:03   Tissue fixation time:  12:30   Total fixation time: 55 hours 30 minutes           Microscopic Examination:   A-C.  Procedure: Mastectomy   Specimen laterality: Left   Tumor site: Upper inner quadrant   Tumor size: 29 mm   Histologic type:  Invasive mucinous micropapillary carcinoma   Histologic grade (Lynn histologic score)   Glandular/tubular differentiation: Score 3   Nuclear pleomorphism: Score 2   Mitotic rate: Score 1   Overall grade: Grade 2 (score 6)   Ductal carcinoma in situ: Not identified   Invasive carcinoma margins: Uninvolved by invasive carcinoma    Distance from closest margin: 25 mm (deep margin)   DCIS margins: Not applicable   Regional lymph nodes: Involved by tumor cells    Number of lymph nodes with macrometastases (>2 mm): 2    Number of lymph nodes with micrometastases: 0    Number of lymph nodes with isolated tumor cells: 0   Size of largest metastatic deposit: 2.5 mm   Extranodal extension: Not identified   Total number of lymph nodes examined: 6   Number of sentinel nodes examined: 4   Treatment effect in the breast: No known presurgical therapy   Treatment effect in the lymph nodes: Not applicable   Lymphovascular invasion: Present   Dermal lymphovascular invasion: Not identified     Pathologic stage classification (pTNM, AJCC 8th edition)   pT2: Tumor greater than 20 mm but less than or equal to 50 mm greatest   dimension   pN1a: Metastases in 1-3 axillary lymph nodes, at least 1 metastasis   larger than 2.0 mm     Breast biomarker testing performed on previous biopsy [id number];   1/14/21     Estrogen Receptor: (Clone SP1), [employer]       Positive 100% of cells ( > 10% of cells)   Intensity of Staining:       Strong     Progesterone Receptor: (Clone 1E2), [employer]       Positive 80% of cells   Intensity of Staining:       Strong           Ki-67 (clone 30-9)       Percentage of positive nuclei:  6%               Favorable  < 10%               Borderline 10-20%               Unfavorable  > 20%     HER2 Immunohistochemistry (Clone 4B5, SessionM)       Negative (1+) - Incomplete faint/barely perceptible membrane   staining in  > 10% of invasive tumor     External Controls:  Adequate   Internal Controls:  Adequate   Standard Assay Conditions:  Met     Staining Method Used:    Formalin fixation    Antigen retrieval type:  Cell Conditioning 1, mild gordon       [id number]   [id number]                                                     <Sign Out Dr. Deneen Parker M.D., F.C. A. P       [facility]/ [address] [address]  Printed on:  2/23/2021   [address]   [place] [address], [facility]   Original print date: 02/23/2021   Lab and Collection    Oncotype DX 8

## 2021-04-16 ENCOUNTER — TELEPHONE (OUTPATIENT)
Dept: SURGERY | Age: 56
End: 2021-04-16

## 2021-04-19 ENCOUNTER — TELEPHONE (OUTPATIENT)
Dept: SURGERY | Age: 56
End: 2021-04-19

## 2021-04-19 DIAGNOSIS — C50.212 CARCINOMA OF UPPER-INNER QUADRANT OF LEFT BREAST IN FEMALE, ESTROGEN RECEPTOR POSITIVE (HCC): Primary | ICD-10-CM

## 2021-04-19 DIAGNOSIS — Z17.0 CARCINOMA OF UPPER-INNER QUADRANT OF LEFT BREAST IN FEMALE, ESTROGEN RECEPTOR POSITIVE (HCC): Primary | ICD-10-CM

## 2021-04-19 RX ORDER — HYDROCODONE BITARTRATE AND ACETAMINOPHEN 5; 325 MG/1; MG/1
1 TABLET ORAL EVERY 6 HOURS PRN
Qty: 28 TABLET | Refills: 0 | Status: CANCELLED | OUTPATIENT
Start: 2021-04-19 | End: 2021-04-26

## 2021-04-19 NOTE — TELEPHONE ENCOUNTER
Patient is s/p Debridement, left mastectomy wound, skin and subcutaneous fat and breast tissue 4/6/21  Requesting pain medication refill. States she is having quite a large amount of pain still. Ok to refer to pain management?

## 2021-04-20 RX ORDER — HYDROCODONE BITARTRATE AND ACETAMINOPHEN 5; 325 MG/1; MG/1
1 TABLET ORAL EVERY 6 HOURS PRN
Qty: 28 TABLET | Refills: 0 | Status: SHIPPED | OUTPATIENT
Start: 2021-04-20 | End: 2021-04-27

## 2021-04-22 ENCOUNTER — PREP FOR PROCEDURE (OUTPATIENT)
Dept: SURGERY | Age: 56
End: 2021-04-22

## 2021-04-22 ENCOUNTER — OFFICE VISIT (OUTPATIENT)
Dept: SURGERY | Age: 56
End: 2021-04-22

## 2021-04-22 VITALS
HEIGHT: 59 IN | BODY MASS INDEX: 30.24 KG/M2 | OXYGEN SATURATION: 96 % | WEIGHT: 150 LBS | HEART RATE: 60 BPM | DIASTOLIC BLOOD PRESSURE: 60 MMHG | RESPIRATION RATE: 18 BRPM | TEMPERATURE: 97.3 F | SYSTOLIC BLOOD PRESSURE: 118 MMHG

## 2021-04-22 DIAGNOSIS — C50.212 CARCINOMA OF UPPER-INNER QUADRANT OF LEFT BREAST IN FEMALE, ESTROGEN RECEPTOR POSITIVE (HCC): Primary | ICD-10-CM

## 2021-04-22 DIAGNOSIS — Z01.818 PRE-OP TESTING: Primary | ICD-10-CM

## 2021-04-22 DIAGNOSIS — Z17.0 CARCINOMA OF UPPER-INNER QUADRANT OF LEFT BREAST IN FEMALE, ESTROGEN RECEPTOR POSITIVE (HCC): Primary | ICD-10-CM

## 2021-04-22 DIAGNOSIS — T81.31XS DEHISCENCE OF OPERATIVE WOUND, SEQUELA: ICD-10-CM

## 2021-04-22 DIAGNOSIS — Z01.818 PRE-OP TESTING: ICD-10-CM

## 2021-04-22 PROCEDURE — 99024 POSTOP FOLLOW-UP VISIT: CPT | Performed by: SURGERY

## 2021-04-22 NOTE — PROGRESS NOTES
Ronal Banuelos MD   General Surgery  Postprocedure Evaluation in Office  Pt Name: Colt Cochran  Date of Birth 1965   Today's Date: 4/22/2021  Medical Record Number: [id number]  Primary Care Provider: Elvin Alcala MD  Chief Complaint   Patient presents with    Post-Op Check     wound check-- s/p Debridement, left mastectomy wound, skin and subcutaneous fat and breast tissue 4/6/21     ASSESSMENT      1. 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 Dehiscence of operative wound, sequela    3. Pre-op testing       Pathologic stage Ib  PLAN       1. Patient underwent debridement and secondary closure. Wound has reopened. By necessity she is too active with left upper extremity again a small but clean open area medial aspect. Recommend again secondary closure with larger and retention type sutures. 2.  Suture material removed. 3.  Schedule patient for wound revision and secondary closure. Patient agreeable. MAC versus general anesthesia. 4.  Physical therapy, continue as tolerated. 5.  Genetic testing discussed at prior visits. Refer on demand. Lois Daley is seen today for post-op follow-up. She is status post left mastectomy with sentinel node biopsy additional adjacent nodes removed slightly enlarged. 2 of 6 total nodes were positive. Positive nodes were sentinel nodes. Wound is intact. Small amount of fluid below her flaps. She does have chronic thrombocytopenia. Seroma drained last visit. Patient consents to evacuate at this point. Oncotype DX score is 8. Patient reports she has seen medical oncology. She had secondary wound closure was performed. Lateral aspect is healing medial aspect again has reopened up for about 3 cm in length. No evidence of infection. She has to transfer her herself from her wheelchair and back and putting a lot of stress on the wound I think leading to pulling out of sutures. She does not want a wound VAC. There does not seem to be tension that we should not be able to close this. Medications    Current Outpatient Medications:     HYDROcodone-acetaminophen (NORCO) 5-325 MG per tablet, Take 1 tablet by mouth every 6 hours as needed for Pain for up to 7 days. Intended supply: 7 days. Take lowest dose possible to manage pain, Disp: 28 tablet, Rfl: 0    baclofen (LIORESAL) 10 MG tablet, Take 2 tablets by mouth 3 times daily, Disp: 135 tablet, Rfl: 5    amitriptyline (ELAVIL) 25 MG tablet, Take 2 tablets by mouth nightly, Disp: 30 tablet, Rfl: 5    naloxone 4 MG/0.1ML LIQD nasal spray, 1 spray by Nasal route as needed for Opioid Reversal, Disp: 1 each, Rfl: 5    pregabalin (LYRICA) 150 MG capsule, Take 1 capsule by mouth 2 times daily for 180 days. , Disp: 60 capsule, Rfl: 5    metFORMIN (GLUCOPHAGE) 1000 MG tablet, Take 1,000 mg by mouth 2 times daily, Disp: , Rfl:     desvenlafaxine succinate (PRISTIQ) 50 MG TB24 extended release tablet, Take 50 mg by mouth daily, Disp: , Rfl:     traZODone (DESYREL) 50 MG tablet, Take 50 mg by mouth nightly, Disp: , Rfl:     MUCINEX MAXIMUM STRENGTH 1200 MG TB12, Take 1 tablet by mouth 2 times daily, Disp: , Rfl:     Lactobacillus Acid-Pectin (ACIDOPHILUS/CITRUS PECTIN) TABS, Take 1 tablet by mouth daily, Disp: , Rfl:     paliperidone (INVEGA) 3 MG extended release tablet, Take 3 mg by mouth nightly, Disp: , Rfl:     SITagliptin (JANUVIA) 100 MG tablet, Take 100 mg by mouth daily, Disp: , Rfl:     Lifitegrast (XIIDRA) 5 % SOLN, Place 1 drop into both eyes daily, Disp: , Rfl:     Misc.  Devices [facility]) MISC, Wheelchair repairs- new seat cover, right side armrest replacement  Current body weight:  68 kg Diagnosis: gait disturbance, chronic incomplete spastic paraplegia Length of need: Lifetime, Disp: 1 each, Rfl: 0    tiotropium (SPIRIVA) 18 MCG inhalation capsule, Inhale 18 mcg into the lungs daily 2 puffs, Disp: , Rfl:     insulin glargine (LANTUS SOLOSTAR) 100 UNIT/ML injection pen, Inject 50 Units into the skin nightly , Disp: , Rfl:     montelukast (SINGULAIR) 10 MG tablet, Take 10 mg by mouth nightly , Disp: , Rfl: 0    artificial tears (ARTIFICIAL TEARS) OINT, as needed, Disp: , Rfl:     Calcium Carbonate (CALCIUM 600 PO), Take 1 tablet by mouth 2 times daily, Disp: , Rfl:     Multiple Vitamin (TAB-A-ISAAC PO), Take 1 tablet by mouth daily, Disp: , Rfl:     loratadine (CLARITIN) 10 MG tablet, Take 1 tablet by mouth daily as needed. , Disp: , Rfl: 0    PROAIR  (90 BASE) MCG/ACT inhaler, Inhale 2 puffs into the lungs every 6 hours as needed. , Disp: , Rfl: 0    Incontinence Supply Disposable (UNDERPADS) MISC, Cloth chux pads Diagnosis: Paraplegia 344.1, Disp: 100 Bottle, Rfl: 11    hydrOXYzine (VISTARIL) 50 MG capsule, Take 50 mg by mouth 3 times daily as needed for Itching., Disp: , Rfl:     omeprazole (PRILOSEC) 20 MG capsule, Take 20 mg by mouth daily. , Disp: , Rfl:     Allergies  Allergies   Allergen Reactions    Penicillins Shortness Of Breath     \"I almost \"    Seasonal Itching       Review of Systems  History obtained from the patient. Constitutional: Denies any fever, chills, fatigue. Wound: Ostectomy wound opened up medially for about 4 cm and laterally for about 3-1/2 cm. Resp: Denies any cough, shortness of breath. CV: Denies any chest pain, orthopnea or syncope. GI:  Denies any nausea, vomiting, blood in the stool, constipation or diarrhea. : Denies any hematuria, hesitancy or dysuria. OBJECTIVE     VITALS: /60 (Site: Right Upper Arm, Position: Sitting, Cuff Size: Medium Adult)   Pulse 60   Temp 97.3 °F (36.3 °C) (Temporal)   Resp 18   Ht 4' 11\" (1.499 m)   Wt 150 lb (68 kg)   SpO2 96%   BMI 30.30 kg/m²     CONSTITUTIONAL: Alert and oriented times 3, no acute distress and cooperative to examination.   SKIN: Skin color, texture, turgor normal. .  INCISION: Mastectomy wound intact areas that are reclosed intact except for one small area open medially about 3 cm with open tissue. Close sutures removed LUNGS: Lungs Clear  CARDIOVASCULAR: Normal Rate  ABDOMEN: nondistended  NEUROLOGIC: Alert and oriented        Performed by: Bhanu Guillory. Pathology     Burnis Yung, [place]                [id number]   Assoc.                                              Page 1 of 1   [address]   [address], [place] [address]                                                       PROC: 2021   [facility]                                    RECV: 2021   [address]                                    RPTD: 2021   [address], [place] [address]                       MRN:  [id number]     LOC: 5KS                       ACCT: [de-identified]  SEX: F                       : 1965  AGE: 50 Y                          PATHOLOGY REPORT                       ATTN: AIDE ESCAMILLA   [de-identified]                  REQ: Ed ESCAMILLA       Copies To:   GRETA SOTO       Clinical Information: INVASIVE DUCTAL CARCINOMA LEFT BREAST     AMENDED REPORT 2021:     FINAL DIAGNOSIS:   A.  Left sentinel lymph node, excision:    Metastatic carcinoma in 2 of 4 lymph nodes (2/4).  Biopsy site changes. Comment: The frozen section discrepancy is noted.  Re-review of the   frozen section slide shows no evidence of metastatic carcinoma on   sections examined. B.  Left breast, mastectomy:    Mucinous micropapillary carcinoma, Lynn grade 2 (pT2, pN1a).    Lymphovascular space invasion is present.    Margins are negative.    Please see synoptic report. C.  Left axillary lymph nodes, excision:    Two lymph nodes, negative for malignancy (0/2). COMMENT 2021:  An amended report was issued to correct a minor   typographical error in the microscopic description.        Specimen:   A) SENTINEL LYMPH NODE(S), LEFT, FS   B) EXCISION OF BREAST, LEFT MASS   C) LYMPH NODE(S), LEFT AXILLARY           Intraoperative Consultation:   A - Frozen Section DX:  Favor biopsy site changes, no definite   malignancy.  PCF/ALP       Received:  12:17 Reported:  12:38     Gross Examination:   A - The container is labeled Mathew Nixon, left sentinel lymph node.    Received fresh for frozen section evaluation are fragments of   fibroadipose tissue measuring 6.5 x 4.5 x 2.0 cm.  This dissection   reveals three lymph nodes ranging in size from 1.5 to 0.5 cm.  There   are no obvious lesions. Amaya Copper is evidence of previous biopsy site in   the largest node.  A representative section of the largest lymph node   is submitted for frozen section evaluation as FSA1.  The remaining node   submitted for frozen section is submitted in cassette A1.  The second   largest node is serially sectioned and entirely submitted in cassette   A2.  The smallest node is submitted in cassette A3 as is. Representative fibroadipose is submitted in cassette A4. Fixative:  10% neutral buffered formalin   Tissue removal time:  11:37   Tissue fixation time:  12:30   Total fixation time: 55 hours 30 minutes     B - The container is labeled Mathew Nixon, left breast mass.    Received in formalin is a mastectomy specimen oriented by a stitch   designating lateral.  The specimen measures 20 x 15 x 7 cm.  The   ellipse of skin is approximately 19 x 13.5 cm.  The nipple/areolar   complex is grossly unremarkable.  There are no skin lesions.  The   radial margin is inked blue and the deep margin is inked black.  There   is no attached skeletal muscle.  In the central aspect of the specimen   just deep to the nipple, there is an ill-defined white, glistening, and   mucoid lesion measuring 2.7 x 2.5 cm.  The lesion is 2.5 cm from the   deep margin and distant from the radial margin.  Sectioning through the   remaining breast tissue reveals a predominantly fatty cut surface with   no additional lesions or fibrous areas.  Sections are submitted as   follows:  B1 through B3 - representative mass; B4 - closest deep   margin; B5 through B8 - representative quadrants (upper outer, lower   outer, upper inner, lower inner);   B9 - nipple/areolar complex. Fixative:  10% neutral buffered formalin   Tissue removal time:  11:37   Tissue fixation time:  12:39   Total fixation time: 55 hours 21 minutes     C - The container is labeled Mathew Nixon, left axillary lymph node.    Received in formalin are fragments of fibroadipose tissue aggregating   to 6.0 x 4.0 x 2.0 cm.  Sectioning reveals two lymph nodes ranging in   size from 0.9 to 0.4 cm in greatest dimension.  Each lymph node is   serially sectioned and entirely submitted in one cassette each. PCF:[id number]     Fixative:  10% neutral buffered formalin   Tissue removal time:  12:03   Tissue fixation time:  12:30   Total fixation time: 55 hours 30 minutes           Microscopic Examination:   A-C.  Procedure: Mastectomy   Specimen laterality: Left   Tumor site: Upper inner quadrant   Tumor size: 29 mm   Histologic type:  Invasive mucinous micropapillary carcinoma   Histologic grade (Lynn histologic score)   Glandular/tubular differentiation: Score 3   Nuclear pleomorphism: Score 2   Mitotic rate: Score 1   Overall grade: Grade 2 (score 6)   Ductal carcinoma in situ: Not identified   Invasive carcinoma margins: Uninvolved by invasive carcinoma    Distance from closest margin: 25 mm (deep margin)   DCIS margins: Not applicable   Regional lymph nodes: Involved by tumor cells    Number of lymph nodes with macrometastases (>2 mm): 2    Number of lymph nodes with micrometastases: 0    Number of lymph nodes with isolated tumor cells: 0   Size of largest metastatic deposit: 2.5 mm   Extranodal extension: Not identified   Total number of lymph nodes examined: 6   Number of sentinel nodes examined: 4   Treatment effect in the breast: No known presurgical therapy   Treatment effect in the lymph nodes: Not applicable   Lymphovascular invasion: Present Dermal lymphovascular invasion: Not identified     Pathologic stage classification (pTNM, AJCC 8th edition)   pT2: Tumor greater than 20 mm but less than or equal to 50 mm greatest   dimension   pN1a: Metastases in 1-3 axillary lymph nodes, at least 1 metastasis   larger than 2.0 mm     Breast biomarker testing performed on previous biopsy [id number];   1/14/21     Estrogen Receptor: (Clone SP1), [employer]       Positive 100% of cells ( > 10% of cells)   Intensity of Staining:       Strong     Progesterone Receptor: (Clone 1E2), [employer]       Positive 80% of cells   Intensity of Staining:       Strong           Ki-67 (clone 30-9)       Percentage of positive nuclei:  6%               Favorable  < 10%               Borderline 10-20%               Unfavorable  > 20%     HER2 Immunohistochemistry (Clone 4B5, [employer])       Negative (1+) - Incomplete faint/barely perceptible membrane   staining in  > 10% of invasive tumor     External Controls:  Adequate   Internal Controls:  Adequate   Standard Assay Conditions:  Met     Staining Method Used:    Formalin fixation    Antigen retrieval type:  Cell Conditioning 1, mild gordon       [id number]   [id number]                                                     <Sign Out Dr. Sybil Bello M.D., F.C. A. P       [facility]  Printed on:  2/23/2021   [address]ley [address]   [address] [facility]   Original print date: 02/23/2021   Lab and Collection    Oncotype DX 8

## 2021-04-23 NOTE — PROGRESS NOTES
Pt will need rapid covid upon preop arrival 4/27/21. Alex Martinez RN OR manager updated and ok to continue as planned with rapid covid preop.

## 2021-04-26 DIAGNOSIS — T81.31XS DEHISCENCE OF OPERATIVE WOUND, SEQUELA: Primary | ICD-10-CM

## 2021-04-27 ENCOUNTER — ANESTHESIA EVENT (OUTPATIENT)
Dept: OPERATING ROOM | Age: 56
End: 2021-04-27
Payer: COMMERCIAL

## 2021-04-27 ENCOUNTER — ANESTHESIA (OUTPATIENT)
Dept: OPERATING ROOM | Age: 56
End: 2021-04-27
Payer: COMMERCIAL

## 2021-04-27 ENCOUNTER — HOSPITAL ENCOUNTER (OUTPATIENT)
Age: 56
Setting detail: OUTPATIENT SURGERY
Discharge: HOME | End: 2021-04-27
Attending: SURGERY | Admitting: SURGERY
Payer: COMMERCIAL

## 2021-04-27 VITALS
TEMPERATURE: 97.9 F | BODY MASS INDEX: 29.45 KG/M2 | WEIGHT: 150 LBS | RESPIRATION RATE: 16 BRPM | OXYGEN SATURATION: 96 % | HEIGHT: 60 IN | SYSTOLIC BLOOD PRESSURE: 121 MMHG | HEART RATE: 92 BPM | DIASTOLIC BLOOD PRESSURE: 67 MMHG

## 2021-04-27 VITALS
OXYGEN SATURATION: 99 % | SYSTOLIC BLOOD PRESSURE: 121 MMHG | TEMPERATURE: 96.8 F | DIASTOLIC BLOOD PRESSURE: 65 MMHG | RESPIRATION RATE: 20 BRPM

## 2021-04-27 LAB
GLUCOSE BLD-MCNC: 274 MG/DL (ref 70–108)
GLUCOSE BLD-MCNC: 299 MG/DL (ref 70–108)
SARS-COV-2, NAAT: NOT DETECTED

## 2021-04-27 PROCEDURE — 7100000010 HC PHASE II RECOVERY - FIRST 15 MIN: Performed by: SURGERY

## 2021-04-27 PROCEDURE — 6370000000 HC RX 637 (ALT 250 FOR IP): Performed by: SURGERY

## 2021-04-27 PROCEDURE — 6370000000 HC RX 637 (ALT 250 FOR IP): Performed by: ANESTHESIOLOGY

## 2021-04-27 PROCEDURE — 2580000003 HC RX 258: Performed by: SURGERY

## 2021-04-27 PROCEDURE — 3700000000 HC ANESTHESIA ATTENDED CARE: Performed by: SURGERY

## 2021-04-27 PROCEDURE — 82948 REAGENT STRIP/BLOOD GLUCOSE: CPT

## 2021-04-27 PROCEDURE — 87635 SARS-COV-2 COVID-19 AMP PRB: CPT

## 2021-04-27 PROCEDURE — 3600000012 HC SURGERY LEVEL 2 ADDTL 15MIN: Performed by: SURGERY

## 2021-04-27 PROCEDURE — 3700000001 HC ADD 15 MINUTES (ANESTHESIA): Performed by: SURGERY

## 2021-04-27 PROCEDURE — 2500000003 HC RX 250 WO HCPCS: Performed by: SURGERY

## 2021-04-27 PROCEDURE — 19380 REVJ RECONSTRUCTED BREAST: CPT | Performed by: SURGERY

## 2021-04-27 PROCEDURE — 3600000002 HC SURGERY LEVEL 2 BASE: Performed by: SURGERY

## 2021-04-27 PROCEDURE — 7100000011 HC PHASE II RECOVERY - ADDTL 15 MIN: Performed by: SURGERY

## 2021-04-27 PROCEDURE — 2500000003 HC RX 250 WO HCPCS: Performed by: NURSE ANESTHETIST, CERTIFIED REGISTERED

## 2021-04-27 PROCEDURE — 6360000002 HC RX W HCPCS: Performed by: NURSE ANESTHETIST, CERTIFIED REGISTERED

## 2021-04-27 PROCEDURE — 2709999900 HC NON-CHARGEABLE SUPPLY: Performed by: SURGERY

## 2021-04-27 RX ORDER — MIDAZOLAM HYDROCHLORIDE 1 MG/ML
INJECTION INTRAMUSCULAR; INTRAVENOUS PRN
Status: DISCONTINUED | OUTPATIENT
Start: 2021-04-27 | End: 2021-04-27 | Stop reason: SDUPTHER

## 2021-04-27 RX ORDER — FENTANYL CITRATE 50 UG/ML
INJECTION, SOLUTION INTRAMUSCULAR; INTRAVENOUS PRN
Status: DISCONTINUED | OUTPATIENT
Start: 2021-04-27 | End: 2021-04-27 | Stop reason: SDUPTHER

## 2021-04-27 RX ORDER — SODIUM CHLORIDE 9 MG/ML
25 INJECTION, SOLUTION INTRAVENOUS PRN
Status: DISCONTINUED | OUTPATIENT
Start: 2021-04-27 | End: 2021-04-27 | Stop reason: HOSPADM

## 2021-04-27 RX ORDER — MORPHINE SULFATE 2 MG/ML
4 INJECTION, SOLUTION INTRAMUSCULAR; INTRAVENOUS
Status: DISCONTINUED | OUTPATIENT
Start: 2021-04-27 | End: 2021-04-27 | Stop reason: HOSPADM

## 2021-04-27 RX ORDER — SODIUM CHLORIDE 0.9 % (FLUSH) 0.9 %
5-40 SYRINGE (ML) INJECTION EVERY 12 HOURS SCHEDULED
Status: DISCONTINUED | OUTPATIENT
Start: 2021-04-27 | End: 2021-04-27 | Stop reason: HOSPADM

## 2021-04-27 RX ORDER — CLINDAMYCIN PHOSPHATE 150 MG/ML
INJECTION, SOLUTION INTRAVENOUS PRN
Status: DISCONTINUED | OUTPATIENT
Start: 2021-04-27 | End: 2021-04-27 | Stop reason: SDUPTHER

## 2021-04-27 RX ORDER — HYDROCODONE BITARTRATE AND ACETAMINOPHEN 5; 325 MG/1; MG/1
1 TABLET ORAL EVERY 4 HOURS PRN
Status: DISCONTINUED | OUTPATIENT
Start: 2021-04-27 | End: 2021-04-27 | Stop reason: HOSPADM

## 2021-04-27 RX ORDER — PROPOFOL 10 MG/ML
INJECTION, EMULSION INTRAVENOUS PRN
Status: DISCONTINUED | OUTPATIENT
Start: 2021-04-27 | End: 2021-04-27 | Stop reason: SDUPTHER

## 2021-04-27 RX ORDER — SODIUM CHLORIDE 0.9 % (FLUSH) 0.9 %
5-40 SYRINGE (ML) INJECTION PRN
Status: DISCONTINUED | OUTPATIENT
Start: 2021-04-27 | End: 2021-04-27 | Stop reason: SDUPTHER

## 2021-04-27 RX ORDER — BUPIVACAINE HYDROCHLORIDE 5 MG/ML
INJECTION, SOLUTION PERINEURAL PRN
Status: DISCONTINUED | OUTPATIENT
Start: 2021-04-27 | End: 2021-04-27 | Stop reason: ALTCHOICE

## 2021-04-27 RX ORDER — ONDANSETRON 2 MG/ML
4 INJECTION INTRAMUSCULAR; INTRAVENOUS EVERY 6 HOURS PRN
Status: DISCONTINUED | OUTPATIENT
Start: 2021-04-27 | End: 2021-04-27 | Stop reason: HOSPADM

## 2021-04-27 RX ORDER — SODIUM CHLORIDE 9 MG/ML
INJECTION, SOLUTION INTRAVENOUS CONTINUOUS
Status: DISCONTINUED | OUTPATIENT
Start: 2021-04-27 | End: 2021-04-27 | Stop reason: HOSPADM

## 2021-04-27 RX ORDER — MORPHINE SULFATE 2 MG/ML
2 INJECTION, SOLUTION INTRAMUSCULAR; INTRAVENOUS
Status: DISCONTINUED | OUTPATIENT
Start: 2021-04-27 | End: 2021-04-27 | Stop reason: HOSPADM

## 2021-04-27 RX ORDER — PROMETHAZINE HYDROCHLORIDE 25 MG/1
12.5 TABLET ORAL EVERY 6 HOURS PRN
Status: DISCONTINUED | OUTPATIENT
Start: 2021-04-27 | End: 2021-04-27 | Stop reason: HOSPADM

## 2021-04-27 RX ORDER — LIDOCAINE HYDROCHLORIDE 20 MG/ML
INJECTION, SOLUTION EPIDURAL; INFILTRATION; INTRACAUDAL; PERINEURAL PRN
Status: DISCONTINUED | OUTPATIENT
Start: 2021-04-27 | End: 2021-04-27 | Stop reason: SDUPTHER

## 2021-04-27 RX ORDER — KETOROLAC TROMETHAMINE 30 MG/ML
INJECTION, SOLUTION INTRAMUSCULAR; INTRAVENOUS PRN
Status: DISCONTINUED | OUTPATIENT
Start: 2021-04-27 | End: 2021-04-27 | Stop reason: SDUPTHER

## 2021-04-27 RX ORDER — HYDROCODONE BITARTRATE AND ACETAMINOPHEN 5; 325 MG/1; MG/1
2 TABLET ORAL EVERY 4 HOURS PRN
Status: DISCONTINUED | OUTPATIENT
Start: 2021-04-27 | End: 2021-04-27 | Stop reason: HOSPADM

## 2021-04-27 RX ORDER — SODIUM CHLORIDE 0.9 % (FLUSH) 0.9 %
5-40 SYRINGE (ML) INJECTION EVERY 12 HOURS SCHEDULED
Status: DISCONTINUED | OUTPATIENT
Start: 2021-04-27 | End: 2021-04-27 | Stop reason: SDUPTHER

## 2021-04-27 RX ORDER — SODIUM CHLORIDE 0.9 % (FLUSH) 0.9 %
5-40 SYRINGE (ML) INJECTION PRN
Status: DISCONTINUED | OUTPATIENT
Start: 2021-04-27 | End: 2021-04-27 | Stop reason: HOSPADM

## 2021-04-27 RX ORDER — SODIUM CHLORIDE 9 MG/ML
25 INJECTION, SOLUTION INTRAVENOUS PRN
Status: DISCONTINUED | OUTPATIENT
Start: 2021-04-27 | End: 2021-04-27 | Stop reason: SDUPTHER

## 2021-04-27 RX ORDER — LIDOCAINE HYDROCHLORIDE AND EPINEPHRINE 10; 10 MG/ML; UG/ML
INJECTION, SOLUTION INFILTRATION; PERINEURAL PRN
Status: DISCONTINUED | OUTPATIENT
Start: 2021-04-27 | End: 2021-04-27 | Stop reason: ALTCHOICE

## 2021-04-27 RX ADMIN — PROPOFOL 400 MG: 10 INJECTION, EMULSION INTRAVENOUS at 08:22

## 2021-04-27 RX ADMIN — FENTANYL CITRATE 50 MCG: 50 INJECTION, SOLUTION INTRAMUSCULAR; INTRAVENOUS at 08:22

## 2021-04-27 RX ADMIN — LIDOCAINE HYDROCHLORIDE 60 MG: 20 INJECTION, SOLUTION EPIDURAL; INFILTRATION; INTRACAUDAL; PERINEURAL at 08:22

## 2021-04-27 RX ADMIN — INSULIN HUMAN 3 UNITS: 100 INJECTION, SOLUTION PARENTERAL at 09:49

## 2021-04-27 RX ADMIN — SODIUM CHLORIDE: 9 INJECTION, SOLUTION INTRAVENOUS at 08:18

## 2021-04-27 RX ADMIN — MIDAZOLAM 2 MG: 1 INJECTION INTRAMUSCULAR; INTRAVENOUS at 08:17

## 2021-04-27 RX ADMIN — FENTANYL CITRATE 25 MCG: 50 INJECTION, SOLUTION INTRAMUSCULAR; INTRAVENOUS at 08:38

## 2021-04-27 RX ADMIN — HYDROCODONE BITARTRATE AND ACETAMINOPHEN 2 TABLET: 5; 325 TABLET ORAL at 09:56

## 2021-04-27 RX ADMIN — KETOROLAC TROMETHAMINE 30 MG: 30 INJECTION, SOLUTION INTRAMUSCULAR at 09:12

## 2021-04-27 RX ADMIN — CLINDAMYCIN PHOSPHATE 600 MG: 150 INJECTION, SOLUTION INTRAVENOUS at 08:27

## 2021-04-27 ASSESSMENT — PAIN DESCRIPTION - ORIENTATION: ORIENTATION: LEFT

## 2021-04-27 ASSESSMENT — PULMONARY FUNCTION TESTS
PIF_VALUE: 0

## 2021-04-27 ASSESSMENT — PAIN DESCRIPTION - LOCATION: LOCATION: BREAST

## 2021-04-27 ASSESSMENT — PAIN - FUNCTIONAL ASSESSMENT: PAIN_FUNCTIONAL_ASSESSMENT: 0-10

## 2021-04-27 NOTE — H&P
[address] [address]  History and Physical Update    Pt Name: Caroline Murphy  MRN: [id number]  YOB: 1965  Date of evaluation: 4/27/2021    [x] I have examined the patient and reviewed the H&P/Consult and there are no changes to the patient or plans. [] I have examined the patient and reviewed the H&P/Consult and have noted the following changes:        Joseph Garcia MD  Electronically signed 4/27/2021 at 7:26 AM    Joseph Garcia MD   General Surgery  Postprocedure Evaluation in Office  Pt Name: Caroline Murphy  Date of Birth 1965   Today's Date: 4/22/2021  Medical Record Number: [id number]  Primary Care Provider: Radha Terry MD       Chief Complaint   Patient presents with    Post-Op Check       wound check-- s/p Debridement, left mastectomy wound, skin and subcutaneous fat and breast tissue 4/6/21      ASSESSMENT   1. 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 Dehiscence of operative wound, sequela    3. Pre-op testing        Pathologic stage Ib  PLAN       1. Patient underwent debridement and secondary closure. Wound has reopened. By necessity she is too active with left upper extremity again a small but clean open area medial aspect. Recommend again secondary closure with larger and retention type sutures. 2.  Suture material removed. 3.  Schedule patient for wound revision and secondary closure. Patient agreeable. MAC versus general anesthesia. 4.  Physical therapy, continue as tolerated. 5.  Genetic testing discussed at prior visits. Refer on demand. SMILEY Mosley is seen today for post-op follow-up. She is status post left mastectomy with sentinel node biopsy additional adjacent nodes removed slightly enlarged. 2 of 6 total nodes were positive. Positive nodes were sentinel nodes. Wound is intact. Small amount of fluid below her flaps. She does have chronic thrombocytopenia.   Seroma daily, Disp: , Rfl:     Lifitegrast (XIIDRA) 5 % SOLN, Place 1 drop into both eyes daily, Disp: , Rfl:     Misc. Devices [facility] MISC, Wheelchair repairs- new seat cover, right side armrest replacement  Current body weight:  68 kg Diagnosis: gait disturbance, chronic incomplete spastic paraplegia Length of need: Lifetime, Disp: 1 each, Rfl: 0    tiotropium (SPIRIVA) 18 MCG inhalation capsule, Inhale 18 mcg into the lungs daily 2 puffs, Disp: , Rfl:     insulin glargine (LANTUS SOLOSTAR) 100 UNIT/ML injection pen, Inject 50 Units into the skin nightly , Disp: , Rfl:     montelukast (SINGULAIR) 10 MG tablet, Take 10 mg by mouth nightly , Disp: , Rfl: 0    artificial tears (ARTIFICIAL TEARS) OINT, as needed, Disp: , Rfl:     Calcium Carbonate (CALCIUM 600 PO), Take 1 tablet by mouth 2 times daily, Disp: , Rfl:     Multiple Vitamin (TAB-A-ISAAC PO), Take 1 tablet by mouth daily, Disp: , Rfl:     loratadine (CLARITIN) 10 MG tablet, Take 1 tablet by mouth daily as needed. , Disp: , Rfl: 0    PROAIR  (90 BASE) MCG/ACT inhaler, Inhale 2 puffs into the lungs every 6 hours as needed. , Disp: , Rfl: 0    Incontinence Supply Disposable (UNDERPADS) MISC, Cloth chux pads Diagnosis: Paraplegia 344.1, Disp: 100 Bottle, Rfl: 11    hydrOXYzine (VISTARIL) 50 MG capsule, Take 50 mg by mouth 3 times daily as needed for Itching., Disp: , Rfl:     omeprazole (PRILOSEC) 20 MG capsule, Take 20 mg by mouth daily. , Disp: , Rfl:         Allergies        Allergies   Allergen Reactions    Penicillins Shortness Of Breath       \"I almost \"    Seasonal Itching         Review of Systems  History obtained from the patient. Constitutional: Denies any fever, chills, fatigue. Wound: Ostectomy wound opened up medially for about 4 cm and laterally for about 3-1/2 cm. Resp: Denies any cough, shortness of breath. CV: Denies any chest pain, orthopnea or syncope.    GI:  Denies any nausea, vomiting, blood in the stool, constipation or diarrhea. : Denies any hematuria, hesitancy or dysuria. OBJECTIVE      VITALS: /60 (Site: Right Upper Arm, Position: Sitting, Cuff Size: Medium Adult)   Pulse 60   Temp 97.3 °F (36.3 °C) (Temporal)   Resp 18   Ht 4' 11\" (1.499 m)   Wt 150 lb (68 kg)   SpO2 96%   BMI 30.30 kg/m²      CONSTITUTIONAL: Alert and oriented times 3, no acute distress and cooperative to examination. SKIN: Skin color, texture, turgor normal. .  INCISION: Mastectomy wound intact areas that are reclosed intact except for one small area open medially about 3 cm with open tissue. Close sutures removed LUNGS: Lungs Clear  CARDIOVASCULAR: Normal Rate  ABDOMEN: nondistended  NEUROLOGIC: Alert and oriented           Performed by: Bhanu Guillory. Pathology     [id number]   Assoc.                                              Page 1 of 1   [address]   [address], [place] [address]                                                       PROC: 2021   [facility]                                    RECV: 2021   [address]                                    RPTD: 2021   [address], [place] [address]                       MRN:  [id number]     LOC: 5KS                       ACCT: [id number]  SEX: F                       : 1965  AGE: 50 Y                          PATHOLOGY REPORT                       ATTN: AIDE ESCAMILLA   [de-identified]                  REQ: Dez ESCAMILLA       Copies To:   GRETA SOTO       Clinical Information: INVASIVE DUCTAL CARCINOMA LEFT BREAST     AMENDED REPORT 2021:     FINAL DIAGNOSIS:   A.  Left sentinel lymph node, excision:    Metastatic carcinoma in 2 of 4 lymph nodes (2/4).  Biopsy site changes. Comment: The frozen section discrepancy is noted.  Re-review of the   frozen section slide shows no evidence of metastatic carcinoma on   sections examined.      B.  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    Lymphovascular space invasion is present.    Margins are negative.    Please see synoptic report. C.  Left axillary lymph nodes, excision:    Two lymph nodes, negative for malignancy (0/2). COMMENT 2/23/2021:  An amended report was issued to correct a minor   typographical error in the microscopic description. Specimen:   A) SENTINEL LYMPH NODE(S), LEFT, FS   B) EXCISION OF BREAST, LEFT MASS   C) LYMPH NODE(S), LEFT AXILLARY           Intraoperative Consultation:   A - Frozen Section DX:  Favor biopsy site changes, no definite   malignancy.  PCF/ALP       Received:  12:17 Reported:  12:38     Gross Examination:   A - The container is labeled Allie Layer, left sentinel lymph node.    Received fresh for frozen section evaluation are fragments of   fibroadipose tissue measuring 6.5 x 4.5 x 2.0 cm.  This dissection   reveals three lymph nodes ranging in size from 1.5 to 0.5 cm.  There   are no obvious lesions. [place] Feast is evidence of previous biopsy site in   the largest node.  A representative section of the largest lymph node   is submitted for frozen section evaluation as FSA1.  The remaining node   submitted for frozen section is submitted in cassette A1.  The second   largest node is serially sectioned and entirely submitted in cassette   A2.  The smallest node is submitted in cassette A3 as is. Representative fibroadipose is submitted in cassette A4. Fixative:  10% neutral buffered formalin   Tissue removal time:  11:37   Tissue fixation time:  12:30   Total fixation time: 55 hours 30 minutes     B - The container is labeled Allie Layer, left breast mass.    Received in formalin is a mastectomy specimen oriented by a stitch   designating lateral.  The specimen measures 20 x 15 x 7 cm.  The   ellipse of skin is approximately 19 x 13.5 cm.  The nipple/areolar   complex is grossly unremarkable.  There are no skin lesions.  The   radial margin is inked blue and the deep margin is inked black.  There   is no attached skeletal muscle.  In the central aspect of the specimen   just deep to the nipple, there is an ill-defined white, glistening, and   mucoid lesion measuring 2.7 x 2.5 cm.  The lesion is 2.5 cm from the   deep margin and distant from the radial margin.  Sectioning through the   remaining breast tissue reveals a predominantly fatty cut surface with   no additional lesions or fibrous areas.  Sections are submitted as   follows:  B1 through B3 - representative mass; B4 - closest deep   margin; B5 through B8 - representative quadrants (upper outer, lower   outer, upper inner, lower inner);   B9 - nipple/areolar complex. Fixative:  10% neutral buffered formalin   Tissue removal time:  11:37   Tissue fixation time:  12:39   Total fixation time: 55 hours 21 minutes     C - The container is labeled Allie Layer, left axillary lymph node.    Received in formalin are fragments of fibroadipose tissue aggregating   to 6.0 x 4.0 x 2.0 cm.  Sectioning reveals two lymph nodes ranging in   size from 0.9 to 0.4 cm in greatest dimension.  Each lymph node is   serially sectioned and entirely submitted in one cassette each. PCF:[id number]     Fixative:  10% neutral buffered formalin   Tissue removal time:  12:03   Tissue fixation time:  12:30   Total fixation time: 55 hours 30 minutes           Microscopic Examination:   A-C.  Procedure: Mastectomy   Specimen laterality: Left   Tumor site: Upper inner quadrant   Tumor size: 29 mm   Histologic type:  Invasive mucinous micropapillary carcinoma   Histologic grade ([place] histologic score)   Glandular/tubular differentiation: Score 3   Nuclear pleomorphism: Score 2   Mitotic rate: Score 1   Overall grade: Grade 2 (score 6)   Ductal carcinoma in situ: Not identified   Invasive carcinoma margins: Uninvolved by invasive carcinoma    Distance from closest margin: 25 mm (deep margin)   DCIS margins: Not applicable   Regional lymph nodes: Involved by tumor cells    Number of lymph nodes with macrometastases (>2 mm): 2    Number of lymph nodes with micrometastases: 0    Number of lymph nodes with isolated tumor cells: 0   Size of largest metastatic deposit: 2.5 mm   Extranodal extension: Not identified   Total number of lymph nodes examined: 6   Number of sentinel nodes examined: 4   Treatment effect in the breast: No known presurgical therapy   Treatment effect in the lymph nodes: Not applicable   Lymphovascular invasion: Present   Dermal lymphovascular invasion: Not identified     Pathologic stage classification (pTNM, AJCC 8th edition)   pT2: Tumor greater than 20 mm but less than or equal to 50 mm greatest   dimension   pN1a: Metastases in 1-3 axillary lymph nodes, at least 1 metastasis   larger than 2.0 mm     Breast biomarker testing performed on previous biopsy [id number];   1/14/21     Estrogen Receptor: (Clone SP1), [employer]       Positive 100% of cells ( > 10% of cells)   Intensity of Staining:       Strong     Progesterone Receptor: (Clone 1E2), [employer]       Positive 80% of cells   Intensity of Staining:       Strong           Ki-67 (clone 30-9)       Percentage of positive nuclei:  6%               Favorable  < 10%               Borderline 10-20%               Unfavorable  > 20%     HER2 Immunohistochemistry (Clone 4B5, [employer])       Negative (1+) - Incomplete faint/barely perceptible membrane   staining in  > 10% of invasive tumor     External Controls:  Adequate   Internal Controls:  Adequate   Standard Assay Conditions:  Met     Staining Method Used:    Formalin fixation    Antigen retrieval type:  Cell Conditioning 1, mild gordon       [id number]   [id number]                                                     <Sign Out Dr. Hector Louis M.D., F.C. A. P       NVML/ [address] [address]  Printed on:  2/23/2021   Ashley [address]   [address], [employer] [facility] Original print date: 02/23/2021   Lab and Collection     Oncotype DX 8

## 2021-04-27 NOTE — BRIEF OP NOTE
Brief Postoperative Note      Patient: Randi Estevez  YOB: 1965  MRN: [id number]    Date of Procedure: 4/27/2021    Pre-Op Diagnosis: LEFT BREAST WOUND    Post-Op Diagnosis: Same       Procedure(s):  LEFT BREAST WOUND REVISION with tissue advancement flaps    Surgeon(s):  Phu Haddad MD    Assistant:  * No surgical staff found *    Anesthesia: Monitor Anesthesia Care    Estimated Blood Loss (mL): Minimal    Complications: None    Specimens:   * No specimens in log *    Implants:  * No implants in log *      Drains:   Open Drain Inferior; Left Breast (Active)       [REMOVED] Closed/Suction Drain Inferior; Left Breast Bulb (Removed)       Findings:     Electronically signed by Phu Haddad MD on 4/27/2021 at 9:03 AM

## 2021-04-27 NOTE — ANESTHESIA PRE PROCEDURE
SOLN Place 1 drop into both eyes daily    Historical Provider, MD Cas[place]. [employer] [facility]) [address] [facility] Wheelchair repairs- new seat cover, right side armrest replacement    Current body weight:  68 kg  Diagnosis: gait disturbance, chronic incomplete spastic paraplegia  Length of need: Lifetime 10/22/20   CHAS Gan - CNP   tiotropium (SPIRIVA) 18 MCG inhalation capsule Inhale 18 mcg into the lungs daily 2 puffs    Historical Provider, MD   insulin glargine (LANTUS SOLOSTAR) 100 UNIT/ML injection pen Inject 50 Units into the skin nightly     Historical Provider, MD   montelukast (SINGULAIR) 10 MG tablet Take 10 mg by mouth nightly  10/26/16   Historical Provider, MD   artificial tears (ARTIFICIAL TEARS) OINT as needed    Historical Provider, MD   Calcium Carbonate (CALCIUM 600 PO) Take 1 tablet by mouth 2 times daily    Historical Provider, MD   Multiple Vitamin (TAB-A-ISAAC PO) Take 1 tablet by mouth daily    Historical Provider, MD   loratadine (CLARITIN) 10 MG tablet Take 1 tablet by mouth daily as needed. 2/24/15   Historical Provider, MD   PROAIR  (90 BASE) MCG/ACT inhaler Inhale 2 puffs into the lungs every 6 hours as needed. 1/6/15   Historical Provider, MD   Incontinence Supply Disposable (UNDERPADS) MISC Cloth chux pads  Diagnosis: Paraplegia 344.1 3/10/15   Usama Coffey MD   hydrOXYzine (VISTARIL) 50 MG capsule Take 50 mg by mouth 3 times daily as needed for Itching. Historical Provider, MD   omeprazole (PRILOSEC) 20 MG capsule Take 20 mg by mouth daily.     Historical Provider, MD       Current medications:    Current Facility-Administered Medications   Medication Dose Route Frequency Provider Last Rate Last Admin    0.9 % sodium chloride infusion   Intravenous Continuous Rossy Umanzor MD        sodium chloride flush 0.9 % injection 5-40 mL  5-40 mL Intravenous 2 times per day Rossy Umanzor MD        sodium chloride flush 0.9 % injection 5-40 mL  5-40 mL Intravenous PRN Deborah Shen MD Seven        0.9 % sodium chloride infusion  25 mL Intravenous PRN Coralie Meigs, MD        ceFAZolin (ANCEF) 2000 mg in dextrose 5 % 50 mL IVPB  2,000 mg Intravenous On Call to [address] [place] [address], MD           Allergies: Allergies   Allergen Reactions    Penicillins Shortness Of Breath     \"I almost \"    Seasonal Itching       Problem List:    Patient Active Problem List   Diagnosis Code    Leg weakness, bilateral R29.898    Hypokalemia E87.6    Hyperglycemia R73.9    Multiple sclerosis ([facility]) G35    Myelopathy ([facility]) G95.9    Hyponatremia E87.1    Lupus ([address] M32.9    Lupus (systemic lupus erythematosus) ([facility]) M32.9    Paraplegia ([facility]) G82.20    Neurogenic bladder N31.9    Neurogenic bowel K59.2    Fatigue R53.83    Spinal cord infarction ([facility]) G95.11    Depression F32.9    Bipolar 1 disorder ([facility]) F31.9    Asthma J45.909    Skin ulcer of multiple sites ([address] bilateral inner thighs L98.499    History of cancer, cervical Z85.9    Anxiety F41.9    Transverse myelitis ([facility]) G37.3    Flexion contracture of right knee M24.561    Overactive bladder N32.81    Urgency-frequency syndrome N32.81    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  cervical \"long time ago\"    Cancer ([address] 01/15/2021    Left Breast    Depression     Endometriosis     GERD (gastroesophageal reflux disease)     Kidney stones     Lupus ([address]     Movement disorder     MS (multiple sclerosis) ([address]     Schizophrenia ([address]     Thyroid disease     Type 2 diabetes mellitus without complication ([address]     Unspecified cerebral artery occlusion with cerebral infarction 2014       Past Surgical History:        Procedure Laterality Date    BREAST LUMPECTOMY Left 02/19/2021    LEFT BREAST MASTECTOMY, SENTINAL LYMPH NODE BIOPSY, PREOP NEEDLE LOC performed by Della Wilks MD at [address] Left 4/6/2021    DEBRIDEMENT LEFT BREAST MASTECTOMY WOUND performed by Della Wilks MD at [facility] 80  01/2020    DILATION AND CURETTAGE OF UTERUS      BEN US GUID NDL BIOPSY LEFT Left 01/14/2021    BEN US GUID NDL BIOPSY LEFT 1/14/2021 Venancio Haskins MD [facility]    NERVE SURGERY N/A 01/26/2021    EXCHANGE OF INTERSTIM SYSTEM performed by Coco Gonzales MD at [address] [place] [address]  03/23/2021    right breast i and d with closure Dr Mary Kate Jaimes in the procedure room    FL OFFICE/OUTPT VISIT,PROCEDURE ONLY N/A 11/21/2018    INTERSTIM DEVICE PLACEMENT MODEL 3058, ONLY HEAD ELIGIBLE FOR MRI WITH TRANSMIT/RECEIVE HEAD COIL    TUBAL LIGATION      10 years ago    US GUIDED NEEDLE LOC OF LEFT BREAST Left 02/19/2021    US GUIDED NEEDLE LOC OF LEFT BREAST 2/19/2021 [address] BIOPSY  01/14/2021    US LYMPH NODE BIOPSY 1/14/2021 Venancio Haskins MD [facility]       Social History:    Social History     Tobacco Use    Smoking status: Current Every Day Smoker     Packs/day: 1.00     Types: Cigarettes     Start date: 12/6/1979    Smokeless tobacco: Never Used   Substance Use Topics    Alcohol use: Yes     Alcohol/week: 0.0 standard drinks     Comment: social drinker                                Ready to quit: Not Answered  Counseling given: Not Answered      Vital Signs (Current): There were no vitals filed for this visit.                                            BP Readings from Last 3 Encounters:   04/22/21 118/60   04/12/21 110/62   04/08/21 116/68       NPO Status:                                                                                 BMI:   Wt Readings from Last 3 Encounters:   04/22/21 150 lb (68 kg)   04/12/21 150 lb (68 kg)   04/08/21 150 lb (68 kg)     There is no height or weight on file to calculate BMI.    CBC:   Lab Results   Component Value Date    WBC 2.9 04/01/2021    RBC 4.55 04/01/2021    RBC 0 03/14/2018    HGB 12.0 04/01/2021    HCT 35.7 04/01/2021    MCV 78.5 04/01/2021    RDW 17.8 09/23/2016     04/01/2021       CMP:   Lab Results   Component Value Date     02/19/2021    K 4.2 02/19/2021     02/19/2021    CO2 28 02/19/2021    BUN 11 02/19/2021    CREATININE 0.3 02/19/2021    GFRAA >60 10/10/2014    LABGLOM >90 02/19/2021    GLUCOSE 293 02/19/2021    PROT 7.1 01/07/2021    CALCIUM 9.1 02/19/2021    BILITOT 0.7 01/07/2021    BILITOT NEGATIVE 03/14/2018    ALKPHOS 121 01/07/2021    AST 27 01/07/2021    ALT 17 01/07/2021       POC Tests: No results for input(s): POCGLU, POCNA, POCK, POCCL, POCBUN, POCHEMO, POCHCT in the last 72 hours.     Coags:   Lab Results   Component Value Date    PROTIME 12.6 10/09/2014    INR 1.2 10/09/2014    APTT 40.7 10/09/2014       HCG (If Applicable):   Lab Results   Component Value Date    PREGTESTUR NEGATIVE 12/16/2013    PREGSERUM NEGATIVE 09/13/2014        ABGs: No results found for: PHART, PO2ART, LPM1VUU, UNT7YFF, BEART, U3GNKPFA     Type & Screen (If Applicable):  Lab Results   Component Value Date    LABRH POS 02/19/2021       Drug/Infectious Status (If Applicable):  No results found for: HIV, HEPCAB    COVID-19 Screening (If Applicable):   Lab Results   Component Value Date    COVID19 Not Detected 04/01/2021           Anesthesia Evaluation  Patient summary reviewed and Nursing notes reviewed no history of anesthetic complications:   Airway: Mallampati: II        Dental:          Pulmonary: breath sounds clear to auscultation  (+) asthma:                            Cardiovascular:  Exercise tolerance: poor (<4 METS),         ECG reviewed  Rhythm: regular  Rate: normal                    Neuro/Psych:   (+) CVA:, psychiatric history: GI/Hepatic/Renal:   (+) GERD:,           Endo/Other:    (+) Diabetes, : arthritis:., .                 Abdominal:           Vascular:                                        Anesthesia Plan      MAC     ASA 3       Induction: intravenous. MIPS: Postoperative opioids intended and Prophylactic antiemetics administered. Anesthetic plan and risks discussed with patient. Plan discussed with CRNA.                   [address] Marlene DO Chris   4/27/2021

## 2021-04-27 NOTE — OP NOTE
and instrument counts were correct. The patient was  transported to the recovery area at the [facility].         Mariana Dacosta M.D.    D: 04/27/2021 9:11:39       T: 04/27/2021 9:17:39     [email or web address]  Job#: [id number]     Doc#: [id number]    CC:

## 2021-04-27 NOTE — ANESTHESIA POSTPROCEDURE EVALUATION
Department of Anesthesiology  Postprocedure Note    Patient: Fox Villanueva  MRN: [id number]  YOB: 1965  Date of evaluation: 4/27/2021  Time:  9:14 AM     Procedure Summary     Date: 04/27/21 Room / Location: [address] [place] [address] 02 / Tar[facility]    Anesthesia Start: 2357 Anesthesia Stop: 1473    Procedure: LEFT BREAST WOUND REVISION (Left ) Diagnosis: (LEFT BREAST WOUND)    Surgeons: Bethany Neumann MD Responsible Provider: Kaitlin Mckeon DO    Anesthesia Type: MAC ASA Status: 3          Anesthesia Type: MAC    Cristine Phase I:      Cristine Phase II:      Last vitals: Reviewed and per EMR flowsheets.        Anesthesia Post Evaluation    Patient location during evaluation: bedside  Patient participation: complete - patient participated  Level of consciousness: awake and alert  Pain score: 8 (just gave pain medication see chart)  Airway patency: patent  Nausea & Vomiting: no nausea and no vomiting  Complications: no  Cardiovascular status: hemodynamically stable  Respiratory status: spontaneous ventilation and acceptable  Hydration status: euvolemic

## 2021-04-27 NOTE — PROGRESS NOTES
1784: Patient arrived to Phase II recovery via cart. Awake and alert. [facility] RENETTA Valdovinos.  2671: VSS, patient rates pain 8/10 to L breast. Surgical bra intact and incision is not visible. Toradol given per RENETTA Valdovinos.  0915: Patient's HOB elevated and provided diet adriana mist.   0871: Patient's CHEM 299. Dr. Magdiel Munroe updated and orders given for half the NPO sliding scale. 0522: 3 units Humalin R given per order to patient's R arm. Crackers given. 8382: Patient tolerating PO food and drink. Continues to rate pain to L breast 8/10. Norco given per order. 1020: Discharge instructions reviewed with patient and patient's friend, Dean Clark. IV removed without complications and band aid applied to site. Patient dressed in bed with this RN's assistance. 1033: Patient transferred to own wheelchair and discharged home via [address] wheelchair transport with friend.

## 2021-04-28 ENCOUNTER — TELEPHONE (OUTPATIENT)
Dept: SURGERY | Age: 56
End: 2021-04-28

## 2021-04-29 NOTE — PROGRESS NOTES
Radiation Oncology Consultation  Encounter Date: 3/24/2021    Ms. Luan Valera is a 50 y.o. female  : 1965  MRN: [id number]  [facility]: [de-identified]  Requesting Provider: Dr. Brinda Alcala    Reason for request: Breast cancer      CONSULTANT: Joana Daniel      DIAGNOSIS: B53.135 -- Malignant neoplasm of the upper inner quadrant left female breast; Invasive ductal carcinoma, mucinous type, Grade 1; pT2 pN1a M0, Stage IA; ER+; WV+; Her2-; Oncotype Dx Score 8. ASSESSMENT:  1. Node positive receptor positive well-differentiated left breast cancer with low Oncotype recurrent score, status post breast conservation surgery with multiple medical core morbidities and relative contraindication to radiation therapy. 2. The diagnosis, including AJCC staging was reviewed. Ronaldo Dyson received a copy of her AJCC staging information. 3. Treatment options and recommendations, including current clinical practice guidelines (NCCN) were reviewed in detail. We reviewed the recommendations regarding initial diagnostic workup, local/regional treatment options and systemic therapy options. As noted, with the Oncotype DX score in the low range, the recommendation for systemic therapy is for endocrine therapy only. The standard recommendation at this point is for radiation therapy which is usually whole breast (category 1 recommendation), but with possible consideration of accelerated partial breast irradiation. Ronaldo Dyson received a copy of the clinical practice guideline pertaining to her situation. 4. Specific concerning factors in this case include the lupus and the MS. The former places Ronaldo Dyson at increased risk for healing difficulties, and indeed she is having trouble healing after her surgery. The latter makes it extremely difficult for her to maintain anything approaching an appropriate treatment position.   Currently, lupus is considered a relative contraindication, and there have been multiple articles in the medical literature indicating that those with lupus can be safely treated, though definitely with a higher risk of complications. Because of the MS, treatment may not be feasible. 5. Details of radiation therapy were reviewed. We discussed the nature/mode of action of external beam radiation therapy, multiple steps involved and set up/simulation, planning, initiation and performance of the treatment. We reviewed the logistics of treatment including expected number of daily treatments and amount of time usually spent in the department for each treatment. Elaine Tapia was rather taken a back by the expected number of daily treatments. Elaine Tapia and her family members had the opportunity to ask questions, and indicated that their questions were satisfactorily addressed for today. Elaine Tapia also indicated that she understood the discussion, and is not sure whether she wishes to undergo radiation therapy. In any case, she currently has some wound healing issues, and is not ready to proceed with radiation and if she wishes to do so. PLAN:  1. Continue local/wound management per Dr. Andriy Mckeon.  2. Schedule radiation oncology follow-up after complete wound healing to re-discuss radiation therapy. 3. Continue care with other physicians/providers  4. Continue surveillance and basic/supportive/preventive health care in accordance with clinical practice guidelines        HISTORY OF PRESENT ILLNESS:   Alfredo Georges a 53-year-old woman with multiple medical problems including lupus erythematosus and multiple sclerosis who was recently diagnosed with a left breast cancer. She felt a mass in her left breast, prompting her to seek medical evaluation. She underwent diagnostic imaging of the breast showing a suspicious mass in the central portion of the left breast.  She subsequently underwent ultrasound-guided biopsy of the left and also a suspicious appearing left axillary lymph node.   The biopsy confirmed the presence of well-differentiated invasive ductal carcinoma with mucinous features in the left breast mass. The cancer was strongly positive for estrogen and also positive for progesterone with a low proliferative index and negative HER-2/maegan (see pathology reports below). After discussing her local/regional treatment options, Brayden Flores elected to undergo breast conservation surgery. She underwent lumpectomy and sentinel lymph node biopsy 2/19/2021. Final pathology showed a 2.9 cm invasive adenocarcinoma with mucinous features as previously noted. 2 of the 4 sampled lymph nodes were involved with metastatic carcinoma with no extranodal extension. Brayden Flores was seen by Dr. JOYA [facility] in medical oncology. Oncotype DX testing was performed on her cancer, and returned with a low recurrence score of 8. Therefore, the recommendation is for adjuvant endocrine therapy without chemotherapy. Brayden Flores is being seen today 3/24/2021 for evaluation and discussion regarding the potential role of radiation therapy in the management of her breast cancer. Brayden Flores has been experiencing healing difficulties since her surgery, which may be in part related to her lupus. Recently underwent excisional debridement with intermediate wound closure. She also has significant range of motion issues related to her multiple sclerosis. Because of these issues, she may not be a suitable candidate for adjuvant radiation therapy. Past Medical History:   Diagnosis Date    Anxiety     Anxiety and depression     Asthma     Bipolar 1 disorder ([address]     Bipolar 1 disorder with moderate sourav ([address]     Blood circulation, collateral     Cancer ([address]      ?  cervical \"long time ago\"    Cancer ([address] 01/15/2021    Left Breast    Depression     Endometriosis     GERD (gastroesophageal reflux disease)     Kidney stones     Lupus (HCC)     Movement disorder     MS (multiple sclerosis) ([address]     Schizophrenia ([address]     Thyroid disease     Type 2 diabetes mellitus without complication ([address]     Unspecified cerebral artery occlusion with cerebral infarction 2014        Past Surgical History:   Procedure Laterality Date    BREAST LUMPECTOMY Left 02/19/2021    LEFT BREAST MASTECTOMY, SENTINAL LYMPH NODE BIOPSY, PREOP NEEDLE LOC performed by Eulalio Denny MD at [address] 4/6/2021    DEBRIDEMENT LEFT BREAST MASTECTOMY WOUND performed by Eulalio Denny MD at [address] 4/27/2021    LEFT BREAST WOUND REVISION performed by Eulalio Denny MD at [address] [place] [address]  01/2020    DILATION AND CURETTAGE OF UTERUS      BEN US GUID NDL BIOPSY LEFT Left 01/14/2021    BEN US GUID NDL BIOPSY LEFT 1/14/2021 Donavon Anderson MD [facility]    NERVE SURGERY N/A 01/26/2021    EXCHANGE OF INTERSTIM SYSTEM performed by Alex Concepcion MD at [place] [address]  03/23/2021    right breast i and d with closure Dr Briana Garcia in the procedure room    AR OFFICE/OUTPT VISIT,PROCEDURE ONLY N/A 11/21/2018    INTERSTIM [address] [facility], ONLY HEAD ELIGIBLE FOR MRI WITH TRANSMIT/RECEIVE HEAD COIL    TUBAL LIGATION      10 years ago    US GUIDED NEEDLE LOC OF LEFT BREAST Left 02/19/2021    US GUIDED NEEDLE LOC OF LEFT BREAST 2/19/2021 [address] LYMPH NODE BIOPSY  01/14/2021    US LYMPH NODE BIOPSY 1/14/2021 Donavon Anderson MD [facility]       Family History   Problem Relation Age of Onset    Stroke Mother     Asthma Mother     Other Mother     No Known Problems Sister     Asthma Brother     No Known Problems Brother        Social History     Socioeconomic History    Marital status:      Spouse name: 4    Number of children: Not on file    Years of education: Not on file    Highest education level: Not on file   Occupational History    Not on file   Social Needs    Financial resource strain: Not on file    Food insecurity     Worry: Not on file     Inability: Not on file  Transportation needs     Medical: Not on file     Non-medical: Not on file   Tobacco Use    Smoking status: Current Every Day Smoker     Packs/day: 1.00     Types: Cigarettes     Start date: 1979    Smokeless tobacco: Never Used   Substance and Sexual Activity    Alcohol use: Yes     Alcohol/week: 0.0 standard drinks     Comment: social drinker    Drug use: No    Sexual activity: Never     Partners: Male   Lifestyle    Physical activity     Days per week: Not on file     Minutes per session: Not on file    Stress: Not on file   Relationships    Social connections     Talks on phone: Not on file     Gets together: Not on file     Attends Sikhism service: Not on file     Active member of club or organization: Not on file     Attends meetings of clubs or organizations: Not on file     Relationship status: Not on file    Intimate partner violence     Fear of current or ex partner: Not on file     Emotionally abused: Not on file     Physically abused: Not on file     Forced sexual activity: Not on file   Other Topics Concern    Not on file   Social History Narrative    ** Merged History Encounter **          Exposure to Industrial/ environmental Carcinogens: no      ALLERGIES:   Allergies   Allergen Reactions    Penicillins Shortness Of Breath     \"I almost \"    Seasonal Itching          Current Outpatient Medications   Medication Sig Dispense Refill    baclofen (LIORESAL) 10 MG tablet Take 2 tablets by mouth 3 times daily 135 tablet 5    amitriptyline (ELAVIL) 25 MG tablet Take 2 tablets by mouth nightly 30 tablet 5    naloxone 4 MG/0.1ML LIQD nasal spray 1 spray by Nasal route as needed for Opioid Reversal 1 each 5    pregabalin (LYRICA) 150 MG capsule Take 1 capsule by mouth 2 times daily for 180 days.  60 capsule 5    metFORMIN (GLUCOPHAGE) 1000 MG tablet Take 1,000 mg by mouth 2 times daily      desvenlafaxine succinate (PRISTIQ) 50 MG TB24 extended release tablet Take 50 mg by mouth daily      traZODone (DESYREL) 50 MG tablet Take 50 mg by mouth nightly      MUCINEX MAXIMUM STRENGTH 1200 MG TB12 Take 1 tablet by mouth 2 times daily      Lactobacillus Acid-Pectin (ACIDOPHILUS/CITRUS PECTIN) TABS Take 1 tablet by mouth daily      paliperidone (INVEGA) 3 MG extended release tablet Take 3 mg by mouth nightly      SITagliptin (JANUVIA) 100 MG tablet Take 100 mg by mouth daily      Lifitegrast (XIIDRA) 5 % SOLN Place 1 drop into both eyes daily      Misc. Devices [facility]) [address] [facility] Wheelchair repairs- new seat cover, right side armrest replacement    Current body weight:  68 kg  Diagnosis: gait disturbance, chronic incomplete spastic paraplegia  Length of need: Lifetime 1 each 0    tiotropium (SPIRIVA) 18 MCG inhalation capsule Inhale 18 mcg into the lungs daily 2 puffs      insulin glargine (LANTUS SOLOSTAR) 100 UNIT/ML injection pen Inject 60 Units into the skin nightly       montelukast (SINGULAIR) 10 MG tablet Take 10 mg by mouth nightly   0    artificial tears (ARTIFICIAL TEARS) OINT as needed      Calcium Carbonate (CALCIUM 600 PO) Take 1 tablet by mouth 2 times daily      Multiple Vitamin (TAB-A-ISAAC PO) Take 1 tablet by mouth daily      loratadine (CLARITIN) 10 MG tablet Take 1 tablet by mouth daily as needed. 0    PROAIR  (90 BASE) MCG/ACT inhaler Inhale 2 puffs into the lungs every 6 hours as needed. 0    Incontinence Supply Disposable (UNDERPADS) MISC Cloth chux pads  Diagnosis: Paraplegia 344. 1 100 Bottle 11    hydrOXYzine (VISTARIL) 50 MG capsule Take 50 mg by mouth 3 times daily as needed for Itching.  omeprazole (PRILOSEC) 20 MG capsule Take 20 mg by mouth daily. No current facility-administered medications for this encounter.       No outpatient medications have been marked as taking for the 3/24/21 encounter [facility]) with Eben Lopez MD.          LABORATORY STUDIES:    2/20/2021: CBC:  WBC 6.4  4.8 - 10.8 thou/mm3   RBC 4.10Low 4.20 - 5.40 mill/mm3   Hemoglobin 11.6Low   12.0 - 16.0 gm/dl   Hematocrit 35.2Low   37.0 - 47.0 %   MCV 85.9  81.0 - 99.0 fL   MCH 28.3  26.0 - 33.0 pg   MCHC 33.0  32.2 - 35.5 gm/dl   RDW-CV 16.8High   11.5 - 14.5 %   RDW-SD 50.9High   35.0 - 45.0 fL   Platelets 94GUZ   463 - 400 thou/mm3   MPV 11.1  9.4 - 12.4 fL         PATHOLOGY:   1/14/2021: Surgical pathology:  FINAL DIAGNOSIS:   A.  Breast, left, upper inner quadrant, barbell clip, core needle   biopsies:    Well-differentiated invasive adenocarcinoma with mucinous features.    Lynn score 1 of 3. B.  Lymph node, left axilla, tribell clip, core needle biopsies:    No evidence of malignancy. BREAST BIOMARKERS*  1/15/2021   Estrogen Receptor: (Clone SP1), [employer]       ( X ) Positive 100% of cells (>10% of cells)   Intensity of Staining:       ( X ) Strong     Progesterone Receptor: (Clone 1E2), [employer]       ( X ) Positive 80% of cells   Intensity of Staining:       ( X ) Strong     Ki-67 (clone 30-9)       Percentage of positive nuclei:  6%               Favorable <10%               Borderline 10-20%               Unfavorable >20%     HER2 Immunohistochemistry (Clone 4B5, [employer])       ( X ) Negative (1+) - Incomplete faint/barely perceptible membrane       staining in >10% of invasive tumor     2/19/2021: Surgical pathology:  FINAL DIAGNOSIS:   A.  Left sentinel lymph node, excision:    Metastatic carcinoma in 2 of 4 lymph nodes (2/4).  Biopsy site changes. Comment: The frozen section discrepancy is noted.  Re-review of the   frozen section slide shows no evidence of metastatic carcinoma on   sections examined. B.  Left breast, mastectomy:    Mucinous micropapillary carcinoma, Lynn grade 2 (pT2, pN1a).    Lymphovascular space invasion is present.    Margins are negative.    Please see synoptic report.      C.  Left axillary lymph nodes, excision:    Two lymph nodes, negative for malignancy (0/2). COMMENT 2/23/2021:  An amended report was issued to correct a minor   typographical error in the microscopic description. ADDENDUM REPORT 3/4/2021: Selected slides and pathology report were   reviewed by Dr. Vonda Garcia and archival tissue was selected for the   following testing: Oncotype DX (ordered by Dr. Geetha Silveira). Oncotype DX Breast Cancer Assay (RT-PCR) performed by [employer]   Breast Cancer Recurrence Score:   8     Microscopic Examination:   A-C.  Procedure: Mastectomy   Specimen laterality: Left   Tumor site: Upper inner quadrant   Tumor size: 29 mm   Histologic type:  Invasive mucinous micropapillary carcinoma   Histologic grade (Lynn histologic score)   Glandular/tubular differentiation: Score 3   Nuclear pleomorphism: Score 2   Mitotic rate: Score 1   Overall grade: Grade 2 (score 6)   Ductal carcinoma in situ: Not identified   Invasive carcinoma margins: Uninvolved by invasive carcinoma    Distance from closest margin: 25 mm (deep margin)   DCIS margins: Not applicable   Regional lymph nodes: Involved by tumor cells    Number of lymph nodes with macrometastases (>2 mm): 2    Number of lymph nodes with micrometastases: 0    Number of lymph nodes with isolated tumor cells: 0   Size of largest metastatic deposit: 2.5 mm   Extranodal extension: Not identified   Total number of lymph nodes examined: 6   Number of sentinel nodes examined: 4   Treatment effect in the breast: No known presurgical therapy   Treatment effect in the lymph nodes: Not applicable   Lymphovascular invasion: Present   Dermal lymphovascular invasion: Not identified     Pathologic stage classification (pTNM, AJCC 8th edition)   pT2: Tumor greater than 20 mm but less than or equal to 50 mm greatest   dimension   pN1a: Metastases in 1-3 axillary lymph nodes, at least 1 metastasis   larger than 2.0 mm     2/23/2021: Oncotype DX:  Oncotype DX Breast Cancer Assay (RT-PCR) performed by [employer]   Breast Cancer Recurrence Score:   8          RADIOLOGIC STUDIES:   1/4/2021: Mammogram George digital diagnostic bilateral:  IMPRESSION: Mammogram BI-RADS: Category 0: Incomplete: Needs    Additional Imaging Evaluation   1. The numerous lymph nodes in the right axilla seen on the    mediolateral oblique view only appears indeterminate.  An    ultrasound is recommended.     2. The irregular high density mass in the left breast central to    the nipple in the retroareolar region appears indeterminate.  An    ultrasound is recommended.     3. The numerous lymph nodes in the left axilla seen on the    mediolateral oblique view only appears indeterminate.  An    ultrasound is recommended.     4. A targeted ultrasound was performed. 1/4/2021: Ultrasound breast Limited left:  AMENDMENT: 1/14/2021   Sania [facility]fordclarence    On real time imagaing, the left breast mass is in the upper inner    quadrant (12:00), anterior depth.     Amended BI-RADS: Category 5: Highly Suggestive of Malignancy    IMPRESSION: Ultrasound BI-RADS: Category 5: Highly Suggestive of    Malignancy   1. The 2.2 cm x 2.2 cm x 2.9 cm irregular mass in the left breast    central to the nipple in the retroareolar region appears highly    suggestive of malignancy.  An ultrasound guided biopsy is    recommended.     2. The 0.7 cm x 0.9 cm x 1 cm oval lymph node in the left axilla    appears to have a moderate suspicion for malignancy.  3. An    ultrasound guided biopsy is recommended.     4. The results were reviewed with the patient and the biopsy was    scheduled. 2/19/2021: Mammogram breast specimen:  IMPRESSION: SPECIMEN   1. A single surgical specimen was received which includes 2 lymph    nodes (one of which is the targeted lymph node), biopsy marker    clip and the localization wire. REVIEW OF SYSTEMS:    Constitutional: Denies fever, chills, unintentional weight change. HEENT: Denies hearing or vision change. Denies dysphagia or odynophagia. Complains of dryness in mouth and throat. Respiratory: Denies worsening dyspnea. Denies hemoptysis, coughing, wheezing or sputum production. Cardiovascular: Denies chest pain, palpitations, syncope. GI: indigestion. abdominal gassiness, hemorrhoids. Denies nausea or vomiting, hematemesis or rectal bleeding. Denies change in bowel habits. : frequent urination, weak urinary stream, incomplete emptying of bladder. Denies hematuria or dysuria. Musculoskeletal: MS.  Extremities: extremity weakness related to MS.  Joint stiffness related to lupus and MS. Metabolic/endocrine: diabetes. lupus erythematosus  Neurological: Denies seizures, fainting or tremors. Multiple stigmata related to MS. Integument: lumpectomy incision incompletely healed. PHYSICAL EXAMINATION:   VITAL SIGNS: Height: 4 feet 11 inches. Weight: 150 pounds. Temperature: 36.8 °C.  Pulse: 84.  Respirations: 16.  Blood pressure: 110/55. Pulse oximetry: O2 saturation 97% on room air. ECOG PERFORMANCE STATUS: 1  PAIN RATIN/10 (left breast pain)  GENERAL: Pleasant well-developed but ill-appearing adult female. In no acute distress. Alert and oriented ×3; clear mentation with appropriate affect  SKIN: Warm and dry, without jaundice, or petechiae. HEENT: Normocephalic, atraumatic. PERRL/EOMI; ears, nose and lips unremarkable on external examination; oral cavity/oropharyngeal mucosa moist without lesion or exudate  NECK:  No JVD; no palpable cervical adenopathy. THORAX: No palpable supraclavicular or axillary adenopathy;  LUNGS: clear bilaterally. Good inspiratory effort, no accessory muscle use. CARDIAC: Regular rate and rhythm, without murmur  BREASTS: left breast status post recent debridement, currently with incomplete healing. ABDOMEN: Soft, nontender, bowel sounds present  EXTREMITIES: limited extremity range of motion with some joint contractures related to MS  NEUROLOGIC: multiple stigmata related to MS. . Cranial nerves grossly intact      Thank you for allowing my assistance in Micah Kusum' care. Shruthi Brown MD  Radiation Oncology     ATTESTATION: 50 minutes face-to-face time,  >50% spent in counseling/discussion/education. CC: Elias Puga; Dakota Jhaveri  ACC: Tumor Registry: [id number] [address]      This document was created using a voice-recognition program.  Computer generated transcription errors may be present.

## 2021-05-03 ENCOUNTER — OFFICE VISIT (OUTPATIENT)
Dept: SURGERY | Age: 56
End: 2021-05-03

## 2021-05-03 VITALS
SYSTOLIC BLOOD PRESSURE: 110 MMHG | TEMPERATURE: 97.1 F | DIASTOLIC BLOOD PRESSURE: 62 MMHG | HEART RATE: 96 BPM | OXYGEN SATURATION: 95 % | HEIGHT: 60 IN | WEIGHT: 150 LBS | RESPIRATION RATE: 18 BRPM | BODY MASS INDEX: 29.45 KG/M2

## 2021-05-03 DIAGNOSIS — T81.31XS DEHISCENCE OF OPERATIVE WOUND, SEQUELA: ICD-10-CM

## 2021-05-03 DIAGNOSIS — C50.212 CARCINOMA OF UPPER-INNER QUADRANT OF LEFT BREAST IN FEMALE, ESTROGEN RECEPTOR POSITIVE (HCC): Primary | ICD-10-CM

## 2021-05-03 DIAGNOSIS — C50.912 INVASIVE DUCTAL CARCINOMA OF LEFT BREAST (HCC): ICD-10-CM

## 2021-05-03 DIAGNOSIS — G89.18 POST-OP PAIN: ICD-10-CM

## 2021-05-03 DIAGNOSIS — Z17.0 CARCINOMA OF UPPER-INNER QUADRANT OF LEFT BREAST IN FEMALE, ESTROGEN RECEPTOR POSITIVE (HCC): Primary | ICD-10-CM

## 2021-05-03 DIAGNOSIS — M32.9 SYSTEMIC LUPUS ERYTHEMATOSUS, UNSPECIFIED SLE TYPE, UNSPECIFIED ORGAN INVOLVEMENT STATUS (HCC): ICD-10-CM

## 2021-05-03 DIAGNOSIS — G35 MULTIPLE SCLEROSIS (HCC): ICD-10-CM

## 2021-05-03 PROCEDURE — 99024 POSTOP FOLLOW-UP VISIT: CPT | Performed by: SURGERY

## 2021-05-05 ENCOUNTER — OFFICE VISIT (OUTPATIENT)
Dept: ONCOLOGY | Age: 56
End: 2021-05-05
Payer: COMMERCIAL

## 2021-05-05 ENCOUNTER — HOSPITAL ENCOUNTER (OUTPATIENT)
Dept: INFUSION THERAPY | Age: 56
Discharge: HOME | End: 2021-05-05
Payer: COMMERCIAL

## 2021-05-05 VITALS
SYSTOLIC BLOOD PRESSURE: 116 MMHG | TEMPERATURE: 98.4 F | WEIGHT: 150 LBS | BODY MASS INDEX: 29.45 KG/M2 | RESPIRATION RATE: 16 BRPM | HEART RATE: 84 BPM | OXYGEN SATURATION: 97 % | HEIGHT: 60 IN | DIASTOLIC BLOOD PRESSURE: 59 MMHG

## 2021-05-05 DIAGNOSIS — C50.912 BREAST CANCER METASTASIZED TO AXILLARY LYMPH NODE, LEFT (HCC): ICD-10-CM

## 2021-05-05 DIAGNOSIS — Z17.0 ESTROGEN RECEPTOR POSITIVE STATUS (ER+): ICD-10-CM

## 2021-05-05 DIAGNOSIS — C50.912 INVASIVE DUCTAL CARCINOMA OF LEFT BREAST (HCC): Primary | ICD-10-CM

## 2021-05-05 DIAGNOSIS — Z90.12 S/P LEFT MASTECTOMY: ICD-10-CM

## 2021-05-05 DIAGNOSIS — C77.3 BREAST CANCER METASTASIZED TO AXILLARY LYMPH NODE, LEFT (HCC): ICD-10-CM

## 2021-05-05 PROCEDURE — 99213 OFFICE O/P EST LOW 20 MIN: CPT | Performed by: INTERNAL MEDICINE

## 2021-05-05 PROCEDURE — G8427 DOCREV CUR MEDS BY ELIG CLIN: HCPCS | Performed by: INTERNAL MEDICINE

## 2021-05-05 PROCEDURE — 4004F PT TOBACCO SCREEN RCVD TLK: CPT | Performed by: INTERNAL MEDICINE

## 2021-05-05 PROCEDURE — G9899 SCRN MAM PERF RSLTS DOC: HCPCS | Performed by: INTERNAL MEDICINE

## 2021-05-05 PROCEDURE — G8417 CALC BMI ABV UP PARAM F/U: HCPCS | Performed by: INTERNAL MEDICINE

## 2021-05-05 PROCEDURE — 3017F COLORECTAL CA SCREEN DOC REV: CPT | Performed by: INTERNAL MEDICINE

## 2021-05-05 PROCEDURE — 99211 OFF/OP EST MAY X REQ PHY/QHP: CPT

## 2021-05-05 ASSESSMENT — ENCOUNTER SYMPTOMS
ABDOMINAL PAIN: 0
CONSTIPATION: 0
WHEEZING: 0
EYE DISCHARGE: 0
DIARRHEA: 0
SHORTNESS OF BREATH: 0
RECTAL PAIN: 0
TROUBLE SWALLOWING: 0
BACK PAIN: 0
COUGH: 0
FACIAL SWELLING: 0
COLOR CHANGE: 0
BLOOD IN STOOL: 0
CHEST TIGHTNESS: 0
NAUSEA: 0
VOMITING: 0
ABDOMINAL DISTENTION: 0
SORE THROAT: 0

## 2021-05-05 NOTE — PROGRESS NOTES
Kay Yeboah MD   General Surgery  Postprocedure Evaluation in Office  Pt Name: Michael Gunderson  Date of Birth 1965   Today's Date: 5/3/2021  Medical Record Number: [id number]  Primary Care Provider: Marisabel Blanco MD  Chief Complaint   Patient presents with   [facility] Post-Op Check     s/p Revision of wound with excision of skin and subcutaneous fat 16 cm2 with tissue advancement flaps inferior and superior, totaling 18 cm skin and subcutaneous fat with wound closure 4/27/21     ASSESSMENT      1. 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 Systemic lupus erythematosus, unspecified SLE type, unspecified organ involvement status ([facility] [address]    3. Multiple sclerosis ([facility] [address]    4. Invasive ductal carcinoma of left breast ([facility] [address]    5. Dehiscence of operative wound, sequela    6. Post-op pain       Pathologic stage Ib  PLAN       1. Patient underwent debridement and secondary closure for a second time. Wound does look better. Penrose drain in place. Minimal drainage small open area where drain is. 2.  Patient has visiting nurse to check wound daily. E.  Follow-up office in 1 week or less. Patient would not do well with the VAC. Miguel Das is seen today for post-op follow-up. She is status post left mastectomy with sentinel node biopsy additional adjacent nodes removed slightly enlarged. 2 of 6 total nodes were positive. Positive nodes were sentinel nodes. Wound is intact. Small amount of fluid below her flaps. She does have chronic thrombocytopenia. Patient underwent a second revision. No seroma. Oncotype DX score is 8. She has to transfer her herself from her wheelchair and back and putting a lot of stress on the wound I think leading to pulling out of sutures. She does not want a wound VAC. She would not do well with a wound VAC. There does not seem to be tension that we should not be able to close this.   Continue local wound care.      Medications    Current Outpatient Medications:     baclofen (LIORESAL) 10 MG tablet, Take 2 tablets by mouth 3 times daily, Disp: 135 tablet, Rfl: 5    amitriptyline (ELAVIL) 25 MG tablet, Take 2 tablets by mouth nightly, Disp: 30 tablet, Rfl: 5    naloxone 4 MG/0.1ML LIQD nasal spray, 1 spray by Nasal route as needed for Opioid Reversal, Disp: 1 each, Rfl: 5    pregabalin (LYRICA) 150 MG capsule, Take 1 capsule by mouth 2 times daily for 180 days. , Disp: 60 capsule, Rfl: 5    metFORMIN (GLUCOPHAGE) 1000 MG tablet, Take 1,000 mg by mouth 2 times daily, Disp: , Rfl:     desvenlafaxine succinate (PRISTIQ) 50 MG TB24 extended release tablet, Take 50 mg by mouth daily, Disp: , Rfl:     traZODone (DESYREL) 50 MG tablet, Take 50 mg by mouth nightly, Disp: , Rfl:     MUCINEX MAXIMUM STRENGTH 1200 MG TB12, Take 1 tablet by mouth 2 times daily, Disp: , Rfl:     Lactobacillus Acid-Pectin (ACIDOPHILUS/CITRUS PECTIN) TABS, Take 1 tablet by mouth daily, Disp: , Rfl:     paliperidone (INVEGA) 3 MG extended release tablet, Take 3 mg by mouth nightly, Disp: , Rfl:     SITagliptin (JANUVIA) 100 MG tablet, Take 100 mg by mouth daily, Disp: , Rfl:     Lifitegrast (XIIDRA) 5 % SOLN, Place 1 drop into both eyes daily, Disp: , Rfl:     Misc.  Devices [facility]) MISC, Wheelchair repairs- new seat cover, right side armrest replacement  Current body weight:  68 kg Diagnosis: gait disturbance, chronic incomplete spastic paraplegia Length of need: Lifetime, Disp: 1 each, Rfl: 0    insulin glargine (LANTUS SOLOSTAR) 100 UNIT/ML injection pen, Inject 60 Units into the skin nightly , Disp: , Rfl:     montelukast (SINGULAIR) 10 MG tablet, Take 10 mg by mouth nightly , Disp: , Rfl: 0    artificial tears (ARTIFICIAL TEARS) OINT, as needed, Disp: , Rfl:     Calcium Carbonate (CALCIUM 600 PO), Take 1 tablet by mouth 2 times daily, Disp: , Rfl:     Multiple Vitamin (TAB-A-ISAAC PO), Take 1 tablet by mouth daily, Disp: , Rfl:     loratadine (CLARITIN) 10 MG tablet, Take 1 tablet by mouth daily as needed. , Disp: , Rfl: 0    PROAIR  (90 BASE) MCG/ACT inhaler, Inhale 2 puffs into the lungs every 6 hours as needed. , Disp: , Rfl: 0    Incontinence Supply Disposable (UNDERPADS) MISC, Cloth chux pads Diagnosis: Paraplegia 344.1, Disp: 100 Bottle, Rfl: 11    hydrOXYzine (VISTARIL) 50 MG capsule, Take 50 mg by mouth 3 times daily as needed for Itching., Disp: , Rfl:     omeprazole (PRILOSEC) 20 MG capsule, Take 20 mg by mouth daily. , Disp: , Rfl:     tiotropium (SPIRIVA) 18 MCG inhalation capsule, Inhale 18 mcg into the lungs daily 2 puffs, Disp: , Rfl:     Allergies  Allergies   Allergen Reactions    Penicillins Shortness Of Breath     \"I almost \"    Seasonal Itching       Review of Systems  History obtained from the patient. Constitutional: Denies any fever, chills, fatigue. Wound: Ostectomy wound opened up medially for about 4 cm and laterally for about 3-1/2 cm. Resp: Denies any cough, shortness of breath. CV: Denies any chest pain, orthopnea or syncope. GI:  Denies any nausea, vomiting, blood in the stool, constipation or diarrhea. : Denies any hematuria, hesitancy or dysuria. OBJECTIVE     VITALS: /62 (Site: Right Upper Arm, Position: Sitting, Cuff Size: Medium Adult)   Pulse 96   Temp 97.1 °F (36.2 °C) (Temporal)   Resp 18   Ht 4' 11.5\" (1.511 m)   Wt 150 lb (68 kg)   SpO2 95%   BMI 29.79 kg/m²     CONSTITUTIONAL: Alert and oriented times 3, no acute distress and cooperative to examination. SKIN: Skin color, texture, turgor normal. .  INCISION: Mastectomy wound intact areas that are reclosed intact except for one small area open near Penrose drain which was placed in the incision line. .   LUNGS: Lungs Clear  CARDIOVASCULAR: Normal Rate  ABDOMEN: nondistended  NEUROLOGIC: Alert and oriented        Performed by: Bhanu Guillory. Pathology     Laya Dunham                [id number]   Assoc.                                              Page 1 of 1   [address]   [place] [address] [address], [place]                                                        PROC: 2021   [place]                                    RECV: 2021   [address]                                    RPTD: 2021   [facility] [place] [address]                       MRN:  [id number]     LOC: 5KS                       ACCT: [de-identified]  SEX: F                       : 1965  AGE: 50 Y                          PATHOLOGY REPORT                       ATTN: AIDE Pereira                  REQ: Gladis Hernandez ANDI       Copies To:   GRETA SOTO       Clinical Information: INVASIVE DUCTAL CARCINOMA LEFT BREAST     AMENDED REPORT 2021:     FINAL DIAGNOSIS:   A.  Left sentinel lymph node, excision:    Metastatic carcinoma in 2 of 4 lymph nodes (2/4).  Biopsy site changes. Comment: The frozen section discrepancy is noted.  Re-review of the   frozen section slide shows no evidence of metastatic carcinoma on   sections examined. B.  Left breast, mastectomy:    Mucinous micropapillary carcinoma, Lynn grade 2 (pT2, pN1a).    Lymphovascular space invasion is present.    Margins are negative.    Please see synoptic report. C.  Left axillary lymph nodes, excision:    Two lymph nodes, negative for malignancy (0/2). COMMENT 2021:  An amended report was issued to correct a minor   typographical error in the microscopic description.        Specimen:   A) SENTINEL LYMPH NODE(S), LEFT, FS   B) EXCISION OF BREAST, LEFT MASS   C) LYMPH NODE(S), LEFT AXILLARY           Intraoperative Consultation:   A - Frozen Section DX:  Favor biopsy site changes, no definite   malignancy.  PCF/ALP       Received:  12:17 Reported:  12:38     Gross Examination:   A - The container is labeled Jany Monahan, left sentinel lymph node.    Received fresh for frozen section evaluation are fragments of   fibroadipose tissue measuring 6.5 container is labeled Amber Neri, left axillary lymph node.    Received in formalin are fragments of fibroadipose tissue aggregating   to 6.0 x 4.0 x 2.0 cm.  Sectioning reveals two lymph nodes ranging in   size from 0.9 to 0.4 cm in greatest dimension.  Each lymph node is   serially sectioned and entirely submitted in one cassette each. PCF:[id number]     Fixative:  10% neutral buffered formalin   Tissue removal time:  12:03   Tissue fixation time:  12:30   Total fixation time: 55 hours 30 minutes           Microscopic Examination:   A-C.  Procedure: Mastectomy   Specimen laterality: Left   Tumor site: Upper inner quadrant   Tumor size: 29 mm   Histologic type:  Invasive mucinous micropapillary carcinoma   Histologic grade (Lynn histologic score)   Glandular/tubular differentiation: Score 3   Nuclear pleomorphism: Score 2   Mitotic rate: Score 1   Overall grade: Grade 2 (score 6)   Ductal carcinoma in situ: Not identified   Invasive carcinoma margins: Uninvolved by invasive carcinoma    Distance from closest margin: 25 mm (deep margin)   DCIS margins: Not applicable   Regional lymph nodes: Involved by tumor cells    Number of lymph nodes with macrometastases (>2 mm): 2    Number of lymph nodes with micrometastases: 0    Number of lymph nodes with isolated tumor cells: 0   Size of largest metastatic deposit: 2.5 mm   Extranodal extension: Not identified   Total number of lymph nodes examined: 6   Number of sentinel nodes examined: 4   Treatment effect in the breast: No known presurgical therapy   Treatment effect in the lymph nodes: Not applicable   Lymphovascular invasion: Present   Dermal lymphovascular invasion: Not identified     Pathologic stage classification (pTNM, AJCC 8th edition)   pT2: Tumor greater than 20 mm but less than or equal to 50 mm greatest   dimension   pN1a: Metastases in 1-3 axillary lymph nodes, at least 1 metastasis   larger than 2.0 mm     Breast biomarker testing performed on previous biopsy [id number];   1/14/21     Estrogen Receptor: (Clone SP1), General Compression       Positive 100% of cells ( > 10% of cells)   Intensity of Staining:       Strong     Progesterone Receptor: (Clone 1E2), General Compression       Positive 80% of cells   Intensity of Staining:       Strong           Ki-67 (clone 30-9)       Percentage of positive nuclei:  6%               Favorable  < 10%               Borderline 10-20%               Unfavorable  > 20%     HER2 Immunohistochemistry (Clone 4B5, Snapkin)       Negative (1+) - Incomplete faint/barely perceptible membrane   staining in  > 10% of invasive tumor     External Controls:  Adequate   Internal Controls:  Adequate   Standard Assay Conditions:  Met     Staining Method Used:    Formalin fixation    Antigen retrieval type:  Cell Conditioning 1, mild gordon       [id number]   [id number]                                                     <Sign Out Dr. Irasema Stokes M.D., F.C. A. P       [facility]  Printed on:  2/23/2021   [employer] [employer], [address]   Original print date: 02/23/2021   Lab and Collection    Oncotype DX 8

## 2021-05-05 NOTE — PROGRESS NOTES
[facility] [address] [place] [address],[address]  [place] [address]  Dept: [phone]  Dept Fax: [phone]: [phone]    Visit Date:5/5/2021     Colt Cochran is a 50 y.o. female who presents today for:   Chief Complaint   Patient presents with    Follow-up     Invasive ductal carcinoma of left breast         HPI:   This is a 50-year old postmenopausal patient with newly diagnosed left breast cancer. She presents to the medical oncology clinic to discussed postsurgical treatment. 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 She gets Botox injections for contractures in her lower extremities. She has also multiple sclerosis, neurogenic bowel and bladder, bipolar disorder. She carries history of cervical cancer. Also has mild chronic thrombocytopenia going back to 2014. Vladimir Ackerman presented for diagnostic breast imaging after she felt a mass in the left breast.  Mammogram on January 4, 2021 showed irregular high density mass in the left breast central to the nipple and numerous lymph nodes in the right axilla. Ultrasounds of both axilla and the breast were performed and ultimately ultrasound-guided biopsies of the mass in the lymph node on the left. On ultrasound of the right axilla there were no abnormalities seen and no abnormal lymph nodes. Ultrasound imaging revealed a 2.2 x 2.2 x 2.9 cm mass in the left breast in the retroareolar area. A single lymph node in the left axilla appeared to have a moderate suspicion for malignancy. Ultrasound-guided biopsies were performed on January 14, 2021. Pathology revealed a grade 1 well-differentiated invasive adenocarcinoma with mucinous features, the lymph node was negative for malignancy. Breast cancer cells were estrogen receptor 100% positive, progesterone receptor 80% positive. Ki-67 was 6%,  HER-2 by IHC was negative.     Sequently she met with the surgeon Dr. Fede Garza old and after discussing her surgical treatment options she decided to proceed with a left breast mastectomy and axillary lymph node excision. She had her surgery on February 19, 2021 final pathology report showed: She gets Botox injections for contractures in her lower extremities. Recent onset of thrombocytopenia. A.  Left sentinel lymph node, excision:    Metastatic carcinoma in 2 of 4 lymph nodes (2/4). B.  Left breast, mastectomy:    Mucinous micropapillary carcinoma, Lynn grade 2 (pT2, pN1a).  Lymphovascular space invasion is present.    Margins are negative.    C.  Left axillary lymph nodes, excision:    Two lymph nodes, negative for malignancy (0/2). Oncotype DX Breast Cancer Assay (RT-PCR) performed by [employer]   Breast Cancer Recurrence Score:   8     Her post mastectomy incision is complicated by seroma formation. The patient required aspiration. Interim history on May 5, 2021:  The patient presents to the medical oncology clinic to start adjuvant hormonal treatment. However she did not start her radiation treatment due to secondary wound closure. Lateral aspect is healing medial aspect again has reopened up for about 3 cm in length. On April 27, 2021 the patient had a revision of wound with excision of skin and subcutaneous fat tissue with tissue advancement flaps inferior and superior. HPI   Past Medical History:   Diagnosis Date    Anxiety     Anxiety and depression     Asthma     Bipolar 1 disorder ([address]     Bipolar 1 disorder with moderate sourav ([address]     Blood circulation, collateral     Cancer ([address]      ?  cervical \"long time ago\"    Cancer ([address] 01/15/2021    Left Breast    Depression     Endometriosis     GERD (gastroesophageal reflux disease)     Kidney stones     Lupus (HCC)     Movement disorder     MS (multiple sclerosis) ([address]     Schizophrenia ([address]     Thyroid disease     Type 2 diabetes mellitus without complication [facility])     Unspecified cerebral artery occlusion with cerebral infarction 2014      Past Surgical History:   Procedure Laterality Date    BREAST LUMPECTOMY Left 02/19/2021    LEFT BREAST MASTECTOMY, SENTINAL LYMPH NODE BIOPSY, PREOP NEEDLE LOC performed by Tom Mejia MD at [address] [facility] 4/6/2021    DEBRIDEMENT LEFT BREAST MASTECTOMY WOUND performed by Tom Mejia MD at [address] [place] Left 4/27/2021    LEFT BREAST WOUND REVISION performed by Tom Mejia MD at [address] [facility]  01/2020    DILATION AND CURETTAGE OF UTERUS      BEN US GUID NDL BIOPSY LEFT Left 01/14/2021    BEN US GUID NDL BIOPSY LEFT 1/14/2021 Vivienne Hassan MD [facility]    NERVE SURGERY N/A 01/26/2021    EXCHANGE OF INTERSTIM SYSTEM performed by Harrison Kaur MD at [address] [place] [address],[address]  03/23/2021    right breast i and d with closure Dr [facility] in the procedure room    [place] OFFICE/OUTPT VISIT,PROCEDURE ONLY N/A 11/21/2018    INTERSTIM [address] [facility], ONLY HEAD ELIGIBLE FOR MRI WITH TRANSMIT/RECEIVE HEAD COIL    TUBAL LIGATION      10 years ago    US GUIDED NEEDLE LOC OF LEFT BREAST Left 02/19/2021    US GUIDED NEEDLE LOC OF LEFT BREAST 2/19/2021 [address] LYMPH NODE BIOPSY  01/14/2021    US LYMPH NODE BIOPSY 1/14/2021 Vivienne Hassan MD [facility]      Family History   Problem Relation Age of Onset    Stroke Mother     Asthma Mother     Other Mother     No Known Problems Sister     Asthma Brother     No Known Problems Brother       Social History     Tobacco Use    Smoking status: Current Every Day Smoker     Packs/day: 1.00     Types: Cigarettes     Start date: 12/6/1979    Smokeless tobacco: Never Used   Substance Use Topics    Alcohol use:  Yes     Alcohol/week: 0.0 standard drinks     Comment: social drinker      Current Outpatient Medications   Medication Sig Dispense Refill    baclofen (LIORESAL) 10 MG tablet Take 2 tablets by mouth 3 times daily 135 tablet 5    amitriptyline (ELAVIL) 25 MG tablet Take 2 tablets by mouth nightly 30 tablet 5    pregabalin (LYRICA) 150 MG capsule Take 1 capsule by mouth 2 times daily for 180 days. 60 capsule 5    metFORMIN (GLUCOPHAGE) 1000 MG tablet Take 1,000 mg by mouth 2 times daily      desvenlafaxine succinate (PRISTIQ) 50 MG TB24 extended release tablet Take 50 mg by mouth daily      traZODone (DESYREL) 50 MG tablet Take 50 mg by mouth nightly      MUCINEX MAXIMUM STRENGTH 1200 MG TB12 Take 1 tablet by mouth 2 times daily      Lactobacillus Acid-Pectin (ACIDOPHILUS/CITRUS PECTIN) TABS Take 1 tablet by mouth daily      paliperidone (INVEGA) 3 MG extended release tablet Take 3 mg by mouth nightly      SITagliptin (JANUVIA) 100 MG tablet Take 100 mg by mouth daily      Lifitegrast (XIIDRA) 5 % SOLN Place 1 drop into both eyes daily      [facility] [address] [facility] Wheelchair repairs- new seat cover, right side armrest replacement    Current body weight:  68 kg  Diagnosis: gait disturbance, chronic incomplete spastic paraplegia  Length of need: Lifetime 1 each 0    tiotropium (SPIRIVA) 18 MCG inhalation capsule Inhale 18 mcg into the lungs daily 2 puffs      insulin glargine (LANTUS SOLOSTAR) 100 UNIT/ML injection pen Inject 60 Units into the skin nightly       montelukast (SINGULAIR) 10 MG tablet Take 10 mg by mouth nightly   0    artificial tears (ARTIFICIAL TEARS) OINT as needed      Calcium Carbonate (CALCIUM 600 PO) Take 1 tablet by mouth 2 times daily      Multiple Vitamin (TAB-A-ISAAC PO) Take 1 tablet by mouth daily      loratadine (CLARITIN) 10 MG tablet Take 1 tablet by mouth daily as needed. 0    PROAIR  (90 BASE) MCG/ACT inhaler Inhale 2 puffs into the lungs every 6 hours as needed. 0    Incontinence Supply Disposable (UNDERPADS) MISC Cloth chux pads  Diagnosis: Paraplegia 344. 1 100 Bottle 11    hydrOXYzine (VISTARIL) 50 MG capsule Take 50 mg by mouth 3 times daily as needed for Itching.  omeprazole (PRILOSEC) 20 MG capsule Take 20 mg by mouth daily.  HYDROcodone-acetaminophen (NORCO) 5-325 MG per tablet Take 1 tablet by mouth every 6 hours as needed for Pain.  naloxone 4 MG/0.1ML LIQD nasal spray 1 spray by Nasal route as needed for Opioid Reversal 1 each 5     No current facility-administered medications for this visit. Allergies   Allergen Reactions    Penicillins Shortness Of Breath     \"I almost \"    Seasonal Itching      Health Maintenance   Topic Date Due    Hepatitis C screen  Never done    HIV screen  Never done    Shingles Vaccine (1 of 2) Never done    Colon cancer screen colonoscopy  Never done    A1C test (Diabetic or Prediabetic)  2017    Pneumococcal 0-64 years Vaccine (2 of 4 - PPSV23) 2021    Breast cancer screen  2022    Cervical cancer screen  2024    DTaP/Tdap/Td vaccine (2 - Td) 2024    Lipid screen  2026    Flu vaccine  Completed    COVID-19 Vaccine  Completed    Hepatitis A vaccine  Aged Out    Hepatitis B vaccine  Aged Out    Hib vaccine  Aged Out    Meningococcal (ACWY) vaccine  Aged Out        Subjective:   Review of Systems   Constitutional: Negative for activity change, appetite change, fatigue and fever. HENT: Negative for congestion, dental problem, facial swelling, hearing loss, mouth sores, nosebleeds, sore throat, tinnitus and trouble swallowing. Eyes: Negative for discharge and visual disturbance. Respiratory: Negative for cough, chest tightness, shortness of breath and wheezing. Cardiovascular: Negative for chest pain, palpitations and leg swelling. Gastrointestinal: Negative for abdominal distention, abdominal pain, blood in stool, constipation, diarrhea, nausea, rectal pain and vomiting. Endocrine: Negative for cold intolerance, polydipsia and polyuria. Genitourinary: Positive for difficulty urinating.  Negative for decreased urine volume, dysuria, flank pain, hematuria and urgency. Musculoskeletal: Negative for arthralgias, back pain, gait problem, joint swelling, myalgias and neck stiffness. Skin: Negative for color change, rash and wound. Neurological: Negative for dizziness, tremors, seizures, speech difficulty, weakness (The patient has spastic contractures in the lower extremities), light-headedness, numbness and headaches. Hematological: Negative for adenopathy. Bruises/bleeds easily. Psychiatric/Behavioral: Positive for behavioral problems. Negative for confusion and sleep disturbance. The patient is nervous/anxious. Objective:   Physical Exam  Vitals signs reviewed. Constitutional:       General: She is not in acute distress. Appearance: She is well-developed. HENT:      Head: Normocephalic. Mouth/Throat:      Pharynx: No oropharyngeal exudate. Eyes:      General: No scleral icterus. Right eye: No discharge. Left eye: No discharge. Pupils: Pupils are equal, round, and reactive to light. Neck:      Musculoskeletal: Normal range of motion and neck supple. Thyroid: No thyromegaly. Vascular: No JVD. Trachea: No tracheal deviation. Cardiovascular:      Rate and Rhythm: Normal rate. Heart sounds: Normal heart sounds. No murmur. No friction rub. No gallop. Pulmonary:      Effort: Pulmonary effort is normal. No respiratory distress. Breath sounds: Normal breath sounds. No stridor. No wheezing or rales. Chest:      Chest wall: No tenderness. Abdominal:      General: Bowel sounds are normal. There is no distension. Palpations: Abdomen is soft. There is no mass. Tenderness: There is no abdominal tenderness. There is no rebound. Musculoskeletal: Normal range of motion. Comments: Good range of motion in all four extremities. Lymphadenopathy:      Cervical: No cervical adenopathy. Skin:     General: Skin is warm.       Findings: No erythema or rash. Neurological:      Mental Status: She is alert and oriented to person, place, and time. Cranial Nerves: No cranial nerve deficit. Motor: No abnormal muscle tone. Deep Tendon Reflexes: Reflexes are normal and symmetric. Psychiatric:         Behavior: Behavior normal.         Thought Content: Thought content normal.         Judgment: Judgment normal.         BP (!) 116/59 (Site: Right Upper Arm, Position: Sitting, Cuff Size: Medium Adult)   Pulse 84   Temp 98.4 °F (36.9 °C) (Oral)   Resp 16   Ht 4' 11.5\" (1.511 m)   Wt 150 lb (68 kg)   SpO2 97%   BMI 29.79 kg/m²      ECOG status is 2    Imaging studies and labs:     Lab Results   Component Value Date    WBC 2.9 (L) 04/01/2021    HGB 12.0 04/01/2021    HCT 35.7 (L) 04/01/2021    MCV 78.5 (L) 04/01/2021     (L) 04/01/2021       Chemistry        Component Value Date/Time     02/19/2021 1011    K 4.2 02/19/2021 1011     02/19/2021 1011    CO2 28 02/19/2021 1011    BUN 11 02/19/2021 1011    CREATININE 0.3 (L) 02/19/2021 1011        Component Value Date/Time    CALCIUM 9.1 02/19/2021 1011    ALKPHOS 121 01/07/2021 1610    AST 27 01/07/2021 1610    ALT 17 01/07/2021 1610    BILITOT 0.7 01/07/2021 1610    BILITOT NEGATIVE 03/14/2018 0000            Assessment/Plan:        Diagnosis Orders   1. Invasive ductal carcinoma of left breast (HCC)     2. Estrogen receptor positive status (ER+)     3. Breast cancer metastasized to axillary lymph node, left ([facility]     4. S/P left mastectomy          1. Stage IB (pT2, pN1a, cM0, G2, ER+, LA+, HER2-, Oncotype DX score: 8) left breast cancer. The patient is s/p left breast mastectomy with axillary lymph nodes excision. The Oncotype Dx 21-gene recurrence score (RS) is the best-validated prognostic and predictive assay to identify patients who are most and least likely to derive benefit from adjuvant chemotherapy.  RS 25 or below identifies women with low risk of disease recurrence for whom chemotherapy is unlikely to provide a benefit. Her recurrence score came back as 8. The RS for those with one to three positive nodes, using cutoffs as for those with node-negative disease, can be used to make decision about adjuvant chemotherapy. This approach is included in the ASCO guidelines and in the NCCN. Rationale for this approach is that, in the modern era of more effective local therapy, and widespread application of adjuvant aromatase inhibitors, lymph node positivity might not confer the same degree of heightened risk as it once did. The SWOG  phase III study in women with hormone responsive, HER-2/maegan negative breast cancer with 1-3 axillary lymph nodes involvement showed no overall benefit from adjuvant chemotherapy for postmenopausal women with recurrence score between 0 and 25. In addition due to patient's multiple comorbidities she is poor candidate for systemic chemotherapy. As such my recommendation is to proceed with postmastectomy radiation treatment. The patient did not even start radiation treatment due to need for revision of wound with excision of skin and subcutaneous fat 16 cm2 with tissue advancement flaps inferior and superior, totaling 18 cm skin and subcutaneous fat with wound closure on 4/27/21. Should see the patient upon completion of postmastectomy radiation treatment. Return in about 8 weeks (around 6/30/2021). Orders Placed:   No orders of the defined types were placed in this encounter. Medications Prescribed:   No orders of the defined types were placed in this encounter. Discussed use, benefit, and side effectsof prescribed medications. All patient questions answered. Pt voiced understanding. Instructed to continue current medications, diet and exercise. Patient agreed with treatment plan. Follow up as directed.     Electronically signed by Candida Brown MD on 3/27/21 at 10:39 AM EDT

## 2021-05-06 ENCOUNTER — PROCEDURE VISIT (OUTPATIENT)
Dept: SURGERY | Age: 56
End: 2021-05-06

## 2021-05-06 VITALS
HEIGHT: 60 IN | RESPIRATION RATE: 18 BRPM | SYSTOLIC BLOOD PRESSURE: 132 MMHG | BODY MASS INDEX: 29.45 KG/M2 | DIASTOLIC BLOOD PRESSURE: 60 MMHG | HEART RATE: 65 BPM | OXYGEN SATURATION: 94 % | WEIGHT: 150 LBS | TEMPERATURE: 97.5 F

## 2021-05-06 DIAGNOSIS — C50.212 CARCINOMA OF UPPER-INNER QUADRANT OF LEFT BREAST IN FEMALE, ESTROGEN RECEPTOR POSITIVE (HCC): Primary | ICD-10-CM

## 2021-05-06 DIAGNOSIS — Z17.0 CARCINOMA OF UPPER-INNER QUADRANT OF LEFT BREAST IN FEMALE, ESTROGEN RECEPTOR POSITIVE (HCC): Primary | ICD-10-CM

## 2021-05-06 DIAGNOSIS — T81.31XS DEHISCENCE OF OPERATIVE WOUND, SEQUELA: ICD-10-CM

## 2021-05-06 DIAGNOSIS — M32.9 SYSTEMIC LUPUS ERYTHEMATOSUS, UNSPECIFIED SLE TYPE, UNSPECIFIED ORGAN INVOLVEMENT STATUS (HCC): ICD-10-CM

## 2021-05-06 DIAGNOSIS — G35 MULTIPLE SCLEROSIS (HCC): ICD-10-CM

## 2021-05-06 DIAGNOSIS — Z48.89 ENCOUNTER FOR POSTOPERATIVE CARE: ICD-10-CM

## 2021-05-06 PROCEDURE — 99024 POSTOP FOLLOW-UP VISIT: CPT | Performed by: SURGERY

## 2021-05-09 NOTE — PROGRESS NOTES
metFORMIN (GLUCOPHAGE) 1000 MG tablet, Take 1,000 mg by mouth 2 times daily, Disp: , Rfl:     desvenlafaxine succinate (PRISTIQ) 50 MG TB24 extended release tablet, Take 50 mg by mouth daily, Disp: , Rfl:     traZODone (DESYREL) 50 MG tablet, Take 50 mg by mouth nightly, Disp: , Rfl:     MUCINEX MAXIMUM STRENGTH 1200 MG TB12, Take 1 tablet by mouth 2 times daily, Disp: , Rfl:     Lactobacillus Acid-Pectin (ACIDOPHILUS/CITRUS PECTIN) TABS, Take 1 tablet by mouth daily, Disp: , Rfl:     paliperidone (INVEGA) 3 MG extended release tablet, Take 3 mg by mouth nightly, Disp: , Rfl:     SITagliptin (JANUVIA) 100 MG tablet, Take 100 mg by mouth daily, Disp: , Rfl:     Lifitegrast (XIIDRA) 5 % SOLN, Place 1 drop into both eyes daily, Disp: , Rfl:     Misc. Devices [facility]) MISC, Wheelchair repairs- new seat cover, right side armrest replacement  Current body weight:  68 kg Diagnosis: gait disturbance, chronic incomplete spastic paraplegia Length of need: Lifetime, Disp: 1 each, Rfl: 0    tiotropium (SPIRIVA) 18 MCG inhalation capsule, Inhale 18 mcg into the lungs daily 2 puffs, Disp: , Rfl:     insulin glargine (LANTUS SOLOSTAR) 100 UNIT/ML injection pen, Inject 60 Units into the skin nightly , Disp: , Rfl:     montelukast (SINGULAIR) 10 MG tablet, Take 10 mg by mouth nightly , Disp: , Rfl: 0    artificial tears (ARTIFICIAL TEARS) OINT, as needed, Disp: , Rfl:     Calcium Carbonate (CALCIUM 600 PO), Take 1 tablet by mouth 2 times daily, Disp: , Rfl:     Multiple Vitamin (TAB-A-ISAAC PO), Take 1 tablet by mouth daily, Disp: , Rfl:     loratadine (CLARITIN) 10 MG tablet, Take 1 tablet by mouth daily as needed. , Disp: , Rfl: 0    PROAIR  (90 BASE) MCG/ACT inhaler, Inhale 2 puffs into the lungs every 6 hours as needed. , Disp: , Rfl: 0    Incontinence Supply Disposable (UNDERPADS) MISC, Cloth chux pads Diagnosis: Paraplegia 344.1, Disp: 100 Bottle, Rfl: 11    hydrOXYzine (VISTARIL) 50 MG capsule, Take 50 mg by mouth 3 times daily as needed for Itching., Disp: , Rfl:     omeprazole (PRILOSEC) 20 MG capsule, Take 20 mg by mouth daily. , Disp: , Rfl:     Allergies  Allergies   Allergen Reactions    Penicillins Shortness Of Breath     \"I almost \"    Seasonal Itching       Review of Systems  History obtained from the patient. Constitutional: Denies any fever, chills, fatigue. Wound: Ostectomy wound opened up medially for about 4 cm and laterally for about 3-1/2 cm. Resp: Denies any cough, shortness of breath. CV: Denies any chest pain, orthopnea or syncope. GI:  Denies any nausea, vomiting, blood in the stool, constipation or diarrhea. : Denies any hematuria, hesitancy or dysuria. OBJECTIVE     VITALS: /60 (Site: Right Upper Arm, Position: Sitting, Cuff Size: Medium Adult)   Pulse 65   Temp 97.5 °F (36.4 °C) (Temporal)   Resp 18   Ht 4' 11.5\" (1.511 m)   Wt 150 lb (68 kg)   SpO2 94%   BMI 29.79 kg/m²     CONSTITUTIONAL: Alert and oriented times 3, no acute distress and cooperative to examination. SKIN: Skin color, texture, turgor normal. .  INCISION: Mastectomy wound intact areas that are reclosed intact except for one small area open near Penrose drain which was removed today. No purulent drainage. No erythema of surrounding skin.    LUNGS: Lungs Clear  CARDIOVASCULAR: Normal Rate  ABDOMEN: nondistended  NEUROLOGIC: Alert and oriented        Performed by: [address] [place] [address]. Pathology     [place], [place]                [id number]   Assoc.                                              Page 1 of 1   [address]   [address], [place] [address]                                                       PROC: 2021   [facility]                                    RECV: 2021   [address]                                    RPTD: 2021   [address], [place] [address]                       MRN:  [id number]     LOC: 5KS                       ACCT: [de-identified]  SEX: F                       : 1965  AGE: 50 Y                          PATHOLOGY REPORT                       ATTN: [address] [facility]                       REQ: [address] [facility]       Copies To:   GRETA CHARLES       Clinical Information: INVASIVE DUCTAL CARCINOMA LEFT BREAST     AMENDED REPORT 2021:     FINAL DIAGNOSIS:   A.  Left sentinel lymph node, excision:    Metastatic carcinoma in 2 of 4 lymph nodes (2/4).  Biopsy site changes. Comment: The frozen section discrepancy is noted.  Re-review of the   frozen section slide shows no evidence of metastatic carcinoma on   sections examined. B.  Left breast, mastectomy:    Mucinous micropapillary carcinoma, Lynn grade 2 (pT2, pN1a).    Lymphovascular space invasion is present.    Margins are negative.    Please see synoptic report. C.  Left axillary lymph nodes, excision:    Two lymph nodes, negative for malignancy (0/2). COMMENT 2021:  An amended report was issued to correct a minor   typographical error in the microscopic description.        Specimen:   A) SENTINEL LYMPH NODE(S), LEFT, FS   B) EXCISION OF BREAST, LEFT MASS   C) LYMPH NODE(S), LEFT AXILLARY           Intraoperative Consultation:   A - Frozen Section DX:  Favor biopsy site changes, no definite   malignancy.  PCF/ALP       Received:  12:17 Reported:  12:38     Gross Examination:   A - The container is labeled Ramona Kelly, left sentinel lymph node.    Received fresh for frozen section evaluation are fragments of   fibroadipose tissue measuring 6.5 x 4.5 x 2.0 cm.  This dissection   reveals three lymph nodes ranging in size from 1.5 to 0.5 cm.  There   are no obvious lesions. Bryce Alaniz is evidence of previous biopsy site in   the largest node.  A representative section of the largest lymph node   is submitted for frozen section evaluation as FSA1.  The remaining node   submitted for frozen section is submitted in cassette A1.  The second   largest node is serially sectioned and entirely submitted in cassette   A2.  The smallest node is submitted in cassette A3 as is. Representative fibroadipose is submitted in cassette A4. Fixative:  10% neutral buffered formalin   Tissue removal time:  11:37   Tissue fixation time:  12:30   Total fixation time: 55 hours 30 minutes     B - The container is labeled Roshan Dodson, left breast mass. Received in formalin is a mastectomy specimen oriented by a stitch   designating lateral.  The specimen measures 20 x 15 x 7 cm.  The   ellipse of skin is approximately 19 x 13.5 cm.  The nipple/areolar   complex is grossly unremarkable.  There are no skin lesions.  The   radial margin is inked blue and the deep margin is inked black.  There   is no attached skeletal muscle.  In the central aspect of the specimen   just deep to the nipple, there is an ill-defined white, glistening, and   mucoid lesion measuring 2.7 x 2.5 cm.  The lesion is 2.5 cm from the   deep margin and distant from the radial margin.  Sectioning through the   remaining breast tissue reveals a predominantly fatty cut surface with   no additional lesions or fibrous areas.  Sections are submitted as   follows:  B1 through B3 - representative mass; B4 - closest deep   margin; B5 through B8 - representative quadrants (upper outer, lower   outer, upper inner, lower inner);   B9 - nipple/areolar complex. Fixative:  10% neutral buffered formalin   Tissue removal time:  11:37   Tissue fixation time:  12:39   Total fixation time: 55 hours 21 minutes     C - The container is labeled Roshan Dodson left axillary lymph node.    Received in formalin are fragments of fibroadipose tissue aggregating   to 6.0 x 4.0 x 2.0 cm.  Sectioning reveals two lymph nodes ranging in   size from 0.9 to 0.4 cm in greatest dimension.  Each lymph node is   serially sectioned and entirely submitted in one cassette each.    PCF:[id number]     Fixative:  10% neutral buffered formalin   Tissue removal time:  12:03   Tissue fixation time:  12:30   Total fixation time: 55 hours 30 minutes           Microscopic Examination:   A-C.  Procedure: Mastectomy   Specimen laterality: Left   Tumor site: Upper inner quadrant   Tumor size: 29 mm   Histologic type:  Invasive mucinous micropapillary carcinoma   Histologic grade (Lynn histologic score)   Glandular/tubular differentiation: Score 3   Nuclear pleomorphism: Score 2   Mitotic rate: Score 1   Overall grade: Grade 2 (score 6)   Ductal carcinoma in situ: Not identified   Invasive carcinoma margins: Uninvolved by invasive carcinoma    Distance from closest margin: 25 mm (deep margin)   DCIS margins: Not applicable   Regional lymph nodes: Involved by tumor cells    Number of lymph nodes with macrometastases (>2 mm): 2    Number of lymph nodes with micrometastases: 0    Number of lymph nodes with isolated tumor cells: 0   Size of largest metastatic deposit: 2.5 mm   Extranodal extension: Not identified   Total number of lymph nodes examined: 6   Number of sentinel nodes examined: 4   Treatment effect in the breast: No known presurgical therapy   Treatment effect in the lymph nodes: Not applicable   Lymphovascular invasion: Present   Dermal lymphovascular invasion: Not identified     Pathologic stage classification (pTNM, AJCC 8th edition)   pT2: Tumor greater than 20 mm but less than or equal to 50 mm greatest   dimension   pN1a: Metastases in 1-3 axillary lymph nodes, at least 1 metastasis   larger than 2.0 mm     Breast biomarker testing performed on previous biopsy [id number];   1/14/21     Estrogen Receptor: (Clone SP1), MDCapsule       Positive 100% of cells ( > 10% of cells)   Intensity of Staining:       Strong     Progesterone Receptor: (Clone 1E2), MDCapsule       Positive 80% of cells   Intensity of Staining:       Strong           Ki-67 (clone 30-9)       Percentage of positive nuclei:  6%               Favorable  < 10%               Borderline 10-20%               Unfavorable  > 20%     HER2 Immunohistochemistry (Clone 4B5, [employer])       Negative (1+) - Incomplete faint/barely perceptible membrane   staining in  > 10% of invasive tumor     External Controls:  Adequate   Internal Controls:  Adequate   Standard Assay Conditions:  Met     Staining Method Used:    Formalin fixation    Antigen retrieval type:  Cell Conditioning 1, mild gordon       [id number]   [id number]                                                     <Sign Out Dr. Hector Louis M.D., F.C. A. P       [facility]/ [facility]  Printed on:  2/23/2021   Ashley Jackman Simon [address] [facility], [facility]   Original print date: 02/23/2021   Lab and Collection    Oncotype DX 8

## 2021-05-12 ENCOUNTER — PROCEDURE VISIT (OUTPATIENT)
Dept: SURGERY | Age: 56
End: 2021-05-12

## 2021-05-12 VITALS
TEMPERATURE: 97.5 F | OXYGEN SATURATION: 96 % | RESPIRATION RATE: 18 BRPM | SYSTOLIC BLOOD PRESSURE: 118 MMHG | HEART RATE: 85 BPM | DIASTOLIC BLOOD PRESSURE: 64 MMHG

## 2021-05-12 DIAGNOSIS — Z48.89 ENCOUNTER FOR POSTOPERATIVE CARE: ICD-10-CM

## 2021-05-12 DIAGNOSIS — C50.212 CARCINOMA OF UPPER-INNER QUADRANT OF LEFT BREAST IN FEMALE, ESTROGEN RECEPTOR POSITIVE (HCC): Primary | ICD-10-CM

## 2021-05-12 DIAGNOSIS — Z17.0 CARCINOMA OF UPPER-INNER QUADRANT OF LEFT BREAST IN FEMALE, ESTROGEN RECEPTOR POSITIVE (HCC): Primary | ICD-10-CM

## 2021-05-12 DIAGNOSIS — T81.31XS DEHISCENCE OF OPERATIVE WOUND, SEQUELA: ICD-10-CM

## 2021-05-12 PROCEDURE — 99024 POSTOP FOLLOW-UP VISIT: CPT | Performed by: SURGERY

## 2021-05-12 RX ORDER — HYDROCODONE BITARTRATE AND ACETAMINOPHEN 5; 325 MG/1; MG/1
1 TABLET ORAL EVERY 6 HOURS PRN
COMMUNITY
End: 2021-07-29 | Stop reason: DRUGHIGH

## 2021-05-13 ENCOUNTER — TELEPHONE (OUTPATIENT)
Dept: SURGERY | Age: 56
End: 2021-05-13

## 2021-05-13 DIAGNOSIS — G95.9 MYELOPATHY (HCC): Primary | ICD-10-CM

## 2021-05-13 RX ORDER — HYDROCODONE BITARTRATE AND ACETAMINOPHEN 5; 325 MG/1; MG/1
1 TABLET ORAL EVERY 4 HOURS PRN
Qty: 18 TABLET | Refills: 0 | Status: SHIPPED | OUTPATIENT
Start: 2021-05-13 | End: 2021-05-16

## 2021-05-13 NOTE — PROGRESS NOTES
Ambulatory Procedure Note    Patient name: Fox Villanueva  Medical Record Number: [id number]  Primary Care Physician: Cooper Mata MD    Date of Procedure: 5/12/2021    Pre-operative Diagnosis: Left breast cancer nonhealing mastectomy wound    Post-operative Diagnosis: Same    Procedure: Excisional debridement 4 cm² skin subcutaneous tissues open wound left mastectomy. Application of vacuum-assisted closure device    Anesthesia: Local     Surgeons/Assistants: Olt    Estimated Blood Loss: 0 ml    Complications: none immediately appreciated    Specimens: 9 debrided tissue discarded    Procedure: In the office, the pt was placed supine on the procedure table. Consent was given by the patient. The left breast was prepped and draped. Open area measured approximately 2 x 3 cm. Nonviable tissue at the edges of the wound were debrided sharply. There was good pink granulation tissue underneath. VAC was then placed in a standard fashion. Sponge was placed in the wound. Barrier protectant was placed at the edges of the wound. This was covered with the provided adherent plastic covering small cut was made over the sponge and the vacuum tubing placed and then that was covered and sealed and placed to the suction canister.  -125 cm mercury pressure. Patient to home has visiting nurses and will change VAC in the office once weekly. Patient tolerated well.         Bethany Neumann MD

## 2021-05-20 ENCOUNTER — OFFICE VISIT (OUTPATIENT)
Dept: SURGERY | Age: 56
End: 2021-05-20
Payer: COMMERCIAL

## 2021-05-20 VITALS
WEIGHT: 150 LBS | RESPIRATION RATE: 18 BRPM | SYSTOLIC BLOOD PRESSURE: 120 MMHG | OXYGEN SATURATION: 97 % | TEMPERATURE: 97.1 F | HEART RATE: 70 BPM | HEIGHT: 60 IN | DIASTOLIC BLOOD PRESSURE: 60 MMHG | BODY MASS INDEX: 29.45 KG/M2

## 2021-05-20 DIAGNOSIS — C50.212 CARCINOMA OF UPPER-INNER QUADRANT OF LEFT BREAST IN FEMALE, ESTROGEN RECEPTOR POSITIVE (HCC): Primary | ICD-10-CM

## 2021-05-20 DIAGNOSIS — Z17.0 CARCINOMA OF UPPER-INNER QUADRANT OF LEFT BREAST IN FEMALE, ESTROGEN RECEPTOR POSITIVE (HCC): Primary | ICD-10-CM

## 2021-05-20 DIAGNOSIS — T81.31XS DEHISCENCE OF OPERATIVE WOUND, SEQUELA: ICD-10-CM

## 2021-05-20 DIAGNOSIS — G35 MULTIPLE SCLEROSIS (HCC): ICD-10-CM

## 2021-05-20 DIAGNOSIS — M32.9 SYSTEMIC LUPUS ERYTHEMATOSUS, UNSPECIFIED SLE TYPE, UNSPECIFIED ORGAN INVOLVEMENT STATUS (HCC): ICD-10-CM

## 2021-05-20 PROCEDURE — 97605 NEG PRS WND THER DME<=50SQCM: CPT | Performed by: SURGERY

## 2021-05-20 PROCEDURE — 99024 POSTOP FOLLOW-UP VISIT: CPT | Performed by: SURGERY

## 2021-05-21 NOTE — PROGRESS NOTES
Marry Molina MD   General Surgery  Postprocedure Evaluation in Office  Pt Name: Odilon Armenta  Date of Birth 1965   Today's Date: 5/20/2021  Medical Record Number: [id number]  Primary Care Provider: César Barrett MD  Chief Complaint   Patient presents with   [facility] Post-Op Check     s/p Revision of wound with excision of skin and subcutaneous fat 16 cm2 with tissue advancement flaps inferior and superior, totaling 18 cm skin and subcutaneous fat with wound closure 4/27/21    Follow Up After Procedure     s/p 5/12  Excisional debridement 4 cm² skin subcutaneous tissues open wound left mastectomy. Application of vacuum-assisted closure device     ASSESSMENT      1. 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 Systemic lupus erythematosus, unspecified SLE type, unspecified organ involvement status ([facility] [address]    3. Multiple sclerosis ([facility] [address]    4. Dehiscence of operative wound, sequela       Pathologic stage Ib  PLAN       1. VAC placed last visit. 2.  VAC dressing changed today. Wound looks good 2 x 3 cm. Tunneling decreased. Wound clean no evidence of infection. 3.  Follow-up 1 week for wound check  Apple Floyd is seen today for post-op follow-up. She had VAC placed at last visit. VAC changed today. Wound looks good. She tolerated without complication no bleeding no purulence of wound. Denies any fever or chills.     Medications    Current Outpatient Medications:     HYDROcodone-acetaminophen (NORCO) 5-325 MG per tablet, Take 1 tablet by mouth every 6 hours as needed for Pain., Disp: , Rfl:     baclofen (LIORESAL) 10 MG tablet, Take 2 tablets by mouth 3 times daily, Disp: 135 tablet, Rfl: 5    amitriptyline (ELAVIL) 25 MG tablet, Take 2 tablets by mouth nightly, Disp: 30 tablet, Rfl: 5    naloxone 4 MG/0.1ML LIQD nasal spray, 1 spray by Nasal route as needed for Opioid Reversal, Disp: 1 each, Rfl: 5    pregabalin (LYRICA) 150 MG capsule, Take 1 capsule by mouth 2 times daily for 180 days. , Disp: 60 capsule, Rfl: 5    metFORMIN (GLUCOPHAGE) 1000 MG tablet, Take 1,000 mg by mouth 2 times daily, Disp: , Rfl:     desvenlafaxine succinate (PRISTIQ) 50 MG TB24 extended release tablet, Take 50 mg by mouth daily, Disp: , Rfl:     traZODone (DESYREL) 50 MG tablet, Take 50 mg by mouth nightly, Disp: , Rfl:     MUCINEX MAXIMUM STRENGTH 1200 MG TB12, Take 1 tablet by mouth 2 times daily, Disp: , Rfl:     Lactobacillus Acid-Pectin (ACIDOPHILUS/CITRUS PECTIN) TABS, Take 1 tablet by mouth daily, Disp: , Rfl:     paliperidone (INVEGA) 3 MG extended release tablet, Take 3 mg by mouth nightly, Disp: , Rfl:     SITagliptin (JANUVIA) 100 MG tablet, Take 100 mg by mouth daily, Disp: , Rfl:     Lifitegrast (XIIDRA) 5 % SOLN, Place 1 drop into both eyes daily, Disp: , Rfl:     Misc. Devices [facility]) MISC, Wheelchair repairs- new seat cover, right side armrest replacement  Current body weight:  68 kg Diagnosis: gait disturbance, chronic incomplete spastic paraplegia Length of need: Lifetime, Disp: 1 each, Rfl: 0    tiotropium (SPIRIVA) 18 MCG inhalation capsule, Inhale 18 mcg into the lungs daily 2 puffs, Disp: , Rfl:     insulin glargine (LANTUS SOLOSTAR) 100 UNIT/ML injection pen, Inject 60 Units into the skin nightly , Disp: , Rfl:     montelukast (SINGULAIR) 10 MG tablet, Take 10 mg by mouth nightly , Disp: , Rfl: 0    artificial tears (ARTIFICIAL TEARS) OINT, as needed, Disp: , Rfl:     Calcium Carbonate (CALCIUM 600 PO), Take 1 tablet by mouth 2 times daily, Disp: , Rfl:     Multiple Vitamin (TAB-A-ISAAC PO), Take 1 tablet by mouth daily, Disp: , Rfl:     loratadine (CLARITIN) 10 MG tablet, Take 1 tablet by mouth daily as needed. , Disp: , Rfl: 0    PROAIR  (90 BASE) MCG/ACT inhaler, Inhale 2 puffs into the lungs every 6 hours as needed. , Disp: , Rfl: 0    Incontinence Supply Disposable (UNDERPADS) MISC, Cloth chux pads [id number]  SEX: F                       : 1965  AGE: 50 Y                          PATHOLOGY REPORT                       ATTN: AIDE ESCAMILLA                       REQ: Sadia Yee ANDI       Copies To:   GRETA CHALRES       Clinical Information: INVASIVE DUCTAL CARCINOMA LEFT BREAST     AMENDED REPORT 2021:     FINAL DIAGNOSIS:   A.  Left sentinel lymph node, excision:    Metastatic carcinoma in 2 of 4 lymph nodes (2/4).  Biopsy site changes. Comment: The frozen section discrepancy is noted.  Re-review of the   frozen section slide shows no evidence of metastatic carcinoma on   sections examined. B.  Left breast, mastectomy:    Mucinous micropapillary carcinoma, [place]ar grade 2 (pT2, pN1a).    Lymphovascular space invasion is present.    Margins are negative.    Please see synoptic report. C.  Left axillary lymph nodes, excision:    Two lymph nodes, negative for malignancy (0/2). COMMENT 2021:  An amended report was issued to correct a minor   typographical error in the microscopic description.        Specimen:   A) SENTINEL LYMPH NODE(S), LEFT, FS   B) EXCISION OF BREAST, LEFT MASS   C) LYMPH NODE(S), LEFT AXILLARY           Intraoperative Consultation:   A - Frozen Section DX:  Favor biopsy site changes, no definite   malignancy.  PCF/ALP       Received:  12:17 Reported:  12:38     Gross Examination:   A - The container is labeled Sundeep Valencia, left sentinel lymph node.    Received fresh for frozen section evaluation are fragments of   fibroadipose tissue measuring 6.5 x 4.5 x 2.0 cm.  This dissection   reveals three lymph nodes ranging in size from 1.5 to 0.5 cm.  There   are no obvious lesions. Phillips Maizes is evidence of previous biopsy site in   the largest node.  A representative section of the largest lymph node   is submitted for frozen section evaluation as FSA1.  The remaining node   submitted for frozen section is submitted in cassette A1.  The second   largest node is serially sectioned and entirely submitted in cassette   A2.  The smallest node is submitted in cassette A3 as is. Representative fibroadipose is submitted in cassette A4. Fixative:  10% neutral buffered formalin   Tissue removal time:  11:37   Tissue fixation time:  12:30   Total fixation time: 55 hours 30 minutes     B - The container is labeled faheem Vyas breast mass. Received in formalin is a mastectomy specimen oriented by a stitch   designating lateral.  The specimen measures 20 x 15 x 7 cm.  The   ellipse of skin is approximately 19 x 13.5 cm.  The nipple/areolar   complex is grossly unremarkable.  There are no skin lesions.  The   radial margin is inked blue and the deep margin is inked black.  There   is no attached skeletal muscle.  In the central aspect of the specimen   just deep to the nipple, there is an ill-defined white, glistening, and   mucoid lesion measuring 2.7 x 2.5 cm.  The lesion is 2.5 cm from the   deep margin and distant from the radial margin.  Sectioning through the   remaining breast tissue reveals a predominantly fatty cut surface with   no additional lesions or fibrous areas.  Sections are submitted as   follows:  B1 through B3 - representative mass; B4 - closest deep   margin; B5 through B8 - representative quadrants (upper outer, lower   outer, upper inner, lower inner);   B9 - nipple/areolar complex. Fixative:  10% neutral buffered formalin   Tissue removal time:  11:37   Tissue fixation time:  12:39   Total fixation time: 55 hours 21 minutes     C - The container is labeled faheem Vyas axillary lymph node.    Received in formalin are fragments of fibroadipose tissue aggregating   to 6.0 x 4.0 x 2.0 cm.  Sectioning reveals two lymph nodes ranging in   size from 0.9 to 0.4 cm in greatest dimension.  Each lymph node is   serially sectioned and entirely submitted in one cassette each.    PCF:[id number]     Fixative:  10% neutral buffered formalin   Tissue removal time:  12:03   Tissue fixation time:  12:30   Total fixation time: 55 hours 30 minutes           Microscopic Examination:   A-C.  Procedure: Mastectomy   Specimen laterality: Left   Tumor site: Upper inner quadrant   Tumor size: 29 mm   Histologic type:  Invasive mucinous micropapillary carcinoma   Histologic grade (Lynn histologic score)   Glandular/tubular differentiation: Score 3   Nuclear pleomorphism: Score 2   Mitotic rate: Score 1   Overall grade: Grade 2 (score 6)   Ductal carcinoma in situ: Not identified   Invasive carcinoma margins: Uninvolved by invasive carcinoma    Distance from closest margin: 25 mm (deep margin)   DCIS margins: Not applicable   Regional lymph nodes: Involved by tumor cells    Number of lymph nodes with macrometastases (>2 mm): 2    Number of lymph nodes with micrometastases: 0    Number of lymph nodes with isolated tumor cells: 0   Size of largest metastatic deposit: 2.5 mm   Extranodal extension: Not identified   Total number of lymph nodes examined: 6   Number of sentinel nodes examined: 4   Treatment effect in the breast: No known presurgical therapy   Treatment effect in the lymph nodes: Not applicable   Lymphovascular invasion: Present   Dermal lymphovascular invasion: Not identified     Pathologic stage classification (pTNM, AJCC 8th edition)   pT2: Tumor greater than 20 mm but less than or equal to 50 mm greatest   dimension   pN1a: Metastases in 1-3 axillary lymph nodes, at least 1 metastasis   larger than 2.0 mm     Breast biomarker testing performed on previous biopsy [id number];   1/14/21     Estrogen Receptor: (Clone SP1), [employer]       Positive 100% of cells ( > 10% of cells)   Intensity of Staining:       Strong     Progesterone Receptor: (Clone 1E2), [employer]       Positive 80% of cells   Intensity of Staining:       Strong           Ki-67 (clone 30-9)       Percentage of positive nuclei:  6%               Favorable  < 10%             Borderline 10-20%               Unfavorable  > 20%     HER2 Immunohistochemistry (Clone 4B5, [employer])       Negative (1+) - Incomplete faint/barely perceptible membrane   staining in  > 10% of invasive tumor     External Controls:  Adequate   Internal Controls:  Adequate   Standard Assay Conditions:  Met     Staining Method Used:    Formalin fixation    Antigen retrieval type:  Cell Conditioning 1, mild gordon       [id number]   [id number]                                                     <Sign Out Dr. Rob Willard M.D., F.C. A. P       [facility]/ [facility]  Printed on:  2/23/2021   Ashley Angulo [address]   [address] RAMIREZ II.[place], [address] [employer] [facility]   Original print date: 02/23/2021   Lab and Collection    Oncotype DX 8

## 2021-05-27 ENCOUNTER — TELEPHONE (OUTPATIENT)
Dept: SURGERY | Age: 56
End: 2021-05-27

## 2021-05-27 ENCOUNTER — OFFICE VISIT (OUTPATIENT)
Dept: SURGERY | Age: 56
End: 2021-05-27

## 2021-05-27 VITALS
WEIGHT: 150 LBS | DIASTOLIC BLOOD PRESSURE: 70 MMHG | BODY MASS INDEX: 29.45 KG/M2 | OXYGEN SATURATION: 96 % | SYSTOLIC BLOOD PRESSURE: 119 MMHG | HEIGHT: 60 IN | RESPIRATION RATE: 14 BRPM | TEMPERATURE: 97.4 F | HEART RATE: 90 BPM

## 2021-05-27 DIAGNOSIS — T81.31XS DEHISCENCE OF OPERATIVE WOUND, SEQUELA: Primary | ICD-10-CM

## 2021-05-27 DIAGNOSIS — C50.212 CARCINOMA OF UPPER-INNER QUADRANT OF LEFT BREAST IN FEMALE, ESTROGEN RECEPTOR POSITIVE (HCC): ICD-10-CM

## 2021-05-27 DIAGNOSIS — M32.9 SYSTEMIC LUPUS ERYTHEMATOSUS, UNSPECIFIED SLE TYPE, UNSPECIFIED ORGAN INVOLVEMENT STATUS (HCC): ICD-10-CM

## 2021-05-27 DIAGNOSIS — G95.9 MYELOPATHY (HCC): ICD-10-CM

## 2021-05-27 DIAGNOSIS — Z17.0 CARCINOMA OF UPPER-INNER QUADRANT OF LEFT BREAST IN FEMALE, ESTROGEN RECEPTOR POSITIVE (HCC): ICD-10-CM

## 2021-05-27 DIAGNOSIS — C50.912 INVASIVE DUCTAL CARCINOMA OF LEFT BREAST (HCC): ICD-10-CM

## 2021-05-27 PROCEDURE — 99024 POSTOP FOLLOW-UP VISIT: CPT | Performed by: SURGERY

## 2021-05-27 NOTE — TELEPHONE ENCOUNTER
No.  Not indicated. Patient was referred to pain clinic for chronic pain and she canceled her appointment.

## 2021-05-28 NOTE — PROGRESS NOTES
Joseph Garcia MD   General Surgery  Postprocedure Evaluation in Office  Pt Name: Caroline Murphy  Date of Birth 1965   Today's Date: 5/27/2021  Medical Record Number: [id number]  Primary Care Provider: Radha Terry MD  Chief Complaint   Patient presents with   [facility] Post-Op Check      s/p Revision of wound with excision of skin and subcutaneous fat 16 cm2 with tissue advancement flaps inferior and superior, totaling 18 cm skin and subcutaneous fat with wound closure 4/27/21    Follow Up After Procedure     s/p 5/12  Excisional debridement 4 cm² skin subcutaneous tissues open wound left mastectomy. Application of vacuum-assisted closure device     ASSESSMENT      1. Dehiscence of operative wound, sequela    2. Invasive ductal carcinoma of left breast (HCC)    3. 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 [address]    4. Systemic lupus erythematosus, unspecified SLE type, unspecified organ involvement status ([facility] [address]    5. Myelopathy ([facility] [address]       Pathologic stage Ib  PLAN       1. VAC change today wound looks good. 2. VAC dressing changed today. Wound looks good 2 x 3 cm. Tunneling resolved. Half centimeter in depth. .  Wound clean no evidence of infection. 3.  Follow-up 1 week for wound check. Pending what wound looks like next week may discontinue VAC dressings. Micheal Serna is seen today for post-op follow-up. VAC changed today. Wound looks good. She tolerated without complication no bleeding no purulence of wound. Denies any fever or chills.     Medications    Current Outpatient Medications:     HYDROcodone-acetaminophen (NORCO) 5-325 MG per tablet, Take 1 tablet by mouth every 6 hours as needed for Pain., Disp: , Rfl:     baclofen (LIORESAL) 10 MG tablet, Take 2 tablets by mouth 3 times daily, Disp: 135 tablet, Rfl: 5    amitriptyline (ELAVIL) 25 MG tablet, Take 2 tablets by mouth nightly, Disp: 30 tablet, Rfl: 5    pregabalin (LYRICA) 150 MG capsule, Take 1 capsule by mouth 2 times daily for 180 days. , Disp: 60 capsule, Rfl: 5    metFORMIN (GLUCOPHAGE) 1000 MG tablet, Take 1,000 mg by mouth 2 times daily, Disp: , Rfl:     desvenlafaxine succinate (PRISTIQ) 50 MG TB24 extended release tablet, Take 50 mg by mouth daily, Disp: , Rfl:     traZODone (DESYREL) 50 MG tablet, Take 50 mg by mouth nightly, Disp: , Rfl:     MUCINEX MAXIMUM STRENGTH 1200 MG TB12, Take 1 tablet by mouth 2 times daily, Disp: , Rfl:     Lactobacillus Acid-Pectin (ACIDOPHILUS/CITRUS PECTIN) TABS, Take 1 tablet by mouth daily, Disp: , Rfl:     paliperidone (INVEGA) 3 MG extended release tablet, Take 3 mg by mouth nightly, Disp: , Rfl:     SITagliptin (JANUVIA) 100 MG tablet, Take 100 mg by mouth daily, Disp: , Rfl:     Lifitegrast (XIIDRA) 5 % SOLN, Place 1 drop into both eyes daily, Disp: , Rfl:     Misc. Devices [facility] MISC, Wheelchair repairs- new seat cover, right side armrest replacement  Current body weight:  68 kg Diagnosis: gait disturbance, chronic incomplete spastic paraplegia Length of need: Lifetime, Disp: 1 each, Rfl: 0    tiotropium (SPIRIVA) 18 MCG inhalation capsule, Inhale 18 mcg into the lungs daily 2 puffs, Disp: , Rfl:     insulin glargine (LANTUS SOLOSTAR) 100 UNIT/ML injection pen, Inject 60 Units into the skin nightly , Disp: , Rfl:     montelukast (SINGULAIR) 10 MG tablet, Take 10 mg by mouth nightly , Disp: , Rfl: 0    artificial tears (ARTIFICIAL TEARS) OINT, as needed, Disp: , Rfl:     Calcium Carbonate (CALCIUM 600 PO), Take 1 tablet by mouth 2 times daily, Disp: , Rfl:     Multiple Vitamin (TAB-A-ISAAC PO), Take 1 tablet by mouth daily, Disp: , Rfl:     loratadine (CLARITIN) 10 MG tablet, Take 1 tablet by mouth daily as needed. , Disp: , Rfl: 0    PROAIR  (90 BASE) MCG/ACT inhaler, Inhale 2 puffs into the lungs every 6 hours as needed. , Disp: , Rfl: 0    Incontinence Supply Disposable (UNDERPADS) MISC, Cloth chux pads Diagnosis: Paraplegia 344.1, Disp: 100 Bottle, Rfl: 11    hydrOXYzine (VISTARIL) 50 MG capsule, Take 50 mg by mouth 3 times daily as needed for Itching., Disp: , Rfl:     omeprazole (PRILOSEC) 20 MG capsule, Take 20 mg by mouth daily. , Disp: , Rfl:     naloxone 4 MG/0.1ML LIQD nasal spray, 1 spray by Nasal route as needed for Opioid Reversal (Patient not taking: Reported on 2021), Disp: 1 each, Rfl: 5    Allergies  Allergies   Allergen Reactions    Penicillins Shortness Of Breath     \"I almost \"    Seasonal Itching       Review of Systems  History obtained from the patient. Constitutional: Denies any fever, chills, fatigue. Wound: Ostectomy wound opened up medially for about 4 cm and laterally for about 3-1/2 cm. Resp: Denies any cough, shortness of breath. CV: Denies any chest pain, orthopnea or syncope. GI:  Denies any nausea, vomiting, blood in the stool, constipation or diarrhea. : Denies any hematuria, hesitancy or dysuria. OBJECTIVE     VITALS: /70 (Site: Right Upper Arm, Position: Sitting, Cuff Size: Medium Adult)   Pulse 90   Temp 97.4 °F (36.3 °C) (Tympanic)   Resp 14   Ht 4' 11.5\" (1.511 m)   Wt 150 lb (68 kg)   SpO2 96%   BMI 29.79 kg/m²     CONSTITUTIONAL: Alert and oriented times 3, no acute distress and cooperative to examination. SKIN: Skin color, texture, turgor normal. .  INCISION: Mastectomy wound intact areas back removed and replaced. Clean tissue. Tunneling resolved. Superficial wound 2 x 3 cm.   LUNGS: Lungs Clear  CARDIOVASCULAR: Normal Rate  ABDOMEN: nondistended  NEUROLOGIC: Alert and oriented        Performed by: [address] [place] [address]. Pathology     [place], [place]                [id number]   Assoc.                                              Page 1 of 5 [phone], [place] [address]                                                       PROC: 2021   [facility]                                    RECV: 2021 [address]                                     RPTD: 2021   [address], [place] [address]                       MRN:  [id number]     LOC: 5KS                       ACCT: [de-identified]  SEX: F                       : 1965  AGE: 50 Y                          PATHOLOGY REPORT                       ATTN: AIDE ESCAMILLA   [de-identified]                  REQ: Felix ESCAMILLA       Copies To:   GRETA CHARLES       Clinical Information: INVASIVE DUCTAL CARCINOMA LEFT BREAST     AMENDED REPORT 2021:     FINAL DIAGNOSIS:   A.  Left sentinel lymph node, excision:    Metastatic carcinoma in 2 of 4 lymph nodes (2/4).  Biopsy site changes. Comment: The frozen section discrepancy is noted.  Re-review of the   frozen section slide shows no evidence of metastatic carcinoma on   sections examined. B.  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    Lymphovascular space invasion is present.    Margins are negative.    Please see synoptic report. C.  Left axillary lymph nodes, excision:    Two lymph nodes, negative for malignancy (0/2). COMMENT 2021:  An amended report was issued to correct a minor   typographical error in the microscopic description.        Specimen:   A) SENTINEL LYMPH NODE(S), LEFT, FS   B) EXCISION OF BREAST, LEFT MASS   C) LYMPH NODE(S), LEFT AXILLARY           Intraoperative Consultation:   A - Frozen Section DX:  Favor biopsy site changes, no definite   malignancy.  PCF/ALP       Received:  12:17 Reported:  12:38     Gross Examination:   A - The container is labeled [place], left sentinel lymph node.    Received fresh for frozen section evaluation are fragments of   fibroadipose tissue measuring 6.5 x 4.5 x 2.0 cm.  This dissection   reveals three lymph nodes ranging in size from 1.5 to 0.5 cm.  There   are no obvious lesions. Nika Tristen is evidence of previous biopsy site in   the largest node.  A representative section of the largest lymph node   is submitted for frozen section evaluation as FSA1.  The remaining node   submitted for frozen section is submitted in cassette A1.  The second   largest node is serially sectioned and entirely submitted in cassette   A2.  The smallest node is submitted in cassette A3 as is. Representative fibroadipose is submitted in cassette A4. Fixative:  10% neutral buffered formalin   Tissue removal time:  11:37   Tissue fixation time:  12:30   Total fixation time: 55 hours 30 minutes     B - The container is labeled Joann Naqvi left breast mass. Received in formalin is a mastectomy specimen oriented by a stitch   designating lateral.  The specimen measures 20 x 15 x 7 cm.  The   ellipse of skin is approximately 19 x 13.5 cm.  The nipple/areolar   complex is grossly unremarkable.  There are no skin lesions.  The   radial margin is inked blue and the deep margin is inked black.  There   is no attached skeletal muscle.  In the central aspect of the specimen   just deep to the nipple, there is an ill-defined white, glistening, and   mucoid lesion measuring 2.7 x 2.5 cm.  The lesion is 2.5 cm from the   deep margin and distant from the radial margin.  Sectioning through the   remaining breast tissue reveals a predominantly fatty cut surface with   no additional lesions or fibrous areas.  Sections are submitted as   follows:  B1 through B3 - representative mass; B4 - closest deep   margin; B5 through B8 - representative quadrants (upper outer, lower   outer, upper inner, lower inner);   B9 - nipple/areolar complex.      Fixative:  10% neutral buffered formalin   Tissue removal time:  11:37   Tissue fixation time:  12:39   Total fixation time: 55 hours 21 minutes     C - The container is labeled Joann Naqvi left axillary lymph node.    Received in formalin are fragments of fibroadipose tissue aggregating   to 6.0 x 4.0 x 2.0 cm.  Sectioning reveals two lymph nodes ranging in   size from 0.9 to 0.4 cm in greatest dimension.  Each lymph node is   serially sectioned and entirely submitted in one cassette each. PCF:[id number]     Fixative:  10% neutral buffered formalin   Tissue removal time:  12:03   Tissue fixation time:  12:30   Total fixation time: 55 hours 30 minutes           Microscopic Examination:   A-C.  Procedure: Mastectomy   Specimen laterality: Left   Tumor site: Upper inner quadrant   Tumor size: 29 mm   Histologic type:  Invasive mucinous micropapillary carcinoma   Histologic grade ([place] histologic score)   Glandular/tubular differentiation: Score 3   Nuclear pleomorphism: Score 2   Mitotic rate: Score 1   Overall grade: Grade 2 (score 6)   Ductal carcinoma in situ: Not identified   Invasive carcinoma margins: Uninvolved by invasive carcinoma    Distance from closest margin: 25 mm (deep margin)   DCIS margins: Not applicable   Regional lymph nodes: Involved by tumor cells    Number of lymph nodes with macrometastases (>2 mm): 2    Number of lymph nodes with micrometastases: 0    Number of lymph nodes with isolated tumor cells: 0   Size of largest metastatic deposit: 2.5 mm   Extranodal extension: Not identified   Total number of lymph nodes examined: 6   Number of sentinel nodes examined: 4   Treatment effect in the breast: No known presurgical therapy   Treatment effect in the lymph nodes: Not applicable   Lymphovascular invasion: Present   Dermal lymphovascular invasion: Not identified     Pathologic stage classification (pTNM, AJCC 8th edition)   pT2: Tumor greater than 20 mm but less than or equal to 50 mm greatest   dimension   pN1a: Metastases in 1-3 axillary lymph nodes, at least 1 metastasis   larger than 2.0 mm     Breast biomarker testing performed on previous biopsy [id number];   1/14/21     Estrogen Receptor: (Clone SP1), Phytel       Positive 100% of cells ( > 10% of cells)   Intensity of Staining:       Strong     Progesterone Receptor: (Clone 1E2), Cindy Delacruz     Positive 80% of cells   Intensity of Staining:       Strong           Ki-67 (clone 30-9)       Percentage of positive nuclei:  6%               Favorable  < 10%               Borderline 10-20%               Unfavorable  > 20%     HER2 Immunohistochemistry (Clone 4B5, [employer])       Negative (1+) - Incomplete faint/barely perceptible membrane   staining in  > 10% of invasive tumor     External Controls:  Adequate   Internal Controls:  Adequate   Standard Assay Conditions:  Met     Staining Method Used:    Formalin fixation    Antigen retrieval type:  Cell Conditioning 1, mild gordon       [id number]   [id number]                                                     <Sign Out Dr. Emma Naranjo M.D., F.C. A. P       [facility]/ [address] [address]  Printed on:  2/23/2021   Ashley Angulo [address]   [address], [facility] [place]   Original print date: 02/23/2021   Lab and Collection    Oncotype DX 8

## 2021-06-07 ENCOUNTER — OFFICE VISIT (OUTPATIENT)
Dept: SURGERY | Age: 56
End: 2021-06-07

## 2021-06-07 VITALS
OXYGEN SATURATION: 97 % | HEART RATE: 71 BPM | TEMPERATURE: 96.1 F | BODY MASS INDEX: 29.79 KG/M2 | RESPIRATION RATE: 18 BRPM | HEIGHT: 60 IN | SYSTOLIC BLOOD PRESSURE: 120 MMHG | DIASTOLIC BLOOD PRESSURE: 68 MMHG

## 2021-06-07 DIAGNOSIS — C50.912 INVASIVE DUCTAL CARCINOMA OF LEFT BREAST (HCC): Primary | ICD-10-CM

## 2021-06-07 DIAGNOSIS — G35 MULTIPLE SCLEROSIS (HCC): ICD-10-CM

## 2021-06-07 DIAGNOSIS — Z17.0 CARCINOMA OF UPPER-INNER QUADRANT OF LEFT BREAST IN FEMALE, ESTROGEN RECEPTOR POSITIVE (HCC): ICD-10-CM

## 2021-06-07 DIAGNOSIS — T81.31XS DEHISCENCE OF OPERATIVE WOUND, SEQUELA: ICD-10-CM

## 2021-06-07 DIAGNOSIS — C50.212 CARCINOMA OF UPPER-INNER QUADRANT OF LEFT BREAST IN FEMALE, ESTROGEN RECEPTOR POSITIVE (HCC): ICD-10-CM

## 2021-06-07 PROCEDURE — G8427 DOCREV CUR MEDS BY ELIG CLIN: HCPCS | Performed by: SURGERY

## 2021-06-07 PROCEDURE — 3017F COLORECTAL CA SCREEN DOC REV: CPT | Performed by: SURGERY

## 2021-06-07 PROCEDURE — G8417 CALC BMI ABV UP PARAM F/U: HCPCS | Performed by: SURGERY

## 2021-06-07 PROCEDURE — 99024 POSTOP FOLLOW-UP VISIT: CPT | Performed by: SURGERY

## 2021-06-07 PROCEDURE — G9899 SCRN MAM PERF RSLTS DOC: HCPCS | Performed by: SURGERY

## 2021-06-07 PROCEDURE — 4004F PT TOBACCO SCREEN RCVD TLK: CPT | Performed by: SURGERY

## 2021-06-09 ENCOUNTER — OFFICE VISIT (OUTPATIENT)
Dept: UROLOGY | Age: 56
End: 2021-06-09

## 2021-06-09 VITALS — WEIGHT: 150 LBS | BODY MASS INDEX: 29.45 KG/M2 | HEIGHT: 60 IN

## 2021-06-09 DIAGNOSIS — R35.0 FREQUENCY OF URINATION: ICD-10-CM

## 2021-06-09 DIAGNOSIS — N31.9 NEUROGENIC BLADDER: Primary | ICD-10-CM

## 2021-06-09 PROCEDURE — 99024 POSTOP FOLLOW-UP VISIT: CPT | Performed by: NURSE PRACTITIONER

## 2021-06-09 NOTE — PROGRESS NOTES
Salas Baez MD   General Surgery  Postprocedure Evaluation in Office  Pt Name: Tawana Nicholson  Date of Birth 1965   Today's Date: 6/7/2021  Medical Record Number: [id number]  Primary Care Provider: Melody Dewitt MD  Chief Complaint   Patient presents with   [facility] Post-Op Check      s/p Revision of wound with excision of skin and subcutaneous fat 16 cm2 with tissue advancement flaps inferior and superior, totaling 18 cm skin and subcutaneous fat with wound closure 4/27/21    Follow Up After Procedure     s/p 5/12  Excisional debridement 4 cm² skin subcutaneous tissues open wound left mastectomy. Application of vacuum-assisted closure device     ASSESSMENT      1. Invasive ductal carcinoma of left breast (HCC)    2. 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 Dehiscence of operative wound, sequela    4. Multiple sclerosis ([facility]       Pathologic stage Ib  PLAN       1. Clean and dry tunneling gone very superficial 2 x 1 cm wound  2. Leave VAC off   3. Follow-up 1 to 2 weeks. Al Jane is seen today for post-op follow-up. VAC removed at home patient showered came into the office. The tunneling is all gone there is no drainage. She has a very superficial clean 2 x 1 cm wound. Pink granulation tissue in the base. Would leave wound VAC off I do not think will be a much more benefit but will reevaluate next visit.   Medications    Current Outpatient Medications:     HYDROcodone-acetaminophen (NORCO) 5-325 MG per tablet, Take 1 tablet by mouth every 6 hours as needed for Pain., Disp: , Rfl:     baclofen (LIORESAL) 10 MG tablet, Take 2 tablets by mouth 3 times daily, Disp: 135 tablet, Rfl: 5    amitriptyline (ELAVIL) 25 MG tablet, Take 2 tablets by mouth nightly, Disp: 30 tablet, Rfl: 5    naloxone 4 MG/0.1ML LIQD nasal spray, 1 spray by Nasal route as needed for Opioid Reversal, Disp: 1 each, Rfl: 5    pregabalin (LYRICA) 150 MG capsule, Take 1 capsule by mouth 2 times daily for 180 days. , Disp: 60 capsule, Rfl: 5    metFORMIN (GLUCOPHAGE) 1000 MG tablet, Take 1,000 mg by mouth 2 times daily, Disp: , Rfl:     desvenlafaxine succinate (PRISTIQ) 50 MG TB24 extended release tablet, Take 50 mg by mouth daily, Disp: , Rfl:     traZODone (DESYREL) 50 MG tablet, Take 50 mg by mouth nightly, Disp: , Rfl:     MUCINEX MAXIMUM STRENGTH 1200 MG TB12, Take 1 tablet by mouth 2 times daily, Disp: , Rfl:     Lactobacillus Acid-Pectin (ACIDOPHILUS/CITRUS PECTIN) TABS, Take 1 tablet by mouth daily, Disp: , Rfl:     paliperidone (INVEGA) 3 MG extended release tablet, Take 3 mg by mouth nightly, Disp: , Rfl:     SITagliptin (JANUVIA) 100 MG tablet, Take 100 mg by mouth daily, Disp: , Rfl:     Lifitegrast (XIIDRA) 5 % SOLN, Place 1 drop into both eyes daily, Disp: , Rfl:     Misc. Devices [facility]) MISC, Wheelchair repairs- new seat cover, right side armrest replacement  Current body weight:  68 kg Diagnosis: gait disturbance, chronic incomplete spastic paraplegia Length of need: Lifetime, Disp: 1 each, Rfl: 0    tiotropium (SPIRIVA) 18 MCG inhalation capsule, Inhale 18 mcg into the lungs daily 2 puffs, Disp: , Rfl:     insulin glargine (LANTUS SOLOSTAR) 100 UNIT/ML injection pen, Inject 60 Units into the skin nightly , Disp: , Rfl:     montelukast (SINGULAIR) 10 MG tablet, Take 10 mg by mouth nightly , Disp: , Rfl: 0    artificial tears (ARTIFICIAL TEARS) OINT, as needed, Disp: , Rfl:     Calcium Carbonate (CALCIUM 600 PO), Take 1 tablet by mouth 2 times daily, Disp: , Rfl:     Multiple Vitamin (TAB-A-ISAAC PO), Take 1 tablet by mouth daily, Disp: , Rfl:     loratadine (CLARITIN) 10 MG tablet, Take 1 tablet by mouth daily as needed. , Disp: , Rfl: 0    PROAIR  (90 BASE) MCG/ACT inhaler, Inhale 2 puffs into the lungs every 6 hours as needed. , Disp: , Rfl: 0    Incontinence Supply Disposable (UNDERPADS) MISC, Cloth chux pads Diagnosis: Paraplegia 344.1, Disp: 100 Bottle, Rfl: 11    hydrOXYzine (VISTARIL) 50 MG capsule, Take 50 mg by mouth 3 times daily as needed for Itching., Disp: , Rfl:     omeprazole (PRILOSEC) 20 MG capsule, Take 20 mg by mouth daily. , Disp: , Rfl:     Allergies  Allergies   Allergen Reactions    Penicillins Shortness Of Breath     \"I almost \"    Seasonal Itching       Review of Systems  History obtained from the patient. Constitutional: Denies any fever, chills, fatigue. Wound: No drainage  Resp: Denies any cough, shortness of breath. CV: Denies any chest pain, orthopnea or syncope. GI:  Denies any nausea, vomiting, blood in the stool, constipation or diarrhea. : Denies any hematuria, hesitancy or dysuria. OBJECTIVE     VITALS: /68 (Site: Right Upper Arm, Position: Sitting, Cuff Size: Medium Adult)   Pulse 71   Temp 96.1 °F (35.6 °C) (Axillary)   Resp 18   Ht 4' 11.5\" (1.511 m)   SpO2 97%   BMI 29.79 kg/m²     CONSTITUTIONAL: Alert and oriented times 3, no acute distress and cooperative to examination.   SKIN: Skin color, texture, turgor normal. .  INCISION: LUNGS: Lungs Clear  Wound: Healed exception of circular area mid medial aspect 2 x 1 cm clean granulation tissue in the base at skin level no real depth to wound  Cardiovascular: Normal heart rate  ABDOMEN: nondistended  NEUROLOGIC: Alert and oriented        Performed by: [address] [place] [address]. Pathology     Juju Heller                [id number]   Assoc.                                              Page 1 of 1   [address], [place] [address]                                                       PROC: 2021   [facility]                                    RECV: 2021   [address]                                    RPTD: 2021   [facility], [place] [address]                       MRN:  [id number]     LOC: 5KS                       ACCT: [id number]  SEX: F                       : 1965  AGE: 50 Y                      PATHOLOGY REPORT                       ATTN: AIDE ANDI                       REQ: Ed Powell MAURICIOYOSEF       Copies To:   GRETA SOTO       Clinical Information: INVASIVE DUCTAL CARCINOMA LEFT BREAST     AMENDED REPORT 2/23/2021:     FINAL DIAGNOSIS:   A.  Left sentinel lymph node, excision:    Metastatic carcinoma in 2 of 4 lymph nodes (2/4).  Biopsy site changes. Comment: The frozen section discrepancy is noted.  Re-review of the   frozen section slide shows no evidence of metastatic carcinoma on   sections examined. B.  Left breast, mastectomy:    Mucinous micropapillary carcinoma, Lynn grade 2 (pT2, pN1a).    Lymphovascular space invasion is present.    Margins are negative.    Please see synoptic report. C.  Left axillary lymph nodes, excision:    Two lymph nodes, negative for malignancy (0/2). COMMENT 2/23/2021:  An amended report was issued to correct a minor   typographical error in the microscopic description.        Specimen:   A) SENTINEL LYMPH NODE(S), LEFT, FS   B) EXCISION OF BREAST, LEFT MASS   C) LYMPH NODE(S), LEFT AXILLARY           Intraoperative Consultation:   A - Frozen Section DX:  Favor biopsy site changes, no definite   malignancy.  PCF/ALP       Received:  12:17 Reported:  12:38     Gross Examination:   A - The container is labeled Maryann Kimball, left sentinel lymph node.    Received fresh for frozen section evaluation are fragments of   fibroadipose tissue measuring 6.5 x 4.5 x 2.0 cm.  This dissection   reveals three lymph nodes ranging in size from 1.5 to 0.5 cm.  There   are no obvious lesions. Marinell Curd is evidence of previous biopsy site in   the largest node.  A representative section of the largest lymph node   is submitted for frozen section evaluation as FSA1.  The remaining node   submitted for frozen section is submitted in cassette A1.  The second   largest node is serially sectioned and entirely submitted in cassette   A2.  The smallest node is submitted in cassette A3 as is. Representative fibroadipose is submitted in cassette A4. Fixative:  10% neutral buffered formalin   Tissue removal time:  11:37   Tissue fixation time:  12:30   Total fixation time: 55 hours 30 minutes     B - The container is labeled faheem Vyas breast mass. Received in formalin is a mastectomy specimen oriented by a stitch   designating lateral.  The specimen measures 20 x 15 x 7 cm.  The   ellipse of skin is approximately 19 x 13.5 cm.  The nipple/areolar   complex is grossly unremarkable.  There are no skin lesions.  The   radial margin is inked blue and the deep margin is inked black.  There   is no attached skeletal muscle.  In the central aspect of the specimen   just deep to the nipple, there is an ill-defined white, glistening, and   mucoid lesion measuring 2.7 x 2.5 cm.  The lesion is 2.5 cm from the   deep margin and distant from the radial margin.  Sectioning through the   remaining breast tissue reveals a predominantly fatty cut surface with   no additional lesions or fibrous areas.  Sections are submitted as   follows:  B1 through B3 - representative mass; B4 - closest deep   margin; B5 through B8 - representative quadrants (upper outer, lower   outer, upper inner, lower inner);   B9 - nipple/areolar complex. Fixative:  10% neutral buffered formalin   Tissue removal time:  11:37   Tissue fixation time:  12:39   Total fixation time: 55 hours 21 minutes     C - The container is labeled faheem Vyas axillary lymph node.    Received in formalin are fragments of fibroadipose tissue aggregating   to 6.0 x 4.0 x 2.0 cm.  Sectioning reveals two lymph nodes ranging in   size from 0.9 to 0.4 cm in greatest dimension.  Each lymph node is   serially sectioned and entirely submitted in one cassette each.    PCF:[id number]     Fixative:  10% neutral buffered formalin   Tissue removal time:  12:03   Tissue fixation time:  12:30   Total fixation time: 55 hours 30 minutes           Microscopic Examination:   A-C.  Procedure: Mastectomy   Specimen laterality: Left   Tumor site: Upper inner quadrant   Tumor size: 29 mm   Histologic type:  Invasive mucinous micropapillary carcinoma   Histologic grade ([place] histologic score)   Glandular/tubular differentiation: Score 3   Nuclear pleomorphism: Score 2   Mitotic rate: Score 1   Overall grade: Grade 2 (score 6)   Ductal carcinoma in situ: Not identified   Invasive carcinoma margins: Uninvolved by invasive carcinoma    Distance from closest margin: 25 mm (deep margin)   DCIS margins: Not applicable   Regional lymph nodes: Involved by tumor cells    Number of lymph nodes with macrometastases (>2 mm): 2    Number of lymph nodes with micrometastases: 0    Number of lymph nodes with isolated tumor cells: 0   Size of largest metastatic deposit: 2.5 mm   Extranodal extension: Not identified   Total number of lymph nodes examined: 6   Number of sentinel nodes examined: 4   Treatment effect in the breast: No known presurgical therapy   Treatment effect in the lymph nodes: Not applicable   Lymphovascular invasion: Present   Dermal lymphovascular invasion: Not identified     Pathologic stage classification (pTNM, AJCC 8th edition)   pT2: Tumor greater than 20 mm but less than or equal to 50 mm greatest   dimension   pN1a: Metastases in 1-3 axillary lymph nodes, at least 1 metastasis   larger than 2.0 mm     Breast biomarker testing performed on previous biopsy [id number];   1/14/21     Estrogen Receptor: (Clone SP1), [employer]       Positive 100% of cells ( > 10% of cells)   Intensity of Staining:       Strong     Progesterone Receptor: (Clone 1E2), [employer]       Positive 80% of cells   Intensity of Staining:       Strong           Ki-67 (clone 30-9)       Percentage of positive nuclei:  6%               Favorable  < 10%               Borderline 10-20%               Unfavorable  > 20% HER2 Immunohistochemistry (Clone 4B5, [employer])       Negative (1+) - Incomplete faint/barely perceptible membrane   staining in  > 10% of invasive tumor     External Controls:  Adequate   Internal Controls:  Adequate   Standard Assay Conditions:  Met     Staining Method Used:    Formalin fixation    Antigen retrieval type:  Cell Conditioning 1, mild gordon       [id number]   [id number]                                                     <Sign Out Dr. Hubbard Phalen Cristal Passy, M.D., F.C. A. P       NVML/ [address] [address]  Printed on:  2/23/2021   Ashley [address]   KALPANA RAMIREZ II.VIERTEL, [facility]   Original print date: 02/23/2021   Lab and Collection    Oncotype DX 8

## 2021-06-10 NOTE — PROGRESS NOTES
CHAS Hernandez  Urology Clinic Progress Note      Patient:  Michael Gunderson  YOB: 1965  Date: 6/10/2021    HISTORY OF PRESENT ILLNESS:   The patient is a 50 y.o. female who presents today for follow-up for the following problem(s):      Neurogenic bladder  OAB    Overall the problem(s) : are worsening. Associated Symptoms: No dysuria, gross hematuria. FU for Interstim battery exchange by Dr. Tamanna Bundy. Pt reports symptoms are worsening at night. Having more leakage. Doesn't feel like she has UTI. Has been dealing with recent diagnosis of Breast cancer and recently had left simple mastectomy by Dr Canelo Messina. Pt unsure of how to use controller and change settings. Summary of old records:   Karma Carey is here today for OAB follow up. She underwent placement of permanent sacral nerve stimulator with leads (stage 1 and 2) on 11/21/18 per Dr. Kelly Barragan. She is down to 2-3 depends per day (previously 9 daily). She still reports frequency every 1 hour, sometimes every 30 minutes. Prior to surgery she failed multiple anti-cholinergics & Myrbetriq. Hx of DM, MS, and recurrent UTI. Wheelchair bound. Additional History: Onset many years  Worsening of her mixed incontinence and urinary symptoms. Her device was recently interrogated which showed low battery life. Recommended to have the battery replaced. She is here to discuss the procedure  Severity is described as moderate. The course of symptoms over time is chronic. Alleviating factors: none  Worsening factors: none  Lower urinary tract symptoms: Mixed urinary symptoms complicated by diabetes and multiple sclerosis. Interstim was checked 11/4/2020, it was not on. Battery life was noted to be 6-12 months. Imaging Reviewed during this Office Visit:   (results were independently reviewed by physician and radiology report verified)    Urinalysis today:  No results found for this visit on 06/09/21.     Last BUN and creatinine:  Lab Results   Component Value Date    BUN 11 02/19/2021     Lab Results   Component Value Date    CREATININE 0.3 (L) 02/19/2021       PAST MEDICAL, FAMILY AND SOCIAL HISTORY UPDATE:  Past Medical History:   Diagnosis Date    Anxiety     Anxiety and depression     Asthma     Bipolar 1 disorder ([address]     Bipolar 1 disorder with moderate sourav ([address]     Blood circulation, collateral     Breast cancer ([address]     diagnosed in jan 2021    Cancer [facility])      ?  cervical \"long time ago\"    Cancer ([address] 01/15/2021    Left Breast    Depression     Endometriosis     GERD (gastroesophageal reflux disease)     Kidney stones     Lupus (HCC)     Movement disorder     MS (multiple sclerosis) ([address]     Schizophrenia ([address]     Thyroid disease     Type 2 diabetes mellitus without complication ([address]     Unspecified cerebral artery occlusion with cerebral infarction 2014     Past Surgical History:   Procedure Laterality Date    BREAST LUMPECTOMY Left 02/19/2021    LEFT BREAST MASTECTOMY, SENTINAL LYMPH NODE BIOPSY, PREOP NEEDLE LOC performed by Kindra Jones MD at [address] [place] [address] Left 4/6/2021    DEBRIDEMENT LEFT BREAST MASTECTOMY WOUND performed by Kindra Jones MD at [address] [place] [address] Left 4/27/2021    LEFT BREAST WOUND REVISION performed by Kindra Jones MD at [address] [place] [address]  01/2020    DILATION AND CURETTAGE OF UTERUS      BEN US GUID NDL BIOPSY LEFT Left 01/14/2021    BEN US GUID [address] LEFT 1/14/2021 Jen Gonzalez MD [facility] NERVE SURGERY N/A 01/26/2021    EXCHANGE OF INTERSTIM SYSTEM performed by Cecilia Olguin MD at [address] [place] [address]  03/23/2021    right breast i and d with closure Dr Sasha Beatty in the procedure room    MS OFFICE/OUTPT VISIT,PROCEDURE ONLY N/A 11/21/2018    INTERSTIM DEVICE PLACEMENT MODEL 3058, ONLY HEAD ELIGIBLE FOR MRI WITH TRANSMIT/RECEIVE HEAD COIL    TUBAL LIGATION      10 years ago   Alyssa Mcmanus tiotropium (SPIRIVA) 18 MCG inhalation capsule Inhale 18 mcg into the lungs daily 2 puffs      insulin glargine (LANTUS SOLOSTAR) 100 UNIT/ML injection pen Inject 60 Units into the skin nightly       montelukast (SINGULAIR) 10 MG tablet Take 10 mg by mouth nightly   0    artificial tears (ARTIFICIAL TEARS) OINT as needed      Calcium Carbonate (CALCIUM 600 PO) Take 1 tablet by mouth 2 times daily      Multiple Vitamin (TAB-A-ISAAC PO) Take 1 tablet by mouth daily      loratadine (CLARITIN) 10 MG tablet Take 1 tablet by mouth daily as needed. 0    PROAIR  (90 BASE) MCG/ACT inhaler Inhale 2 puffs into the lungs every 6 hours as needed. 0    Incontinence Supply Disposable (UNDERPADS) MISC Cloth chux pads  Diagnosis: Paraplegia 344. 1 100 Bottle 11    hydrOXYzine (VISTARIL) 50 MG capsule Take 50 mg by mouth 3 times daily as needed for Itching.  omeprazole (PRILOSEC) 20 MG capsule Take 20 mg by mouth daily. Penicillins and Seasonal  Social History     Tobacco Use   Smoking Status Current Every Day Smoker    Packs/day: 1.00    Types: Cigarettes    Start date: 12/6/1979   Smokeless Tobacco Never Used       Social History     Substance and Sexual Activity   Alcohol Use Yes    Alcohol/week: 0.0 standard drinks    Comment: social drinker       REVIEW OF SYSTEMS:  Constitutional: negative  Eyes: negative  Respiratory: negative  Cardiovascular: negative  Gastrointestinal: negative  Genitourinary: negative except for what is in HPI  Musculoskeletal: negative  Skin: negative   Neurological: negative  Hematological/Lymphatic: negative  Psychological: negative    Physical Exam:      There were no vitals filed for this visit. Patient is a 50 y.o. female in no acute distress and alert and oriented to person, place and time.     NAD, Alert  Non labored respiration  Normal peripheral pulses  Soft, non tender  Skin-warm and dry  Psych- normal mood and affect    Assessment and Plan      Neurogenic bladder  OAB  Breast Cancer       Plan:     She had explant of Interstim and excision of device capsule and washout of wound and interstime stage 1 and 2 procedure, placement of rechargeable device by Dr. Adriano ross in March of 2021. Continue Interstim. Ramesh's number given to patient. She is unsure of how to change settings, she didn't bring her controller with her. She reports night time symptoms are the worse, goes through 1-2 depends. Daytime symptoms not so bad.      Electronically signed by Levon Holter, APRN - CNP on 6/10/2021 at 7:49 AM

## 2021-06-24 ENCOUNTER — OFFICE VISIT (OUTPATIENT)
Dept: SURGERY | Age: 56
End: 2021-06-24

## 2021-06-24 VITALS
DIASTOLIC BLOOD PRESSURE: 60 MMHG | BODY MASS INDEX: 29.45 KG/M2 | RESPIRATION RATE: 18 BRPM | OXYGEN SATURATION: 98 % | WEIGHT: 150 LBS | TEMPERATURE: 96.3 F | SYSTOLIC BLOOD PRESSURE: 128 MMHG | HEART RATE: 64 BPM | HEIGHT: 60 IN

## 2021-06-24 DIAGNOSIS — C50.212 CARCINOMA OF UPPER-INNER QUADRANT OF LEFT BREAST IN FEMALE, ESTROGEN RECEPTOR POSITIVE (HCC): ICD-10-CM

## 2021-06-24 DIAGNOSIS — M32.9 SYSTEMIC LUPUS ERYTHEMATOSUS, UNSPECIFIED SLE TYPE, UNSPECIFIED ORGAN INVOLVEMENT STATUS (HCC): ICD-10-CM

## 2021-06-24 DIAGNOSIS — G35 MULTIPLE SCLEROSIS (HCC): ICD-10-CM

## 2021-06-24 DIAGNOSIS — Z17.0 CARCINOMA OF UPPER-INNER QUADRANT OF LEFT BREAST IN FEMALE, ESTROGEN RECEPTOR POSITIVE (HCC): ICD-10-CM

## 2021-06-24 DIAGNOSIS — C50.912 INVASIVE DUCTAL CARCINOMA OF LEFT BREAST (HCC): Primary | ICD-10-CM

## 2021-06-24 DIAGNOSIS — Z51.89 VISIT FOR WOUND CHECK: ICD-10-CM

## 2021-06-24 PROCEDURE — 99024 POSTOP FOLLOW-UP VISIT: CPT | Performed by: SURGERY

## 2021-06-24 NOTE — PROGRESS NOTES
Sally Sarabia MD   General Surgery  Postprocedure Evaluation in Office  Pt Name: Uzma Schroeder  Date of Birth 1965   Today's Date: 6/24/2021  Medical Record Number: [id number]  Primary Care Provider: Hedy Abraham MD  Chief Complaint   Patient presents with   Sonido Springer Wound Check     s/p Invasive ductal carcinoma of left breast-check wound     ASSESSMENT      1. Invasive ductal carcinoma of left breast (HCC)    2. 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 Multiple sclerosis ([facility] [facility]    4. Systemic lupus erythematosus, unspecified SLE type, unspecified organ involvement status ([facility] [address]    5. Visit for wound check    6. Surgical wound now scabbed over. Pathologic stage Ib  PLAN       1. Wound granulated close dry scab  2. Follows up with medical oncology next week   3. Follow-up surgical clinic in 4 weeks  Alec Montanez is seen today for post-op follow-up. VAC removed a few weeks ago. Patient could not make her previously scheduled appointment. The entire wound is granulating close there is small scab she is applying ointments. Wound is clean no drainage or infection. No hematoma or seroma. She has a follow-up with Dr. Fili Yin next week. Medications    Current Outpatient Medications:     HYDROcodone-acetaminophen (NORCO) 5-325 MG per tablet, Take 1 tablet by mouth every 6 hours as needed for Pain., Disp: , Rfl:     baclofen (LIORESAL) 10 MG tablet, Take 2 tablets by mouth 3 times daily, Disp: 135 tablet, Rfl: 5    amitriptyline (ELAVIL) 25 MG tablet, Take 2 tablets by mouth nightly, Disp: 30 tablet, Rfl: 5    naloxone 4 MG/0.1ML LIQD nasal spray, 1 spray by Nasal route as needed for Opioid Reversal, Disp: 1 each, Rfl: 5    pregabalin (LYRICA) 150 MG capsule, Take 1 capsule by mouth 2 times daily for 180 days. , Disp: 60 capsule, Rfl: 5    metFORMIN (GLUCOPHAGE) 1000 MG tablet, Take 1,000 mg by mouth 2 times daily, Disp: , Rfl:    desvenlafaxine succinate (PRISTIQ) 50 MG TB24 extended release tablet, Take 50 mg by mouth daily, Disp: , Rfl:     traZODone (DESYREL) 50 MG tablet, Take 50 mg by mouth nightly, Disp: , Rfl:     MUCINEX MAXIMUM STRENGTH 1200 MG TB12, Take 1 tablet by mouth 2 times daily, Disp: , Rfl:     Lactobacillus Acid-Pectin (ACIDOPHILUS/CITRUS PECTIN) TABS, Take 1 tablet by mouth daily, Disp: , Rfl:     paliperidone (INVEGA) 3 MG extended release tablet, Take 3 mg by mouth nightly, Disp: , Rfl:     SITagliptin (JANUVIA) 100 MG tablet, Take 100 mg by mouth daily, Disp: , Rfl:     Lifitegrast (XIIDRA) 5 % SOLN, Place 1 drop into both eyes daily, Disp: , Rfl:     Misc. Devices [facility]) MISC, Wheelchair repairs- new seat cover, right side armrest replacement  Current body weight:  68 kg Diagnosis: gait disturbance, chronic incomplete spastic paraplegia Length of need: Lifetime, Disp: 1 each, Rfl: 0    tiotropium (SPIRIVA) 18 MCG inhalation capsule, Inhale 18 mcg into the lungs daily 2 puffs, Disp: , Rfl:     insulin glargine (LANTUS SOLOSTAR) 100 UNIT/ML injection pen, Inject 60 Units into the skin nightly , Disp: , Rfl:     montelukast (SINGULAIR) 10 MG tablet, Take 10 mg by mouth nightly , Disp: , Rfl: 0    artificial tears (ARTIFICIAL TEARS) OINT, as needed, Disp: , Rfl:     Calcium Carbonate (CALCIUM 600 PO), Take 1 tablet by mouth 2 times daily, Disp: , Rfl:     Multiple Vitamin (TAB-A-ISAAC PO), Take 1 tablet by mouth daily, Disp: , Rfl:     loratadine (CLARITIN) 10 MG tablet, Take 1 tablet by mouth daily as needed. , Disp: , Rfl: 0    PROAIR  (90 BASE) MCG/ACT inhaler, Inhale 2 puffs into the lungs every 6 hours as needed. , Disp: , Rfl: 0    Incontinence Supply Disposable (UNDERPADS) MISC, Cloth chux pads Diagnosis: Paraplegia 344.1, Disp: 100 Bottle, Rfl: 11    hydrOXYzine (VISTARIL) 50 MG capsule, Take 50 mg by mouth 3 times daily as needed for Itching., Disp: , Rfl:     omeprazole (PRILOSEC) 20 MG capsule, Take 20 mg by mouth daily. , Disp: , Rfl:     Allergies  Allergies   Allergen Reactions    Penicillins Shortness Of Breath     \"I almost \"    Seasonal Itching       Review of Systems  History obtained from the patient. Constitutional: Denies any fever, chills, fatigue. Wound: No drainage  Resp: Denies any cough, shortness of breath. CV: Denies any chest pain, orthopnea or syncope. GI:  Denies any nausea, vomiting, blood in the stool, constipation or diarrhea. : Denies any hematuria, hesitancy or dysuria. OBJECTIVE     VITALS: /60 (Site: Right Upper Arm, Position: Sitting, Cuff Size: Medium Adult)   Pulse 64   Temp 96.3 °F (35.7 °C) (Temporal)   Resp 18   Ht 4' 11.5\" (1.511 m)   Wt 150 lb (68 kg)   SpO2 98%   BMI 29.79 kg/m²     CONSTITUTIONAL: Alert and oriented times 3, no acute distress and cooperative to examination.   SKIN: Skin color, texture, turgor normal. .  INCISION: LUNGS: Lungs Clear  Wound: Healed exception of circular area mid medial aspect 2 x 1 cm clean granulation tissue in the base at skin level no real depth to wound  Cardiovascular: Normal heart rate  ABDOMEN: nondistended  NEUROLOGIC: Alert and oriented        Performed by: [address] [place] [address]. Pathology     Wilfrido Leach                [id number]   Assoc.                                              Page 1 of 1   [address]   [address] ABREU AM [employer] [address], [place] [address]                                                       PROC: 2021   [facility]                                    RECV: 2021   [address] [facility]                                    RPTD: 2021   [address]U AM [employer] [address], [place] [address]                       MRN:  [id number]     LOC: 5KS                       ACCT: [id number]  SEX: F                       : 1965  AGE: 50 Y                          PATHOLOGY REPORT                       ATTN: AIDE ESCAMILLA   [facility]                  [id number]       Copies To:   GRETA Arroyo Information: INVASIVE DUCTAL CARCINOMA LEFT BREAST     AMENDED REPORT 2/23/2021:     FINAL DIAGNOSIS:   A.  Left sentinel lymph node, excision:    Metastatic carcinoma in 2 of 4 lymph nodes (2/4).  Biopsy site changes. Comment: The frozen section discrepancy is noted.  Re-review of the   frozen section slide shows no evidence of metastatic carcinoma on   sections examined. B.  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    Lymphovascular space invasion is present.    Margins are negative.    Please see synoptic report. C.  Left axillary lymph nodes, excision:    Two lymph nodes, negative for malignancy (0/2). COMMENT 2/23/2021:  An amended report was issued to correct a minor   typographical error in the microscopic description. Specimen:   A) SENTINEL LYMPH NODE(S), LEFT, FS   B) EXCISION OF BREAST, LEFT MASS   C) LYMPH NODE(S), LEFT AXILLARY           Intraoperative Consultation:   A - Frozen Section DX:  Favor biopsy site changes, no definite   malignancy.  PCF/ALP       Received:  12:17 Reported:  12:38     Gross Examination:   A - The container is labeled Demetria Benz, left sentinel lymph node.    Received fresh for frozen section evaluation are fragments of   fibroadipose tissue measuring 6.5 x 4.5 x 2.0 cm.  This dissection   reveals three lymph nodes ranging in size from 1.5 to 0.5 cm.  There   are no obvious lesions. Lenetta Cave is evidence of previous biopsy site in   the largest node.  A representative section of the largest lymph node   is submitted for frozen section evaluation as FSA1.  The remaining node   submitted for frozen section is submitted in cassette A1.  The second   largest node is serially sectioned and entirely submitted in cassette   A2.  The smallest node is submitted in cassette A3 as is. Representative fibroadipose is submitted in cassette A4.      Fixative:  10% neutral buffered formalin   Tissue removal time:  11:37   Tissue fixation time:  12:30   Total fixation time: 55 hours 30 minutes     B - The container is labeled faheem Keller breast mass. Received in formalin is a mastectomy specimen oriented by a stitch   designating lateral.  The specimen measures 20 x 15 x 7 cm.  The   ellipse of skin is approximately 19 x 13.5 cm.  The nipple/areolar   complex is grossly unremarkable.  There are no skin lesions.  The   radial margin is inked blue and the deep margin is inked black.  There   is no attached skeletal muscle.  In the central aspect of the specimen   just deep to the nipple, there is an ill-defined white, glistening, and   mucoid lesion measuring 2.7 x 2.5 cm.  The lesion is 2.5 cm from the   deep margin and distant from the radial margin.  Sectioning through the   remaining breast tissue reveals a predominantly fatty cut surface with   no additional lesions or fibrous areas.  Sections are submitted as   follows:  B1 through B3 - representative mass; B4 - closest deep   margin; B5 through B8 - representative quadrants (upper outer, lower   outer, upper inner, lower inner);   B9 - nipple/areolar complex. Fixative:  10% neutral buffered formalin   Tissue removal time:  11:37   Tissue fixation time:  12:39   Total fixation time: 55 hours 21 minutes     C - The container is labeled faheem Keller axillary lymph node.    Received in formalin are fragments of fibroadipose tissue aggregating   to 6.0 x 4.0 x 2.0 cm.  Sectioning reveals two lymph nodes ranging in   size from 0.9 to 0.4 cm in greatest dimension.  Each lymph node is   serially sectioned and entirely submitted in one cassette each. PCF:[id number]     Fixative:  10% neutral buffered formalin   Tissue removal time:  12:03   Tissue fixation time:  12:30   Total fixation time: 55 hours 30 minutes           Microscopic Examination:   A-C.  Procedure:  Mastectomy   Specimen laterality: Left   Tumor site: Upper inner quadrant   Tumor size: 29

## 2021-06-30 ENCOUNTER — OFFICE VISIT (OUTPATIENT)
Dept: ONCOLOGY | Age: 56
End: 2021-06-30
Payer: COMMERCIAL

## 2021-06-30 ENCOUNTER — HOSPITAL ENCOUNTER (OUTPATIENT)
Dept: INFUSION THERAPY | Age: 56
Discharge: HOME | End: 2021-06-30
Payer: COMMERCIAL

## 2021-06-30 VITALS
RESPIRATION RATE: 18 BRPM | OXYGEN SATURATION: 93 % | BODY MASS INDEX: 29.45 KG/M2 | TEMPERATURE: 98 F | HEIGHT: 60 IN | DIASTOLIC BLOOD PRESSURE: 66 MMHG | SYSTOLIC BLOOD PRESSURE: 133 MMHG | WEIGHT: 150 LBS | HEART RATE: 92 BPM

## 2021-06-30 DIAGNOSIS — C50.912 BREAST CANCER METASTASIZED TO AXILLARY LYMPH NODE, LEFT (HCC): ICD-10-CM

## 2021-06-30 DIAGNOSIS — Z17.0 ESTROGEN RECEPTOR POSITIVE STATUS (ER+): ICD-10-CM

## 2021-06-30 DIAGNOSIS — Z90.12 S/P LEFT MASTECTOMY: ICD-10-CM

## 2021-06-30 DIAGNOSIS — C77.3 BREAST CANCER METASTASIZED TO AXILLARY LYMPH NODE, LEFT (HCC): ICD-10-CM

## 2021-06-30 DIAGNOSIS — C50.912 INVASIVE DUCTAL CARCINOMA OF LEFT BREAST (HCC): Primary | ICD-10-CM

## 2021-06-30 PROCEDURE — G8417 CALC BMI ABV UP PARAM F/U: HCPCS | Performed by: INTERNAL MEDICINE

## 2021-06-30 PROCEDURE — 99211 OFF/OP EST MAY X REQ PHY/QHP: CPT

## 2021-06-30 PROCEDURE — G9899 SCRN MAM PERF RSLTS DOC: HCPCS | Performed by: INTERNAL MEDICINE

## 2021-06-30 PROCEDURE — G8427 DOCREV CUR MEDS BY ELIG CLIN: HCPCS | Performed by: INTERNAL MEDICINE

## 2021-06-30 PROCEDURE — 99214 OFFICE O/P EST MOD 30 MIN: CPT | Performed by: INTERNAL MEDICINE

## 2021-06-30 PROCEDURE — 3017F COLORECTAL CA SCREEN DOC REV: CPT | Performed by: INTERNAL MEDICINE

## 2021-06-30 PROCEDURE — 4004F PT TOBACCO SCREEN RCVD TLK: CPT | Performed by: INTERNAL MEDICINE

## 2021-06-30 ASSESSMENT — ENCOUNTER SYMPTOMS
WHEEZING: 0
SORE THROAT: 0
COUGH: 0
CONSTIPATION: 0
BACK PAIN: 0
ABDOMINAL DISTENTION: 0
ABDOMINAL PAIN: 0
COLOR CHANGE: 0
DIARRHEA: 0
FACIAL SWELLING: 0
BLOOD IN STOOL: 0
NAUSEA: 0
RECTAL PAIN: 0
TROUBLE SWALLOWING: 0
EYE DISCHARGE: 0
SHORTNESS OF BREATH: 0
VOMITING: 0
CHEST TIGHTNESS: 0

## 2021-06-30 NOTE — PROGRESS NOTES
[facility] [address] [facility] [address], [address] [facility] [address]  [place] [facility]  Dept: [phone]  Dept Fax: [phone]: [phone]    Visit Date:6/30/2021     Jane Forrester is a 50 y.o. female who presents today for:   Chief Complaint   Patient presents with    Follow-up     Invasive ductal carcinoma of left breast [facility])        HPI:   This is a 50-year old postmenopausal patient with newly diagnosed left breast cancer. She presents to the medical oncology clinic to discussed postsurgical treatment. 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 She gets Botox injections for contractures in her lower extremities. She has also multiple sclerosis, neurogenic bowel and bladder, bipolar disorder. She carries history of cervical cancer. Also has mild chronic thrombocytopenia going back to 2014. Tiff Cooney presented for diagnostic breast imaging after she felt a mass in the left breast.  Mammogram on January 4, 2021 showed irregular high density mass in the left breast central to the nipple and numerous lymph nodes in the right axilla. Ultrasounds of both axilla and the breast were performed and ultimately ultrasound-guided biopsies of the mass in the lymph node on the left. On ultrasound of the right axilla there were no abnormalities seen and no abnormal lymph nodes. Ultrasound imaging revealed a 2.2 x 2.2 x 2.9 cm mass in the left breast in the retroareolar area. A single lymph node in the left axilla appeared to have a moderate suspicion for malignancy. Ultrasound-guided biopsies were performed on January 14, 2021. Pathology revealed a grade 1 well-differentiated invasive adenocarcinoma with mucinous features, the lymph node was negative for malignancy. Breast cancer cells were estrogen receptor 100% positive, progesterone receptor 80% positive. Ki-67 was 6%,  HER-2 by IHC was negative.     Sequently she met with the surgeon Dr. Jason gross and after discussing her surgical treatment options she decided to proceed with a left breast mastectomy and axillary lymph node excision. She had her surgery on February 19, 2021 final pathology report showed: She gets Botox injections for contractures in her lower extremities. Recent onset of thrombocytopenia. A.  Left sentinel lymph node, excision:    Metastatic carcinoma in 2 of 4 lymph nodes (2/4). B.  Left breast, mastectomy:    Mucinous micropapillary carcinoma, Lynn grade 2 (pT2, pN1a).  Lymphovascular space invasion is present.    Margins are negative.    C.  Left axillary lymph nodes, excision:    Two lymph nodes, negative for malignancy (0/2). Oncotype DX Breast Cancer Assay (RT-PCR) performed by [employer]   Breast Cancer Recurrence Score:   8     Her post mastectomy incision is complicated by seroma formation. The patient required aspiration. Interim history on June 30, 2021:  The patient presents to the medical oncology clinic for follow up visit. Radiation treatment has been delayed due postmastectomy surgical wound dehiscence and delayed healing. Her VAC was removed few weeks ago. The patient saw Dr. Rambo Trinidad last week, the entire wound is granulating and closed. There is only a small scab in the medial aspect. The patient denies having any new complaints since last visit with me  HPI   Past Medical History:   Diagnosis Date    Anxiety     Anxiety and depression     Asthma     Bipolar 1 disorder ([address]     Bipolar 1 disorder with moderate sourav ([address]     Blood circulation, collateral     Breast cancer ([address]     diagnosed in jan 2021    Cancer [facility])      ?  cervical \"long time ago\"    Cancer ([address] 01/15/2021    Left Breast    Depression     Endometriosis     GERD (gastroesophageal reflux disease)     Kidney stones     Lupus (HCC)     Movement disorder     MS (multiple sclerosis) ([address]     Schizophrenia ([address]     Thyroid disease  Type 2 diabetes mellitus without complication ([facility]     Unspecified cerebral artery occlusion with cerebral infarction 2014      Past Surgical History:   Procedure Laterality Date    BREAST LUMPECTOMY Left 02/19/2021    LEFT BREAST MASTECTOMY, SENTINAL LYMPH NODE BIOPSY, PREOP NEEDLE LOC performed by Della Wilks MD at [address] Left 4/6/2021    DEBRIDEMENT LEFT BREAST MASTECTOMY WOUND performed by Della Wilks MD at [address] [facility] Left 4/27/2021    LEFT BREAST WOUND REVISION performed by Della Wilks MD at [address] [place] [address]  01/2020    DILATION AND CURETTAGE OF UTERUS      BEN US GUID NDL BIOPSY LEFT Left 01/14/2021    BEN US GUID NDL BIOPSY LEFT 1/14/2021 Venancio Haskins MD [facility]    NERVE SURGERY N/A 01/26/2021    EXCHANGE OF INTERSTIM SYSTEM performed by Coco Gonzales MD at [address]  03/23/2021    right breast i and d with closure Dr Mary Kate Jaimes in the procedure room    AL OFFICE/OUTPT VISIT,PROCEDURE ONLY N/A 11/21/2018    INTERSTIM [address] [facility], ONLY HEAD ELIGIBLE FOR MRI WITH TRANSMIT/RECEIVE HEAD COIL    TUBAL LIGATION      10 years ago    US GUIDED NEEDLE LOC OF LEFT BREAST Left 02/19/2021    US GUIDED NEEDLE LOC OF LEFT BREAST 2/19/2021 [address] LYMPH NODE BIOPSY  01/14/2021    US LYMPH NODE BIOPSY 1/14/2021 Venancio Haskins MD [facility]      Family History   Problem Relation Age of Onset    Stroke Mother     Asthma Mother     Other Mother     No Known Problems Sister     Asthma Brother     No Known Problems Brother       Social History     Tobacco Use    Smoking status: Current Every Day Smoker     Packs/day: 1.00     Types: Cigarettes     Start date: 12/6/1979    Smokeless tobacco: Never Used   Substance Use Topics    Alcohol use:  Yes     Alcohol/week: 0.0 standard drinks     Comment: social drinker      Current Outpatient Medications   Medication Sig Dispense Refill    HYDROcodone-acetaminophen (NORCO) 5-325 MG per tablet Take 1 tablet by mouth every 6 hours as needed for Pain.  baclofen (LIORESAL) 10 MG tablet Take 2 tablets by mouth 3 times daily 135 tablet 5    amitriptyline (ELAVIL) 25 MG tablet Take 2 tablets by mouth nightly 30 tablet 5    pregabalin (LYRICA) 150 MG capsule Take 1 capsule by mouth 2 times daily for 180 days. 60 capsule 5    metFORMIN (GLUCOPHAGE) 1000 MG tablet Take 1,000 mg by mouth 2 times daily      desvenlafaxine succinate (PRISTIQ) 50 MG TB24 extended release tablet Take 50 mg by mouth daily      traZODone (DESYREL) 50 MG tablet Take 50 mg by mouth nightly      MUCINEX MAXIMUM STRENGTH 1200 MG TB12 Take 1 tablet by mouth 2 times daily      Lactobacillus Acid-Pectin (ACIDOPHILUS/CITRUS PECTIN) TABS Take 1 tablet by mouth daily      paliperidone (INVEGA) 3 MG extended release tablet Take 3 mg by mouth nightly      SITagliptin (JANUVIA) 100 MG tablet Take 100 mg by mouth daily      Lifitegrast (XIIDRA) 5 % SOLN Place 1 drop into both eyes daily      Misc. Devices [facility]) [address] [facility] Wheelchair repairs- new seat cover, right side armrest replacement    Current body weight:  68 kg  Diagnosis: gait disturbance, chronic incomplete spastic paraplegia  Length of need: Lifetime 1 each 0    tiotropium (SPIRIVA) 18 MCG inhalation capsule Inhale 18 mcg into the lungs daily 2 puffs      insulin glargine (LANTUS SOLOSTAR) 100 UNIT/ML injection pen Inject 60 Units into the skin nightly       montelukast (SINGULAIR) 10 MG tablet Take 10 mg by mouth nightly   0    artificial tears (ARTIFICIAL TEARS) OINT as needed      Calcium Carbonate (CALCIUM 600 PO) Take 1 tablet by mouth 2 times daily      Multiple Vitamin (TAB-A-ISAAC PO) Take 1 tablet by mouth daily      loratadine (CLARITIN) 10 MG tablet Take 1 tablet by mouth daily as needed. 0    PROAIR  (90 BASE) MCG/ACT inhaler Inhale 2 puffs into the lungs every 6 hours as needed.   0  Incontinence Supply Disposable (UNDERPADS) MISC Cloth chux pads  Diagnosis: Paraplegia 344. 1 100 Bottle 11    hydrOXYzine (VISTARIL) 50 MG capsule Take 50 mg by mouth 3 times daily as needed for Itching.  omeprazole (PRILOSEC) 20 MG capsule Take 20 mg by mouth daily.  naloxone 4 MG/0.1ML LIQD nasal spray 1 spray by Nasal route as needed for Opioid Reversal (Patient not taking: Reported on 2021) 1 each 5     No current facility-administered medications for this visit. Allergies   Allergen Reactions    Penicillins Shortness Of Breath     \"I almost \"    Seasonal Itching      Health Maintenance   Topic Date Due    Hepatitis C screen  Never done    HIV screen  Never done    Shingles Vaccine (1 of 2) Never done    Colon cancer screen colonoscopy  Never done    A1C test (Diabetic or Prediabetic)  2017    Pneumococcal 0-64 years Vaccine (2 of 4 - PPSV23) 2021    Breast cancer screen  2022    Cervical cancer screen  2024    DTaP/Tdap/Td vaccine (2 - Td or Tdap) 2024    Lipid screen  2026    Flu vaccine  Completed    COVID-19 Vaccine  Completed    Hepatitis A vaccine  Aged Out    Hepatitis B vaccine  Aged Out    Hib vaccine  Aged Out    Meningococcal (ACWY) vaccine  Aged Out        Subjective:   Review of Systems   Constitutional: Negative for activity change, appetite change, fatigue and fever. HENT: Negative for congestion, dental problem, facial swelling, hearing loss, mouth sores, nosebleeds, sore throat, tinnitus and trouble swallowing. Eyes: Negative for discharge and visual disturbance. Respiratory: Negative for cough, chest tightness, shortness of breath and wheezing. Cardiovascular: Negative for chest pain, palpitations and leg swelling. Gastrointestinal: Negative for abdominal distention, abdominal pain, blood in stool, constipation, diarrhea, nausea, rectal pain and vomiting.    Endocrine: Negative for cold intolerance, polydipsia and polyuria. Genitourinary: Positive for difficulty urinating. Negative for decreased urine volume, dysuria, flank pain, hematuria and urgency. Musculoskeletal: Negative for arthralgias, back pain, gait problem, joint swelling, myalgias and neck stiffness. Skin: Negative for color change, rash and wound. Neurological: Negative for dizziness, tremors, seizures, speech difficulty, weakness (The patient has spastic contractures in the lower extremities), light-headedness, numbness and headaches. Hematological: Negative for adenopathy. Bruises/bleeds easily. Psychiatric/Behavioral: Positive for behavioral problems. Negative for confusion and sleep disturbance. The patient is nervous/anxious. Objective:   Physical Exam  Vitals reviewed. Constitutional:       General: She is not in acute distress. Appearance: She is well-developed. HENT:      Head: Normocephalic. Mouth/Throat:      Pharynx: No oropharyngeal exudate. Eyes:      General: No scleral icterus. Right eye: No discharge. Left eye: No discharge. Pupils: Pupils are equal, round, and reactive to light. Neck:      Thyroid: No thyromegaly. Vascular: No JVD. Trachea: No tracheal deviation. Cardiovascular:      Rate and Rhythm: Normal rate. Heart sounds: Normal heart sounds. No murmur heard. No friction rub. No gallop. Pulmonary:      Effort: Pulmonary effort is normal. No respiratory distress. Breath sounds: Normal breath sounds. No stridor. No wheezing or rales. Chest:      Chest wall: No tenderness. Abdominal:      General: Bowel sounds are normal. There is no distension. Palpations: Abdomen is soft. There is no mass. Tenderness: There is no abdominal tenderness. There is no rebound. Musculoskeletal:         General: Normal range of motion. Cervical back: Normal range of motion and neck supple.       Comments: Good range of motion in all four extremities. Lymphadenopathy:      Cervical: No cervical adenopathy. Skin:     General: Skin is warm. Findings: No erythema or rash. Neurological:      Mental Status: She is alert and oriented to person, place, and time. Cranial Nerves: No cranial nerve deficit. Motor: No abnormal muscle tone. Deep Tendon Reflexes: Reflexes are normal and symmetric. Psychiatric:         Behavior: Behavior normal.         Thought Content: Thought content normal.         Judgment: Judgment normal.         /66 (Site: Right Upper Arm, Position: Sitting, Cuff Size: Medium Adult)   Pulse 92   Temp 98 °F (36.7 °C) (Oral)   Resp 18   Ht 4' 11.5\" (1.511 m)   Wt 150 lb (68 kg) Comment: stated  SpO2 93%   BMI 29.79 kg/m²      ECOG status is 2    Imaging studies and labs:     Lab Results   Component Value Date    WBC 2.9 (L) 04/01/2021    HGB 12.0 04/01/2021    HCT 35.7 (L) 04/01/2021    MCV 78.5 (L) 04/01/2021     (L) 04/01/2021       Chemistry        Component Value Date/Time     02/19/2021 1011    K 4.2 02/19/2021 1011     02/19/2021 1011    CO2 28 02/19/2021 1011    BUN 11 02/19/2021 1011    CREATININE 0.3 (L) 02/19/2021 1011        Component Value Date/Time    CALCIUM 9.1 02/19/2021 1011    ALKPHOS 121 01/07/2021 1610    AST 27 01/07/2021 1610    ALT 17 01/07/2021 1610    BILITOT 0.7 01/07/2021 1610    BILITOT NEGATIVE 03/14/2018 0000            Assessment/Plan:        Diagnosis Orders   1. Invasive ductal carcinoma of left breast [facility])  Ambulatory referral to Radiation Oncology   2. S/P left mastectomy     3. Breast cancer metastasized to axillary lymph node, left (HCC)     4. Estrogen receptor positive status (ER+)          1.Stage IB (pT2, pN1a, cM0, G2, ER+, VA+, HER2-, Oncotype DX score: 8) left breast cancer. The patient is s/p left breast mastectomy with axillary lymph nodes excision.   The Oncotype Dx 21-gene recurrence score (RS) is the best-validated prognostic and predictive assay to identify patients who are most and least likely to derive benefit from adjuvant chemotherapy. RS 25 or below identifies women with low risk of disease recurrence for whom chemotherapy is unlikely to provide a benefit. Her recurrence score came back as 8. The RS for those with one to three positive nodes, using cutoffs as for those with node-negative disease, can be used to make decision about adjuvant chemotherapy. This approach is included in the ASCO guidelines and in the NCCN. Rationale for this approach is that, in the modern era of more effective local therapy, and widespread application of adjuvant aromatase inhibitors, lymph node positivity might not confer the same degree of heightened risk as it once did. The SWOG  phase III study in women with hormone responsive, HER-2/maegan negative breast cancer with 1-3 axillary lymph nodes involvement showed no overall benefit from adjuvant chemotherapy for postmenopausal women with recurrence score between 0 and 25. In addition due to patient's multiple comorbidities she is poor candidate for systemic chemotherapy. As such my recommendation is to proceed with postmastectomy radiation treatment. The patient did not start radiation treatment due to need for revision of wound with excision of skin and subcutaneous fat 16 cm2 with tissue advancement flaps inferior and superior, totaling 18 cm skin and subcutaneous fat with wound closure on 4/27/21. Her VAC was removed few weeks ago. The patient saw Dr. Jayna Iniguez last week, the entire wound is granulating and closed. There is only a small scab in the medial aspect. Patient is referred to radiation oncology to discuss pros and cons of  postmastectomy radiation treatment. Her radiation treatment is delayed for more than 4 months. If radiation oncology feels that there is no benefit from delayed radiation treatment I will start the patient on aromatase inhibitor.     Return in about 8 weeks (around 8/27/2021). Orders Placed:   Orders Placed This Encounter   Procedures    Ambulatory referral to Radiation Oncology     Referral Priority:   Routine     Referral Type:   Consult for Advice and Opinion     Referral Reason:   Specialty Services Required     Requested Specialty:   Radiation Oncology     Number of Visits Requested:   1        Medications Prescribed:   No orders of the defined types were placed in this encounter. Discussed use, benefit, and side effectsof prescribed medications. All patient questions answered. Pt voiced understanding. Instructed to continue current medications, diet and exercise. Patient agreed with treatment plan. Follow up as directed.     Electronically signed by Collin Pastor MD on 3/27/21 at 10:39 AM EDT

## 2021-07-09 ENCOUNTER — TELEPHONE (OUTPATIENT)
Dept: PHYSICAL MEDICINE AND REHAB | Age: 56
End: 2021-07-09

## 2021-07-09 DIAGNOSIS — G82.22 CHRONIC INCOMPLETE SPASTIC PARAPLEGIA (HCC): Primary | ICD-10-CM

## 2021-07-09 NOTE — TELEPHONE ENCOUNTER
DOLV=04-08-21. DONV=07-29-21. Artie Meza is calling to get a new rx for a new shower chair, her old chair broke. 2. She also is requesting an order to get her wheelchair fixed, needs a new seat and new armpads, and new back.

## 2021-07-14 ENCOUNTER — OFFICE VISIT (OUTPATIENT)
Dept: PHYSICAL MEDICINE AND REHAB | Age: 56
End: 2021-07-14
Payer: COMMERCIAL

## 2021-07-14 VITALS
SYSTOLIC BLOOD PRESSURE: 126 MMHG | HEIGHT: 59 IN | DIASTOLIC BLOOD PRESSURE: 78 MMHG | WEIGHT: 150 LBS | BODY MASS INDEX: 30.24 KG/M2

## 2021-07-14 DIAGNOSIS — G35 MULTIPLE SCLEROSIS (HCC): ICD-10-CM

## 2021-07-14 DIAGNOSIS — F41.9 ANXIETY: ICD-10-CM

## 2021-07-14 DIAGNOSIS — G82.22 CHRONIC INCOMPLETE SPASTIC PARAPLEGIA (HCC): ICD-10-CM

## 2021-07-14 DIAGNOSIS — Z17.0 CARCINOMA OF UPPER-INNER QUADRANT OF LEFT BREAST IN FEMALE, ESTROGEN RECEPTOR POSITIVE (HCC): ICD-10-CM

## 2021-07-14 DIAGNOSIS — M32.9 LUPUS (HCC): ICD-10-CM

## 2021-07-14 DIAGNOSIS — Z90.12 HISTORY OF LEFT TOTAL MASTECTOMY: ICD-10-CM

## 2021-07-14 DIAGNOSIS — G89.4 CHRONIC PAIN SYNDROME: ICD-10-CM

## 2021-07-14 DIAGNOSIS — C50.212 CARCINOMA OF UPPER-INNER QUADRANT OF LEFT BREAST IN FEMALE, ESTROGEN RECEPTOR POSITIVE (HCC): ICD-10-CM

## 2021-07-14 DIAGNOSIS — R26.9 GAIT DISTURBANCE: ICD-10-CM

## 2021-07-14 DIAGNOSIS — R07.89 LEFT-SIDED CHEST WALL PAIN: Primary | ICD-10-CM

## 2021-07-14 DIAGNOSIS — G37.3 TRANSVERSE MYELITIS (HCC): ICD-10-CM

## 2021-07-14 PROCEDURE — 99244 OFF/OP CNSLTJ NEW/EST MOD 40: CPT | Performed by: PAIN MEDICINE

## 2021-07-14 PROCEDURE — G9899 SCRN MAM PERF RSLTS DOC: HCPCS | Performed by: PAIN MEDICINE

## 2021-07-14 PROCEDURE — G8417 CALC BMI ABV UP PARAM F/U: HCPCS | Performed by: PAIN MEDICINE

## 2021-07-14 PROCEDURE — G8427 DOCREV CUR MEDS BY ELIG CLIN: HCPCS | Performed by: PAIN MEDICINE

## 2021-07-14 ASSESSMENT — ENCOUNTER SYMPTOMS
WHEEZING: 0
PHOTOPHOBIA: 0
COUGH: 0
BACK PAIN: 1
RHINORRHEA: 0
EYE PAIN: 0
SINUS PRESSURE: 0
VOMITING: 0
SORE THROAT: 0
DIARRHEA: 0
CONSTIPATION: 0
SHORTNESS OF BREATH: 0
NAUSEA: 0
ABDOMINAL PAIN: 0
CHEST TIGHTNESS: 0
COLOR CHANGE: 0

## 2021-07-14 NOTE — PROGRESS NOTES
[facility] [address]  Dept: [phone]  Dept Fax: [phone]: [phone]    Visit Date: 7/14/2021    Lionel Freire is a 50 y.o. female who is referred for pain management evaluation and treatment per Dr. Valeda Dubin. CAGE and CAGE-AID Questions   1. In the last three months, have you felt you should cut down or stop drinking or using drugs? Yes []        No [x]     2. In the last three months, has anyone annoyed you or gotten on your nerves by telling you to cut down or stop drinking or using drugs? Yes []        No [x]     3. In the last three months, have you felt guilty or bad about how much you drink or use drugs? Yes []        No [x]     4. In the last three months, have you been waking up wanting to have an alcoholic drink or use drugs? Yes []        No [x]        Opioid Risk Tool:  Clinician Form       1. Family History of Substance Abuse: Female Male    Alcohol   []1   []3    Illegal drugs   []2   []3    Prescription drugs     []4   []4   2. Personal History of Substance Abuse:          Alcohol   []3   []3    Illegal drugs   []4   []4    Prescription drugs     []5   []5   3. Age (aruna box if between 12 and 39):     []1   []1   4. History of Preadolescent Sexual Abuse:     []3   []0   5. Psychological Disease:      Attention deficit disorder, obsessive-compulsive disorder, bipolar, schizophrenia   []2   []2      Depression     []1   []1    Scoring Totals 0 0     Total Score  Low Risk  Moderate Risk  High Risk   Risk Category   0 - 3   4 - 7   8 or Above      Patient states symptoms interfere with:  A.  General Activity:  yes   B. Mood: yes    C. Walking Ability:   yes   D. Normal Work (Includes both work outside the home and housework):   yes    E.  Relations with Other People:  yes   F. Sleep:   yes   G.  Enjoyment of Life:  yes       HPI: ChiefComplaint: Left breast pain    HPI    Patient is a 52y/o female with history of left breast cancer Patient had left breast mastectomy,sentinal lymph node biopsy on 02/19/2021 per Dr Trinity Love. Patient developed dehiscence of operative wound. Patient had debridement left breast mastectomy wound with intermediate closure wound and skin and subcutaneous tissue advancement flaps on 4/6/2021. Patient was taken back to surgery on 04/27/2021 for revision of wound with excision of skin and subcutaneous fat with tissue advancement flaps with wound closure. Patient is seeing Dr Webster,oncologist to develop treatment plan. Patient also has history of multiple sclerosis, transverse myelitis,neurogenic bladder and bowel,spastic paraplegis and lupus. Patient states has a lot of pain across her left breast. States pain is constant, burning and sharp pain. States pain at worse is a 10/10 and at best is a 5-6/10 after pain medications. States was receiving Norco 5/325 mg per Dr Trinity Love, last prescribed on 05/18/2021. Patient also takes Lyrica 150 mg twice a day. The patient is allergic to penicillins and seasonal.      Subjective:      Review of Systems   Constitutional: Positive for activity change. Negative for appetite change, chills, diaphoresis, fatigue, fever and unexpected weight change. HENT: Negative for congestion, ear pain, hearing loss, mouth sores, nosebleeds, rhinorrhea, sinus pressure and sore throat. Eyes: Negative for photophobia, pain and visual disturbance. Respiratory: Negative for cough, chest tightness, shortness of breath and wheezing. Hx COPD  Smoker   Cardiovascular: Negative for chest pain and palpitations. Gastrointestinal: Negative for abdominal pain, constipation, diarrhea, nausea and vomiting. Endocrine: Negative for cold intolerance, heat intolerance, polydipsia, polyphagia and polyuria.         Hx IDDM   Genitourinary: Negative for decreased urine volume, difficulty urinating, frequency and hematuria. Bladder incontinence   Musculoskeletal: Positive for arthralgias, back pain, gait problem, myalgias and neck pain. Negative for joint swelling and neck stiffness. Skin: Negative for color change and rash. Allergic/Immunologic: Negative for food allergies and immunocompromised state. Neurological: Positive for weakness, numbness and headaches. Negative for dizziness, tremors, seizures, syncope, facial asymmetry, speech difficulty and light-headedness. Hx stroke and MS   Hematological: Does not bruise/bleed easily. Psychiatric/Behavioral: Positive for sleep disturbance. Negative for agitation, behavioral problems, confusion, decreased concentration, dysphoric mood, hallucinations, self-injury and suicidal ideas. The patient is nervous/anxious. The patient is not hyperactive. Objective:     Vitals:    07/14/21 1144   BP: 126/78   Weight: 150 lb (68 kg)   Height: 4' 11.05\" (1.5 m)       Physical Exam  Vitals and nursing note reviewed. Constitutional:       General: She is not in acute distress. Appearance: She is well-developed. She is not diaphoretic. HENT:      Head: Normocephalic and atraumatic. Right Ear: External ear normal.      Left Ear: External ear normal.      Nose: Nose normal.      Mouth/Throat:      Pharynx: No oropharyngeal exudate. Eyes:      General: No scleral icterus. Right eye: No discharge. Left eye: No discharge. Conjunctiva/sclera: Conjunctivae normal.      Pupils: Pupils are equal, round, and reactive to light. Neck:      Thyroid: No thyromegaly. Cardiovascular:      Rate and Rhythm: Normal rate and regular rhythm. Heart sounds: Normal heart sounds. No murmur heard. No friction rub. No gallop. Pulmonary:      Effort: Pulmonary effort is normal. No respiratory distress. Breath sounds: Normal breath sounds. No wheezing or rales. Chest:      Chest wall: No tenderness.       Breasts: Right: Normal.         Left: Tenderness present. Abdominal:      General: Bowel sounds are normal. There is no distension. Palpations: Abdomen is soft. Tenderness: There is no abdominal tenderness. There is no guarding or rebound. Musculoskeletal:      Cervical back: Full passive range of motion without pain, normal range of motion and neck supple. No edema, erythema or rigidity. No muscular tenderness. Normal range of motion. Right knee: Decreased range of motion. Left knee: Decreased range of motion. Right ankle: Decreased range of motion. Left ankle: Decreased range of motion. Skin:     General: Skin is warm. Coloration: Skin is not pale. Findings: No erythema or rash. Neurological:      Mental Status: She is alert and oriented to person, place, and time. She is not disoriented. Cranial Nerves: No cranial nerve deficit. Sensory: Sensory deficit present. Motor: Weakness and abnormal muscle tone present. No atrophy. Coordination: Coordination normal.      Gait: Gait abnormal.      Deep Tendon Reflexes: Reflexes abnormal. Babinski sign absent on the right side. Babinski sign absent on the left side. Comments: Gait--wheelchair   Psychiatric:         Attention and Perception: Attention normal. She is attentive. Mood and Affect: Mood is anxious. Mood is not depressed. Affect is not labile, blunt, angry or inappropriate. Speech: Speech normal. She is communicative. Speech is not rapid and pressured, delayed, slurred or tangential.         Behavior: Behavior normal. Behavior is not agitated, slowed, aggressive, withdrawn, hyperactive or combative. Behavior is cooperative. Thought Content: Thought content normal. Thought content is not paranoid or delusional. Thought content does not include homicidal or suicidal ideation. Thought content does not include homicidal or suicidal plan.          Cognition and Memory: Cognition normal. Memory is not impaired. She does not exhibit impaired recent memory or impaired remote memory. Judgment: Judgment normal. Judgment is not impulsive or inappropriate. Assessment:     1. Left-sided chest wall pain    2. 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 History of left total mastectomy    4. Chronic incomplete spastic paraplegia ([address]    5. Gait disturbance    6. Lupus ([address]    7. Multiple sclerosis ([address]    8. Transverse myelitis ([address]    9. Chronic pain syndrome    10. Anxiety            Plan:      · Patient read and signed orientation and opioid agreement. · OARRS reviewed. Current MED: 2  · Patient was not offered naloxone for home. · Discussed long term side effects of medications, tolerance, dependency and addiction. · UDS preformed today. · Patient told can not receive any pain medications from any other source. · No evidence of abuse, diversion or aberrant behavior. · Prescription Needs: Await clean UDS for future prescription needs   Medications and/or procedures to improve function and quality of life- patient understanding with this and that may not be pain free   Discussed possible weaning of medication dosing dependent on treatment/procedure results.  Discussed with patient about safe storage of medications at home   Testing: None   Procedures: None   Discussed with patient about risks with procedure including infection, reaction to medication, increased pain, or bleeding.  Medications: Long discussion with patient regarding pain medications,side effects. Told patient have to wait for UDS.  If patient is on blood thinners will need approval to hold:N0           Meds. Prescribed:   No orders of the defined types were placed in this encounter. Return in about 2 weeks (around 7/28/2021), or REVIEW UDS.      Time spent with patient was 60 minutes more than 50% was spent  Counseling/coordinated the patient'scare.     Electronically signed by Dolores Baptiste MD on 7/15/2021 at 6:10 PM

## 2021-07-15 ENCOUNTER — OFFICE VISIT (OUTPATIENT)
Dept: SURGERY | Age: 56
End: 2021-07-15
Payer: COMMERCIAL

## 2021-07-15 VITALS
RESPIRATION RATE: 15 BRPM | DIASTOLIC BLOOD PRESSURE: 77 MMHG | SYSTOLIC BLOOD PRESSURE: 128 MMHG | WEIGHT: 150 LBS | BODY MASS INDEX: 29.45 KG/M2 | OXYGEN SATURATION: 99 % | HEIGHT: 60 IN | HEART RATE: 96 BPM | TEMPERATURE: 97.2 F

## 2021-07-15 DIAGNOSIS — G35 MULTIPLE SCLEROSIS (HCC): ICD-10-CM

## 2021-07-15 DIAGNOSIS — M32.9 SYSTEMIC LUPUS ERYTHEMATOSUS, UNSPECIFIED SLE TYPE, UNSPECIFIED ORGAN INVOLVEMENT STATUS (HCC): ICD-10-CM

## 2021-07-15 DIAGNOSIS — Z51.89 VISIT FOR WOUND CHECK: ICD-10-CM

## 2021-07-15 DIAGNOSIS — Z17.0 CARCINOMA OF UPPER-INNER QUADRANT OF LEFT BREAST IN FEMALE, ESTROGEN RECEPTOR POSITIVE (HCC): ICD-10-CM

## 2021-07-15 DIAGNOSIS — C50.912 INVASIVE DUCTAL CARCINOMA OF LEFT BREAST (HCC): Primary | ICD-10-CM

## 2021-07-15 DIAGNOSIS — C50.212 CARCINOMA OF UPPER-INNER QUADRANT OF LEFT BREAST IN FEMALE, ESTROGEN RECEPTOR POSITIVE (HCC): ICD-10-CM

## 2021-07-15 DIAGNOSIS — G95.9 MYELOPATHY (HCC): ICD-10-CM

## 2021-07-15 PROCEDURE — 3017F COLORECTAL CA SCREEN DOC REV: CPT | Performed by: SURGERY

## 2021-07-15 PROCEDURE — G9899 SCRN MAM PERF RSLTS DOC: HCPCS | Performed by: SURGERY

## 2021-07-15 PROCEDURE — G8427 DOCREV CUR MEDS BY ELIG CLIN: HCPCS | Performed by: SURGERY

## 2021-07-15 PROCEDURE — G8417 CALC BMI ABV UP PARAM F/U: HCPCS | Performed by: SURGERY

## 2021-07-15 PROCEDURE — 4004F PT TOBACCO SCREEN RCVD TLK: CPT | Performed by: SURGERY

## 2021-07-15 PROCEDURE — 99213 OFFICE O/P EST LOW 20 MIN: CPT | Performed by: SURGERY

## 2021-07-15 NOTE — PROGRESS NOTES
Ronald Clifford MD   General Surgery  Postprocedure Evaluation in Office  Pt Name: Mitch Solorzano  Date of Birth 1965   Today's Date: 7/15/2021  Medical Record Number: [id number]  Primary Care Provider: Tony Moody MD  Chief Complaint   Patient presents with    Wound Check     s/p 4/27 left breast wound revision     ASSESSMENT      1. Invasive ductal carcinoma of left breast (HCC)    2. 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 Multiple sclerosis ([facility]    4. Visit for wound check    5. Systemic lupus erythematosus, unspecified SLE type, unspecified organ involvement status ([facility] [address]    6. Myelopathy ([facility] [facility]    6.  Surgical wound now healed   Pathologic stage Ib  PLAN       1. Wounds healed  2. Discussed with Dr. Bonifacio Garcia and Dr. Lauris Epley. Recommend radiation therapy. Arrange follow-up with radiation for opinion regarding risk-benefit of radiation therapy with her comorbidities. 3.  Follow-up surgical clinic in 4 months or sooner as needed  4. Patient will start on aromatase inhibitor after radiation or sooner if radiation not recommended. Kenney Saldaña is seen today for post-op follow-up. VAC removed a several weeks ago. Wound has healed. Medications    Current Outpatient Medications:     HYDROcodone-acetaminophen (NORCO) 5-325 MG per tablet, Take 1 tablet by mouth every 6 hours as needed for Pain., Disp: , Rfl:     baclofen (LIORESAL) 10 MG tablet, Take 2 tablets by mouth 3 times daily, Disp: 135 tablet, Rfl: 5    amitriptyline (ELAVIL) 25 MG tablet, Take 2 tablets by mouth nightly, Disp: 30 tablet, Rfl: 5    pregabalin (LYRICA) 150 MG capsule, Take 1 capsule by mouth 2 times daily for 180 days. , Disp: 60 capsule, Rfl: 5    metFORMIN (GLUCOPHAGE) 1000 MG tablet, Take 1,000 mg by mouth 2 times daily, Disp: , Rfl:     desvenlafaxine succinate (PRISTIQ) 50 MG TB24 extended release tablet, Take 50 mg by mouth daily, Disp: , Rfl:   traZODone (DESYREL) 50 MG tablet, Take 50 mg by mouth nightly, Disp: , Rfl:     MUCINEX MAXIMUM STRENGTH 1200 MG TB12, Take 1 tablet by mouth 2 times daily, Disp: , Rfl:     Lactobacillus Acid-Pectin (ACIDOPHILUS/CITRUS PECTIN) TABS, Take 1 tablet by mouth daily, Disp: , Rfl:     paliperidone (INVEGA) 3 MG extended release tablet, Take 3 mg by mouth nightly, Disp: , Rfl:     SITagliptin (JANUVIA) 100 MG tablet, Take 100 mg by mouth daily, Disp: , Rfl:     Lifitegrast (XIIDRA) 5 % SOLN, Place 1 drop into both eyes daily, Disp: , Rfl:     Misc. Devices [facility]) MISC, Wheelchair repairs- new seat cover, right side armrest replacement  Current body weight:  68 kg Diagnosis: gait disturbance, chronic incomplete spastic paraplegia Length of need: Lifetime, Disp: 1 each, Rfl: 0    tiotropium (SPIRIVA) 18 MCG inhalation capsule, Inhale 18 mcg into the lungs daily 2 puffs, Disp: , Rfl:     insulin glargine (LANTUS SOLOSTAR) 100 UNIT/ML injection pen, Inject 60 Units into the skin nightly , Disp: , Rfl:     montelukast (SINGULAIR) 10 MG tablet, Take 10 mg by mouth nightly , Disp: , Rfl: 0    artificial tears (ARTIFICIAL TEARS) OINT, as needed, Disp: , Rfl:     Calcium Carbonate (CALCIUM 600 PO), Take 1 tablet by mouth 2 times daily, Disp: , Rfl:     Multiple Vitamin (TAB-A-ISAAC PO), Take 1 tablet by mouth daily, Disp: , Rfl:     loratadine (CLARITIN) 10 MG tablet, Take 1 tablet by mouth daily as needed. , Disp: , Rfl: 0    PROAIR  (90 BASE) MCG/ACT inhaler, Inhale 2 puffs into the lungs every 6 hours as needed. , Disp: , Rfl: 0    Incontinence Supply Disposable (UNDERPADS) MISC, Cloth chux pads Diagnosis: Paraplegia 344.1, Disp: 100 Bottle, Rfl: 11    hydrOXYzine (VISTARIL) 50 MG capsule, Take 50 mg by mouth 3 times daily as needed for Itching., Disp: , Rfl:     omeprazole (PRILOSEC) 20 MG capsule, Take 20 mg by mouth daily. , Disp: , Rfl:     naloxone 4 MG/0.1ML LIQD nasal spray, 1 spray by Nasal route as needed for Opioid Reversal (Patient not taking: Reported on 7/15/2021), Disp: 1 each, Rfl: 5    Allergies  Allergies   Allergen Reactions    Penicillins Shortness Of Breath     \"I almost \"    Seasonal Itching       Review of Systems  History obtained from the patient. Constitutional: Denies any fever, chills, fatigue. Wound: No drainage  Resp: Denies any cough, shortness of breath. CV: Denies any chest pain, orthopnea or syncope. GI:  Denies any nausea, vomiting, blood in the stool, constipation or diarrhea. : Denies any hematuria, hesitancy or dysuria. OBJECTIVE     VITALS: /77 (Site: Right Upper Arm, Position: Sitting, Cuff Size: Medium Adult)   Pulse 96   Temp 97.2 °F (36.2 °C) (Tympanic)   Resp 15   Ht 4' 11.5\" (1.511 m)   Wt 150 lb (68 kg)   SpO2 99%   BMI 29.79 kg/m²     CONSTITUTIONAL: Alert and oriented times 3, no acute distress and cooperative to examination.   SKIN: Skin color, texture, turgor normal. .  INCISION: LUNGS: Lungs Clear  Wound: Healed healed   cardiovascular: Normal heart rate  ABDOMEN: nondistended  NEUROLOGIC: Alert and oriented        Performed by: [facility]     [place], [place]                [id number]   Assoc.                                              Page 1 of 6 [address], [place], [place] [address]                                                       PROC: 2021   [facility]                                    RECV: 2021   [address] [facility]                                    RPTD: 2021   [address] [place], [place] [address]                       MRN:  [id number]     LOC: 5KS                       ACCT: [id number]  SEX: F                       : 1965  AGE: 50 Y                          PATHOLOGY REPORT                       ATTN: AIDE ESCAMILLA   [de-identified]                  REQ: AIDE ESCAMILLA       Copies To:   GRETA SOTO       Clinical Information: INVASIVE DUCTAL CARCINOMA LEFT BREAST     AMENDED REPORT 2/23/2021:     FINAL DIAGNOSIS:   A.  Left sentinel lymph node, excision:    Metastatic carcinoma in 2 of 4 lymph nodes (2/4).  Biopsy site changes. Comment: The frozen section discrepancy is noted.  Re-review of the   frozen section slide shows no evidence of metastatic carcinoma on   sections examined. B.  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    Lymphovascular space invasion is present.    Margins are negative.    Please see synoptic report. C.  Left axillary lymph nodes, excision:    Two lymph nodes, negative for malignancy (0/2). COMMENT 2/23/2021:  An amended report was issued to correct a minor   typographical error in the microscopic description. Specimen:   A) SENTINEL LYMPH NODE(S), LEFT, FS   B) EXCISION OF BREAST, LEFT MASS   C) LYMPH NODE(S), LEFT AXILLARY           Intraoperative Consultation:   A - Frozen Section DX:  Favor biopsy site changes, no definite   malignancy.  PCF/ALP       Received:  12:17 Reported:  12:38     Gross Examination:   A - The container is labeled Wil Carballo, left sentinel lymph node.    Received fresh for frozen section evaluation are fragments of   fibroadipose tissue measuring 6.5 x 4.5 x 2.0 cm.  This dissection   reveals three lymph nodes ranging in size from 1.5 to 0.5 cm.  There   are no obvious lesions. Flakita Maddi is evidence of previous biopsy site in   the largest node.  A representative section of the largest lymph node   is submitted for frozen section evaluation as FSA1.  The remaining node   submitted for frozen section is submitted in cassette A1.  The second   largest node is serially sectioned and entirely submitted in cassette   A2.  The smallest node is submitted in cassette A3 as is. Representative fibroadipose is submitted in cassette A4.      Fixative:  10% neutral buffered formalin   Tissue removal time:  11:37   Tissue fixation time:  12:30   Total fixation time: 55 hours 30 minutes     B - The container is labeled faheem Mac breast mass. Received in formalin is a mastectomy specimen oriented by a stitch   designating lateral.  The specimen measures 20 x 15 x 7 cm.  The   ellipse of skin is approximately 19 x 13.5 cm.  The nipple/areolar   complex is grossly unremarkable.  There are no skin lesions.  The   radial margin is inked blue and the deep margin is inked black.  There   is no attached skeletal muscle.  In the central aspect of the specimen   just deep to the nipple, there is an ill-defined white, glistening, and   mucoid lesion measuring 2.7 x 2.5 cm.  The lesion is 2.5 cm from the   deep margin and distant from the radial margin.  Sectioning through the   remaining breast tissue reveals a predominantly fatty cut surface with   no additional lesions or fibrous areas.  Sections are submitted as   follows:  B1 through B3 - representative mass; B4 - closest deep   margin; B5 through B8 - representative quadrants (upper outer, lower   outer, upper inner, lower inner);   B9 - nipple/areolar complex. Fixative:  10% neutral buffered formalin   Tissue removal time:  11:37   Tissue fixation time:  12:39   Total fixation time: 55 hours 21 minutes     C - The container is labeled faheem Mac axillary lymph node.    Received in formalin are fragments of fibroadipose tissue aggregating   to 6.0 x 4.0 x 2.0 cm.  Sectioning reveals two lymph nodes ranging in   size from 0.9 to 0.4 cm in greatest dimension.  Each lymph node is   serially sectioned and entirely submitted in one cassette each. PCF:[id number]     Fixative:  10% neutral buffered formalin   Tissue removal time:  12:03   Tissue fixation time:  12:30   Total fixation time: 55 hours 30 minutes           Microscopic Examination:   A-C.  Procedure: Mastectomy   Specimen laterality: Left   Tumor site: Upper inner quadrant   Tumor size: 29 mm   Histologic type:  Invasive mucinous micropapillary carcinoma Histologic grade (Lynn histologic score)   Glandular/tubular differentiation: Score 3   Nuclear pleomorphism: Score 2   Mitotic rate: Score 1   Overall grade: Grade 2 (score 6)   Ductal carcinoma in situ: Not identified   Invasive carcinoma margins: Uninvolved by invasive carcinoma    Distance from closest margin: 25 mm (deep margin)   DCIS margins: Not applicable   Regional lymph nodes: Involved by tumor cells    Number of lymph nodes with macrometastases (>2 mm): 2    Number of lymph nodes with micrometastases: 0    Number of lymph nodes with isolated tumor cells: 0   Size of largest metastatic deposit: 2.5 mm   Extranodal extension: Not identified   Total number of lymph nodes examined: 6   Number of sentinel nodes examined: 4   Treatment effect in the breast: No known presurgical therapy   Treatment effect in the lymph nodes: Not applicable   Lymphovascular invasion: Present   Dermal lymphovascular invasion: Not identified     Pathologic stage classification (pTNM, AJCC 8th edition)   pT2: Tumor greater than 20 mm but less than or equal to 50 mm greatest   dimension   pN1a: Metastases in 1-3 axillary lymph nodes, at least 1 metastasis   larger than 2.0 mm     Breast biomarker testing performed on previous biopsy [id number];   1/14/21     Estrogen Receptor: (Clone SP1), [employer]       Positive 100% of cells ( > 10% of cells)   Intensity of Staining:       Strong     Progesterone Receptor: (Clone 1E2), [employer]       Positive 80% of cells   Intensity of Staining:       Strong           Ki-67 (clone 30-9)       Percentage of positive nuclei:  6%               Favorable  < 10%               Borderline 10-20%               Unfavorable  > 20%     HER2 Immunohistochemistry (Clone 4B5, [email or web address])       Negative (1+) - Incomplete faint/barely perceptible membrane   staining in  > 10% of invasive tumor     External Controls:  Adequate   Internal Controls: Edison Standard Assay Conditions:  Met     Staining Method Used:    Formalin fixation    Antigen retrieval type:  Cell Conditioning 1, mild gordon       [id number]   [id number]                                                     <Sign Out Dr. Ha Members                                              Cedrick Hernandez M.D., F.C. A. P       [facility]/ [address] [address]  Printed on:  2/23/2021   [address]   [place] [address] [place], [facility] [employer] [facility]   Original print date: 02/23/2021   Lab and Collection    Oncotype DX 8

## 2021-07-16 ENCOUNTER — TELEPHONE (OUTPATIENT)
Dept: SURGERY | Age: 56
End: 2021-07-16

## 2021-07-16 DIAGNOSIS — C50.912 INVASIVE DUCTAL CARCINOMA OF LEFT BREAST (HCC): Primary | ICD-10-CM

## 2021-07-16 NOTE — TELEPHONE ENCOUNTER
Dr Jocelynn Campuzano spoke with breast clinic and discussed patient-it is recommended patient see Dr Troy Sinha for consult for radiation.

## 2021-07-20 ENCOUNTER — TELEPHONE (OUTPATIENT)
Dept: UROLOGY | Age: 56
End: 2021-07-20

## 2021-07-20 NOTE — TELEPHONE ENCOUNTER
Patient left a message stating that she spoke with Love Pressley regarding her interstim and she will need to make an appt when Love Pressley is in the office. Attempted to call her and left a message to make an appt on Aug 6 or Sept 3 between 9 -11:30am, these are the dates that Love Pressley is at our office.

## 2021-07-22 NOTE — TELEPHONE ENCOUNTER
Attempted to call the patient and left a message to call the office to see if she would like to schedule an appt with Carlitos Dill.

## 2021-07-23 NOTE — TELEPHONE ENCOUNTER
Attempted to call patient and left a message to call our office to make an appt with Donna Sánchez if that is what she wishes to do. Donna Sánchez will be here on Aug 6 or Sept 3 between 9 -11:30am.  Please call [phone] option #4 to schedule an appt with Donna Sánchez.

## 2021-07-28 ENCOUNTER — HOSPITAL ENCOUNTER (OUTPATIENT)
Dept: RADIATION ONCOLOGY | Age: 56
Discharge: HOME | End: 2021-07-28
Attending: RADIOLOGY
Payer: COMMERCIAL

## 2021-07-28 ENCOUNTER — HOSPITAL ENCOUNTER (OUTPATIENT)
Dept: CT IMAGING | Age: 56
Discharge: HOME | End: 2021-07-28
Payer: COMMERCIAL

## 2021-07-28 ENCOUNTER — HOSPITAL ENCOUNTER (OUTPATIENT)
Dept: RADIATION ONCOLOGY | Age: 56
Discharge: HOME | End: 2021-07-28
Payer: COMMERCIAL

## 2021-07-28 DIAGNOSIS — C50.212 MALIGNANT NEOPLASM OF UPPER-INNER QUADRANT OF LEFT BREAST IN FEMALE, ESTROGEN RECEPTOR POSITIVE (HCC): ICD-10-CM

## 2021-07-28 DIAGNOSIS — Z17.0 MALIGNANT NEOPLASM OF UPPER-INNER QUADRANT OF LEFT BREAST IN FEMALE, ESTROGEN RECEPTOR POSITIVE (HCC): ICD-10-CM

## 2021-07-28 PROCEDURE — 77263 THER RADIOLOGY TX PLNG CPLX: CPT | Performed by: RADIOLOGY

## 2021-07-28 PROCEDURE — 77290 THER RAD SIMULAJ FIELD CPLX: CPT | Performed by: RADIOLOGY

## 2021-07-28 PROCEDURE — 77334 RADIATION TREATMENT AID(S): CPT | Performed by: RADIOLOGY

## 2021-07-28 PROCEDURE — 99214 OFFICE O/P EST MOD 30 MIN: CPT | Performed by: RADIOLOGY

## 2021-07-28 PROCEDURE — 3209999900 CT GUIDE RADIATION THERAPY NO CHARGE

## 2021-07-28 RX ORDER — L. ACIDOPHILUS/PECTIN, CITRUS 25MM-100MG
1 TABLET ORAL DAILY
Qty: 30 TABLET | Refills: 0 | Status: SHIPPED | OUTPATIENT
Start: 2021-07-28 | End: 2021-09-20 | Stop reason: SDUPTHER

## 2021-07-29 ENCOUNTER — OFFICE VISIT (OUTPATIENT)
Dept: PHYSICAL MEDICINE AND REHAB | Age: 56
End: 2021-07-29
Payer: COMMERCIAL

## 2021-07-29 VITALS
HEIGHT: 60 IN | BODY MASS INDEX: 29.45 KG/M2 | DIASTOLIC BLOOD PRESSURE: 70 MMHG | SYSTOLIC BLOOD PRESSURE: 124 MMHG | WEIGHT: 150 LBS

## 2021-07-29 DIAGNOSIS — G35 MULTIPLE SCLEROSIS (HCC): Primary | ICD-10-CM

## 2021-07-29 DIAGNOSIS — G82.22 CHRONIC INCOMPLETE SPASTIC PARAPLEGIA (HCC): ICD-10-CM

## 2021-07-29 DIAGNOSIS — G89.4 CHRONIC PAIN SYNDROME: ICD-10-CM

## 2021-07-29 DIAGNOSIS — G37.3 TRANSVERSE MYELITIS (HCC): ICD-10-CM

## 2021-07-29 PROCEDURE — 64642 CHEMODENERV 1 EXTREMITY 1-4: CPT | Performed by: PHYSICAL MEDICINE & REHABILITATION

## 2021-07-29 PROCEDURE — 99213 OFFICE O/P EST LOW 20 MIN: CPT | Performed by: NURSE PRACTITIONER

## 2021-07-29 PROCEDURE — 4004F PT TOBACCO SCREEN RCVD TLK: CPT | Performed by: NURSE PRACTITIONER

## 2021-07-29 PROCEDURE — G8417 CALC BMI ABV UP PARAM F/U: HCPCS | Performed by: NURSE PRACTITIONER

## 2021-07-29 PROCEDURE — G8427 DOCREV CUR MEDS BY ELIG CLIN: HCPCS | Performed by: NURSE PRACTITIONER

## 2021-07-29 PROCEDURE — 3017F COLORECTAL CA SCREEN DOC REV: CPT | Performed by: NURSE PRACTITIONER

## 2021-07-29 PROCEDURE — G9899 SCRN MAM PERF RSLTS DOC: HCPCS | Performed by: NURSE PRACTITIONER

## 2021-07-29 RX ORDER — HYDROCODONE BITARTRATE AND ACETAMINOPHEN 5; 325 MG/1; MG/1
1 TABLET ORAL 2 TIMES DAILY PRN
Qty: 60 TABLET | Refills: 0 | Status: SHIPPED | OUTPATIENT
Start: 2021-07-29 | End: 2021-09-29 | Stop reason: SDUPTHER

## 2021-07-29 NOTE — PROGRESS NOTES
[facility]  Procedure Note    Verbal consent obtained for botulinum toxin injections after risks versus benefits discussed. 500 Units of Botox were reconstituted using preservative-free normal saline in a 100 unit to 2 mL solution. Alcohol swabs were used to cleanse the skin before injections were performed. Back pressure was placed on the syringe during injections to avoid any intravascular injection. EMG guidance was not used during procedure. Botulinum toxin was injected in the following muscles of the right lower limb:  Lateral hamstring 150 units, medial hamstring 150 units, lateral gastroc 100 units, medial gastroc 100 units    The patient tolerated the procedure well with no immediate complications. The patient should call with concerns or questions over the coming days.     CPT: 93564    Narciso Dash MD

## 2021-07-29 NOTE — PROGRESS NOTES
[address] [facility]  Outpatient progress note    Chief Complaint:   Chief Complaint   Patient presents with    Follow-up     botox 500 units        Subjective: Alfonso Carson is a 50 y.o. female who returns to the office today for further follow up. Patient with left breast cancer diagnosis with mastectomy 2/19/21 with Dr Shamar Goldstein. Incision healed, required wound vac though. Treatments start in the next few weeks. 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 Botox injections remain beneficial.  Would like repeat botox injections today. Remains on amitriptyline and Lyrica. Increased Elavil to 50mg lat visit. Patient states she has not noticed a difference with her neuropathy pain. Patient states she is sleeping slightly better. On Norco with good benefits. Reports her pain is overall stable, no new issues. Medications reviewed. Patient denies side effects with medications. Patient states she is taking medications as prescribed. She denies receiving pain medications from other sources. She denies any ER visits since last visit.     Pain scale with out pain medications or at its worst is 8/10  Pain scale with pain medications or at its best is 7/10.   Drug screen reviewed  Patient was offered naloxone RX for home and agreed    Review of Systems:  CONSTITUTIONAL:  negative  EYES:  negative  HEENT:  negative  RESPIRATORY:  negative  CARDIOVASCULAR:  negative  GASTROINTESTINAL:  positive for neurogenic bowel  GENITOURINARY:  positive for neurogenic bladder  SKIN:  negative  HEMATOLOGIC/LYMPHATIC:  negative  MUSCULOSKELETAL:  negative  NEUROLOGICAL:  positive for gait problems, weakness, pain, tingling and spasticity  BEHAVIOR/PSYCH:  negative  All other review of systems otherwise negative    Physical Exam:  /70   Ht 4' 11.5\" (1.511 m)   Wt 150 lb (68 kg)   BMI 29.79 kg/m²     awake  Orientation:   person, place, time  Mood: within normal limits  Affect: calm  General appearance: Patient is well nourished, well developed, well groomed and in no acute distress, appearing stated age    ROM:  abnormal - right knee and ankle restricted due to spasticity, severe-improving  Motor Exam:  2/5 right knee extension and ADF. 3/5 right knee flexion  Tone:  abnormal - 2-3/4 right leg/ankle  Muscle bulk: within normal limits  Sensory:  Decreased sensation    Skin: warm and dry, no rash or erythema  Peripheral vascular: Pulses: Normal upper and lower extremity pulses; Edema: no      Impression:  · Multiple sclerosis  · Transverse myelitis  · Lupus  · Neurogenic bowel and bladder  · Spastic paraplegia  · Bilateral lower limb neuralgia  · Moderate bilateral carpal tunnel syndrome    Plan:  · Repeat botulinum toxin injections performed by Dr. Dagmar Sweet. See procedure note. · Conitnue Lyrica 150 mg twice daily - consideration for increase if needed  · Amitriptyline to 50mg   · Baclofen 20mg TID   · Takes trazodone 50mg for sleep  · Order given for wheelchair repairs  · Continue with bowel and bladder program  · OARRS reviewed. Current MED: 10    · Patient was offered naloxone for home. Patient declined in the past.   · Discussed long term side effects of medications, tolerance, dependency and addiction. · Previous UDS reviewed  · UDS performed today for compliance  · Patient told can not receive any pain medications from any other source. · No evidence of abuse, diversion or aberrant behavior. · Medications and/or procedures to improve function and quality of life- patient understanding with this and that may not be pain free  · Discussed with patient about safe storage of medications at home      Return in about 3 months (around 10/29/2021) for Botox with Dr Dagmar Sweet - 500 Units of Botox . It was my pleasure to evaluate Selina Savage today. Please call with any concerns or questions.      Reema Askew, APRN - CNP

## 2021-08-05 PROCEDURE — 77295 3-D RADIOTHERAPY PLAN: CPT | Performed by: RADIOLOGY

## 2021-08-05 PROCEDURE — 77300 RADIATION THERAPY DOSE PLAN: CPT | Performed by: RADIOLOGY

## 2021-08-05 PROCEDURE — 77334 RADIATION TREATMENT AID(S): CPT | Performed by: RADIOLOGY

## 2021-08-06 PROCEDURE — 77334 RADIATION TREATMENT AID(S): CPT | Performed by: RADIOLOGY

## 2021-08-09 ENCOUNTER — OFFICE VISIT (OUTPATIENT)
Dept: PHYSICAL MEDICINE AND REHAB | Age: 56
End: 2021-08-09
Payer: COMMERCIAL

## 2021-08-09 VITALS
BODY MASS INDEX: 29.45 KG/M2 | HEIGHT: 60 IN | SYSTOLIC BLOOD PRESSURE: 138 MMHG | WEIGHT: 150 LBS | DIASTOLIC BLOOD PRESSURE: 70 MMHG

## 2021-08-09 DIAGNOSIS — G89.18 POST-MASTECTOMY PAIN: Primary | ICD-10-CM

## 2021-08-09 DIAGNOSIS — G89.4 CHRONIC PAIN SYNDROME: ICD-10-CM

## 2021-08-09 DIAGNOSIS — M79.2 NEUROPATHIC PAIN: ICD-10-CM

## 2021-08-09 DIAGNOSIS — R07.89 LEFT-SIDED CHEST WALL PAIN: ICD-10-CM

## 2021-08-09 PROCEDURE — 99215 OFFICE O/P EST HI 40 MIN: CPT | Performed by: ANESTHESIOLOGY

## 2021-08-09 PROCEDURE — 3017F COLORECTAL CA SCREEN DOC REV: CPT | Performed by: ANESTHESIOLOGY

## 2021-08-09 PROCEDURE — G8417 CALC BMI ABV UP PARAM F/U: HCPCS | Performed by: ANESTHESIOLOGY

## 2021-08-09 PROCEDURE — G8427 DOCREV CUR MEDS BY ELIG CLIN: HCPCS | Performed by: ANESTHESIOLOGY

## 2021-08-09 PROCEDURE — 4004F PT TOBACCO SCREEN RCVD TLK: CPT | Performed by: ANESTHESIOLOGY

## 2021-08-09 PROCEDURE — G9899 SCRN MAM PERF RSLTS DOC: HCPCS | Performed by: ANESTHESIOLOGY

## 2021-08-09 NOTE — PATIENT INSTRUCTIONS
The following treatment recommendations and plan were discussed in detail with Emily Best. Imaging:   None    Analgesics: The patient is currently managing their pain with the use of Norco which is prescribed by Didi Mcnulty. We recommend that the patient follow the recommendation of the prescribing provider with regard to the above medication(s) and contact their prescribing provider for refills if needed. The side effect profile of long-term opioid use was discussed and recommended emphasis on multimodal strategies for pain management. Adjuvants: In light of the presence of a neuropathic component of pain, patient is advised to continue Lyrica 150 mg twice a day. Interventions: With examination consistent with neuropathic pain from previous mastectomy, I set the patient up for a left T3 paravertebral block under fluoroscopy. Patient is agreement wants to proceed with this injection at next visit. Anticoagulation/NPO Recommendations:   Patient does not need to hold any medications prior to the procedure. Patient will need to be NPO x 8 hours prior to the procedure. We will start an IV prior to the procedure    Multidisciplinary Pain Management:   In the presence of complex, chronic, and multi-factorial pain, the importance of a multidisciplinary approach to pain management in the patients management regimen was emphasized and discussed in great detail.      Referrals:  None    Prescriptions Written This Visit:   None    Follow-up: For left T3 paravertebral block

## 2021-08-09 NOTE — PROGRESS NOTES
Chronic Pain/PM&R Clinic Note     Encounter Date: 8/9/21    Subjective:   Chief Complaint:   Chief Complaint   Patient presents with    Follow-up       History of Present Illness:   Ana Lance is a 49 y.o. female seen in the clinic initially on 08/09/21 for her history of left sided chest wall pain. She has a medical history of left breast mastectomy secondary to breast cancer with subsequent wound dehiscence and surgical debridement and spinal cord injury secondary to transverse myelitis. She has been dealing with left-sided chest wall pain at the site of her incision ever since her mastectomy and this is been exacerbated after most recent revision surgery in June 2021. She describes his pain is a constant stabbing, electrical type pain right on her incision scar. She rates this pain is a constant 6/10 pain that can get up to a 10/10. She denies any drainage or areas of erythema around the scar. She states that this area is so sensitive that is difficult for her to wear even a supportive bra. She reports not getting much relief with her other medications including Lyrica 150 mg twice a day, Norco 5 mg twice a day, and Elavil 50 mg at night. She states that this is interfering with her everyday activities and really interfering with sleep. History of Interventions:   Surgery: Left breast mastectomy and surgical debridement  Injections: None    Current Treatment Medications:   Lyrica 100 mg twice daily  Elavil 50 mg nightly  Norco 5 mg twice daily as needed    Imaging:  None    Past Medical History:   Diagnosis Date    Anxiety     Anxiety and depression     Asthma     [facility]     Bipolar 1 disorder with moderate sourav ([facility]     Blood circulation, collateral     Breast cancer ([facility]     diagnosed in jan 2021    Cancer [facility])      ?  cervical \"long time ago\"    Cancer ([facility] 01/15/2021    Left Breast    Depression     Endometriosis     GERD (gastroesophageal reflux disease)  Kidney stones     [facility]up[facility]     Movement disorder     MS (multiple sclerosis) (HCC)     Schizophrenia ([facility]     Thyroid disease     Type 2 diabetes mellitus without complication ([facility]     Unspecified cerebral artery occlusion with cerebral infarction 2014       Past Surgical History:   Procedure Laterality Date    BREAST LUMPECTOMY Left 02/19/2021    LEFT BREAST MASTECTOMY, SENTINAL LYMPH NODE BIOPSY, PREOP NEEDLE LOC performed by Linda Reilly MD at [place] [address] Left 4/6/2021    DEBRIDEMENT LEFT BREAST MASTECTOMY WOUND performed by Linda Reilly MD at [place] [address] Left 4/27/2021    LEFT BREAST WOUND REVISION performed by Linda Reilly MD at [address] [place] [address]  01/2020    DILATION AND CURETTAGE OF UTERUS      BEN US GUID NDL BIOPSY LEFT Left 01/14/2021    BEN US GUID NDL BIOPSY LEFT 1/14/2021 Francisco J Cristina MD [address] [place] [address] NERVE SURGERY N/A 01/26/2021    EXCHANGE OF INTERSTIM SYSTEM performed by Nancy Sepulveda MD at [place] [address]  03/23/2021    right breast i and d with closure Dr Keshawn Ramires in the procedure room    [place] OFFICE/OUTPT VISIT,PROCEDURE ONLY N/A 11/21/2018    INTERSTIM [facility], ONLY HEAD ELIGIBLE FOR MRI WITH TRANSMIT/RECEIVE HEAD COIL    TUBAL LIGATION      10 years ago    US GUIDED NEEDLE LOC OF LEFT BREAST Left 02/19/2021    US GUIDED NEEDLE LOC OF LEFT BREAST 2/19/2021 [address] LYMPH NODE BIOPSY  01/14/2021    US LYMPH NODE BIOPSY 1/14/2021 Francisco J Cristina MD [facility]       Family History   Problem Relation Age of Onset    Stroke Mother     Asthma Mother     Other Mother     No Known Problems Sister     Asthma Brother     No Known Problems Brother        Social History     Socioeconomic History    Marital status:      Spouse name: 4    Number of children: Not on file    Years of education: Not on file    Highest education level: Not on file Occupational History    Not on file   Tobacco Use    Smoking status: Current Every Day Smoker     Packs/day: 1.00     Types: Cigarettes     Start date: 12/6/1979    Smokeless tobacco: Never Used   Vaping Use    Vaping Use: Never used   Substance and Sexual Activity    Alcohol use: Yes     Alcohol/week: 0.0 standard drinks     Comment: social drinker    Drug use: No    Sexual activity: Never     Partners: Male   Other Topics Concern    Not on file   Social History Narrative    ** Merged History Encounter **          Social Determinants of Health     Financial Resource Strain:     Difficulty of Paying Living Expenses:    Food Insecurity:     Worried About Running Out of Food in the Last Year:     Ran Out of Food in the Last Year:    Transportation Needs:     Lack of Transportation (Medical):      Lack of Transportation (Non-Medical):    Physical Activity:     Days of Exercise per Week:     Minutes of Exercise per Session:    Stress:     Feeling of Stress :    Social Connections:     Frequency of Communication with Friends and Family:     Frequency of Social Gatherings with Friends and Family:     Attends Scientologist Services:     Active Member of Clubs or Organizations:     Attends Club or Organization Meetings:     Marital Status:    Intimate Partner Violence:     Fear of Current or Ex-Partner:     Emotionally Abused:     Physically Abused:     Sexually Abused:        Medications & Allergies:   Current Outpatient Medications   Medication Instructions    amitriptyline (ELAVIL) 50 mg, Oral, NIGHTLY    artificial tears (ARTIFICIAL TEARS) OINT PRN    baclofen (LIORESAL) 20 mg, Oral, 3 TIMES DAILY    Calcium Carbonate (CALCIUM 600 PO) 1 tablet, Oral, 2 TIMES DAILY    desvenlafaxine succinate (PRISTIQ) 50 mg, Oral, DAILY    HYDROcodone-acetaminophen (NORCO) 5-325 MG per tablet 1 tablet, Oral, 2 TIMES DAILY PRN    hydrOXYzine (VISTARIL) 50 mg, Oral, 3 TIMES DAILY PRN    Incontinence Supply Disposable (UNDERPADS) MISC Cloth chux pads<BR>Diagnosis: Paraplegia 344.1    Lactobacillus Acid-Pectin (ACIDOPHILUS/CITRUS PECTIN) TABS 1 tablet, Oral, DAILY    Lantus SoloStar 60 Units, Subcutaneous, NIGHTLY    Lifitegrast (XIIDRA) 5 % SOLN 1 drop, Both Eyes, DAILY    loratadine (CLARITIN) 10 MG tablet 1 tablet, Oral, DAILY PRN    metFORMIN (GLUCOPHAGE) 1,000 mg, Oral, 2 TIMES DAILY    Misc.  [facility]) [address] [facility] Wheelchair repairs- new seat cover, right side armrest replacement<BR><BR>Current body weight:  68 kg<BR>Diagnosis: gait disturbance, chronic incomplete spastic paraplegia<BR>Length of need: Lifetime    montelukast (SINGULAIR) 10 mg, Oral, NIGHTLY    MUCINEX MAXIMUM STRENGTH 1200 MG TB12 1 tablet, Oral, 2 TIMES DAILY    Multiple Vitamin (TAB-A-ISAAC PO) 1 tablet, Oral, DAILY    naloxone 4 MG/0.1ML LIQD nasal spray 1 spray, Nasal, PRN    omeprazole (PRILOSEC) 20 mg, Oral, DAILY    paliperidone (INVEGA) 3 mg, Oral, NIGHTLY    pregabalin (LYRICA) 150 mg, Oral, 2 TIMES DAILY    PROAIR  (90 BASE) MCG/ACT inhaler 2 puffs, Inhalation, EVERY 6 HOURS PRN    SITagliptin (JANUVIA) 100 mg, Oral, DAILY    tiotropium (SPIRIVA) 18 mcg, Inhalation, DAILY, 2 puffs     traZODone (DESYREL) 50 mg, Oral, NIGHTLY       Allergies   Allergen Reactions    Penicillins Shortness Of Breath     \"I almost \"    Seasonal Itching       Review of Systems:   Constitutional: negative for weight changes or fevers  Genitourinary: negative for bowel/bladder incontinence   Musculoskeletal: positive for left chest wall pain  Neurological:  Positive for numbness/tingling over left chest incision scar  Behavioral/Psych: negative for anxiety/depression   All other systems reviewed and are negative    Objective:     Vitals:    21 1421   BP: 138/70     Constitutional: Pleasant, no acute distress   Head: Normocephalic, atraumatic   Eyes: Conjunctivae normal   Neck: Supple, symmetrical   Respiration: Non-labored breathing   Cardiovascular: Limbs warm and well perfused   Musculoskeletal: Full cervical ROM in all planes. Negative Spurling maneuver bilaterally. Neuro: Alert, oriented. CN II-XII appear grossly intact. No focal motor deficits appreciated in upper limbs. Hyperesthesia and allodynia appreciated over surgical incision scar left chest wall  Skin: Healed surgical incision scar of left chest wall (near nipple line)  Psychological: Cooperative, no exaggerated pain behaviors       Assessment:    Diagnosis Orders   1. Post-mastectomy pain  CHG FLUOR NEEDLE/CATH SPINE/PARASPINAL DX/THER ADDON    RI INJECTION AA&/STRD OTHER PERIPHERAL NERVE/BRANCH   2. Chronic pain syndrome     3. Left-sided chest wall pain     4. Neuropathic pain           Asad Montez is a 49 y. o.female presenting to the pain clinic for evaluation of postmastectomy pain. Her clinical history of his examination is consistent with severe neuropathic pain secondary to her mastectomy surgery. I have set her up for a left T3 paravertebral block under fluoroscopy. We will continue her locations at this point and follow-up after her injection to determine next steps of therapy. Plan: The following treatment recommendations and plan were discussed in detail with Asad Montez. Imaging:   None    Analgesics: The patient is currently managing their pain with the use of Norco which is prescribed by Dee Petersen. We recommend that the patient follow the recommendation of the prescribing provider with regard to the above medication(s) and contact their prescribing provider for refills if needed. The side effect profile of long-term opioid use was discussed and recommended emphasis on multimodal strategies for pain management. Adjuvants: In light of the presence of a neuropathic component of pain, patient is advised to continue Lyrica 150 mg twice a day. Interventions:    With examination consistent with neuropathic pain from previous mastectomy, I set the patient up for a left T3 paravertebral block under fluoroscopy. Patient is agreement wants to proceed with this injection at next visit. Anticoagulation/NPO Recommendations:   Patient does not need to hold any medications prior to the procedure. Patient will need to be NPO x 8 hours prior to the procedure. We will start an IV prior to the procedure    Multidisciplinary Pain Management:   In the presence of complex, chronic, and multi-factorial pain, the importance of a multidisciplinary approach to pain management in the patients management regimen was emphasized and discussed in great detail. Referrals:  None    Prescriptions Written This Visit:   None    Follow-up: For left T3 paravertebral block    I spent 40 minutes with the patient and greater than 50% of this time was spent establishing care (patient new to me), discussing medication management for neuropathic pain, and discussing injection therapy to treat postmastectomy pain.       Lakesha Davies, DO  Interventional Pain Management/PM&R   [place]

## 2021-08-11 ENCOUNTER — HOSPITAL ENCOUNTER (OUTPATIENT)
Dept: RADIATION ONCOLOGY | Age: 56
Discharge: HOME | End: 2021-08-11
Attending: RADIOLOGY
Payer: COMMERCIAL

## 2021-08-11 ENCOUNTER — APPOINTMENT (OUTPATIENT)
Dept: RADIATION ONCOLOGY | Age: 56
End: 2021-08-11
Attending: RADIOLOGY
Payer: COMMERCIAL

## 2021-08-11 PROCEDURE — 77280 THER RAD SIMULAJ FIELD SMPL: CPT | Performed by: RADIOLOGY

## 2021-08-11 PROCEDURE — 77412 RADIATION TX DELIVERY LVL 3: CPT | Performed by: RADIOLOGY

## 2021-08-12 ENCOUNTER — CLINICAL DOCUMENTATION (OUTPATIENT)
Dept: RADIATION ONCOLOGY | Age: 56
End: 2021-08-12

## 2021-08-12 ENCOUNTER — HOSPITAL ENCOUNTER (OUTPATIENT)
Dept: RADIATION ONCOLOGY | Age: 56
Discharge: HOME | End: 2021-08-12
Attending: RADIOLOGY
Payer: COMMERCIAL

## 2021-08-12 PROCEDURE — 99215 OFFICE O/P EST HI 40 MIN: CPT | Performed by: NURSE PRACTITIONER

## 2021-08-12 PROCEDURE — 77387 GUIDANCE FOR RADJ TX DLVR: CPT | Performed by: RADIOLOGY

## 2021-08-12 PROCEDURE — 77412 RADIATION TX DELIVERY LVL 3: CPT | Performed by: RADIOLOGY

## 2021-08-12 PROCEDURE — 77427 RADIATION TX MANAGEMENT X5: CPT | Performed by: RADIOLOGY

## 2021-08-13 ENCOUNTER — HOSPITAL ENCOUNTER (OUTPATIENT)
Dept: RADIATION ONCOLOGY | Age: 56
Discharge: HOME | End: 2021-08-13
Attending: RADIOLOGY
Payer: COMMERCIAL

## 2021-08-13 PROCEDURE — 77412 RADIATION TX DELIVERY LVL 3: CPT | Performed by: RADIOLOGY

## 2021-08-13 PROCEDURE — 77387 GUIDANCE FOR RADJ TX DLVR: CPT | Performed by: RADIOLOGY

## 2021-08-16 ENCOUNTER — CLINICAL DOCUMENTATION (OUTPATIENT)
Dept: RADIATION ONCOLOGY | Age: 56
End: 2021-08-16

## 2021-08-17 ENCOUNTER — HOSPITAL ENCOUNTER (OUTPATIENT)
Dept: RADIATION ONCOLOGY | Age: 56
Discharge: HOME | End: 2021-08-17
Attending: RADIOLOGY
Payer: COMMERCIAL

## 2021-08-17 PROCEDURE — 77412 RADIATION TX DELIVERY LVL 3: CPT | Performed by: RADIOLOGY

## 2021-08-17 PROCEDURE — 77387 GUIDANCE FOR RADJ TX DLVR: CPT | Performed by: RADIOLOGY

## 2021-08-18 ENCOUNTER — HOSPITAL ENCOUNTER (OUTPATIENT)
Dept: RADIATION ONCOLOGY | Age: 56
Discharge: HOME | End: 2021-08-18
Attending: RADIOLOGY
Payer: COMMERCIAL

## 2021-08-18 PROCEDURE — 77387 GUIDANCE FOR RADJ TX DLVR: CPT | Performed by: RADIOLOGY

## 2021-08-18 PROCEDURE — 77412 RADIATION TX DELIVERY LVL 3: CPT | Performed by: RADIOLOGY

## 2021-08-19 ENCOUNTER — HOSPITAL ENCOUNTER (OUTPATIENT)
Dept: RADIATION ONCOLOGY | Age: 56
Discharge: HOME | End: 2021-08-19
Attending: RADIOLOGY
Payer: COMMERCIAL

## 2021-08-19 PROCEDURE — 77387 GUIDANCE FOR RADJ TX DLVR: CPT | Performed by: RADIOLOGY

## 2021-08-19 PROCEDURE — 77412 RADIATION TX DELIVERY LVL 3: CPT | Performed by: RADIOLOGY

## 2021-08-20 ENCOUNTER — HOSPITAL ENCOUNTER (OUTPATIENT)
Dept: RADIATION ONCOLOGY | Age: 56
Discharge: HOME | End: 2021-08-20
Attending: RADIOLOGY
Payer: COMMERCIAL

## 2021-08-20 PROCEDURE — 77387 GUIDANCE FOR RADJ TX DLVR: CPT | Performed by: RADIOLOGY

## 2021-08-20 PROCEDURE — 77412 RADIATION TX DELIVERY LVL 3: CPT | Performed by: RADIOLOGY

## 2021-08-23 ENCOUNTER — HOSPITAL ENCOUNTER (OUTPATIENT)
Dept: RADIATION ONCOLOGY | Age: 56
Discharge: HOME | End: 2021-08-23
Attending: RADIOLOGY
Payer: COMMERCIAL

## 2021-08-23 PROCEDURE — 77387 GUIDANCE FOR RADJ TX DLVR: CPT | Performed by: RADIOLOGY

## 2021-08-23 PROCEDURE — 77336 RADIATION PHYSICS CONSULT: CPT | Performed by: RADIOLOGY

## 2021-08-23 PROCEDURE — 77412 RADIATION TX DELIVERY LVL 3: CPT | Performed by: RADIOLOGY

## 2021-08-24 ENCOUNTER — HOSPITAL ENCOUNTER (OUTPATIENT)
Dept: RADIATION ONCOLOGY | Age: 56
Discharge: HOME | End: 2021-08-24
Attending: RADIOLOGY
Payer: COMMERCIAL

## 2021-08-24 PROCEDURE — 77387 GUIDANCE FOR RADJ TX DLVR: CPT | Performed by: RADIOLOGY

## 2021-08-24 PROCEDURE — 77412 RADIATION TX DELIVERY LVL 3: CPT | Performed by: RADIOLOGY

## 2021-08-25 ENCOUNTER — OFFICE VISIT (OUTPATIENT)
Dept: ONCOLOGY | Age: 56
End: 2021-08-25
Payer: COMMERCIAL

## 2021-08-25 ENCOUNTER — HOSPITAL ENCOUNTER (OUTPATIENT)
Dept: RADIATION ONCOLOGY | Age: 56
Discharge: HOME | End: 2021-08-25
Attending: RADIOLOGY
Payer: COMMERCIAL

## 2021-08-25 ENCOUNTER — HOSPITAL ENCOUNTER (OUTPATIENT)
Dept: INFUSION THERAPY | Age: 56
Discharge: HOME | End: 2021-08-25
Payer: COMMERCIAL

## 2021-08-25 VITALS
DIASTOLIC BLOOD PRESSURE: 67 MMHG | OXYGEN SATURATION: 97 % | RESPIRATION RATE: 18 BRPM | WEIGHT: 150 LBS | HEIGHT: 60 IN | HEART RATE: 94 BPM | BODY MASS INDEX: 29.45 KG/M2 | SYSTOLIC BLOOD PRESSURE: 108 MMHG

## 2021-08-25 DIAGNOSIS — C50.912 INVASIVE DUCTAL CARCINOMA OF LEFT BREAST (HCC): Primary | ICD-10-CM

## 2021-08-25 DIAGNOSIS — C77.3 BREAST CANCER METASTASIZED TO AXILLARY LYMPH NODE, LEFT (HCC): ICD-10-CM

## 2021-08-25 DIAGNOSIS — C50.912 BREAST CANCER METASTASIZED TO AXILLARY LYMPH NODE, LEFT (HCC): ICD-10-CM

## 2021-08-25 DIAGNOSIS — Z90.12 S/P LEFT MASTECTOMY: ICD-10-CM

## 2021-08-25 PROCEDURE — 99213 OFFICE O/P EST LOW 20 MIN: CPT | Performed by: PHYSICIAN ASSISTANT

## 2021-08-25 PROCEDURE — 77427 RADIATION TX MANAGEMENT X5: CPT | Performed by: RADIOLOGY

## 2021-08-25 PROCEDURE — G8417 CALC BMI ABV UP PARAM F/U: HCPCS | Performed by: PHYSICIAN ASSISTANT

## 2021-08-25 PROCEDURE — G9899 SCRN MAM PERF RSLTS DOC: HCPCS | Performed by: PHYSICIAN ASSISTANT

## 2021-08-25 PROCEDURE — 77387 GUIDANCE FOR RADJ TX DLVR: CPT | Performed by: RADIOLOGY

## 2021-08-25 PROCEDURE — 3017F COLORECTAL CA SCREEN DOC REV: CPT | Performed by: PHYSICIAN ASSISTANT

## 2021-08-25 PROCEDURE — 77412 RADIATION TX DELIVERY LVL 3: CPT | Performed by: RADIOLOGY

## 2021-08-25 PROCEDURE — 4004F PT TOBACCO SCREEN RCVD TLK: CPT | Performed by: PHYSICIAN ASSISTANT

## 2021-08-25 PROCEDURE — 99211 OFF/OP EST MAY X REQ PHY/QHP: CPT

## 2021-08-25 PROCEDURE — G8427 DOCREV CUR MEDS BY ELIG CLIN: HCPCS | Performed by: PHYSICIAN ASSISTANT

## 2021-08-25 RX ORDER — ATORVASTATIN CALCIUM 10 MG/1
TABLET, FILM COATED ORAL
Status: ON HOLD | COMMUNITY
Start: 2021-06-06 | End: 2022-02-20 | Stop reason: HOSPADM

## 2021-08-25 RX ORDER — PEN NEEDLE, DIABETIC 32GX 5/32"
NEEDLE, DISPOSABLE MISCELLANEOUS
COMMUNITY
Start: 2021-06-28

## 2021-08-25 RX ORDER — BACLOFEN 20 MG/1
TABLET ORAL
COMMUNITY
Start: 2021-07-27 | End: 2021-08-25

## 2021-08-25 NOTE — PROGRESS NOTES
[facility] [address] [facility], [address] [facility],[address]  [address] [address]  Dept: [phone]  Dept Fax: [phone]: [phone]      Visit Date:8/25/2021     Audrey Shepherd is a 49 y.o. female who presents today for:   Chief Complaint   Patient presents with    Follow-up     Invasive ductal carcinoma of left breast [facility])        HPI:   Audrey Shepherd is a 49 y.o. female followed by Dr. Marcie Su for left breast cancer. Per Dr. July Junior note on 6/30/2021: newly diagnosed left breast cancer. oJanna has multiple co morbidities including paraplegia secondary to transverse myelitis since 2014, she is wheelchair-bound. She gets Botox injections for contractures in her lower extremities. She has also multiple sclerosis, neurogenic bowel and bladder, bipolar disorder. She carries history of cervical cancer. Also has mild chronic thrombocytopenia going back to 2014.   Lelon Siemens presented for diagnostic breast imaging after she felt a mass in the left breast.  Mammogram on January 4, 2021 showed irregular high density mass in the left breast central to the nipple and numerous lymph nodes in the right axilla.  Ultrasounds of both axilla and the breast were performed and ultimately ultrasound-guided biopsies of the mass in the lymph node on the left.  On ultrasound of the right axilla there were no abnormalities seen and no abnormal lymph nodes.  Ultrasound imaging revealed a 2.2 x 2.2 x 2.9 cm mass in the left breast in the retroareolar area.  A single lymph node in the left axilla appeared to have a moderate suspicion for malignancy.  Ultrasound-guided biopsies were performed on January 14, 2021.  Pathology revealed a grade 1 well-differentiated invasive adenocarcinoma with mucinous features, the lymph node was negative for malignancy.    Breast cancer cells were estrogen receptor 100% positive, progesterone receptor 80% positive.  Ki-67 was 6%,  HER-2 by IHC was negative.    Sequently she met with the surgeon Dr. Audrey Ochoa and after discussing her surgical treatment options she decided to proceed with a left breast mastectomy and axillary lymph node excision. She had her surgery on February 19, 2021 final pathology report showed:   A.  Left sentinel lymph node, excision:    Metastatic carcinoma in 2 of 4 lymph nodes (2/4). B.  Left breast, mastectomy:    Mucinous micropapillary carcinoma, Lynn grade 2 (pT2, pN1a).  Lymphovascular space invasion is present.    Margins are negative.    C.  Left axillary lymph nodes, excision:    Two lymph nodes, negative for malignancy (0/2). Oncotype DX Breast Cancer Assay (RT-PCR) performed by [employer]   Breast Cancer Recurrence Score:   8    Her post mastectomy incision is complicated by seroma formation. The patient required aspiration. Interval History 8/25/2021:   The patient is here for follow-up evaluation. She was last seen by Dr. Sonya Corona on 6/30/2021 and referred to radiation oncology at that time. The patient is currently completing radiation therapy and has completed 10 out of 30 planned fractions. She states she is tolerating radiation well without significant side effects. She affirms mild soreness to the left mastectomy site, but denies any open wounds or drainage. She affirms mild numbness along the axilla which has not changed in surgery. She offers no complaints to the right breast or other changes since her last visit. PMH, SH, and FH:  I reviewed the patients medication list and allergy list as noted on the electronic medical record. The PMH, SH and FH were also reviewed as noted on the EMR. Review of Systems:   Review of Systems   Pertinent review of systems noted in HPI, all other ROS negative.    Objective:   Physical Exam   /67 (Site: Right Upper Arm, Position: Sitting, Cuff Size: Medium Adult)   Pulse 94   Resp 18   Ht 4' 11.5\" (1.511 m)   Wt 150 lb (68 kg) Comment: stated  SpO2 97%   BMI 29.79 kg/m² General appearance: No apparent distress, well developed, and cooperative. HEENT: Pupils equal, round, and reactive to light. Conjunctivae/corneas clear. Neck: Supple, with full range of motion. Trachea midline. Respiratory:  Normal respiratory effort. Clear to auscultation, bilaterally. No wheezes, rales or rhonchi. Cardiovascular: Regular rate and rhythm with normal S1/S2   Chest: S/P left mastectomy. Mastectomy scar well healed without open wounds or drainage. No edema noted. Mild erythema along radiation field. Abdomen: Soft, with active bowel sounds. Musculoskeletal: examined in wheelchair. Skin: Skin color, texture, turgor normal.  No visible rashes or lesions. Neurologic:  Neurovascularly intact without any focal sensory/motor deficits. Psychiatric: Alert and oriented      Imaging Studies and Labs:   CBC:   Lab Results   Component Value Date    WBC 2.9 (L) 04/01/2021    HGB 12.0 04/01/2021    HCT 35.7 (L) 04/01/2021    MCV 78.5 (L) 04/01/2021     (L) 04/01/2021     BMP:   Lab Results   Component Value Date     02/19/2021    K 4.2 02/19/2021     02/19/2021    CO2 28 02/19/2021    BUN 11 02/19/2021    CREATININE 0.3 02/19/2021    GLUCOSE 293 02/19/2021    CALCIUM 9.1 02/19/2021      LFT:   Lab Results   Component Value Date    ALT 17 01/07/2021    AST 27 01/07/2021    ALKPHOS 121 01/07/2021    BILITOT 0.7 01/07/2021         Assessment and Plan:   1. Stage IB (pT2, pN1a, cM0, G2, ER+, CO+, HER2-, Oncotype DX score: 8) left breast cancer)   S/P left breast mastectomy with axillary lymph nodes excision. Oncotype DX assay completed and patient has recurrence score of 8. As per Dr. Rylee Awad note: The RS for those with one to three positive nodes, using cutoffs as for those with node-negative disease, can be used to make decision about adjuvant chemotherapy. This approach is included in the ASCO guidelines and in the NCCN.  Rationale for this approach is that, in the modern era of more effective local therapy, and widespread application of adjuvant aromatase inhibitors, lymph node positivity might not confer the same degree of heightened risk as it once did. The SWOG  phase III study in women with hormone responsive, HER-2/maegan negative breast cancer with 1-3 axillary lymph nodes involvement showed no overall benefit from adjuvant chemotherapy for postmenopausal women with recurrence score between 0 and 25. In addition due to patient's multiple comorbidities she is poor candidate for systemic chemotherapy. The patient was recommended to proceed with postmastectomy radiation treatment. She is currently completing radiation per Dr. Rivka Krueger with total planned dose of 6000 cGy with current 10/30 fractions completed. -Return to clinic in 6 weeks with Dr. Yuriy Latham   -Discussed with the patient she will be recommended adjuvant hormonal therapy with aromatase inhibitor following completion of adjuvant radiation. RTC in 6 weeks. All patient questions answered. Pt voiced understanding. Patient agreed with treatment plan. Follow up as directed. Patient instructed to call for questions or concerns.           Electronically signed by   Lionel Pimentel PA-C

## 2021-08-30 NOTE — H&P
Today, patient presents for planned left T3 paravertebral block. This note is reflective of the patient's previous visit for evaluation. We will proceed with today's planned procedure. Since patient's last visit for evaluation, there have been no interval changes in medical history. Patient has no new numbness, weakness, or focal neurological deficit since evaluation. Patient has no contraindications to injection (no anticoagulation or recent antibiotic intake for active infections), and has a  present or is able to drive themselves (as discussed and cleared by physician). Allergies to latex, contrast dye, and steroid medications have been confirmed with the patient prior to the procedure. NPO necessity has been assessed and accepted based on procedure complexity. The risks and benefits of the procedure have been explained including but are not limited to infection, bleeding, paralysis, immediate post procedure weakness, and dizziness; the patient acknowledges understanding and desires to proceed with the procedure. Patient has signed consent for same procedure as discussed in previous clinic encounter. All other questions and concerns were addressed at bedside. See procedure note for full details. Post procedure Instructions: The patient was advised not to drive during the day of the procedure and not to engage in any significant decision making (unless otherwise states by physician). The patient was also advised to be cautious with walking/activity for 24 hours following today's visit and asked not to engage in over-exertion (unless otherwise states by physician). After this time, it is ok to resume pre-procedure level of activity. Patient advised to apply ice to site of injection in situations of pain and discomfort. Patient advised to not submerge site of injection during bath or pool activities for approximately 24 hours post-procedure.  Patient attested to understanding post procedure directions / restrictions. All other questions and concerns addressed before patient discharge in ambulatory fashion. Chronic Pain/PM&R Clinic Note     Encounter Date: 8/9/21    Subjective:   Chief Complaint:   No chief complaint on file. History of Present Illness:   Audrey Shepherd is a 49 y.o. female seen in the clinic initially on 08/30/21 for her history of left sided chest wall pain. She has a medical history of left breast mastectomy secondary to breast cancer with subsequent wound dehiscence and surgical debridement and spinal cord injury secondary to transverse myelitis. She has been dealing with left-sided chest wall pain at the site of her incision ever since her mastectomy and this is been exacerbated after most recent revision surgery in June 2021. She describes his pain is a constant stabbing, electrical type pain right on her incision scar. She rates this pain is a constant 6/10 pain that can get up to a 10/10. She denies any drainage or areas of erythema around the scar. She states that this area is so sensitive that is difficult for her to wear even a supportive bra. She reports not getting much relief with her other medications including Lyrica 150 mg twice a day, Norco 5 mg twice a day, and Elavil 50 mg at night. She states that this is interfering with her everyday activities and really interfering with sleep. History of Interventions:   Surgery: Left breast mastectomy and surgical debridement  Injections: None    Current Treatment Medications:   Lyrica 100 mg twice daily  Elavil 50 mg nightly  Norco 5 mg twice daily as needed    Imaging:  None    Past Medical History:   Diagnosis Date    Anxiety     Anxiety and depression     Asthma     [facility]ipolar [facility] disorder [facility]     Bipolar 1 disorder with moderate sourav ([address]     Blood circulation, collateral     Breast cancer ([facility]     diagnosed in jan 2021    Cancer [facility])      ?  cervical \"long time ago\"    Cancer ([facility] 01/15/2021    Left Breast    Depression     Endometriosis     GERD (gastroesophageal reflux disease)     Kidney stones     Lupus ([facility]     Movement disorder     MS (multiple sclerosis) ([facility]     Schizophrenia ([facility]     Thyroid disease     Type 2 diabetes mellitus without complication ([facility]     Unspecified cerebral artery occlusion with cerebral infarction 2014       Past Surgical History:   Procedure Laterality Date    BREAST LUMPECTOMY Left 02/19/2021    LEFT BREAST MASTECTOMY, SENTINAL LYMPH NODE BIOPSY, PREOP NEEDLE LOC performed by Alicia Reyes MD at [address] 4/6/2021    DEBRIDEMENT LEFT BREAST MASTECTOMY WOUND performed by Alicia Reyes MD at [address] [facility] 4/27/2021    LEFT BREAST WOUND REVISION performed by Alicia Reyes MD at [address] [place] [address]  01/2020    DILATION AND CURETTAGE OF UTERUS      BEN US GUID NDL BIOPSY LEFT Left 01/14/2021    BEN US GUID NDL BIOPSY LEFT 1/14/2021 Ramona Coleman MD [address] [facility] NERVE SURGERY N/A 01/26/2021    EXCHANGE OF INTERSTIM SYSTEM performed by Cindy Yin MD at [address]  03/23/2021    right breast i and d with closure Dr Ryan Paul in the procedure room    [place] OFFICE/OUTPT VISIT,PROCEDURE ONLY N/A 11/21/2018    INTERSTIM DEVICE PLACEMENT MODEL 3058, ONLY HEAD ELIGIBLE FOR MRI WITH TRANSMIT/RECEIVE HEAD COIL    TUBAL LIGATION      10 years ago    US GUIDED NEEDLE LOC OF LEFT BREAST Left 02/19/2021    US GUIDED NEEDLE LOC OF LEFT BREAST 2/19/2021 [address] LYMPH NODE BIOPSY  01/14/2021    US LYMPH NODE BIOPSY 1/14/2021 Ramona Coleman MD [facility]       Family History   Problem Relation Age of Onset    Stroke Mother     Asthma Mother     Other Mother     No Known Problems Sister     Asthma Brother     No Known Problems Brother        Social History     Socioeconomic History    Marital status:      Spouse name: 4    Number of children: Not on file    Years of education: Not on file    Highest education level: Not on file   Occupational History    Not on file   Tobacco Use    Smoking status: Current Every Day Smoker     Packs/day: 1.00     Types: Cigarettes     Start date: 12/6/1979    Smokeless tobacco: Never Used   Vaping Use    Vaping Use: Never used   Substance and Sexual Activity    Alcohol use: Yes     Alcohol/week: 0.0 standard drinks     Comment: social drinker    Drug use: No    Sexual activity: Never     Partners: Male   Other Topics Concern    Not on file   Social History Narrative    ** Merged History Encounter **          Social Determinants of Health     Financial Resource Strain:     Difficulty of Paying Living Expenses:    Food Insecurity:     Worried About Running Out of Food in the Last Year:     Ran Out of Food in the Last Year:    Transportation Needs:     Lack of Transportation (Medical):      Lack of Transportation (Non-Medical):    Physical Activity:     Days of Exercise per Week:     Minutes of Exercise per Session:    Stress:     Feeling of Stress :    Social Connections:     Frequency of Communication with Friends and Family:     Frequency of Social Gatherings with Friends and Family:     Attends [employer] Services:     Active Member of Clubs or Organizations:     Attends Club or Organization Meetings:     Marital Status:    Intimate Partner Violence:     Fear of Current or Ex-Partner:     Emotionally Abused:     Physically Abused:     Sexually Abused:        Medications & Allergies:   Current Outpatient Medications   Medication Instructions    amitriptyline (ELAVIL) 50 mg, Oral, NIGHTLY    artificial tears (ARTIFICIAL TEARS) OINT PRN    atorvastatin (LIPITOR) 10 MG tablet take 1 tablet by mouth once daily    baclofen (LIORESAL) 20 mg, Oral, 3 TIMES DAILY    BD PEN NEEDLE JACOBO 2ND GEN 32G X 4 MM MISC use as directed WITH INSULIN    Calcium Carbonate (CALCIUM 600 PO) 1 tablet, Oral, 2 TIMES DAILY    desvenlafaxine succinate (PRISTIQ) 50 mg, Oral, DAILY    hydrOXYzine (VISTARIL) 50 mg, Oral, 3 TIMES DAILY PRN    Incontinence Supply Disposable (UNDERPADS) MISC Cloth chux padsDiagnosis: Paraplegia 344.1    Lactobacillus Acid-Pectin (ACIDOPHILUS/CITRUS PECTIN) TABS 1 tablet, Oral, DAILY    Lantus SoloStar 60 Units, SubCUTAneous, NIGHTLY    Lifitegrast (XIIDRA) 5 % SOLN 1 drop, Both Eyes, DAILY    loratadine (CLARITIN) 10 MG tablet 1 tablet, Oral, DAILY PRN    metFORMIN (GLUCOPHAGE) 1,000 mg, Oral, 2 TIMES DAILY    Misc. Devices [facility]) [address] [facility] Wheelchair repairs- new seat cover, right side armrest replacementCurrent body weight:  68 kgDiagnosis: gait disturbance, chronic incomplete spastic paraplegiaLength of need: Lifetime    montelukast (SINGULAIR) 10 mg, Oral, NIGHTLY    MUCINEX MAXIMUM STRENGTH 1200 MG TB12 1 tablet, Oral, 2 TIMES DAILY    Multiple Vitamin (TAB-A-ISAAC PO) 1 tablet, Oral, DAILY    naloxone 4 MG/0.1ML LIQD nasal spray 1 spray, Nasal, PRN    omeprazole (PRILOSEC) 20 mg, Oral, DAILY    paliperidone (INVEGA) 3 mg, Oral, NIGHTLY    pregabalin (LYRICA) 150 mg, Oral, 2 TIMES DAILY    PROAIR  (90 BASE) MCG/ACT inhaler 2 puffs, Inhalation, EVERY 6 HOURS PRN    SITagliptin (JANUVIA) 100 mg, Oral, DAILY    tiotropium (SPIRIVA) 18 mcg, Inhalation, DAILY, 2 puffs     traZODone (DESYREL) 50 mg, Oral, NIGHTLY       Allergies   Allergen Reactions    Penicillins Shortness Of Breath     \"I almost \"    Seasonal Itching       Review of Systems:   Constitutional: negative for weight changes or fevers  Genitourinary: negative for bowel/bladder incontinence   Musculoskeletal: positive for left chest wall pain  Neurological:  Positive for numbness/tingling over left chest incision scar  Behavioral/Psych: negative for anxiety/depression   All other systems reviewed and are negative    Objective: There were no vitals filed for this visit.   Constitutional: Pleasant, no acute distress   Head: Normocephalic, atraumatic   Eyes: Conjunctivae normal   Neck: Supple, symmetrical   Respiration: Non-labored breathing   Cardiovascular: Limbs warm and well perfused   Musculoskeletal: Full cervical ROM in all planes. Negative Spurling maneuver bilaterally. Neuro: Alert, oriented. CN II-XII appear grossly intact. No focal motor deficits appreciated in upper limbs. Hyperesthesia and allodynia appreciated over surgical incision scar left chest wall  Skin: Healed surgical incision scar of left chest wall (near nipple line)  Psychological: Cooperative, no exaggerated pain behaviors       Assessment:    Diagnosis Orders   1. Post-mastectomy pain  CHG FLUOR NEEDLE/CATH SPINE/PARASPINAL DX/THER ADDON    MO INJECTION AA&/STRD OTHER PERIPHERAL NERVE/BRANCH   2. Chronic pain syndrome     3. Left-sided chest wall pain     4. Neuropathic pain           Memo Carballo is a 49 y. o.female presenting to the pain clinic for evaluation of postmastectomy pain. Her clinical history of his examination is consistent with severe neuropathic pain secondary to her mastectomy surgery. I have set her up for a left T3 paravertebral block under fluoroscopy. We will continue her locations at this point and follow-up after her injection to determine next steps of therapy. Plan: The following treatment recommendations and plan were discussed in detail with Memo Carballo. Imaging:   None    Analgesics: The patient is currently managing their pain with the use of Norco which is prescribed by Jocelyne Granados. We recommend that the patient follow the recommendation of the prescribing provider with regard to the above medication(s) and contact their prescribing provider for refills if needed. The side effect profile of long-term opioid use was discussed and recommended emphasis on multimodal strategies for pain management. Adjuvants:    In light of the presence of a neuropathic component of pain, patient is advised to continue Lyrica 150 mg twice a day. Interventions: With examination consistent with neuropathic pain from previous mastectomy, I set the patient up for a left T3 paravertebral block under fluoroscopy. Patient is agreement wants to proceed with this injection at next visit. Anticoagulation/NPO Recommendations:   Patient does not need to hold any medications prior to the procedure. Patient will need to be NPO x 8 hours prior to the procedure. We will start an IV prior to the procedure    Multidisciplinary Pain Management:   In the presence of complex, chronic, and multi-factorial pain, the importance of a multidisciplinary approach to pain management in the patients management regimen was emphasized and discussed in great detail. Referrals:  None    Prescriptions Written This Visit:   None    Follow-up: For left T3 paravertebral block    I spent 40 minutes with the patient and greater than 50% of this time was spent establishing care (patient new to me), discussing medication management for neuropathic pain, and discussing injection therapy to treat postmastectomy pain.       Monique Hills, DO  Interventional Pain Management/PM&R   [place]

## 2021-08-31 ENCOUNTER — APPOINTMENT (OUTPATIENT)
Dept: GENERAL RADIOLOGY | Age: 56
End: 2021-08-31
Attending: ANESTHESIOLOGY
Payer: COMMERCIAL

## 2021-08-31 ENCOUNTER — HOSPITAL ENCOUNTER (OUTPATIENT)
Age: 56
Setting detail: OUTPATIENT SURGERY
Discharge: HOME | End: 2021-08-31
Attending: ANESTHESIOLOGY | Admitting: ANESTHESIOLOGY
Payer: COMMERCIAL

## 2021-08-31 VITALS
RESPIRATION RATE: 18 BRPM | WEIGHT: 150 LBS | DIASTOLIC BLOOD PRESSURE: 74 MMHG | OXYGEN SATURATION: 96 % | TEMPERATURE: 98.7 F | BODY MASS INDEX: 29.45 KG/M2 | HEART RATE: 94 BPM | SYSTOLIC BLOOD PRESSURE: 135 MMHG | HEIGHT: 60 IN

## 2021-08-31 LAB — GLUCOSE BLD-MCNC: 185 MG/DL (ref 70–108)

## 2021-08-31 PROCEDURE — 99152 MOD SED SAME PHYS/QHP 5/>YRS: CPT | Performed by: ANESTHESIOLOGY

## 2021-08-31 PROCEDURE — 6360000004 HC RX CONTRAST MEDICATION: Performed by: ANESTHESIOLOGY

## 2021-08-31 PROCEDURE — 3209999900 FLUORO FOR SURGICAL PROCEDURES

## 2021-08-31 PROCEDURE — 3600000054 HC PAIN LEVEL 3 BASE: Performed by: ANESTHESIOLOGY

## 2021-08-31 PROCEDURE — 6360000002 HC RX W HCPCS: Performed by: ANESTHESIOLOGY

## 2021-08-31 PROCEDURE — 82948 REAGENT STRIP/BLOOD GLUCOSE: CPT

## 2021-08-31 PROCEDURE — 7100000011 HC PHASE II RECOVERY - ADDTL 15 MIN: Performed by: ANESTHESIOLOGY

## 2021-08-31 PROCEDURE — 7100000010 HC PHASE II RECOVERY - FIRST 15 MIN: Performed by: ANESTHESIOLOGY

## 2021-08-31 PROCEDURE — 2500000003 HC RX 250 WO HCPCS: Performed by: ANESTHESIOLOGY

## 2021-08-31 PROCEDURE — 64461 PVB THORACIC SINGLE INJ SITE: CPT | Performed by: ANESTHESIOLOGY

## 2021-08-31 RX ORDER — DEXAMETHASONE SODIUM PHOSPHATE 4 MG/ML
INJECTION, SOLUTION INTRA-ARTICULAR; INTRALESIONAL; INTRAMUSCULAR; INTRAVENOUS; SOFT TISSUE PRN
Status: DISCONTINUED | OUTPATIENT
Start: 2021-08-31 | End: 2021-08-31 | Stop reason: ALTCHOICE

## 2021-08-31 RX ORDER — MIDAZOLAM HYDROCHLORIDE 1 MG/ML
INJECTION INTRAMUSCULAR; INTRAVENOUS PRN
Status: DISCONTINUED | OUTPATIENT
Start: 2021-08-31 | End: 2021-08-31 | Stop reason: ALTCHOICE

## 2021-08-31 RX ORDER — LIDOCAINE HYDROCHLORIDE 10 MG/ML
INJECTION, SOLUTION INFILTRATION; PERINEURAL PRN
Status: DISCONTINUED | OUTPATIENT
Start: 2021-08-31 | End: 2021-08-31 | Stop reason: ALTCHOICE

## 2021-08-31 RX ORDER — FENTANYL CITRATE 50 UG/ML
INJECTION, SOLUTION INTRAMUSCULAR; INTRAVENOUS PRN
Status: DISCONTINUED | OUTPATIENT
Start: 2021-08-31 | End: 2021-08-31 | Stop reason: ALTCHOICE

## 2021-08-31 RX ORDER — BUPIVACAINE HYDROCHLORIDE 5 MG/ML
INJECTION, SOLUTION PERINEURAL PRN
Status: DISCONTINUED | OUTPATIENT
Start: 2021-08-31 | End: 2021-08-31 | Stop reason: ALTCHOICE

## 2021-08-31 ASSESSMENT — PAIN - FUNCTIONAL ASSESSMENT: PAIN_FUNCTIONAL_ASSESSMENT: 0-10

## 2021-08-31 ASSESSMENT — PAIN DESCRIPTION - DESCRIPTORS: DESCRIPTORS: ACHING

## 2021-08-31 NOTE — PROGRESS NOTES
Resting quietly in bed with side rails up x 2 and call light in reach. Updated on plan of care and discharge and voiced understanding.

## 2021-08-31 NOTE — PRE SEDATION
[address] [place] [address]  Pre-Sedation/Analgesia History & Physical    Pt Name: Jassi Kelly  MRN: [id number]  YOB: 1965  Provider Performing Procedure: Ileana Yeboah DO   Primary Care Physician: Charissa Cummings MD      MEDICAL HISTORY       has a past medical history of Anxiety, Anxiety and depression, Asthma, Bipolar 1 disorder ([address] Bipolar 1 disorder with moderate sourav ([address] Blood circulation, collateral, Breast cancer ([address] Cancer ([address] Cancer ([address] Depression, Endometriosis, GERD (gastroesophageal reflux disease), Kidney stones, Lupus ([address] Movement disorder, MS (multiple sclerosis) ([address] Schizophrenia ([address] Thyroid disease, Type 2 diabetes mellitus without complication ([address] and Unspecified cerebral artery occlusion with cerebral infarction. SURGICAL HISTORY   has a past surgical history that includes Tubal ligation; Colonoscopy (01/2020); Dilation and curettage of uterus; pr office/outpt visit,procedure only (N/A, 11/21/2018); [facility] US GUIDED BREAST BIOPSY W LOC DEVICE 1ST LESION LEFT (Left, 01/14/2021); US BIOPSY LYMPH NODE (01/14/2021); Nerve Surgery (N/A, 01/26/2021); US PLACE BREAST LOC DEVICE 1ST LESION LEFT (Left, 02/19/2021); Breast lumpectomy (Left, 02/19/2021); other surgical history (03/23/2021); Breast surgery (Left, 4/6/2021); and Breast surgery (Left, 4/27/2021). ALLERGIES   Allergies as of 08/09/2021 - Fully Reviewed 08/09/2021   Allergen Reaction Noted    Penicillins Shortness Of Breath 12/16/2013    Seasonal Itching 02/03/2015       MEDICATIONS   Prior to Admission medications    Medication Sig Start Date End Date Taking?  Authorizing Provider   atorvastatin (LIPITOR) 10 MG tablet take 1 tablet by mouth once daily 6/6/21  Yes Historical Provider, MD   Lactobacillus Acid-Pectin (ACIDOPHILUS/CITRUS PECTIN) TABS take 1 tablet by mouth daily 7/28/21  Yes Bebo Brautigam, APRN - CNP   baclofen (LIORESAL) 10 MG tablet Take 2 tablets by mouth 3 times daily 4/8/21  Yes CHAS Huertas CNP   amitriptyline (ELAVIL) 25 MG tablet Take 2 tablets by mouth nightly 4/8/21  Yes CHAS Huertas CNP   pregabalin (LYRICA) 150 MG capsule Take 1 capsule by mouth 2 times daily for 180 days. 3/18/21 9/14/21 Yes CHAS Flores CNP   metFORMIN (GLUCOPHAGE) 1000 MG tablet Take 1,000 mg by mouth 2 times daily 12/7/20  Yes Historical Provider, MD   desvenlafaxine succinate (PRISTIQ) 50 MG TB24 extended release tablet Take 50 mg by mouth daily 12/16/20  Yes Historical Provider, MD   traZODone (DESYREL) 50 MG tablet Take 50 mg by mouth nightly 1/10/21  Yes Historical Provider, MD   paliperidone (INVEGA) 3 MG extended release tablet Take 3 mg by mouth nightly   Yes Historical Provider, MD   SITagliptin (JANUVIA) 100 MG tablet Take 100 mg by mouth daily   Yes Historical Provider, MD   Lifitegrast (XIIDRA) 5 % SOLN Place 1 drop into both eyes daily   Yes Historical Provider, MD   tiotropium (SPIRIVA) 18 MCG inhalation capsule Inhale 18 mcg into the lungs daily 2 puffs   Yes Historical Provider, MD   insulin glargine (LANTUS SOLOSTAR) 100 UNIT/ML injection pen Inject 60 Units into the skin nightly    Yes Historical Provider, MD   artificial tears (ARTIFICIAL TEARS) OINT as needed   Yes Historical Provider, MD   Calcium Carbonate (CALCIUM 600 PO) Take 1 tablet by mouth 2 times daily   Yes Historical Provider, MD   Multiple Vitamin (TAB-A-ISAAC PO) Take 1 tablet by mouth daily   Yes Historical Provider, MD   loratadine (CLARITIN) 10 MG tablet Take 1 tablet by mouth daily as needed. 2/24/15  Yes Historical Provider, MD   PROAIR  (90 BASE) MCG/ACT inhaler Inhale 2 puffs into the lungs every 6 hours as needed. 1/6/15  Yes Historical Provider, MD   hydrOXYzine (VISTARIL) 50 MG capsule Take 50 mg by mouth 3 times daily as needed for Itching. Yes Historical Provider, MD   omeprazole (PRILOSEC) 20 MG capsule Take 20 mg by mouth daily.    Yes Historical Provider, MD   BD PEN NEEDLE JACOBO 2ND GEN 32G X 4 MM MISC use as directed WITH INSULIN 6/28/21   Historical Provider, MD   naloxone 4 MG/0.1ML LIQD nasal spray 1 spray by Nasal route as needed for Opioid Reversal  Patient not taking: Reported on 8/25/2021 4/8/21   Sheryl CHAS Curran CNP   MUCINEX MAXIMUM STRENGTH 1200 MG TB12 Take 1 tablet by mouth 2 times daily 1/11/21   Historical Provider, MD   Misc. Devices [facility]) [address] [facility] Wheelchair repairs- new seat cover, right side armrest replacement    Current body weight:  68 kg  Diagnosis: gait disturbance, chronic incomplete spastic paraplegia  Length of need: Lifetime 10/22/20   CHAS Worley CNP   montelukast (SINGULAIR) 10 MG tablet Take 10 mg by mouth nightly  10/26/16   Historical Provider, MD   Incontinence Supply Disposable (UNDERPADS) MISC Cloth chux pads  Diagnosis: Paraplegia 344.1 3/10/15   Darren Durán MD     PHYSICAL:   Vitals:    08/31/21 1021   BP: 135/74   Pulse: 94   Resp: 18   Temp: 98.7 °F (37.1 °C)   SpO2: 96%     PLANNED PROCEDURE   See procedure note  SEDATION  Planned agent: Versed and Fentanyl  ASA Classification: 1  Class 1: A normal healthy patient  Class 2: Pt with mild to moderate systemic disease  Class 3: Severe systemic disease or disturbance  Class 4: Severe systemic disorders that are already life threatening. Class 5: Moribund pt with little chances of survival, for more than 24 hours. Mallampati I Airway Classification: 1    1. Pre-procedure diagnostic studies complete and results available. 2. Previous sedation/anesthesia experiences assessed. 3. The patient is an appropriate candidate to undergo the planned procedure sedation and anesthesia. (Refer to nursing sedation/analgesia documentation record)  4. Formulation and discussion of sedation/procedure plan, risks, and expectations with patient and/or responsible adult completed. 5. Patient examined immediately prior to the procedure.  (Refer to nursing sedation/analgesia documentation record)    Kennedi Gasca DO  Electronically signed 8/31/2021 at 12:10 PM

## 2021-08-31 NOTE — PROCEDURES
Pre-operative Diagnosis: Thoracic back pain/Thoracic chest wall pain     Post-operative Diagnosis: Thoracic back pain/Thoracic chest wall pain     Procedure: Left T3 paravertebral block     After having obtained a signed informed consent, the patient was taken to the fluoroscopy suite and placed in the prone position. The thoracic back was prepped with chlorhexadine and was draped in a sterile fashion. A total of 1 cc of 1% lidocaine was used to anesthetize the skin and underlying tissues. Under fluoroscopic guidance the LEFT T3 transverse process was identified. Then a single 22 gauge 3.5 inch spinal needle was advanced to lie in the T3/T4 interspace below the costotransverse ligament and above the lung pleura. After negative aspiration, 0.5 cc of Omnipaque 300 was injected to rule out intra-vascular uptake. Then, a total of 10 mg of dexamethasone with 2 cc of 0.5% bupivacaine as injected. The needle was withdrawn without any complication. The patient tolerated the procedure well and was transported to the recovery room where she was observed for 15 minutes to then be discharged in ambulatory fashion.      Complications: None  Estimated Blood Loss: 0 mL       IV sedation was used during the procedure:  - Moderate intravenous conscious sedation was supervised by Dr. Arjun Juarez  - The patient was independently monitored by a Registered Nurse assigned to the procedure room  - Monitoring included automated blood pressure, continuous EKG, and continuous pulse oximetry  - The detailed conscious record is permanently stored in the [place] [address]  - The following is the conscious sedation record:  Start Time: 10:07  End Time : 10:22  Duration: 15 minutes   Medications Administered: 1 mg Versed, 50 mcg Fentanyl  Mayito Lloyd DO  Interventional Pain Management/PM&R   [place]

## 2021-08-31 NOTE — PROGRESS NOTES
1020- Pt to PACU phase 2 Elvia at bedside hooking up pt  1021- Pt VSS, pt breathing deeply on room air, Dr Kahlil Valencia at bedside. 1024- Snack given pt sat up a bit,  1037- IV removed  1038- Ride called no answer. 46- Marlyn back here was in waiting room. Pt needs to call transport company ride. 1048- Pt discharged taken to car in her wheelchair pt going with [facility], Joanie Galindo will meet her at the house.

## 2021-08-31 NOTE — PROGRESS NOTES
Pt has chronic weakness bilateral lower legs with right side weaker than left. Bilateral hand  strong. Left pedal push/pull and leg lift strong. Right side weak.

## 2021-08-31 NOTE — POST SEDATION
[address] [address]  Sedation/Analgesia Post Sedation Record    Pt Name: Phillip Gregory  MRN: [id number]  YOB: 1965  Procedure Performed By: Zelalem Abdullahi DO  Primary Care Physician: Jelena Dumont MD    POST-PROCEDURE    Physicians/Assistants: Zelalem Abdullahi DO  Procedure Performed: See Procedure Note   Sedation/Anesthesia: Versed and Fentanyl (See procedure note for amount and duration)  Estimated Blood Loss:     0  ml  Specimens Removed: None        Complications: None           Mayito Jackson DO  Electronically signed 8/31/2021 at 12:10 PM

## 2021-09-01 ENCOUNTER — HOSPITAL ENCOUNTER (OUTPATIENT)
Dept: RADIATION ONCOLOGY | Age: 56
Discharge: HOME | End: 2021-09-01
Attending: RADIOLOGY
Payer: COMMERCIAL

## 2021-09-01 PROCEDURE — 77387 GUIDANCE FOR RADJ TX DLVR: CPT | Performed by: RADIOLOGY

## 2021-09-01 PROCEDURE — 77412 RADIATION TX DELIVERY LVL 3: CPT | Performed by: RADIOLOGY

## 2021-09-02 ENCOUNTER — HOSPITAL ENCOUNTER (OUTPATIENT)
Dept: RADIATION ONCOLOGY | Age: 56
Discharge: HOME | End: 2021-09-02
Attending: RADIOLOGY
Payer: COMMERCIAL

## 2021-09-02 PROCEDURE — 77387 GUIDANCE FOR RADJ TX DLVR: CPT | Performed by: RADIOLOGY

## 2021-09-02 PROCEDURE — 77412 RADIATION TX DELIVERY LVL 3: CPT | Performed by: RADIOLOGY

## 2021-09-03 ENCOUNTER — NURSE ONLY (OUTPATIENT)
Dept: UROLOGY | Age: 56
End: 2021-09-03

## 2021-09-03 ENCOUNTER — HOSPITAL ENCOUNTER (OUTPATIENT)
Dept: RADIATION ONCOLOGY | Age: 56
Discharge: HOME | End: 2021-09-03
Attending: RADIOLOGY
Payer: COMMERCIAL

## 2021-09-03 DIAGNOSIS — N31.9 NEUROGENIC BLADDER: ICD-10-CM

## 2021-09-03 PROCEDURE — 99999 PR OFFICE/OUTPT VISIT,PROCEDURE ONLY: CPT | Performed by: UROLOGY

## 2021-09-03 PROCEDURE — 77387 GUIDANCE FOR RADJ TX DLVR: CPT | Performed by: RADIOLOGY

## 2021-09-03 PROCEDURE — 77412 RADIATION TX DELIVERY LVL 3: CPT | Performed by: RADIOLOGY

## 2021-09-03 NOTE — PROGRESS NOTES
Patient is here for an interstim check with the representative [place]. Patient re-educated on how to charge IPG. IPG completely depleted. Has not charged since implant in January. Instruction given to patient.

## 2021-09-07 ENCOUNTER — HOSPITAL ENCOUNTER (OUTPATIENT)
Dept: RADIATION ONCOLOGY | Age: 56
Discharge: HOME | End: 2021-09-07
Attending: RADIOLOGY
Payer: COMMERCIAL

## 2021-09-07 PROCEDURE — 77336 RADIATION PHYSICS CONSULT: CPT | Performed by: RADIOLOGY

## 2021-09-07 PROCEDURE — 77387 GUIDANCE FOR RADJ TX DLVR: CPT | Performed by: RADIOLOGY

## 2021-09-07 PROCEDURE — 77412 RADIATION TX DELIVERY LVL 3: CPT | Performed by: RADIOLOGY

## 2021-09-08 ENCOUNTER — HOSPITAL ENCOUNTER (OUTPATIENT)
Dept: RADIATION ONCOLOGY | Age: 56
Discharge: HOME | End: 2021-09-08
Attending: RADIOLOGY
Payer: COMMERCIAL

## 2021-09-08 PROCEDURE — 77412 RADIATION TX DELIVERY LVL 3: CPT | Performed by: RADIOLOGY

## 2021-09-08 PROCEDURE — 77427 RADIATION TX MANAGEMENT X5: CPT | Performed by: RADIOLOGY

## 2021-09-08 PROCEDURE — 77334 RADIATION TREATMENT AID(S): CPT | Performed by: RADIOLOGY

## 2021-09-08 PROCEDURE — 77387 GUIDANCE FOR RADJ TX DLVR: CPT | Performed by: RADIOLOGY

## 2021-09-08 PROCEDURE — 99999 PR OFFICE/OUTPT VISIT,PROCEDURE ONLY: CPT | Performed by: RADIOLOGY

## 2021-09-08 PROCEDURE — 77290 THER RAD SIMULAJ FIELD CPLX: CPT | Performed by: RADIOLOGY

## 2021-09-09 ENCOUNTER — HOSPITAL ENCOUNTER (OUTPATIENT)
Dept: RADIATION ONCOLOGY | Age: 56
Discharge: HOME | End: 2021-09-09
Attending: RADIOLOGY
Payer: COMMERCIAL

## 2021-09-09 PROCEDURE — 77412 RADIATION TX DELIVERY LVL 3: CPT | Performed by: RADIOLOGY

## 2021-09-09 PROCEDURE — 77387 GUIDANCE FOR RADJ TX DLVR: CPT | Performed by: RADIOLOGY

## 2021-09-10 ENCOUNTER — HOSPITAL ENCOUNTER (OUTPATIENT)
Dept: RADIATION ONCOLOGY | Age: 56
Discharge: HOME | End: 2021-09-10
Attending: RADIOLOGY
Payer: COMMERCIAL

## 2021-09-10 PROCEDURE — 77387 GUIDANCE FOR RADJ TX DLVR: CPT | Performed by: RADIOLOGY

## 2021-09-10 PROCEDURE — 77412 RADIATION TX DELIVERY LVL 3: CPT | Performed by: RADIOLOGY

## 2021-09-13 ENCOUNTER — APPOINTMENT (OUTPATIENT)
Dept: RADIATION ONCOLOGY | Age: 56
End: 2021-09-13
Attending: RADIOLOGY
Payer: COMMERCIAL

## 2021-09-13 PROCEDURE — 77336 RADIATION PHYSICS CONSULT: CPT | Performed by: RADIOLOGY

## 2021-09-14 ENCOUNTER — HOSPITAL ENCOUNTER (OUTPATIENT)
Dept: RADIATION ONCOLOGY | Age: 56
Discharge: HOME | End: 2021-09-14
Attending: RADIOLOGY
Payer: COMMERCIAL

## 2021-09-14 PROCEDURE — 77387 GUIDANCE FOR RADJ TX DLVR: CPT | Performed by: RADIOLOGY

## 2021-09-14 PROCEDURE — 77412 RADIATION TX DELIVERY LVL 3: CPT | Performed by: RADIOLOGY

## 2021-09-15 ENCOUNTER — HOSPITAL ENCOUNTER (OUTPATIENT)
Dept: RADIATION ONCOLOGY | Age: 56
Discharge: HOME | End: 2021-09-15
Attending: RADIOLOGY
Payer: COMMERCIAL

## 2021-09-15 PROCEDURE — 77412 RADIATION TX DELIVERY LVL 3: CPT | Performed by: RADIOLOGY

## 2021-09-15 PROCEDURE — 77387 GUIDANCE FOR RADJ TX DLVR: CPT | Performed by: RADIOLOGY

## 2021-09-16 ENCOUNTER — HOSPITAL ENCOUNTER (OUTPATIENT)
Dept: RADIATION ONCOLOGY | Age: 56
Discharge: HOME | End: 2021-09-16
Attending: RADIOLOGY
Payer: COMMERCIAL

## 2021-09-16 PROCEDURE — 77387 GUIDANCE FOR RADJ TX DLVR: CPT | Performed by: RADIOLOGY

## 2021-09-16 PROCEDURE — 77427 RADIATION TX MANAGEMENT X5: CPT | Performed by: RADIOLOGY

## 2021-09-16 PROCEDURE — 77412 RADIATION TX DELIVERY LVL 3: CPT | Performed by: RADIOLOGY

## 2021-09-17 ENCOUNTER — HOSPITAL ENCOUNTER (OUTPATIENT)
Dept: RADIATION ONCOLOGY | Age: 56
Discharge: HOME | End: 2021-09-17
Attending: RADIOLOGY
Payer: COMMERCIAL

## 2021-09-17 PROCEDURE — 77412 RADIATION TX DELIVERY LVL 3: CPT | Performed by: RADIOLOGY

## 2021-09-17 PROCEDURE — 77321 SPECIAL TELETX PORT PLAN: CPT | Performed by: RADIOLOGY

## 2021-09-17 PROCEDURE — 77334 RADIATION TREATMENT AID(S): CPT | Performed by: RADIOLOGY

## 2021-09-17 PROCEDURE — 77387 GUIDANCE FOR RADJ TX DLVR: CPT | Performed by: RADIOLOGY

## 2021-09-20 ENCOUNTER — TELEPHONE (OUTPATIENT)
Dept: PHYSICAL MEDICINE AND REHAB | Age: 56
End: 2021-09-20

## 2021-09-20 ENCOUNTER — APPOINTMENT (OUTPATIENT)
Dept: RADIATION ONCOLOGY | Age: 56
End: 2021-09-20
Attending: RADIOLOGY
Payer: COMMERCIAL

## 2021-09-20 DIAGNOSIS — G95.9 MYELOPATHY (HCC): ICD-10-CM

## 2021-09-20 DIAGNOSIS — G95.11 SPINAL CORD INFARCTION (HCC): ICD-10-CM

## 2021-09-20 DIAGNOSIS — G35 MULTIPLE SCLEROSIS (HCC): ICD-10-CM

## 2021-09-20 DIAGNOSIS — G37.3 TRANSVERSE MYELITIS (HCC): ICD-10-CM

## 2021-09-20 DIAGNOSIS — R29.898 LEG WEAKNESS, BILATERAL: Primary | ICD-10-CM

## 2021-09-20 DIAGNOSIS — G82.20 PARAPLEGIA (HCC): ICD-10-CM

## 2021-09-20 PROCEDURE — 77336 RADIATION PHYSICS CONSULT: CPT | Performed by: RADIOLOGY

## 2021-09-20 RX ORDER — L. ACIDOPHILUS/PECTIN, CITRUS 25MM-100MG
1 TABLET ORAL DAILY
Qty: 30 TABLET | Refills: 0 | Status: SHIPPED | OUTPATIENT
Start: 2021-09-20 | End: 2021-10-28 | Stop reason: SDUPTHER

## 2021-09-20 NOTE — TELEPHONE ENCOUNTER
Lionel Freire called requesting a refill on the following medications:  Requested Prescriptions     Pending Prescriptions Disp Refills    Lactobacillus Acid-Pectin (ACIDOPHILUS/CITRUS PECTIN) TABS 30 tablet 0     Sig: Take 1 tablet by mouth daily     Pharmacy verified: [facility] on [address]  [address]      Date of last visit: 6/09/2021  Date of next visit (if applicable): 14/99/4968

## 2021-09-20 NOTE — TELEPHONE ENCOUNTER
Patient is calling regarding a request for orders for a new shower chair/bench. Her current one has broken. She stated that she called a couple of weeks ago regarding this and wanted to f/u. I could not find any previous encounters regarding this.   Please send orders to [facility] on [address]    Please advise pt # [phone]

## 2021-09-21 ENCOUNTER — APPOINTMENT (OUTPATIENT)
Dept: RADIATION ONCOLOGY | Age: 56
End: 2021-09-21
Attending: RADIOLOGY
Payer: COMMERCIAL

## 2021-09-21 NOTE — TELEPHONE ENCOUNTER
Shower chair order faxed to [facility] ([facility]) medical supply

## 2021-09-22 ENCOUNTER — APPOINTMENT (OUTPATIENT)
Dept: RADIATION ONCOLOGY | Age: 56
End: 2021-09-22
Attending: RADIOLOGY
Payer: COMMERCIAL

## 2021-09-23 ENCOUNTER — APPOINTMENT (OUTPATIENT)
Dept: RADIATION ONCOLOGY | Age: 56
End: 2021-09-23
Attending: RADIOLOGY
Payer: COMMERCIAL

## 2021-09-24 ENCOUNTER — APPOINTMENT (OUTPATIENT)
Dept: RADIATION ONCOLOGY | Age: 56
End: 2021-09-24
Attending: RADIOLOGY
Payer: COMMERCIAL

## 2021-09-26 DIAGNOSIS — G89.29 OTHER CHRONIC PAIN: ICD-10-CM

## 2021-09-27 ENCOUNTER — HOSPITAL ENCOUNTER (OUTPATIENT)
Dept: RADIATION ONCOLOGY | Age: 56
Discharge: HOME | End: 2021-09-27
Attending: RADIOLOGY
Payer: COMMERCIAL

## 2021-09-27 PROCEDURE — 77412 RADIATION TX DELIVERY LVL 3: CPT | Performed by: RADIOLOGY

## 2021-09-27 PROCEDURE — 77387 GUIDANCE FOR RADJ TX DLVR: CPT | Performed by: RADIOLOGY

## 2021-09-27 RX ORDER — PREGABALIN 150 MG/1
CAPSULE ORAL
Qty: 60 CAPSULE | Refills: 2 | Status: SHIPPED | OUTPATIENT
Start: 2021-09-27 | End: 2022-01-17 | Stop reason: SDUPTHER

## 2021-09-27 NOTE — TELEPHONE ENCOUNTER
OARRS reviewed. UDS: + for Pregabalin, Amitriptyline, Nortriptyline, Cotinine. Last seen: 8/9/21.  Follow-up: 11/4/21

## 2021-09-28 ENCOUNTER — HOSPITAL ENCOUNTER (OUTPATIENT)
Dept: RADIATION ONCOLOGY | Age: 56
Discharge: HOME | End: 2021-09-28
Attending: RADIOLOGY
Payer: COMMERCIAL

## 2021-09-28 DIAGNOSIS — G82.22 CHRONIC INCOMPLETE SPASTIC PARAPLEGIA (HCC): ICD-10-CM

## 2021-09-28 DIAGNOSIS — G35 MULTIPLE SCLEROSIS (HCC): ICD-10-CM

## 2021-09-28 DIAGNOSIS — G89.4 CHRONIC PAIN SYNDROME: ICD-10-CM

## 2021-09-28 PROCEDURE — 77412 RADIATION TX DELIVERY LVL 3: CPT | Performed by: RADIOLOGY

## 2021-09-28 PROCEDURE — 77387 GUIDANCE FOR RADJ TX DLVR: CPT | Performed by: RADIOLOGY

## 2021-09-28 NOTE — TELEPHONE ENCOUNTER
Audrey Shepherd called requesting a refill on the following medications:  Requested Prescriptions     Pending Prescriptions Disp Refills    HYDROcodone-acetaminophen (NORCO) 5-325 MG per tablet 60 tablet 0     Sig: Take 1 tablet by mouth 2 times daily as needed for Pain for up to 30 days. Pharmacy verified: [facility], [address]  ++++ pt states that this medication doesn't help much.   She thinks if she took it more often, or if the strength is increased, she'd get more relief      Date of last visit: 8/9/21  Date of next visit (if applicable): 93/5/8461

## 2021-09-29 ENCOUNTER — APPOINTMENT (OUTPATIENT)
Dept: RADIATION ONCOLOGY | Age: 56
End: 2021-09-29
Attending: RADIOLOGY
Payer: COMMERCIAL

## 2021-09-29 RX ORDER — HYDROCODONE BITARTRATE AND ACETAMINOPHEN 5; 325 MG/1; MG/1
1 TABLET ORAL 2 TIMES DAILY PRN
Qty: 60 TABLET | Refills: 0 | Status: SHIPPED | OUTPATIENT
Start: 2021-09-29 | End: 2021-10-27 | Stop reason: SDUPTHER

## 2021-09-29 NOTE — TELEPHONE ENCOUNTER
OARRS reviewed. UDS: + for Pregabalin, Amitriptyline, Nortriptyline, Cotinine. Last seen: 8/9/2021.  Follow-up: 11/4/21

## 2021-09-30 ENCOUNTER — HOSPITAL ENCOUNTER (OUTPATIENT)
Dept: RADIATION ONCOLOGY | Age: 56
Discharge: HOME | End: 2021-09-30
Attending: RADIOLOGY
Payer: COMMERCIAL

## 2021-09-30 PROCEDURE — 77387 GUIDANCE FOR RADJ TX DLVR: CPT | Performed by: RADIOLOGY

## 2021-09-30 PROCEDURE — 77412 RADIATION TX DELIVERY LVL 3: CPT | Performed by: RADIOLOGY

## 2021-10-01 ENCOUNTER — HOSPITAL ENCOUNTER (OUTPATIENT)
Dept: RADIATION ONCOLOGY | Age: 56
Discharge: HOME | End: 2021-10-01
Attending: RADIOLOGY
Payer: COMMERCIAL

## 2021-10-01 PROCEDURE — 77387 GUIDANCE FOR RADJ TX DLVR: CPT | Performed by: RADIOLOGY

## 2021-10-01 PROCEDURE — 77427 RADIATION TX MANAGEMENT X5: CPT | Performed by: RADIOLOGY

## 2021-10-01 PROCEDURE — 77412 RADIATION TX DELIVERY LVL 3: CPT | Performed by: RADIOLOGY

## 2021-10-04 ENCOUNTER — HOSPITAL ENCOUNTER (OUTPATIENT)
Dept: RADIATION ONCOLOGY | Age: 56
Discharge: HOME | End: 2021-10-04
Attending: RADIOLOGY
Payer: COMMERCIAL

## 2021-10-04 PROCEDURE — 77412 RADIATION TX DELIVERY LVL 3: CPT | Performed by: RADIOLOGY

## 2021-10-04 PROCEDURE — 77280 THER RAD SIMULAJ FIELD SMPL: CPT | Performed by: RADIOLOGY

## 2021-10-04 PROCEDURE — 77336 RADIATION PHYSICS CONSULT: CPT | Performed by: RADIOLOGY

## 2021-10-05 ENCOUNTER — HOSPITAL ENCOUNTER (OUTPATIENT)
Dept: RADIATION ONCOLOGY | Age: 56
Discharge: HOME | End: 2021-10-05
Attending: RADIOLOGY
Payer: COMMERCIAL

## 2021-10-05 PROCEDURE — 77412 RADIATION TX DELIVERY LVL 3: CPT | Performed by: RADIOLOGY

## 2021-10-06 ENCOUNTER — HOSPITAL ENCOUNTER (OUTPATIENT)
Dept: INFUSION THERAPY | Age: 56
Discharge: HOME | End: 2021-10-06
Payer: COMMERCIAL

## 2021-10-06 ENCOUNTER — CLINICAL DOCUMENTATION (OUTPATIENT)
Dept: ONCOLOGY | Age: 56
End: 2021-10-06

## 2021-10-06 ENCOUNTER — OFFICE VISIT (OUTPATIENT)
Dept: ONCOLOGY | Age: 56
End: 2021-10-06
Payer: COMMERCIAL

## 2021-10-06 ENCOUNTER — HOSPITAL ENCOUNTER (OUTPATIENT)
Dept: RADIATION ONCOLOGY | Age: 56
Discharge: HOME | End: 2021-10-06
Attending: RADIOLOGY
Payer: COMMERCIAL

## 2021-10-06 VITALS
HEART RATE: 88 BPM | WEIGHT: 150 LBS | BODY MASS INDEX: 29.45 KG/M2 | OXYGEN SATURATION: 97 % | DIASTOLIC BLOOD PRESSURE: 60 MMHG | SYSTOLIC BLOOD PRESSURE: 128 MMHG | RESPIRATION RATE: 18 BRPM | HEIGHT: 60 IN | TEMPERATURE: 98.6 F

## 2021-10-06 DIAGNOSIS — C50.912 INVASIVE DUCTAL CARCINOMA OF LEFT BREAST (HCC): Primary | ICD-10-CM

## 2021-10-06 DIAGNOSIS — Z17.0 ESTROGEN RECEPTOR POSITIVE STATUS (ER+): ICD-10-CM

## 2021-10-06 DIAGNOSIS — Z92.3 PERSONAL HISTORY OF RADIATION THERAPY: ICD-10-CM

## 2021-10-06 DIAGNOSIS — Z90.12 S/P LEFT MASTECTOMY: ICD-10-CM

## 2021-10-06 PROCEDURE — G9899 SCRN MAM PERF RSLTS DOC: HCPCS | Performed by: INTERNAL MEDICINE

## 2021-10-06 PROCEDURE — G8417 CALC BMI ABV UP PARAM F/U: HCPCS | Performed by: INTERNAL MEDICINE

## 2021-10-06 PROCEDURE — 99213 OFFICE O/P EST LOW 20 MIN: CPT | Performed by: INTERNAL MEDICINE

## 2021-10-06 PROCEDURE — 77412 RADIATION TX DELIVERY LVL 3: CPT | Performed by: RADIOLOGY

## 2021-10-06 PROCEDURE — 99211 OFF/OP EST MAY X REQ PHY/QHP: CPT

## 2021-10-06 PROCEDURE — G8427 DOCREV CUR MEDS BY ELIG CLIN: HCPCS | Performed by: INTERNAL MEDICINE

## 2021-10-06 PROCEDURE — 4004F PT TOBACCO SCREEN RCVD TLK: CPT | Performed by: INTERNAL MEDICINE

## 2021-10-06 PROCEDURE — G8484 FLU IMMUNIZE NO ADMIN: HCPCS | Performed by: INTERNAL MEDICINE

## 2021-10-06 PROCEDURE — 3017F COLORECTAL CA SCREEN DOC REV: CPT | Performed by: INTERNAL MEDICINE

## 2021-10-06 ASSESSMENT — ENCOUNTER SYMPTOMS
VOMITING: 0
ABDOMINAL DISTENTION: 0
RECTAL PAIN: 0
EYE DISCHARGE: 0
DIARRHEA: 0
FACIAL SWELLING: 0
COUGH: 0
TROUBLE SWALLOWING: 0
BLOOD IN STOOL: 0
NAUSEA: 0
ABDOMINAL PAIN: 0
CONSTIPATION: 0
SHORTNESS OF BREATH: 0
BACK PAIN: 0
COLOR CHANGE: 0
SORE THROAT: 0
WHEEZING: 0
CHEST TIGHTNESS: 0

## 2021-10-06 NOTE — PROGRESS NOTES
proceed with a left breast mastectomy and axillary lymph node excision. She had her surgery on February 19, 2021 final pathology report showed: She gets Botox injections for contractures in her lower extremities. Recent onset of thrombocytopenia. A.  Left sentinel lymph node, excision:    Metastatic carcinoma in 2 of 4 lymph nodes (2/4). B.  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  Lymphovascular space invasion is present.    Margins are negative.    C.  Left axillary lymph nodes, excision:    Two lymph nodes, negative for malignancy (0/2). Oncotype DX Breast Cancer Assay (RT-PCR) performed by [employer]   Breast Cancer Recurrence Score:   8     Her post mastectomy incision is complicated by seroma formation. The patient required aspiration. Interim history on 10/06/2021:  The patient presents to the medical oncology clinic to discuss adjuvant hormonal treatment. Her radiation treatment has been delayed due postmastectomy surgical wound dehiscence and delayed healing. She started radiation treatment on August 13, 2021. She has 1 more week to complete planned course of radiation treatment. The plan is to deliver 6000 cGy. The patient has developed some skin erythema and skin peeling. HPI   Past Medical History:   Diagnosis Date    Anxiety     Anxiety and depression     Asthma     Bipolar 1 disorder ([address]     Bipolar 1 disorder with moderate sourav ([address]     Blood circulation, collateral     Breast cancer ([address]     diagnosed in jan 2021    Cancer [facility])      ?  cervical \"long time ago\"    Cancer ([address] 01/15/2021    Left Breast    Depression     Endometriosis     GERD (gastroesophageal reflux disease)     Kidney stones     Lupus ([address]     Movement disorder     MS (multiple sclerosis) ([address]     Schizophrenia ([address]     Thyroid disease     Type 2 diabetes mellitus without complication ([address]     Unspecified cerebral artery occlusion with cerebral infarction 2014      Past Surgical History:   Procedure Laterality Date    BREAST LUMPECTOMY Left 02/19/2021    LEFT BREAST MASTECTOMY, SENTINAL LYMPH NODE BIOPSY, PREOP NEEDLE LOC performed by Ranulfo Eid MD at [facility] Left 4/6/2021    DEBRIDEMENT LEFT BREAST MASTECTOMY WOUND performed by Ranulfo Eid MD at [facility] Left 4/27/2021    LEFT BREAST WOUND REVISION performed by Ranulfo Eid MD at [address] [place] [address]  01/2020    DILATION AND CURETTAGE OF UTERUS      BEN US GUID NDL BIOPSY LEFT Left 01/14/2021    BEN US GUID NDL BIOPSY LEFT 1/14/2021 Syh Rg MD [facility]    NERVE SURGERY N/A 01/26/2021    EXCHANGE OF INTERSTIM SYSTEM performed by Janelle Tomlinson MD at [address] [place] [address]  03/23/2021    right breast i and d with closure Dr Melva Arrington in the procedure room    PAIN MANAGEMENT PROCEDURE Left 8/31/2021    left T3 paravertebral block under fluoroscopy performed by Jeremy Brar DO at [address] OFFICE/OUTPT VISIT,PROCEDURE ONLY N/A 11/21/2018    INTERSTIM [address] [address], ONLY HEAD ELIGIBLE FOR MRI WITH TRANSMIT/RECEIVE HEAD COIL    TUBAL LIGATION      10 years ago    US GUIDED NEEDLE LOC OF LEFT BREAST Left 02/19/2021    US GUIDED NEEDLE LOC OF LEFT BREAST 2/19/2021 [address] BIOPSY  01/14/2021    US LYMPH NODE BIOPSY 1/14/2021 Shy Rg MD [facility]      Family History   Problem Relation Age of Onset    Stroke Mother     Asthma Mother     Other Mother     No Known Problems Sister     Asthma Brother     No Known Problems Brother       Social History     Tobacco Use    Smoking status: Current Every Day Smoker     Packs/day: 1.00     Types: Cigarettes     Start date: 12/6/1979    Smokeless tobacco: Never Used   Substance Use Topics    Alcohol use:  Yes     Alcohol/week: 0.0 standard drinks     Comment: social drinker Current Outpatient Medications   Medication Sig Dispense Refill    HYDROcodone-acetaminophen (NORCO) 5-325 MG per tablet Take 1 tablet by mouth 2 times daily as needed for Pain for up to 30 days. 60 tablet 0    pregabalin (LYRICA) 150 MG capsule take 1 capsule by mouth twice a day 60 capsule 2    BD PEN NEEDLE JACOBO 2ND GEN 32G X 4 MM MISC use as directed WITH INSULIN      atorvastatin (LIPITOR) 10 MG tablet take 1 tablet by mouth once daily      baclofen (LIORESAL) 10 MG tablet Take 2 tablets by mouth 3 times daily 135 tablet 5    amitriptyline (ELAVIL) 25 MG tablet Take 2 tablets by mouth nightly 30 tablet 5    metFORMIN (GLUCOPHAGE) 1000 MG tablet Take 1,000 mg by mouth 2 times daily      desvenlafaxine succinate (PRISTIQ) 50 MG TB24 extended release tablet Take 50 mg by mouth daily      traZODone (DESYREL) 50 MG tablet Take 50 mg by mouth nightly      MUCINEX MAXIMUM STRENGTH 1200 MG TB12 Take 1 tablet by mouth 2 times daily      paliperidone (INVEGA) 3 MG extended release tablet Take 3 mg by mouth nightly      SITagliptin (JANUVIA) 100 MG tablet Take 100 mg by mouth daily      Lifitegrast (XIIDRA) 5 % SOLN Place 1 drop into both eyes daily      Misc.  Devices [facility] [address] [facility] Wheelchair repairs- new seat cover, right side armrest replacement    Current body weight:  68 kg  Diagnosis: gait disturbance, chronic incomplete spastic paraplegia  Length of need: Lifetime 1 each 0    tiotropium (SPIRIVA) 18 MCG inhalation capsule Inhale 18 mcg into the lungs daily 2 puffs      insulin glargine (LANTUS SOLOSTAR) 100 UNIT/ML injection pen Inject 60 Units into the skin nightly       montelukast (SINGULAIR) 10 MG tablet Take 10 mg by mouth nightly   0    artificial tears (ARTIFICIAL TEARS) OINT as needed      Calcium Carbonate (CALCIUM 600 PO) Take 1 tablet by mouth 2 times daily      Multiple Vitamin (TAB-A-ISAAC PO) Take 1 tablet by mouth daily      loratadine (CLARITIN) 10 MG tablet Take 1 tablet by mouth daily as needed. 0    PROAIR  (90 BASE) MCG/ACT inhaler Inhale 2 puffs into the lungs every 6 hours as needed. 0    Incontinence Supply Disposable (UNDERPADS) MISC Cloth chux pads  Diagnosis: Paraplegia 344. 1 100 Bottle 11    hydrOXYzine (VISTARIL) 50 MG capsule Take 50 mg by mouth 3 times daily as needed for Itching.  omeprazole (PRILOSEC) 20 MG capsule Take 20 mg by mouth daily.  Lactobacillus Acid-Pectin (ACIDOPHILUS/CITRUS PECTIN) TABS Take 1 tablet by mouth daily 30 tablet 0    naloxone 4 MG/0.1ML LIQD nasal spray 1 spray by Nasal route as needed for Opioid Reversal (Patient not taking: Reported on 10/13/2021) 1 each 5     Current Facility-Administered Medications   Medication Dose Route Frequency Provider Last Rate Last Admin    onabotulinumtoxin A (BOTOX) injection 500 Units  500 Units IntraMUSCular Once Mariano Jacobs MD          Allergies   Allergen Reactions    Penicillins Shortness Of Breath     \"I almost \"  Other reaction(s): PENICILLINS    Seasonal Itching      Health Maintenance   Topic Date Due    Hepatitis C screen  Never done    HIV screen  Never done    Colon cancer screen colonoscopy  Never done    Shingles Vaccine (1 of 2) Never done    A1C test (Diabetic or Prediabetic)  2017    Pneumococcal 0-64 years Vaccine (2 of 4 - PPSV23) 2021    COVID-19 Vaccine (3 - Moderna risk 3-dose series) 2021    Flu vaccine (1) 2021    Lipid screen  2022    Breast cancer screen  2022    Cervical cancer screen  2024    DTaP/Tdap/Td vaccine (2 - Td or Tdap) 2024    Hepatitis A vaccine  Aged Out    Hepatitis B vaccine  Aged Out    Hib vaccine  Aged Out    Meningococcal (ACWY) vaccine  Aged Out        Subjective:   Review of Systems   Constitutional: Negative for activity change, appetite change, fatigue and fever.    HENT: Negative for congestion, dental problem, facial swelling, hearing loss, mouth sores, nosebleeds, sore throat, tinnitus and trouble swallowing. Eyes: Negative for discharge and visual disturbance. Respiratory: Negative for cough, chest tightness, shortness of breath and wheezing. Cardiovascular: Negative for chest pain, palpitations and leg swelling. Gastrointestinal: Negative for abdominal distention, abdominal pain, blood in stool, constipation, diarrhea, nausea, rectal pain and vomiting. Endocrine: Negative for cold intolerance, polydipsia and polyuria. Genitourinary: Positive for difficulty urinating. Negative for decreased urine volume, dysuria, flank pain, hematuria and urgency. Musculoskeletal: Negative for arthralgias, back pain, gait problem, joint swelling, myalgias and neck stiffness. Skin: Negative for color change, rash and wound. Neurological: Negative for dizziness, tremors, seizures, speech difficulty, weakness (The patient has spastic contractures in the lower extremities), light-headedness, numbness and headaches. Hematological: Negative for adenopathy. Bruises/bleeds easily. Psychiatric/Behavioral: Positive for behavioral problems. Negative for confusion and sleep disturbance. The patient is nervous/anxious. Objective:   Physical Exam  Vitals reviewed. Constitutional:       General: She is not in acute distress. Appearance: She is well-developed. HENT:      Head: Normocephalic. Mouth/Throat:      Pharynx: No oropharyngeal exudate. Eyes:      General: No scleral icterus. Right eye: No discharge. Left eye: No discharge. Pupils: Pupils are equal, round, and reactive to light. Neck:      Thyroid: No thyromegaly. Vascular: No JVD. Trachea: No tracheal deviation. Cardiovascular:      Rate and Rhythm: Normal rate. Heart sounds: Normal heart sounds. No murmur heard. No friction rub. No gallop. Pulmonary:      Effort: Pulmonary effort is normal. No respiratory distress.       Breath sounds: Normal breath sounds. No stridor. No wheezing or rales. Chest:      Chest wall: No tenderness. Abdominal:      General: Bowel sounds are normal. There is no distension. Palpations: Abdomen is soft. There is no mass. Tenderness: There is no abdominal tenderness. There is no rebound. Musculoskeletal:         General: Normal range of motion. Cervical back: Normal range of motion and neck supple. Comments: Good range of motion in all four extremities. Lymphadenopathy:      Cervical: No cervical adenopathy. Skin:     General: Skin is warm. Findings: No erythema or rash. Neurological:      Mental Status: She is alert and oriented to person, place, and time. Cranial Nerves: No cranial nerve deficit. Motor: No abnormal muscle tone. Deep Tendon Reflexes: Reflexes are normal and symmetric. Psychiatric:         Behavior: Behavior normal.         Thought Content: Thought content normal.         Judgment: Judgment normal.         /60 (Site: Right Upper Arm, Position: Sitting, Cuff Size: Medium Adult)   Pulse 88   Temp 98.6 °F (37 °C) (Oral)   Resp 18   Ht 5' (1.524 m)   Wt 150 lb (68 kg)   SpO2 97%   BMI 29.29 kg/m²      ECOG status is 2    Imaging studies and labs:     Lab Results   Component Value Date    WBC 2.9 (L) 04/01/2021    HGB 12.0 04/01/2021    HCT 35.7 (L) 04/01/2021    MCV 78.5 (L) 04/01/2021     (L) 04/01/2021       Chemistry        Component Value Date/Time     02/19/2021 1011    K 4.2 02/19/2021 1011     02/19/2021 1011    CO2 28 02/19/2021 1011    BUN 11 02/19/2021 1011    CREATININE 0.3 (L) 02/19/2021 1011        Component Value Date/Time    CALCIUM 9.1 02/19/2021 1011    ALKPHOS 121 01/07/2021 1610    AST 27 01/07/2021 1610    ALT 17 01/07/2021 1610    BILITOT 0.7 01/07/2021 1610    BILITOT NEGATIVE 03/14/2018 0000            Assessment/Plan:        Diagnosis Orders   1. Invasive ductal carcinoma of left breast ([address]     2.  S/P left mastectomy     3. Estrogen receptor positive status (ER+)     4. Personal history of radiation therapy          1. Stage IB (pT2, pN1a, cM0, G2, ER+, AR+, HER2-, Oncotype DX score: 8) left breast cancer. The patient is s/p left breast mastectomy with axillary lymph nodes excision in February 2021. The Oncotype Dx 21-gene recurrence score (RS) is the best-validated prognostic and predictive assay to identify patients who are most and least likely to derive benefit from adjuvant chemotherapy. RS 25 or below identifies women with low risk of disease recurrence for whom chemotherapy is unlikely to provide a benefit. Her recurrence score came back as 8. The RS for those with one to three positive nodes, using cutoffs as for those with node-negative disease, can be used to make decision about adjuvant chemotherapy. This approach is included in the ASCO guidelines and in the NCCN. Rationale for this approach is that, in the modern era of more effective local therapy, and widespread application of adjuvant aromatase inhibitors, lymph node positivity might not confer the same degree of heightened risk as it once did. The SWOG  phase III study in women with hormone responsive, HER-2/maegan negative breast cancer with 1-3 axillary lymph nodes involvement showed no overall benefit from adjuvant chemotherapy for postmenopausal women with recurrence score between 0 and 25. In addition due to patient's multiple comorbidities she is poor candidate for systemic chemotherapy. As such my recommendation is to proceed with postmastectomy radiation treatment. The patient did not start radiation treatment due to need for revision of wound with excision of skin and subcutaneous fat 16 cm2 with tissue advancement flaps inferior and superior, totaling 18 cm skin and subcutaneous fat with wound closure on 4/27/21. The patient started to delay of radiation treatment to the chest wall on August 13, 2021.   She still has 1 week to complete planned course of radiation treatment. The plan is to deliver 6000 cGy. Patient has some skin erythema and peeling. I would like to see the patient 2 weeks after completion of radiation treatment to make decision about timing of adjuvant hormonal treatment    Return in about 30 days (around 11/5/2021). Orders Placed:   No orders of the defined types were placed in this encounter. Medications Prescribed:   No orders of the defined types were placed in this encounter. Discussed use, benefit, and side effectsof prescribed medications. All patient questions answered. Pt voiced understanding. Instructed to continue current medications, diet and exercise. Patient agreed with treatment plan. Follow up as directed.     Electronically signed by Grey Sena MD on 3/27/21 at 10:39 AM EDT

## 2021-10-06 NOTE — PROGRESS NOTES
Name: Rohan Canchola  : 1965  MRN: [id number]    Oncology Navigation Follow-Up Note    Contact Type:  Medical Oncology    Subjective: Seen prior to visit with Dr. Alice Wilkes. Objective: Will complete radiation to left chest wall 10/8/21. Skin dark brown with some peeling of left chest area and left upper scapula. Assistance Needed: WC bound, hx MS      Receptive to Advanced Care Planning / Palliative Care:  NA    Notes: Patient tearful, son from [address] passed away this month, received ashes yesterday. Emotional support provided. Will follow through continuum of care.     Electronically signed by Edmond Serna RN on 10/6/2021 at 2:24 PM

## 2021-10-07 ENCOUNTER — HOSPITAL ENCOUNTER (OUTPATIENT)
Dept: RADIATION ONCOLOGY | Age: 56
Discharge: HOME | End: 2021-10-07
Attending: RADIOLOGY
Payer: COMMERCIAL

## 2021-10-07 PROCEDURE — 77412 RADIATION TX DELIVERY LVL 3: CPT | Performed by: RADIOLOGY

## 2021-10-08 ENCOUNTER — HOSPITAL ENCOUNTER (OUTPATIENT)
Dept: RADIATION ONCOLOGY | Age: 56
Discharge: HOME | End: 2021-10-08
Attending: RADIOLOGY
Payer: COMMERCIAL

## 2021-10-08 ENCOUNTER — APPOINTMENT (OUTPATIENT)
Dept: RADIATION ONCOLOGY | Age: 56
End: 2021-10-08
Attending: RADIOLOGY
Payer: COMMERCIAL

## 2021-10-08 PROCEDURE — 77412 RADIATION TX DELIVERY LVL 3: CPT | Performed by: RADIOLOGY

## 2021-10-13 ENCOUNTER — OFFICE VISIT (OUTPATIENT)
Dept: UROLOGY | Age: 56
End: 2021-10-13
Payer: COMMERCIAL

## 2021-10-13 VITALS
HEART RATE: 83 BPM | SYSTOLIC BLOOD PRESSURE: 111 MMHG | WEIGHT: 150 LBS | DIASTOLIC BLOOD PRESSURE: 73 MMHG | BODY MASS INDEX: 29.45 KG/M2 | HEIGHT: 60 IN

## 2021-10-13 DIAGNOSIS — R32 URINARY INCONTINENCE, UNSPECIFIED TYPE: Primary | ICD-10-CM

## 2021-10-13 PROCEDURE — 4004F PT TOBACCO SCREEN RCVD TLK: CPT | Performed by: NURSE PRACTITIONER

## 2021-10-13 PROCEDURE — G8484 FLU IMMUNIZE NO ADMIN: HCPCS | Performed by: NURSE PRACTITIONER

## 2021-10-13 PROCEDURE — G8427 DOCREV CUR MEDS BY ELIG CLIN: HCPCS | Performed by: NURSE PRACTITIONER

## 2021-10-13 PROCEDURE — G8417 CALC BMI ABV UP PARAM F/U: HCPCS | Performed by: NURSE PRACTITIONER

## 2021-10-13 PROCEDURE — G9899 SCRN MAM PERF RSLTS DOC: HCPCS | Performed by: NURSE PRACTITIONER

## 2021-10-13 PROCEDURE — 99213 OFFICE O/P EST LOW 20 MIN: CPT | Performed by: NURSE PRACTITIONER

## 2021-10-13 PROCEDURE — 3017F COLORECTAL CA SCREEN DOC REV: CPT | Performed by: NURSE PRACTITIONER

## 2021-10-13 NOTE — PROGRESS NOTES
[facility]  Urology Clinic Progress Note      Patient:  Rola Vigil  YOB: 1965  Date: 10/13/2021    HISTORY OF PRESENT ILLNESS:   The patient is a 49 y.o. female who presents today for follow-up for the following problem(s):      Neurogenic bladder  OAB    Overall the problem(s) : are stable  Associated Symptoms: No dysuria, gross hematuria. Having enuresis. Daytime symptoms are controlled. FU for Interstim battery exchange by Dr. Clayton Tineo. Pt reports symptoms are worsening at night. Having more leakage. Doesn't feel like she has UTI. Has been dealing with recent diagnosis of Breast cancer and recently had left simple mastectomy by Dr Lefty Benavides and recent loss of her son. Pt unsure of how to use controller and change settings. Did not bring with her today. Summary of old records:   Cherry Jacome is here today for OAB follow up. She underwent placement of permanent sacral nerve stimulator with leads (stage 1 and 2) on 11/21/18 per Dr. Justin Escoto. She is down to 2-3 depends per day (previously 9 daily). She still reports frequency every 1 hour, sometimes every 30 minutes. Prior to surgery she failed multiple anti-cholinergics & Myrbetriq. Hx of DM, MS, and recurrent UTI. Wheelchair bound. Additional History: Onset many years  Worsening of her mixed incontinence and urinary symptoms. Her device was recently interrogated which showed low battery life. Recommended to have the battery replaced. She is here to discuss the procedure  Severity is described as moderate. The course of symptoms over time is chronic. Alleviating factors: none  Worsening factors: none  Lower urinary tract symptoms: Mixed urinary symptoms complicated by diabetes and multiple sclerosis.       Imaging Reviewed during this Office Visit:   (results were independently reviewed by physician and radiology report verified)    Urinalysis today:  No results found for this visit on 10/13/21. Last BUN and creatinine:  Lab Results   Component Value Date    BUN 11 02/19/2021     Lab Results   Component Value Date    CREATININE 0.3 (L) 02/19/2021       PAST MEDICAL, FAMILY AND SOCIAL HISTORY UPDATE:  Past Medical History:   Diagnosis Date    Anxiety     Anxiety and depression     Asthma     Bipolar 1 disorder ([address]     Bipolar 1 disorder with moderate sourav ([address]     Blood circulation, collateral     [facility]     diagnosed in jan 2021    Cancer [facility])      ?  cervical \"long time ago\"    Cancer ([address] 01/15/2021    Left Breast    Depression     Endometriosis     GERD (gastroesophageal reflux disease)     Kidney stones     Lupus (HCC)     Movement disorder     MS (multiple sclerosis) ([address]     Schizophrenia ([address]     Thyroid disease     Type 2 diabetes mellitus without complication ([address]     Unspecified cerebral artery occlusion with cerebral infarction 2014     Past Surgical History:   Procedure Laterality Date    BREAST LUMPECTOMY Left 02/19/2021    LEFT BREAST MASTECTOMY, SENTINAL LYMPH NODE BIOPSY, PREOP NEEDLE LOC performed by Ashley Hay MD at [address] [place] [address] 4/6/2021    DEBRIDEMENT LEFT BREAST MASTECTOMY WOUND performed by Ashley Hay MD at [address] [place] [address] 4/27/2021    LEFT BREAST WOUND REVISION performed by Ashley Hay MD at [address] [place] [address]  01/2020    DILATION AND CURETTAGE OF UTERUS      BEN US GUID NDL BIOPSY LEFT Left 01/14/2021    BEN US GUID NDL BIOPSY LEFT 1/14/2021 Linda Onofre MD [address] [facility] NERVE SURGERY N/A 01/26/2021    EXCHANGE OF INTERSTIM SYSTEM performed by Edwin Hackett MD at [address] [place] [address]  03/23/2021    right breast i and d with nimo Demarco Monday in the procedure room    PAIN MANAGEMENT PROCEDURE Left 8/31/2021    left T3 paravertebral block under fluoroscopy performed by Lance Camacho DO at [address] OFFICE/OUTPT both eyes daily      Misc. Devices [facility]) [address] [facility] Wheelchair repairs- new seat cover, right side armrest replacement    Current body weight:  68 kg  Diagnosis: gait disturbance, chronic incomplete spastic paraplegia  Length of need: Lifetime 1 each 0    tiotropium (SPIRIVA) 18 MCG inhalation capsule Inhale 18 mcg into the lungs daily 2 puffs      insulin glargine (LANTUS SOLOSTAR) 100 UNIT/ML injection pen Inject 60 Units into the skin nightly       montelukast (SINGULAIR) 10 MG tablet Take 10 mg by mouth nightly   0    artificial tears (ARTIFICIAL TEARS) OINT as needed      Calcium Carbonate (CALCIUM 600 PO) Take 1 tablet by mouth 2 times daily      Multiple Vitamin (TAB-A-ISAAC PO) Take 1 tablet by mouth daily      loratadine (CLARITIN) 10 MG tablet Take 1 tablet by mouth daily as needed. 0    PROAIR  (90 BASE) MCG/ACT inhaler Inhale 2 puffs into the lungs every 6 hours as needed. 0    Incontinence Supply Disposable (UNDERPADS) MISC Cloth chux pads  Diagnosis: Paraplegia 344. 1 100 Bottle 11    hydrOXYzine (VISTARIL) 50 MG capsule Take 50 mg by mouth 3 times daily as needed for Itching.  omeprazole (PRILOSEC) 20 MG capsule Take 20 mg by mouth daily.          Penicillins and Seasonal  Social History     Tobacco Use   Smoking Status Current Every Day Smoker    Packs/day: 1.00    Types: Cigarettes    Start date: 12/6/1979   Smokeless Tobacco Never Used       Social History     Substance and Sexual Activity   Alcohol Use Yes    Alcohol/week: 0.0 standard drinks    Comment: social drinker       REVIEW OF SYSTEMS:  Constitutional: negative  Eyes: negative  Respiratory: negative  Cardiovascular: negative  Gastrointestinal: negative  Genitourinary: negative except for what is in HPI  Musculoskeletal: negative  Skin: negative   Neurological: negative  Hematological/Lymphatic: negative  Psychological: negative    Physical Exam:      Vitals:    10/13/21 1420   BP: 111/73   Pulse: 83     Patient is a 49 y.o. female in no acute distress and alert and oriented to person, place and time. NAD, Alert  Non labored respiration  Normal peripheral pulses  Soft, non tender  Skin-warm and dry  Psych- normal mood and affect    Assessment and Plan      Neurogenic bladder  OAB  Breast Cancer       Plan:     She had explant of Interstim and excision of device capsule and washout of wound and interstime stage 1 and 2 procedure, placement of rechargeable device by Dr. Rimma Olguin in March of 2021. Continue Interstim. Has FU with Yousif Kohler on 10/27. Will see pt with Yousif Kohler that day. No UTI since last visit. Enuresis is biggest complaint. FU 6 months.   Electronically signed by CHAS Lyons CNP on 10/13/2021 at 4:20 PM

## 2021-10-27 ENCOUNTER — NURSE ONLY (OUTPATIENT)
Dept: UROLOGY | Age: 56
End: 2021-10-27

## 2021-10-27 DIAGNOSIS — G82.22 CHRONIC INCOMPLETE SPASTIC PARAPLEGIA (HCC): ICD-10-CM

## 2021-10-27 DIAGNOSIS — G89.4 CHRONIC PAIN SYNDROME: ICD-10-CM

## 2021-10-27 DIAGNOSIS — R35.0 FREQUENCY OF URINATION: Primary | ICD-10-CM

## 2021-10-27 DIAGNOSIS — N32.81 OAB (OVERACTIVE BLADDER): ICD-10-CM

## 2021-10-27 DIAGNOSIS — G35 MULTIPLE SCLEROSIS (HCC): ICD-10-CM

## 2021-10-27 PROCEDURE — 99999 PR OFFICE/OUTPT VISIT,PROCEDURE ONLY: CPT | Performed by: NURSE PRACTITIONER

## 2021-10-27 NOTE — TELEPHONE ENCOUNTER
OARRS reviewed. UDS: + for  lyrica consistent. Last seen: 8/9/2021.  Follow-up:   Future Appointments   Date Time Provider Rashard Blank   11/4/2021  1:20 PM DO JONNY Lion [facility] [place]   11/5/2021 11:40 AM MD JONNY Byrnes Oncology [facility] [place]   11/8/2021  2:30 PM CHAS Lee - [facility] [place]   11/11/2021  3:40 PM MD JONNY Feliciano [facility] [place]   11/15/2021  1:15 PM MD Desmond Rodríguez [facility] [place]   3/23/2022  1:30 PM Sebastián Navarrete, [facility]

## 2021-10-27 NOTE — TELEPHONE ENCOUNTER
10/27 pt requested to have her hydrocodone-acetaminophen 5-325mg refilled. Still using [employer] [facility] [address].

## 2021-10-27 NOTE — PROGRESS NOTES
Per dorota Maloney rep, Pt re-educated today on recharging device. Pt seemed prepared and motivated. Got IPG charged up to 40% in office. Programmed IPG to program 5 @ 1.1mA. Pt had positive sensory response in rectal/vaginal area.

## 2021-10-28 RX ORDER — L. ACIDOPHILUS/PECTIN, CITRUS 25MM-100MG
1 TABLET ORAL DAILY
Qty: 30 TABLET | Refills: 0 | Status: SHIPPED | OUTPATIENT
Start: 2021-10-28 | End: 2021-12-06

## 2021-10-28 NOTE — TELEPHONE ENCOUNTER
Odilon Rayo called requesting a refill on the following medications:  Requested Prescriptions     Pending Prescriptions Disp Refills    Lactobacillus Acid-Pectin (ACIDOPHILUS/CITRUS PECTIN) TABS 30 tablet 0     Sig: Take 1 tablet by mouth daily     Pharmacy verified: [facility] [address]      Date of last visit: 10/27/21  Date of next visit (if applicable): 0/35/8498

## 2021-10-29 RX ORDER — HYDROCODONE BITARTRATE AND ACETAMINOPHEN 5; 325 MG/1; MG/1
1 TABLET ORAL 2 TIMES DAILY PRN
Qty: 60 TABLET | Refills: 0 | Status: SHIPPED | OUTPATIENT
Start: 2021-10-29 | End: 2022-06-03 | Stop reason: SDUPTHER

## 2021-11-04 ENCOUNTER — OFFICE VISIT (OUTPATIENT)
Dept: PHYSICAL MEDICINE AND REHAB | Age: 56
End: 2021-11-04
Payer: COMMERCIAL

## 2021-11-04 VITALS
BODY MASS INDEX: 29.45 KG/M2 | HEIGHT: 60 IN | WEIGHT: 150 LBS | DIASTOLIC BLOOD PRESSURE: 66 MMHG | SYSTOLIC BLOOD PRESSURE: 104 MMHG

## 2021-11-04 DIAGNOSIS — G35 MULTIPLE SCLEROSIS (HCC): ICD-10-CM

## 2021-11-04 DIAGNOSIS — R07.89 LEFT-SIDED CHEST WALL PAIN: ICD-10-CM

## 2021-11-04 DIAGNOSIS — G89.4 CHRONIC PAIN SYNDROME: Primary | ICD-10-CM

## 2021-11-04 DIAGNOSIS — G89.18 POST-MASTECTOMY PAIN: ICD-10-CM

## 2021-11-04 DIAGNOSIS — G82.22 CHRONIC INCOMPLETE SPASTIC PARAPLEGIA (HCC): ICD-10-CM

## 2021-11-04 PROCEDURE — G9899 SCRN MAM PERF RSLTS DOC: HCPCS | Performed by: ANESTHESIOLOGY

## 2021-11-04 PROCEDURE — G8417 CALC BMI ABV UP PARAM F/U: HCPCS | Performed by: ANESTHESIOLOGY

## 2021-11-04 PROCEDURE — 3017F COLORECTAL CA SCREEN DOC REV: CPT | Performed by: ANESTHESIOLOGY

## 2021-11-04 PROCEDURE — G8484 FLU IMMUNIZE NO ADMIN: HCPCS | Performed by: ANESTHESIOLOGY

## 2021-11-04 PROCEDURE — G8427 DOCREV CUR MEDS BY ELIG CLIN: HCPCS | Performed by: ANESTHESIOLOGY

## 2021-11-04 PROCEDURE — 4004F PT TOBACCO SCREEN RCVD TLK: CPT | Performed by: ANESTHESIOLOGY

## 2021-11-04 PROCEDURE — 99214 OFFICE O/P EST MOD 30 MIN: CPT | Performed by: ANESTHESIOLOGY

## 2021-11-04 RX ORDER — OXYCODONE HYDROCHLORIDE AND ACETAMINOPHEN 5; 325 MG/1; MG/1
1 TABLET ORAL 2 TIMES DAILY PRN
Qty: 60 TABLET | Refills: 0 | Status: SHIPPED | OUTPATIENT
Start: 2021-11-28 | End: 2021-12-27 | Stop reason: SDUPTHER

## 2021-11-04 NOTE — PROGRESS NOTES
Chronic Pain/PM&R Clinic Note     Encounter Date: 11/4/21    Subjective:   Chief Complaint:   Chief Complaint   Patient presents with    Follow Up After Procedure     no pain relief from procedure       History of Present Illness:   Raoul Casey is a 49 y.o. female seen in the clinic initially on 08/09/21 for her history of left sided chest wall pain. She has a medical history of left breast mastectomy in February 2021 secondary to breast cancer with subsequent wound dehiscence and surgical debridement and spinal cord injury secondary to transverse myelitis. She has been dealing with left-sided chest wall pain at the site of her incision ever since her mastectomy and this is been exacerbated after most recent revision surgery in June 2021. She describes his pain is a constant stabbing, electrical type pain right on her incision scar. She rates this pain is a constant 6/10 pain that can get up to a 10/10. She denies any drainage or areas of erythema around the scar. She states that this area is so sensitive that is difficult for her to wear even a supportive bra. She reports not getting much relief with her other medications including Lyrica 150 mg twice a day, Norco 5 mg twice a day, and Elavil 50 mg at night. She states that this is interfering with her everyday activities and really interfering with sleep. Today, 11/4/2021, patient presents for planned follow-up for left-sided chest wall pain. He underwent a left T3 paravertebral block on 8/31/2021. She reports absolutely no relief from this injection. She states that she continues take her medication as prescribed. She feels like the Norco is not really beneficial for her for breakthrough pain. She states that she was on oxycodone in the past and felt like this was very effective for her. States she would be interested in switching from [employer] to [employer]. She denies any new symptoms at this point.   States her last dose of Norco was last night (11/3/2021). History of Interventions:   Surgery: Left breast mastectomy and surgical debridement  Injections: Left T3 PVP (8/31/21)no relief    Current Treatment Medications:   Lyrica 100 mg twice daily  Elavil 50 mg nightly  Norco 5 mg twice daily as needed    Imaging:  None    Past Medical History:   Diagnosis Date    Anxiety     Anxiety and depression     Asthma     Bipolar 1 disorder ([address]     Bipolar 1 disorder with moderate sourav ([address]     Blood circulation, collateral     Breast cancer ([address]     diagnosed in jan 2021    Cancer [facility])      ?  cervical \"long time ago\"    Cancer ([facility] 01/15/2021    Left Breast    Depression     Endometriosis     GERD (gastroesophageal reflux disease)     Kidney stones     Lupus (HCC)     Movement disorder     MS (multiple sclerosis) ([address]     Schizophrenia ([address]     Thyroid disease     Type 2 diabetes mellitus without complication ([address]     Unspecified cerebral artery occlusion with cerebral infarction 2014       Past Surgical History:   Procedure Laterality Date    BREAST LUMPECTOMY Left 02/19/2021    LEFT BREAST MASTECTOMY, SENTINAL LYMPH NODE BIOPSY, PREOP NEEDLE LOC performed by Jocelyne Summers MD at [address] [place] [address] 4/6/2021    DEBRIDEMENT LEFT BREAST MASTECTOMY WOUND performed by Jocelyne Summers MD at [address] [place] [address] 4/27/2021    LEFT BREAST WOUND REVISION performed by Jocelyne Summers MD at [address] [place] [address]  01/2020    DILATION AND CURETTAGE OF UTERUS      BEN US GUID NDL BIOPSY LEFT Left 01/14/2021    BEN US GUID [address] LEFT 1/14/2021 Jen Hadley MD [facility] NERVE SURGERY N/A 01/26/2021    EXCHANGE OF INTERSTIM SYSTEM performed by Neil Monahan MD at [address] [place] [address] Drive,[facility]  03/23/2021    right breast i and d with closure Dr Yanet Christianson in the procedure room    PAIN MANAGEMENT PROCEDURE Left 8/31/2021    left T3 paravertebral block under fluoroscopy performed by Susu Pino Loree Fraga,  at [address] OFFICE/OUTPT VISIT,PROCEDURE ONLY N/A 11/21/2018    INTERSTIM DEVICE PLACEMENT MODEL 3058, ONLY HEAD ELIGIBLE FOR MRI WITH TRANSMIT/RECEIVE HEAD COIL    TUBAL LIGATION      10 years ago    US GUIDED NEEDLE LOC OF LEFT BREAST Left 02/19/2021    US GUIDED NEEDLE LOC OF LEFT BREAST 2/19/2021 [facility]    US LYMPH NODE BIOPSY  01/14/2021    US LYMPH NODE BIOPSY 1/14/2021 Yenifer Childress MD [facility]       Family History   Problem Relation Age of Onset   [place] Se Stroke Mother     Asthma Mother     Other Mother     No Known Problems Sister     Asthma Brother     No Known Problems Brother        Social History     Socioeconomic History    Marital status:      Spouse name: 4    Number of children: Not on file    Years of education: Not on file    Highest education level: Not on file   Occupational History    Not on file   Tobacco Use    Smoking status: Current Every Day Smoker     Packs/day: 1.00     Types: Cigarettes     Start date: 12/6/1979    Smokeless tobacco: Never Used   Vaping Use    Vaping Use: Never used   Substance and Sexual Activity    Alcohol use: Yes     Alcohol/week: 0.0 standard drinks     Comment: social drinker    Drug use: No    Sexual activity: Never     Partners: Male   Other Topics Concern    Not on file   Social History Narrative    ** Merged History Encounter **          Social Determinants of Health     Financial Resource Strain:     Difficulty of Paying Living Expenses:    Food Insecurity:     Worried About Running Out of Food in the Last Year:     Ran Out of Food in the Last Year:    Transportation Needs:     Lack of Transportation (Medical):      Lack of Transportation (Non-Medical):    Physical Activity:     Days of Exercise per Week:     Minutes of Exercise per Session:    Stress:     Feeling of Stress :    Social Connections:     Frequency of Communication with Friends and Family:     Frequency of Social Gatherings with Friends and Family:     Attends [employer] Services:     Active Member of Clubs or Organizations:     Attends Club or Organization Meetings:     Marital Status:    Intimate Partner Violence:     Fear of Current or Ex-Partner:     Emotionally Abused:     Physically Abused:     Sexually Abused:        Medications & Allergies:   Current Outpatient Medications   Medication Instructions    amitriptyline (ELAVIL) 50 mg, Oral, NIGHTLY    artificial tears (ARTIFICIAL TEARS) OINT PRN    atorvastatin (LIPITOR) 10 MG tablet take 1 tablet by mouth once daily    baclofen (LIORESAL) 20 mg, Oral, 3 TIMES DAILY    BD PEN NEEDLE JACOBO 2ND GEN 32G X 4 MM MISC use as directed WITH INSULIN    Calcium Carbonate (CALCIUM 600 PO) 1 tablet, Oral, 2 TIMES DAILY    desvenlafaxine succinate (PRISTIQ) 50 mg, Oral, DAILY    HYDROcodone-acetaminophen (NORCO) 5-325 MG per tablet 1 tablet, Oral, 2 TIMES DAILY PRN    hydrOXYzine (VISTARIL) 50 mg, Oral, 3 TIMES DAILY PRN    Incontinence Supply Disposable (UNDERPADS) MISC Cloth chux padsDiagnosis: Paraplegia 344.1    Lactobacillus Acid-Pectin (ACIDOPHILUS/CITRUS PECTIN) TABS 1 tablet, Oral, DAILY    Lantus SoloStar 60 Units, SubCUTAneous, NIGHTLY    Lifitegrast (XIIDRA) 5 % SOLN 1 drop, Both Eyes, DAILY    loratadine (CLARITIN) 10 MG tablet 1 tablet, Oral, DAILY PRN    metFORMIN (GLUCOPHAGE) 1,000 mg, Oral, 2 TIMES DAILY    Misc.  Devices [facility]) [address] [facility] Wheelchair repairs- new seat cover, right side armrest replacementCurrent body weight:  68 kgDiagnosis: gait disturbance, chronic incomplete spastic paraplegiaLength of need: Lifetime    montelukast (SINGULAIR) 10 mg, Oral, NIGHTLY    MUCINEX MAXIMUM STRENGTH 1200 MG TB12 1 tablet, Oral, 2 TIMES DAILY    Multiple Vitamin (TAB-A-ISAAC PO) 1 tablet, Oral, DAILY    naloxone 4 MG/0.1ML LIQD nasal spray 1 spray, Nasal, PRN    omeprazole (PRILOSEC) 20 mg, Oral, DAILY    paliperidone (INVEGA) 3 mg, Oral, NIGHTLY    pregabalin (LYRICA) 150 MG capsule take 1 capsule by mouth twice a day    PROAIR  (90 BASE) MCG/ACT inhaler 2 puffs, Inhalation, EVERY 6 HOURS PRN    SITagliptin (JANUVIA) 100 mg, Oral, DAILY    tiotropium (SPIRIVA) 18 mcg, Inhalation, DAILY, 2 puffs     traZODone (DESYREL) 50 mg, Oral, NIGHTLY       Allergies   Allergen Reactions    Penicillins Shortness Of Breath     \"I almost \"  Other reaction(s): PENICILLINS    Seasonal Itching       Review of Systems:   Constitutional: negative for weight changes or fevers  Genitourinary: negative for bowel/bladder incontinence   Musculoskeletal: positive for left chest wall pain  Neurological:  Positive for numbness/tingling over left chest incision scar  Behavioral/Psych: negative for anxiety/depression   All other systems reviewed and are negative    Objective:     Vitals:    21 1313   BP: 104/66     Constitutional: Pleasant, no acute distress   Head: Normocephalic, atraumatic   Eyes: Conjunctivae normal   Neck: Supple, symmetrical   Respiration: Non-labored breathing   Cardiovascular: Limbs warm and well perfused   Musculoskeletal: Full cervical ROM in all planes. Negative Spurling maneuver bilaterally. Neuro: Alert, oriented. CN II-XII appear grossly intact. No focal motor deficits appreciated in upper limbs. Hyperesthesia and allodynia appreciated over surgical incision scar left chest wall  Skin: Healed surgical incision scar of left chest wall (near nipple line)  Psychological: Cooperative, no exaggerated pain behaviors       Assessment:    Diagnosis Orders   1. Chronic pain syndrome  Urine Drug Screen   2. Chronic incomplete spastic paraplegia ([address]     3. Post-mastectomy pain     4. Left-sided chest wall pain     5. Multiple sclerosis ([address]           Jarrod Wen is a 49 y. o.female presenting to the pain clinic for evaluation of postmastectomy pain.   Her clinical history of his examination is consistent with severe neuropathic pain secondary to her mastectomy surgery. I have set her up for a left T3 paravertebral block under fluoroscopy. We will continue her locations at this point and follow-up after her injection to determine next steps of therapy. She failed to respond to her left T3 paravertebral block. I have ordered a urine drug screen as we will switch her from [address] [place] [address] to Percocet. We will follow-up in 8 weeks for reevaluation determine next steps of pain management. Plan: The following treatment recommendations and plan were discussed in detail with Yfn Sanchez. Imaging:   None    Analgesics: The patient is currently managing their pain with the use of Norco which is prescribed by Huyen Masters. I have taken over prescribing this medication. I have decided switched patient over from [address] [place] [address],[address] [facility] to [employer]. We will start at 5 mg that patient can take up to twice a day as needed for severe pain (pain greater than 7/10). Patient is advised take this medication as prescribed as taking more than prescribed and the development of tolerance, physical dependence, addiction. Due to this change in opioid medication, we have ordered a urine drug screen. OARRS reviewed and is appropriate. Contract signed. Adjuvants: In light of the presence of a neuropathic component of pain, patient is advised to continue Lyrica 150 mg twice a day. Interventions:   None    Anticoagulation/NPO Recommendations:   None    Multidisciplinary Pain Management:   In the presence of complex, chronic, and multi-factorial pain, the importance of a multidisciplinary approach to pain management in the patients management regimen was emphasized and discussed in great detail.      Referrals:  None    Prescriptions Written This Visit:   Percocet 5 mg     Follow-up: 8 weeks      Aylin Gonzalez DO  Interventional Pain Management/PM&R   [place]

## 2021-11-05 ENCOUNTER — OFFICE VISIT (OUTPATIENT)
Dept: ONCOLOGY | Age: 56
End: 2021-11-05
Payer: COMMERCIAL

## 2021-11-05 ENCOUNTER — HOSPITAL ENCOUNTER (OUTPATIENT)
Dept: INFUSION THERAPY | Age: 56
Discharge: HOME | End: 2021-11-05
Payer: COMMERCIAL

## 2021-11-05 VITALS
BODY MASS INDEX: 29.45 KG/M2 | HEART RATE: 91 BPM | HEIGHT: 60 IN | TEMPERATURE: 98.4 F | SYSTOLIC BLOOD PRESSURE: 110 MMHG | DIASTOLIC BLOOD PRESSURE: 62 MMHG | OXYGEN SATURATION: 97 % | WEIGHT: 150 LBS | RESPIRATION RATE: 18 BRPM

## 2021-11-05 DIAGNOSIS — C50.912 BREAST CANCER METASTASIZED TO AXILLARY LYMPH NODE, LEFT (HCC): ICD-10-CM

## 2021-11-05 DIAGNOSIS — Z79.811 PROPHYLACTIC USE OF ANASTROZOLE (ARIMIDEX): ICD-10-CM

## 2021-11-05 DIAGNOSIS — C50.912 INVASIVE DUCTAL CARCINOMA OF LEFT BREAST (HCC): Primary | ICD-10-CM

## 2021-11-05 DIAGNOSIS — Z17.0 ESTROGEN RECEPTOR POSITIVE STATUS (ER+): ICD-10-CM

## 2021-11-05 DIAGNOSIS — Z90.12 S/P LEFT MASTECTOMY: ICD-10-CM

## 2021-11-05 DIAGNOSIS — C77.3 BREAST CANCER METASTASIZED TO AXILLARY LYMPH NODE, LEFT (HCC): ICD-10-CM

## 2021-11-05 PROCEDURE — 4004F PT TOBACCO SCREEN RCVD TLK: CPT | Performed by: INTERNAL MEDICINE

## 2021-11-05 PROCEDURE — G9899 SCRN MAM PERF RSLTS DOC: HCPCS | Performed by: INTERNAL MEDICINE

## 2021-11-05 PROCEDURE — G8484 FLU IMMUNIZE NO ADMIN: HCPCS | Performed by: INTERNAL MEDICINE

## 2021-11-05 PROCEDURE — G8417 CALC BMI ABV UP PARAM F/U: HCPCS | Performed by: INTERNAL MEDICINE

## 2021-11-05 PROCEDURE — G8427 DOCREV CUR MEDS BY ELIG CLIN: HCPCS | Performed by: INTERNAL MEDICINE

## 2021-11-05 PROCEDURE — 99211 OFF/OP EST MAY X REQ PHY/QHP: CPT

## 2021-11-05 PROCEDURE — 99214 OFFICE O/P EST MOD 30 MIN: CPT | Performed by: INTERNAL MEDICINE

## 2021-11-05 PROCEDURE — 3017F COLORECTAL CA SCREEN DOC REV: CPT | Performed by: INTERNAL MEDICINE

## 2021-11-05 RX ORDER — ANASTROZOLE 1 MG/1
1 TABLET ORAL EVERY OTHER DAY
Qty: 30 TABLET | Refills: 3 | Status: SHIPPED | OUTPATIENT
Start: 2021-11-05 | End: 2022-01-05 | Stop reason: SDUPTHER

## 2021-11-05 ASSESSMENT — ENCOUNTER SYMPTOMS
FACIAL SWELLING: 0
RECTAL PAIN: 0
BLOOD IN STOOL: 0
BACK PAIN: 0
WHEEZING: 0
CHEST TIGHTNESS: 0
SORE THROAT: 0
VOMITING: 0
COUGH: 0
CONSTIPATION: 0
NAUSEA: 0
ABDOMINAL PAIN: 0
COLOR CHANGE: 0
DIARRHEA: 0
SHORTNESS OF BREATH: 0
TROUBLE SWALLOWING: 0
ABDOMINAL DISTENTION: 0
EYE DISCHARGE: 0

## 2021-11-05 NOTE — PROGRESS NOTES
[facility], [address] [facility] [address]  [address]  Dept: [phone]  Dept Fax: [phone]: [phone]    Visit Date:11/5/2021     Yfn Sanchez is a 49 y.o. female who presents today for:   Chief Complaint   Patient presents with    Follow-up     Invasive ductal carcinoma of left breast         HPI:   This is a 57-year old postmenopausal patient with h/o left breast cancer. 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 She gets Botox injections for contractures in her lower extremities. She has also multiple sclerosis, neurogenic bowel and bladder, bipolar disorder. She carries history of cervical cancer. Also has mild chronic thrombocytopenia going back to 2014. Avila Farley presented for diagnostic breast imaging after she felt a mass in the left breast.  Mammogram on January 4, 2021 showed irregular high density mass in the left breast central to the nipple and numerous lymph nodes in the right axilla. Ultrasounds of both axilla and the breast were performed and ultimately ultrasound-guided biopsies of the mass in the lymph node on the left. On ultrasound of the right axilla there were no abnormalities seen and no abnormal lymph nodes. Ultrasound imaging revealed a 2.2 x 2.2 x 2.9 cm mass in the left breast in the retroareolar area. A single lymph node in the left axilla appeared to have a moderate suspicion for malignancy. Ultrasound-guided biopsies were performed on January 14, 2021. Pathology revealed a grade 1 well-differentiated invasive adenocarcinoma with mucinous features, the lymph node was negative for malignancy. Breast cancer cells were estrogen receptor 100% positive, progesterone receptor 80% positive. Ki-67 was 6%,  HER-2 by IHC was negative.     Sequently she met with the surgeon Dr. Allison gross and after discussing her surgical treatment options she decided to proceed with a left breast mastectomy and axillary lymph node excision. She had her surgery on February 19, 2021 final pathology report showed: She gets Botox injections for contractures in her lower extremities. Recent onset of thrombocytopenia. A.  Left sentinel lymph node, excision:    Metastatic carcinoma in 2 of 4 lymph nodes (2/4). B.  Left breast, mastectomy:    Mucinous micropapillary carcinoma, Lynn grade 2 (pT2, pN1a).  Lymphovascular space invasion is present.    Margins are negative.    C.  Left axillary lymph nodes, excision:    Two lymph nodes, negative for malignancy (0/2). Oncotype DX Breast Cancer Assay (RT-PCR) performed by [employer]   Breast Cancer Recurrence Score:   8     Her post mastectomy incision is complicated by seroma formation. The patient required aspiration. Interim history on 11/05/2021:  The patient presents to the medical oncology clinic to discuss adjuvant hormonal treatment. Her radiation treatment has been delayed due postmastectomy surgical wound dehiscence and delayed healing. She started radiation treatment on August 13, 2021 and completed on 10/08/2021. She received 6000 cGy in 30 fractions. .  The patient has developed some skin erythema and skin peeling. The patient reports persistent arthralgia. HPI   Past Medical History:   Diagnosis Date    Anxiety     Anxiety and depression     Asthma     Bipolar 1 disorder ([address]     Bipolar 1 disorder with moderate sourav ([address]     Blood circulation, collateral     Breast cancer ([address]     diagnosed in jan 2021    Cancer [facility])      ?  cervical \"long time ago\"    Cancer ([address] 01/15/2021    Left Breast    Depression     Endometriosis     GERD (gastroesophageal reflux disease)     Kidney stones     Lupus ([address]     Movement disorder     MS (multiple sclerosis) ([address]     Schizophrenia ([address]     Thyroid disease     Type 2 diabetes mellitus without complication ([address]     Unspecified cerebral drinker      Current Outpatient Medications   Medication Sig Dispense Refill    anastrozole (ARIMIDEX) 1 MG tablet Take 1 tablet by mouth every other day 30 tablet 3    [START ON 11/28/2021] oxyCODONE-acetaminophen (PERCOCET) 5-325 MG per tablet Take 1 tablet by mouth 2 times daily as needed for Pain for up to 30 days. (Patient not taking: Reported on 11/15/2021) 60 tablet 0    HYDROcodone-acetaminophen (NORCO) 5-325 MG per tablet Take 1 tablet by mouth 2 times daily as needed for Pain for up to 30 days. 60 tablet 0    Lactobacillus Acid-Pectin (ACIDOPHILUS/CITRUS PECTIN) TABS Take 1 tablet by mouth daily 30 tablet 0    BD PEN NEEDLE JACOBO 2ND GEN 32G X 4 MM MISC use as directed WITH INSULIN      atorvastatin (LIPITOR) 10 MG tablet take 1 tablet by mouth once daily      baclofen (LIORESAL) 10 MG tablet Take 2 tablets by mouth 3 times daily 135 tablet 5    naloxone 4 MG/0.1ML LIQD nasal spray 1 spray by Nasal route as needed for Opioid Reversal (Patient not taking: Reported on 11/15/2021) 1 each 5    metFORMIN (GLUCOPHAGE) 1000 MG tablet Take 1,000 mg by mouth 2 times daily      desvenlafaxine succinate (PRISTIQ) 50 MG TB24 extended release tablet Take 50 mg by mouth daily      MUCINEX MAXIMUM STRENGTH 1200 MG TB12 Take 1 tablet by mouth 2 times daily      paliperidone (INVEGA) 3 MG extended release tablet Take 3 mg by mouth nightly      SITagliptin (JANUVIA) 100 MG tablet Take 100 mg by mouth daily      Lifitegrast (XIIDRA) 5 % SOLN Place 1 drop into both eyes daily      Misc.  Devices [facility]) [address] [facility] Wheelchair repairs- new seat cover, right side armrest replacement    Current body weight:  68 kg  Diagnosis: gait disturbance, chronic incomplete spastic paraplegia  Length of need: Lifetime 1 each 0    tiotropium (SPIRIVA) 18 MCG inhalation capsule Inhale 18 mcg into the lungs daily 2 puffs      insulin glargine (LANTUS SOLOSTAR) 100 UNIT/ML injection pen Inject 60 Units into the skin nightly       montelukast (SINGULAIR) 10 MG tablet Take 10 mg by mouth nightly   0    artificial tears (ARTIFICIAL TEARS) OINT as needed      Calcium Carbonate (CALCIUM 600 PO) Take 1 tablet by mouth 2 times daily      Multiple Vitamin (TAB-A-ISAAC PO) Take 1 tablet by mouth daily      loratadine (CLARITIN) 10 MG tablet Take 1 tablet by mouth daily as needed. 0    PROAIR  (90 BASE) MCG/ACT inhaler Inhale 2 puffs into the lungs every 6 hours as needed. 0    Incontinence Supply Disposable (UNDERPADS) MISC Cloth chux pads  Diagnosis: Paraplegia 344. 1 100 Bottle 11    hydrOXYzine (VISTARIL) 50 MG capsule Take 50 mg by mouth 3 times daily as needed for Itching.  omeprazole (PRILOSEC) 20 MG capsule Take 20 mg by mouth daily.  amitriptyline (ELAVIL) 75 MG tablet Take 1 tablet by mouth nightly 30 tablet 2    pregabalin (LYRICA) 150 MG capsule take 1 capsule by mouth twice a day 60 capsule 2     No current facility-administered medications for this visit. Allergies   Allergen Reactions    Penicillins Shortness Of Breath     \"I almost \"  Other reaction(s): PENICILLINS    Seasonal Itching      Health Maintenance   Topic Date Due    Hepatitis C screen  Never done    HIV screen  Never done    Colon cancer screen colonoscopy  Never done    Shingles Vaccine (1 of 2) Never done    A1C test (Diabetic or Prediabetic)  2017    Pneumococcal 0-64 years Vaccine (2 of 4 - PPSV23) 2021    COVID-19 Vaccine (3 - Moderna risk 4-dose series) 2021    Flu vaccine (1) 2021    Lipid screen  2022    Breast cancer screen  2022    Cervical cancer screen  2024    DTaP/Tdap/Td vaccine (2 - Td or Tdap) 2024    Hepatitis A vaccine  Aged Out    Hepatitis B vaccine  Aged Out    Hib vaccine  Aged Out    Meningococcal (ACWY) vaccine  Aged Out        Subjective:   Review of Systems   Constitutional: Negative for activity change, appetite change, fatigue and fever.    HENT: Negative for congestion, dental problem, facial swelling, hearing loss, mouth sores, nosebleeds, sore throat, tinnitus and trouble swallowing. Eyes: Negative for discharge and visual disturbance. Respiratory: Negative for cough, chest tightness, shortness of breath and wheezing. Cardiovascular: Negative for chest pain, palpitations and leg swelling. Gastrointestinal: Negative for abdominal distention, abdominal pain, blood in stool, constipation, diarrhea, nausea, rectal pain and vomiting. Endocrine: Negative for cold intolerance, polydipsia and polyuria. Genitourinary: Positive for difficulty urinating. Negative for decreased urine volume, dysuria, flank pain, hematuria and urgency. Musculoskeletal: Negative for arthralgias, back pain, gait problem, joint swelling, myalgias and neck stiffness. Skin: Negative for color change, rash and wound. Neurological: Negative for dizziness, tremors, seizures, speech difficulty, weakness (The patient has spastic contractures in the lower extremities), light-headedness, numbness and headaches. Hematological: Negative for adenopathy. Bruises/bleeds easily. Psychiatric/Behavioral: Positive for behavioral problems. Negative for confusion and sleep disturbance. The patient is nervous/anxious. Objective:   Physical Exam  Vitals reviewed. Constitutional:       General: She is not in acute distress. Appearance: She is well-developed. HENT:      Head: Normocephalic. Mouth/Throat:      Pharynx: No oropharyngeal exudate. Eyes:      General: No scleral icterus. Right eye: No discharge. Left eye: No discharge. Pupils: Pupils are equal, round, and reactive to light. Neck:      Thyroid: No thyromegaly. Vascular: No JVD. Trachea: No tracheal deviation. Cardiovascular:      Rate and Rhythm: Normal rate. Heart sounds: Normal heart sounds. No murmur heard. No friction rub. No gallop.     Pulmonary:      Effort: Pulmonary effort is normal. No respiratory distress. Breath sounds: Normal breath sounds. No stridor. No wheezing or rales. Chest:      Chest wall: No tenderness. Abdominal:      General: Bowel sounds are normal. There is no distension. Palpations: Abdomen is soft. There is no mass. Tenderness: There is no abdominal tenderness. There is no rebound. Musculoskeletal:         General: Normal range of motion. Cervical back: Normal range of motion and neck supple. Comments: Good range of motion in all four extremities. Lymphadenopathy:      Cervical: No cervical adenopathy. Skin:     General: Skin is warm. Findings: No erythema or rash. Neurological:      Mental Status: She is alert and oriented to person, place, and time. Cranial Nerves: No cranial nerve deficit. Motor: No abnormal muscle tone. Deep Tendon Reflexes: Reflexes are normal and symmetric. Psychiatric:         Behavior: Behavior normal.         Thought Content:  Thought content normal.         Judgment: Judgment normal.         /62 (Site: Right Upper Arm, Position: Sitting, Cuff Size: Medium Adult)   Pulse 91   Temp 98.4 °F (36.9 °C) (Oral)   Resp 18   Ht 5' (1.524 m)   Wt 150 lb (68 kg)   SpO2 97%   BMI 29.29 kg/m²      ECOG status is 2    Imaging studies and labs:     Lab Results   Component Value Date    WBC 2.9 (L) 04/01/2021    HGB 12.0 04/01/2021    HCT 35.7 (L) 04/01/2021    MCV 78.5 (L) 04/01/2021     (L) 04/01/2021       Chemistry        Component Value Date/Time     02/19/2021 1011    K 4.2 02/19/2021 1011     02/19/2021 1011    CO2 28 02/19/2021 1011    BUN 11 02/19/2021 1011    CREATININE 0.3 (L) 02/19/2021 1011        Component Value Date/Time    CALCIUM 9.1 02/19/2021 1011    ALKPHOS 121 01/07/2021 1610    AST 27 01/07/2021 1610    ALT 17 01/07/2021 1610    BILITOT 0.7 01/07/2021 1610    BILITOT NEGATIVE 03/14/2018 0000            Assessment/Plan: Diagnosis Orders   1. Invasive ductal carcinoma of left breast ([facility]     2. S/P left mastectomy     3. Estrogen receptor positive status (ER+)     4. Breast cancer metastasized to axillary lymph node, left ([address]     5. Prophylactic use of anastrozole (Arimidex)          1. Stage IB (pT2, pN1a, cM0, G2, ER+, ME+, HER2-, Oncotype DX score: 8) left breast cancer. The patient is s/p left breast mastectomy with axillary lymph nodes excision in February 2021. The Oncotype Dx 21-gene recurrence score (RS) is the best-validated prognostic and predictive assay to identify patients who are most and least likely to derive benefit from adjuvant chemotherapy. RS 25 or below identifies women with low risk of disease recurrence for whom chemotherapy is unlikely to provide a benefit. Her recurrence score came back as 8. The RS for those with one to three positive nodes, using cutoffs as for those with node-negative disease, can be used to make decision about adjuvant chemotherapy. This approach is included in the ASCO guidelines and in the NCCN. Rationale for this approach is that, in the modern era of more effective local therapy, and widespread application of adjuvant aromatase inhibitors, lymph node positivity might not confer the same degree of heightened risk as it once did. The SWOG  phase III study in women with hormone responsive, HER-2/maegan negative breast cancer with 1-3 axillary lymph nodes involvement showed no overall benefit from adjuvant chemotherapy for postmenopausal women with recurrence score between 0 and 25. In addition due to patient's multiple comorbidities she is poor candidate for systemic chemotherapy. As such my recommendation is to proceed with postmastectomy radiation treatment.   The patient did not start radiation treatment due to need for revision of wound with excision of skin and subcutaneous fat 16 cm2 with tissue advancement flaps inferior and superior, totaling 18 cm skin and subcutaneous fat with wound closure on 4/27/21. The patient started delay of radiation treatment to the chest wall on August 13, 2021 and completed on October 8, 2021. She received 6000 cGy in 30 fractions. 2.  Adjuvant hormonal therapy. The patient's breast cancer is estrogen receptor and progesterone receptor positive. Patient presents to the medical oncology clinic to discuss and start antiestrogen therapy. Adjuvant hormonal therapy lowers the risk of distant disease recurrence, risk of local disease recurrence, risk of contralateral breast cancer and improves overall survival.  Side effects of arimidex explained: including but not limited to hot flashes, aches, osteoporosis, edema, vasodilation, rash, GI upset, mood changes, sob/cough, dyslipidemia, thrombophlebitis, anemia, leukopenia, thromboembolic disorder (rare, more so with tamoxifen), hypersensitivity, vaginal bleeding (rare, more so with tamoxifen), pain - pt understands and agrees to proceed. Patient has underlying diffuse arthralgia attributable to her MS. Therefore we will start a on every other day. In addition the patient was instructed to try glucosamine and chondroitin or turmeric for her joint pain. Return in about 9 weeks (around 1/7/2022). Orders Placed:   No orders of the defined types were placed in this encounter. Medications Prescribed:   Orders Placed This Encounter   Medications    anastrozole (ARIMIDEX) 1 MG tablet     Sig: Take 1 tablet by mouth every other day     Dispense:  30 tablet     Refill:  3            Discussed use, benefit, and side effectsof prescribed medications. All patient questions answered. Pt voiced understanding. Instructed to continue current medications, diet and exercise. Patient agreed with treatment plan. Follow up as directed.     Electronically signed by Carrington Lima MD on 3/27/21 at 10:39 AM EDT

## 2021-11-05 NOTE — PATIENT INSTRUCTIONS
1.  The patient was instructed to take the glucosamine/chondroitin twice a day and take turmeric 1 tablet daily  2.  RTC to see me in 2 months

## 2021-11-08 ENCOUNTER — HOSPITAL ENCOUNTER (OUTPATIENT)
Dept: RADIATION ONCOLOGY | Age: 56
Discharge: HOME | End: 2021-11-08
Attending: RADIOLOGY
Payer: COMMERCIAL

## 2021-11-08 VITALS
SYSTOLIC BLOOD PRESSURE: 124 MMHG | RESPIRATION RATE: 16 BRPM | DIASTOLIC BLOOD PRESSURE: 58 MMHG | OXYGEN SATURATION: 99 % | TEMPERATURE: 98.3 F | HEART RATE: 78 BPM

## 2021-11-08 PROCEDURE — 99215 OFFICE O/P EST HI 40 MIN: CPT | Performed by: NURSE PRACTITIONER

## 2021-11-08 PROCEDURE — 99212 OFFICE O/P EST SF 10 MIN: CPT | Performed by: NURSE PRACTITIONER

## 2021-11-08 ASSESSMENT — PAIN DESCRIPTION - PAIN TYPE: TYPE: CHRONIC PAIN

## 2021-11-08 ASSESSMENT — PAIN SCALES - GENERAL: PAINLEVEL_OUTOF10: 1

## 2021-11-08 ASSESSMENT — PAIN DESCRIPTION - LOCATION: LOCATION: CHEST;LEG

## 2021-11-08 NOTE — PROGRESS NOTES
[facility], [address]  Phone: [phone]   Toll Free: [phone]   Fax: [phone]    RADIATION ONCOLOGY FOLLOW UP REPORT    PATIENT NAME:  Abhi Noe     : 1965  MEDICAL RECORD NO: [id number]    LOCATION: [facility] NO: [id number]      PROVIDER: CHAS Sweeney CNP        DATE OF SERVICE: 2021    FOLLOW UP PHYSICIANS: Elias Calixto; Colonel Persaud; David Mejias    DIAGNOSIS: C50.212 -- Malignant neoplasm of the upper inner quadrant left female breast; Invasive ductal carcinoma, mucinous type, Grade 1; pT2 pN1a M0, Stage IA; ER+; CA+; Her2-; Oncotype Dx Score 8. DATE OF DIAGNOSIS: 2021    END OF TREATMENT DATE: 10/8/2021    ECOG PERFORMANCE STATUS: 0    PAIN: Denies    CHAPERONE:  Declined      HPI: Diogo Wright a 62-year-old woman with multiple medical problems including lupus erythematosus and multiple sclerosis who was recently diagnosed with a left breast cancer. She felt a mass in her left breast, prompting her to seek medical evaluation. She underwent diagnostic imaging of the breast showing a suspicious mass in the central portion of the left breast.  She subsequently underwent ultrasound-guided biopsy of the left and also a suspicious appearing left axillary lymph node. The biopsy confirmed the presence of well-differentiated invasive ductal carcinoma with mucinous features in the left breast mass. The cancer was strongly positive for estrogen and also positive for progesterone with a low proliferative index and negative HER-2/maegan (see pathology reports below). After discussing her local/regional treatment options, Leif Petty elected to undergo breast conservation surgery. She underwent lumpectomy and sentinel lymph node biopsy 2021. Final pathology showed a 2.9 cm invasive adenocarcinoma with mucinous features as previously noted.   2 of the 4 sampled lymph nodes were involved with metastatic carcinoma with no extranodal extension. Tatyana Roberson was seen by Dr. Luis Angel Dougherty in medical oncology. Oncotype DX testing was performed on her cancer, and returned with a low recurrence score of 8. Therefore, the recommendation is for adjuvant endocrine therapy without chemotherapy. Tatyana Roberson is being seen today 3/24/2021 for evaluation and discussion regarding the potential role of radiation therapy in the management of her breast cancer. Tatyana Roberson experienced healing difficulties since her surgery, which may be in part related to her lupus. Recently underwent excisional debridement with intermediate wound closure. She also has significant range of motion issues related to her multiple sclerosis. Tatyana Roberson did develop pronounced radiation skin reaction. She was placed on treatment break for 1 week to allow for some skin recovery. She was able to restart treatment one week later and did not require any further breaks in treatment. INTERVAL HISTORY: Tatyana Roberson returns to [facility] for a posttreatment follow-up after undergoing surgery and radiation therapy for left breast cancer. Tatyana Roberson states she is tolerating Arimidex without any adverse side effects. She admits to some fatigue but this is unchanged for her. She denies complaints. Later to the radiation treatment at this time. She states her skin is healed and she is not having any other lingering side effects. Tatyana Roberson denies chest pain, shortness of breath, fever, chills, nausea, abdominal pain, dizziness, headaches, decreased left arm range of motion, swelling to the left chest wall or arm, unintentional weight loss or loss of appetite.     LAB RESULTS:   CBC:   Lab Results   Component Value Date    WBC 2.9 04/01/2021    RBC 4.55 04/01/2021    HGB 12.0 04/01/2021    HCT 35.7 04/01/2021    MCV 78.5 04/01/2021    MCH 26.4 04/01/2021    MCHC 33.6 04/01/2021     04/01/2021    MPV 11.1 02/20/2021     CMP:  Lab Results   Component Value Date  02/19/2021    K 4.2 02/19/2021     02/19/2021    CO2 28 02/19/2021    BUN 11 02/19/2021    CREATININE 0.3 02/19/2021    LABGLOM >90 02/19/2021    GLUCOSE 293 02/19/2021    PROT 7.1 01/07/2021    LABALBU 3.6 01/07/2021    CALCIUM 9.1 02/19/2021    BILITOT 0.7 01/07/2021    ALKPHOS 121 01/07/2021    AST 27 01/07/2021    ALT 17 01/07/2021        RADIOLOGY RESULTS:   No recent imaging    MEDICATIONS:   Current Outpatient Medications   Medication Sig Dispense Refill    anastrozole (ARIMIDEX) 1 MG tablet Take 1 tablet by mouth every other day 30 tablet 3    [START ON 11/28/2021] oxyCODONE-acetaminophen (PERCOCET) 5-325 MG per tablet Take 1 tablet by mouth 2 times daily as needed for Pain for up to 30 days. 60 tablet 0    HYDROcodone-acetaminophen (NORCO) 5-325 MG per tablet Take 1 tablet by mouth 2 times daily as needed for Pain for up to 30 days.  60 tablet 0    Lactobacillus Acid-Pectin (ACIDOPHILUS/CITRUS PECTIN) TABS Take 1 tablet by mouth daily 30 tablet 0    pregabalin (LYRICA) 150 MG capsule take 1 capsule by mouth twice a day 60 capsule 2    BD PEN NEEDLE JACOBO 2ND GEN 32G X 4 MM MISC use as directed WITH INSULIN      atorvastatin (LIPITOR) 10 MG tablet take 1 tablet by mouth once daily      baclofen (LIORESAL) 10 MG tablet Take 2 tablets by mouth 3 times daily 135 tablet 5    amitriptyline (ELAVIL) 25 MG tablet Take 2 tablets by mouth nightly 30 tablet 5    naloxone 4 MG/0.1ML LIQD nasal spray 1 spray by Nasal route as needed for Opioid Reversal 1 each 5    metFORMIN (GLUCOPHAGE) 1000 MG tablet Take 1,000 mg by mouth 2 times daily      desvenlafaxine succinate (PRISTIQ) 50 MG TB24 extended release tablet Take 50 mg by mouth daily      traZODone (DESYREL) 50 MG tablet Take 50 mg by mouth nightly      MUCINEX MAXIMUM STRENGTH 1200 MG TB12 Take 1 tablet by mouth 2 times daily      paliperidone (INVEGA) 3 MG extended release tablet Take 3 mg by mouth nightly      SITagliptin (Jalil Frohlich) 100 MG tablet Take 100 mg by mouth daily      Lifitegrast (XIIDRA) 5 % SOLN Place 1 drop into both eyes daily      [facility] [employer] [facility] [address] Wheelchair repairs- new seat cover, right side armrest replacement    Current body weight:  68 kg  Diagnosis: gait disturbance, chronic incomplete spastic paraplegia  Length of need: Lifetime 1 each 0    tiotropium (SPIRIVA) 18 MCG inhalation capsule Inhale 18 mcg into the lungs daily 2 puffs      insulin glargine (LANTUS SOLOSTAR) 100 UNIT/ML injection pen Inject 60 Units into the skin nightly       montelukast (SINGULAIR) 10 MG tablet Take 10 mg by mouth nightly   0    artificial tears (ARTIFICIAL TEARS) OINT as needed      Calcium Carbonate (CALCIUM 600 PO) Take 1 tablet by mouth 2 times daily      Multiple Vitamin (TAB-A-ISAAC PO) Take 1 tablet by mouth daily      loratadine (CLARITIN) 10 MG tablet Take 1 tablet by mouth daily as needed. 0    PROAIR  (90 BASE) MCG/ACT inhaler Inhale 2 puffs into the lungs every 6 hours as needed. 0    Incontinence Supply Disposable (UNDERPADS) MISC Cloth chux pads  Diagnosis: Paraplegia 344. 1 100 Bottle 11    hydrOXYzine (VISTARIL) 50 MG capsule Take 50 mg by mouth 3 times daily as needed for Itching.  omeprazole (PRILOSEC) 20 MG capsule Take 20 mg by mouth daily. Current Facility-Administered Medications   Medication Dose Route Frequency Provider Last Rate Last Admin    onabotulinumtoxin A (BOTOX) injection 500 Units  500 Units IntraMUSCular Once José Miguel Cantor MD             ROS: As noted in the HPI and Interval history, otherwise negative. EXAMINATION:   GENERAL: Kris Kang is a pleasant well adult female. She is chronically in a wheelchair. She is alert and oriented x3. VITAL SIGNS: Weight obtained 11/5/2021 68 kg, temperature 98.3 °F, pulse 78, blood pressure 124/58, respirations 16, pulse ox 99% on room air. HEENT: Normocephalic, atraumatic. PERRL. EOMI.   Ears, nose and lips are within normal limits on external examination. Oropharynx unremarkable. NECK: Without palpable cervical adenopathy. LYMPH: Without palpable supraclavicular axillary adenopathy. LUNGS: Clear without adventitious sounds. Respirations easy and unlabored. HEART: Regular rate and rhythm. Without murmur. BREASTS: Left breast surgically absent. Left chest wall with mild residual hyperpigmentation. Surgical incision site well-healed without signs of infection. No concerning masses, lumps or skin rashes. ABDOMEN: Soft, nontender, nondistended. Active bowel sounds x4. EXTREMITIES: Without clubbing or cyanosis. Full range of motion of bilateral upper extremities. Bilateral lower extremity weakness due to lupus. NEUROLOGIC EXAMINATION: Cranial nerves grossly intact. ASSESSMENT: Jaime Alarcon was diagnosed with left breast cancer in January 2021. She underwent treatment with surgery followed by adjuvant radiation therapy and most recently was initiated on adjuvant Arimidex. Jaime Alarcon is tolerating Arimidex well without any side effects. She will be due for her right mammogram in January. She still has mild residual hyperpigmentation noted to the left chest wall otherwise she does not have any lingering side effects related to the radiation treatment. PLAN:  1. Recovering well from radiation treatment mild residual hyperpigmentation noted to the left chest wall. Continue gentle skin care and moisturizer. 2. Arimidex started on 11/5/2021 and tolerating well without side effects  3. Right mammogram due in January. 4. Continue follow-up with other providers as scheduled. 5. Follow-up here in May to offset appointments with medical oncology. 10. Jaime Alarcon is aware she can call for questions, concerns or changes in condition.     Electronically signed by CHAS Davila CNP on 11/8/21 at 2:38 PM EST    ATTESTATION:  I have spent 42 minutes reviewing previous notes, test results and face to face with the patient discussing the diagnosis and importance of compliance with the treatment plan as well as documenting on the day of the visit.     CC: Elias Juan; Graham Wei; Grayson David

## 2021-11-11 ENCOUNTER — OFFICE VISIT (OUTPATIENT)
Dept: PHYSICAL MEDICINE AND REHAB | Age: 56
End: 2021-11-11
Payer: COMMERCIAL

## 2021-11-11 VITALS
HEIGHT: 60 IN | SYSTOLIC BLOOD PRESSURE: 102 MMHG | WEIGHT: 150 LBS | DIASTOLIC BLOOD PRESSURE: 60 MMHG | BODY MASS INDEX: 29.45 KG/M2

## 2021-11-11 DIAGNOSIS — R29.898 LEG WEAKNESS, BILATERAL: ICD-10-CM

## 2021-11-11 DIAGNOSIS — G37.3 TRANSVERSE MYELITIS (HCC): Primary | ICD-10-CM

## 2021-11-11 DIAGNOSIS — G95.11 SPINAL CORD INFARCTION (HCC): ICD-10-CM

## 2021-11-11 DIAGNOSIS — G89.29 OTHER CHRONIC PAIN: ICD-10-CM

## 2021-11-11 DIAGNOSIS — G82.20 PARAPLEGIA (HCC): ICD-10-CM

## 2021-11-11 PROCEDURE — 99213 OFFICE O/P EST LOW 20 MIN: CPT | Performed by: NURSE PRACTITIONER

## 2021-11-11 PROCEDURE — G9899 SCRN MAM PERF RSLTS DOC: HCPCS | Performed by: NURSE PRACTITIONER

## 2021-11-11 PROCEDURE — G8417 CALC BMI ABV UP PARAM F/U: HCPCS | Performed by: NURSE PRACTITIONER

## 2021-11-11 PROCEDURE — G8427 DOCREV CUR MEDS BY ELIG CLIN: HCPCS | Performed by: NURSE PRACTITIONER

## 2021-11-11 PROCEDURE — 4004F PT TOBACCO SCREEN RCVD TLK: CPT | Performed by: NURSE PRACTITIONER

## 2021-11-11 PROCEDURE — 64642 CHEMODENERV 1 EXTREMITY 1-4: CPT | Performed by: PHYSICAL MEDICINE & REHABILITATION

## 2021-11-11 PROCEDURE — 3017F COLORECTAL CA SCREEN DOC REV: CPT | Performed by: NURSE PRACTITIONER

## 2021-11-11 PROCEDURE — G8484 FLU IMMUNIZE NO ADMIN: HCPCS | Performed by: NURSE PRACTITIONER

## 2021-11-11 RX ORDER — AMITRIPTYLINE HYDROCHLORIDE 75 MG/1
75 TABLET, FILM COATED ORAL NIGHTLY
Qty: 30 TABLET | Refills: 2 | Status: SHIPPED | OUTPATIENT
Start: 2021-11-11 | End: 2022-05-10 | Stop reason: SDUPTHER

## 2021-11-11 NOTE — PROGRESS NOTES
[facility]  Procedure Note    Verbal consent obtained for botulinum toxin injections after risks versus benefits discussed. 500 Units of Botox were reconstituted using preservative-free normal saline in a 100 unit to 2 mL solution. Alcohol swabs were used to cleanse the skin before injections were performed. Back pressure was placed on the syringe during injections to avoid any intravascular injection. EMG guidance was not used during procedure. Botulinum toxin was injected in the following muscles of the right lower limb:  Lateral hamstring 150 units, medial hamstring 150 units, lateral gastroc 100 units, medial gastroc 100 units    The patient tolerated the procedure well with no immediate complications. The patient should call with concerns or questions over the coming days.     CPT: [id number]    Aliza Metzger MD

## 2021-11-11 NOTE — PROGRESS NOTES
[address] [facility]  Outpatient progress note    Chief Complaint:   Chief Complaint   Patient presents with    Follow-up     500 units botox        Subjective: Delia Faith is a 49 y.o. female who returns to the office today for further follow up. Patient with left breast cancer diagnosis with mastectomy 2/19/21 with Dr Aria Roberts. Incision healed, required wound vac though. Treatments with radiation concluded 10/8/21. Now on 5 year Arimidex treatment. 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 Botox injections remain beneficial.  Would like repeat botox injections today. Remains on amitriptyline and Lyrica. Elavil 50mg. Patient states she has not noticed a difference with her neuropathy pain. Patient not sleeping well either. Will increase Elavil to 75mg. Pain management now working on treatment for pain. Medications reviewed. Patient denies side effects with medications. Patient states she is taking medications as prescribed. She denies receiving pain medications from other sources.  She denies any ER visits since last visit.       Review of Systems:  CONSTITUTIONAL:  negative  EYES:  negative  HEENT:  negative  RESPIRATORY:  negative  CARDIOVASCULAR:  negative  GASTROINTESTINAL:  positive for neurogenic bowel  GENITOURINARY:  positive for neurogenic bladder  SKIN:  negative  HEMATOLOGIC/LYMPHATIC:  negative  MUSCULOSKELETAL:  negative  NEUROLOGICAL:  positive for gait problems, weakness, pain, tingling and spasticity  BEHAVIOR/PSYCH:  negative  All other review of systems otherwise negative    Physical Exam:  /60   Ht 5' (1.524 m)   Wt 150 lb (68 kg)   BMI 29.29 kg/m²     awake  Orientation:   person, place, time  Mood: within normal limits  Affect: calm  General appearance: Patient is well nourished, well developed, well groomed and in no acute distress, appearing stated age    ROM: abnormal - right knee and ankle restricted due to spasticity, severe-improving  Motor Exam:  2/5 right knee extension and ADF. 3/5 right knee flexion  Tone:  abnormal - 2-3/4 right leg/ankle  Muscle bulk: within normal limits  Sensory:  Decreased sensation    Skin: warm and dry, no rash or erythema  Peripheral vascular: Pulses: Normal upper and lower extremity pulses; Edema: no      Impression:  · Multiple sclerosis  · Transverse myelitis  · Lupus  · Neurogenic bowel and bladder  · Spastic paraplegia  · Bilateral lower limb neuralgia  · Moderate bilateral carpal tunnel syndrome    Plan:  · Repeat botulinum toxin injections performed by Dr. Bernadine Small. See procedure note. · Lyrica 150 mg twice daily - managed by Dr Leblanc Medicus  · Amitriptyline to 75mg   · Baclofen 20mg TID   · Stop trazodone   · Continue with bowel and bladder program  · 500 units botox        Return in about 3 months (around 2/11/2022) for Botox with Dr Bernadine Small -  500 units botox. It was my pleasure to evaluate Gera Potter today. Please call with any concerns or questions.      Kristie Forde, CHAS - CNP

## 2021-11-15 ENCOUNTER — OFFICE VISIT (OUTPATIENT)
Dept: SURGERY | Age: 56
End: 2021-11-15
Payer: COMMERCIAL

## 2021-11-15 VITALS
WEIGHT: 150 LBS | TEMPERATURE: 97.6 F | BODY MASS INDEX: 29.45 KG/M2 | HEIGHT: 60 IN | DIASTOLIC BLOOD PRESSURE: 61 MMHG | OXYGEN SATURATION: 97 % | RESPIRATION RATE: 15 BRPM | SYSTOLIC BLOOD PRESSURE: 119 MMHG | HEART RATE: 67 BPM

## 2021-11-15 DIAGNOSIS — G35 MULTIPLE SCLEROSIS (HCC): ICD-10-CM

## 2021-11-15 DIAGNOSIS — M32.9 SYSTEMIC LUPUS ERYTHEMATOSUS, UNSPECIFIED SLE TYPE, UNSPECIFIED ORGAN INVOLVEMENT STATUS (HCC): ICD-10-CM

## 2021-11-15 DIAGNOSIS — C50.912 INVASIVE DUCTAL CARCINOMA OF LEFT BREAST (HCC): Primary | ICD-10-CM

## 2021-11-15 DIAGNOSIS — G95.9 MYELOPATHY (HCC): ICD-10-CM

## 2021-11-15 PROCEDURE — G8427 DOCREV CUR MEDS BY ELIG CLIN: HCPCS | Performed by: SURGERY

## 2021-11-15 PROCEDURE — G8484 FLU IMMUNIZE NO ADMIN: HCPCS | Performed by: SURGERY

## 2021-11-15 PROCEDURE — 3017F COLORECTAL CA SCREEN DOC REV: CPT | Performed by: SURGERY

## 2021-11-15 PROCEDURE — 4004F PT TOBACCO SCREEN RCVD TLK: CPT | Performed by: SURGERY

## 2021-11-15 PROCEDURE — G8417 CALC BMI ABV UP PARAM F/U: HCPCS | Performed by: SURGERY

## 2021-11-15 PROCEDURE — 99213 OFFICE O/P EST LOW 20 MIN: CPT | Performed by: SURGERY

## 2021-11-15 PROCEDURE — G9899 SCRN MAM PERF RSLTS DOC: HCPCS | Performed by: SURGERY

## 2021-11-15 NOTE — PROGRESS NOTES
Juany Aviles MD   General Surgery  Follow up Patient Evaluation in Office  Pt Name: Jacob Lorenzo  Date of Birth 1965   Today's Date: 11/15/2021  Medical Record Number: [id number]  Referring Provider: No ref. provider found  Primary Care Provider: Aileen Mcnulty MD  Chief Complaint:  Chief Complaint   Patient presents with    Follow-up     4 month f/u--Invasive ductal carcinoma of left breast      Pathologic stage Ib ER positive MA positive HER-2 negative. Oncotype DX score 8  ASSESSMENT      1. Invasive ductal carcinoma of left breast ([facility]    2. Multiple sclerosis ([facility]    3. Systemic lupus erythematosus, unspecified SLE type, unspecified organ involvement status ([facility]    4. Myelopathy ([facility]         PLANS      1. Examination of the mastectomy site is benign. No clinical evidence of lymphedema. Status post radiation therapy with dermal changes within window of radiation  2. Patient has started Arimidex. No side effects. 3.  Follow-up surgical clinic in 6 months. 4.  Right breast mammography at 1 year interval from previous  [facility] is a 49y.o. year old female who is presenting today in the office for follow-up evaluation of left breast cancer. Sherin Santana has multiple medical comorbidities. She underwent a mastectomy in February 2021. She presented with a mass behind her left nipple. Ultrasound revealed a 2.2 x 2.2 x 2.9 cm mass and an ultrasound-guided biopsy was performed. Tumor was ER and MA strongly positive. Ki-67 was low HER-2 was negative. She is in a wheelchair has MS. She opted for a mastectomy. At surgery for lymph nodes were removed. The tumor was over 2 cm.  2 of 4 lymph nodes were involved. Oncotype DX score was 8. Due to her transfers in her wheelchair she had issues with wound healing for some time but ultimately her wound did heal with VAC placement. She has undergone chemotherapy. With radiation therapy recently completed in October.   She was recently started on Arimidex and so far has no issues. Past Medical History  Past Medical History:   Diagnosis Date    Anxiety     Anxiety and depression     Asthma     Bipolar 1 disorder ([address]     Bipolar 1 disorder with moderate sourav ([address]     Blood circulation, collateral     Breast cancer ([address]     diagnosed in jan 2021    Cancer [facility])      ?  cervical \"long time ago\"    Cancer ([address] 01/15/2021    Left Breast    Depression     Endometriosis     GERD (gastroesophageal reflux disease)     Kidney stones     Lupus ([address]     Movement disorder     MS (multiple sclerosis) ([address]     Schizophrenia ([address]     Thyroid disease     Type 2 diabetes mellitus without complication ([address]     Unspecified cerebral artery occlusion with cerebral infarction 2014       Past Surgical History  Past Surgical History:   Procedure Laterality Date    BREAST LUMPECTOMY Left 02/19/2021    LEFT BREAST MASTECTOMY, SENTINAL LYMPH NODE BIOPSY, PREOP NEEDLE LOC performed by Juany Aviles MD at [address] [place] Left 4/6/2021    DEBRIDEMENT LEFT BREAST MASTECTOMY WOUND performed by Juany Aviles MD at [address] [facility] 4/27/2021    LEFT BREAST WOUND REVISION performed by Juany Aviles MD at [address] [place] [address]  01/2020    DILATION AND CURETTAGE OF UTERUS      BEN US GUID NDL BIOPSY LEFT Left 01/14/2021    BEN US GUID [address] LEFT 1/14/2021 Viry Reyes MD [facility] NERVE SURGERY N/A 01/26/2021    EXCHANGE OF INTERSTIM SYSTEM performed by Aida Potter MD at [facility] [address]  03/23/2021    right breast i and d with closure Dr Shmuel Olguin in the procedure room    PAIN MANAGEMENT PROCEDURE Left 8/31/2021    left T3 paravertebral block under fluoroscopy performed by Jeremie Caballero DO at [address] [place] OFFICE/OUTPT VISIT,PROCEDURE ONLY N/A 11/21/2018    INTERSTIM DEVICE PLACEMENT MODEL 3058, ONLY HEAD ELIGIBLE FOR MRI WITH TRANSMIT/RECEIVE HEAD COIL    TUBAL LIGATION      10 years ago    US GUIDED NEEDLE LOC OF LEFT BREAST Left 02/19/2021    US GUIDED NEEDLE LOC OF LEFT BREAST 2/19/2021 [facility]    US LYMPH NODE BIOPSY  01/14/2021    US LYMPH NODE BIOPSY 1/14/2021 Raj Nicholas MD [facility]       Medications  Current Outpatient Medications   Medication Sig Dispense Refill    amitriptyline (ELAVIL) 75 MG tablet Take 1 tablet by mouth nightly 30 tablet 2    anastrozole (ARIMIDEX) 1 MG tablet Take 1 tablet by mouth every other day 30 tablet 3    HYDROcodone-acetaminophen (NORCO) 5-325 MG per tablet Take 1 tablet by mouth 2 times daily as needed for Pain for up to 30 days. 60 tablet 0    Lactobacillus Acid-Pectin (ACIDOPHILUS/CITRUS PECTIN) TABS Take 1 tablet by mouth daily 30 tablet 0    pregabalin (LYRICA) 150 MG capsule take 1 capsule by mouth twice a day 60 capsule 2    BD PEN NEEDLE JACOBO 2ND GEN 32G X 4 MM MISC use as directed WITH INSULIN      atorvastatin (LIPITOR) 10 MG tablet take 1 tablet by mouth once daily      baclofen (LIORESAL) 10 MG tablet Take 2 tablets by mouth 3 times daily 135 tablet 5    metFORMIN (GLUCOPHAGE) 1000 MG tablet Take 1,000 mg by mouth 2 times daily      desvenlafaxine succinate (PRISTIQ) 50 MG TB24 extended release tablet Take 50 mg by mouth daily      MUCINEX MAXIMUM STRENGTH 1200 MG TB12 Take 1 tablet by mouth 2 times daily      paliperidone (INVEGA) 3 MG extended release tablet Take 3 mg by mouth nightly      SITagliptin (JANUVIA) 100 MG tablet Take 100 mg by mouth daily      Lifitegrast (XIIDRA) 5 % SOLN Place 1 drop into both eyes daily      Misc.  Devices [address] [facility]) [address] [facility] Wheelchair repairs- new seat cover, right side armrest replacement    Current body weight:  68 kg  Diagnosis: gait disturbance, chronic incomplete spastic paraplegia  Length of need: Lifetime 1 each 0    tiotropium (SPIRIVA) 18 MCG inhalation capsule Inhale 18 mcg into the lungs daily 2 puffs      insulin glargine (LANTUS SOLOSTAR) 100 UNIT/ML injection pen Inject 60 Units into the skin nightly       montelukast (SINGULAIR) 10 MG tablet Take 10 mg by mouth nightly   0    artificial tears (ARTIFICIAL TEARS) OINT as needed      Calcium Carbonate (CALCIUM 600 PO) Take 1 tablet by mouth 2 times daily      Multiple Vitamin (TAB-A-ISAAC PO) Take 1 tablet by mouth daily      loratadine (CLARITIN) 10 MG tablet Take 1 tablet by mouth daily as needed. 0    PROAIR  (90 BASE) MCG/ACT inhaler Inhale 2 puffs into the lungs every 6 hours as needed. 0    Incontinence Supply Disposable (UNDERPADS) MISC Cloth chux pads  Diagnosis: Paraplegia 344. 1 100 Bottle 11    hydrOXYzine (VISTARIL) 50 MG capsule Take 50 mg by mouth 3 times daily as needed for Itching.  omeprazole (PRILOSEC) 20 MG capsule Take 20 mg by mouth daily.  [START ON 2021] oxyCODONE-acetaminophen (PERCOCET) 5-325 MG per tablet Take 1 tablet by mouth 2 times daily as needed for Pain for up to 30 days. (Patient not taking: Reported on 11/15/2021) 60 tablet 0    naloxone 4 MG/0.1ML LIQD nasal spray 1 spray by Nasal route as needed for Opioid Reversal (Patient not taking: Reported on 11/15/2021) 1 each 5     No current facility-administered medications for this visit.      Allergies     Allergies   Allergen Reactions    Penicillins Shortness Of Breath     \"I almost \"  Other reaction(s): PENICILLINS    Seasonal Itching       Family History  Family History   Problem Relation Age of Onset    Stroke Mother     Asthma Mother     Other Mother     No Known Problems Sister     Asthma Brother     No Known Problems Brother        SocialHistory  Social History     Socioeconomic History    Marital status:      Spouse name: 4    Number of children: Not on file    Years of education: Not on file    Highest education level: Not on file   Occupational History    Not on file   Tobacco Use    Smoking status: Current Every Day Smoker Packs/day: 1.00     Types: Cigarettes     Start date: 12/6/1979    Smokeless tobacco: Never Used   Vaping Use    Vaping Use: Never used   Substance and Sexual Activity    Alcohol use: Yes     Alcohol/week: 0.0 standard drinks     Comment: social drinker    Drug use: No    Sexual activity: Never     Partners: Male   Other Topics Concern    Not on file   Social History Narrative    ** Merged History Encounter **          Social Determinants of Health     Financial Resource Strain:     Difficulty of Paying Living Expenses: Not on file   Food Insecurity:     Worried About Running Out of Food in the Last Year: Not on file    Amber of Food in the Last Year: Not on file   Transportation Needs:     Lack of Transportation (Medical): Not on file    Lack of Transportation (Non-Medical): Not on file   Physical Activity:     Days of Exercise per Week: Not on file    Minutes of Exercise per Session: Not on file   Stress:     Feeling of Stress : Not on file   Social Connections:     Frequency of Communication with Friends and Family: Not on file    Frequency of Social Gatherings with Friends and Family: Not on file    Attends [employer] Services: Not on file    Active Member of [address] [place] [address] or Organizations: Not on file    Attends Club or Organization Meetings: Not on file    Marital Status: Not on file   Intimate Partner Violence:     Fear of Current or Ex-Partner: Not on file    Emotionally Abused: Not on file    Physically Abused: Not on file    Sexually Abused: Not on file   Housing Stability:     Unable to Pay for Housing in the Last Year: Not on file    Number of [place] in the Last Year: Not on file    Unstable Housing in the Last Year: Not on file           Review of Systems  Review of Systems   Constitutional: Negative for activity change, chills, fatigue, fever and unexpected weight change. HENT: Negative for sore throat, trouble swallowing and voice change.     Eyes: Negative for visual disturbance. Respiratory: Negative for cough, shortness of breath and wheezing. Cardiovascular: Negative for chest pain and palpitations. Gastrointestinal: Negative for abdominal pain, blood in stool, nausea and vomiting. Endocrine: Negative for cold intolerance, heat intolerance and polydipsia. Genitourinary: Negative for dysuria, flank pain and hematuria. Musculoskeletal: Negative for gait problem, joint swelling and myalgias. Skin: Positive for color change. Negative for rash. Allergic/Immunologic: Negative for immunocompromised state. Neurological: Positive for weakness. Negative for dizziness, seizures and speech difficulty. Hematological: Does not bruise/bleed easily. Psychiatric/Behavioral: Negative for behavioral problems and confusion. OBJECTIVE     /61 (Site: Right Upper Arm, Position: Sitting, Cuff Size: Medium Adult)   Pulse 67   Temp 97.6 °F (36.4 °C) (Tympanic)   Resp 15   Ht 5' (1.524 m)   Wt 150 lb (68 kg)   SpO2 97%   BMI 29.29 kg/m²      Physical Exam  Vitals reviewed. Constitutional:       Appearance: She is well-developed. HENT:      Head: Normocephalic and atraumatic. Eyes:      General: No scleral icterus. Extraocular Movements: Extraocular movements intact. Pupils: Pupils are equal, round, and reactive to light. Neck:      Thyroid: No thyromegaly. Vascular: No JVD. Trachea: No tracheal deviation. Cardiovascular:      Rate and Rhythm: Normal rate. Heart sounds: Normal heart sounds. Pulmonary:      Effort: No respiratory distress. Breath sounds: Normal breath sounds. No wheezing. Chest:   Breasts:      Right: No swelling, bleeding, inverted nipple, mass, nipple discharge, skin change, tenderness, axillary adenopathy or supraclavicular adenopathy. Left: Skin change present. No mass, tenderness, axillary adenopathy or supraclavicular adenopathy. Abdominal:      General: There is no distension. Palpations: Abdomen is soft. Tenderness: There is no abdominal tenderness. Musculoskeletal:         General: No swelling. Cervical back: Normal range of motion and neck supple. Comments: Muscular wasting lower extremities   Lymphadenopathy:      Cervical: No cervical adenopathy. Upper Body:      Right upper body: No supraclavicular, axillary or pectoral adenopathy. Left upper body: No supraclavicular, axillary or pectoral adenopathy. Skin:     General: Skin is warm and dry. Findings: No rash. Neurological:      Mental Status: She is alert and oriented to person, place, and time. Cranial Nerves: No cranial nerve deficit.       Comments: Patient in wheelchair with weakness of lower extremities to a lesser extent upper extremities   Psychiatric:         Mood and Affect: Mood normal.         Behavior: Behavior normal.         Lab Results   Component Value Date    WBC 2.9 (L) 04/01/2021    HGB 12.0 04/01/2021    HCT 35.7 (L) 04/01/2021     (L) 04/01/2021    CHOL 172 01/07/2021    TRIG 97 01/07/2021    HDL 38 01/07/2021    ALT 17 01/07/2021    AST 27 01/07/2021     02/19/2021    K 4.2 02/19/2021     02/19/2021    CREATININE 0.3 (L) 02/19/2021    BUN 11 02/19/2021    CO2 28 02/19/2021    TSH 2.990 01/07/2021    INR 1.2 10/09/2014    LABA1C 6.2 09/23/2016

## 2021-11-17 ASSESSMENT — ENCOUNTER SYMPTOMS
TROUBLE SWALLOWING: 0
SHORTNESS OF BREATH: 0
COUGH: 0
WHEEZING: 0
VOICE CHANGE: 0
VOMITING: 0
COLOR CHANGE: 1
BLOOD IN STOOL: 0
NAUSEA: 0
ABDOMINAL PAIN: 0
SORE THROAT: 0

## 2021-12-06 RX ORDER — L. ACIDOPHILUS/PECTIN, CITRUS 25MM-100MG
TABLET ORAL
Qty: 30 TABLET | Refills: 0 | Status: SHIPPED | OUTPATIENT
Start: 2021-12-06 | End: 2022-01-17 | Stop reason: SDUPTHER

## 2021-12-06 NOTE — TELEPHONE ENCOUNTER
Lorie Johnson called requesting a refill on the following medications:  Requested Prescriptions     Pending Prescriptions Disp Refills    Lactobacillus Acid-Pectin (ACIDOPHILUS/CITRUS PECTIN) TABS [Pharmacy Med Name: ACIDOPHILUS-PECTIN CAPTAB] 30 tablet 0     Sig: take 1 tablet by mouth once daily     Pharmacy verified:  [email or web address]      Date of last visit: 10/13/2021  Date of next visit (if applicable): 6/04/7883

## 2021-12-11 DIAGNOSIS — G89.29 OTHER CHRONIC PAIN: ICD-10-CM

## 2021-12-13 RX ORDER — AMITRIPTYLINE HYDROCHLORIDE 75 MG/1
75 TABLET, FILM COATED ORAL NIGHTLY
Qty: 30 TABLET | Refills: 2 | Status: SHIPPED | OUTPATIENT
Start: 2021-12-13 | End: 2022-01-05 | Stop reason: SDUPTHER

## 2021-12-13 NOTE — TELEPHONE ENCOUNTER
OARRS reviewed. UDS: + for  Hydrocodone, Pregabalin -consistent. + for EtG -inconsistent  Last seen: 11/11/2021.  Follow-up: 2/17/2022

## 2021-12-27 DIAGNOSIS — G89.4 CHRONIC PAIN SYNDROME: ICD-10-CM

## 2021-12-27 NOTE — TELEPHONE ENCOUNTER
Kedar Juarez called requesting a refill on the following medications:  Requested Prescriptions     Pending Prescriptions Disp Refills    oxyCODONE-acetaminophen (PERCOCET) 5-325 MG per tablet 60 tablet 0     Sig: Take 1 tablet by mouth 2 times daily as needed for Pain for up to 30 days. Pharmacy verified:  .[email or web address] [address]    Date of last visit: 11/04/2021  Date of next visit (if applicable): 20/61/9134        *patient stated it was just oxycodone on the oxycodone/acetaminophen, but I did not see where she was prescribed the oxycodone.  Please advise

## 2021-12-27 NOTE — TELEPHONE ENCOUNTER
OARRS reviewed. UDS: Inconsistent EtG. + for Hydrocodone, Pregabalin. Last seen: 11/4/2021.  Follow-up:   Future Appointments   Date Time Provider Rashard Farnsworthi   12/30/2021  2:20 PM Mayito Cano DO N [facility] [address]   1/5/2022  1:40 PM Zhane Meraz MD N Oncology [address]   2/17/2022  3:20 PM Dallas Bassett MD N [facility] [address]   3/23/2022  1:30 PM Edwige Caceres, [address] [place] [address]   5/2/2022  1:15 PM MD Zaira Rhodes Surg [address]   5/10/2022  2:00 PM Marguerite Herr, [address] [place] [address]

## 2021-12-28 RX ORDER — OXYCODONE HYDROCHLORIDE AND ACETAMINOPHEN 5; 325 MG/1; MG/1
1 TABLET ORAL 2 TIMES DAILY PRN
Qty: 60 TABLET | Refills: 0 | Status: SHIPPED | OUTPATIENT
Start: 2021-12-29 | End: 2021-12-30 | Stop reason: SDUPTHER

## 2021-12-28 NOTE — TELEPHONE ENCOUNTER
I called patient and explained to her the new policy with bringing her medications to her appointment.  Patient voiced understanding and said she would bring her medications in Thursday to her appointment

## 2021-12-30 ENCOUNTER — OFFICE VISIT (OUTPATIENT)
Dept: PHYSICAL MEDICINE AND REHAB | Age: 56
End: 2021-12-30
Payer: COMMERCIAL

## 2021-12-30 VITALS
WEIGHT: 150 LBS | SYSTOLIC BLOOD PRESSURE: 120 MMHG | DIASTOLIC BLOOD PRESSURE: 64 MMHG | BODY MASS INDEX: 29.45 KG/M2 | HEIGHT: 60 IN

## 2021-12-30 DIAGNOSIS — G89.29 OTHER CHRONIC PAIN: Primary | ICD-10-CM

## 2021-12-30 DIAGNOSIS — G89.4 CHRONIC PAIN SYNDROME: ICD-10-CM

## 2021-12-30 DIAGNOSIS — G82.20 PARAPLEGIA (HCC): ICD-10-CM

## 2021-12-30 DIAGNOSIS — G37.3 TRANSVERSE MYELITIS (HCC): ICD-10-CM

## 2021-12-30 PROCEDURE — 4004F PT TOBACCO SCREEN RCVD TLK: CPT | Performed by: ANESTHESIOLOGY

## 2021-12-30 PROCEDURE — G8484 FLU IMMUNIZE NO ADMIN: HCPCS | Performed by: ANESTHESIOLOGY

## 2021-12-30 PROCEDURE — G9899 SCRN MAM PERF RSLTS DOC: HCPCS | Performed by: ANESTHESIOLOGY

## 2021-12-30 PROCEDURE — 99214 OFFICE O/P EST MOD 30 MIN: CPT | Performed by: ANESTHESIOLOGY

## 2021-12-30 PROCEDURE — G8427 DOCREV CUR MEDS BY ELIG CLIN: HCPCS | Performed by: ANESTHESIOLOGY

## 2021-12-30 PROCEDURE — G8417 CALC BMI ABV UP PARAM F/U: HCPCS | Performed by: ANESTHESIOLOGY

## 2021-12-30 PROCEDURE — 3017F COLORECTAL CA SCREEN DOC REV: CPT | Performed by: ANESTHESIOLOGY

## 2021-12-30 RX ORDER — OXYCODONE HYDROCHLORIDE AND ACETAMINOPHEN 5; 325 MG/1; MG/1
1 TABLET ORAL 2 TIMES DAILY PRN
Qty: 60 TABLET | Refills: 0 | Status: SHIPPED | OUTPATIENT
Start: 2022-01-28 | End: 2022-03-01 | Stop reason: SDUPTHER

## 2021-12-30 NOTE — PROGRESS NOTES
Chronic Pain/PM&R Clinic Note     Encounter Date: 12/30/21    Subjective:   Chief Complaint:   Chief Complaint   Patient presents with    Follow-up     8 wk fu       History of Present Illness:   Matthias Moncada is a 49 y.o. female seen in the clinic initially on 08/09/21 for her history of left sided chest wall pain. She has a medical history of left breast mastectomy in February 2021 secondary to breast cancer with subsequent wound dehiscence and surgical debridement and spinal cord injury secondary to transverse myelitis. She has been dealing with left-sided chest wall pain at the site of her incision ever since her mastectomy and this is been exacerbated after most recent revision surgery in June 2021. She describes his pain is a constant stabbing, electrical type pain right on her incision scar. She rates this pain is a constant 6/10 pain that can get up to a 10/10. She denies any drainage or areas of erythema around the scar. She states that this area is so sensitive that is difficult for her to wear even a supportive bra. She reports not getting much relief with her other medications including Lyrica 150 mg twice a day, Norco 5 mg twice a day, and Elavil 50 mg at night. She states that this is interfering with her everyday activities and really interfering with sleep. Today, 12/30/2021, patient presents for planned follow-up for chronic left chest wall pain. She states that she has noticed significant improvement in her overall pain switching over to Percocet. She feels like this medication is much more effective at giving her relief for breakthrough pain and last longer than her Norco did. She denies any side effects on the medication. She states that she continues to grieve with the loss of her son back in October. She has any new symptoms this point and feels like she is doing relatively okay with her pain at this point.       History of Interventions:   Surgery: Left breast mastectomy and surgical debridement  Injections: Left T3 PVP (8/31/21)-no relief    Current Treatment Medications:   Percocet 5 mg twice daily  Lyrica 100 mg twice daily  Elavil 50 mg nightly    Historical Medications:  Norco - ineffective     Imaging:  None    Past Medical History:   Diagnosis Date    Anxiety     Anxiety and depression     Asthma     Bipolar 1 disorder ([address]     Bipolar 1 disorder with moderate sourav ([address]     Blood circulation, collateral     Breast [facility]     diagnosed in jan 2021    Cancer [facility])      ?  cervical \"long time ago\"    Cancer ([facility] 01/15/2021    Left Breast    Depression     Endometriosis     GERD (gastroesophageal reflux disease)     Kidney stones     Lupus (HCC)     Movement disorder     MS (multiple sclerosis) ([facility]     Schizophrenia ([facility]     Thyroid disease     Type 2 diabetes mellitus without complication ([facility]     Unspecified cerebral artery occlusion with cerebral infarction 2014       Past Surgical History:   Procedure Laterality Date    BREAST LUMPECTOMY Left 02/19/2021    LEFT BREAST MASTECTOMY, SENTINAL LYMPH NODE BIOPSY, PREOP NEEDLE LOC performed by Rufina Berman MD at [address] [place] [address] 4/6/2021    DEBRIDEMENT LEFT BREAST MASTECTOMY WOUND performed by Rufina Berman MD at [address] [place] [address] 4/27/2021    LEFT BREAST WOUND REVISION performed by Rufina Berman MD at [address] [place] [address]  01/2020    DILATION AND CURETTAGE OF UTERUS      BEN US GUID NDL BIOPSY LEFT Left 01/14/2021    BEN US GUID [address] LEFT 1/14/2021 Kojo Montano MD [address] [facility] NERVE SURGERY N/A 01/26/2021    EXCHANGE OF INTERSTIM SYSTEM performed by Leo Domínguez MD at [address] [place] [address]  03/23/2021    right breast i and d with closure Dr Edith Cha in the procedure room    PAIN MANAGEMENT PROCEDURE Left 8/31/2021    left T3 paravertebral block under fluoroscopy performed by Wendie Napier DO at [address]  [place] OFFICE/OUTPT VISIT,PROCEDURE ONLY N/A 11/21/2018    INTERSTIM DEVICE PLACEMENT MODEL 3058, ONLY HEAD ELIGIBLE FOR MRI WITH TRANSMIT/RECEIVE HEAD COIL    TUBAL LIGATION      10 years ago    US GUIDED NEEDLE LOC OF LEFT BREAST Left 02/19/2021    US GUIDED NEEDLE LOC OF LEFT BREAST 2/19/2021 [facility]    US LYMPH NODE BIOPSY  01/14/2021    US LYMPH NODE BIOPSY 1/14/2021 Shy Jeong MD [facility]       Family History   Problem Relation Age of Onset   [facility] Stroke Mother     Asthma Mother     Other Mother     No Known Problems Sister     Asthma Brother     No Known Problems Brother        Social History     Socioeconomic History    Marital status:      Spouse name: 4    Number of children: Not on file    Years of education: Not on file    Highest education level: Not on file   Occupational History    Not on file   Tobacco Use    Smoking status: Current Every Day Smoker     Packs/day: 1.00     Types: Cigarettes     Start date: 12/6/1979    Smokeless tobacco: Never Used   Vaping Use    Vaping Use: Never used   Substance and Sexual Activity    Alcohol use: Yes     Alcohol/week: 0.0 standard drinks     Comment: social drinker    Drug use: No    Sexual activity: Never     Partners: Male   Other Topics Concern    Not on file   Social History Narrative    ** Merged History Encounter **          Social Determinants of Health     Financial Resource Strain:     Difficulty of Paying Living Expenses: Not on file   Food Insecurity:     Worried About Running Out of Food in the Last Year: Not on file    Amber of Food in the Last Year: Not on file   Transportation Needs:     Lack of Transportation (Medical): Not on file    Lack of Transportation (Non-Medical):  Not on file   Physical Activity:     Days of Exercise per Week: Not on file    Minutes of Exercise per Session: Not on file   Stress:     Feeling of Stress : Not on file   Social Connections:     Frequency of Communication with Friends and Family: Not on file    Frequency of Social Gatherings with Friends and Family: Not on file    Attends Zoroastrianism Services: Not on file    Active Member of Clubs or Organizations: Not on file    Attends Club or Organization Meetings: Not on file    Marital Status: Not on file   Intimate Partner Violence:     Fear of Current or Ex-Partner: Not on file    Emotionally Abused: Not on file    Physically Abused: Not on file    Sexually Abused: Not on file   Housing Stability:     Unable to Pay for Housing in the Last Year: Not on file    Number of [place] in the Last Year: Not on file    Unstable Housing in the Last Year: Not on file       Medications & Allergies:   Current Outpatient Medications   Medication Instructions    amitriptyline (ELAVIL) 75 mg, Oral, NIGHTLY    amitriptyline (ELAVIL) 75 mg, Oral, NIGHTLY    anastrozole (ARIMIDEX) 1 mg, Oral, EVERY OTHER DAY    artificial tears (ARTIFICIAL TEARS) OINT PRN    atorvastatin (LIPITOR) 10 MG tablet take 1 tablet by mouth once daily    baclofen (LIORESAL) 20 mg, Oral, 3 TIMES DAILY    BD PEN NEEDLE JACOBO 2ND GEN 32G X 4 MM MISC use as directed WITH INSULIN    Calcium Carbonate (CALCIUM 600 PO) 1 tablet, Oral, 2 TIMES DAILY    desvenlafaxine succinate (PRISTIQ) 50 mg, Oral, DAILY    hydrOXYzine (VISTARIL) 50 mg, Oral, 3 TIMES DAILY PRN    Incontinence Supply Disposable (UNDERPADS) MISC Cloth chux padsDiagnosis: Paraplegia 344.1    Lactobacillus Acid-Pectin (ACIDOPHILUS/CITRUS PECTIN) TABS take 1 tablet by mouth once daily    Lantus SoloStar 60 Units, SubCUTAneous, NIGHTLY    Lifitegrast (XIIDRA) 5 % SOLN 1 drop, Both Eyes, DAILY    loratadine (CLARITIN) 10 MG tablet 1 tablet, Oral, DAILY PRN    metFORMIN (GLUCOPHAGE) 1,000 mg, Oral, 2 TIMES DAILY    Misc.  Devices [facility]) [address] [facility] Wheelchair repairs- new seat cover, right side armrest replacementCurrent body weight:  68 kgDiagnosis: gait disturbance, chronic incomplete spastic paraplegiaLength of need: Lifetime    montelukast (SINGULAIR) 10 mg, Oral, NIGHTLY    MUCINEX MAXIMUM STRENGTH 1200 MG TB12 1 tablet, Oral, 2 TIMES DAILY    Multiple Vitamin (TAB-A-ISAAC PO) 1 tablet, Oral, DAILY    naloxone 4 MG/0.1ML LIQD nasal spray 1 spray, Nasal, PRN    omeprazole (PRILOSEC) 20 mg, Oral, DAILY    [START ON 2022] oxyCODONE-acetaminophen (PERCOCET) 5-325 MG per tablet 1 tablet, Oral, 2 TIMES DAILY PRN    paliperidone (INVEGA) 3 mg, Oral, NIGHTLY    pregabalin (LYRICA) 150 MG capsule take 1 capsule by mouth twice a day    PROAIR  (90 BASE) MCG/ACT inhaler 2 puffs, Inhalation, EVERY 6 HOURS PRN    SITagliptin (JANUVIA) 100 mg, Oral, DAILY    tiotropium (SPIRIVA) 18 mcg, Inhalation, DAILY, 2 puffs        Allergies   Allergen Reactions    Penicillins Shortness Of Breath     \"I almost \"  Other reaction(s): PENICILLINS    Seasonal Itching       Review of Systems:   Constitutional: negative for weight changes or fevers  Genitourinary: negative for bowel/bladder incontinence   Musculoskeletal: positive for left chest wall pain  Neurological:  Positive for numbness/tingling over left chest incision scar  Behavioral/Psych: negative for anxiety/depression   All other systems reviewed and are negative    Objective:     Vitals:    21 1401   BP: 120/64     Constitutional: Pleasant, no acute distress   Head: Normocephalic, atraumatic   Eyes: Conjunctivae normal   Neck: Supple, symmetrical   Respiration: Non-labored breathing   Cardiovascular: Limbs warm and well perfused   Musculoskeletal: Full cervical ROM in all planes. Negative Spurling maneuver bilaterally. Neuro: Alert, oriented. CN II-XII appear grossly intact. No focal motor deficits appreciated in upper limbs.   Hyperesthesia and allodynia appreciated over surgical incision scar left chest wall  Skin: Healed surgical incision scar of left chest wall (near nipple line)  Psychological: Cooperative, no exaggerated pain behaviors       Assessment:    Diagnosis Orders   1. Other chronic pain     2. Chronic pain syndrome  oxyCODONE-acetaminophen (PERCOCET) 5-325 MG per tablet   3. Transverse myelitis ([address]     4. Paraplegia ([address]           Nikky Mann is a 49 y. o.female presenting to the pain clinic for evaluation of postmastectomy pain. Her clinical history of his examination is consistent with severe neuropathic pain secondary to her mastectomy surgery. I have set her up for a left T3 paravertebral block under fluoroscopy. We will continue her locations at this point and follow-up after her injection to determine next steps of therapy. She failed to respond to her left T3 paravertebral block. She has noticed a significant improvement switching from Norco to [employer]. I have refilled her medications. Plan: The following treatment recommendations and plan were discussed in detail with Nikky Mann. Imaging:   None    Analgesics:   Continued chronic pain, we will continue her Percocet at 5 mg that patient can take up to twice a day as needed for severe pain (pain greater than 7/10). Patient is advised take this medication as prescribed as taking more than prescribed and the development of tolerance, physical dependence, addiction. OARRS reviewed and is appropriate. Pain Contract signed. UDS reviewed and is appropriate. Adjuvants: In light of the presence of a neuropathic component of pain, patient is advised to continue Lyrica 150 mg twice a day. Interventions:   None    Anticoagulation/NPO Recommendations:   None    Multidisciplinary Pain Management:   In the presence of complex, chronic, and multi-factorial pain, the importance of a multidisciplinary approach to pain management in the patients management regimen was emphasized and discussed in great detail.      Referrals:  None    Prescriptions Written This Visit:   Percocet 5 mg (#60, 0 refills)    Follow-up: 10 yoan Flores, DO  Interventional Pain Management/PM&R   [place]

## 2022-01-05 ENCOUNTER — OFFICE VISIT (OUTPATIENT)
Dept: ONCOLOGY | Age: 57
End: 2022-01-05
Payer: COMMERCIAL

## 2022-01-05 VITALS
BODY MASS INDEX: 29.45 KG/M2 | TEMPERATURE: 98.1 F | DIASTOLIC BLOOD PRESSURE: 71 MMHG | HEIGHT: 60 IN | WEIGHT: 150 LBS | RESPIRATION RATE: 18 BRPM | SYSTOLIC BLOOD PRESSURE: 120 MMHG | HEART RATE: 100 BPM | OXYGEN SATURATION: 96 %

## 2022-01-05 DIAGNOSIS — C77.3 BREAST CANCER METASTASIZED TO AXILLARY LYMPH NODE, LEFT (HCC): ICD-10-CM

## 2022-01-05 DIAGNOSIS — Z79.811 PROPHYLACTIC USE OF ANASTROZOLE (ARIMIDEX): ICD-10-CM

## 2022-01-05 DIAGNOSIS — C50.912 INVASIVE DUCTAL CARCINOMA OF LEFT BREAST (HCC): Primary | ICD-10-CM

## 2022-01-05 DIAGNOSIS — C50.912 BREAST CANCER METASTASIZED TO AXILLARY LYMPH NODE, LEFT (HCC): ICD-10-CM

## 2022-01-05 DIAGNOSIS — Z17.0 ESTROGEN RECEPTOR POSITIVE STATUS (ER+): ICD-10-CM

## 2022-01-05 DIAGNOSIS — Z90.12 S/P LEFT MASTECTOMY: ICD-10-CM

## 2022-01-05 PROCEDURE — 99214 OFFICE O/P EST MOD 30 MIN: CPT | Performed by: INTERNAL MEDICINE

## 2022-01-05 PROCEDURE — G9899 SCRN MAM PERF RSLTS DOC: HCPCS | Performed by: INTERNAL MEDICINE

## 2022-01-05 PROCEDURE — G8484 FLU IMMUNIZE NO ADMIN: HCPCS | Performed by: INTERNAL MEDICINE

## 2022-01-05 PROCEDURE — 3017F COLORECTAL CA SCREEN DOC REV: CPT | Performed by: INTERNAL MEDICINE

## 2022-01-05 PROCEDURE — G8427 DOCREV CUR MEDS BY ELIG CLIN: HCPCS | Performed by: INTERNAL MEDICINE

## 2022-01-05 PROCEDURE — 4004F PT TOBACCO SCREEN RCVD TLK: CPT | Performed by: INTERNAL MEDICINE

## 2022-01-05 PROCEDURE — G8417 CALC BMI ABV UP PARAM F/U: HCPCS | Performed by: INTERNAL MEDICINE

## 2022-01-05 RX ORDER — ANASTROZOLE 1 MG/1
1 TABLET ORAL EVERY OTHER DAY
Qty: 30 TABLET | Refills: 3 | Status: SHIPPED | OUTPATIENT
Start: 2022-01-05

## 2022-01-05 ASSESSMENT — ENCOUNTER SYMPTOMS
ABDOMINAL DISTENTION: 0
NAUSEA: 0
BACK PAIN: 0
COLOR CHANGE: 0
FACIAL SWELLING: 0
CONSTIPATION: 0
TROUBLE SWALLOWING: 0
WHEEZING: 0
EYE DISCHARGE: 0
ABDOMINAL PAIN: 0
RECTAL PAIN: 0
SORE THROAT: 0
DIARRHEA: 0
CHEST TIGHTNESS: 0
COUGH: 0
VOMITING: 0
BLOOD IN STOOL: 0
SHORTNESS OF BREATH: 0

## 2022-01-05 NOTE — PATIENT INSTRUCTIONS
1.  She was instructed to take anastrozole every other day till end of January. In February she will start taking anastrozole every day.   2.  Return in 3 months to have survivorship visit with the nurse practitioner

## 2022-01-05 NOTE — PROGRESS NOTES
[facility] [address] [place] [address], [address] [place] [address] [address]  Lele Velez [address]  Dept: [phone]  Dept Fax: [phone]: [phone]    Visit Date:1/5/2022     Karine Mayer is a 49 y.o. female who presents today for:   Chief Complaint   Patient presents with    Follow-up     Invasive ductal carcinoma of left breast [facility])        HPI:   This is a 57-year old postmenopausal patient with h/o left breast cancer. 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 She gets Botox injections for contractures in her lower extremities. She has also multiple sclerosis, neurogenic bowel and bladder, bipolar disorder. She carries history of cervical cancer. Also has mild chronic thrombocytopenia going back to 2014. [facility] presented for diagnostic breast imaging after she felt a mass in the left breast.  Mammogram on January 4, 2021 showed irregular high density mass in the left breast central to the nipple and numerous lymph nodes in the right axilla. Ultrasounds of both axilla and the breast were performed and ultimately ultrasound-guided biopsies of the mass in the lymph node on the left. On ultrasound of the right axilla there were no abnormalities seen and no abnormal lymph nodes. Ultrasound imaging revealed a 2.2 x 2.2 x 2.9 cm mass in the left breast in the retroareolar area. A single lymph node in the left axilla appeared to have a moderate suspicion for malignancy. Ultrasound-guided biopsies were performed on January 14, 2021. Pathology revealed a grade 1 well-differentiated invasive adenocarcinoma with mucinous features, the lymph node was negative for malignancy. Breast cancer cells were estrogen receptor 100% positive, progesterone receptor 80% positive. Ki-67 was 6%,  HER-2 by IHC was negative.     Sequently she met with the surgeon Dr. Jose Miguel gross and after discussing her surgical treatment options she decided to proceed with a left breast mastectomy and axillary lymph node excision. She had her surgery on February 19, 2021 final pathology report showed: She gets Botox injections for contractures in her lower extremities. Recent onset of thrombocytopenia. A.  Left sentinel lymph node, excision:    Metastatic carcinoma in 2 of 4 lymph nodes (2/4). B.  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  Lymphovascular space invasion is present.    Margins are negative.    C.  Left axillary lymph nodes, excision:    Two lymph nodes, negative for malignancy (0/2). Oncotype DX Breast Cancer Assay (RT-PCR) performed by [facility]   Breast Cancer Recurrence Score:   8     Her post mastectomy incision is complicated by seroma formation. The patient required aspiration. Her radiation treatment has been delayed due postmastectomy surgical wound dehiscence and delayed healing. She started radiation treatment on August 13, 2021 and completed on 10/08/2021. She received 6000 cGy in 30 fractions. Interim history on 01/05/2022:  The patient presents to the medical oncology clinic for 2 months follow-up visit. She is currently on adjuvant hormonal therapy with Arimidex which she takes every other day. She is taking it every other day due to underlying arthralgia. The patient reports that her arthralgia is stable she did not have any flareup of her joint pain. Denies having any hot flashes no mood changes. The patient denies having any concerns related to her breast or chest wall. No hospitalizations since last visit with me. Remaining 12 point review of system is negative  HPI   Past Medical History:   Diagnosis Date    Anxiety     Anxiety and depression     Asthma     Bipolar 1 disorder ([address]     Bipolar 1 disorder with moderate sourav ([address]     Blood circulation, collateral     Breast cancer ([facility]     diagnosed in jan 2021    Cancer [facility])      ?  cervical \"long time ago\"    Cancer ([facility] [address] [facility] 01/15/2021    Left Breast    Depression     Endometriosis     GERD (gastroesophageal reflux disease)     Kidney stones     Lupus (HCC)     Movement disorder     MS (multiple sclerosis) ([facility] [facility]     Schizophrenia ([facility] [facility]     Thyroid disease     Type 2 diabetes mellitus without complication ([facility] [address] [facility]     Unspecified cerebral artery occlusion with cerebral infarction 2014      Past Surgical History:   Procedure Laterality Date    BREAST LUMPECTOMY Left 02/19/2021    LEFT BREAST MASTECTOMY, SENTINAL LYMPH NODE BIOPSY, PREOP NEEDLE LOC performed by David Haddad MD at [address] 4/6/2021    DEBRIDEMENT LEFT BREAST MASTECTOMY WOUND performed by David Haddad MD at [address] 4/27/2021    LEFT BREAST WOUND REVISION performed by Daivd Haddad MD at [address] [place] [address]  01/2020    DILATION AND CURETTAGE OF UTERUS      BEN US GUID NDL BIOPSY LEFT Left 01/14/2021    BEN US GUID NDL BIOPSY LEFT 1/14/2021 Diego Rush MD [facility] NERVE SURGERY N/A 01/26/2021    EXCHANGE OF INTERSTIM SYSTEM performed by Teodora Gregory MD at [address] [place] [address]  03/23/2021    right breast i and d with closure Dr Shade Leon in the procedure room    PAIN MANAGEMENT PROCEDURE Left 8/31/2021    left T3 paravertebral block under fluoroscopy performed by Herb Hogan DO at [address] OFFICE/OUTPT VISIT,PROCEDURE ONLY N/A 11/21/2018    INTERSTIM [facility], ONLY HEAD ELIGIBLE FOR MRI WITH TRANSMIT/RECEIVE HEAD COIL    TUBAL LIGATION      10 years ago    US GUIDED NEEDLE LOC OF LEFT BREAST Left 02/19/2021    US GUIDED NEEDLE LOC OF LEFT BREAST 2/19/2021 [address] [facility] [address] BIOPSY  01/14/2021    US LYMPH NODE BIOPSY 1/14/2021 Diego Rush MD [facility]      Family History   Problem Relation Age of Onset    Stroke Mother     Asthma Mother     Other Mother     No Known Problems Sister     Asthma Brother     No Known Problems Brother       Social History     Tobacco Use    Smoking status: Current Every Day Smoker     Packs/day: 0.50     Types: Cigarettes     Start date: 12/6/1979    Smokeless tobacco: Never Used   Substance Use Topics    Alcohol use: Yes     Alcohol/week: 0.0 standard drinks     Comment: social drinker      Current Outpatient Medications   Medication Sig Dispense Refill    pregabalin (LYRICA) 150 MG capsule Take 1 capsule by mouth 2 times daily for 30 days. 60 capsule 2    anastrozole (ARIMIDEX) 1 MG tablet Take 1 tablet by mouth every other day 30 tablet 3    [START ON 1/28/2022] oxyCODONE-acetaminophen (PERCOCET) 5-325 MG per tablet Take 1 tablet by mouth 2 times daily as needed for Pain for up to 30 days. 60 tablet 0    amitriptyline (ELAVIL) 75 MG tablet Take 1 tablet by mouth nightly 30 tablet 2    BD PEN NEEDLE JACOBO 2ND GEN 32G X 4 MM MISC use as directed WITH INSULIN      atorvastatin (LIPITOR) 10 MG tablet take 1 tablet by mouth once daily      baclofen (LIORESAL) 10 MG tablet Take 2 tablets by mouth 3 times daily 135 tablet 5    metFORMIN (GLUCOPHAGE) 1000 MG tablet Take 1,000 mg by mouth 2 times daily      desvenlafaxine succinate (PRISTIQ) 50 MG TB24 extended release tablet Take 50 mg by mouth daily      MUCINEX MAXIMUM STRENGTH 1200 MG TB12 Take 1 tablet by mouth 2 times daily      paliperidone (INVEGA) 3 MG extended release tablet Take 3 mg by mouth nightly      SITagliptin (JANUVIA) 100 MG tablet Take 100 mg by mouth daily      Lifitegrast (XIIDRA) 5 % SOLN Place 1 drop into both eyes daily      Misc.  Devices [facility]) [address] [facility] Wheelchair repairs- new seat cover, right side armrest replacement    Current body weight:  68 kg  Diagnosis: gait disturbance, chronic incomplete spastic paraplegia  Length of need: Lifetime 1 each 0    tiotropium (SPIRIVA) 18 MCG inhalation capsule Inhale 18 mcg into the lungs daily 2 puffs      insulin glargine (LANTUS SOLOSTAR) 100 UNIT/ML injection pen Inject 60 Units into the skin nightly       montelukast (SINGULAIR) 10 MG tablet Take 10 mg by mouth nightly   0    artificial tears (ARTIFICIAL TEARS) OINT as needed      Calcium Carbonate (CALCIUM 600 PO) Take 1 tablet by mouth 2 times daily      Multiple Vitamin (TAB-A-ISAAC PO) Take 1 tablet by mouth daily      loratadine (CLARITIN) 10 MG tablet Take 1 tablet by mouth daily as needed. 0    PROAIR  (90 BASE) MCG/ACT inhaler Inhale 2 puffs into the lungs every 6 hours as needed. 0    Incontinence Supply Disposable (UNDERPADS) MISC Cloth chux pads  Diagnosis: Paraplegia 344. 1 100 Bottle 11    hydrOXYzine (VISTARIL) 50 MG capsule Take 50 mg by mouth 3 times daily as needed for Itching.  omeprazole (PRILOSEC) 20 MG capsule Take 20 mg by mouth daily.  Lactobacillus Acid-Pectin (ACIDOPHILUS/CITRUS PECTIN) TABS take 1 tablet by mouth once daily 30 tablet 0    naloxone 4 MG/0.1ML LIQD nasal spray 1 spray by Nasal route as needed for Opioid Reversal (Patient not taking: Reported on 2022) 1 each 5     No current facility-administered medications for this visit.       Allergies   Allergen Reactions    Penicillins Shortness Of Breath     \"I almost \"  Other reaction(s): PENICILLINS    Seasonal Itching      Health Maintenance   Topic Date Due    Hepatitis C screen  Never done    Depression Monitoring  Never done    HIV screen  Never done    Colon cancer screen colonoscopy  Never done    Shingles Vaccine (1 of 2) Never done    Pneumococcal 0-64 years Vaccine (2 of 4 - PPSV23) 2021    COVID-19 Vaccine (3 - Moderna risk 4-dose series) 2021    Flu vaccine (1) 2021    A1C test (Diabetic or Prediabetic)  2021    Lipid screen  2022    Breast cancer screen  2022    Cervical cancer screen  2024    DTaP/Tdap/Td vaccine (2 - Td or Tdap) 2024    Hepatitis A vaccine  Aged C/ [place] [address] Hepatitis B vaccine  Aged Out    Hib vaccine  Aged Out    Meningococcal (ACWY) vaccine  Aged Out        Subjective:   Review of Systems   Constitutional: Negative for activity change, appetite change, fatigue and fever. HENT: Negative for congestion, dental problem, facial swelling, hearing loss, mouth sores, nosebleeds, sore throat, tinnitus and trouble swallowing. Eyes: Negative for discharge and visual disturbance. Respiratory: Negative for cough, chest tightness, shortness of breath and wheezing. Cardiovascular: Negative for chest pain, palpitations and leg swelling. Gastrointestinal: Negative for abdominal distention, abdominal pain, blood in stool, constipation, diarrhea, nausea, rectal pain and vomiting. Endocrine: Negative for cold intolerance, polydipsia and polyuria. Genitourinary: Positive for difficulty urinating. Negative for decreased urine volume, dysuria, flank pain, hematuria and urgency. Musculoskeletal: Negative for arthralgias, back pain, gait problem, joint swelling, myalgias and neck stiffness. Skin: Negative for color change, rash and wound. Neurological: Negative for dizziness, tremors, seizures, speech difficulty, weakness (The patient has spastic contractures in the lower extremities), light-headedness, numbness and headaches. Hematological: Negative for adenopathy. Bruises/bleeds easily. Psychiatric/Behavioral: Positive for behavioral problems. Negative for confusion and sleep disturbance. The patient is nervous/anxious. Objective:   Physical Exam  Vitals reviewed. Constitutional:       General: She is not in acute distress. Appearance: She is well-developed. HENT:      Head: Normocephalic. Mouth/Throat:      Pharynx: No oropharyngeal exudate. Eyes:      General: No scleral icterus. Right eye: No discharge. Left eye: No discharge. Pupils: Pupils are equal, round, and reactive to light.    Neck:      Thyroid: No thyromegaly. Vascular: No JVD. Trachea: No tracheal deviation. Cardiovascular:      Rate and Rhythm: Normal rate. Heart sounds: Normal heart sounds. No murmur heard. No friction rub. No gallop. Pulmonary:      Effort: Pulmonary effort is normal. No respiratory distress. Breath sounds: Normal breath sounds. No stridor. No wheezing or rales. Chest:      Chest wall: No tenderness. Abdominal:      General: Bowel sounds are normal. There is no distension. Palpations: Abdomen is soft. There is no mass. Tenderness: There is no abdominal tenderness. There is no rebound. Musculoskeletal:         General: Normal range of motion. Cervical back: Normal range of motion and neck supple. Comments: Good range of motion in all four extremities. Lymphadenopathy:      Cervical: No cervical adenopathy. Skin:     General: Skin is warm. Findings: No erythema or rash. Neurological:      Mental Status: She is alert and oriented to person, place, and time. Cranial Nerves: No cranial nerve deficit. Motor: No abnormal muscle tone. Deep Tendon Reflexes: Reflexes are normal and symmetric. Psychiatric:         Behavior: Behavior normal.         Thought Content:  Thought content normal.         Judgment: Judgment normal.         /71 (Site: Right Upper Arm, Position: Sitting, Cuff Size: Medium Adult)   Pulse 100   Temp 98.1 °F (36.7 °C) (Oral)   Resp 18   Ht 5' (1.524 m)   Wt 150 lb (68 kg)   SpO2 96%   BMI 29.29 kg/m²      ECOG status is 1    Imaging studies and labs:     Lab Results   Component Value Date    WBC 2.9 (L) 04/01/2021    HGB 12.0 04/01/2021    HCT 35.7 (L) 04/01/2021    MCV 78.5 (L) 04/01/2021     (L) 04/01/2021       Chemistry        Component Value Date/Time     02/19/2021 1011    K 4.2 02/19/2021 1011     02/19/2021 1011    CO2 28 02/19/2021 1011    BUN 11 02/19/2021 1011    CREATININE 0.3 (L) 02/19/2021 1011 Component Value Date/Time    CALCIUM 9.1 02/19/2021 1011    ALKPHOS 121 01/07/2021 1610    AST 27 01/07/2021 1610    ALT 17 01/07/2021 1610    BILITOT 0.7 01/07/2021 1610    BILITOT NEGATIVE 03/14/2018 0000            Assessment/Plan:        Diagnosis Orders   1. Invasive ductal carcinoma of left breast ([facility]     2. S/P left mastectomy     3. Estrogen receptor positive status (ER+)     4. Breast cancer metastasized to axillary lymph node, left ([address]     5. Prophylactic use of anastrozole (Arimidex)          1. Stage IB (pT2, pN1a, cM0, G2, ER+, NE+, HER2-, Oncotype DX score: 8) left breast cancer. The patient is s/p left breast mastectomy with axillary lymph nodes excision in February 2021. The Oncotype Dx 21-gene recurrence score (RS) is the best-validated prognostic and predictive assay to identify patients who are most and least likely to derive benefit from adjuvant chemotherapy. RS 25 or below identifies women with low risk of disease recurrence for whom chemotherapy is unlikely to provide a benefit. Her recurrence score came back as 8. The RS for those with one to three positive nodes, using cutoffs as for those with node-negative disease, can be used to make decision about adjuvant chemotherapy. This approach is included in the ASCO guidelines and in the NCCN. Rationale for this approach is that, in the modern era of more effective local therapy, and widespread application of adjuvant aromatase inhibitors, lymph node positivity might not confer the same degree of heightened risk as it once did. The SWOG  phase III study in women with hormone responsive, HER-2/maegan negative breast cancer with 1-3 axillary lymph nodes involvement showed no overall benefit from adjuvant chemotherapy for postmenopausal women with recurrence score between 0 and 25. In addition due to patient's multiple comorbidities she is poor candidate for systemic chemotherapy.   As such my recommendation is to proceed with postmastectomy radiation treatment. The patient did not start radiation treatment due to need for revision of wound with excision of skin and subcutaneous fat 16 cm2 with tissue advancement flaps inferior and superior, totaling 18 cm skin and subcutaneous fat with wound closure on 4/27/21. The patient started delayed radiation treatment to the chest wall on August 13, 2021 and completed on October 8, 2021. She received 6000 cGy in 30 fractions. 2.  Adjuvant hormonal therapy. Patient started adjuvant hormonal therapy with Arimidex in October 2021. Due to underlying arthralgia she takes Arimidex every other day. She reports that her arthralgia is stable. She denies having any ot flashes. The patient will continue Arimidex every other day  till end of January 2022. In February she will start taking anastrozole every day. 3.. Cancer surveillance. Management of breast cancer survivors who have completed active treatment and have no evidence of disease are cancer surveillance, lifestyle modifications including pursuing healthy regular exercise program, minimizing alcohol intake, and refraining from smoking. Survivor follow-up will include updated history, regular physical examination. Radiologic imaging to screen for distant recurrence will be not performed unless the patient will develop new symptoms. Symptoms suspicious for recurrent /metastic disease include:  ?Constitutional symptoms - Anorexia, weight loss, malaise, fatigue, insomnia. ? Bone pain  ? Pulmonary symptoms - persistent cough or dyspnea (at rest or with exertion). ?Neurologic symptoms - Headache, nausea, vomiting, confusion, weakness, numbness or tingling. ?Gastrointestinal symptoms - Right upper quadrant pain, change in bowel habits, presence of bloody or tarry stools. No follow-ups on file. Orders Placed:   No orders of the defined types were placed in this encounter.        Medications Prescribed:   Orders Placed This Encounter Medications    anastrozole (ARIMIDEX) 1 MG tablet     Sig: Take 1 tablet by mouth every other day     Dispense:  30 tablet     Refill:  3            Discussed use, benefit, and side effectsof prescribed medications. All patient questions answered. Pt voiced understanding. Instructed to continue current medications, diet and exercise. Patient agreed with treatment plan. Follow up as directed.     Electronically signed by Evelina James MD on 3/27/21 at 10:39 AM EDT

## 2022-01-17 DIAGNOSIS — G89.29 OTHER CHRONIC PAIN: ICD-10-CM

## 2022-01-17 RX ORDER — L. ACIDOPHILUS/PECTIN, CITRUS 25MM-100MG
TABLET ORAL
Qty: 30 TABLET | Refills: 0 | Status: SHIPPED | OUTPATIENT
Start: 2022-01-17 | End: 2022-02-28 | Stop reason: SDUPTHER

## 2022-01-17 RX ORDER — BACLOFEN 10 MG/1
20 TABLET ORAL 3 TIMES DAILY
Qty: 135 TABLET | Refills: 5 | Status: CANCELLED | OUTPATIENT
Start: 2022-01-17

## 2022-01-17 NOTE — TELEPHONE ENCOUNTER
Bk Ramires called requesting a refill on the following medications:  Requested Prescriptions     Pending Prescriptions Disp Refills    pregabalin (LYRICA) 150 MG capsule 60 capsule 2    baclofen (LIORESAL) 10 MG tablet 135 tablet 5     Sig: Take 2 tablets by mouth 3 times daily     Pharmacy verified:[facility] [id number]   . pv      Date of last visit:   Date of next visit (if applicable): 3/04/9796

## 2022-01-17 NOTE — TELEPHONE ENCOUNTER
Angus Serra called requesting a refill on the following medications:  Requested Prescriptions     Pending Prescriptions Disp Refills    Lactobacillus Acid-Pectin (ACIDOPHILUS/CITRUS PECTIN) TABS 30 tablet 0     Pharmacy verified: [facility] [address]  . pv    CAN PT HAVE REFILLS UNTIL HER NEXT APPT?     Date of last visit: 10/13/2021  Date of next visit (if applicable): 0/27/8439

## 2022-01-18 RX ORDER — PREGABALIN 150 MG/1
150 CAPSULE ORAL 2 TIMES DAILY
Qty: 60 CAPSULE | Refills: 2 | Status: SHIPPED | OUTPATIENT
Start: 2022-01-18 | End: 2022-05-26 | Stop reason: SDUPTHER

## 2022-01-20 ENCOUNTER — TELEPHONE (OUTPATIENT)
Dept: PHYSICAL MEDICINE AND REHAB | Age: 57
End: 2022-01-20

## 2022-02-08 ENCOUNTER — TELEPHONE (OUTPATIENT)
Dept: PHYSICAL MEDICINE AND REHAB | Age: 57
End: 2022-02-08

## 2022-02-08 DIAGNOSIS — G82.20 PARAPLEGIA (HCC): ICD-10-CM

## 2022-02-08 DIAGNOSIS — G35 MULTIPLE SCLEROSIS (HCC): Primary | ICD-10-CM

## 2022-02-17 ENCOUNTER — HOSPITAL ENCOUNTER (INPATIENT)
Age: 57
LOS: 2 days | Discharge: HOME | DRG: 463 | End: 2022-02-20
Attending: EMERGENCY MEDICINE | Admitting: HOSPITALIST
Payer: COMMERCIAL

## 2022-02-17 ENCOUNTER — APPOINTMENT (OUTPATIENT)
Dept: GENERAL RADIOLOGY | Age: 57
DRG: 463 | End: 2022-02-17
Payer: COMMERCIAL

## 2022-02-17 ENCOUNTER — APPOINTMENT (OUTPATIENT)
Dept: CT IMAGING | Age: 57
DRG: 463 | End: 2022-02-17
Payer: COMMERCIAL

## 2022-02-17 DIAGNOSIS — R65.20 SEPSIS WITH ACUTE ORGAN DYSFUNCTION WITHOUT SEPTIC SHOCK, DUE TO UNSPECIFIED ORGANISM, UNSPECIFIED TYPE (HCC): Primary | ICD-10-CM

## 2022-02-17 DIAGNOSIS — N30.00 ACUTE CYSTITIS WITHOUT HEMATURIA: ICD-10-CM

## 2022-02-17 DIAGNOSIS — R29.90 STROKE-LIKE SYMPTOMS: ICD-10-CM

## 2022-02-17 DIAGNOSIS — E87.1 HYPONATREMIA: ICD-10-CM

## 2022-02-17 DIAGNOSIS — A41.9 SEPSIS WITH ACUTE ORGAN DYSFUNCTION WITHOUT SEPTIC SHOCK, DUE TO UNSPECIFIED ORGANISM, UNSPECIFIED TYPE (HCC): Primary | ICD-10-CM

## 2022-02-17 DIAGNOSIS — E88.89 KETOSIS (HCC): ICD-10-CM

## 2022-02-17 DIAGNOSIS — R73.9 HYPERGLYCEMIA: ICD-10-CM

## 2022-02-17 DIAGNOSIS — J45.909 ASTHMA WITHOUT ACUTE EXACERBATION: ICD-10-CM

## 2022-02-17 LAB
ALBUMIN SERPL-MCNC: 3.1 G/DL (ref 3.5–5.1)
ALP BLD-CCNC: 152 U/L (ref 38–126)
ALT SERPL-CCNC: 25 U/L (ref 11–66)
ANION GAP SERPL CALCULATED.3IONS-SCNC: 18 MEQ/L (ref 8–16)
AST SERPL-CCNC: 51 U/L (ref 5–40)
BACTERIA: ABNORMAL /HPF
BASOPHILS # BLD: 0.5 %
BASOPHILS ABSOLUTE: 0 THOU/MM3 (ref 0–0.1)
BETA-HYDROXYBUTYRATE: 5.61 MG/DL (ref 0.2–2.81)
BILIRUB SERPL-MCNC: 1.6 MG/DL (ref 0.3–1.2)
BILIRUBIN URINE: NEGATIVE
BLOOD, URINE: NEGATIVE
BUN BLDV-MCNC: 11 MG/DL (ref 7–22)
CALCIUM SERPL-MCNC: 8.8 MG/DL (ref 8.5–10.5)
CASTS 2: ABNORMAL /LPF
CASTS UA: ABNORMAL /LPF
CHARACTER, URINE: ABNORMAL
CHLORIDE BLD-SCNC: 95 MEQ/L (ref 98–111)
CO2: 21 MEQ/L (ref 23–33)
COLOR: YELLOW
CREAT SERPL-MCNC: 0.8 MG/DL (ref 0.4–1.2)
CRYSTALS, UA: ABNORMAL
EOSINOPHIL # BLD: 1.7 %
EOSINOPHILS ABSOLUTE: 0.1 THOU/MM3 (ref 0–0.4)
EPITHELIAL CELLS, UA: ABNORMAL /HPF
ERYTHROCYTE [DISTWIDTH] IN BLOOD BY AUTOMATED COUNT: 19 % (ref 11.5–14.5)
ERYTHROCYTE [DISTWIDTH] IN BLOOD BY AUTOMATED COUNT: 57.2 FL (ref 35–45)
GFR SERPL CREATININE-BSD FRML MDRD: 75 ML/MIN/1.73M2
GLUCOSE BLD-MCNC: 341 MG/DL (ref 70–108)
GLUCOSE BLD-MCNC: 354 MG/DL
GLUCOSE BLD-MCNC: 354 MG/DL (ref 70–108)
GLUCOSE URINE: >= 1000 MG/DL
HCT VFR BLD CALC: 38.9 % (ref 37–47)
HEMOGLOBIN: 13.2 GM/DL (ref 12–16)
IMMATURE GRANS (ABS): 0.01 THOU/MM3 (ref 0–0.07)
IMMATURE GRANULOCYTES: 0.2 %
INR BLD: 1.58 (ref 0.85–1.13)
KETONES, URINE: NEGATIVE
LEUKOCYTE ESTERASE, URINE: ABNORMAL
LYMPHOCYTES # BLD: 19.1 %
LYMPHOCYTES ABSOLUTE: 0.8 THOU/MM3 (ref 1–4.8)
MCH RBC QN AUTO: 28.8 PG (ref 26–33)
MCHC RBC AUTO-ENTMCNC: 33.9 GM/DL (ref 32.2–35.5)
MCV RBC AUTO: 84.7 FL (ref 81–99)
MISCELLANEOUS 2: ABNORMAL
MONOCYTES # BLD: 11.2 %
MONOCYTES ABSOLUTE: 0.4 THOU/MM3 (ref 0.4–1.3)
NITRITE, URINE: POSITIVE
NUCLEATED RED BLOOD CELLS: 0 /100 WBC
OSMOLALITY CALCULATION: 281.1 MOSMOL/KG (ref 275–300)
PH UA: 5 (ref 5–9)
PH VENOUS: 7.36 (ref 7.31–7.41)
PLATELET # BLD: 55 THOU/MM3 (ref 130–400)
PMV BLD AUTO: ABNORMAL FL (ref 9.4–12.4)
POC CREATININE WHOLE BLOOD: 0.8 MG/DL (ref 0.5–1.2)
POTASSIUM REFLEX MAGNESIUM: 3.6 MEQ/L (ref 3.5–5.2)
PROTEIN UA: NEGATIVE
RBC # BLD: 4.59 MILL/MM3 (ref 4.2–5.4)
RBC URINE: ABNORMAL /HPF
RENAL EPITHELIAL, UA: ABNORMAL
SEG NEUTROPHILS: 67.3 %
SEGMENTED NEUTROPHILS ABSOLUTE COUNT: 2.7 THOU/MM3 (ref 1.8–7.7)
SODIUM BLD-SCNC: 134 MEQ/L (ref 135–145)
SPECIFIC GRAVITY, URINE: 1.02 (ref 1–1.03)
TOTAL PROTEIN: 7.2 G/DL (ref 6.1–8)
TROPONIN T: < 0.01 NG/ML
UROBILINOGEN, URINE: 1 EU/DL (ref 0–1)
WBC # BLD: 4 THOU/MM3 (ref 4.8–10.8)
WBC UA: ABNORMAL /HPF
YEAST: ABNORMAL

## 2022-02-17 PROCEDURE — 82565 ASSAY OF CREATININE: CPT

## 2022-02-17 PROCEDURE — 6370000000 HC RX 637 (ALT 250 FOR IP): Performed by: STUDENT IN AN ORGANIZED HEALTH CARE EDUCATION/TRAINING PROGRAM

## 2022-02-17 PROCEDURE — 82800 BLOOD PH: CPT

## 2022-02-17 PROCEDURE — 87186 SC STD MICRODIL/AGAR DIL: CPT

## 2022-02-17 PROCEDURE — 82948 REAGENT STRIP/BLOOD GLUCOSE: CPT

## 2022-02-17 PROCEDURE — 99285 EMERGENCY DEPT VISIT HI MDM: CPT

## 2022-02-17 PROCEDURE — 71045 X-RAY EXAM CHEST 1 VIEW: CPT

## 2022-02-17 PROCEDURE — 85025 COMPLETE CBC W/AUTO DIFF WBC: CPT

## 2022-02-17 PROCEDURE — 6360000004 HC RX CONTRAST MEDICATION: Performed by: EMERGENCY MEDICINE

## 2022-02-17 PROCEDURE — 87077 CULTURE AEROBIC IDENTIFY: CPT

## 2022-02-17 PROCEDURE — 96360 HYDRATION IV INFUSION INIT: CPT

## 2022-02-17 PROCEDURE — 87635 SARS-COV-2 COVID-19 AMP PRB: CPT

## 2022-02-17 PROCEDURE — 70496 CT ANGIOGRAPHY HEAD: CPT

## 2022-02-17 PROCEDURE — 85730 THROMBOPLASTIN TIME PARTIAL: CPT

## 2022-02-17 PROCEDURE — 80053 COMPREHEN METABOLIC PANEL: CPT

## 2022-02-17 PROCEDURE — 85610 PROTHROMBIN TIME: CPT

## 2022-02-17 PROCEDURE — 36415 COLL VENOUS BLD VENIPUNCTURE: CPT

## 2022-02-17 PROCEDURE — 70450 CT HEAD/BRAIN W/O DYE: CPT

## 2022-02-17 PROCEDURE — 84484 ASSAY OF TROPONIN QUANT: CPT

## 2022-02-17 PROCEDURE — 81001 URINALYSIS AUTO W/SCOPE: CPT

## 2022-02-17 PROCEDURE — 93005 ELECTROCARDIOGRAM TRACING: CPT | Performed by: EMERGENCY MEDICINE

## 2022-02-17 PROCEDURE — 96361 HYDRATE IV INFUSION ADD-ON: CPT

## 2022-02-17 PROCEDURE — 82010 KETONE BODYS QUAN: CPT

## 2022-02-17 PROCEDURE — 2580000003 HC RX 258: Performed by: STUDENT IN AN ORGANIZED HEALTH CARE EDUCATION/TRAINING PROGRAM

## 2022-02-17 PROCEDURE — 87086 URINE CULTURE/COLONY COUNT: CPT

## 2022-02-17 PROCEDURE — 70498 CT ANGIOGRAPHY NECK: CPT

## 2022-02-17 RX ORDER — LEVOFLOXACIN 5 MG/ML
750 INJECTION, SOLUTION INTRAVENOUS ONCE
Status: COMPLETED | OUTPATIENT
Start: 2022-02-18 | End: 2022-02-18

## 2022-02-17 RX ORDER — ASPIRIN 81 MG/1
324 TABLET, CHEWABLE ORAL ONCE
Status: COMPLETED | OUTPATIENT
Start: 2022-02-17 | End: 2022-02-17

## 2022-02-17 RX ORDER — CLOPIDOGREL 300 MG/1
300 TABLET, FILM COATED ORAL ONCE
Status: COMPLETED | OUTPATIENT
Start: 2022-02-17 | End: 2022-02-17

## 2022-02-17 RX ORDER — 0.9 % SODIUM CHLORIDE 0.9 %
1000 INTRAVENOUS SOLUTION INTRAVENOUS ONCE
Status: COMPLETED | OUTPATIENT
Start: 2022-02-17 | End: 2022-02-18

## 2022-02-17 RX ADMIN — ASPIRIN 81 MG 324 MG: 81 TABLET ORAL at 22:24

## 2022-02-17 RX ADMIN — CLOPIDOGREL BISULFATE 300 MG: 300 TABLET, FILM COATED ORAL at 22:24

## 2022-02-17 RX ADMIN — SODIUM CHLORIDE 1000 ML: 9 INJECTION, SOLUTION INTRAVENOUS at 22:13

## 2022-02-17 RX ADMIN — IOPAMIDOL 80 ML: 755 INJECTION, SOLUTION INTRAVENOUS at 21:52

## 2022-02-18 ENCOUNTER — APPOINTMENT (OUTPATIENT)
Dept: MRI IMAGING | Age: 57
DRG: 463 | End: 2022-02-18
Payer: COMMERCIAL

## 2022-02-18 PROBLEM — G93.40 ACUTE ENCEPHALOPATHY: Status: ACTIVE | Noted: 2022-02-18

## 2022-02-18 PROBLEM — G45.9 TIA (TRANSIENT ISCHEMIC ATTACK): Status: ACTIVE | Noted: 2022-02-18

## 2022-02-18 LAB
ALLEN TEST: POSITIVE
AMPHETAMINE+METHAMPHETAMINE URINE SCREEN: NEGATIVE
ANION GAP SERPL CALCULATED.3IONS-SCNC: 13 MEQ/L (ref 8–16)
APTT: 43 SECONDS (ref 22–38)
AVERAGE GLUCOSE: 213 MG/DL (ref 70–126)
BARBITURATE QUANTITATIVE URINE: NEGATIVE
BASE EXCESS (CALCULATED): -0.7 MMOL/L (ref -2.5–2.5)
BENZODIAZEPINE QUANTITATIVE URINE: NEGATIVE
BUN BLDV-MCNC: 11 MG/DL (ref 7–22)
CALCIUM SERPL-MCNC: 8.3 MG/DL (ref 8.5–10.5)
CANNABINOID QUANTITATIVE URINE: NEGATIVE
CHLORIDE BLD-SCNC: 101 MEQ/L (ref 98–111)
CHOLESTEROL, TOTAL: 98 MG/DL (ref 100–199)
CO2: 22 MEQ/L (ref 23–33)
COCAINE METABOLITE QUANTITATIVE URINE: NEGATIVE
COLLECTED BY:: ABNORMAL
CREAT SERPL-MCNC: 0.8 MG/DL (ref 0.4–1.2)
DEVICE: ABNORMAL
EKG ATRIAL RATE: 97 BPM
EKG P AXIS: 60 DEGREES
EKG P-R INTERVAL: 178 MS
EKG Q-T INTERVAL: 416 MS
EKG QRS DURATION: 88 MS
EKG QTC CALCULATION (BAZETT): 528 MS
EKG R AXIS: 42 DEGREES
EKG T AXIS: 27 DEGREES
EKG VENTRICULAR RATE: 97 BPM
GFR SERPL CREATININE-BSD FRML MDRD: 75 ML/MIN/1.73M2
GLUCOSE BLD-MCNC: 256 MG/DL (ref 70–108)
GLUCOSE BLD-MCNC: 261 MG/DL (ref 70–108)
GLUCOSE BLD-MCNC: 319 MG/DL
GLUCOSE BLD-MCNC: 319 MG/DL (ref 70–108)
GLUCOSE BLD-MCNC: 328 MG/DL (ref 70–108)
GLUCOSE BLD-MCNC: 381 MG/DL
GLUCOSE BLD-MCNC: 381 MG/DL (ref 70–108)
GLUCOSE BLD-MCNC: 394 MG/DL (ref 70–108)
HBA1C MFR BLD: 9.1 % (ref 4.4–6.4)
HCO3: 23 MMOL/L (ref 23–28)
HDLC SERPL-MCNC: 26 MG/DL
LACTIC ACID: 3 MMOL/L (ref 0.5–2)
LACTIC ACID: 3.3 MMOL/L (ref 0.5–2)
LACTIC ACID: 5.2 MMOL/L (ref 0.5–2)
LACTIC ACID: 5.6 MMOL/L (ref 0.5–2)
LDL CHOLESTEROL CALCULATED: 53 MG/DL
LV EF: 55 %
LVEF MODALITY: NORMAL
O2 SATURATION: 94 %
OPIATES, URINE: NEGATIVE
OXYCODONE: NEGATIVE
PCO2: 36 MMHG (ref 35–45)
PH BLOOD GAS: 7.42 (ref 7.35–7.45)
PHENCYCLIDINE QUANTITATIVE URINE: NEGATIVE
PO2: 69 MMHG (ref 71–104)
POTASSIUM SERPL-SCNC: 3.5 MEQ/L (ref 3.5–5.2)
REASON FOR REJECTION: NORMAL
REJECTED TEST: NORMAL
SALICYLATE, SERUM: 0.8 MG/DL (ref 2–10)
SARS-COV-2, NAAT: NOT  DETECTED
SODIUM BLD-SCNC: 136 MEQ/L (ref 135–145)
SOURCE, BLOOD GAS: ABNORMAL
TRIGL SERPL-MCNC: 97 MG/DL (ref 0–199)

## 2022-02-18 PROCEDURE — 96365 THER/PROPH/DIAG IV INF INIT: CPT

## 2022-02-18 PROCEDURE — 80061 LIPID PANEL: CPT

## 2022-02-18 PROCEDURE — 83036 HEMOGLOBIN GLYCOSYLATED A1C: CPT

## 2022-02-18 PROCEDURE — 36415 COLL VENOUS BLD VENIPUNCTURE: CPT

## 2022-02-18 PROCEDURE — 6370000000 HC RX 637 (ALT 250 FOR IP): Performed by: HOSPITALIST

## 2022-02-18 PROCEDURE — 83605 ASSAY OF LACTIC ACID: CPT

## 2022-02-18 PROCEDURE — 97129 THER IVNTJ 1ST 15 MIN: CPT

## 2022-02-18 PROCEDURE — 97166 OT EVAL MOD COMPLEX 45 MIN: CPT

## 2022-02-18 PROCEDURE — 92523 SPEECH SOUND LANG COMPREHEN: CPT

## 2022-02-18 PROCEDURE — 80179 DRUG ASSAY SALICYLATE: CPT

## 2022-02-18 PROCEDURE — 97535 SELF CARE MNGMENT TRAINING: CPT

## 2022-02-18 PROCEDURE — 97530 THERAPEUTIC ACTIVITIES: CPT

## 2022-02-18 PROCEDURE — 2060000000 HC ICU INTERMEDIATE R&B

## 2022-02-18 PROCEDURE — 99255 IP/OBS CONSLTJ NEW/EST HI 80: CPT | Performed by: SOCIAL WORKER

## 2022-02-18 PROCEDURE — 92610 EVALUATE SWALLOWING FUNCTION: CPT

## 2022-02-18 PROCEDURE — 6370000000 HC RX 637 (ALT 250 FOR IP): Performed by: STUDENT IN AN ORGANIZED HEALTH CARE EDUCATION/TRAINING PROGRAM

## 2022-02-18 PROCEDURE — 36600 WITHDRAWAL OF ARTERIAL BLOOD: CPT

## 2022-02-18 PROCEDURE — 80307 DRUG TEST PRSMV CHEM ANLYZR: CPT

## 2022-02-18 PROCEDURE — 87040 BLOOD CULTURE FOR BACTERIA: CPT

## 2022-02-18 PROCEDURE — 96366 THER/PROPH/DIAG IV INF ADDON: CPT

## 2022-02-18 PROCEDURE — 97162 PT EVAL MOD COMPLEX 30 MIN: CPT

## 2022-02-18 PROCEDURE — 82803 BLOOD GASES ANY COMBINATION: CPT

## 2022-02-18 PROCEDURE — 6360000002 HC RX W HCPCS: Performed by: HOSPITALIST

## 2022-02-18 PROCEDURE — 80048 BASIC METABOLIC PNL TOTAL CA: CPT

## 2022-02-18 PROCEDURE — 96375 TX/PRO/DX INJ NEW DRUG ADDON: CPT

## 2022-02-18 PROCEDURE — 6360000002 HC RX W HCPCS: Performed by: STUDENT IN AN ORGANIZED HEALTH CARE EDUCATION/TRAINING PROGRAM

## 2022-02-18 PROCEDURE — 2580000003 HC RX 258: Performed by: HOSPITALIST

## 2022-02-18 PROCEDURE — 82948 REAGENT STRIP/BLOOD GLUCOSE: CPT

## 2022-02-18 PROCEDURE — 93306 TTE W/DOPPLER COMPLETE: CPT

## 2022-02-18 RX ORDER — DEXTROSE MONOHYDRATE 50 MG/ML
100 INJECTION, SOLUTION INTRAVENOUS PRN
Status: DISCONTINUED | OUTPATIENT
Start: 2022-02-18 | End: 2022-02-20 | Stop reason: HOSPADM

## 2022-02-18 RX ORDER — MONTELUKAST SODIUM 10 MG/1
10 TABLET ORAL NIGHTLY
Status: DISCONTINUED | OUTPATIENT
Start: 2022-02-18 | End: 2022-02-20 | Stop reason: HOSPADM

## 2022-02-18 RX ORDER — ANASTROZOLE 1 MG/1
1 TABLET ORAL EVERY OTHER DAY
Status: DISCONTINUED | OUTPATIENT
Start: 2022-02-18 | End: 2022-02-20 | Stop reason: HOSPADM

## 2022-02-18 RX ORDER — ASPIRIN 81 MG/1
81 TABLET ORAL DAILY
Status: DISCONTINUED | OUTPATIENT
Start: 2022-02-18 | End: 2022-02-18

## 2022-02-18 RX ORDER — AMITRIPTYLINE HYDROCHLORIDE 75 MG/1
75 TABLET, FILM COATED ORAL NIGHTLY
Status: DISCONTINUED | OUTPATIENT
Start: 2022-02-18 | End: 2022-02-20 | Stop reason: HOSPADM

## 2022-02-18 RX ORDER — POTASSIUM CHLORIDE 20 MEQ/1
20 TABLET, EXTENDED RELEASE ORAL ONCE
Status: DISCONTINUED | OUTPATIENT
Start: 2022-02-18 | End: 2022-02-20

## 2022-02-18 RX ORDER — INSULIN GLARGINE 100 [IU]/ML
60 INJECTION, SOLUTION SUBCUTANEOUS NIGHTLY
Status: DISCONTINUED | OUTPATIENT
Start: 2022-02-18 | End: 2022-02-19

## 2022-02-18 RX ORDER — ASPIRIN 300 MG/1
300 SUPPOSITORY RECTAL DAILY
Status: DISCONTINUED | OUTPATIENT
Start: 2022-02-18 | End: 2022-02-18

## 2022-02-18 RX ORDER — BACLOFEN 10 MG/1
20 TABLET ORAL 3 TIMES DAILY
Status: DISCONTINUED | OUTPATIENT
Start: 2022-02-18 | End: 2022-02-20 | Stop reason: HOSPADM

## 2022-02-18 RX ORDER — PANTOPRAZOLE SODIUM 40 MG/1
40 TABLET, DELAYED RELEASE ORAL
Status: DISCONTINUED | OUTPATIENT
Start: 2022-02-18 | End: 2022-02-20 | Stop reason: HOSPADM

## 2022-02-18 RX ORDER — ATORVASTATIN CALCIUM 40 MG/1
40 TABLET, FILM COATED ORAL NIGHTLY
Status: DISCONTINUED | OUTPATIENT
Start: 2022-02-18 | End: 2022-02-20 | Stop reason: HOSPADM

## 2022-02-18 RX ORDER — POLYETHYLENE GLYCOL 3350 17 G/17G
17 POWDER, FOR SOLUTION ORAL DAILY PRN
Status: DISCONTINUED | OUTPATIENT
Start: 2022-02-18 | End: 2022-02-20 | Stop reason: HOSPADM

## 2022-02-18 RX ORDER — PALIPERIDONE 3 MG/1
3 TABLET, EXTENDED RELEASE ORAL NIGHTLY
Status: DISCONTINUED | OUTPATIENT
Start: 2022-02-18 | End: 2022-02-20 | Stop reason: HOSPADM

## 2022-02-18 RX ORDER — POTASSIUM CHLORIDE 7.45 MG/ML
10 INJECTION INTRAVENOUS ONCE
Status: COMPLETED | OUTPATIENT
Start: 2022-02-18 | End: 2022-02-18

## 2022-02-18 RX ORDER — PREGABALIN 75 MG/1
150 CAPSULE ORAL 2 TIMES DAILY
Status: DISCONTINUED | OUTPATIENT
Start: 2022-02-18 | End: 2022-02-18

## 2022-02-18 RX ORDER — ATORVASTATIN CALCIUM 80 MG/1
80 TABLET, FILM COATED ORAL NIGHTLY
Status: DISCONTINUED | OUTPATIENT
Start: 2022-02-18 | End: 2022-02-18

## 2022-02-18 RX ORDER — DEXTROSE MONOHYDRATE 100 MG/ML
12.5 INJECTION, SOLUTION INTRAVENOUS PRN
Status: DISCONTINUED | OUTPATIENT
Start: 2022-02-18 | End: 2022-02-20 | Stop reason: HOSPADM

## 2022-02-18 RX ORDER — ALBUTEROL SULFATE 90 UG/1
2 AEROSOL, METERED RESPIRATORY (INHALATION) EVERY 6 HOURS PRN
Status: DISCONTINUED | OUTPATIENT
Start: 2022-02-18 | End: 2022-02-20 | Stop reason: HOSPADM

## 2022-02-18 RX ORDER — SODIUM CHLORIDE 9 MG/ML
INJECTION, SOLUTION INTRAVENOUS CONTINUOUS
Status: DISCONTINUED | OUTPATIENT
Start: 2022-02-18 | End: 2022-02-19

## 2022-02-18 RX ORDER — NICOTINE POLACRILEX 4 MG
15 LOZENGE BUCCAL PRN
Status: DISCONTINUED | OUTPATIENT
Start: 2022-02-18 | End: 2022-02-20 | Stop reason: HOSPADM

## 2022-02-18 RX ADMIN — CEFTRIAXONE SODIUM 1000 MG: 1 INJECTION, POWDER, FOR SOLUTION INTRAMUSCULAR; INTRAVENOUS at 05:09

## 2022-02-18 RX ADMIN — ASPIRIN 81 MG: 81 TABLET, COATED ORAL at 08:39

## 2022-02-18 RX ADMIN — MONTELUKAST SODIUM 10 MG: 10 TABLET ORAL at 21:55

## 2022-02-18 RX ADMIN — ATORVASTATIN CALCIUM 40 MG: 40 TABLET, FILM COATED ORAL at 21:55

## 2022-02-18 RX ADMIN — ANASTROZOLE 1 MG: 1 TABLET, COATED ORAL at 09:15

## 2022-02-18 RX ADMIN — SODIUM CHLORIDE: 9 INJECTION, SOLUTION INTRAVENOUS at 06:51

## 2022-02-18 RX ADMIN — Medication 10 UNITS: at 01:19

## 2022-02-18 RX ADMIN — BACLOFEN 20 MG: 10 TABLET ORAL at 16:25

## 2022-02-18 RX ADMIN — BACLOFEN 20 MG: 10 TABLET ORAL at 21:55

## 2022-02-18 RX ADMIN — SODIUM CHLORIDE: 9 INJECTION, SOLUTION INTRAVENOUS at 16:25

## 2022-02-18 RX ADMIN — AMITRIPTYLINE HYDROCHLORIDE 75 MG: 75 TABLET, FILM COATED ORAL at 21:55

## 2022-02-18 RX ADMIN — INSULIN GLARGINE 60 UNITS: 100 INJECTION, SOLUTION SUBCUTANEOUS at 21:53

## 2022-02-18 RX ADMIN — POTASSIUM CHLORIDE 10 MEQ: 7.46 INJECTION, SOLUTION INTRAVENOUS at 02:29

## 2022-02-18 RX ADMIN — PALIPERIDONE 3 MG: 3 TABLET, EXTENDED RELEASE ORAL at 21:55

## 2022-02-18 RX ADMIN — PANTOPRAZOLE SODIUM 40 MG: 40 TABLET, DELAYED RELEASE ORAL at 05:10

## 2022-02-18 RX ADMIN — BACLOFEN 20 MG: 10 TABLET ORAL at 09:15

## 2022-02-18 RX ADMIN — LEVOFLOXACIN 750 MG: 5 INJECTION, SOLUTION INTRAVENOUS at 00:41

## 2022-02-18 ASSESSMENT — ENCOUNTER SYMPTOMS
VOMITING: 0
DIARRHEA: 0
COUGH: 0
COLOR CHANGE: 0
SHORTNESS OF BREATH: 0
PHOTOPHOBIA: 0
NAUSEA: 0
TROUBLE SWALLOWING: 0

## 2022-02-18 ASSESSMENT — PAIN SCALES - GENERAL
PAINLEVEL_OUTOF10: 0
PAINLEVEL_OUTOF10: 0

## 2022-02-18 NOTE — CONSULTS
Neurology Consult Note    Date:2/18/2022       FYGD:[id number]  Patient Rahat Bañuelos     YOB: 1965     Age:56 y.o. Requesting Physician: Emiliano Alexander MD     Reason for Consult:  Evaluate for Stroke Alert      Chief Complaint: Weakness    Subjective     Ruddy Hyde is a 62-year-old female with past medical history significant for diabetes and spinal stroke years ago for which she is not currently taking an antiplatelet drug due to burning sensation in the stomach while taking aspirin who presented to [address] ED with dysarthria, weakness, sensory change since 8-9 PM on 2/16/2022. Weakness was in all 4 extremities, worse on the right and sensory change was in the right upper and lower extremities. Patient was promptly taken to imaging for CT head which revealed no acute intracranial process and CTA head and neck which revealed no LVO and mild stenosis. Review of Systems   Review of Systems   Constitutional: Negative for activity change, fatigue and fever. HENT: Negative for tinnitus and trouble swallowing. Eyes: Negative for photophobia and visual disturbance. Respiratory: Negative for cough and shortness of breath. Cardiovascular: Negative for chest pain and palpitations. Gastrointestinal: Negative for diarrhea, nausea and vomiting. Genitourinary: Positive for dysuria. Negative for flank pain. Musculoskeletal: Negative for neck pain and neck stiffness. Skin: Negative for color change and rash. Neurological: Positive for speech difficulty, weakness and numbness. Negative for dizziness, facial asymmetry and headaches. Psychiatric/Behavioral: Negative for agitation and confusion.        Medications   Scheduled Meds:    potassium chloride  20 mEq Oral Once    aspirin  81 mg Oral Daily    Or    aspirin  300 mg Rectal Daily    amitriptyline  75 mg Oral Nightly    anastrozole  1 mg Oral Every Other Day    baclofen  20 mg Oral TID    montelukast  10 mg Oral Nightly pantoprazole  40 mg Oral QAM AC    paliperidone  3 mg Oral Nightly    tiotropium  1 puff Inhalation Daily    atorvastatin  40 mg Oral Nightly    insulin lispro  0-12 Units SubCUTAneous TID WC    insulin lispro  0-6 Units SubCUTAneous Nightly    insulin glargine  60 Units SubCUTAneous Nightly    cefTRIAXone (ROCEPHIN) IV  1,000 mg IntraVENous Q24H     Continuous Infusions:    sodium chloride 125 mL/hr at 22 0651     PRN Meds: polyethylene glycol, perflutren lipid microspheres, albuterol sulfate HFA    Past History    Past Medical History:   has a past medical history of Anxiety, Anxiety and depression, Asthma, Bipolar 1 disorder ([facility] [address] Bipolar 1 disorder with moderate sourav ([facility] [address] Blood circulation, collateral, Breast cancer ([facility] [address] Cancer ([facility] [address] Cancer ([facility] [address] Depression, Endometriosis, GERD (gastroesophageal reflux disease), Kidney stones, Lupus ([facility] [address] Movement disorder, MS (multiple sclerosis) ([facility] [address] Schizophrenia ([facility] [address] Thyroid disease, Type 2 diabetes mellitus without complication ([facility] [address] and Unspecified cerebral artery occlusion with cerebral infarction. Social History:   reports that she has been smoking cigarettes. She started smoking about 42 years ago. She has been smoking about 0.50 packs per day. She has never used smokeless tobacco. She reports current alcohol use. She reports that she does not use drugs. Family History:   Family History   Problem Relation Age of Onset    Stroke Mother     Asthma Mother     Other Mother     No Known Problems Sister     Asthma Brother     No Known Problems Brother        Physical Examination      Vitals:  /68   Pulse 98   Temp 97.7 °F (36.5 °C) (Oral)   Resp 14   Ht 5' (1.524 m)   Wt 162 lb 9.6 oz (73.8 kg)   SpO2 99%   BMI 31.76 kg/m²   Temp (24hrs), Av.8 °F (36.6 °C), Min:97.7 °F (36.5 °C), Max:97.9 °F (36.6 °C)      I/O (24Hr):     Intake/Output Summary (Last 24 hours) at 2022 0713  Last data filed at 2022 0225  Gross per 24 hour Intake 1147.98 ml   Output --   Net 1147.98 ml     Physical Exam  Vitals reviewed. Constitutional:       Appearance: Normal appearance. HENT:      Head: Normocephalic and atraumatic. Right Ear: External ear normal.      Left Ear: External ear normal.      Nose: Nose normal.      Mouth/Throat:      Mouth: Mucous membranes are moist.      Pharynx: Oropharynx is clear. Eyes:      Extraocular Movements: Extraocular movements intact and EOM normal.      Pupils: Pupils are equal, round, and reactive to light. Cardiovascular:      Rate and Rhythm: Normal rate and regular rhythm. Pulmonary:      Effort: Pulmonary effort is normal. No respiratory distress. Abdominal:      General: There is no distension. Palpations: Abdomen is soft. Tenderness: There is no abdominal tenderness. Musculoskeletal:         General: No deformity or signs of injury. Cervical back: No rigidity or tenderness. Skin:     General: Skin is warm and dry. Neurological:      Mental Status: She is alert and oriented to person, place, and time. Coordination: Finger-Nose-Finger Test and Romberg Test normal.      Gait: Gait is intact. Deep Tendon Reflexes:      Reflex Scores:       Bicep reflexes are 2+ on the right side and 2+ on the left side. Brachioradialis reflexes are 2+ on the right side and 2+ on the left side. Patellar reflexes are 1+ on the right side and 1+ on the left side. Psychiatric:         Mood and Affect: Mood normal.         Speech: Speech normal.         Behavior: Behavior normal.         Thought Content: Thought content normal.       Neurologic Exam     Mental Status   Oriented to person, place, and time. Follows 2 step commands. Attention: normal.   Speech: speech is normal   Level of consciousness: alert  Knowledge: good. Able to name object. Able to read. Able to repeat. Cranial Nerves     CN II   Visual fields full to confrontation.      CN III, IV, VI   Pupils are equal, round, and reactive to light. Extraocular motions are normal.     CN V   Facial sensation intact. CN VII   Facial expression full, symmetric. CN VIII   CN VIII normal.   Hearing: intact    CN IX, X   CN IX normal.   Palate: symmetric    CN XI   CN XI normal.   Right sternocleidomastoid strength: normal  Right trapezius strength: normal    CN XII   CN XII normal.   Tongue: not atrophic    Motor Exam   Muscle bulk: normal  Overall muscle tone: normal  Strength:  RUE: 4/5  LUE: 5/5  RLE: 4/5  LLE: 5/5     Sensory Exam   Light touch normal.     Gait, Coordination, and Reflexes     Gait  Gait: normal    Coordination   Romberg: negative  Finger to nose coordination: normal    Reflexes   Right brachioradialis: 2+  Left brachioradialis: 2+  Right biceps: 2+  Left biceps: 2+  Right patellar: 1+  Left patellar: 1+       Labs/Imaging/Diagnostics   Labs:  CBC:  Recent Labs     02/17/22 2153   WBC 4.0*   RBC 4.59   HGB 13.2   HCT 38.9   MCV 84.7   PLT 55*     CHEMISTRIES:  Recent Labs     02/17/22 2153 02/18/22  0046 02/18/22  0232   *  --   --    K 3.6  --   --    CL 95*  --   --    CO2 21*  --   --    BUN 11  --   --    CREATININE 0.8  --   --    GLUCOSE 341* 381 319     PT/INR:  Recent Labs     02/17/22 2253   INR 1.58*     APTT:  Recent Labs     02/17/22 2253   APTT 43.0*     LIVER PROFILE:  Recent Labs     02/17/22 2153   AST 51*   ALT 25   BILITOT 1.6*   ALKPHOS 152*       Imaging Last 24 Hours:  CTA HEAD W WO CONTRAST (CODE STROKE)    Result Date: 2/17/2022  CTA HEAD WITH CONTRAST COMPARISON: Noncontrast CT brain 2/17/2022. FINDINGS: There is no significant stenosis, occlusion, dissection, aneurysm, or vascular malformation. There is no active bleeding. The right A1 segment is hypoplastic or absent which is an anatomic variant. Terminal internal carotid arteries, middle cerebral arteries, anterior cerebral arteries, basilar artery, and posterior cerebral arteries are otherwise unremarkable. 1. Unremarkable CTA head. This document has been electronically signed by: Analia Turner M.D. on 02/17/2022 11:16 PM All CTs at this facility use dose modulation techniques and iterative reconstructions, and/or weight-based dosing when appropriate to reduce radiation to a low as reasonably achievable. CT HEAD WO CONTRAST    Result Date: 2/17/2022  ** ADDENDUM #1 ** This report was discussed with Amarilys Green on Feb 17, 2022 22:08:00 EST. This document has been electronically signed by: Pito Shaw on 02/17/2022 10:08 PM ** ORIGINAL REPORT ** CT BRAIN WITHOUT CONTRAST COMPARISON: None. FINDINGS: There is mild parenchymal atrophy. There is no evidence of an acute infarct or intraparenchymal hemorrhage. Patchy areas of low attenuation are noted in the subcortical and periventricular region which are nonspecific but most likely represent chronic small vessel ischemic disease. There is no mass effect, midline shift, or extra-axial blood. The ventricle are normal in size without evidence of hydrocephalus. The bone windows are unremarkable. Small retention cyst or polyp is noted in the left maxillary sinus. 1. No acute disease in the brain. This document has been electronically signed by: Analia Turner M.D. on 02/17/2022 10:05 PM All CTs at this facility use dose modulation techniques and iterative reconstructions, and/or weight-based dosing when appropriate to reduce radiation to a low as reasonably achievable. CTA NECK W WO CONTRAST (CODE STROKE)    Result Date: 2/17/2022  CTA NECK WITH CONTRAST COMPARISON: None. FINDINGS: There is no high-grade stenosis, occlusion, dissection, aneurysm, or vascular malformation. There is no active bleeding. Vertebral arteries are unremarkable. There is mild soft and calcific plaque in the right carotid bulb. There is no significant stenosis within the right internal/external carotid arteries.  On the left side, soft and calcific plaque are noted within the left carotid bulb and proximal left internal carotid artery, causing mild stenosis of the proximal left internal carotid artery. Left external carotid artery is patent. The graft calcific plaque is noted in the carotid siphons. There is no vascular malformation or dissection. Reversal of the normal cervical lordosis is noted. There are multilevel endplate degenerative changes and vacuum disc degeneration within the cervical spine. Mild nonspecific groundglass infiltrates are noted within the visualized upper lungs, along with mild scattered atelectatic/fibrotic change. 1. CTA neck demonstrates no evidence of a high-grade stenosis, occlusion, or dissection. 2. Mild groundglass infiltrates in the visualized upper lungs. Groundglass infiltrates are nonspecific and can be seen in conditions such as edema, viral infection, and pneumonitis. 3. Additional findings are detailed above. This document has been electronically signed by: Robyn Srivastava M.D. on 02/17/2022 11:26 PM All CTs at this facility use dose modulation techniques and iterative reconstructions, and/or weight-based dosing when appropriate to reduce radiation to a low as reasonably achievable. Carotid stenosis and measurements are in accordance with NASCET criteria. XR CHEST PORTABLE    Result Date: 2/17/2022  Chest Radiograph Comparison: None Findings: Prominent size heart. Normal mediastinal contours. No pneumothorax. Linear opacities in the left mid and lower lung zones, atelectasis versus scarring. No pleural effusion. Normal upper abdomen. No fracture. Impression: No acute findings. This document has been electronically signed by: Karen Prado. Johnie Meraz MD on 02/17/2022 10:48 PM        Assessment and Plan:        Hospital Problems             Last Modified POA    * (Principal) TIA (transient ischemic attack) 2/18/2022 Yes    Acute encephalopathy 2/18/2022 Yes          1.  Stroke Like Symptoms- Stroke vs Recrudescence in setting of UTI + Hyperglycemia  CT Head- no acute bleed, mass effect, midline shift  CTA H&N- no LVO, no hemodynamically significant stenosis  MRI- ordered, delayed due to pt bladder stimulator requiring mode change  Hospitalist admit to telemetry, no tPA received  Neuro checks every 4 hours  Loaded ASA + Plavix on 2/17/22  Will D/C ASA due to previous GI issue with ASA  Continue Plavix 75 mg daily   NIHSS q shift  HgbA1C 9.1  LDL 53, Goal 45-70  Lipitor 40mg daily  Permissive HTN for the first 24 hours  2D Echo- pending read  PT/OT/SLP Evaluation   Maintain telemetry, monitor for atrial-fib  Maintain oxygenation saturation >94%  Start 0.9% Saline IV at 75 mL/hr  NPO pending swallow evaluation  Monitor for infection  Urinalysis with Reflex- + for infection  Chest XRay  EKG, ordered  Labs daily  SCDs and lovenox for DVT prophylaxis  CT head is needed if severe headache or altered mental status  Provide stroke education for individualized risk factors   Neuro following    This patient was seen and evaluated with Dr. Nilda Diaz and he is in agreement with the assessment and plan. Electronically signed by Uday Brian PA-C on 2/18/22 at 7:27 AM EST    I had a face-to-face encounter with this patient today where I evaluated the patient with LISA Tyson. The history and physical examination was completed by me as documented in the attached note. All relevant radiology images, labs and vitals signs were reviewed by me. I agree with the physical examination, assessment, and plan as documented which was formulated by me. The timing of this note filing does not necessarily correlate with the timing of when the patient was seen by me. 81-year-old woman with a history of a spinal cord infarct for which she had weakness and bladder incontinence for which she has a bladder stimulator in. She presents with weakness in all 4 extremities, and sensory changes in the right upper and lower extremities. She was also found to have a urinary tract infection.   She was not on any antiplatelet medication, she has had GI issues with aspirin in the past, we will plan on continuing her only on Plavix from a secondary stroke perspective. I suspect her symptoms are likely secondary to a toxic metabolic encephalopathy given her underlying infection, but stroke cannot be completely ruled out. We will plan on obtaining an MRI of her brain without contrast, agree with treatment of underlying infection. Other recommendations as above.     Bruce Padron MD  Vascular and Interventional Neurology, Neurocritical Care  [address] [facility]

## 2022-02-18 NOTE — PROGRESS NOTES
Patient admitted to 4A Room 11 from ED  Complaint upon arrival to the room TIA  Vital signs obtained. Assessment and data collection initiated. Oriented to room. Policies and procedures for 4a explained All questions answered with no further questions at this time. Fall prevention and safety brochure discussed with patient. 2 person skin check completed.

## 2022-02-18 NOTE — CARE COORDINATION
declines HH. Will follow. Transportation/Food Security/Housekeeping Addressed:  No issues identified.

## 2022-02-18 NOTE — ED NOTES
EKG tech completed EKG in [address] [facility]  02/17/22 8451

## 2022-02-18 NOTE — PLAN OF CARE
Problem: Pain:  Goal: Pain level will decrease  Description: Pain level will decrease  2/18/2022 1156 by Howie Prado RN  Outcome: Ongoing  2/18/2022 0447 by Ede Vargas RN  Outcome: Ongoing  Note: Patient able to use 0-10 pain scale. Denies pain at this time. Agreeable to take PRN pain medications. Goal: Control of acute pain  Description: Control of acute pain  Outcome: Ongoing  Goal: Control of chronic pain  Description: Control of chronic pain  Outcome: Ongoing     Problem: Skin Integrity:  Goal: Will show no infection signs and symptoms  Description: Will show no infection signs and symptoms  2/18/2022 1156 by Howie Prado RN  Outcome: Ongoing  2/18/2022 0447 by Ede Vargas RN  Outcome: Ongoing  Note: No signs of skin breakdown. Skin warm, dry, and intact. Mucous membranes pink and moist.  Assistance with turns/ambulation provided PRN. Will continue to monitor. Goal: Absence of new skin breakdown  Description: Absence of new skin breakdown  Outcome: Ongoing     Problem: Falls - Risk of:  Goal: Will remain free from falls  Description: Will remain free from falls  2/18/2022 1156 by Howie Prado RN  Outcome: Ongoing  2/18/2022 0447 by Ede Vargas RN  Outcome: Ongoing  Note: Patient remained free from falls this shift. Bed is in low position with alarm on and siderails up x2. Education given on use of call light and patient voiced understanding. Call light and beside table within reach. Arm band and falling star in place. Hourly rounds completed. Will continue to monitor.      Goal: Absence of physical injury  Description: Absence of physical injury  Outcome: Ongoing     Problem: Discharge Planning:  Goal: Discharged to appropriate level of care  Description: Discharged to appropriate level of care  2/18/2022 1156 by Howie Prado RN  Outcome: Ongoing  2/18/2022 0447 by Ede Vargas RN  Outcome: Ongoing  Note: Discharge planning in process and discussed with patient/family. Social work consulted for any additional needs. Care manager aware of discharge needs.         Problem: HEMODYNAMIC STATUS  Goal: Patient has stable vital signs and fluid balance  2/18/2022 1156 by Jatinder Montano RN  Outcome: Ongoing  2/18/2022 0447 by Kevin Dennison RN  Outcome: Ongoing  Note: /68   Pulse 98   Temp 97.7 °F (36.5 °C) (Oral)   Resp 14   Ht 5' (1.524 m)   Wt 162 lb 9.6 oz (73.8 kg)   SpO2 99%   BMI 31.76 kg/m²

## 2022-02-18 NOTE — PROGRESS NOTES
Physician Progress Note      Catherine Lynn  CSN #:                  [id number]  :                       1965  ADMIT DATE:       2022 9:29 PM  [address] [place] DATE:  RESPONDING  PROVIDER #:        Demond Carbajal DO          QUERY TEXT:    Patient admitted with Acute cystitis. Documentation reflects ED final: Sepsis,   in note(s) dated . If possible, please document in the progress notes   and discharge summary if Sepsis was: The medical record reflects the following:  Risk Factors: acute cystitis,  Clinical Indicators: acute cystitis, WBC 4.0, LA 3.0, tachy noted, no fever,   IV rocephin  Treatment: IV ATB    Thank you. Please call if you have any questions. (P) [phone]. Signed   by Jo Mancilla RN Clinical , CRCR  Options provided:  -- Sepsis ruled out after study  -- Sepsis confirmed after study and was present on admission  -- Sepsis, not present on admission,  -- Sepsis treated and resolved  -- Other - I will add my own diagnosis  -- Disagree - Not applicable / Not valid  -- Disagree - Clinically unable to determine / Unknown  -- Refer to Clinical Documentation Reviewer    PROVIDER RESPONSE TEXT:    Sepsis ruled out after study. Query created by: Venus Button on 2022 10:25 AM      QUERY TEXT:    Pt admitted with Right-sided weakness, encephalopathy, complicated by possible   UTI  and has encephalopathy documented. If possible, please document in   progress notes and discharge summary further specificity regarding the type of   encephalopathy:    The medical record reflects the following:  Risk Factors: Acute cystitis, dm, metabolic acidosis,  Clinical Indicators: AMS, very somnolent and difficult to arouse. Treatment: Neuro consult, neuro checks, IV ATBS and bed alarms,    Thank you. Please call if you have any questions. (P) [phone].   Signed   by Jo Mancilla RN Clinical , CRCR  Options provided:  -- Metabolic encephalopathy  -- Septic encephalopathy  -- Toxic encephalopathy  -- Anoxic encephalopathy  -- Toxic metabolic encephalopathy  -- Wernicke?s encephalopathy  -- Other - I will add my own diagnosis  -- Disagree - Not applicable / Not valid  -- Disagree - Clinically unable to determine / Unknown  -- Refer to Clinical Documentation Reviewer    PROVIDER RESPONSE TEXT:    This patient has metabolic encephalopathy.     Query created by: Serafin Monreal on 2/18/2022 10:25 AM      Electronically signed by:  Carissa Bernard DO 2/18/2022 10:50 AM

## 2022-02-18 NOTE — PROGRESS NOTES
Neuro Nurse Navigator Follow Up    This RN spoke with pt and son. Provided stroke folder and discussed personal risk factors of diabetes and smoking. PT and primary RN at bedside at this time. Pt states she does not have the controls for her spinal stimulator and that her sons \"wouldn't know where to find it even if I told them\". I spoke with her sons and they state that if pt tells them where it is they will go to the house and try to find it. Notified HUBERT Brown of the MRI delay for stimulator controls.

## 2022-02-18 NOTE — PLAN OF CARE
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 She has also multiple sclerosis, neurogenic bowel and bladder, bipolar disorder. She carries history of cervical cancer. She also has hx of thrombocytopenia. Patient presented with R sided weakness and was done as code stroke. Patient initial CT head is negative no acute bleed and CTA head negative for LVO. Patient was encephalopathic on arrival however ABG normal. Maybe due to polypharmacy. Patient's mental status improved. MRI of the brain pending. Patient urine culture positive for gram negative bacilli with >100,000 CFU. Patient does complaint of dysuria. Will Continue Rocephin started on 2/18/2022. Patient's sugars continue to be elevated. A1c checked is 9.1%. Will start patient on Lantus and High dose sliding scale. Will need to follow up OP with PCP for better diabetes management. Lactic acid trending up to 5.0. BC pending. Likely could be type 2 Lactic acidosis due metformin. Will trend. Patient afebrile with normal white count.       Electronically signed by Ashwini Valdovinos DO on 2/18/22 at 6:12 PM EST

## 2022-02-18 NOTE — ED NOTES
ED to inpatient nurses report    Chief Complaint   Patient presents with    Extremity Weakness      Present to ED from home  LOC: alert and orientated to name, place, date  Vital signs   Vitals:    02/18/22 0032 02/18/22 0042 02/18/22 0119 02/18/22 0221   BP: 100/65  110/69 114/68   Pulse: 91 90 88 88   Resp: 11 10 15 11   Temp:       TempSrc:       SpO2:  94% 99% 100%   Weight:       Height:          Oxygen Baseline RA    Current needs required RA Bipap/Cpap No  LDAs:   Peripheral IV 02/17/22 Right Forearm (Active)   Site Assessment Clean;Dry; Intact 02/17/22 2213   Line Status Blood return noted;Specimen collected 02/17/22 2213   Dressing Status Clean;Dry; Intact 02/17/22 2213     Mobility: Requires assistance * 2  Pending ED orders: na  Present condition: stable/lethargic  Person of contract, phone number   Our promise was given to family    Electronically signed by Carmen Magdaleno RN on 2/18/2022 at 3:09 AM       Carmen Magdaleno RN  02/18/22 7518

## 2022-02-18 NOTE — PROGRESS NOTES
[facility] NEUROSCIENCES 4A  Speech - Language - Cognitive Evaluation/Treatment   + Clinical Swallow Evaluation    SLP Individual Minutes  Time In: 8971  Time Out: 1020  Minutes: 40  Timed Code Treatment Minutes: 10 Minutes      CSE: 8 minutes  Cog Eval: 22 minutes   Cog Tx: 10 minutes     Date: 2022  Patient Name: Jaden Sparrow      CSN: [id number]   : 1965  (49 y.o.)  Gender: female   Referring Physician: Evita Ortega MD  Diagnosis: TIA   Secondary Diagnosis: Cognitive impairment   Precautions: fall risk, safety  History of Present Illness/Injury: Per chart review; Jaden Sparrow is a 50 y/o female who was admitted to [facility] with the above medical dx. \"Pt is 61-year-old female seen by neurology in the ED as a stroke alert with NIH of 8. Patient's last known well is between 8 and 9 PM yesterday 2022. Patient with giveaway weakness in all 4 extremities, worse on the right, dysarthria and decreased sensation on the right side. CT head findings negative for acute change. 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 \" Sachin Velasquez was referred for a CSE and cognitive evaluation d/t concerns for stroke upon admission. Past Medical History:   Diagnosis Date    Anxiety     Anxiety and depression     Asthma     Bipolar 1 disorder ([address]     Bipolar 1 disorder with moderate sourav ([address]     Blood circulation, collateral     Breast cancer ([address]     diagnosed in 2021    Cancer [facility])      ?  cervical \"long time ago\"    Cancer ([address] 01/15/2021    Left Breast    Depression     Endometriosis     GERD (gastroesophageal reflux disease)     Kidney stones     Lupus ([address]     Movement disorder     MS (multiple sclerosis) ([address]     Schizophrenia ([address]     Thyroid disease     Type 2 diabetes mellitus without complication ([address]     Unspecified cerebral artery occlusion with cerebral infarction        Pain: No pain reported. Subjective:  Patient seen upright in wheelchair for CSE and cognitive evaluation. No family was present during the evaluation, however, the pt's boyfriend arrived at the end and was present for duration of education and treatment      SOCIAL HISTORY:   Living Arrangements: lives at home with boyfriend indep   Work History: disability   Education Level: 11th grade   Driving Status: Does not drive  Finance Management: Independent  Medication Management: Independent  ADL's: Independent. Hobbies: NA  Vision Status: glasses   Hearing: WFL in quiet setting   Type of Home: Apartment  Home Layout: One level,Performs ADL's on one level,Able to Live on Main level with bedroom/bathroom  Home Access: Level entry  Home Equipment: SitScape / VOICE:  Speech and Voice appear to be grossly intact for basic and complex daily communication    LANGUAGE:  Receptive:  Receptive language skills appear to be grossly intact for basic  daily communication. Expressive:  Expressive language skills appear to be grossly intact for basic daily communication. COGNITION:  [place] Cognitive Assessment (MOCA) version 7.2 completed. Pt scored 10/30. Normal is greater than or equal to 26/30.   Inclusion of +1 point given highest level of education achieved less than/equal to 12th grade or GED with limited-0 post-secondary schooling   Orientation: 5/6  Immediate Recall: Trial 1: 4/5 & Trial 2: 5/5   Short-Term Recall: 0/5 indep, 0/5 min A, 3/5 MAX FO2   Divergent Namin words/min   Problem Solving: poor general safety  Reasonin/2 indep   Sequencing: fair   Thought Organization: fair to poor   Insight: poor   Attention: very poor   Math Computation: 0/3 on serial 7's   Executive Functionin/5     SWALLOWING:    Respiratory Status: Independent      Behavioral Observation: Alert    ORAL MECHANISM EVALUATION:      Facial / Labial [facility] Lingual WFL    Dentition WFL    Velum WFL    Vocal Quality WFL    Sensation WFL    Cough WFL      PATIENT WAS EVALUATED USING:  Thin via ice, tsp, cup, straw, puree, soft, and hard solids. ORAL PHASE:  WFL    PHARYNGEAL PHASE:  WFL:  Pharyngeal phase appears WFL but cannot rule out pharyngeal phase deficits from a bedside swallowing evaluation alone. SIGNS AND SYMPTOMS OF LARYNGEAL PENETRATION / ASPIRATION:  No signs/symptoms of aspiration evident in this evaluation, but cannot rule out silent aspiration. INSTRUMENTAL EVALUATION: Instrumental evaluation not indicated at this time. DIET RECOMMENDATIONS:  Regular solids, thin liquids     STRATEGIES: Full Upright Position          RECOMMENDATIONS/ASSESSMENT:  DIAGNOSTIC IMPRESSIONS:  Patient presented with Severe cognitive linguistic deficits as outlined above. Cognitive deficits are in the domains of orientation to the date, mild and moderately complex immediate recall, BASIC delayed recall, BASIC working memory, organizational naming, thought processing, general problem solving, and personal safety awareness re: current deficits etiology of impairments, as well as, comprehension/reasoning re: ST recommendations. Pt unable to complete basic sustained and alternating attention tasks, as well as, impaired visual/verbal reasoning. Receptive and expressive language remains intact for basic daily communication of wants/needs. No dysarthria or dysphonia detected. Recommended STRICT 24 hour SUPERVISION, direct 1:1 assistance with recall of any/all appointments, pertinent information, medications, finances, and safety in the home environment. Pt is a HIGH fall risk, as well as, readmission risk without recommended level of supervision. **Education/Intervention: Completed introduction and review of WRAP memory strategies - write it down, repeat it 3-4x, personal associations, and picture it/visual memory.  The pt presented with minimal receptiveness and agreeability with recommendations. Pt was not agitated, but presents with minimal to NO INSIGHT into her cognitive impairments and identified deficits on this date. Completed review of aforementioned recommendations; \"Recommended STRICT 24 hour SUPERVISION, direct 1:1 assistance with recall of any/all appointments, pertinent information, medications, finances, and safety in the home environment. Pt is a HIGH fall risk, as well as, readmission risk without recommended level of supervision. \" Pt immediate response with verbal understanding, but quickly followed up with the following statement; \"well I don't need that help. I do my finances everyday just fine. \"      Re: swallow function; Patient presents with [facility] oral dysphagia with inability to fully discern potential presence of pharyngeal phase deficits without formal instrumentation. Pt presented with good labial strength, ROM, symmetry, timely mastication, bolus control/formation, and AP transit / withOUT signs of anterior loss, no oral residue, no concerns for premature spillage, and No overt s/s of aspiration/penetration across all trials. Pharyngeal phase appears WFL but cannot rule out pharyngeal phase deficits from a bedside swallowing evaluation alone. Instrumental swallow evaluation is NOT recommended at this time. ST to sign-off of dysphagia tx as of 2/18/22. Please re-consult if pt's medical status changes. Rehabilitation Potential: good    EDUCATION:  Learner: Patient  Education:  Reviewed results and recommendations of this evaluation, Reviewed ST goals and Plan of Care and Reviewed recommendations for follow-up  Evaluation of Education: Milton Morales understanding, Needs further instruction and No evidence of learning    PLAN:  Skilled SLP intervention on acute care 3-5 x per week or until goals met and/or pt plateaus in function.   Specific interventions for next session may include: insight, verbal reasoning, orientation, problem solving

## 2022-02-18 NOTE — PROGRESS NOTES
[address] [place] [address]  INPATIENT PHYSICAL THERAPY  EVALUATION  [facility] 4A - 4A-11/011-A    Time In: 7740  Time Out: 4153  Timed Code Treatment Minutes: 14 Minutes  Minutes: 24          Date: 2022  Patient Name: Ron Dykes,  Gender:  female        MRN: [id number]  : 1965  (49 y.o.)      Referring Practitioner: Belkis Su MD  Diagnosis: TIA (transient ischemic attack)  Additional Pertinent Hx: Per H&P : Ron Dykes is a 49 y.o. female with PMHx of chronic paraplegia secondary to \"spinal stroke\" since , diabetes mellitus type 2, schizophrenia, who presented to [address] [place] [address] initially as a code stroke. Patient lives with her boyfriend. Apparently, she woke up this morning not feeling well. She was alone at the time because her boyfriend was at work. A friend of hers came by to check up on her and noted that she was unable to hold her coffee cup to drink from. Apparently, her right-sided weakness was noticeable, and she woke up with this. Patient was brought to the ED for further evaluation. In the ED, CT of the head demonstrated no acute findings. CTA demonstrated no evidence of high-grade stenosis, occlusion, or dissection. CTA of the head was otherwise unremarkable. Restrictions/Precautions:  Restrictions/Precautions: General Precautions,Fall Risk  Position Activity Restriction  Other position/activity restrictions: Hx lumbar stroke, w/c bound, L mastectomy approx. 1 year ago    Subjective:  Chart Reviewed: Yes  Patient assessed for rehabilitation services?: Yes  Family / Caregiver Present: No  Subjective: RN approved session. Pt pleasantly agrees for PT eval. Pt noted to require reorientation to task or question at times as she was easily distracted. Pt plans to d/c home with her boyfriend, states he works 3rd shift so she is alone only when he works.  Increased time during session to assist with brief change, pt states she cannot control her bladder. General:  Overall Orientation Status: Within Functional Limits (not formally assessed)  Follows Commands: Within Functional Limits    Vision: Within Functional Limits    Hearing: Within functional limits     Pain: not rated R arm and leg, chronic     Vitals: Vitals not assessed per clinical judgement, see nursing flowsheet    Social/Functional History:    Lives With: Significant other (boyfriend)  Type of Home: Apartment  Home Layout: One level,Performs ADL's on one level,Able to Live on Main level with bedroom/bathroom  Home Access: Level entry  Home Equipment: Wheelchair-manual     Bathroom Shower/Tub: Tub/Shower unit  Bathroom Toilet: Standard  Bathroom Equipment: Shower chair (PT reports shower chair is broken)  Bathroom Accessibility: Wheelchair accessible    Jordana Pleasure Help From: Family  ADL Assistance: Independent  Homemaking Assistance:  (Pt completes from w/c level)  Ambulation Assistance:  (Pt is non-ambulatory and uses w/c)  Transfer Assistance:  (Pt reports independent with t/fs but boyfriend occasionally helps. Pt has slideboard at home but doesnt use it often. Pt reports sit pivot t/fs from couch to w/c)        Additional Comments: Pt boyfriend works 3rd shift and Pt is home alone during the night. Pt sons live nearby and assist when needed. Pt has been indep with mobility in her w/c for years, states her boyfriend assists with transfers.     OBJECTIVE:  Range of Motion:  Right Lower Extremity: Impaired - R ankle with hypertonicity- contracted into PF position   Left lower extremity: WFL- limited due to weakness     Strength:  Right Lower Extremity: Impaired - Hip flexion 3/5, knee extension 4+/5, knee flexion 4+/5, DF 2+/5, PF 4/5     Left Lower Extremity: Impaired -Hip flexion 2+/5, knee extension 2+/5, knee flexion 3/5, DF 3/5, PF 3/5    *Hx L LE weakness from spinal CVA    Balance:  Static Sitting Balance:  Stand By Assistance  Dynamic Sitting Balance: Contact Guard Assistance, Minimal Assistance   *CGA with extreme reaching in the bathroom secondary to pt completing quickly with decreased attention to safety    Bed Mobility:  Rolling to Left: Minimal Assistance   Rolling to Right: Minimal Assistance   Supine to Sit: Contact Guard Assistance, with rail, with increased time for completion     *Multple rolls for change of brief  *Pt very slow for supine to sit with heavy reliance on rail     Transfers:  Sit Pivot:Contact Guard Assistance, Minimal Assistance, with increased time for completion, cues for hand placement  x3 trials of this from bed to w/c, w/c to toilet, and toilet to w/c   *CGA to min assist to support, for cues for positioning and limb alignment, and to assist lateral translation   *Pt refusing gait belt despite max encouragement and education, attributes this to her mastectomy. Pt also with decreased receptiveness to feedback or assistance. *Pt noted to complete transfers quickly with decreased attention to safety in multiple bouts. Cues for hand placement and positioning of chair provided. *Pt would benefit from continued practice and repetition for improved safety with transfers. Ambulation:  Not Tested    Wheelchair Mobility:  Stand By Assistance, Minimal Assistance, with cues for safety  Extremities Used: Bilateral Upper Extremities and Bilateral Lower Extremities  Type of Chair:Manual  Surface: Level Tile  Distance: ~10'x2  Quality: Decreased Fluidity  *min assist with change in direction in a small space and for w/c positioning for a safe transfer. Cues for path finding provided and to slow down secondary to IV line. Exercise:  None completed    Functional Outcome Measures: Completed  AM-PAC Inpatient Mobility without Stair Climbing Raw Score : 11  AM-PAC Inpatient without Stair Climbing T-Scale Score : 35.66    ASSESSMENT:  Activity Tolerance:  Patient tolerance of  treatment: fair.  Pt noted to have decreased insight regarding safe technique with transfers, and poor attention to her IV line despite cues. Pt completed mobility impulsively and with decreased safety. Treatment Initiated: Treatment and education initiated within context of evaluation. Evaluation time included review of current medical information, gathering information related to past medical, social and functional history, completion of standardized testing, formal and informal observation of tasks, assessment of data and development of plan of care and goals. Treatment time included skilled education and facilitation of tasks to increase safety and independence with functional mobility for improved independence and quality of life. Assessment: Body structures, Functions, Activity limitations: Decreased functional mobility ,Decreased safe awareness,Decreased balance,Decreased endurance,Decreased strength  Assessment: This patient is a 49 y. o. who presents with TIA. This is a decline from the patient's baseline status of mod I with w/c mobility and living with her boyfriend. The patient is observed to have deficits in strength, balance, activity tolerance, and safety awareness and would benefit from skilled PT services to progress functional mobility, safety awareness, and to decrease overall risk of falls.     Prognosis: Good    REQUIRES PT FOLLOW UP: Yes    Discharge Recommendations:  Discharge Recommendations: Continue to assess pending progress,24 hour supervision or assist,Patient would benefit from continued therapy after discharge    Patient Education:  PT Education: Jason Almonte of [address] Training,Transfer Training    Equipment Recommendations:  Equipment Needed: No    Plan:  Times per week: 6x N  Current Treatment Recommendations: Isa Goel Re-education,Patient/Caregiver Education & Training,Wheelchair Mobility Training,Balance Training,Functional Mobility Training,Safety Education & Training    Goals:  Patient goals : get out of here  Short term goals  Time Frame for Short term goals: By hospital d/c  Short term goal 1: Pt to demo supine <->sit mod I for ease getting in and out of bed  Short term goal 2: Pt to demo sit <->pivot transfer to and from her w/c mod I for improved safety with transfers  Short term goal 3: Pt to demo w/c mobility for >=150' mod I for ability to progress within her environment  Long term goals  Time Frame for Long term goals : NA due to short ELOS    Following session, patient left in safe position with all fall risk precautions in place.

## 2022-02-18 NOTE — ED NOTES
Patient and family states that now last time was seen normal was around 8-9 yesterday. States has just not been feeling right and has not taken any medication today.       Daniel Sharp RN  02/18/22 [id number]

## 2022-02-18 NOTE — PLAN OF CARE
Problem: Pain:  Goal: Pain level will decrease  Description: Pain level will decrease  Outcome: Ongoing  Note: Patient able to use 0-10 pain scale. Denies pain at this time. Agreeable to take PRN pain medications. Problem: Skin Integrity:  Goal: Will show no infection signs and symptoms  Description: Will show no infection signs and symptoms  Outcome: Ongoing  Note: No signs of skin breakdown. Skin warm, dry, and intact. Mucous membranes pink and moist.  Assistance with turns/ambulation provided PRN. Will continue to monitor. Problem: Falls - Risk of:  Goal: Will remain free from falls  Description: Will remain free from falls  Outcome: Ongoing  Note: Patient remained free from falls this shift. Bed is in low position with alarm on and siderails up x2. Education given on use of call light and patient voiced understanding. Call light and beside table within reach. Arm band and falling star in place. Hourly rounds completed. Will continue to monitor. Problem: Discharge Planning:  Goal: Discharged to appropriate level of care  Description: Discharged to appropriate level of care  Outcome: Ongoing  Note: Discharge planning in process and discussed with patient/family. Social work consulted for any additional needs. Care manager aware of discharge needs. Problem: HEMODYNAMIC STATUS  Goal: Patient has stable vital signs and fluid balance  Outcome: Ongoing  Note: /68   Pulse 98   Temp 97.7 °F (36.5 °C) (Oral)   Resp 14   Ht 5' (1.524 m)   Wt 162 lb 9.6 oz (73.8 kg)   SpO2 99%   BMI 31.76 kg/m²      Care plan reviewed with patient. Patient verbalizes understanding of the plan of care and contributed to goal setting.

## 2022-02-18 NOTE — PLAN OF CARE
Elmer Treviño, 68-year-old female seen by neurology in the ED as a stroke alert. Patient's last known well is between 8 and 9 PM yesterday 2/16/2022. Patient with giveaway weakness in all 4 extremities, worse on the right, dysarthria and decreased sensation on the right side. Due to time since last known well patient is not a candidate for TPA. Not a candidate for neuro intervention. Neuro recommends load with aspirin 325 mg and Plavix 300 mg, hydration, high intensity statin as well as metabolic and infectious work-up. Full note to follow tomorrow.

## 2022-02-18 NOTE — ED TRIAGE NOTES
Patient presents to ed with right sided weakness. States has been having right sided weakness since around noon.

## 2022-02-18 NOTE — H&P
History & Physical        Patient:  Rae Lynne  YOB: 1965    MRN: [id number]     Acct: [de-identified]    PCP: Ember Dyer MD    Date of Admission: 2/17/2022    Date of Service: Pt seen/examined on 02/18/22  and Admitted to observation with expected LOS less than two midnights due to medical therapy. Chief Complaint: Right-sided weakness    History Of Present Illness:  Rae Lynne is a 49 y.o. 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 Patient lives with her boyfriend. Apparently, she woke up this morning not feeling well. She was alone at the time because her boyfriend was at work. A friend of hers came by to check up on her and noted that she was unable to hold her coffee cup to drink from. Apparently, her right-sided weakness was noticeable, and she woke up with this. Patient was brought to the ED for further evaluation. In the ED, CT of the head demonstrated no acute findings. CTA demonstrated no evidence of high-grade stenosis, occlusion, or dissection. CTA of the head was otherwise unremarkable. UA negative for blood but noted positive nitrites and small leukocyte esterase. Many bacteria was seen. Urinary WBC also was elevated. Little epithelial cells was observed. At the time of my assessment, patient was very somnolent and very difficult to arouse. ABG performed was reported to be normal.    We were asked to admit this patient for further management. Past Medical History:          Diagnosis Date    Anxiety     Anxiety and depression     Asthma     B[facility] disorder [facility]     Bipolar 1 disorder with moderate sourav ([facility]     Blood circulation, collateral     Breast cancer ([facility]     diagnosed in jan 2021    Cancer [facility])      ?  cervical \"long time ago\"    [facility]) 01/15/2021 Left Breast    Depression     Endometriosis     GERD (gastroesophageal reflux disease)     Kidney stones     Lupus (HCC)     Movement disorder     MS (multiple sclerosis) (HCC)     Schizophrenia ([facility]     Thyroid disease     Type 2 diabetes mellitus without complication ([facility]     Unspecified cerebral artery occlusion with cerebral infarction 2014       Past Surgical History:          Procedure Laterality Date    BREAST LUMPECTOMY Left 02/19/2021    LEFT BREAST MASTECTOMY, SENTINAL LYMPH NODE BIOPSY, PREOP NEEDLE LOC performed by Margarita Baer MD at [address] Left 4/6/2021    DEBRIDEMENT LEFT BREAST MASTECTOMY WOUND performed by Margarita Baer MD at [address] Left 4/27/2021    LEFT BREAST WOUND REVISION performed by Margarita Baer MD at [facility]  01/2020    DILATION AND CURETTAGE OF UTERUS      BEN US GUID NDL BIOPSY LEFT Left 01/14/2021    BEN US GUID [address] LEFT 1/14/2021 Luann Gilliam MD [facility] NERVE SURGERY N/A 01/26/2021    EXCHANGE OF INTERSTIM SYSTEM performed by Thai Draper MD at [facility]  03/23/2021    right breast i and d with closure Dr Mk Valdovinos in the procedure room    PAIN MANAGEMENT PROCEDURE Left 8/31/2021    left T3 paravertebral block under fluoroscopy performed by David iRce DO at [address] [place] OFFICE/OUTPT VISIT,PROCEDURE ONLY N/A 11/21/2018    INTERSTIM DEVICE PLACEMENT MODEL 3058, ONLY HEAD ELIGIBLE FOR MRI WITH TRANSMIT/RECEIVE HEAD COIL    TUBAL LIGATION      10 years ago    US GUIDED NEEDLE LOC OF LEFT BREAST Left 02/19/2021    US GUIDED NEEDLE LOC OF LEFT BREAST 2/19/2021 [address] LYMPH NODE BIOPSY  01/14/2021    US LYMPH NODE BIOPSY 1/14/2021 Luann Gilliam MD [facility]       Medications Prior to Admission:      Prior to Admission medications    Medication Sig Start Date End Date Taking?  Authorizing Provider pregabalin (LYRICA) 150 MG capsule Take 1 capsule by mouth 2 times daily for 30 days. 1/18/22 2/17/22 Yes Mayito Walton DO   Lactobacillus Acid-Pectin (ACIDOPHILUS/CITRUS PECTIN) TABS take 1 tablet by mouth once daily 1/17/22  Yes CHAS Cook CNP   oxyCODONE-acetaminophen (PERCOCET) 5-325 MG per tablet Take 1 tablet by mouth 2 times daily as needed for Pain for up to 30 days.  1/28/22 2/27/22 Yes Mayito Walton DO   amitriptyline (ELAVIL) 75 MG tablet Take 1 tablet by mouth nightly 11/11/21  Yes CHAS Huertas CNP   atorvastatin (LIPITOR) 10 MG tablet take 1 tablet by mouth once daily 6/6/21  Yes Historical Provider, MD   baclofen (LIORESAL) 10 MG tablet Take 2 tablets by mouth 3 times daily 4/8/21  Yes CHAS Huertas CNP   metFORMIN (GLUCOPHAGE) 1000 MG tablet Take 1,000 mg by mouth 2 times daily 12/7/20  Yes Historical Provider, MD   desvenlafaxine succinate (PRISTIQ) 50 MG TB24 extended release tablet Take 50 mg by mouth daily 12/16/20  Yes Historical Provider, MD   MUCINEX MAXIMUM STRENGTH 1200 MG TB12 Take 1 tablet by mouth 2 times daily 1/11/21  Yes Historical Provider, MD   paliperidone (INVEGA) 3 MG extended release tablet Take 3 mg by mouth nightly   Yes Historical Provider, MD   SITagliptin (JANUVIA) 100 MG tablet Take 100 mg by mouth daily   Yes Historical Provider, MD   Lifitegrast (XIIDRA) 5 % SOLN Place 1 drop into both eyes daily   Yes Historical Provider, MD   insulin glargine (LANTUS SOLOSTAR) 100 UNIT/ML injection pen Inject 60 Units into the skin nightly    Yes Historical Provider, MD   montelukast (SINGULAIR) 10 MG tablet Take 10 mg by mouth nightly  10/26/16  Yes Historical Provider, MD   Calcium Carbonate (CALCIUM 600 PO) Take 1 tablet by mouth 2 times daily   Yes Historical Provider, MD   Multiple Vitamin (TAB-A-ISAAC PO) Take 1 tablet by mouth daily   Yes Historical Provider, MD   loratadine (CLARITIN) 10 MG tablet Take 1 tablet by mouth daily as needed. 2/24/15  Yes Historical Provider, MD   PROAIR  (90 BASE) MCG/ACT inhaler Inhale 2 puffs into the lungs every 6 hours as needed. 1/6/15  Yes Historical Provider, MD   hydrOXYzine (VISTARIL) 50 MG capsule Take 50 mg by mouth 3 times daily as needed for Itching. Yes Historical Provider, MD   omeprazole (PRILOSEC) 20 MG capsule Take 20 mg by mouth daily. Yes Historical Provider, MD   [facility] [facility] by Does not apply route 22- 2027   Mayito Lloyd,    anastrozole (ARIMIDEX) 1 MG tablet Take 1 tablet by mouth every other day 22   Dee Marsh MD   BD PEN NEEDLE JACOBO 2ND GEN 32G X 4 MM MISC use as directed WITH INSULIN 21   Historical Provider, MD   naloxone 4 MG/0.1ML LIQD nasal spray 1 spray by Nasal route as needed for Opioid Reversal  Patient not taking: Reported on 2022   CHAS Pedersen - CNP   [facility] [place] [facility]. Devices [facility]) [address] [place] [address] Wheelchair repairs- new seat cover, right side armrest replacement    Current body weight:  68 kg  Diagnosis: gait disturbance, chronic incomplete spastic paraplegia  Length of need: Lifetime 10/22/20   CHAS Miles CNP   tiotropium (SPIRIVA) 18 MCG inhalation capsule Inhale 18 mcg into the lungs daily 2 puffs    Historical Provider, MD   artificial tears (ARTIFICIAL TEARS) Merlin Nurse as needed    Historical Provider, MD   Incontinence Supply Disposable (UNDERPADS) MISC Cloth chux pads  Diagnosis: Paraplegia 344.1 3/10/15   Mary Anne Chandler MD       Allergies:  Penicillins and Seasonal    Social History:      The patient currently lives with boyfriend    TOBACCO:   reports that she has been smoking cigarettes. She started smoking about 42 years ago. She has been smoking about 0.50 packs per day. She has never used smokeless tobacco.  ETOH:   reports current alcohol use. DRUG USE HISTORY: Denies    EMPLOYMENT HISTORY: Unknown    Family History: Mother: Unknown what mother  from.   Father: Father had MI.  Siblings: 1 brother  from drug overdose. One other brother and 1 sister otherwise healthy without medical problems. Children: 1 son committed suicide. 2 other sons and 1 daughter otherwise healthy. REVIEW OF SYSTEMS:   12 points of systems reviewed and otherwise negative except for that which already was noted above. PHYSICAL EXAM:    /68   Pulse 88   Temp 97.9 °F (36.6 °C) (Oral)   Resp 11   Ht 5' (1.524 m)   Wt 150 lb (68 kg)   SpO2 100%   BMI 29.29 kg/m²     General appearance: Alert and oriented x3, but very somnolent and difficult to arouse. However, once aroused, patient was able to participate in exam.  No apparent distress, well developed, appears stated age. No appreciated ketones in her breath. Eyes:  Pupils equal, round, and reactive to light. Conjunctivae/corneas clear. HENT: Head normal in appearance. External nares normal.  Oral mucosa moist without lesions. Hearing grossly intact. Neck: Supple, with full range of motion. Trachea midline. No gross JVD appreciated. Respiratory:  Normal respiratory effort. Clear to auscultation, bilaterally without rales or wheezes or rhonchi. Cardiovascular: Normal rate, regular rhythm with normal S1/S2 without murmurs. No lower extremity edema. Abdomen: Soft, non-tender, non-distended with normal bowel sounds. Musculoskeletal: No contractures appreciated. No edema. Nontender. Skin: Warm and dry. No rashes or lesions. Posterior examination does not review pressure ulcerations. Neurologic: Unable to elicit deep tendon reflexes in bilateral patellar tendons.  strength bilaterally equal.  Upper extremity strength are equal.  Cranial nerves intact. Psychiatric: Deferred, patient is too somnolent to assess appropriately. Capillary Refill: Brisk,< 3 seconds. Peripheral Pulses: +2 palpable, equal bilaterally.       Labs:     Recent Labs     22  2153   WBC 4.0*   HGB 13.2   HCT 38.9   PLT 55*     Recent Labs 02/17/22 2153   *   K 3.6   CL 95*   CO2 21*   BUN 11   CREATININE 0.8   CALCIUM 8.8     Recent Labs     02/17/22 2153   AST 51*   ALT 25   BILITOT 1.6*   ALKPHOS 152*     Recent Labs     02/17/22  2253   INR 1.58*     No results for input(s): [employer] in the last 72 hours. Urinalysis:      Lab Results   Component Value Date    NITRU POSITIVE 02/17/2022    WBCUA 50-75 02/17/2022    BACTERIA MANY 02/17/2022    RBCUA 0-2 02/17/2022    BLOODU NEGATIVE 02/17/2022    SPECGRAV 1.024 01/26/2021    GLUCOSEU >= 1000 02/17/2022       Intake & Output:  No intake/output data recorded. I/O this shift:  In: 1148 [IV Piggyback:1148]  Out: -         XR CHEST PORTABLE   Final Result   Impression:   No acute findings. This document has been electronically signed by: Glynda Sandhoff. Xavier Matthew MD    on 02/17/2022 10:48 PM      CTA HEAD W WO CONTRAST (CODE STROKE)   Final Result   1. Unremarkable CTA head. This document has been electronically signed by: Kristie Padron M.D. on    02/17/2022 11:16 PM      All CTs at this facility use dose modulation techniques and iterative    reconstructions, and/or weight-based dosing   when appropriate to reduce radiation to a low as reasonably achievable. CTA NECK W WO CONTRAST (CODE STROKE)   Final Result   1. CTA neck demonstrates no evidence of a high-grade stenosis, occlusion,    or dissection. 2. Mild groundglass infiltrates in the visualized upper lungs. Groundglass    infiltrates are nonspecific and can be seen in conditions such as edema,    viral infection, and pneumonitis. 3. Additional findings are detailed above. This document has been electronically signed by: Kristie Padron M.D. on    02/17/2022 11:26 PM      All CTs at this facility use dose modulation techniques and iterative    reconstructions, and/or weight-based dosing   when appropriate to reduce radiation to a low as reasonably achievable.       Carotid stenosis and measurements are in accordance with NASCET criteria. CT HEAD WO CONTRAST   Final Result   1. No acute disease in the brain. This document has been electronically signed by: Buck Lyman M.D. on    02/17/2022 10:05 PM      All CTs at this facility use dose modulation techniques and iterative    reconstructions, and/or weight-based dosing   when appropriate to reduce radiation to a low as reasonably achievable. MRI brain without contrast    (Results Pending)            Assessment And Plan:    1. Right-sided weakness: As noted above, presentation of code stroke with possible right-sided deficit. At the current moment, patient appears to have good right-sided strength, although is very difficult to truly assess as patient moderately somnolent. We will admit to telemetry, will continue neuro checks, PT, OT, ST evaluation per TIA/CVA admission order set. We will also continue with aspirin and statin therapy. MRI brain and echocardiogram pending. 2.  Encephalopathy: Unclear etiology of patient's hypersomnolence. ABG does not indicate hypercapnia. Patient does have what appears to be polypharmacy, which we will hold many of her home medications which may contribute to depressed mental status. Her presentation may also be complicated by possible UTI. We will also check a UDS. Finally, metabolic derangement may be contributing. We will continue with neurochecks. 3.  Acute cystitis: Patient cannot confirm whether or not she has dysuria in the setting of what appears to be a UTI. Additionally, patient does present with some degree of encephalopathy, cannot rule out UTI as a contributing factor. Patient otherwise did not meet sepsis criteria. We will empirically cover with antibiotics as we monitor patient's clinical course and await for urine cultures. 4.  IDDM 2: Per patient's son, she had not taken her medications today. Her glucose remains quite elevated in the ED.   We will resume her Lantus, and will put her on insulin sliding scale. 5.  Anion gap metabolic acidosis: Patient's serum bicarb is just on the low side of normal, but her anion gap was 18. Ketones are high at 5.61. Patient has a slight delta gap of three. Expected PCO2 is 38-42, but actual PCO2 of 36 suggesting mild respiratory alkalosis. pH otherwise compensated. Lactic acid is elevated at 3, may be contributing to this anion gap. UDS, and salicylate levels are currently pending. Patient does have a history of DKA in the past.  She has not been taking her medications at home in the past day or so. Patient did receive subcu insulin in the ED, but it appears that her glucose remains in the 300 range. We will repeat BMP, and if her anion gap continues to be elevated, we may consider starting her on insulin drip. In the meantime, we will continue with IV fluids and repeat lactic acid levels. 6.  Chronic pain: Apparently, patient is on polypharmacy, which may be contributing to her somnolence. We will hold her Percocet for now and her Lyrica for now. 7.  Chronic paraplegia: Patient has been wheelchair-bound since 2014. We will reposition every 2 hours to prevent formation of pressure wound. 8.  Schizophrenia: We will continue with patient's home dose of paliperidone. 9.  Breast cancer: Patient apparently had left-sided breast cancer diagnosed January 2021 per EMR. We will continue her Arimidex. 10.  Asthma: We will continue with her Singulair and bronchodilator treatment. 12. DVT prophylaxis: Lovenox daily injection. Code Status: Full Code    Thank you Rowan Norman MD for the opportunity to be involved in this patient's care.     Electronically signed by Sherin Dhaliwal MD on 2/18/2022 at 3:04 AM

## 2022-02-18 NOTE — FLOWSHEET NOTE
Pt was asleep      02/18/22 7395   Encounter Summary   Services provided to: Patient   Referral/Consult From: Rounding   Continue Visiting Yes  (2/18)   Complexity of Encounter Low   Length of Encounter 15 minutes   Routine   Type Initial   Assessment Sleeping   Sacraments   Sacrament of Sick-Anointing Anointed   but was anointed

## 2022-02-18 NOTE — PROGRESS NOTES
Patient has a [employer] INTERSTIM [address] with lead [employer] [place] [facility] MRI [id number]. MRI CONDITIONAL SYSTEM. Pt's device is currently charging, when device has a charge of at least 70%, mri can be done. Pt must bring handheld device and remote to department. Pt is aware that device needs to charge, rn notified.

## 2022-02-18 NOTE — PROCEDURES
A home oxygen evaluation has been completed. [x]Patient is an inpatient. It is expected that the patient will be discharged within the next 48 hours. Qualified provider to write order for home prescription if patient qualifies. Social service/care managers will arrange for home oxygen. If patient is active, arrange for Home Medical supplier to assess for Oxygen Conserving Device per pulse oximetry. []Patient is an outpatient. Results will be faxed to the ordering provider. Qualified provider to write order for home prescription if patient qualifies and arranges for home oxygen. Patient was on room air. SpO2 was 94 % on room air at rest. Patients SpO2 was 89% or above and did not qualify for home oxygen. Patient pushed herself in wheelchair for 6 minutes. SpO2 was 93% during. Patients SpO2 was not below 89% and did not qualifiy for home oxygen. Oxygen was not applied via nasal cannula. Note: For any SpO2 at 37% see policy and procedure for possible qualifications.

## 2022-02-18 NOTE — ED PROVIDER NOTES
[place] ENCOUNTER        Pt Name: Jaden Sparrow  MRN: [id number]  [place] 1965  Date of evaluation: 2/17/2022  Treating Resident Physician: Anuradha Francisco DO  Supervising Physician: Deon Oshea,[address]       Chief Complaint   Patient presents with    Extremity Weakness     History obtained from the patient. HISTORY OF PRESENT ILLNESS    HPI  Jaden Sparrow is a 49 y.o. female who presents to the emergency department for evaluation of right-sided weakness. Patient states her last known normal was yesterday at 8 PM.  She awoke this morning is experiencing some worsened right-sided weakness from her baseline. She is unable to hold her coffee cup to drink from. Patient states she is also slurring her words this is supported by the family member in the room. Patient does have history significant for lumbar stroke and is wheelchair-bound. No anticoagulants. Patient does admit history of DKA. No changes in diet or medication recently. Stroke alert called immediately upon evaluation. The patient has no other acute complaints at this time. REVIEW OF SYSTEMS   Review of Systems   Unable to perform ROS: Acuity of condition         PAST MEDICAL AND SURGICAL HISTORY     Past Medical History:   Diagnosis Date    Anxiety     Anxiety and depression     Asthma     Bipolar 1 disorder ([address]     Bipolar 1 disorder with moderate sourav ([address]     Blood circulation, collateral     Breast cancer ([address]     diagnosed in jan 2021    Cancer [facility])      ?  cervical \"long time ago\"    Cancer ([address] 01/15/2021    Left Breast    Depression     Endometriosis     GERD (gastroesophageal reflux disease)     Kidney stones     Lupus ([address]     Movement disorder     MS (multiple sclerosis) ([address]     Schizophrenia ([address]     Thyroid disease     Type 2 diabetes mellitus without complication ([address]     Unspecified cerebral artery occlusion with cerebral infarction 2014     Past Surgical History:   Procedure Laterality Date    BREAST LUMPECTOMY Left 02/19/2021    LEFT BREAST MASTECTOMY, SENTINAL LYMPH NODE BIOPSY, PREOP NEEDLE LOC performed by Keith Altamirano MD at [address] 4/6/2021    DEBRIDEMENT LEFT BREAST MASTECTOMY WOUND performed by Keith Altamirano MD at [address] 4/27/2021    LEFT BREAST WOUND REVISION performed by Keith Altamirano MD at [address] [facility]  01/2020    DILATION AND CURETTAGE OF UTERUS      BEN US GUID NDL BIOPSY LEFT Left 01/14/2021    BEN US GUID NDL BIOPSY LEFT 1/14/2021 Omie Nageotte, MD [address] [facility] NERVE SURGERY N/A 01/26/2021    EXCHANGE OF INTERSTIM SYSTEM performed by Margaret Barton MD at [address] [place] [address]  03/23/2021    right breast i and d with closure Dr Yi Villasenor in the procedure room    PAIN MANAGEMENT PROCEDURE Left 8/31/2021    left T3 paravertebral block under fluoroscopy performed by Miguel Hilton DO at [address] OFFICE/OUTPT VISIT,PROCEDURE ONLY N/A 11/21/2018    INTERSTIM DEVICE PLACEMENT MODEL 3058, ONLY HEAD ELIGIBLE FOR MRI WITH TRANSMIT/RECEIVE HEAD COIL    TUBAL LIGATION      10 years ago   [place] LOC OF LEFT BREAST Left 02/19/2021    US GUIDED NEEDLE LOC OF LEFT BREAST 2/19/2021 [address] [facility] [address] BIOPSY  01/14/2021    US LYMPH NODE BIOPSY 1/14/2021 Omie Nageotte, MD [facility]         MEDICATIONS     Current Facility-Administered Medications:     potassium chloride 10 mEq/100 mL IVPB (Peripheral Line), 10 mEq, IntraVENous, Once, Veronica Ann, DO    insulin regular (HUMULIN R;NOVOLIN R) injection 10 Units, 10 Units, IntraVENous, Once, Veronica Ann, DO    potassium chloride (KLOR-CON M) extended release tablet 20 mEq, 20 mEq, Oral, Once, Veronica Ann, DO    levoFLOXacin (LEVAQUIN) 750 MG/150ML infusion 750 mg, 750 mg, IntraVENous, Once, Hershell Paget, DO, Last Rate: 100 mL/hr at 02/18/22 0041, 750 mg at 02/18/22 0041    Current Outpatient Medications:     pregabalin (LYRICA) 150 MG capsule, Take 1 capsule by mouth 2 times daily for 30 days. , Disp: 60 capsule, Rfl: 2    Lactobacillus Acid-Pectin (ACIDOPHILUS/CITRUS PECTIN) TABS, take 1 tablet by mouth once daily, Disp: 30 tablet, Rfl: 0    oxyCODONE-acetaminophen (PERCOCET) 5-325 MG per tablet, Take 1 tablet by mouth 2 times daily as needed for Pain for up to 30 days. , Disp: 60 tablet, Rfl: 0    amitriptyline (ELAVIL) 75 MG tablet, Take 1 tablet by mouth nightly, Disp: 30 tablet, Rfl: 2    atorvastatin (LIPITOR) 10 MG tablet, take 1 tablet by mouth once daily, Disp: , Rfl:     baclofen (LIORESAL) 10 MG tablet, Take 2 tablets by mouth 3 times daily, Disp: 135 tablet, Rfl: 5    metFORMIN (GLUCOPHAGE) 1000 MG tablet, Take 1,000 mg by mouth 2 times daily, Disp: , Rfl:     desvenlafaxine succinate (PRISTIQ) 50 MG TB24 extended release tablet, Take 50 mg by mouth daily, Disp: , Rfl:     MUCINEX MAXIMUM STRENGTH 1200 MG TB12, Take 1 tablet by mouth 2 times daily, Disp: , Rfl:     paliperidone (INVEGA) 3 MG extended release tablet, Take 3 mg by mouth nightly, Disp: , Rfl:     SITagliptin (JANUVIA) 100 MG tablet, Take 100 mg by mouth daily, Disp: , Rfl:     Lifitegrast (XIIDRA) 5 % SOLN, Place 1 drop into both eyes daily, Disp: , Rfl:     insulin glargine (LANTUS SOLOSTAR) 100 UNIT/ML injection pen, Inject 60 Units into the skin nightly , Disp: , Rfl:     montelukast (SINGULAIR) 10 MG tablet, Take 10 mg by mouth nightly , Disp: , Rfl: 0    Calcium Carbonate (CALCIUM 600 PO), Take 1 tablet by mouth 2 times daily, Disp: , Rfl:     Multiple Vitamin (TAB-A-ISAAC PO), Take 1 tablet by mouth daily, Disp: , Rfl:     loratadine (CLARITIN) 10 MG tablet, Take 1 tablet by mouth daily as needed. , Disp: , Rfl: 0    PROAIR  (90 BASE) MCG/ACT inhaler, Inhale 2 puffs into the lungs every 6 hours as needed. , Disp: , Rfl: 0    hydrOXYzine (VISTARIL) 50 MG capsule, Take 50 mg by mouth 3 times daily as needed for Itching., Disp: , Rfl:     omeprazole (PRILOSEC) 20 MG capsule, Take 20 mg by mouth daily. , Disp: , Rfl:     Handicap Placard MISC, by Does not apply route 22- 2027, Disp: 1 each, Rfl: 0    anastrozole (ARIMIDEX) 1 MG tablet, Take 1 tablet by mouth every other day, Disp: 30 tablet, Rfl: 3    BD PEN NEEDLE JACOBO 2ND GEN 32G X 4 MM MISC, use as directed WITH INSULIN, Disp: , Rfl:     naloxone 4 MG/0.1ML LIQD nasal spray, 1 spray by Nasal route as needed for Opioid Reversal (Patient not taking: Reported on 2022), Disp: 1 each, Rfl: 5    Misc. Devices [facility]) MISC, Wheelchair repairs- new seat cover, right side armrest replacement  Current body weight:  68 kg Diagnosis: gait disturbance, chronic incomplete spastic paraplegia Length of need: Lifetime, Disp: 1 each, Rfl: 0    tiotropium (SPIRIVA) 18 MCG inhalation capsule, Inhale 18 mcg into the lungs daily 2 puffs, Disp: , Rfl:     artificial tears (ARTIFICIAL TEARS) OINT, as needed, Disp: , Rfl:     Incontinence Supply Disposable (UNDERPADS) MISC, Cloth chux pads Diagnosis: Paraplegia 344.1, Disp: 100 Bottle, Rfl: 11      SOCIAL HISTORY     Social History     Social History Narrative    ** Merged History Encounter **          Social History     Tobacco Use    Smoking status: Current Every Day Smoker     Packs/day: 0.50     Types: Cigarettes     Start date: 1979    Smokeless tobacco: Never Used   Vaping Use    Vaping Use: Never used   Substance Use Topics    Alcohol use:  Yes     Alcohol/week: 0.0 standard drinks     Comment: social drinker    Drug use: No         ALLERGIES     Allergies   Allergen Reactions    Penicillins Shortness Of Breath     \"I almost \"  Other reaction(s): PENICILLINS    Seasonal Itching         FAMILY HISTORY     Family History   Problem Relation Age of Onset    Stroke Mother     Asthma refill takes less than 2 seconds. Neurological:      General: No focal deficit present. Mental Status: She is alert and oriented to person, place, and time. GCS: GCS eye subscore is 4. GCS verbal subscore is 5. GCS motor subscore is 6. Cranial Nerves: Dysarthria present. No cranial nerve deficit or facial asymmetry. Sensory: Sensory deficit (Bilateral lower extremities. ) present. Motor: Weakness (Bilateral upper extremities. Bilateral lower extremities.) and pronator drift (Bilateral upper extremities. ) present. No seizure activity. MEDICAL DECISION MAKING   Initial Assessment:   3 79-year-old female with significant comorbidities presenting with worsening speech and right-sided weakness. Stroke alert called. Initial glucose 350. NIH of 8. Last known well 8 PM yesterday evening. Not a TPA candidate. CT and CTAs obtained. Neurology recommends aspirin and Plavix load. I suspect this is likely secondary to DKA/infection and not neurologic ischemia.   Plan:    DKA evaluation including urinalysis and blood ketones   Fluids   UA,reflex   Blood cultures/abx   Insulin as indicated   Head imaging   EKG   Expected disposition admission to the hospital        ED RESULTS   Laboratory results:  Labs Reviewed   CBC WITH AUTO DIFFERENTIAL - Abnormal; Notable for the following components:       Result Value    WBC 4.0 (*)     RDW-CV 19.0 (*)     RDW-SD 57.2 (*)     Platelets 55 (*)     Lymphocytes Absolute 0.8 (*)     All other components within normal limits   COMPREHENSIVE METABOLIC PANEL W/ REFLEX TO MG FOR LOW K - Abnormal; Notable for the following components:    Glucose 341 (*)     Sodium 134 (*)     Chloride 95 (*)     CO2 21 (*)     AST 51 (*)     Alkaline Phosphatase 152 (*)     Albumin 3.1 (*)     Total Bilirubin 1.6 (*)     All other components within normal limits   PROTIME-INR - Abnormal; Notable for the following components:    INR 1.58 (*)     All other components within normal limits   APTT - Abnormal; Notable for the following components:    aPTT 43.0 (*)     All other components within normal limits   BETA-HYDROXYBUTYRATE - Abnormal; Notable for the following components:    Beta-Hydroxybutyrate 5.61 (*)     All other components within normal limits   URINE WITH REFLEXED MICRO - Abnormal; Notable for the following components:    Glucose, Ur >= 1000 (*)     Nitrite, Urine POSITIVE (*)     Leukocyte Esterase, Urine SMALL (*)     Character, Urine CLOUDY (*)     All other components within normal limits   ANION GAP - Abnormal; Notable for the following components:    Anion Gap 18.0 (*)     All other components within normal limits   GLOMERULAR FILTRATION RATE, ESTIMATED - Abnormal; Notable for the following components:    Est, Glom Filt Rate 75 (*)     All other components within normal limits   POCT GLUCOSE - Abnormal; Notable for the following components:    POC Glucose 354 (*)     All other components within normal limits   POCT GLUCOSE - Normal   POCT GLUCOSE - Normal   COVID-19, RAPID   CULTURE, REFLEXED, URINE   CULTURE, BLOOD 1   CULTURE, BLOOD 2   TROPONIN   PH VENOUS   OSMOLALITY   POCT CREATININE - URINE       Radiologic studies results:  XR CHEST PORTABLE   Final Result   Impression:   No acute findings. This document has been electronically signed by: Yany Clements. Lucio Dominguez MD    on 02/17/2022 10:48 PM      CTA HEAD W WO CONTRAST (CODE STROKE)   Final Result   1. Unremarkable CTA head. This document has been electronically signed by: Omar Diaz M.D. on    02/17/2022 11:16 PM      All CTs at this facility use dose modulation techniques and iterative    reconstructions, and/or weight-based dosing   when appropriate to reduce radiation to a low as reasonably achievable. CTA NECK W WO CONTRAST (CODE STROKE)   Final Result   1. CTA neck demonstrates no evidence of a high-grade stenosis, occlusion,    or dissection.    2. Mild groundglass infiltrates in the visualized upper lungs. Groundglass    infiltrates are nonspecific and can be seen in conditions such as edema,    viral infection, and pneumonitis. 3. Additional findings are detailed above. This document has been electronically signed by: Fang Martinez M.D. on    02/17/2022 11:26 PM      All CTs at this facility use dose modulation techniques and iterative    reconstructions, and/or weight-based dosing   when appropriate to reduce radiation to a low as reasonably achievable. Carotid stenosis and measurements are in accordance with NASCET criteria. CT HEAD WO CONTRAST   Final Result   1. No acute disease in the brain. This document has been electronically signed by: Fang Martinez M.D. on    02/17/2022 10:05 PM      All CTs at this facility use dose modulation techniques and iterative    reconstructions, and/or weight-based dosing   when appropriate to reduce radiation to a low as reasonably achievable.           ED Medications administered this visit:   Medications   levoFLOXacin (LEVAQUIN) 750 MG/150ML infusion 750 mg (750 mg IntraVENous New Bag 2/18/22 0041)   potassium chloride 10 mEq/100 mL IVPB (Peripheral Line) (has no administration in time range)   insulin regular (HUMULIN R;NOVOLIN R) injection 10 Units (has no administration in time range)   potassium chloride (KLOR-CON M) extended release tablet 20 mEq (has no administration in time range)   iopamidol (ISOVUE-370) 76 % injection 80 mL (80 mLs IntraVENous Given 2/17/22 2152)   aspirin chewable tablet 324 mg (324 mg Oral Given 2/17/22 2224)   clopidogrel (PLAVIX) tablet 300 mg (300 mg Oral Given 2/17/22 2224)   0.9 % sodium chloride bolus (0 mLs IntraVENous Stopped 2/18/22 0019)         ED COURSE     ED Course as of 02/18/22 0056   Thu Feb 17, 2022   2217 Glucose: 354  Suspect dka/hhs [EM]   5995 Platelet Count(!): 55 [EM]   2344 Nitrite, Urine(!): POSITIVE [EM]   4276 Bacteria, UA: MANY [EM]   Fri Feb 18, 2022 0056 Glucose: 381  10 units insulin and 30 mEq of potassium ordered. Hospitalist updated. [EM]      ED Course User Index  [EM] Anabel Kahn DO             MEDICATION CHANGES     New Prescriptions    No medications on file         FINAL DISPOSITION     Final diagnoses:   Sepsis with acute organ dysfunction without septic shock, due to unspecified organism, unspecified type ([facility]   Acute cystitis without hematuria   Stroke-like symptoms   Hyperglycemia   Ketosis (HCC)     Condition: condition: stable  Dispo: Admit to telemetry      This transcription was electronically signed. Parts of this transcriptions may have been dictated by use of voice recognition software and electronically transcribed, and parts may have been transcribed with the assistance of an ED scribe. The transcription may contain errors not detected in proofreading. Please refer to my supervising physician's documentation if my documentation differs.     Electronically Signed: Anabel Kahn DO, 02/18/22, 12:56 AM           Anabel Kahn DO  Resident  02/18/22 6698

## 2022-02-18 NOTE — PROGRESS NOTES
Caitlin Donald 62  INPATIENT OCCUPATIONAL THERAPY  [facility] NEUROSCIENCES 4A  EVALUATION    Time:   Time In: 818  Time Out: 4737  Timed Code Treatment Minutes: 24 Minutes  Minutes: 34          Date: 2022  Patient Name: Kelly Victor,   Gender: female      MRN: [id number]  : 1965  (49 y.o.)  Referring Practitioner: Radha Alfred MD  Diagnosis: TIA (Transient ischemic attack)  Additional Pertinent Hx: Pt is 60-year-old female seen by neurology in the ED as a stroke alert with NIH of 8. Patient's last known well is between 8 and 9 PM yesterday 2022. Patient with giveaway weakness in all 4 extremities, worse on the right, dysarthria and decreased sensation on the right side. CT head findings negative for acute change. 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 Restrictions/Precautions:  Restrictions/Precautions: General Precautions,Fall Risk  Position Activity Restriction  Other position/activity restrictions: Hx lumbar stroke, w/c bound    Subjective  Chart Reviewed: Joanie Malik and Physical  Patient assessed for rehabilitation services?: Yes  Family / Caregiver Present: Yes (2 sons)    Subjective: Leesie Lowgap to see Pt per R. Pt asleep in bed upon arrival and 2 sons present. Pt easily awakened and agreeable to OT session. Cooperative throughout. Reports R sided weakness at baseline but has worsened with TIA    Pain:  Pain Assessment  Patient Currently in Pain: Yes     Pt reported pain where tape from EKG was placed on breast scar tissue. Did not rate on scale.      Vitals: Vitals not assessed per clinical judgement, see nursing flowsheet    Social/Functional History:  Lives With: Significant other (boyfriend)  Type of Home: Apartment  Home Layout: One level,Performs ADL's on one level,Able to Live on Main level with bedroom/bathroom  Home Access: Level entry  Home Equipment: Wheelchair-manual   Bathroom Shower/Tub: Tub/Shower unit  Bathroom Toilet: Standard  Bathroom Equipment: Shower chair (PT reports shower chair is broken)  Bathroom Accessibility: Wheelchair accessible    Receives Help From: Family  ADL Assistance: Independent  Homemaking Assistance:  (Pt completes from w/c level)  Ambulation Assistance:  (Pt is non-ambulatory and uses w/c)  Transfer Assistance:  (Pt reports independent with t/fs but boyfriend occasionally helps. Pt has slideboard at home but doesnt use it often. Pt reports sit pivot t/fs from couch to w/c)          Additional Comments: Pt boyfriend works and Pt is home alone most of the day. Pt sons live nearby and assist when needed. VISION:WFL    HEARING:  WFL    COGNITION: WFL    RANGE OF MOTION:  Bilateral Upper Extremity:  WFL    STRENGTH:  Right Upper Extremity: Impaired - Shoulder & Elbow WFL 5/5  /  strength impared 3+/5  Left Upper Extremity:  WFL      ADL:   Feeding: Independent and with increased time for completion. Pt had decreased  strength but [facility] does not appear impaired able to open items with R hand. Pt initially had trouble but able to hold utensils. Lower Extremity Dressing: Stand By Assistance and with increased time for completion. Donned depends while supine in bed. BALANCE:  Sitting Balance:  Stand By Assistance. Pt able to sit ~2 minutes EOB before t/f    BED MOBILITY:  Rolling to Left: Stand By Assistance, with increased time for completion to don depends  Rolling to Right: Stand By Assistance, with increased time for completion to don depends  Supine to Sit: Moderate Assistance, with increased time for completion ; A to bring BLE to EOB    TRANSFERS:  Sit pivot t/f from w/c from EOB. Minimal Assistance for R LE.     FUNCTIONAL MOBILITY:  Assistive Device: Wheelchair  Assist Level:  Stand By Assistance. Distance: Within room    Pt able to maneuver w/c within room. Min cues for IV nori safety.     Activity Tolerance: Patient tolerance of  treatment: good. Assessment:  Assessment: Pt requires increased assistance with ADLs and IADLs compared to PLOF. Pt would benefit from skilled OT to address these deficits and maximize safety and independence in desired environment. Performance deficits / Impairments: Decreased functional mobility ,Decreased ADL status,Decreased strength,Decreased high-level IADLs,Decreased endurance  Prognosis: Good  REQUIRES OT FOLLOW UP: Yes  Decision Making: Medium Complexity    Treatment Initiated: Treatment and education initiated within context of evaluation. Evaluation time included review of current medical information, gathering information related to past medical, social and functional history, completion of standardized testing, formal and informal observation of tasks, assessment of data and development of plan of care and goals. Treatment time included skilled education and facilitation of tasks to increase safety and independence with ADL's for improved functional independence and quality of life. Discharge Recommendations:  24 hour supervision or assist,Home with Home health OT    Patient Education:  OT Education: OT Brandie Garay of Care,Transfer Training    Equipment Recommendations:  Equipment Needed: No    Plan:  Times per week: 5x  Times per day: Daily  Current Treatment Recommendations: Strengthening,Functional Mobility Training,Safety Education & Training,Self-Care / ADL,Patient/Caregiver Education & Training,Equipment Evaluation, Education, & procurement,Wheelchair Mobility Training. See long-term goal time frame for expected duration of plan of care. If no long-term goals established, a short length of stay is anticipated.     Goals:  Patient goals : Return home  Short term goals  Time Frame for Short term goals: By discharge  Short term goal 1: Pt will complete modified independent sit pivot t/fs from w/c to various surfaces in prep for toileting  Short term goal 2: Pt will increase R hand  strength to 4-/5 in prep for donning shirt and bra  Short term goal 3: Pt will complete BADLs with supervision in prep for LB bathing/dressing. Following session, patient left in safe position with all fall risk precautions in place.

## 2022-02-19 ENCOUNTER — APPOINTMENT (OUTPATIENT)
Dept: MRI IMAGING | Age: 57
DRG: 463 | End: 2022-02-19
Payer: COMMERCIAL

## 2022-02-19 LAB
ALBUMIN SERPL-MCNC: 2.5 G/DL (ref 3.5–5.1)
ALP BLD-CCNC: 120 U/L (ref 38–126)
ALT SERPL-CCNC: 18 U/L (ref 11–66)
ANION GAP SERPL CALCULATED.3IONS-SCNC: 8 MEQ/L (ref 8–16)
AST SERPL-CCNC: 31 U/L (ref 5–40)
BILIRUB SERPL-MCNC: 0.9 MG/DL (ref 0.3–1.2)
BUN BLDV-MCNC: 7 MG/DL (ref 7–22)
CALCIUM SERPL-MCNC: 7.8 MG/DL (ref 8.5–10.5)
CHLORIDE BLD-SCNC: 105 MEQ/L (ref 98–111)
CO2: 26 MEQ/L (ref 23–33)
CREAT SERPL-MCNC: 0.4 MG/DL (ref 0.4–1.2)
ERYTHROCYTE [DISTWIDTH] IN BLOOD BY AUTOMATED COUNT: 18.8 % (ref 11.5–14.5)
ERYTHROCYTE [DISTWIDTH] IN BLOOD BY AUTOMATED COUNT: 55.8 FL (ref 35–45)
GFR SERPL CREATININE-BSD FRML MDRD: > 90 ML/MIN/1.73M2
GLUCOSE BLD-MCNC: 171 MG/DL (ref 70–108)
GLUCOSE BLD-MCNC: 198 MG/DL (ref 70–108)
GLUCOSE BLD-MCNC: 212 MG/DL (ref 70–108)
GLUCOSE BLD-MCNC: 212 MG/DL (ref 70–108)
GLUCOSE BLD-MCNC: 453 MG/DL (ref 70–108)
HCT VFR BLD CALC: 31.8 % (ref 37–47)
HEMOGLOBIN: 11 GM/DL (ref 12–16)
LACTIC ACID: 1.5 MMOL/L (ref 0.5–2)
LACTIC ACID: 1.9 MMOL/L (ref 0.5–2)
MAGNESIUM: 1 MG/DL (ref 1.6–2.4)
MCH RBC QN AUTO: 28.6 PG (ref 26–33)
MCHC RBC AUTO-ENTMCNC: 34.6 GM/DL (ref 32.2–35.5)
MCV RBC AUTO: 82.6 FL (ref 81–99)
ORGANISM: ABNORMAL
PLATELET # BLD: 42 THOU/MM3 (ref 130–400)
PMV BLD AUTO: ABNORMAL FL (ref 9.4–12.4)
POTASSIUM REFLEX MAGNESIUM: 3.2 MEQ/L (ref 3.5–5.2)
RBC # BLD: 3.85 MILL/MM3 (ref 4.2–5.4)
SODIUM BLD-SCNC: 139 MEQ/L (ref 135–145)
TOTAL PROTEIN: 5.6 G/DL (ref 6.1–8)
URINE CULTURE REFLEX: ABNORMAL
WBC # BLD: 2.6 THOU/MM3 (ref 4.8–10.8)

## 2022-02-19 PROCEDURE — 85027 COMPLETE CBC AUTOMATED: CPT

## 2022-02-19 PROCEDURE — 83735 ASSAY OF MAGNESIUM: CPT

## 2022-02-19 PROCEDURE — 2580000003 HC RX 258: Performed by: HOSPITALIST

## 2022-02-19 PROCEDURE — 82948 REAGENT STRIP/BLOOD GLUCOSE: CPT

## 2022-02-19 PROCEDURE — 36415 COLL VENOUS BLD VENIPUNCTURE: CPT

## 2022-02-19 PROCEDURE — 80053 COMPREHEN METABOLIC PANEL: CPT

## 2022-02-19 PROCEDURE — 6370000000 HC RX 637 (ALT 250 FOR IP): Performed by: HOSPITALIST

## 2022-02-19 PROCEDURE — 6360000002 HC RX W HCPCS: Performed by: HOSPITALIST

## 2022-02-19 PROCEDURE — 99233 SBSQ HOSP IP/OBS HIGH 50: CPT | Performed by: FAMILY MEDICINE

## 2022-02-19 PROCEDURE — 97530 THERAPEUTIC ACTIVITIES: CPT

## 2022-02-19 PROCEDURE — 99232 SBSQ HOSP IP/OBS MODERATE 35: CPT | Performed by: NURSE PRACTITIONER

## 2022-02-19 PROCEDURE — 70551 MRI BRAIN STEM W/O DYE: CPT

## 2022-02-19 PROCEDURE — 2060000000 HC ICU INTERMEDIATE R&B

## 2022-02-19 PROCEDURE — 97110 THERAPEUTIC EXERCISES: CPT

## 2022-02-19 PROCEDURE — 6370000000 HC RX 637 (ALT 250 FOR IP): Performed by: FAMILY MEDICINE

## 2022-02-19 PROCEDURE — 97535 SELF CARE MNGMENT TRAINING: CPT

## 2022-02-19 PROCEDURE — 6370000000 HC RX 637 (ALT 250 FOR IP): Performed by: NURSE PRACTITIONER

## 2022-02-19 PROCEDURE — 6360000002 HC RX W HCPCS: Performed by: FAMILY MEDICINE

## 2022-02-19 PROCEDURE — 83605 ASSAY OF LACTIC ACID: CPT

## 2022-02-19 RX ORDER — POTASSIUM CHLORIDE 20 MEQ/1
40 TABLET, EXTENDED RELEASE ORAL PRN
Status: DISCONTINUED | OUTPATIENT
Start: 2022-02-19 | End: 2022-02-20 | Stop reason: HOSPADM

## 2022-02-19 RX ORDER — CLOPIDOGREL BISULFATE 75 MG/1
75 TABLET ORAL DAILY
Status: DISCONTINUED | OUTPATIENT
Start: 2022-02-19 | End: 2022-02-20 | Stop reason: HOSPADM

## 2022-02-19 RX ORDER — INSULIN GLARGINE 100 [IU]/ML
70 INJECTION, SOLUTION SUBCUTANEOUS NIGHTLY
Status: DISCONTINUED | OUTPATIENT
Start: 2022-02-19 | End: 2022-02-20 | Stop reason: HOSPADM

## 2022-02-19 RX ORDER — MAGNESIUM SULFATE IN WATER 40 MG/ML
2000 INJECTION, SOLUTION INTRAVENOUS PRN
Status: DISCONTINUED | OUTPATIENT
Start: 2022-02-19 | End: 2022-02-20 | Stop reason: HOSPADM

## 2022-02-19 RX ORDER — POTASSIUM CHLORIDE 7.45 MG/ML
10 INJECTION INTRAVENOUS PRN
Status: DISCONTINUED | OUTPATIENT
Start: 2022-02-19 | End: 2022-02-20 | Stop reason: HOSPADM

## 2022-02-19 RX ADMIN — SODIUM CHLORIDE 125 ML/HR: 9 INJECTION, SOLUTION INTRAVENOUS at 00:42

## 2022-02-19 RX ADMIN — BACLOFEN 20 MG: 10 TABLET ORAL at 14:19

## 2022-02-19 RX ADMIN — POTASSIUM BICARBONATE 40 MEQ: 782 TABLET, EFFERVESCENT ORAL at 11:28

## 2022-02-19 RX ADMIN — ATORVASTATIN CALCIUM 40 MG: 40 TABLET, FILM COATED ORAL at 21:47

## 2022-02-19 RX ADMIN — INSULIN GLARGINE 70 UNITS: 100 INJECTION, SOLUTION SUBCUTANEOUS at 21:55

## 2022-02-19 RX ADMIN — BACLOFEN 20 MG: 10 TABLET ORAL at 21:48

## 2022-02-19 RX ADMIN — MONTELUKAST SODIUM 10 MG: 10 TABLET ORAL at 21:48

## 2022-02-19 RX ADMIN — BACLOFEN 20 MG: 10 TABLET ORAL at 07:59

## 2022-02-19 RX ADMIN — CEFTRIAXONE SODIUM 1000 MG: 1 INJECTION, POWDER, FOR SOLUTION INTRAMUSCULAR; INTRAVENOUS at 04:22

## 2022-02-19 RX ADMIN — MAGNESIUM SULFATE HEPTAHYDRATE 2000 MG: 40 INJECTION, SOLUTION INTRAVENOUS at 11:46

## 2022-02-19 RX ADMIN — CLOPIDOGREL BISULFATE 75 MG: 75 TABLET ORAL at 16:45

## 2022-02-19 RX ADMIN — AMITRIPTYLINE HYDROCHLORIDE 75 MG: 75 TABLET, FILM COATED ORAL at 21:48

## 2022-02-19 RX ADMIN — PALIPERIDONE 3 MG: 3 TABLET, EXTENDED RELEASE ORAL at 21:48

## 2022-02-19 RX ADMIN — PANTOPRAZOLE SODIUM 40 MG: 40 TABLET, DELAYED RELEASE ORAL at 06:41

## 2022-02-19 ASSESSMENT — PAIN SCALES - GENERAL
PAINLEVEL_OUTOF10: 0
PAINLEVEL_OUTOF10: 0
PAINLEVEL_OUTOF10: 7
PAINLEVEL_OUTOF10: 0

## 2022-02-19 ASSESSMENT — PAIN DESCRIPTION - PAIN TYPE: TYPE: CHRONIC PAIN

## 2022-02-19 ASSESSMENT — ENCOUNTER SYMPTOMS
NAUSEA: 0
TROUBLE SWALLOWING: 0
COUGH: 0
COLOR CHANGE: 0
DIARRHEA: 0
VOMITING: 0
PHOTOPHOBIA: 0
SHORTNESS OF BREATH: 0

## 2022-02-19 ASSESSMENT — PAIN DESCRIPTION - PROGRESSION: CLINICAL_PROGRESSION: NOT CHANGED

## 2022-02-19 ASSESSMENT — PAIN DESCRIPTION - LOCATION: LOCATION: ARM;LEG

## 2022-02-19 ASSESSMENT — PAIN DESCRIPTION - ONSET: ONSET: ON-GOING

## 2022-02-19 ASSESSMENT — PAIN DESCRIPTION - DESCRIPTORS: DESCRIPTORS: DISCOMFORT;DULL

## 2022-02-19 ASSESSMENT — PAIN - FUNCTIONAL ASSESSMENT: PAIN_FUNCTIONAL_ASSESSMENT: ACTIVITIES ARE NOT PREVENTED

## 2022-02-19 ASSESSMENT — PAIN DESCRIPTION - ORIENTATION: ORIENTATION: LEFT

## 2022-02-19 ASSESSMENT — PAIN DESCRIPTION - FREQUENCY: FREQUENCY: CONTINUOUS

## 2022-02-19 NOTE — PROGRESS NOTES
[address] [place] [address]  INPATIENT PHYSICAL THERAPY  DAILY NOTE  MADDISON THOMASON 4A - 4A-11/011-A    Time In:   Time Out: 3217  Timed Code Treatment Minutes: 25 Minutes  Minutes: 25          Date: 2022  Patient Name: Tamera Aase,  Gender:  female        MRN: [id number]  : 1965  (49 y.o.)     Referring Practitioner: Dolly Chavira MD  Diagnosis: TIA (transient ischemic attack)  Additional Pertinent Hx: Per H&P : Tamera Aase is a 49 y.o. female with PMHx of chronic paraplegia secondary to \"spinal stroke\" since , diabetes mellitus type 2, schizophrenia, who presented to [address] [place] [address] initially as a code stroke. Patient lives with her boyfriend. Apparently, she woke up this morning not feeling well. She was alone at the time because her boyfriend was at work. A friend of hers came by to check up on her and noted that she was unable to hold her coffee cup to drink from. Apparently, her right-sided weakness was noticeable, and she woke up with this. Patient was brought to the ED for further evaluation. In the ED, CT of the head demonstrated no acute findings. CTA demonstrated no evidence of high-grade stenosis, occlusion, or dissection. CTA of the head was otherwise unremarkable.      Prior Level of Function:  Lives With: Significant other (boyfriend)  Type of Home: Apartment  Home Layout: One level,Performs ADL's on one level,Able to Live on Main level with bedroom/bathroom  Home Access: Level entry  Home Equipment: Wheelchair-manual   Bathroom Shower/Tub: Tub/Shower unit  Bathroom Toilet: Standard  Bathroom Equipment: Shower chair (PT reports shower chair is broken)  Bathroom Accessibility: Wheelchair accessible    [employer] Help From: Family  ADL Assistance: Independent  Homemaking Assistance:  (Pt completes from w/c level)  Ambulation Assistance:  (Pt is non-ambulatory and uses w/c)  Transfer Assistance:  (Pt reports independent with t/fs but boyfriend occasionally helps. Pt has slideboard at home but doesnt use it often. Pt reports sit pivot t/fs from couch to w/c)  Additional Comments: Pt boyfriend works 3rd shift and Pt is home alone during the night. Pt sons live nearby and assist when needed. Pt has been indep with mobility in her w/c for years, states her boyfriend assists with transfers. Restrictions/Precautions:  Restrictions/Precautions: General Precautions,Fall Risk  Position Activity Restriction  Other position/activity restrictions: Hx lumbar stroke, w/c bound, L mastectomy approx. 1 year ago     SUBJECTIVE: RN approved therapy session. Patient supine in bed upon arrival. Patient pleasant and agreeable to therapy. PAIN: 0/10:     Vitals: Vitals not assessed per clinical judgement, see nursing flowsheet    OBJECTIVE:  Bed Mobility:  Supine to Sit: Contact Guard Assistance  Sit to Supine: Minimal Assistance, Sania to help R LE into bed. Transfers:  Not Tested    Ambulation:  Not Tested    Exercise:  Patient was guided in 1 set(s) 10 reps of exercise to both lower extremities. Heelslides, Hip abduction/adduction, Seated marches, Seated heel/toe raises, Long arc quads and Seated abduction/adduction. Exercises were completed for increased independence with functional mobility. Functional Outcome Measures: Completed  AM-PAC Inpatient Mobility without Stair Climbing Raw Score : 11  AM-PAC Inpatient without Stair Climbing T-Scale Score : 35.66    ASSESSMENT:  Assessment: Patient progressing toward established goals. Activity Tolerance:  Patient tolerance of  treatment: good. Patient limited by decreased strength and decreased endurance.      Equipment Recommendations:Equipment Needed: No  Discharge Recommendations: Continue to assess pending progress and 24 hour assistance or supervision  Plan: Times per week: 6x N  Current Treatment Recommendations: Zafar Levi Re-education,Patient/Caregiver Education & Training,Wheelchair Mobility Training,Balance Training,Functional Mobility Training,Safety Education & Training    Patient Education  Patient Education: Verbal Exercise Instruction    Goals:  Patient goals : get out of here  Short term goals  Time Frame for Short term goals: By hospital d/c  Short term goal 1: Pt to demo supine <->sit mod I for ease getting in and out of bed  Short term goal 2: Pt to demo sit <->pivot transfer to and from her w/c mod I for improved safety with transfers  Short term goal 3: Pt to demo w/c mobility for >=150' mod I for ability to progress within her environment  Long term goals  Time Frame for Long term goals : NA due to short ELOS    Following session, patient left in safe position with all fall risk precautions in place.

## 2022-02-19 NOTE — PROGRESS NOTES
Hospitalist Progress Note      Patient:  Cj Pina      Unit/Bed:4A-11/011-A    YOB: 1965    MRN: [id number]       Acct: [de-identified]     PCP: Kemi Pace MD    Date of Admission: 2/17/2022    Date/Time of Evaluation:  2/19/2022 at 8:59 AM    Assessment/Plan:    1. Right sided weakness, stroke like symptoms -- apprec neuro assist -- CT head 2/18 (-) acute process, CTA head/neck 2/18 (-) LVO/stenosis/aneurysm  -- pt c/o some trouble with word finding and increase RUE weakness from baseline   -- loaded with ASA, plavix in ER 2/17 -> cont plavix 75 mg daily per neuro (doesn't niko ASA with GI side effects)  -- MRI brain 2/19 (p)   -- d/w neuro 2/18 and noted likely exacerbation of chronic weakness/sx due to metabolic etiology --> likely metabolic with UTI, hyperglycemia, ?dry  -- Pt/OT following  -- ? EEG but no overt seizure like activity  -- ?need MRI spine with increase right hand weakness from baseline 2/19  -- lipids 2/18 = total 98, HDL 26, LDL 53, trig 97 --> cont statin  -- A1C 2/18/22 = 9.1 - no recent to compare -> cont lantus, SSI and titrate as needed  -- Echo 2/18/22 = EF 55%, no WMA, no valve abn  -- tele w/o arrhythmias -> monitor  -- lovenox DVT proph  2. Metabolic encephalopathy -- likely multifactorial -- CT head 2/18 (-) acute process -- ?polypharmacy, ?dry, ? infxn  -- See #1 --> MRI brain 2/19 (p)   -- rocephin 2/18 for UTI with E coli  -- ?dry -> IVF - improving and stopped IVF 2/19  -- ?polypharmacy -> holding home percocet, lyrica, pristiq, vistaril -- ?less likely due to psych meds as chronically on -- f/u providers ASAP at d/c  -- LFT min elevated on admission and WNL 2/19 - unlikely hepatic etiology  3. UTI with E coli -- POA -- cont rocephin started 2/18 - cx with pansensitive E coli - +sx on admission  4.  AG Metabolic acidosis/lactic acidosis -- NOT sepsis -- LA up to 5.6 on 2/18 -> ??etiology - ?metformin, infxn with UTI -- improved with IVF to 1.5 on 2/19 -- IVF stopped 2/19  -- not DKA  -- sepsis ruled out  -- [facility] 2/18 (-) to date;  Urine cx with E coli 2/17 -> rocephin 2/18  5. Hypokalemia -- down to 3.2 on 2/19 -> replace and monitor  6. Hypomagnesemia -- 1.0 on 2/19 -> replace and monitor  7. Pancytopenia -- chronic -- ??due to psych drugs, ?arimidex -- differential unremarkable -- goes back to 1/2021 and ??prior as then last here prior to that was 2018  -- WBC down to 2.6 on 2/19 (4.0 on admission 2/17) - ?down due to infxn  - rocephin 2/18 for UTI  -- hgb down to 11 on 2/19 from 13.2 on admission 2/17 -> ?dilutional - no signs of bleeding - cont monitor  -- plts DOWN to 42k on 2/19 from 55 on 2/17/22 and was 106 in 4/2021 --> ??due to infxn - ?psych meds  -- no signs of bleeding  -- follows with Dr. Yani Atkinson for breast cancer hx  8. Prolonged QTc -- on EKG on 2/17/22 QTc 529 --> ??due to multiple psych meds -- ?contrib to above - Echo 2/18/22 with normal EF, no wall abn -- repeat 2/20 and monitor tele -- avoid further prolonging meds if able. 9. Elevated LFT, elevated bilirubin -- elevated alk phos 152 and AST 51, bili 1.6 on admission 2/17 --> repeat 2/19 all back WNL -- monitor outpt - no GI sx - monitor as on statin  10. Uncontrolled type 2 DM with hyperglycemia -- BS still high 2/19 -- NOT DKA -- Increase lantus to 70 units nightly, add humalog pre-meal bolus 10 units q ac and cont additional high SSI --> monitor and adjust prn   -- holding home metformin fernando with lactic acidosis and ??resume at d/c  -- holding home januvia  11. Chronic paraplegia with hx \"spinal stroke\" -- per neuro with above - ?chronic sx exac with metabolic issues -> ??need spine imaging -- see #1  12. Asthma w/o exacerbation -- asx -- no wheezing, stable on RA   13. Schizophrenia, depression/anxiety -- cont home invega - holding home pristiq, vistaril prn -- not new thus unlikely contrib to confusion  14.  Left Breast cancer 1/2021 -- s/p left mastectomy -- cont prophylactic arimidex -- follows with Dr. Jayden Long  15. Neurogenic bladder with bladder stimulator  16. Chronic pain -- cont baclofen, elavil -- holding home percocet (filled 60 percocet on 2/1/22 and prior 12/2021) and lyrica  17. GERD -- PPI  18. Chronic debility - WC bound - PT/OT/ST following - SS following d/c plan    Dispo  -- 2/19/2022 -> apprec neuro assist - MRI today and await results - cont plavix for now but ?cont with low plts -- K and Mg low -> replace and monitor; Increase lantus to 70 units nightly and add 10 units pre-meal with high SSI for uncontrolled DM. Cont rocephin for UTI. LA improved, niko po thus stop IVF. Cont monitor lytes, renal fxn, BP, BS, HR, tele, neuro status, CBC. ?home 1-2 days depending on MRI and BS        Chief Complaint: worsening right sided weakness    Hospital Course: Angus Serra is a 49 y.o. female with chronic paraplegia secondary to \"spinal stroke\" since 2014, diabetes mellitus type 2, schizophrenia with anxiety/depression, MS, lupus, cervical and breast cancer, asthma presenting with dysarthria, weakness, sensory change since 8-9 PM on 2/16/2022. Weakness was in all 4 extremities, worse on the right and sensory change was in the right upper and lower extremities. Patient was promptly taken to imaging for CT head which revealed no acute intracranial process and CTA head and neck which revealed no LVO and mild stenosis. Neurology consulted in ER. MRI brain (p)    Subjective/HPI:   -- 2/19/2022 --> pt working with OT currently and c/o some trouble with word finding and right arm weaker than normal but right leg normal.   Denies cp, sob. Denies abd pain, n/v.  Denies f/c.  Niko po. On RA. Afebrile. Up in her WC. PMH, SURGICAL HX, FH, SOCIAL HX reviewed and updated as needed.     Medications:  Reviewed    Infusion Medications    dextrose      dextrose       Scheduled Medications    phosphorus replacement protocol   Other RX Placeholder    potassium chloride  20 mEq Oral Once    amitriptyline  75 mg Oral Nightly    anastrozole  1 mg Oral Every Other Day    baclofen  20 mg Oral TID    montelukast  10 mg Oral Nightly    pantoprazole  40 mg Oral QAM AC    paliperidone  3 mg Oral Nightly    tiotropium  1 puff Inhalation Daily    atorvastatin  40 mg Oral Nightly    insulin glargine  60 Units SubCUTAneous Nightly    insulin lispro  0-18 Units SubCUTAneous TID WC    insulin lispro  0-9 Units SubCUTAneous Nightly    cefTRIAXone (ROCEPHIN) 1000 mg IVPB in NS 50ml minibag  1,000 mg IntraVENous Q24H     PRN Meds: potassium chloride **OR** potassium alternative oral replacement **OR** potassium chloride, magnesium sulfate, polyethylene glycol, perflutren lipid microspheres, albuterol sulfate HFA, glucose, dextrose, glucagon (rDNA), dextrose      Intake/Output Summary (Last 24 hours) at 2/19/2022 0859  Last data filed at 2/19/2022 0656  Gross per 24 hour   Intake 3402.61 ml   Output 750 ml   Net 2652.61 ml       Diet:  ADULT DIET; Regular; Low Fat/Low Chol/High Fiber/BRIJESH    Exam:  BP (!) 112/57   Pulse 95   Temp 98 °F (36.7 °C) (Oral)   Resp 16   Ht 5' (1.524 m)   Wt 162 lb 9.6 oz (73.8 kg)   SpO2 98%   BMI 31.76 kg/m²     General appearance: No apparent distress, appears stated age and cooperative. Oriented x 3, up in wheelchair, little expressive aphasia  HEENT: Pupils equal, round, and reactive to light. Conjunctivae/corneas clear. MMM. Neck: Supple, with full range of motion. Trachea midline. Respiratory:  Normal respiratory effort. Clear to auscultation, bilaterally without Rales/Wheezes/Rhonchi. No respiratory distress or accessory muscle use. Cardiovascular: Regular rate and rhythm with normal S1/S2 without murmurs, rubs or gallops. No JVD. Abdomen: Soft, non-tender, non-distended with normal bowel sounds. No rebound or guarding  Musculoskeletal: No clubbing, cyanosis or edema bilaterally. RUE and RLE weaker than left.   No calf tenderness palpation  Skin: Skin color, texture, turgor normal.  No rashes or lesions. Neurologic: slightly expressive aphasia, CN 2-12 intact, right arm and leg weaker than left. Psychiatric: Alert and oriented x 3 but says some off statements from conversation  Capillary Refill: Brisk,< 3 seconds   Peripheral Pulses: +2 palpable, equal bilaterally       All labs reviewed and interpreted by me:  Labs:   Recent Labs     02/17/22 2153 02/19/22  0506   WBC 4.0* 2.6*   HGB 13.2 11.0*   HCT 38.9 31.8*   PLT 55* 42*     Recent Labs     02/17/22 2153 02/18/22  0709 02/19/22  0719   * 136 139   K 3.6 3.5 3.2*   CL 95* 101 105   CO2 21* 22* 26   BUN 11 11 7   CREATININE 0.8 0.8 0.4   CALCIUM 8.8 8.3* 7.8*     Recent Labs     02/17/22 2153 02/19/22  0719   AST 51* 31   ALT 25 18   BILITOT 1.6* 0.9   ALKPHOS 152* 120     Recent Labs     02/17/22  2253   INR 1.58*     Recent Labs     02/17/22 2153   TROPONINT < 0.010       Urinalysis:      Lab Results   Component Value Date    NITRU POSITIVE 02/17/2022    WBCUA 50-75 02/17/2022    BACTERIA MANY 02/17/2022    RBCUA 0-2 02/17/2022    BLOODU NEGATIVE 02/17/2022    SPECGRAV 1.024 01/26/2021    GLUCOSEU >= 1000 02/17/2022       All radiology images and reports reviewed and interpreted by me:  Radiology:  XR CHEST PORTABLE   Final Result   Impression:   No acute findings. This document has been electronically signed by: Monika Srivastava MD    on 02/17/2022 10:48 PM      CTA HEAD W WO CONTRAST (CODE STROKE)   Final Result   1. Unremarkable CTA head. This document has been electronically signed by: Kwan Bills M.D. on    02/17/2022 11:16 PM      All CTs at this facility use dose modulation techniques and iterative    reconstructions, and/or weight-based dosing   when appropriate to reduce radiation to a low as reasonably achievable. CTA NECK W WO CONTRAST (CODE STROKE)   Final Result   1. CTA neck demonstrates no evidence of a high-grade stenosis, occlusion,    or dissection. 2. Mild groundglass infiltrates in the visualized upper lungs. Groundglass    infiltrates are nonspecific and can be seen in conditions such as edema,    viral infection, and pneumonitis. 3. Additional findings are detailed above. This document has been electronically signed by: Fox Ingram M.D. on    02/17/2022 11:26 PM      All CTs at this facility use dose modulation techniques and iterative    reconstructions, and/or weight-based dosing   when appropriate to reduce radiation to a low as reasonably achievable. Carotid stenosis and measurements are in accordance with NASCET criteria. CT HEAD WO CONTRAST   Final Result   1. No acute disease in the brain. This document has been electronically signed by: Fox Ingram M.D. on    02/17/2022 10:05 PM      All CTs at this facility use dose modulation techniques and iterative    reconstructions, and/or weight-based dosing   when appropriate to reduce radiation to a low as reasonably achievable. MRI brain without contrast    (Results Pending)       Diet: ADULT DIET;  Regular; Low Fat/Low Chol/High Fiber/BRIJESH    Bolanos: no    Microbiology:  Blood cx 2/18 (-) to date    COVID 2/17 (-)    Urine cx 2/17= e coli    Tele review last 24 hrs:  SR, no arrhythmias    DVT prophylaxis: [x] Lovenox                                 [x] SCDs                                 [] SQ Heparin                                 [] Encourage ambulation           [] Already on Anticoagulation     Disposition:    [x] Home with [facility]       [] TCU       [] Rehab       [] Psych       [] SNF       [] [facility]       [] Other-    Code Status: Full Code    PT/OT Eval Status: following      Electronically signed by Radha Bishop MD on 2/19/2022 at 8:59 AM

## 2022-02-19 NOTE — PROGRESS NOTES
Neurology Progress Note    Date:2/19/2022       [facility]:[id number]  Patient Janie Alcaraz     YOB: 1965     Age:56 y.o. Requesting Physician: Darrian Gentile MD     Reason for Consult:  Evaluate for Stroke Alert      Chief Complaint: Weakness    Subjective     Courtney Lundborg is a 61-year-old female with past medical history significant for diabetes and spinal stroke years ago for which she is not currently taking an antiplatelet drug due to burning sensation in the stomach while taking aspirin who presented to [address] [facility] ED with dysarthria, weakness, sensory change since 8-9 PM on 2/16/2022. Weakness was in all 4 extremities, worse on the right and sensory change was in the right upper and lower extremities. Patient was promptly taken to imaging for CT head which revealed no acute intracranial process and CTA head and neck which revealed no LVO and mild stenosis. Interval history 2/19/22:  Patient reports a good night. She feels that she is doing better today. Speech is improved, weakness is back to baseline. Review of Systems   Review of Systems   Constitutional: Negative for activity change, fatigue and fever. HENT: Negative for tinnitus and trouble swallowing. Eyes: Negative for photophobia and visual disturbance. Respiratory: Negative for cough and shortness of breath. Cardiovascular: Negative for chest pain and palpitations. Gastrointestinal: Negative for diarrhea, nausea and vomiting. Genitourinary: Positive for dysuria. Negative for flank pain. Musculoskeletal: Negative for neck pain and neck stiffness. Skin: Negative for color change and rash. Neurological: Positive for speech difficulty (improved), weakness (improved) and numbness (improved). Negative for dizziness, facial asymmetry and headaches. Psychiatric/Behavioral: Negative for agitation and confusion.        Medications   Scheduled Meds:    phosphorus replacement protocol   Other RX Placeholder    potassium chloride  20 mEq Oral Once    amitriptyline  75 mg Oral Nightly    anastrozole  1 mg Oral Every Other Day    baclofen  20 mg Oral TID    montelukast  10 mg Oral Nightly    pantoprazole  40 mg Oral QAM AC    paliperidone  3 mg Oral Nightly    tiotropium  1 puff Inhalation Daily    atorvastatin  40 mg Oral Nightly    insulin glargine  60 Units SubCUTAneous Nightly    insulin lispro  0-18 Units SubCUTAneous TID WC    insulin lispro  0-9 Units SubCUTAneous Nightly    cefTRIAXone (ROCEPHIN) 1000 mg IVPB in NS 50ml minibag  1,000 mg IntraVENous Q24H     Continuous Infusions:    dextrose      dextrose       PRN Meds: potassium chloride **OR** potassium alternative oral replacement **OR** potassium chloride, magnesium sulfate, polyethylene glycol, perflutren lipid microspheres, albuterol sulfate HFA, glucose, dextrose, glucagon (rDNA), dextrose    Past History    Past Medical History:   has a past medical history of Anxiety, Anxiety and depression, Asthma, Bipolar 1 disorder ([facility] [address] Bipolar 1 disorder with moderate sourav ([facility] [address] Blood circulation, collateral, Breast cancer ([facility] [address] Cancer ([address] Cancer ([address] Depression, Endometriosis, GERD (gastroesophageal reflux disease), Kidney stones, Lupus ([address] Movement disorder, MS (multiple sclerosis) ([facility] [address] Schizophrenia ([facility] [address] Thyroid disease, Type 2 diabetes mellitus without complication ([facility] [address] and Unspecified cerebral artery occlusion with cerebral infarction. Social History:   reports that she has been smoking cigarettes. She started smoking about 42 years ago. She has been smoking about 0.50 packs per day. She has never used smokeless tobacco. She reports current alcohol use. She reports that she does not use drugs.      Family History:   Family History   Problem Relation Age of Onset    Stroke Mother     Asthma Mother     Other Mother     No Known Problems Sister     Asthma Brother     No Known Problems Brother        Physical Examination Vitals:  BP (!) 112/57   Pulse 95   Temp 98 °F (36.7 °C) (Oral)   Resp 16   Ht 5' (1.524 m)   Wt 162 lb 9.6 oz (73.8 kg)   SpO2 98%   BMI 31.76 kg/m²   Temp (24hrs), Av.1 °F (36.7 °C), Min:97.8 °F (36.6 °C), Max:98.2 °F (36.8 °C)      I/O (24Hr): Intake/Output Summary (Last 24 hours) at 2022 1118  Last data filed at 2022 0656  Gross per 24 hour   Intake 3402.61 ml   Output 750 ml   Net 2652.61 ml     Physical Exam  Vitals reviewed. Constitutional:       Appearance: Normal appearance. HENT:      Head: Normocephalic and atraumatic. Right Ear: External ear normal.      Left Ear: External ear normal.      Nose: Nose normal.      Mouth/Throat:      Mouth: Mucous membranes are moist.      Pharynx: Oropharynx is clear. Eyes:      Extraocular Movements: Extraocular movements intact and EOM normal.      Pupils: Pupils are equal, round, and reactive to light. Cardiovascular:      Rate and Rhythm: Normal rate and regular rhythm. Pulmonary:      Effort: Pulmonary effort is normal. No respiratory distress. Abdominal:      General: There is no distension. Palpations: Abdomen is soft. Tenderness: There is no abdominal tenderness. Musculoskeletal:         General: No deformity or signs of injury. Cervical back: No rigidity or tenderness. Skin:     General: Skin is warm and dry. Neurological:      Mental Status: She is alert and oriented to person, place, and time. Coordination: Finger-Nose-Finger Test and Romberg Test normal.      Gait: Gait is intact. Deep Tendon Reflexes:      Reflex Scores:       Bicep reflexes are 2+ on the right side and 2+ on the left side. Brachioradialis reflexes are 2+ on the right side and 2+ on the left side. Patellar reflexes are 1+ on the right side and 1+ on the left side.   Psychiatric:         Mood and Affect: Mood normal.         Speech: Speech normal.         Behavior: Behavior normal.         Thought Content: Thought content normal.       Neurologic Exam     Mental Status   Oriented to person, place, and time. Follows 2 step commands. Attention: normal.   Speech: speech is normal   Level of consciousness: alert  Knowledge: good. Able to name object. Able to read. Able to repeat. Cranial Nerves     CN II   Visual fields full to confrontation. CN III, IV, VI   Pupils are equal, round, and reactive to light. Extraocular motions are normal.     CN V   Facial sensation intact. CN VII   Facial expression full, symmetric. CN VIII   CN VIII normal.   Hearing: intact    CN IX, X   CN IX normal.   Palate: symmetric    CN XI   CN XI normal.   Right sternocleidomastoid strength: normal  Right trapezius strength: normal    CN XII   CN XII normal.   Tongue: not atrophic    Motor Exam   Muscle bulk: normal  Overall muscle tone: normal  Strength:  RUE: 5/5  LUE: 5/5  RLE: 4/5- chronic from spinal stroke  LLE: 5/5     Sensory Exam   Light touch normal.     Gait, Coordination, and Reflexes     Gait  Gait: normal    Coordination   Romberg: negative  Finger to nose coordination: normal    Reflexes   Right brachioradialis: 2+  Left brachioradialis: 2+  Right biceps: 2+  Left biceps: 2+  Right patellar: 1+  Left patellar: 1+       Labs/Imaging/Diagnostics   Labs:  CBC:  Recent Labs     02/17/22 2153 02/19/22  0506   WBC 4.0* 2.6*   RBC 4.59 3.85*   HGB 13.2 11.0*   HCT 38.9 31.8*   MCV 84.7 82.6   PLT 55* 42*     CHEMISTRIES:  Recent Labs     02/17/22 2153 02/18/22  0046 02/18/22  0232 02/18/22  0709 02/19/22  0719   *  --   --  136 139   K 3.6  --   --  3.5 3.2*   CL 95*  --   --  101 105   CO2 21*  --   --  22* 26   BUN 11  --   --  11 7   CREATININE 0.8  --   --  0.8 0.4   GLUCOSE 341*   < > 319 261* 198*   MG  --   --   --   --  1.0*    < > = values in this interval not displayed.      PT/INR:  Recent Labs     02/17/22 2253   INR 1.58*     APTT:  Recent Labs     02/17/22 2253   APTT 43.0*     LIVER PROFILE:  Recent Labs     02/17/22  2153 02/19/22  0719   AST 51* 31   ALT 25 18   BILITOT 1.6* 0.9   ALKPHOS 152* 120       Imaging Last 24 Hours:  CTA HEAD W WO CONTRAST (CODE STROKE)    Result Date: 2/17/2022  1. Unremarkable CTA head. This document has been electronically signed by: Severo Primer, M.D. on 02/17/2022 11:16 PM All CTs at this facility use dose modulation techniques and iterative reconstructions, and/or weight-based dosing when appropriate to reduce radiation to a low as reasonably achievable. CT HEAD WO CONTRAST    Result Date: 2/17/2022  1. No acute disease in the brain. This document has been electronically signed by: Severo Primer, M.D. on 02/17/2022 10:05 PM All CTs at this facility use dose modulation techniques and iterative reconstructions, and/or weight-based dosing when appropriate to reduce radiation to a low as reasonably achievable. CTA NECK W WO CONTRAST (CODE STROKE)    Result Date: 2/17/2022  1. CTA neck demonstrates no evidence of a high-grade stenosis, occlusion, or dissection. 2. Mild groundglass infiltrates in the visualized upper lungs. Groundglass infiltrates are nonspecific and can be seen in conditions such as edema, viral infection, and pneumonitis. 3. Additional findings are detailed above. This document has been electronically signed by: Severo Primer, M.D. on 02/17/2022 11:26 PM All CTs at this facility use dose modulation techniques and iterative reconstructions, and/or weight-based dosing when appropriate to reduce radiation to a low as reasonably achievable. Carotid stenosis and measurements are in accordance with NASCET criteria. XR CHEST PORTABLE    Result Date: 2/17/2022  Impression: No acute findings. This document has been electronically signed by: Mary Hickman. Tamiko Horne MD on 02/17/2022 10:48 PM    MRI brain without contrast    Result Date: 2/19/2022   1. No evidence of an acute infarct. 2. Very mild severity chronic small vessel ischemic changes.  **This report has been created using voice recognition software. It may contain minor errors which are inherent in voice recognition technology. ** Final report electronically signed by Dr. Gifty Frye on 2/19/2022 11:32 AM      Assessment and Plan:        Hospital Problems           Last Modified POA    * (Principal) TIA (transient ischemic attack) 2/18/2022 Yes    Acute encephalopathy 2/18/2022 Yes        1. Stroke Like Symptoms- Likely recrudescence in setting of UTI + Hyperglycemia   CT Head- no acute bleed, mass effect, midline shift   CTA H&N- no LVO, no hemodynamically significant stenosis   MRI negative for acute intracranial abnormality    Hospitalist admit to telemetry, no tPA received   Neuro checks every 4 hours   Loaded ASA + Plavix on 2/17/22   Will D/C ASA due to previous GI issue with ASA   Continue Plavix 75 mg daily    NIHSS q shift   HgbA1C 9.1   LDL 53, Goal 45-70   Lipitor 40mg daily   2D Echo- EF 55%, no obvious shunting seen   PT/OT/SLP Evaluation    -Regular diet, recommend home therapy   Maintain telemetry, monitor for atrial-fib   Maintain oxygenation saturation >94%   Monitor for infection  o Urinalysis with Reflex- + for infection  o Chest XRay   EKG NSR, prolonged QT   Labs daily   SCDs and lovenox for DVT prophylaxis   CT head is needed if severe headache or altered mental status   Provide stroke education for individualized risk factors    Continue to follow up with outpatient neurologist as needed   Thank you for this consult. Neurology will sign off at this time. Please call with any questions or concerns    This case was discussed with Dr. Jeaneth Anderson and he is in agreement with the assessment and plan.       Electronically signed by CHAS Mulligan CNP on 2/19/22 at 3:10 PM EST

## 2022-02-19 NOTE — PROGRESS NOTES
[address]  Occupational Therapy  Daily Note  Time:   Time In: 3038  Time Out: 0930  Timed Code Treatment Minutes: 40 Minutes  Minutes: 40          Date: 2022  Patient Name: Bria Phipps,   Gender: female      Room: [facility]011-A  MRN: [id number]  : 1965  (49 y.o.)  Referring Practitioner: Sherin Dhaliwal MD  Diagnosis: TIA (Transient ischemic attack)  Additional Pertinent Hx: Pt is 51-year-old female seen by neurology in the ED as a stroke alert with NIH of 8. Patient's last known well is between 8 and 9 PM yesterday 2022. Patient with giveaway weakness in all 4 extremities, worse on the right, dysarthria and decreased sensation on the right side. CT head findings negative for acute change. 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 Restrictions/Precautions:  Restrictions/Precautions: General Precautions,Fall Risk  Position Activity Restriction  Other position/activity restrictions: Hx lumbar stroke, w/c bound, L mastectomy approx. 1 year ago     SUBJECTIVE: Nurse Reji Butt ok'd session, Up in w/c upon arrival, agreeable to OT session    PAIN: 10: R LE/UE    Vitals: Vitals not assessed per clinical judgement, see nursing flowsheet    COGNITION: WFL    ADL:   Grooming: with set-up. Brushed teeth sitting at table due to w/c unable to fit in bathroom. Combed hair sitting up, Increased time due to hair being tangled and incorport  ADDITIONAL ACTIVITIES:  Educated patient on activities to work on at home to increase use of R hand examples, Jigsaw puzzles, coloring, dealing cards, sewing, patient voiced understanding and appreciation    ASSESSMENT:     Activity Tolerance:  Patient tolerance of  treatment: good.        Discharge Recommendations: Continue to assess pending progress  Equipment Recommendations: Equipment Needed: No  Plan: Times per week: 5x  Times per day: Daily  Current Treatment Recommendations: Strengthening,Functional Mobility Training,Safety Education & Training,Self-Care / ADL,Patient/Caregiver Education & Training,Equipment Evaluation, Education, & procurement,Wheelchair Mobility Training    Patient Education  Patient Education: ADL's and Home Exercise Program    Goals  Short term goals  Time Frame for Short term goals: By discharge  Short term goal 1: Pt will complete modified independent sit pivot t/fs from w/c to various surfaces in prep for toileting  Short term goal 2: Pt will increase R hand  strength to 4-/5 in prep for donning shirt and bra  Short term goal 3: Pt will complete BADLs with supervision in prep for LB bathing/dressing. Following session, patient left in safe position with all fall risk precautions in place.

## 2022-02-19 NOTE — ED PROVIDER NOTES
[address]    Pt Name: Joon Doherty  MRN: [id number]  [place] 1965  Date of evaluation: 9/12/20      I personally saw and examined the patient. I have reviewed and agree with the Resident findings, including all diagnostic interpretations and treatment plans as written. I was present for the key portion of any procedures performed and the inclusive time noted in any critical care statement. History: This patient was seen with Juan Myles, resident physician. This is a 65-year-old female who initially presented as a stroke alert. Slurred speech but there was some confusion kind of seemed more initially possibly like an altered mental status picture but in any case was seen by neurology and head CT and CT angio done right away. Neurology has decided TPA not indicated. Suspected of possible urosepsis. Urine is positive. Glucose is elevated but with normal pH. Therefore not acidotic. Admitted to the hospitalist service.                 Jazlyn Arango DO  02/18/22 8193

## 2022-02-19 NOTE — PLAN OF CARE
Problem: Pain:  Goal: Pain level will decrease  Description: Pain level will decrease  2/19/2022 0052 by Tiffany Spears RN  Outcome: Ongoing  Note: Patient denies pain this shift     Problem: Pain:  Goal: Control of acute pain  Description: Control of acute pain  2/19/2022 0052 by Tiffany Spears RN  Outcome: Ongoing  Note: Denies pain this shift     Problem: Pain:  Goal: Control of chronic pain  Description: Control of chronic pain  2/19/2022 0052 by Tiffany Spears RN  Outcome: Ongoing  Note: Denies pain this shift     Problem: Skin Integrity:  Goal: Will show no infection signs and symptoms  Description: Will show no infection signs and symptoms  2/19/2022 0052 by Tiffany Spears RN  Outcome: Ongoing  2/18/2022 1156 by Chloe Dennis RN  Outcome: Ongoing     Problem: Skin Integrity:  Goal: Absence of new skin breakdown  Description: Absence of new skin breakdown  2/19/2022 0052 by Tiffany Spears RN  Outcome: Ongoing  Note: No skin break down noted. Problem: Falls - Risk of:  Goal: Will remain free from falls  Description: Will remain free from falls  2/19/2022 0052 by Tiffany Spears RN  Outcome: Ongoing  Note: Call light and personal belongings within reach.       Problem: Falls - Risk of:  Goal: Absence of physical injury  Description: Absence of physical injury  2/19/2022 0052 by Tiffany Spears RN  Outcome: Ongoing     Problem: Discharge Planning:  Goal: Discharged to appropriate level of care  Description: Discharged to appropriate level of care  2/19/2022 0052 by Tiffany Spears RN  Outcome: Ongoing     Problem: HEMODYNAMIC STATUS  Goal: Patient has stable vital signs and fluid balance  2/19/2022 0052 by Tiffany Spears RN  Outcome: Ongoing  Note: Vital signs stable this shift

## 2022-02-20 VITALS
OXYGEN SATURATION: 98 % | WEIGHT: 158.6 LBS | HEART RATE: 94 BPM | HEIGHT: 60 IN | RESPIRATION RATE: 16 BRPM | TEMPERATURE: 99 F | BODY MASS INDEX: 31.14 KG/M2 | DIASTOLIC BLOOD PRESSURE: 55 MMHG | SYSTOLIC BLOOD PRESSURE: 97 MMHG

## 2022-02-20 LAB
ANION GAP SERPL CALCULATED.3IONS-SCNC: 10 MEQ/L (ref 8–16)
ANISOCYTOSIS: PRESENT
BASOPHILIA: ABNORMAL
BASOPHILS # BLD: 1 %
BASOPHILS ABSOLUTE: 0 THOU/MM3 (ref 0–0.1)
BUN BLDV-MCNC: 5 MG/DL (ref 7–22)
CALCIUM SERPL-MCNC: 8 MG/DL (ref 8.5–10.5)
CHLORIDE BLD-SCNC: 107 MEQ/L (ref 98–111)
CO2: 27 MEQ/L (ref 23–33)
CREAT SERPL-MCNC: 0.4 MG/DL (ref 0.4–1.2)
DIFFERENTIAL TYPE: ABNORMAL
EKG ATRIAL RATE: 91 BPM
EKG P AXIS: 61 DEGREES
EKG P-R INTERVAL: 162 MS
EKG Q-T INTERVAL: 418 MS
EKG QRS DURATION: 86 MS
EKG QTC CALCULATION (BAZETT): 514 MS
EKG R AXIS: 38 DEGREES
EKG T AXIS: 23 DEGREES
EKG VENTRICULAR RATE: 91 BPM
EOSINOPHIL # BLD: 2.6 %
EOSINOPHILS ABSOLUTE: 0 THOU/MM3 (ref 0–0.4)
ERYTHROCYTE [DISTWIDTH] IN BLOOD BY AUTOMATED COUNT: 18.8 % (ref 11.5–14.5)
ERYTHROCYTE [DISTWIDTH] IN BLOOD BY AUTOMATED COUNT: 55.2 FL (ref 35–45)
GFR SERPL CREATININE-BSD FRML MDRD: > 90 ML/MIN/1.73M2
GLUCOSE BLD-MCNC: 123 MG/DL (ref 70–108)
GLUCOSE BLD-MCNC: 211 MG/DL (ref 70–108)
GLUCOSE BLD-MCNC: 97 MG/DL (ref 70–108)
HCT VFR BLD CALC: 32.1 % (ref 37–47)
HEMOGLOBIN: 11 GM/DL (ref 12–16)
IMMATURE GRANS (ABS): 0 THOU/MM3 (ref 0–0.07)
IMMATURE GRANULOCYTES: 0 %
INR BLD: 1.51 (ref 0.85–1.13)
LYMPHOCYTES # BLD: 39.3 %
LYMPHOCYTES ABSOLUTE: 0.7 THOU/MM3 (ref 1–4.8)
MAGNESIUM: 1.2 MG/DL (ref 1.6–2.4)
MAGNESIUM: 2.4 MG/DL (ref 1.6–2.4)
MCH RBC QN AUTO: 28.2 PG (ref 26–33)
MCHC RBC AUTO-ENTMCNC: 34.3 GM/DL (ref 32.2–35.5)
MCV RBC AUTO: 82.3 FL (ref 81–99)
MONOCYTES # BLD: 15.7 %
MONOCYTES ABSOLUTE: 0.3 THOU/MM3 (ref 0.4–1.3)
NUCLEATED RED BLOOD CELLS: 0 /100 WBC
PATHOLOGIST REVIEW: ABNORMAL
PHOSPHORUS: 2.4 MG/DL (ref 2.4–4.7)
PLATELET # BLD: 44 THOU/MM3 (ref 130–400)
PMV BLD AUTO: ABNORMAL FL (ref 9.4–12.4)
POIKILOCYTES: ABNORMAL
POTASSIUM SERPL-SCNC: 3.4 MEQ/L (ref 3.5–5.2)
POTASSIUM SERPL-SCNC: 3.7 MEQ/L (ref 3.5–5.2)
RBC # BLD: 3.9 MILL/MM3 (ref 4.2–5.4)
SCAN OF BLOOD SMEAR: NORMAL
SCHISTOCYTES: ABNORMAL
SEG NEUTROPHILS: 41.4 %
SEGMENTED NEUTROPHILS ABSOLUTE COUNT: 0.8 THOU/MM3 (ref 1.8–7.7)
SODIUM BLD-SCNC: 144 MEQ/L (ref 135–145)
SPHEROCYTES: ABNORMAL
T4 FREE: 1.29 NG/DL (ref 0.93–1.76)
TARGET CELLS: ABNORMAL
TSH SERPL DL<=0.05 MIU/L-ACNC: 7.6 UIU/ML (ref 0.4–4.2)
WBC # BLD: 1.9 THOU/MM3 (ref 4.8–10.8)

## 2022-02-20 PROCEDURE — 36415 COLL VENOUS BLD VENIPUNCTURE: CPT

## 2022-02-20 PROCEDURE — 83735 ASSAY OF MAGNESIUM: CPT

## 2022-02-20 PROCEDURE — 85610 PROTHROMBIN TIME: CPT

## 2022-02-20 PROCEDURE — 84132 ASSAY OF SERUM POTASSIUM: CPT

## 2022-02-20 PROCEDURE — 6370000000 HC RX 637 (ALT 250 FOR IP): Performed by: STUDENT IN AN ORGANIZED HEALTH CARE EDUCATION/TRAINING PROGRAM

## 2022-02-20 PROCEDURE — 2580000003 HC RX 258: Performed by: HOSPITALIST

## 2022-02-20 PROCEDURE — 84443 ASSAY THYROID STIM HORMONE: CPT

## 2022-02-20 PROCEDURE — 80048 BASIC METABOLIC PNL TOTAL CA: CPT

## 2022-02-20 PROCEDURE — 6370000000 HC RX 637 (ALT 250 FOR IP): Performed by: FAMILY MEDICINE

## 2022-02-20 PROCEDURE — 6360000002 HC RX W HCPCS: Performed by: FAMILY MEDICINE

## 2022-02-20 PROCEDURE — 93005 ELECTROCARDIOGRAM TRACING: CPT | Performed by: FAMILY MEDICINE

## 2022-02-20 PROCEDURE — 84100 ASSAY OF PHOSPHORUS: CPT

## 2022-02-20 PROCEDURE — 82948 REAGENT STRIP/BLOOD GLUCOSE: CPT

## 2022-02-20 PROCEDURE — 84439 ASSAY OF FREE THYROXINE: CPT

## 2022-02-20 PROCEDURE — 6360000002 HC RX W HCPCS: Performed by: HOSPITALIST

## 2022-02-20 PROCEDURE — 6370000000 HC RX 637 (ALT 250 FOR IP): Performed by: HOSPITALIST

## 2022-02-20 PROCEDURE — 85025 COMPLETE CBC W/AUTO DIFF WBC: CPT

## 2022-02-20 PROCEDURE — 6370000000 HC RX 637 (ALT 250 FOR IP): Performed by: NURSE PRACTITIONER

## 2022-02-20 PROCEDURE — 99239 HOSP IP/OBS DSCHRG MGMT >30: CPT | Performed by: FAMILY MEDICINE

## 2022-02-20 RX ORDER — INSULIN GLARGINE 100 [IU]/ML
70 INJECTION, SOLUTION SUBCUTANEOUS NIGHTLY
Qty: 10 ML | Refills: 3 | Status: ON HOLD | OUTPATIENT
Start: 2022-02-20 | End: 2022-08-02 | Stop reason: SDUPTHER

## 2022-02-20 RX ORDER — POTASSIUM CHLORIDE 20 MEQ/1
20 TABLET, EXTENDED RELEASE ORAL DAILY
Qty: 180 TABLET | Refills: 1 | Status: SHIPPED | OUTPATIENT
Start: 2022-02-20

## 2022-02-20 RX ORDER — CLOPIDOGREL BISULFATE 75 MG/1
75 TABLET ORAL DAILY
Qty: 30 TABLET | Refills: 3 | Status: SHIPPED | OUTPATIENT
Start: 2022-02-21

## 2022-02-20 RX ORDER — ATORVASTATIN CALCIUM 40 MG/1
40 TABLET, FILM COATED ORAL NIGHTLY
Qty: 30 TABLET | Refills: 3 | Status: SHIPPED | OUTPATIENT
Start: 2022-02-20

## 2022-02-20 RX ORDER — POTASSIUM CHLORIDE 20 MEQ/1
40 TABLET, EXTENDED RELEASE ORAL ONCE
Status: COMPLETED | OUTPATIENT
Start: 2022-02-20 | End: 2022-02-20

## 2022-02-20 RX ADMIN — BACLOFEN 20 MG: 10 TABLET ORAL at 09:19

## 2022-02-20 RX ADMIN — ANASTROZOLE 1 MG: 1 TABLET, COATED ORAL at 09:19

## 2022-02-20 RX ADMIN — PANTOPRAZOLE SODIUM 40 MG: 40 TABLET, DELAYED RELEASE ORAL at 06:43

## 2022-02-20 RX ADMIN — CLOPIDOGREL BISULFATE 75 MG: 75 TABLET ORAL at 09:19

## 2022-02-20 RX ADMIN — MAGNESIUM SULFATE HEPTAHYDRATE 2000 MG: 40 INJECTION, SOLUTION INTRAVENOUS at 13:24

## 2022-02-20 RX ADMIN — Medication 400 MG: at 14:37

## 2022-02-20 RX ADMIN — BACLOFEN 20 MG: 10 TABLET ORAL at 14:37

## 2022-02-20 RX ADMIN — POTASSIUM CHLORIDE 40 MEQ: 20 TABLET, EXTENDED RELEASE ORAL at 14:37

## 2022-02-20 RX ADMIN — POTASSIUM BICARBONATE 40 MEQ: 782 TABLET, EFFERVESCENT ORAL at 09:19

## 2022-02-20 RX ADMIN — MAGNESIUM SULFATE HEPTAHYDRATE 2000 MG: 40 INJECTION, SOLUTION INTRAVENOUS at 12:02

## 2022-02-20 RX ADMIN — CEFTRIAXONE SODIUM 1000 MG: 1 INJECTION, POWDER, FOR SOLUTION INTRAMUSCULAR; INTRAVENOUS at 05:10

## 2022-02-20 NOTE — DISCHARGE SUMMARY
Hospital Medicine Discharge Summary      Patient Identification:   Thomas Chiang   : 1965  MRN: [id number]   Account: [de-identified]      Patient's PCP: Amarjit Henry MD    Admit Date: 2022     Discharge Date: 2022      Admitting Physician: Overton Frankel, MD     Discharge Physician: Kaitlin Paul DO     Discharge Diagnoses:    Metabolic encephalopathy  UTI with E. coli present on admission  Anion gap metabolic acidosis/lactic acidosis  Hypokalemia  Hypomagnesemia  Chronic pancytopenia  Uncontrolled type 2 diabetes with hyperglycemia  Chronic paraplegic with history of spinal stroke  Schizophrenia  Depression/anxiety  Neurogenic bladder with bladder stimulator  Chronic pain  GERD  Chronic debility    The patient was seen and examined on day of discharge and this discharge summary is in conjunction with any daily progress note from day of discharge. Hospital Course:   Thomas Chiang is a 49 y.o. female with chronic paraplegia secondary to \"spinal stroke\" since , diabetes mellitus type 2, schizophrenia with anxiety/depression, MS, lupus, cervical and breast cancer, asthma presenting with dysarthria, weakness, sensory change since 8-9 PM on 2022. Robinson Hedger was in all 4 extremities, worse on the right and sensory change was in the right upper and lower extremities.  Patient was promptly taken to imaging for CT head which revealed no acute intracranial process and CTA head and neck which revealed no LVO and mild stenosis. Neurology consulted in ER. MRI of the brain done on  showed no evidence of any acute infarct along with very mild severity chronic small vessel ischemic changes. Likely all metabolic encephalopathy due to UTI and hyperglycemia. A1c done on  showed 9.1% nighttime Lantus was increased to 70 units along with high-dose sliding scale in the hospital.  UA showed E. coli and patient was treated with Rocephin.   Neuro signed off on  and advised to continue Plavix however discontinue aspirin due to previous GI issues with aspirin. Will follow up with outpatient neurologist as needed. Patient's labs on day of discharge showed hypokalemia and hypomagnesemia. Both were replaced and recheck showed normal potassium and magnesium. Will have patient take potassium and magnesium supplements and recheck with a BMP in 1 week with follow-up PCP. Exam:     Vitals:  Vitals:    02/19/22 2000 02/19/22 2311 02/20/22 0334 02/20/22 0824   BP: 100/60 111/68 105/62 (!) 97/55   Pulse: 83 87 95 94   Resp: 16 18 18 16   Temp: 98.4 °F (36.9 °C) 97.2 °F (36.2 °C) 97.3 °F (36.3 °C) 99 °F (37.2 °C)   TempSrc: Oral Oral Oral Oral   SpO2: 99% 99%  98%   Weight:   158 lb 9.6 oz (71.9 kg)    Height:         Weight: Weight: 158 lb 9.6 oz (71.9 kg)     24 hour intake/output:No intake or output data in the 24 hours ending 02/20/22 1801      General appearance: No apparent distress, appears stated age and cooperative. Oriented x 3, up in wheelchair, little expressive aphasia  HEENT: Pupils equal, round, and reactive to light. Conjunctivae/corneas clear. MMM. Neck: Supple, with full range of motion. Trachea midline. Respiratory:  Normal respiratory effort. Clear to auscultation, bilaterally without Rales/Wheezes/Rhonchi. No respiratory distress or accessory muscle use. Cardiovascular: Regular rate and rhythm with normal S1/S2 without murmurs, rubs or gallops. No JVD. Abdomen: Soft, non-tender, non-distended with normal bowel sounds. No rebound or guarding  Musculoskeletal: No clubbing, cyanosis or edema bilaterally. RUE and RLE weaker than left. No calf tenderness palpation  Skin: Skin color, texture, turgor normal.  No rashes or lesions. Neurologic: slightly expressive aphasia, CN 2-12 intact, right arm and leg weaker than left.   Psychiatric: Alert and oriented x 3 but says some off statements from conversation  Capillary Refill: Brisk,< 3 seconds   Peripheral Pulses: +2 palpable, equal bilaterally       Labs: For convenience and continuity at follow-up the following most recent labs are provided:      CBC:    Lab Results   Component Value Date    WBC 1.9 02/20/2022    HGB 11.0 02/20/2022    HCT 32.1 02/20/2022    PLT 44 02/20/2022       Renal:    Lab Results   Component Value Date     02/20/2022    K 3.7 02/20/2022    K 3.2 02/19/2022     02/20/2022    CO2 27 02/20/2022    BUN 5 02/20/2022    CREATININE 0.4 02/20/2022    CALCIUM 8.0 02/20/2022    PHOS 2.4 02/20/2022         Significant Diagnostic Studies    Radiology:   MRI brain without contrast   Final Result       1. No evidence of an acute infarct. 2. Very mild severity chronic small vessel ischemic changes. **This report has been created using voice recognition software. It may contain minor errors which are inherent in voice recognition technology. **      Final report electronically signed by Dr. Trixie Burnett on 2/19/2022 11:32 AM      XR CHEST PORTABLE   Final Result   Impression:   No acute findings. This document has been electronically signed by: Monika Srivastava MD    on 02/17/2022 10:48 PM      CTA HEAD W WO CONTRAST (CODE STROKE)   Final Result   1. Unremarkable CTA head. This document has been electronically signed by: Kwan Bills M.D. on    02/17/2022 11:16 PM      All CTs at this facility use dose modulation techniques and iterative    reconstructions, and/or weight-based dosing   when appropriate to reduce radiation to a low as reasonably achievable. CTA NECK W WO CONTRAST (CODE STROKE)   Final Result   1. CTA neck demonstrates no evidence of a high-grade stenosis, occlusion,    or dissection. 2. Mild groundglass infiltrates in the visualized upper lungs. Groundglass    infiltrates are nonspecific and can be seen in conditions such as edema,    viral infection, and pneumonitis. 3. Additional findings are detailed above.       This document has been electronically signed by: Marygrace Cockayne, M.D. on    02/17/2022 11:26 PM      All CTs at this facility use dose modulation techniques and iterative    reconstructions, and/or weight-based dosing   when appropriate to reduce radiation to a low as reasonably achievable. Carotid stenosis and measurements are in accordance with NASCET criteria. CT HEAD WO CONTRAST   Final Result   1. No acute disease in the brain. This document has been electronically signed by: Marygrace Cockayne, M.D. on    02/17/2022 10:05 PM      All CTs at this facility use dose modulation techniques and iterative    reconstructions, and/or weight-based dosing   when appropriate to reduce radiation to a low as reasonably achievable. Consults:     IP CONSULT TO PHARMACY  PHARMACY TO CHANGE BASE FLUIDS  IP CONSULT TO SOCIAL WORK  IP CONSULT TO HOME CARE NEEDS    Disposition: Home  Condition at Discharge: Stable    Code Status:  Full Code     Patient Instructions:    Discharge lab work: BMP  Activity: activity as tolerated  Diet: ADULT DIET; Regular; Low Fat/Low Chol/High Fiber/BRIJESH      Follow-up visits:   No follow-up provider specified.        Discharge Medications:        Medication List      START taking these medications    clopidogrel 75 MG tablet  Commonly known as: PLAVIX  Take 1 tablet by mouth daily  Start taking on: February 21, 2022     insulin glargine 100 UNIT/ML injection vial  Commonly known as: LANTUS  Inject 70 Units into the skin nightly  Replaces: Lantus SoloStar 100 UNIT/ML injection pen     insulin lispro 100 UNIT/ML injection vial  Commonly known as: HUMALOG  Inject 10 Units into the skin 3 times daily (with meals)     magnesium oxide 400 (241.3 Mg) MG Tabs tablet  Commonly known as: MAG-OX  Take 1 tablet by mouth daily  Start taking on: February 21, 2022     potassium chloride 20 MEQ extended release tablet  Commonly known as: KLOR-CON M  Take 1 tablet by mouth daily        CHANGE how you take these medications    atorvastatin 40 MG tablet  Commonly known as: LIPITOR  Take 1 tablet by mouth nightly  What changed:   · medication strength  · See the new instructions. CONTINUE taking these medications    acidophilus/citrus pectin Tabs  take 1 tablet by mouth once daily     amitriptyline 75 MG tablet  Commonly known as: ELAVIL  Take 1 tablet by mouth nightly     anastrozole 1 MG tablet  Commonly known as: Arimidex  Take 1 tablet by mouth every other day     artificial tears Oint     baclofen 10 MG tablet  Commonly known as: LIORESAL  Take 2 tablets by mouth 3 times daily     BD Pen Needle Anu 2nd Gen 32G X 4 MM Misc  Generic drug: Insulin Pen Needle     CALCIUM 600 PO     desvenlafaxine succinate 50 MG Tb24 extended release tablet  Commonly known as: PRISTIQ     Handicap Placard Misc  by Does not apply route 2/08/22- 2/8/2027     hydrOXYzine 50 MG capsule  Commonly known as: VISTARIL     Januvia 100 MG tablet  Generic drug: SITagliptin     loratadine 10 MG tablet  Commonly known as: CLARITIN     metFORMIN 1000 MG tablet  Commonly known as: GLUCOPHAGE     montelukast 10 MG tablet  Commonly known as: SINGULAIR     Mucinex Maximum Strength 1200 MG Tb12  Generic drug: guaiFENesin     naloxone 4 MG/0.1ML Liqd nasal spray  1 spray by Nasal route as needed for Opioid Reversal     omeprazole 20 MG delayed release capsule  Commonly known as: PRILOSEC     oxyCODONE-acetaminophen 5-325 MG per tablet  Commonly known as: Percocet  Take 1 tablet by mouth 2 times daily as needed for Pain for up to 30 days. paliperidone 3 MG extended release tablet  Commonly known as: INVEGA     pregabalin 150 MG capsule  Commonly known as: LYRICA  Take 1 capsule by mouth 2 times daily for 30 days.      ProAir  (90 Base) MCG/ACT inhaler  Generic drug: albuterol sulfate HFA     TAB-A-ISAAC PO     tiotropium 18 MCG inhalation capsule  Commonly known as: SPIRIVA     Underpads Misc  Cloth chux pads  Diagnosis: Paraplegia 344.1 Wheelchair Misc  Wheelchair repairs- new seat cover, right side armrest replacement    Current body weight:  68 kg  Diagnosis: gait disturbance, chronic incomplete spastic paraplegia  Length of need: Lifetime     Xiidra 5 % Soln  Generic drug: Lifitegrast        STOP taking these medications    Lantus SoloStar 100 UNIT/ML injection pen  Generic drug: insulin glargine  Replaced by: insulin glargine 100 UNIT/ML injection vial           Where to Get Your Medications      [facility], [address] [phone] - F [phone]  [address] [place] [address], [address] [place] [address]    Phone: [phone]   · atorvastatin 40 MG tablet  · clopidogrel 75 MG tablet  · insulin glargine 100 UNIT/ML injection vial  · insulin lispro 100 UNIT/ML injection vial  · magnesium oxide 400 (241.3 Mg) MG Tabs tablet  · potassium chloride 20 MEQ extended release tablet         Time Spent on discharge is more than 30 minutes in the examination, evaluation, counseling and review of medications and discharge plan. Signed: Thank you Darryle Dixons, MD for the opportunity to be involved in this patient's care.     Electronically signed by Martinez Vega DO on 2/20/2022 at 6:01 PM

## 2022-02-20 NOTE — PLAN OF CARE
Problem: Pain:  Goal: Pain level will decrease  Description: Pain level will decrease  Outcome: Ongoing  Goal: Control of acute pain  Description: Control of acute pain  Outcome: Ongoing  Goal: Control of chronic pain  Description: Control of chronic pain  Outcome: Ongoing     Problem: Skin Integrity:  Goal: Will show no infection signs and symptoms  Description: Will show no infection signs and symptoms  Outcome: Ongoing  Goal: Absence of new skin breakdown  Description: Absence of new skin breakdown  Outcome: Ongoing     Problem: Falls - Risk of:  Goal: Will remain free from falls  Description: Will remain free from falls  Outcome: Ongoing  Goal: Absence of physical injury  Description: Absence of physical injury  Outcome: Ongoing     Problem: Discharge Planning:  Goal: Discharged to appropriate level of care  Description: Discharged to appropriate level of care  Outcome: Ongoing     Problem: HEMODYNAMIC STATUS  Goal: Patient has stable vital signs and fluid balance  Outcome: Ongoing

## 2022-02-21 ENCOUNTER — TELEPHONE (OUTPATIENT)
Dept: ONCOLOGY | Age: 57
End: 2022-02-21

## 2022-02-21 NOTE — TELEPHONE ENCOUNTER
Michael Armand called and wants to know if you can send prescriptions in for her Turmeric and Cosamin to [facility] [address] [facility]. Cost her $60 dollars if buys OTC.

## 2022-02-22 ENCOUNTER — TELEPHONE (OUTPATIENT)
Dept: PHYSICAL MEDICINE AND REHAB | Age: 57
End: 2022-02-22

## 2022-02-22 ENCOUNTER — TELEPHONE (OUTPATIENT)
Dept: ONCOLOGY | Age: 57
End: 2022-02-22

## 2022-02-22 DIAGNOSIS — G82.20 PARAPLEGIA (HCC): ICD-10-CM

## 2022-02-22 DIAGNOSIS — G95.11 SPINAL CORD INFARCTION (HCC): ICD-10-CM

## 2022-02-22 DIAGNOSIS — G82.22 CHRONIC INCOMPLETE SPASTIC PARAPLEGIA (HCC): ICD-10-CM

## 2022-02-22 DIAGNOSIS — G37.3 TRANSVERSE MYELITIS (HCC): ICD-10-CM

## 2022-02-22 DIAGNOSIS — G35 MULTIPLE SCLEROSIS (HCC): Primary | ICD-10-CM

## 2022-02-22 NOTE — TELEPHONE ENCOUNTER
Recalled pt. To clarify what kind of bench and states its a Tub transfer bench with  A back. Told will contact when script signed by  Hopefully this thurs. When  To be in.

## 2022-02-22 NOTE — TELEPHONE ENCOUNTER
She does not have to do anything, just monitor her arm , keep it elevated and let us know if it becomes swallen

## 2022-02-22 NOTE — TELEPHONE ENCOUNTER
Jose Valenciaast   NOV: 3-  Patient stating she needs a new Prescription ([address]) for a shower bench. Please advise patient.

## 2022-02-22 NOTE — TELEPHONE ENCOUNTER
Patient calling in stating she was in the hospital over the weekend for a \"mini stroke. \" Patient stating she had a limb alert band on her left arm but she had 3 lab draws in that left arm. Patient stating she was asleep when they piotr her and told them afterwards that she had a limb alert band on. Patient is concerned and is wondering if she needs to do anything. Please advise.

## 2022-02-23 ENCOUNTER — TELEPHONE (OUTPATIENT)
Dept: INTERNAL MEDICINE CLINIC | Age: 57
End: 2022-02-23

## 2022-02-23 LAB
BLOOD CULTURE, ROUTINE: NORMAL
BLOOD CULTURE, ROUTINE: NORMAL

## 2022-02-23 NOTE — TELEPHONE ENCOUNTER
Pt called to try to get ahold of Dr. Bill Dorman. She was discharged on 2/20/22 and was prescribed insulin vials but does not have syringes. I contacted  and she ordered the syringes and sent them to [facility] [address]. I left a voicemail for pt that the script was sent in.

## 2022-02-24 ENCOUNTER — TELEPHONE (OUTPATIENT)
Dept: PHYSICAL MEDICINE AND REHAB | Age: 57
End: 2022-02-24

## 2022-02-24 NOTE — TELEPHONE ENCOUNTER
Talked to patient to to get verbal consent for Kadi Barlow to be able to  the patients script. Margaret Nolan and I got verbal consent from the patient today 2/24/2022 and the pt stated that it was okay for him to pick her script up for her.

## 2022-02-28 ENCOUNTER — TELEPHONE (OUTPATIENT)
Dept: UROLOGY | Age: 57
End: 2022-02-28

## 2022-02-28 DIAGNOSIS — G89.4 CHRONIC PAIN SYNDROME: ICD-10-CM

## 2022-02-28 RX ORDER — L. ACIDOPHILUS/PECTIN, CITRUS 25MM-100MG
TABLET ORAL
Qty: 30 TABLET | Refills: 0 | Status: ON HOLD | OUTPATIENT
Start: 2022-02-28 | End: 2022-08-02 | Stop reason: HOSPADM

## 2022-02-28 NOTE — TELEPHONE ENCOUNTER
Elvin Adkins called requesting a refill on the following medications:  Requested Prescriptions     Pending Prescriptions Disp Refills    oxyCODONE-acetaminophen (PERCOCET) 5-325 MG per tablet 60 tablet 0     Sig: Take 1 tablet by mouth 2 times daily as needed for Pain for up to 30 days.      Pharmacy verified: [facility] [address]  [email or web address]      Date of last visit:  12/30/2021  Date of next visit (if applicable): 5/6/3612

## 2022-03-01 RX ORDER — OXYCODONE HYDROCHLORIDE AND ACETAMINOPHEN 5; 325 MG/1; MG/1
1 TABLET ORAL 2 TIMES DAILY PRN
Qty: 60 TABLET | Refills: 0 | Status: SHIPPED | OUTPATIENT
Start: 2022-03-03 | End: 2022-03-29 | Stop reason: SDUPTHER

## 2022-03-03 ENCOUNTER — OFFICE VISIT (OUTPATIENT)
Dept: PHYSICAL MEDICINE AND REHAB | Age: 57
End: 2022-03-03
Payer: COMMERCIAL

## 2022-03-03 VITALS
HEIGHT: 60 IN | WEIGHT: 158 LBS | BODY MASS INDEX: 31.02 KG/M2 | DIASTOLIC BLOOD PRESSURE: 66 MMHG | SYSTOLIC BLOOD PRESSURE: 104 MMHG

## 2022-03-03 DIAGNOSIS — G89.4 CHRONIC PAIN SYNDROME: Primary | ICD-10-CM

## 2022-03-03 DIAGNOSIS — G82.20 PARAPLEGIA (HCC): ICD-10-CM

## 2022-03-03 DIAGNOSIS — G37.3 TRANSVERSE MYELITIS (HCC): ICD-10-CM

## 2022-03-03 DIAGNOSIS — G35 MULTIPLE SCLEROSIS (HCC): ICD-10-CM

## 2022-03-03 PROCEDURE — G8427 DOCREV CUR MEDS BY ELIG CLIN: HCPCS | Performed by: ANESTHESIOLOGY

## 2022-03-03 PROCEDURE — 99213 OFFICE O/P EST LOW 20 MIN: CPT | Performed by: ANESTHESIOLOGY

## 2022-03-03 PROCEDURE — 1111F DSCHRG MED/CURRENT MED MERGE: CPT | Performed by: ANESTHESIOLOGY

## 2022-03-03 PROCEDURE — 4004F PT TOBACCO SCREEN RCVD TLK: CPT | Performed by: ANESTHESIOLOGY

## 2022-03-03 PROCEDURE — 3017F COLORECTAL CA SCREEN DOC REV: CPT | Performed by: ANESTHESIOLOGY

## 2022-03-03 PROCEDURE — G8417 CALC BMI ABV UP PARAM F/U: HCPCS | Performed by: ANESTHESIOLOGY

## 2022-03-03 PROCEDURE — G8484 FLU IMMUNIZE NO ADMIN: HCPCS | Performed by: ANESTHESIOLOGY

## 2022-03-03 PROCEDURE — G9899 SCRN MAM PERF RSLTS DOC: HCPCS | Performed by: ANESTHESIOLOGY

## 2022-03-03 NOTE — PROGRESS NOTES
Treatment Medications:   Percocet 5 mg twice daily  Lyrica 100 mg twice daily  Elavil 50 mg nightly    Historical Medications:  Norco - ineffective     Imaging:  None    Past Medical History:   Diagnosis Date    Anxiety     Anxiety and depression     Asthma     Bipolar 1 disorder (HCC)     Bipolar 1 disorder with moderate sourav ([address]     Blood circulation, collateral     Breast cancer ([address]     diagnosed in jan 2021    Cancer [facility])      ?  cervical \"long time ago\"    Cancer ([address] 01/15/2021    Left Breast    Depression     Endometriosis     GERD (gastroesophageal reflux disease)     Kidney stones     Lupus (HCC)     Movement disorder     MS (multiple sclerosis) ([address]     Schizophrenia ([address]     Thyroid disease     Type 2 diabetes mellitus with hyperglycemia, with long-term current use of insulin (HCC)     Type 2 diabetes mellitus without complication ([address]     Unspecified cerebral artery occlusion with cerebral infarction 2014       Past Surgical History:   Procedure Laterality Date    BREAST LUMPECTOMY Left 02/19/2021    LEFT BREAST MASTECTOMY, SENTINAL LYMPH NODE BIOPSY, PREOP NEEDLE LOC performed by Coralie Meigs, MD at [address] [place] [address] 4/6/2021    DEBRIDEMENT LEFT BREAST MASTECTOMY WOUND performed by Coralie Meigs, MD at [address] [place] [address] 4/27/2021    LEFT BREAST WOUND REVISION performed by Coralie Meigs, MD at [address] [place] [address]  01/2020    DILATION AND CURETTAGE OF UTERUS      BEN US GUID NDL BIOPSY LEFT Left 01/14/2021    BEN US GUID [address] LEFT 1/14/2021 Narda Laughlin MD [facility] NERVE SURGERY N/A 01/26/2021    EXCHANGE OF INTERSTIM SYSTEM performed by Kristie Meehan MD at [address] [place] [address]  03/23/2021    right breast i and d with closure Dr Radha Salazar in the procedure room    PAIN MANAGEMENT PROCEDURE Left 8/31/2021    left T3 paravertebral block under fluoroscopy performed by José Miguel Pa DO at [facility] OR    OR OFFICE/OUTPT VISIT,PROCEDURE ONLY N/A 11/21/2018    INTERSTIM DEVICE PLACEMENT MODEL 3058, ONLY HEAD ELIGIBLE FOR MRI WITH TRANSMIT/RECEIVE HEAD COIL    TUBAL LIGATION      10 years ago    US GUIDED NEEDLE LOC OF LEFT BREAST Left 02/19/2021    US GUIDED NEEDLE LOC OF LEFT BREAST 2/19/2021 [facility]    US LYMPH NODE BIOPSY  01/14/2021    US LYMPH NODE BIOPSY 1/14/2021 Andrew Gasca MD [facility]       Family History   Problem Relation Age of Onset   Dain Ford Stroke Mother     Asthma Mother     Other Mother     No Known Problems Sister     Asthma Brother     No Known Problems Brother        Social History     Socioeconomic History    Marital status:      Spouse name: 4    Number of children: Not on file    Years of education: Not on file    Highest education level: Not on file   Occupational History    Not on file   Tobacco Use    Smoking status: Current Every Day Smoker     Packs/day: 0.50     Types: Cigarettes     Start date: 12/6/1979    Smokeless tobacco: Never Used   Vaping Use    Vaping Use: Never used   Substance and Sexual Activity    Alcohol use: Yes     Alcohol/week: 0.0 standard drinks     Comment: social drinker    Drug use: No    Sexual activity: Never     Partners: Male   Other Topics Concern    Not on file   Social History Narrative    ** Merged History Encounter **          Social Determinants of Health     Financial Resource Strain:     Difficulty of Paying Living Expenses: Not on file   Food Insecurity:     Worried About Running Out of Food in the Last Year: Not on file    Amber of Food in the Last Year: Not on file   Transportation Needs:     Lack of Transportation (Medical): Not on file    Lack of Transportation (Non-Medical):  Not on file   Physical Activity:     Days of Exercise per Week: Not on file    Minutes of Exercise per Session: Not on file   Stress:     Feeling of Stress : Not on file   Social Connections:     Frequency of Communication with Friends and Family: Not on file    Frequency of Social Gatherings with Friends and Family: Not on file    Attends Catholic Services: Not on file    Active Member of Clubs or Organizations: Not on file    Attends Club or Organization Meetings: Not on file    Marital Status: Not on file   Intimate Partner Violence:     Fear of Current or Ex-Partner: Not on file    Emotionally Abused: Not on file    Physically Abused: Not on file    Sexually Abused: Not on file   Housing Stability:     Unable to Pay for Housing in the Last Year: Not on file    Number of Places Lived in the Last Year: Not on file    Unstable Housing in the Last Year: Not on file       Medications & Allergies:   Current Outpatient Medications   Medication Instructions    amitriptyline (ELAVIL) 75 mg, Oral, NIGHTLY    anastrozole (ARIMIDEX) 1 mg, Oral, EVERY OTHER DAY    artificial tears (ARTIFICIAL TEARS) OINT PRN    atorvastatin (LIPITOR) 40 mg, Oral, NIGHTLY    baclofen (LIORESAL) 20 mg, Oral, 3 TIMES DAILY    BD PEN NEEDLE JACOBO 2ND GEN 32G X 4 MM MISC use as directed WITH INSULIN    Calcium Carbonate (CALCIUM 600 PO) 1 tablet, Oral, 2 TIMES DAILY    clopidogrel (PLAVIX) 75 mg, Oral, DAILY    desvenlafaxine succinate (PRISTIQ) 50 mg, Oral, DAILY    Handicap Placard MISC Does not apply, 2/08/22- 2/8/2027    hydrOXYzine (VISTARIL) 50 mg, Oral, 3 TIMES DAILY PRN    Incontinence Supply Disposable (UNDERPADS) MISC Cloth chux padsDiagnosis: Paraplegia 344.1    insulin glargine (LANTUS) 70 Units, SubCUTAneous, NIGHTLY    insulin lispro (HUMALOG) 10 Units, SubCUTAneous, 3 TIMES DAILY WITH MEALS    Insulin Syringes, Disposable, U-100 1 ML MISC 1 each, Does not apply, DAILY    Lactobacillus Acid-Pectin (ACIDOPHILUS/CITRUS PECTIN) TABS take 1 tablet by mouth once daily    Lifitegrast (XIIDRA) 5 % SOLN 1 drop, Both Eyes, DAILY    loratadine (CLARITIN) 10 MG tablet 1 tablet, Oral, DAILY PRN    magnesium oxide (MAG-OX) 400 mg, Oral, DAILY    metFORMIN (GLUCOPHAGE) 1,000 mg, Oral, 2 TIMES DAILY    Misc. Devices (TRANSFER BENCH) MISC 1 each, Does not apply, DAILY, Tub transfer bench with back    Misc.  Devices [employer]) [address] [facility] Wheelchair repairs- new seat cover, right side armrest replacementCurrent body weight:  68 kgDiagnosis: gait disturbance, chronic incomplete spastic paraplegiaLength of need: Lifetime    montelukast (SINGULAIR) 10 mg, Oral, NIGHTLY    MUCINEX MAXIMUM STRENGTH 1200 MG TB12 1 tablet, Oral, 2 TIMES DAILY    Multiple Vitamin (TAB-A-ISAAC PO) 1 tablet, Oral, DAILY    naloxone 4 MG/0.1ML LIQD nasal spray 1 spray, Nasal, PRN    omeprazole (PRILOSEC) 20 mg, Oral, DAILY    oxyCODONE-acetaminophen (PERCOCET) 5-325 MG per tablet 1 tablet, Oral, 2 TIMES DAILY PRN    paliperidone (INVEGA) 3 mg, Oral, NIGHTLY    potassium chloride (KLOR-CON M) 20 MEQ extended release tablet 20 mEq, Oral, DAILY    pregabalin (LYRICA) 150 mg, Oral, 2 TIMES DAILY    PROAIR  (90 BASE) MCG/ACT inhaler 2 puffs, Inhalation, EVERY 6 HOURS PRN    SITagliptin (JANUVIA) 100 mg, Oral, DAILY    tiotropium (SPIRIVA) 18 mcg, Inhalation, DAILY, 2 puffs        Allergies   Allergen Reactions    Penicillins Shortness Of Breath     \"I almost \"  Other reaction(s): PENICILLINS    Seasonal Itching       Review of Systems:   Constitutional: negative for weight changes or fevers  Genitourinary: negative for bowel/bladder incontinence   Musculoskeletal: positive for left chest wall pain  Neurological:  Positive for numbness/tingling over left chest incision scar  Behavioral/Psych: negative for anxiety/depression   All other systems reviewed and are negative    Objective:     Vitals:    22 1435   BP: 104/66     Constitutional: Pleasant, no acute distress   Head: Normocephalic, atraumatic   Eyes: Conjunctivae normal   Neck: Supple, symmetrical   Respiration: Non-labored breathing   Cardiovascular: Limbs warm and well perfused   Musculoskeletal: Full cervical ROM in all planes. Negative Spurling maneuver bilaterally. Neuro: Alert, oriented. CN II-XII appear grossly intact. No focal motor deficits appreciated in upper limbs. Hyperesthesia and allodynia appreciated over surgical incision scar left chest wall  Skin: Healed surgical incision scar of left chest wall (near nipple line)  Psychological: Cooperative, no exaggerated pain behaviors       Assessment:    Diagnosis Orders   1. Chronic pain syndrome     2. Multiple sclerosis ([address]     3. Paraplegia ([address]     4. Transverse myelitis ([address]           Bria Phipps is a 49 y. o.female presenting to the pain clinic for evaluation of postmastectomy pain. Her clinical history of his examination is consistent with severe neuropathic pain secondary to her mastectomy surgery. I have set her up for a left T3 paravertebral block under fluoroscopy. We will continue her locations at this point and follow-up after her injection to determine next steps of therapy. She failed to respond to her left T3 paravertebral block. She has noticed a significant improvement switching from Norco to [employer]. I have refilled her medications. Plan: The following treatment recommendations and plan were discussed in detail with Bria Phipps. Imaging:   None    Analgesics:   Continued chronic pain, we will continue her Percocet at 5 mg that patient can take up to twice a day as needed for severe pain (pain greater than 7/10). Patient is advised take this medication as prescribed as taking more than prescribed and the development of tolerance, physical dependence, addiction. OARRS reviewed and is appropriate. Pain Contract signed. UDS reviewed and is appropriate. Adjuvants: In light of the presence of a neuropathic component of pain, patient is advised to continue Lyrica 150 mg twice a day.     Interventions:   None    Anticoagulation/NPO Recommendations: None    Multidisciplinary Pain Management:   In the presence of complex, chronic, and multi-factorial pain, the importance of a multidisciplinary approach to pain management in the patients management regimen was emphasized and discussed in great detail.      Referrals:  None    Prescriptions Written This Visit:   None; just refilled her Percocet    Follow-up: 8 weeks      Jinny Guerrero, DO  Interventional Pain Management/PM&R   [place]

## 2022-03-10 ENCOUNTER — OFFICE VISIT (OUTPATIENT)
Dept: PHYSICAL MEDICINE AND REHAB | Age: 57
End: 2022-03-10
Payer: COMMERCIAL

## 2022-03-10 VITALS
SYSTOLIC BLOOD PRESSURE: 112 MMHG | BODY MASS INDEX: 31.02 KG/M2 | HEIGHT: 60 IN | WEIGHT: 158 LBS | DIASTOLIC BLOOD PRESSURE: 60 MMHG

## 2022-03-10 DIAGNOSIS — G82.20 PARAPLEGIA (HCC): Primary | ICD-10-CM

## 2022-03-10 DIAGNOSIS — G82.22 CHRONIC INCOMPLETE SPASTIC PARAPLEGIA (HCC): ICD-10-CM

## 2022-03-10 PROCEDURE — 3017F COLORECTAL CA SCREEN DOC REV: CPT | Performed by: PHYSICAL MEDICINE & REHABILITATION

## 2022-03-10 PROCEDURE — G8484 FLU IMMUNIZE NO ADMIN: HCPCS | Performed by: PHYSICAL MEDICINE & REHABILITATION

## 2022-03-10 PROCEDURE — 64642 CHEMODENERV 1 EXTREMITY 1-4: CPT | Performed by: PHYSICAL MEDICINE & REHABILITATION

## 2022-03-10 PROCEDURE — 1111F DSCHRG MED/CURRENT MED MERGE: CPT | Performed by: PHYSICAL MEDICINE & REHABILITATION

## 2022-03-10 PROCEDURE — G8417 CALC BMI ABV UP PARAM F/U: HCPCS | Performed by: PHYSICAL MEDICINE & REHABILITATION

## 2022-03-10 PROCEDURE — 4004F PT TOBACCO SCREEN RCVD TLK: CPT | Performed by: PHYSICAL MEDICINE & REHABILITATION

## 2022-03-10 PROCEDURE — G8427 DOCREV CUR MEDS BY ELIG CLIN: HCPCS | Performed by: PHYSICAL MEDICINE & REHABILITATION

## 2022-03-10 PROCEDURE — 99212 OFFICE O/P EST SF 10 MIN: CPT | Performed by: PHYSICAL MEDICINE & REHABILITATION

## 2022-03-10 PROCEDURE — G9899 SCRN MAM PERF RSLTS DOC: HCPCS | Performed by: PHYSICAL MEDICINE & REHABILITATION

## 2022-03-10 RX ORDER — SYRINGE AND NEEDLE,INSULIN,1ML 31 GX5/16"
SYRINGE, EMPTY DISPOSABLE MISCELLANEOUS
COMMUNITY
Start: 2022-02-24 | End: 2022-03-10

## 2022-03-10 NOTE — PROGRESS NOTES
[address] [facility]  Outpatient progress note    Chief Complaint:   Chief Complaint   Patient presents with    Follow-up     500 units botox         Subjective: Tawana Nicholson is a 49 y.o. female who returns to the office today for further follow up. Patient with left breast cancer diagnosis with mastectomy 2/19/21 with Dr Michael Luna. Incision healed, required wound vac though. Treatments with radiation concluded 10/8/21. Now on 5 year Arimidex treatment. 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 Botox injections remain beneficial, but seems short term. Would like repeat botox injections today. Will monitor for ongoing benefits.        Review of Systems:  CONSTITUTIONAL:  negative  EYES:  negative  HEENT:  negative  RESPIRATORY:  negative  CARDIOVASCULAR:  negative  GASTROINTESTINAL:  positive for neurogenic bowel  GENITOURINARY:  positive for neurogenic bladder  SKIN:  negative  HEMATOLOGIC/LYMPHATIC:  negative  MUSCULOSKELETAL:  negative  NEUROLOGICAL:  positive for gait problems, weakness, pain, tingling and spasticity  BEHAVIOR/PSYCH:  negative  All other review of systems otherwise negative    Physical Exam:  /60   Ht 5' (1.524 m)   Wt 158 lb (71.7 kg)   BMI 30.86 kg/m²     awake  Orientation:   person, place, time  Mood: within normal limits  Affect: calm  General appearance: Patient is well nourished, well developed, well groomed and in no acute distress, appearing stated age    ROM:  abnormal - right knee and ankle restricted due to spasticity, severe-improving  Motor Exam:  2/5 right knee extension and ADF.  3/5 right knee flexion  Tone:  abnormal - 2-3/4 right leg/ankle  Muscle bulk: within normal limits  Sensory:  Decreased sensation    Skin: warm and dry, no rash or erythema  Peripheral vascular: Pulses: Normal upper and lower extremity pulses; Edema: no      Impression:  · Multiple sclerosis  · Transverse myelitis  · Lupus  · Neurogenic bowel and bladder  · Spastic paraplegia  · Bilateral lower limb neuralgia  · Moderate bilateral carpal tunnel syndrome    Plan:  · Repeat botulinum toxin injections performed by Dr. Jarrett Haji. See procedure note. · Lyrica 150 mg twice daily - managed by Dr Triston Morris  · Amitriptyline to 75mg   · Baclofen 20mg TID   · Continue with bowel and bladder program  · 500 units botox, patient to monitor effects of botox this round whether they are remainingeffective for enough time to continue to perform      Return in about 3 months (around 6/10/2022) for Botox with Dr Jarrett Haji - 500 units botox. It was my pleasure to evaluate Lendon Ceralise today. Please call with any concerns or questions.      Torsten Mukherjee, CHAS - CNP

## 2022-03-10 NOTE — PROGRESS NOTES
[facility]  Procedure Note    Verbal consent obtained for botulinum toxin injections after risks versus benefits discussed. 500 Units of Botox were reconstituted using preservative-free normal saline in a 100 unit to 2 mL solution. Alcohol swabs were used to cleanse the skin before injections were performed. Back pressure was placed on the syringe during injections to avoid any intravascular injection. EMG guidance was not used during procedure. Botulinum toxin was injected in the following muscles of the right lower limb:  Lateral hamstring 150 units, medial hamstring 150 units, lateral gastroc 100 units, medial gastroc 100 units    The patient tolerated the procedure well with no immediate complications. The patient should call with concerns or questions over the coming days.     CPT: 92557    Micheal Perez MD

## 2022-03-11 DIAGNOSIS — G89.29 OTHER CHRONIC PAIN: ICD-10-CM

## 2022-03-11 RX ORDER — AMITRIPTYLINE HYDROCHLORIDE 75 MG/1
75 TABLET, FILM COATED ORAL NIGHTLY
Qty: 30 TABLET | Refills: 2 | Status: SHIPPED | OUTPATIENT
Start: 2022-03-11 | End: 2022-05-16 | Stop reason: ALTCHOICE

## 2022-03-11 NOTE — TELEPHONE ENCOUNTER
OARRS reviewed. UDS: results from 2021  Last seen: 3/10/22.  Follow-up:   Future Appointments   Date Time Provider Rashard Blank   3/31/2022  1:30 PM Melissa Ramires, [address] [place] [address]   4/5/2022  1:00 PM [facility] [address]   4/28/2022  2:40 PM Mayito Richter DO [facility] [address]   5/2/2022  1:15 PM MD Matthew Neumannu [facility] [address]   5/10/2022  2:00 PM CHAS Zaman - CNP [address] [facility]   6/16/2022  3:40 PM Sarah Cevallos MD [facility] [address]

## 2022-03-29 DIAGNOSIS — G89.4 CHRONIC PAIN SYNDROME: ICD-10-CM

## 2022-03-29 NOTE — TELEPHONE ENCOUNTER
OARRS reviewed. UDS: + for  Hydrocodone, Pregabalin -consistent. + for  EtG -inconsistent (11/2021 screen)  Last seen: 3/3/2022.  Follow-up: 4/28/2022

## 2022-03-29 NOTE — TELEPHONE ENCOUNTER
Fritz Riojas called requesting a refill on the following medications:  Requested Prescriptions     Pending Prescriptions Disp Refills    oxyCODONE-acetaminophen (PERCOCET) 5-325 MG per tablet 60 tablet 0     Sig: Take 1 tablet by mouth 2 times daily as needed for Pain for up to 30 days.      Pharmacy verified: [facility] [address]      Date of last visit: 3/3/22  Date of next visit (if applicable): 0/39/9944

## 2022-03-31 RX ORDER — OXYCODONE HYDROCHLORIDE AND ACETAMINOPHEN 5; 325 MG/1; MG/1
1 TABLET ORAL 2 TIMES DAILY PRN
Qty: 60 TABLET | Refills: 0 | Status: SHIPPED | OUTPATIENT
Start: 2022-04-02 | End: 2022-04-28 | Stop reason: SDUPTHER

## 2022-04-04 NOTE — TELEPHONE ENCOUNTER
Ravindra Echevarria called requesting a refill on the following medications:  Requested Prescriptions     Pending Prescriptions Disp Refills    Probiotic Acidophilus (FLORANEX) TABS 30 tablet 11     Sig: Take 1 tablet by mouth daily     Pharmacy verified: [facility] [address]  [address]      Date of last visit: 10/27/2021  Date of next visit (if applicable): 0/18/7774

## 2022-04-05 ENCOUNTER — TELEPHONE (OUTPATIENT)
Dept: ONCOLOGY | Age: 57
End: 2022-04-05

## 2022-04-05 RX ORDER — GREEN TEA/HOODIA GORDONII 315-12.5MG
1 CAPSULE ORAL DAILY
Qty: 30 TABLET | Refills: 11 | OUTPATIENT
Start: 2022-04-05

## 2022-04-10 DIAGNOSIS — G89.29 OTHER CHRONIC PAIN: ICD-10-CM

## 2022-04-11 RX ORDER — AMITRIPTYLINE HYDROCHLORIDE 25 MG/1
TABLET, FILM COATED ORAL
Qty: 30 TABLET | Refills: 5 | OUTPATIENT
Start: 2022-04-11

## 2022-04-14 ENCOUNTER — TELEPHONE (OUTPATIENT)
Dept: ONCOLOGY | Age: 57
End: 2022-04-14

## 2022-04-28 DIAGNOSIS — G89.4 CHRONIC PAIN SYNDROME: ICD-10-CM

## 2022-04-28 RX ORDER — OXYCODONE HYDROCHLORIDE AND ACETAMINOPHEN 5; 325 MG/1; MG/1
1 TABLET ORAL 2 TIMES DAILY PRN
Qty: 60 TABLET | Refills: 0 | Status: SHIPPED | OUTPATIENT
Start: 2022-05-02 | End: 2022-05-16 | Stop reason: SDUPTHER

## 2022-04-28 NOTE — PROGRESS NOTES
[facility]Y [address] [facility], [address]  Ph. [phone]  Fax [phone]    PATIENT: Pablito Pack  : 1965  MRN: [id number]  DATE: 10/15/2021      DIAGNOSIS: C50.212 -- Malignant neoplasm of the upper inner quadrant left female breast; Invasive ductal carcinoma, mucinous type, Grade 1; pT2 pN1a M0, Stage IA; ER+; SC+; Her2-; Oncotype Dx Score 8. Date of diagnosis: 2021      Treatment Course Number: 1    Treatment Site (s) Modality Dose (cGy) From To Fractions/  Elapsed Days   Left Chest Wall 6MV photons 5000 cGy 2021 10/01/2021 25/ 51   Left Supraclavicular, Infraclavicular and Axillary Lymph Nodes 15MV photons 5000 cGy 2021 10/01/2021 25/ 51    Left Breast bed/ Mastectomy Scar Boost 6Me-V electrons 1000 cGy 10/04/2021 10/08/2021 5/ 5     Cumulative Dose to Left Breast Bed/ Mastectomy Scar: 6000 cGy  Treatment Modifiers: Chest Wall and Regional Lymph Nodes -- Three-dimensional conformal planning; dual photon energy; custom MLC blocking; field in field technique; Custom vac fix immobilization; daily stereoscopic guidance. Breast bed/chest wall boost -- Appositional electron particle beam therapy; custom Cerrobend blocking; custom vac fix immobilization. HISTORY OF PRESENT ILLNESS:   Delaney Ashford presented as a 82-year-old woman with multiple medical problems including lupus erythematosus and multiple sclerosis who was recently diagnosed with a left breast cancer. She felt a mass in her left breast, prompting her to seek medical evaluation. She underwent diagnostic imaging of the breast showing a suspicious mass in the central portion of the left breast.  She subsequently underwent ultrasound-guided biopsy of the left and also a suspicious appearing left axillary lymph node.   The biopsy confirmed the presence of well-differentiated invasive ductal carcinoma with mucinous features in the left breast mass. The cancer was strongly positive for estrogen and also positive for progesterone with a low proliferative index and negative HER-2/maegan (see pathology reports below). After discussing her local/regional treatment options, Caterina Snyder elected to undergo breast conservation surgery. She underwent lumpectomy and sentinel lymph node biopsy 2021. Final pathology showed a 2.9 cm invasive adenocarcinoma with mucinous features as previously noted. 2 of the 4 sampled lymph nodes were involved with metastatic carcinoma with no extranodal extension. Caterina Snyder was seen by Dr. Tashi Rousseau in medical oncology. Oncotype DX testing was performed on her cancer, and returned with a low recurrence score of 8. Therefore, the recommendation is for adjuvant endocrine therapy without chemotherapy. Caterina Snyder was seen 3/24/2021 for evaluation and discussion regarding the potential role of radiation therapy in the management of her breast cancer. Caterina Snyder has been experiencing healing difficulties since her surgery, which may be in part related to her lupus. Recently underwent excisional debridement with intermediate wound closure. She also has significant range of motion issues related to her multiple sclerosis. Because of these issues, she may not be a suitable candidate for adjuvant radiation therapy. She subsequently developed a local infection requiring additional surgical management and prolonged healing time. We then again reviewed the recommendation for postmastectomy radiation therapy. After discussing specifics of the treatment, she was agreeable to proceeding. Pain Ratin-6/10  ECOG Performance Status: 1      Treatment Tolerance/Response:   Tressa Hernandez initially tolerated her radiation therapy well. However, she Then developed a rapidly progressive localized skin reaction over the chest wall with intense hyperpigmentation, brisk erythema and superficial patchy desquamation.

## 2022-04-28 NOTE — TELEPHONE ENCOUNTER
Phillip Gregory called requesting a refill on the following medications:  Requested Prescriptions     Pending Prescriptions Disp Refills    oxyCODONE-acetaminophen (PERCOCET) 5-325 MG per tablet 60 tablet 0     Sig: Take 1 tablet by mouth 2 times daily as needed for Pain for up to 30 days.      Pharmacy verified: [facility]  .      Date of last visit: 3-3-2022  Date of next visit (if applicable): 1/31/0901

## 2022-05-09 ENCOUNTER — TELEPHONE (OUTPATIENT)
Dept: CASE MANAGEMENT | Age: 57
End: 2022-05-09

## 2022-05-09 NOTE — TELEPHONE ENCOUNTER
Name: Ana Lance  : 1965  MRN: [id number]    Oncology Navigation Follow-Up Note    Contact Type:  Telephone    Notes: Called patient to discuss Survivorship Care Plan. Patient was a NS x2 for survivor visit. Care plan reviewed. States she taking Arimidex daily. Discussed importance of following with medical oncology to manage Arimidex. Patient is willing. Will ask  to schedule appointment. Reminded patient of appointment tomorrow with Dr. Beryle Kidd,. States she was not aware of appointment and cannot make it. Keshawn Ramires in Radiation informed and will reschedule. Last mammogram on right breast was 2021. Reviewed importance of having yearly mammogram.  She will discuss with rad onc or med onc whichever she sees first. Survivorship packet mailed to patient and Dr. Oswadlo Crane.      Electronically signed by Guille Delgado RN on 2022 at 12:24 PM

## 2022-05-10 DIAGNOSIS — G89.29 OTHER CHRONIC PAIN: ICD-10-CM

## 2022-05-10 RX ORDER — AMITRIPTYLINE HYDROCHLORIDE 75 MG/1
75 TABLET, FILM COATED ORAL NIGHTLY
Qty: 30 TABLET | Refills: 2 | Status: SHIPPED | OUTPATIENT
Start: 2022-05-10 | End: 2022-10-26

## 2022-05-10 RX ORDER — AMITRIPTYLINE HYDROCHLORIDE 25 MG/1
TABLET, FILM COATED ORAL
Qty: 30 TABLET | Refills: 2 | OUTPATIENT
Start: 2022-05-11 | End: 2022-08-09

## 2022-05-16 ENCOUNTER — OFFICE VISIT (OUTPATIENT)
Dept: SURGERY | Age: 57
End: 2022-05-16
Payer: COMMERCIAL

## 2022-05-16 ENCOUNTER — OFFICE VISIT (OUTPATIENT)
Dept: PHYSICAL MEDICINE AND REHAB | Age: 57
End: 2022-05-16
Payer: COMMERCIAL

## 2022-05-16 VITALS
DIASTOLIC BLOOD PRESSURE: 60 MMHG | BODY MASS INDEX: 31.02 KG/M2 | WEIGHT: 158 LBS | SYSTOLIC BLOOD PRESSURE: 100 MMHG | HEIGHT: 60 IN

## 2022-05-16 VITALS
RESPIRATION RATE: 18 BRPM | HEIGHT: 60 IN | TEMPERATURE: 97.1 F | OXYGEN SATURATION: 92 % | SYSTOLIC BLOOD PRESSURE: 138 MMHG | HEART RATE: 71 BPM | WEIGHT: 158 LBS | BODY MASS INDEX: 31.02 KG/M2 | DIASTOLIC BLOOD PRESSURE: 60 MMHG

## 2022-05-16 DIAGNOSIS — Z12.31 SCREENING MAMMOGRAM FOR BREAST CANCER: Primary | ICD-10-CM

## 2022-05-16 DIAGNOSIS — G82.20 PARAPLEGIA (HCC): Primary | ICD-10-CM

## 2022-05-16 DIAGNOSIS — G89.4 CHRONIC PAIN SYNDROME: ICD-10-CM

## 2022-05-16 DIAGNOSIS — M32.9 SYSTEMIC LUPUS ERYTHEMATOSUS, UNSPECIFIED SLE TYPE, UNSPECIFIED ORGAN INVOLVEMENT STATUS (HCC): ICD-10-CM

## 2022-05-16 DIAGNOSIS — G37.3 TRANSVERSE MYELITIS (HCC): ICD-10-CM

## 2022-05-16 DIAGNOSIS — G82.22 CHRONIC INCOMPLETE SPASTIC PARAPLEGIA (HCC): ICD-10-CM

## 2022-05-16 DIAGNOSIS — G89.29 OTHER CHRONIC PAIN: ICD-10-CM

## 2022-05-16 DIAGNOSIS — C50.912 INVASIVE DUCTAL CARCINOMA OF LEFT BREAST (HCC): ICD-10-CM

## 2022-05-16 PROCEDURE — G8417 CALC BMI ABV UP PARAM F/U: HCPCS | Performed by: ANESTHESIOLOGY

## 2022-05-16 PROCEDURE — 99214 OFFICE O/P EST MOD 30 MIN: CPT | Performed by: SURGERY

## 2022-05-16 PROCEDURE — 4004F PT TOBACCO SCREEN RCVD TLK: CPT | Performed by: ANESTHESIOLOGY

## 2022-05-16 PROCEDURE — G8427 DOCREV CUR MEDS BY ELIG CLIN: HCPCS | Performed by: SURGERY

## 2022-05-16 PROCEDURE — G8417 CALC BMI ABV UP PARAM F/U: HCPCS | Performed by: SURGERY

## 2022-05-16 PROCEDURE — G9899 SCRN MAM PERF RSLTS DOC: HCPCS | Performed by: SURGERY

## 2022-05-16 PROCEDURE — 3017F COLORECTAL CA SCREEN DOC REV: CPT | Performed by: ANESTHESIOLOGY

## 2022-05-16 PROCEDURE — G8427 DOCREV CUR MEDS BY ELIG CLIN: HCPCS | Performed by: ANESTHESIOLOGY

## 2022-05-16 PROCEDURE — 3017F COLORECTAL CA SCREEN DOC REV: CPT | Performed by: SURGERY

## 2022-05-16 PROCEDURE — 99214 OFFICE O/P EST MOD 30 MIN: CPT | Performed by: ANESTHESIOLOGY

## 2022-05-16 PROCEDURE — G9899 SCRN MAM PERF RSLTS DOC: HCPCS | Performed by: ANESTHESIOLOGY

## 2022-05-16 PROCEDURE — 4004F PT TOBACCO SCREEN RCVD TLK: CPT | Performed by: SURGERY

## 2022-05-16 RX ORDER — OXYCODONE HYDROCHLORIDE AND ACETAMINOPHEN 5; 325 MG/1; MG/1
1 TABLET ORAL 2 TIMES DAILY PRN
Qty: 60 TABLET | Refills: 0 | Status: SHIPPED | OUTPATIENT
Start: 2022-06-01 | End: 2022-06-06

## 2022-05-16 ASSESSMENT — ENCOUNTER SYMPTOMS
BLOOD IN STOOL: 0
NAUSEA: 0
ABDOMINAL PAIN: 0
SORE THROAT: 0
VOMITING: 0
VOICE CHANGE: 0
WHEEZING: 0
SHORTNESS OF BREATH: 0
COUGH: 0
TROUBLE SWALLOWING: 0

## 2022-05-16 NOTE — PROGRESS NOTES
in her wheelchair she had issues with wound healing for some time but ultimately her wound did heal with VAC placement. She has undergone chemotherapy. With radiation therapy recently completed in October 2021. She was started on Arimidex postradiation therapy. Interval history: Braydon Beltran had possible TIA in February. Imaging negative including CTA and MRI of the brain. She is on Plavix therapy. Mastectomy site healing well no signs of lymphedema. She is due for right mammogram.  She has no desire for reconstruction at this time. Records reviewed and interval history reviewed with patient. Denies any new cancer diagnoses in her family. Past Medical History  Past Medical History:   Diagnosis Date    Anxiety     Anxiety and depression     Asthma     Bipolar 1 disorder ([address]     Bipolar 1 disorder with moderate sourav ([address]     Blood circulation, collateral     Breast cancer ([address]     diagnosed in jan 2021    Cancer [facility])      ?  cervical \"long time ago\"    Cancer ([address] 01/15/2021    Left Breast    Depression     Endometriosis     GERD (gastroesophageal reflux disease)     Kidney stones     Lupus (HCC)     Movement disorder     MS (multiple sclerosis) ([address]     Schizophrenia ([address]     Thyroid disease     Type 2 diabetes mellitus with hyperglycemia, with long-term current use of insulin (HCC)     Type 2 diabetes mellitus without complication ([address]     Unspecified cerebral artery occlusion with cerebral infarction 2014       Past Surgical History  Past Surgical History:   Procedure Laterality Date    BREAST LUMPECTOMY Left 02/19/2021    LEFT BREAST MASTECTOMY, SENTINAL LYMPH NODE BIOPSY, PREOP NEEDLE LOC performed by Linda Reilly MD at [address] [place] [address] 4/6/2021    DEBRIDEMENT LEFT BREAST MASTECTOMY WOUND performed by Linda Reilly MD at [facility] [address] [facility] 4/27/2021    LEFT BREAST WOUND REVISION performed by Linda Reilly MD at [address] [place] [address] 01/2020    DILATION AND CURETTAGE OF UTERUS      BEN US GUID NDL BIOPSY LEFT Left 01/14/2021    BEN US GUID NDL BIOPSY LEFT 1/14/2021 Frederic Riggins MD [facility]    NERVE SURGERY N/A 01/26/2021    EXCHANGE OF INTERSTIM SYSTEM performed by Cam Barrera MD at [facility]  03/23/2021    right breast i and d with closure Dr Angela Rodríguez in the procedure room    PAIN MANAGEMENT PROCEDURE Left 8/31/2021    left T3 paravertebral block under fluoroscopy performed by Aliya Gomez DO at [address] OFFICE/OUTPT VISIT,PROCEDURE ONLY N/A 11/21/2018    INTERSTIM DEVICE PLACEMENT MODEL 3058, ONLY HEAD ELIGIBLE FOR MRI WITH TRANSMIT/RECEIVE HEAD COIL    TUBAL LIGATION      10 years ago   [place] LOC OF LEFT BREAST Left 02/19/2021    US GUIDED NEEDLE LOC OF LEFT BREAST 2/19/2021 [address] BIOPSY  01/14/2021    US LYMPH NODE BIOPSY 1/14/2021 Frederic Riggins MD [facility]       Medications  Current Outpatient Medications   Medication Sig Dispense Refill    [START ON 6/1/2022] oxyCODONE-acetaminophen (PERCOCET) 5-325 MG per tablet Take 1 tablet by mouth 2 times daily as needed for Pain for up to 30 days. 60 tablet 0    amitriptyline (ELAVIL) 75 MG tablet Take 1 tablet by mouth nightly 30 tablet 2    Lactobacillus Acid-Pectin (ACIDOPHILUS/CITRUS PECTIN) TABS take 1 tablet by mouth once daily 30 tablet 0    Misc.  Devices (TRANSFER BENCH) MISC 1 each by Does not apply route daily Tub transfer bench with back 1 each 0    Insulin Syringes, Disposable, U-100 1 ML MISC 1 each by Does not apply route daily 100 each 3    atorvastatin (LIPITOR) 40 MG tablet Take 1 tablet by mouth nightly 30 tablet 3    insulin glargine (LANTUS) 100 UNIT/ML injection vial Inject 70 Units into the skin nightly 10 mL 3    insulin lispro (HUMALOG) 100 UNIT/ML injection vial Inject 10 Units into the skin 3 times daily (with meals) 10 mL 3    magnesium oxide (MAG-OX) 400 (241.3 Mg) MG TABS tablet Take 1 tablet by mouth daily 30 tablet 3    potassium chloride (KLOR-CON M) 20 MEQ extended release tablet Take 1 tablet by mouth daily 180 tablet 1    Handicap Placard MISC by Does not apply route 2/08/22- 2/8/2027 1 each 0    pregabalin (LYRICA) 150 MG capsule Take 1 capsule by mouth 2 times daily for 30 days. 60 capsule 2    anastrozole (ARIMIDEX) 1 MG tablet Take 1 tablet by mouth every other day 30 tablet 3    BD PEN NEEDLE JACOBO 2ND GEN 32G X 4 MM MISC use as directed WITH INSULIN      baclofen (LIORESAL) 10 MG tablet Take 2 tablets by mouth 3 times daily 135 tablet 5    naloxone 4 MG/0.1ML LIQD nasal spray 1 spray by Nasal route as needed for Opioid Reversal 1 each 5    metFORMIN (GLUCOPHAGE) 1000 MG tablet Take 1,000 mg by mouth 2 times daily      desvenlafaxine succinate (PRISTIQ) 50 MG TB24 extended release tablet Take 50 mg by mouth daily      MUCINEX MAXIMUM STRENGTH 1200 MG TB12 Take 1 tablet by mouth 2 times daily      paliperidone (INVEGA) 3 MG extended release tablet Take 3 mg by mouth nightly      SITagliptin (JANUVIA) 100 MG tablet Take 100 mg by mouth daily      Lifitegrast (XIIDRA) 5 % SOLN Place 1 drop into both eyes daily      Misc. Devices [facility]) [address] [facility] Wheelchair repairs- new seat cover, right side armrest replacement    Current body weight:  68 kg  Diagnosis: gait disturbance, chronic incomplete spastic paraplegia  Length of need: Lifetime 1 each 0    tiotropium (SPIRIVA) 18 MCG inhalation capsule Inhale 18 mcg into the lungs daily 2 puffs      montelukast (SINGULAIR) 10 MG tablet Take 10 mg by mouth nightly   0    artificial tears (ARTIFICIAL TEARS) OINT as needed      Calcium Carbonate (CALCIUM 600 PO) Take 1 tablet by mouth 2 times daily      Multiple Vitamin (TAB-A-ISAAC PO) Take 1 tablet by mouth daily      loratadine (CLARITIN) 10 MG tablet Take 1 tablet by mouth daily as needed.   0    PROAIR  (90 BASE) MCG/ACT inhaler Inhale 2 puffs into the lungs every 6 hours as needed. 0    Incontinence Supply Disposable (UNDERPADS) MISC Cloth chux pads  Diagnosis: Paraplegia 344. 1 100 Bottle 11    hydrOXYzine (VISTARIL) 50 MG capsule Take 50 mg by mouth 3 times daily as needed for Itching.  omeprazole (PRILOSEC) 20 MG capsule Take 20 mg by mouth daily.  clopidogrel (PLAVIX) 75 MG tablet Take 1 tablet by mouth daily (Patient not taking: Reported on 2022) 30 tablet 3     No current facility-administered medications for this visit. Allergies     Allergies   Allergen Reactions    Penicillins Shortness Of Breath     \"I almost \"  Other reaction(s): PENICILLINS    Seasonal Itching       Family History  Family History   Problem Relation Age of Onset    Stroke Mother     Asthma Mother     Other Mother     No Known Problems Sister     Asthma Brother     No Known Problems Brother        SocialHistory  Social History     Socioeconomic History    Marital status:      Spouse name: 4    Number of children: Not on file    Years of education: Not on file    Highest education level: Not on file   Occupational History    Not on file   Tobacco Use    Smoking status: Current Every Day Smoker     Packs/day: 0.50     Types: Cigarettes     Start date: 1979    Smokeless tobacco: Never Used   Vaping Use    Vaping Use: Never used   Substance and Sexual Activity    Alcohol use:  Yes     Alcohol/week: 0.0 standard drinks     Comment: social drinker    Drug use: No    Sexual activity: Never     Partners: Male   Other Topics Concern    Not on file   Social History Narrative    ** Merged History Encounter **          Social Determinants of Health     Financial Resource Strain:     Difficulty of Paying Living Expenses: Not on file   Food Insecurity:     Worried About Running Out of Food in the Last Year: Not on file    Amber of Food in the Last Year: Not on file   Transportation Needs:     Lack of Transportation (Medical): Not on file    Lack of Transportation (Non-Medical): Not on file   Physical Activity:     Days of Exercise per Week: Not on file    Minutes of Exercise per Session: Not on file   Stress:     Feeling of Stress : Not on file   Social Connections:     Frequency of Communication with Friends and Family: Not on file    Frequency of Social Gatherings with Friends and Family: Not on file    Attends Jew Services: Not on file    Active Member of [address] [place] [address] or Organizations: Not on file    Attends Club or Organization Meetings: Not on file    Marital Status: Not on file   Intimate Partner Violence:     Fear of Current or Ex-Partner: Not on file    Emotionally Abused: Not on file    Physically Abused: Not on file    Sexually Abused: Not on file   Housing Stability:     Unable to Pay for Housing in the Last Year: Not on file    Number of [place] in the Last Year: Not on file    Unstable Housing in the Last Year: Not on file           Review of Systems  Review of Systems   Constitutional: Negative for activity change, chills, fatigue, fever and unexpected weight change. HENT: Negative for congestion, sore throat, trouble swallowing and voice change. Eyes: Negative for visual disturbance. Respiratory: Negative for cough, shortness of breath and wheezing. Cardiovascular: Positive for leg swelling. Negative for chest pain and palpitations. Gastrointestinal: Negative for abdominal pain, blood in stool, nausea and vomiting. Endocrine: Negative for cold intolerance, heat intolerance and polydipsia. Genitourinary: Negative for dysuria, flank pain, hematuria and vaginal bleeding. Musculoskeletal: Positive for arthralgias, back pain and joint swelling. Negative for gait problem and myalgias. Skin: Negative for color change and rash. Allergic/Immunologic: Positive for environmental allergies. Negative for immunocompromised state.    Neurological: Negative for dizziness, seizures, speech difficulty, weakness and headaches. Hematological: Does not bruise/bleed easily. Psychiatric/Behavioral: Negative for agitation, behavioral problems and confusion. OBJECTIVE     /60 (Site: Right Upper Arm, Position: Sitting, Cuff Size: Medium Adult)   Pulse 71   Temp 97.1 °F (36.2 °C) (Temporal)   Resp 18   Ht 5' (1.524 m)   Wt 158 lb (71.7 kg)   SpO2 92%   BMI 30.86 kg/m²      Physical Exam  Vitals reviewed. Constitutional:       Appearance: She is well-developed. She is not ill-appearing or diaphoretic. HENT:      Head: Normocephalic and atraumatic. Eyes:      General: No scleral icterus. Extraocular Movements: Extraocular movements intact. Pupils: Pupils are equal, round, and reactive to light. Neck:      Thyroid: No thyromegaly. Vascular: No JVD. Trachea: No tracheal deviation. Cardiovascular:      Rate and Rhythm: Normal rate. Heart sounds: Normal heart sounds. Pulmonary:      Effort: No respiratory distress. Breath sounds: Normal breath sounds. No wheezing. Chest:   Breasts:      Right: No swelling, bleeding, inverted nipple, mass, nipple discharge, skin change, tenderness, axillary adenopathy or supraclavicular adenopathy. Left: Skin change present. No mass, tenderness, axillary adenopathy or supraclavicular adenopathy. Abdominal:      General: There is no distension. Palpations: Abdomen is soft. Tenderness: There is no abdominal tenderness. Musculoskeletal:         General: No swelling. Cervical back: Normal range of motion and neck supple. Comments: Muscular wasting lower extremities. Lymphadenopathy:      Cervical: No cervical adenopathy. Upper Body:      Right upper body: No supraclavicular, axillary or pectoral adenopathy. Left upper body: No supraclavicular, axillary or pectoral adenopathy. Skin:     General: Skin is warm and dry.       Coloration: Skin is not jaundiced or pale.      Findings: No bruising or rash. Neurological:      Mental Status: She is alert and oriented to person, place, and time. Cranial Nerves: No cranial nerve deficit. Motor: Weakness present. Comments: Patient in wheelchair with weakness of lower extremities to a lesser extent upper extremities   Psychiatric:         Mood and Affect: Mood normal.         Behavior: Behavior normal.         Lab Results   Component Value Date    WBC 1.9 (L) 02/20/2022    HGB 11.0 (L) 02/20/2022    HCT 32.1 (L) 02/20/2022    PLT 44 (L) 02/20/2022    CHOL 98 (L) 02/18/2022    TRIG 97 02/18/2022    HDL 26 02/18/2022    ALT 18 02/19/2022    AST 31 02/19/2022     02/20/2022    K 3.7 02/20/2022     02/20/2022    CREATININE 0.4 02/20/2022    BUN 5 (L) 02/20/2022    CO2 27 02/20/2022    TSH 7.600 (H) 02/20/2022    INR 1.51 (H) 02/20/2022    LABA1C 9.1 (H) 02/18/2022          PROCEDURE: MRI BRAIN WO CONTRAST       CLINICAL INFORMATION tia.  dysarthria, weakness, sensory change since 8-9PM on 2/16/2022. Weakness was in all 4 extremities, worse on the right and sensory change was in the right upper and lower extremities.       COMPARISON: Head CT 2/17/2022.       TECHNIQUE: Multiplanar and multiple spin echo MRI images were obtained of the brain without contrast.       FINDINGS:               The diffusion-weighted images are normal.  The brain volume is at the lower limits of normal. There is a mild amount of signal hyperintensity on the FLAIR and T2-weighted sequences in the white matter of the brain. This is consistent with mild severity    chronic small vessel ischemic changes.       There are no intra-or extra-axial collections.  There is no hydrocephalus, midline shift or mass effect.       There is no susceptibility artifact in the brain.  The major intracranial vascular flow voids are present.       The midline craniocervical junction structures are normal.  The pituitary gland and brainstem are normal.                     Impression       1. No evidence of an acute infarct. 2. Very mild severity chronic small vessel ischemic changes.                   **This report has been created using voice recognition software. It may contain minor errors which are inherent in voice recognition technology. **       Final report electronically signed by Dr. Christian Paiz on 2/19/2022 11:32 AM            CTA HEAD WITH CONTRAST       COMPARISON: Noncontrast CT brain 2/17/2022.       FINDINGS: There is no significant stenosis, occlusion, dissection,    aneurysm, or vascular malformation. There is no active bleeding.       The right A1 segment is hypoplastic or absent which is an anatomic    variant.       Terminal internal carotid arteries, middle cerebral arteries, anterior    cerebral arteries, basilar artery, and posterior cerebral arteries are    otherwise unremarkable.           Impression   1.  Unremarkable CTA head.       This document has been electronically signed by: Marlys Gutierrez M.D. on    02/17/2022 11:16 PM       All CTs at this facility use dose modulation techniques and iterative    reconstructions, and/or weight-based dosing   when appropriate to reduce radiation to a low as reasonably achievable.

## 2022-05-16 NOTE — PROGRESS NOTES
Chronic Pain/PM&R Clinic Note     Encounter Date: 5/16/22    Subjective:   Chief Complaint:   Chief Complaint   Patient presents with    Follow-up    Medication Refill     Pregabalin       History of Present Illness:   Amaury Nation is a 49 y.o. female seen in the clinic initially on 08/09/21 for her history of left sided chest wall pain. She has a medical history of left breast mastectomy in February 2021 secondary to breast cancer with subsequent wound dehiscence and surgical debridement and spinal cord injury secondary to transverse myelitis. She has been dealing with left-sided chest wall pain at the site of her incision ever since her mastectomy and this is been exacerbated after most recent revision surgery in June 2021. She describes his pain is a constant stabbing, electrical type pain right on her incision scar. She rates this pain is a constant 6/10 pain that can get up to a 10/10. She denies any drainage or areas of erythema around the scar. She states that this area is so sensitive that is difficult for her to wear even a supportive bra. She reports not getting much relief with her other medications including Lyrica 150 mg twice a day, Norco 5 mg twice a day, and Elavil 50 mg at night. She states that this is interfering with her everyday activities and really interfering with sleep. Today, 5/16/2022, patient presents for planned follow-up for left-sided chest wall pain. Overall, she reports doing relatively well since her last visit. She continues take her Percocet with really good coverage of her pain. She denies any side effects on the medication. She states that she continues to remain active and has a lot of grandchildren birthday parties to attend this coming spring and summer. She denies any other new symptoms at this point.     History of Interventions:   Surgery: Left breast mastectomy and surgical debridement  Injections: Left T3 PVP (8/31/21)-no relief    Current Treatment [facility]    [place] OFFICE/OUTPT VISIT,PROCEDURE ONLY N/A 11/21/2018    INTERSTIM DEVICE PLACEMENT MODEL 3058, ONLY HEAD ELIGIBLE FOR MRI WITH TRANSMIT/RECEIVE HEAD COIL    TUBAL LIGATION      10 years ago    US GUIDED NEEDLE LOC OF LEFT BREAST Left 02/19/2021    US GUIDED NEEDLE LOC OF LEFT BREAST 2/19/2021 [facility]    US LYMPH NODE BIOPSY  01/14/2021    US LYMPH NODE BIOPSY 1/14/2021 Jason Echevarria MD [facility]       Family History   Problem Relation Age of Onset   [facility] Stroke Mother     Asthma Mother     Other Mother     No Known Problems Sister     Asthma Brother     No Known Problems Brother        Social History     Socioeconomic History    Marital status:      Spouse name: 4    Number of children: Not on file    Years of education: Not on file    Highest education level: Not on file   Occupational History    Not on file   Tobacco Use    Smoking status: Current Every Day Smoker     Packs/day: 0.50     Types: Cigarettes     Start date: 12/6/1979    Smokeless tobacco: Never Used   Vaping Use    Vaping Use: Never used   Substance and Sexual Activity    Alcohol use: Yes     Alcohol/week: 0.0 standard drinks     Comment: social drinker    Drug use: No    Sexual activity: Never     Partners: Male   Other Topics Concern    Not on file   Social History Narrative    ** Merged History Encounter **          Social Determinants of Health     Financial Resource Strain:     Difficulty of Paying Living Expenses: Not on file   Food Insecurity:     Worried About Running Out of Food in the Last Year: Not on file    Amber of Food in the Last Year: Not on file   Transportation Needs:     Lack of Transportation (Medical): Not on file    Lack of Transportation (Non-Medical):  Not on file   Physical Activity:     Days of Exercise per Week: Not on file    Minutes of Exercise per Session: Not on file   Stress:     Feeling of Stress : Not on file   Social Connections:     Frequency of Communication with Friends and Family: Not on file    Frequency of Social Gatherings with Friends and Family: Not on file    Attends Latter day Services: Not on file    Active Member of Clubs or Organizations: Not on file    Attends Club or Organization Meetings: Not on file    Marital Status: Not on file   Intimate Partner Violence:     Fear of Current or Ex-Partner: Not on file    Emotionally Abused: Not on file    Physically Abused: Not on file    Sexually Abused: Not on file   Housing Stability:     Unable to Pay for Housing in the Last Year: Not on file    Number of Places Lived in the Last Year: Not on file    Unstable Housing in the Last Year: Not on file       Medications & Allergies:   Current Outpatient Medications   Medication Instructions    amitriptyline (ELAVIL) 75 mg, Oral, NIGHTLY    anastrozole (ARIMIDEX) 1 mg, Oral, EVERY OTHER DAY    artificial tears (ARTIFICIAL TEARS) OINT PRN    atorvastatin (LIPITOR) 40 mg, Oral, NIGHTLY    baclofen (LIORESAL) 20 mg, Oral, 3 TIMES DAILY    BD PEN NEEDLE JACOBO 2ND GEN 32G X 4 MM MISC use as directed WITH INSULIN    Calcium Carbonate (CALCIUM 600 PO) 1 tablet, Oral, 2 TIMES DAILY    clopidogrel (PLAVIX) 75 mg, Oral, DAILY    desvenlafaxine succinate (PRISTIQ) 50 mg, Oral, DAILY    Handicap Placard MISC Does not apply, 2/08/22- 2/8/2027    hydrOXYzine (VISTARIL) 50 mg, Oral, 3 TIMES DAILY PRN    Incontinence Supply Disposable (UNDERPADS) MISC Cloth chux padsDiagnosis: Paraplegia 344.1    insulin glargine (LANTUS) 70 Units, SubCUTAneous, NIGHTLY    insulin lispro (HUMALOG) 10 Units, SubCUTAneous, 3 TIMES DAILY WITH MEALS    Insulin Syringes, Disposable, U-100 1 ML MISC 1 each, Does not apply, DAILY    Lactobacillus Acid-Pectin (ACIDOPHILUS/CITRUS PECTIN) TABS take 1 tablet by mouth once daily    Lifitegrast (XIIDRA) 5 % SOLN 1 drop, Both Eyes, DAILY    loratadine (CLARITIN) 10 MG tablet 1 tablet, Oral, DAILY PRN    magnesium oxide (MAG-OX) 400 mg, Oral, DAILY    metFORMIN (GLUCOPHAGE) 1,000 mg, Oral, 2 TIMES DAILY    Misc. Devices (TRANSFER BENCH) MISC 1 each, Does not apply, DAILY, Tub transfer bench with back    Misc.  Devices [facility]) [address] [facility] Wheelchair repairs- new seat cover, right side armrest replacementCurrent body weight:  68 kgDiagnosis: gait disturbance, chronic incomplete spastic paraplegiaLength of need: Lifetime    montelukast (SINGULAIR) 10 mg, Oral, NIGHTLY    MUCINEX MAXIMUM STRENGTH 1200 MG TB12 1 tablet, Oral, 2 TIMES DAILY    Multiple Vitamin (TAB-A-ISAAC PO) 1 tablet, Oral, DAILY    naloxone 4 MG/0.1ML LIQD nasal spray 1 spray, Nasal, PRN    omeprazole (PRILOSEC) 20 mg, Oral, DAILY    [START ON 2022] oxyCODONE-acetaminophen (PERCOCET) 5-325 MG per tablet 1 tablet, Oral, 2 TIMES DAILY PRN    paliperidone (INVEGA) 3 mg, Oral, NIGHTLY    potassium chloride (KLOR-CON M) 20 MEQ extended release tablet 20 mEq, Oral, DAILY    pregabalin (LYRICA) 150 mg, Oral, 2 TIMES DAILY    PROAIR  (90 BASE) MCG/ACT inhaler 2 puffs, Inhalation, EVERY 6 HOURS PRN    SITagliptin (JANUVIA) 100 mg, Oral, DAILY    tiotropium (SPIRIVA) 18 mcg, Inhalation, DAILY, 2 puffs        Allergies   Allergen Reactions    Penicillins Shortness Of Breath     \"I almost \"  Other reaction(s): PENICILLINS    Seasonal Itching       Review of Systems:   Constitutional: negative for weight changes or fevers  Genitourinary: negative for bowel/bladder incontinence   Musculoskeletal: positive for left chest wall pain  Neurological:  Positive for numbness/tingling over left chest incision scar  Behavioral/Psych: negative for anxiety/depression   All other systems reviewed and are negative    Objective:     Vitals:    22 0857   BP: 100/60     Constitutional: Pleasant, no acute distress   Head: Normocephalic, atraumatic   Eyes: Conjunctivae normal   Neck: Supple, symmetrical   Respiration: Non-labored breathing Cardiovascular: Limbs warm and well perfused   Musculoskeletal: Full cervical ROM in all planes. Negative Spurling maneuver bilaterally. Neuro: Alert, oriented. CN II-XII appear grossly intact. No focal motor deficits appreciated in upper limbs. Hyperesthesia and allodynia appreciated over surgical incision scar left chest wall  Skin: Healed surgical incision scar of left chest wall (near nipple line)  Psychological: Cooperative, no exaggerated pain behaviors       Assessment:    Diagnosis Orders   1. Paraplegia ([address]     2. Chronic pain syndrome  oxyCODONE-acetaminophen (PERCOCET) 5-325 MG per tablet   3. Chronic incomplete spastic paraplegia (HCC)     4. Other chronic pain           Guicho Freire is a 49 y. o.female presenting to the pain clinic for evaluation of postmastectomy pain. Her clinical history of his examination is consistent with severe neuropathic pain secondary to her mastectomy surgery. I have set her up for a left T3 paravertebral block under fluoroscopy. We will continue her locations at this point and follow-up after her injection to determine next steps of therapy. She failed to respond to her left T3 paravertebral block. She has noticed a significant improvement switching from Norco to [employer]. I have refilled her Percocet. We will follow-up in 12 weeks for reevaluation. Plan: The following treatment recommendations and plan were discussed in detail with Guicho Freire. Imaging:   None    Analgesics:   Continued chronic pain, we will continue her Percocet at 5 mg that patient can take up to twice a day as needed for severe pain (pain greater than 7/10). Patient is advised take this medication as prescribed as taking more than prescribed and the development of tolerance, physical dependence, addiction. OARRS reviewed and is appropriate. Pain Contract signed. UDS reviewed and is appropriate. Adjuvants:    In light of the presence of a neuropathic component of pain, patient is advised to continue Lyrica 150 mg twice a day. Interventions:   None    Anticoagulation/NPO Recommendations:   None    Multidisciplinary Pain Management:   In the presence of complex, chronic, and multi-factorial pain, the importance of a multidisciplinary approach to pain management in the patients management regimen was emphasized and discussed in great detail.      Referrals:  None    Prescriptions Written This Visit:   Percocet 5 mg (#60, 0 refills)    Follow-up: 12 weeks      Mariela Kumar DO  Interventional Pain Management/PM&R   [place]

## 2022-05-18 ASSESSMENT — ENCOUNTER SYMPTOMS: COLOR CHANGE: 0

## 2022-05-19 ENCOUNTER — HOSPITAL ENCOUNTER (OUTPATIENT)
Dept: INFUSION THERAPY | Age: 57
Discharge: HOME | End: 2022-05-19
Payer: COMMERCIAL

## 2022-05-19 ENCOUNTER — OFFICE VISIT (OUTPATIENT)
Dept: ONCOLOGY | Age: 57
End: 2022-05-19
Payer: COMMERCIAL

## 2022-05-19 VITALS
TEMPERATURE: 98.4 F | SYSTOLIC BLOOD PRESSURE: 128 MMHG | DIASTOLIC BLOOD PRESSURE: 60 MMHG | RESPIRATION RATE: 16 BRPM | OXYGEN SATURATION: 96 % | HEART RATE: 109 BPM

## 2022-05-19 VITALS
TEMPERATURE: 98.4 F | HEIGHT: 60 IN | RESPIRATION RATE: 16 BRPM | OXYGEN SATURATION: 96 % | SYSTOLIC BLOOD PRESSURE: 128 MMHG | HEART RATE: 109 BPM | DIASTOLIC BLOOD PRESSURE: 60 MMHG | BODY MASS INDEX: 30.86 KG/M2

## 2022-05-19 DIAGNOSIS — C50.912 INVASIVE DUCTAL CARCINOMA OF LEFT BREAST (HCC): Primary | ICD-10-CM

## 2022-05-19 DIAGNOSIS — Z79.811 PROPHYLACTIC USE OF ANASTROZOLE (ARIMIDEX): ICD-10-CM

## 2022-05-19 PROCEDURE — G9899 SCRN MAM PERF RSLTS DOC: HCPCS | Performed by: NURSE PRACTITIONER

## 2022-05-19 PROCEDURE — 3017F COLORECTAL CA SCREEN DOC REV: CPT | Performed by: NURSE PRACTITIONER

## 2022-05-19 PROCEDURE — G8427 DOCREV CUR MEDS BY ELIG CLIN: HCPCS | Performed by: NURSE PRACTITIONER

## 2022-05-19 PROCEDURE — 99213 OFFICE O/P EST LOW 20 MIN: CPT | Performed by: NURSE PRACTITIONER

## 2022-05-19 PROCEDURE — G8417 CALC BMI ABV UP PARAM F/U: HCPCS | Performed by: NURSE PRACTITIONER

## 2022-05-19 PROCEDURE — 4004F PT TOBACCO SCREEN RCVD TLK: CPT | Performed by: NURSE PRACTITIONER

## 2022-05-19 PROCEDURE — 99211 OFF/OP EST MAY X REQ PHY/QHP: CPT

## 2022-05-19 ASSESSMENT — ENCOUNTER SYMPTOMS: BACK PAIN: 1

## 2022-05-23 ENCOUNTER — TELEPHONE (OUTPATIENT)
Dept: ADMINISTRATIVE | Age: 57
End: 2022-05-23

## 2022-05-23 DIAGNOSIS — G82.20 PARAPLEGIA (HCC): Primary | ICD-10-CM

## 2022-05-23 NOTE — TELEPHONE ENCOUNTER
DOLV=03-10-22. DONV=06-16-22. Kimberly Kruse is calling to get a rx to get her wheelchair fixed, the arm is broke. Please fax to [facility] medical supply.

## 2022-05-26 DIAGNOSIS — G89.29 OTHER CHRONIC PAIN: ICD-10-CM

## 2022-05-26 RX ORDER — PREGABALIN 150 MG/1
150 CAPSULE ORAL 2 TIMES DAILY
Qty: 60 CAPSULE | Refills: 2 | Status: SHIPPED | OUTPATIENT
Start: 2022-05-26 | End: 2022-10-03

## 2022-05-26 NOTE — TELEPHONE ENCOUNTER
Corazon Domingo called requesting a refill on the following medications:  Requested Prescriptions     Pending Prescriptions Disp Refills    pregabalin (LYRICA) 150 MG capsule 60 capsule 2     Sig: Take 1 capsule by mouth 2 times daily for 30 days.      Pharmacy verified: [facility] on [address]  [place]      Date of last visit: 5/16/2022  Date of next visit (if applicable): 6/83/0822

## 2022-05-27 NOTE — TELEPHONE ENCOUNTER
Faxed w/c script to [facility] home medical and received confirmation of them receiving fax. Attempted but unable to reach pt. And voice mail full to let know of script done.

## 2022-05-31 DIAGNOSIS — G89.4 CHRONIC PAIN SYNDROME: ICD-10-CM

## 2022-05-31 RX ORDER — OXYCODONE HYDROCHLORIDE AND ACETAMINOPHEN 5; 325 MG/1; MG/1
1 TABLET ORAL 2 TIMES DAILY PRN
Qty: 60 TABLET | Refills: 0 | OUTPATIENT
Start: 2022-06-01 | End: 2022-07-01

## 2022-05-31 NOTE — TELEPHONE ENCOUNTER
Amaury Flight called requesting a refill on the following medications:  Requested Prescriptions     Pending Prescriptions Disp Refills    oxyCODONE-acetaminophen (PERCOCET) 5-325 MG per tablet 60 tablet 0     Sig: Take 1 tablet by mouth 2 times daily as needed for Pain for up to 30 days.      Pharmacy verified:  .pv  [facility] [address]    Date of last visit: 05/16/2022  Date of next visit (if applicable): 5/12/7461

## 2022-06-03 DIAGNOSIS — G35 MULTIPLE SCLEROSIS (HCC): ICD-10-CM

## 2022-06-03 DIAGNOSIS — G89.4 CHRONIC PAIN SYNDROME: ICD-10-CM

## 2022-06-03 DIAGNOSIS — G82.22 CHRONIC INCOMPLETE SPASTIC PARAPLEGIA (HCC): ICD-10-CM

## 2022-06-03 RX ORDER — HYDROCODONE BITARTRATE AND ACETAMINOPHEN 5; 325 MG/1; MG/1
1 TABLET ORAL 2 TIMES DAILY PRN
Qty: 60 TABLET | Refills: 0 | Status: SHIPPED | OUTPATIENT
Start: 2022-06-03 | End: 2022-07-03

## 2022-06-03 NOTE — TELEPHONE ENCOUNTER
Kei Sparks called requesting a refill on the following medications:    PT STATES [facility] FILLS THIS NOT [facility] REFUSED FIRST REQUEST  Requested Prescriptions     Pending Prescriptions Disp Refills    HYDROcodone-acetaminophen (NORCO) 5-325 MG per tablet 60 tablet 0     Sig: Take 1 tablet by mouth 2 times daily as needed for Pain for up to 30 days.      Pharmacy verified:  [facility] [address]       Date of last visit: 05-16-22  Date of next visit (if applicable): 72-51-35

## 2022-06-03 NOTE — TELEPHONE ENCOUNTER
OARRS reviewed. UDS: n/a    Last seen: 5/16/2022.  Follow-up:   Future Appointments   Date Time Provider [facility]   6/8/2022  3:00 PM Terrence Mckoy [address]   6/16/2022  3:40 PM MD JONNY Ngo SRPX Pain [facility] - Karen Yissel   6/22/2022 10:30 AM CHAS Blackmon - CNP Laura Haro Uro [facility] - Karen Blunt   8/8/2022  9:20 AM DO JONNY Singletary SRPX Pain [facility] - Karen Yissel   8/16/2022  2:00 PM DO JONNY Dixon Oncology [facility] - Karen Blunt   12/1/2022  1:00 PM Cooper West MD [id number] [address]

## 2022-06-06 ENCOUNTER — TELEPHONE (OUTPATIENT)
Dept: PHYSICAL MEDICINE AND REHAB | Age: 57
End: 2022-06-06

## 2022-06-06 RX ORDER — GREEN TEA/HOODIA GORDONII 315-12.5MG
1 CAPSULE ORAL DAILY
Qty: 30 TABLET | Refills: 11 | OUTPATIENT
Start: 2022-06-06

## 2022-06-06 RX ORDER — OXYCODONE HYDROCHLORIDE AND ACETAMINOPHEN 5; 325 MG/1; MG/1
1 TABLET ORAL 2 TIMES DAILY PRN
Qty: 60 TABLET | Refills: 0 | Status: SHIPPED | OUTPATIENT
Start: 2022-06-06 | End: 2022-07-05 | Stop reason: SDUPTHER

## 2022-06-06 NOTE — TELEPHONE ENCOUNTER
I will send in her a refill for the Percocet. Patient should not take her Norco and will need to bring in her pill bottle at her f/u visit so we can dispose of the medication.       Monique Hills, DO  Interventional Pain Management/PM&R   [place]

## 2022-06-06 NOTE — TELEPHONE ENCOUNTER
As per note below by pt. We sent in refill for Norco as she requested the refill on 6/3. Apparently she was suppose to get Percocet instead. Do you want her to bring in script for Norco before giving script for Percocet or how do you want handled.

## 2022-06-06 NOTE — TELEPHONE ENCOUNTER
Patient is calling in regarding her pain medication. She said the pharmacy gave her hydrocodone, which she took home but has not taken any. But she said she is supposed to be on oxycodone. Please call her to advise/clarify.

## 2022-06-06 NOTE — TELEPHONE ENCOUNTER
Gera Potter called requesting a refill on the following medications:  Requested Prescriptions     Pending Prescriptions Disp Refills    Probiotic Acidophilus (FLORANEX) TABS 30 tablet 11     Sig: Take 1 tablet by mouth daily     Pharmacy verified: [facility] on [facility]  .pv      Date of last visit: 3/04/2022  Date of next visit (if applicable): 7/6/2786

## 2022-06-07 NOTE — TELEPHONE ENCOUNTER
Notif. Pt. To bring in Sandra Gravel for disposal at next f/u and states she already threw them away. Reports now has her script for Percocet.

## 2022-06-08 ENCOUNTER — TELEMEDICINE (OUTPATIENT)
Dept: UROLOGY | Age: 57
End: 2022-06-08

## 2022-06-08 DIAGNOSIS — R32 URINARY INCONTINENCE, UNSPECIFIED TYPE: ICD-10-CM

## 2022-06-08 DIAGNOSIS — R35.0 URINARY FREQUENCY: ICD-10-CM

## 2022-06-08 DIAGNOSIS — N32.81 OAB (OVERACTIVE BLADDER): ICD-10-CM

## 2022-06-08 DIAGNOSIS — N31.9 NEUROGENIC BLADDER: ICD-10-CM

## 2022-06-08 DIAGNOSIS — R35.0 FREQUENCY OF URINATION: Primary | ICD-10-CM

## 2022-06-08 PROCEDURE — 99999 PR OFFICE/OUTPT VISIT,PROCEDURE ONLY: CPT | Performed by: UROLOGY

## 2022-06-09 ENCOUNTER — TELEPHONE (OUTPATIENT)
Dept: UROLOGY | Age: 57
End: 2022-06-09

## 2022-06-09 DIAGNOSIS — G89.29 OTHER CHRONIC PAIN: ICD-10-CM

## 2022-06-09 RX ORDER — AMITRIPTYLINE HYDROCHLORIDE 25 MG/1
TABLET, FILM COATED ORAL
Qty: 30 TABLET | Refills: 5 | OUTPATIENT
Start: 2022-06-09

## 2022-06-09 NOTE — TELEPHONE ENCOUNTER
Patient did not answer video visit call from Dr. Annelise Angel.  Per Dr. Annelise Angel NS  Please reschedule in July for OV    lvm to call the office to set up the appointment

## 2022-06-16 ENCOUNTER — TELEPHONE (OUTPATIENT)
Dept: PHYSICAL MEDICINE AND REHAB | Age: 57
End: 2022-06-16

## 2022-06-16 NOTE — TELEPHONE ENCOUNTER
Patient was scheduled today 6/16/2022 with Dr Clark Quintanilla for her botox, but she had to reschedule. Next available appt scheduled for 9/1/2022 but PA for her botox expires 8/25/2022. Please call her if able to be seen sooner or please submit new PA.

## 2022-06-22 ENCOUNTER — TELEPHONE (OUTPATIENT)
Dept: UROLOGY | Age: 57
End: 2022-06-22

## 2022-06-22 ENCOUNTER — NURSE ONLY (OUTPATIENT)
Dept: UROLOGY | Age: 57
End: 2022-06-22

## 2022-06-22 DIAGNOSIS — R32 URINARY INCONTINENCE, UNSPECIFIED TYPE: Primary | ICD-10-CM

## 2022-06-22 PROCEDURE — 99999 PR OFFICE/OUTPT VISIT,PROCEDURE ONLY: CPT | Performed by: NURSE PRACTITIONER

## 2022-06-22 NOTE — PROGRESS NOTES
Per [employer] r[employer] rep, pt having trouble charging device, but left all equipment at home.  Pt to return at later date or to call pt services number for charging education while at home

## 2022-06-22 NOTE — TELEPHONE ENCOUNTER
Patient had appt with [facility]ocrates but did not bring her equipment with her. She is suppose to go home and charge up then call rep back.  If they can not adjust it make appt with socrates for july

## 2022-06-22 NOTE — PROGRESS NOTES
I have personally verified, reviewed, released, signed, authenticated, authorized, confirmed,finalized, and approved the actions of the LPN

## 2022-07-01 DIAGNOSIS — G89.4 CHRONIC PAIN SYNDROME: ICD-10-CM

## 2022-07-05 RX ORDER — OXYCODONE HYDROCHLORIDE AND ACETAMINOPHEN 5; 325 MG/1; MG/1
1 TABLET ORAL 2 TIMES DAILY PRN
Qty: 60 TABLET | Refills: 0 | Status: ON HOLD | OUTPATIENT
Start: 2022-07-06 | End: 2022-08-02 | Stop reason: SDUPTHER

## 2022-07-05 NOTE — TELEPHONE ENCOUNTER
OARRS reviewed. UDS:no recent screen. Last seen: 5/16/2022.  Follow-up:   Future Appointments   Date Time Provider Rashard Blank   7/7/2022 10:15 AM Simon Santizo MD Ranell Degree [facility]ro [facility]   8/8/2022  9:20 AM Mayito Nava DO N SRPX Pain [facility]   8/10/2022 12:15 PM Preeti Ortiz APRN - CNP N [place] [facility]   8/16/2022  2:00 PM Earl Whittington MD N Oncology [facility]   9/1/2022  3:00 PM Mariano Jacobs MD N SRPX Pain [facility]   12/1/2022  1:00 PM Jr Mcclellan MD [id number] [address]

## 2022-07-09 DIAGNOSIS — G89.29 OTHER CHRONIC PAIN: ICD-10-CM

## 2022-07-12 RX ORDER — AMITRIPTYLINE HYDROCHLORIDE 25 MG/1
TABLET, FILM COATED ORAL
Qty: 30 TABLET | Refills: 5 | OUTPATIENT
Start: 2022-07-12

## 2022-07-22 ENCOUNTER — TELEPHONE (OUTPATIENT)
Dept: PHYSICAL MEDICINE AND REHAB | Age: 57
End: 2022-07-22

## 2022-07-22 NOTE — TELEPHONE ENCOUNTER
Patient says she needs a new prescription for the repair of her wheelchair or for a brand new wheelchair.  Please contact patient concerning this

## 2022-07-25 NOTE — TELEPHONE ENCOUNTER
Discussed with pt. Going to  a home medical supply company she used previous and verifying how old current w/c is and if new would be covered. Also voiced wanting an electric w/c. Encouraged to discusss with home medical options/coverage and to have them fax us orders so  Could sign. RADHA beard. Verbalized understanding.

## 2022-07-26 ENCOUNTER — APPOINTMENT (OUTPATIENT)
Dept: GENERAL RADIOLOGY | Age: 57
DRG: 380 | End: 2022-07-26
Payer: COMMERCIAL

## 2022-07-26 ENCOUNTER — HOSPITAL ENCOUNTER (EMERGENCY)
Age: 57
Discharge: HOME | DRG: 380 | End: 2022-07-26
Attending: STUDENT IN AN ORGANIZED HEALTH CARE EDUCATION/TRAINING PROGRAM
Payer: COMMERCIAL

## 2022-07-26 VITALS
HEIGHT: 60 IN | WEIGHT: 150 LBS | HEART RATE: 99 BPM | OXYGEN SATURATION: 98 % | RESPIRATION RATE: 18 BRPM | TEMPERATURE: 97.8 F | BODY MASS INDEX: 29.45 KG/M2 | DIASTOLIC BLOOD PRESSURE: 56 MMHG | SYSTOLIC BLOOD PRESSURE: 116 MMHG

## 2022-07-26 DIAGNOSIS — L03.115 CELLULITIS OF RIGHT LOWER EXTREMITY: Primary | ICD-10-CM

## 2022-07-26 DIAGNOSIS — L08.9 RIGHT FOOT INFECTION: ICD-10-CM

## 2022-07-26 LAB
ANION GAP SERPL CALCULATED.3IONS-SCNC: 9 MEQ/L (ref 8–16)
BASOPHILS # BLD: 0.7 %
BASOPHILS ABSOLUTE: 0 THOU/MM3 (ref 0–0.1)
BUN BLDV-MCNC: 7 MG/DL (ref 7–22)
C-REACTIVE PROTEIN: 1.86 MG/DL (ref 0–1)
CALCIUM SERPL-MCNC: 8.4 MG/DL (ref 8.5–10.5)
CHLORIDE BLD-SCNC: 105 MEQ/L (ref 98–111)
CO2: 27 MEQ/L (ref 23–33)
CREAT SERPL-MCNC: 0.5 MG/DL (ref 0.4–1.2)
EOSINOPHIL # BLD: 1.8 %
EOSINOPHILS ABSOLUTE: 0.1 THOU/MM3 (ref 0–0.4)
ERYTHROCYTE [DISTWIDTH] IN BLOOD BY AUTOMATED COUNT: 21 % (ref 11.5–14.5)
ERYTHROCYTE [DISTWIDTH] IN BLOOD BY AUTOMATED COUNT: 61.2 FL (ref 35–45)
GFR SERPL CREATININE-BSD FRML MDRD: > 90 ML/MIN/1.73M2
GLUCOSE BLD-MCNC: 100 MG/DL (ref 70–108)
GLUCOSE BLD-MCNC: 40 MG/DL (ref 70–108)
GLUCOSE BLD-MCNC: 48 MG/DL
GLUCOSE BLD-MCNC: 48 MG/DL (ref 70–108)
HCT VFR BLD CALC: 29 % (ref 37–47)
HEMOGLOBIN: 10.2 GM/DL (ref 12–16)
IMMATURE GRANS (ABS): 0.01 THOU/MM3 (ref 0–0.07)
IMMATURE GRANULOCYTES: 0.2 %
LACTIC ACID: 1.7 MMOL/L (ref 0.5–2)
LYMPHOCYTES # BLD: 19.7 %
LYMPHOCYTES ABSOLUTE: 0.9 THOU/MM3 (ref 1–4.8)
MAGNESIUM: 1.8 MG/DL (ref 1.6–2.4)
MCH RBC QN AUTO: 28.5 PG (ref 26–33)
MCHC RBC AUTO-ENTMCNC: 35.2 GM/DL (ref 32.2–35.5)
MCV RBC AUTO: 81 FL (ref 81–99)
MONOCYTES # BLD: 10.5 %
MONOCYTES ABSOLUTE: 0.5 THOU/MM3 (ref 0.4–1.3)
NUCLEATED RED BLOOD CELLS: 0 /100 WBC
OSMOLALITY CALCULATION: 276 MOSMOL/KG (ref 275–300)
PLATELET # BLD: 55 THOU/MM3 (ref 130–400)
PMV BLD AUTO: ABNORMAL FL (ref 9.4–12.4)
POTASSIUM REFLEX MAGNESIUM: 3.2 MEQ/L (ref 3.5–5.2)
RBC # BLD: 3.58 MILL/MM3 (ref 4.2–5.4)
SCAN OF BLOOD SMEAR: NORMAL
SEDIMENTATION RATE, ERYTHROCYTE: 44 MM/HR (ref 0–20)
SEG NEUTROPHILS: 67.1 %
SEGMENTED NEUTROPHILS ABSOLUTE COUNT: 3.1 THOU/MM3 (ref 1.8–7.7)
SODIUM BLD-SCNC: 141 MEQ/L (ref 135–145)
WBC # BLD: 4.6 THOU/MM3 (ref 4.8–10.8)

## 2022-07-26 PROCEDURE — 87070 CULTURE OTHR SPECIMN AEROBIC: CPT

## 2022-07-26 PROCEDURE — 86140 C-REACTIVE PROTEIN: CPT

## 2022-07-26 PROCEDURE — 85651 RBC SED RATE NONAUTOMATED: CPT

## 2022-07-26 PROCEDURE — 83605 ASSAY OF LACTIC ACID: CPT

## 2022-07-26 PROCEDURE — 87040 BLOOD CULTURE FOR BACTERIA: CPT

## 2022-07-26 PROCEDURE — 96366 THER/PROPH/DIAG IV INF ADDON: CPT

## 2022-07-26 PROCEDURE — 99284 EMERGENCY DEPT VISIT MOD MDM: CPT

## 2022-07-26 PROCEDURE — 85025 COMPLETE CBC W/AUTO DIFF WBC: CPT

## 2022-07-26 PROCEDURE — 83735 ASSAY OF MAGNESIUM: CPT

## 2022-07-26 PROCEDURE — 73630 X-RAY EXAM OF FOOT: CPT

## 2022-07-26 PROCEDURE — 80048 BASIC METABOLIC PNL TOTAL CA: CPT

## 2022-07-26 PROCEDURE — 87147 CULTURE TYPE IMMUNOLOGIC: CPT

## 2022-07-26 PROCEDURE — 96365 THER/PROPH/DIAG IV INF INIT: CPT

## 2022-07-26 PROCEDURE — 36415 COLL VENOUS BLD VENIPUNCTURE: CPT

## 2022-07-26 PROCEDURE — 6360000002 HC RX W HCPCS: Performed by: STUDENT IN AN ORGANIZED HEALTH CARE EDUCATION/TRAINING PROGRAM

## 2022-07-26 PROCEDURE — 87075 CULTR BACTERIA EXCEPT BLOOD: CPT

## 2022-07-26 PROCEDURE — 87205 SMEAR GRAM STAIN: CPT

## 2022-07-26 PROCEDURE — 82948 REAGENT STRIP/BLOOD GLUCOSE: CPT

## 2022-07-26 PROCEDURE — 6370000000 HC RX 637 (ALT 250 FOR IP): Performed by: STUDENT IN AN ORGANIZED HEALTH CARE EDUCATION/TRAINING PROGRAM

## 2022-07-26 PROCEDURE — 2580000003 HC RX 258: Performed by: STUDENT IN AN ORGANIZED HEALTH CARE EDUCATION/TRAINING PROGRAM

## 2022-07-26 PROCEDURE — 96367 TX/PROPH/DG ADDL SEQ IV INF: CPT

## 2022-07-26 RX ORDER — DEXTROSE MONOHYDRATE 25 G/50ML
25 INJECTION, SOLUTION INTRAVENOUS ONCE
Status: DISCONTINUED | OUTPATIENT
Start: 2022-07-26 | End: 2022-07-26 | Stop reason: RX

## 2022-07-26 RX ORDER — CEPHALEXIN 500 MG/1
500 CAPSULE ORAL 4 TIMES DAILY
Qty: 28 CAPSULE | Refills: 0 | Status: ON HOLD | OUTPATIENT
Start: 2022-07-26 | End: 2022-08-02 | Stop reason: HOSPADM

## 2022-07-26 RX ORDER — DOXYCYCLINE HYCLATE 100 MG
100 TABLET ORAL 2 TIMES DAILY
Qty: 14 TABLET | Refills: 0 | Status: ON HOLD | OUTPATIENT
Start: 2022-07-26 | End: 2022-08-02 | Stop reason: HOSPADM

## 2022-07-26 RX ORDER — DOXYCYCLINE HYCLATE 100 MG
100 TABLET ORAL ONCE
Status: COMPLETED | OUTPATIENT
Start: 2022-07-26 | End: 2022-07-26

## 2022-07-26 RX ADMIN — VANCOMYCIN HYDROCHLORIDE 1250 MG: 5 INJECTION, POWDER, LYOPHILIZED, FOR SOLUTION INTRAVENOUS at 15:32

## 2022-07-26 RX ADMIN — CEFTRIAXONE 2000 MG: 2 INJECTION, POWDER, FOR SOLUTION INTRAMUSCULAR; INTRAVENOUS at 14:52

## 2022-07-26 RX ADMIN — DEXTROSE MONOHYDRATE 125 ML: 100 INJECTION, SOLUTION INTRAVENOUS at 14:40

## 2022-07-26 RX ADMIN — DOXYCYCLINE HYCLATE 100 MG: 100 TABLET, COATED ORAL at 16:29

## 2022-07-26 NOTE — ED NOTES
Pt and vs reassessed. RR easy and unlabored. POCT glucose 48 at this time. Pt medicated per [facility] and Dr. Makayla Brandt at bedside. Meal tray ordered at this time and pt denies any other needs. No distress noted.  Pt stable at this time     Shayla Carrillo, [facility]  07/26/22 [id number]

## 2022-07-26 NOTE — ED NOTES
Pt and vs reassessed. RR easy and unlabored. Pt resting in bed alert. Podiatry at bedside dressing wound. Pt medicated per [facility] and denies any needs. No distress noted.  Pt stable at this time     Vik Wang, [facility]  07/26/22 [id number]

## 2022-07-26 NOTE — ED PROVIDER NOTES
[place] ENCOUNTER          Pt Name: Nuha Cabello  MRN: [id number]  [place] 1965  Date of evaluation: 7/26/2022  Physician: Sulaiman Ford, [facility]       Chief Complaint   Patient presents with    Leg Pain    Leg Swelling            History obtained from chart review and the patient. HISTORY OF PRESENT ILLNESS    HPI  Nuha Cabello is a 49 y.o. female who presents to the emergency department for evaluation of right foot wound. Patient reports that she was seen approximately 7 days ago at an urgent care and was prescribed an oral antibiotic. Patient was noted to have a large blister that popped and now has a black eschar that is approximately 5 cm x 3 cm. Patient reports increasing pain in her right foot. Patient also notes extension of redness up into her calf. Patient denies any fevers or chills. Patient denies any chest pain or shortness of breath. Patient reports compliance with her home medications. Patient reports that she has been taking over-the-counter medications for pain with some improvement, but the pain returns immediately afterwards and is progressively worse over the last 24 hours. Patient reports significant tenderness palpation. Patient reports that she elevates her foot it does improve somewhat. Patient reports compliance with the antibiotic that she was prescribed, however she cannot remember the name. The patient has no other acute complaints at this time. REVIEW OF SYSTEMS   Review of Systems   Constitutional:  Negative for activity change, appetite change, fatigue and fever. HENT:  Negative for congestion, sinus pressure, sinus pain, sore throat and trouble swallowing. Respiratory:  Negative for cough, chest tightness and shortness of breath. Cardiovascular:  Negative for chest pain, palpitations and leg swelling.    Gastrointestinal:  Negative for abdominal distention, abdominal pain, constipation, diarrhea, nausea and vomiting. Genitourinary:  Negative for dysuria, flank pain, frequency, hematuria and urgency. Musculoskeletal:  Negative for arthralgias, back pain, myalgias and neck pain. Skin:  Positive for wound. Negative for color change. Neurological:  Negative for dizziness, syncope, weakness and headaches. All other systems reviewed and are negative. PAST MEDICAL AND SURGICAL HISTORY     Past Medical History:   Diagnosis Date    Anxiety     Anxiety and depression     Asthma     Bipolar 1 disorder ([address]     Bipolar 1 disorder with moderate sourav ([address]     Blood circulation, collateral     Breast cancer ([address]     diagnosed in jan 2021    Cancer [facility])      ?  cervical \"long time ago\"    Cancer ([address] 01/15/2021    Left Breast    Depression     Endometriosis     GERD (gastroesophageal reflux disease)     Kidney stones     Lupus (HCC)     Movement disorder     MS (multiple sclerosis) (HCC)     Schizophrenia ([address]     Thyroid disease     Type 2 diabetes mellitus with hyperglycemia, with long-term current use of insulin ([address]     Type 2 diabetes mellitus without complication ([address]     Unspecified cerebral artery occlusion with cerebral infarction 2014     Past Surgical History:   Procedure Laterality Date    BREAST LUMPECTOMY Left 02/19/2021    LEFT BREAST MASTECTOMY, SENTINAL LYMPH NODE BIOPSY, PREOP NEEDLE LOC performed by Grey Woodard MD at [facility] Left 4/6/2021    DEBRIDEMENT LEFT BREAST MASTECTOMY WOUND performed by Grey Woodard MD at [facility] 4/27/2021    LEFT BREAST WOUND REVISION performed by Grey Woodard MD at [address] [place] [address]  01/2020    DILATION AND CURETTAGE OF UTERUS      BEN US GUID NDL BIOPSY LEFT Left 01/14/2021    BEN [address] LEFT 1/14/2021 Brooke Bernard  [facility] SURGERY N/A 01/26/2021    EXCHANGE OF INTERSTIM SYSTEM performed by Delilah Webb MD at [address] [employer] OR    OTHER SURGICAL HISTORY  03/23/2021    right breast i and d with closure Dr Janie Schroeder in the procedure room    PAIN MANAGEMENT PROCEDURE Left 8/31/2021    left T3 paravertebral block under fluoroscopy performed by Brent Gonzalez DO at [address] OFFICE/OUTPT [facility] N/A 11/21/2018    INTERSTIM DEVICE PLACEMENT MODEL 3058, ONLY HEAD ELIGIBLE FOR MRI WITH TRANSMIT/RECEIVE HEAD COIL    TUBAL LIGATION      10 years ago    [address] LOC OF LEFT BREAST Left 02/19/2021    US GUIDED NEEDLE LOC OF LEFT BREAST 2/19/2021 [facility] BIOPSY  01/14/2021    US LYMPH NODE BIOPSY [facility]20[facility]   No current facility-administered medications for this encounter. No current outpatient medications on file.     Facility-Administered Medications Ordered in Other Encounters:     potassium chloride (KLOR-CON M) extended release tablet 40 mEq, 40 mEq, Oral, PRN, 40 mEq at 07/30/22 1135 **OR** potassium bicarb-citric acid (EFFER-K) effervescent tablet 40 mEq, 40 mEq, Oral, PRN **OR** potassium chloride 10 mEq/100 mL IVPB (Peripheral Line), 10 mEq, IntraVENous, PRN, Pilar Bux, DO    magnesium sulfate 4000 mg in 100 mL IVPB premix, 4,000 mg, IntraVENous, Once, Pilar Bux DO, Last Rate: 25 mL/hr at 07/30/22 1136, 4,000 mg at 07/30/22 1136    0.9 % sodium chloride infusion, , IntraVENous, Continuous, Adnan Ejupovic, DPM, Last Rate: 75 mL/hr at 07/29/22 2344, New Bag at 07/29/22 2344    sodium chloride flush 0.9 % injection 5-40 mL, 5-40 mL, IntraVENous, 2 times per day, Adnan Ejupovic, DPM    sodium chloride flush 0.9 % injection 5-40 mL, 5-40 mL, IntraVENous, PRN, Adnan Ejupovic, DPM    0.9 % sodium chloride infusion, , IntraVENous, PRN, Adnan Ejupovic, DPM    [Held by provider] ondansetron (ZOFRAN-ODT) disintegrating tablet 4 mg, 4 mg, Oral, Q8H PRN **OR** [Held by provider] ondansetron (ZOFRAN) injection 4 mg, 4 mg, IntraVENous, Q6H PRN, Adnan Ejupovic, DPM    polyethylene glycol (GLYCOLAX) packet 17 g, 17 g, Oral, Daily PRN, Adnan Ejupovic, DPM    clindamycin (CLEOCIN) 600 mg in dextrose 5 % 50 mL IVPB, 600 mg, IntraVENous, Q8H, Adnan Ejupovic, DPM, Stopped at 07/30/22 0456    vancomycin (VANCOCIN) 1,000 mg in sodium chloride 0.9 % 250 mL IVPB (Lgcv2Efc), 1,000 mg, IntraVENous, Q12H, Adnan Ejupovic, DPM, Stopped at 07/30/22 0940    [START ON 7/31/2022] anastrozole (ARIMIDEX) tablet 1 mg, 1 mg, Oral, Every Other Day, Adnan Ejupovic, DPM    atorvastatin (LIPITOR) tablet 40 mg, 40 mg, Oral, Nightly, Adnan Ejupovic, DPM    baclofen (LIORESAL) tablet 20 mg, 20 mg, Oral, TID, Adnan Ejupovic, DPM, 20 mg at 07/30/22 0834    hydrOXYzine pamoate (VISTARIL) capsule 50 mg, 50 mg, Oral, TID PRN, Adnan Ejupovic, DPM    [Held by provider] magnesium oxide (MAG-OX) tablet 400 mg, 400 mg, Oral, Daily, Adnan Ejupovic, DPM, 400 mg at 07/30/22 0834    montelukast (SINGULAIR) tablet 10 mg, 10 mg, Oral, Nightly, Adnan Ejupovic, DPM    pantoprazole (PROTONIX) tablet 40 mg, 40 mg, Oral, QAM AC, Adnan Ejupovic, DPM, 40 mg at 07/30/22 0834    oxyCODONE-acetaminophen (PERCOCET) 5-325 MG per tablet 1 tablet, 1 tablet, Oral, BID PRN, Achilles Medina, DPM, 1 tablet at 07/30/22 0834    [Held by provider] paliperidone (INVEGA) extended release tablet 3 mg, 3 mg, Oral, Nightly, Adnan Ejupovic, DPM    potassium chloride (KLOR-CON M) extended release tablet 20 mEq, 20 mEq, Oral, Daily, Adnan Ejupovic, DPM, 20 mEq at 07/30/22 0834    pregabalin (LYRICA) capsule 150 mg, 150 mg, Oral, BID, Adnan Ejupovic, DPM, 150 mg at 07/30/22 0834    albuterol sulfate HFA (PROVENTIL;VENTOLIN;PROAIR) 108 (90 Base) MCG/ACT inhaler 2 puff, 2 puff, Inhalation, Q6H PRN, Adnan Ejupovic, DPM    tiotropium (SPIRIVA RESPIMAT) 2.5 MCG/ACT inhaler 2 puff, 2 puff, Inhalation, Daily, Adnan Ejupovic, DPM, 2 puff at 07/30/22 0748    insulin lispro (HUMALOG) injection vial 0-8 Units, 0-8 Units, SubCUTAneous, TID WC, Adnan Ejupovic, DPM    insulin lispro (HUMALOG) injection vial 0-4 Units, 0-4 Units, SubCUTAneous, Nightly, Adnan Ejupovic, DPM    glucose chewable tablet 16 g, 4 tablet, Oral, PRN, Adnan Ejupovic, DPM    dextrose bolus 10% 125 mL, 125 mL, IntraVENous, PRN **OR** dextrose bolus 10% 250 mL, 250 mL, IntraVENous, PRN, Adnan Ejupovic, DPM    glucagon (rDNA) injection 1 mg, 1 mg, SubCUTAneous, PRN, Adnan Ejupovic, DPM    dextrose 10 % infusion, , IntraVENous, Continuous PRN, Adnan Ejupovic, DPM    triamterene-hydroCHLOROthiazide (MAXZIDE-25) 37.5-25 MG per tablet 1 tablet, 1 tablet, Oral, Daily, Adnan Ejupovic, DPM, 1 tablet at 22 0834    [Held by provider] escitalopram (LEXAPRO) tablet 5 mg, 5 mg, Oral, Daily, Adnan Ejupovic, DPM, 5 mg at 22 5221    rivaroxaban (XARELTO) tablet 10 mg, 10 mg, Oral, Daily, Adnan Ejupovic, DPM, 10 mg at 22 0111      SOCIAL HISTORY     Social History     Social History Narrative    ** Merged History Encounter **          Social History     Tobacco Use    Smoking status: Every Day     Packs/day: 0.50     Types: Cigarettes     Start date: 1979    Smokeless tobacco: Never   Vaping Use    Vaping Use: Never used   Substance Use Topics    Alcohol use: Yes     Alcohol/week: 0.0 standard drinks     Comment: social drinker    Drug use: No         ALLERGIES     Allergies   Allergen Reactions    Penicillins Shortness Of Breath     \"I almost \"  Other reaction(s): PENICILLINS    Seasonal Itching         FAMILY HISTORY     Family History   Problem Relation Age of Onset    Stroke Mother     Asthma Mother     Other Mother     No Known Problems Sister     Asthma Brother     No Known Problems Brother          PREVIOUS RECORDS   Previous records reviewed:   Prior records were reviewed.   A continuity of care documents from the [place] [id number] does note cellulitis diagnosis on 7/15, however is unable to identify the antibiotic that was approximately 5 cm x 3 cm on the lateral plantar aspects of the right foot. Erythema concerning for cellulitis noted extending up to the mid calf. No lymphangitic streaking noted. Neurological:      General: No focal deficit present. Mental Status: She is alert and oriented to person, place, and time.            MEDICAL DECISION MAKING   Initial Assessment:   Diabetic foot wound with concerns for osteomyelitis  Cellulitis of the right lower extremity  Plan:   BMP, CBC, CRP, sed rate, blood cultures, x-ray of the right foot        ED RESULTS   Laboratory results:  Labs Reviewed   CBC WITH AUTO DIFFERENTIAL - Abnormal; Notable for the following components:       Result Value    WBC 4.6 (*)     RBC 3.58 (*)     Hemoglobin 10.2 (*)     Hematocrit 29.0 (*)     RDW-CV 21.0 (*)     RDW-SD 61.2 (*)     Platelets 55 (*)     Lymphocytes Absolute 0.9 (*)     All other components within normal limits   BASIC METABOLIC PANEL W/ REFLEX TO MG FOR LOW K - Abnormal; Notable for the following components:    Potassium reflex Magnesium 3.2 (*)     Glucose 40 (*)     Calcium 8.4 (*)     All other components within normal limits   C-REACTIVE PROTEIN - Abnormal; Notable for the following components:    CRP 1.86 (*)     All other components within normal limits   SEDIMENTATION RATE - Abnormal; Notable for the following components:    Sed Rate 44 (*)     All other components within normal limits   POCT GLUCOSE - Abnormal; Notable for the following components:    POC Glucose 48 (*)     All other components within normal limits   POCT GLUCOSE - Normal   CULTURE, BLOOD 1    Narrative:     Source: blood-Adult-suboptimal <5.5oz./set volume       Site: Peripheral Vein                            Current Antibiotics: not stated   CULTURE, BLOOD 2    Narrative:     Source: blood-Adult-suboptimal <5.5oz./set volume       Site: Peripheral Vein                            Current Antibiotics: not stated   CULTURE, ANAEROBIC AND AEROBIC Narrative:     Source: foot       Site: swab          Current Antibiotics: not stated   LACTIC ACID   SCAN OF BLOOD SMEAR   ANION GAP   MAGNESIUM   OSMOLALITY   GLOMERULAR FILTRATION RATE, ESTIMATED   POCT GLUCOSE       Radiologic studies results:  XR FOOT RIGHT (MIN 3 VIEWS)   Final Result   Soft tissue swelling. **This report has been created using voice recognition software. It may contain minor errors which are inherent in voice recognition technology. **      Final report electronically signed by Dr. Makenzie King on 7/26/2022 2:16 PM                ED COURSE   ED Medications administered this visit:   Medications   vancomycin (VANCOCIN) 1250 mg in dextrose 5 % 250 mL IVPB (0 mg IntraVENous Stopped 7/26/22 1735)   cefTRIAXone (ROCEPHIN) 2,000 mg in dextrose 5 % 50 mL IVPB mini-bag (0 mg IntraVENous Stopped 7/26/22 1532)   doxycycline hyclate (VIBRA-TABS) tablet 100 mg (100 mg Oral Given 7/26/22 1629)     Patient work-up in the emergency department for her right lower extremity cellulitis with concern for osteomyelitis. X-ray of the right foot did not show any evidence of osteomyelitis. CRP and sed rate were mildly elevated. Patient was noted to have mild leukopenia. Remainder of laboratory studies were within the patient's baseline when compared to prior records. Case was discussed with podiatry who evaluated the patient in the emergency department. Their suggestion was admission to the hospital for IV antibiotics. Patient was started on Rocephin and vancomycin in the emergency department. Patient did state that she did not want to come into the hospital due to needing to take care of things at home. Lengthy discussion occurred between myself and the patient. She is warned that oral antibiotics as an outpatient may not work and that she may have worsening of infection which could go into her bone. Patient verbalized understanding.   Patient would prefer oral antibiotics and discharge home at this time. Patient will be started on Keflex 500 mg 4 times daily x7 days and doxycycline 100 mg twice daily for 7 days. Patient given podiatry clinic follow-up. Patient discharged home at this time. Patient given strict return precautions along with family in the room. MEDICATION CHANGES     Discharge Medication List as of 7/26/2022  4:10 PM        START taking these medications    Details   cephALEXin (KEFLEX) 500 MG capsule Take 1 capsule by mouth in the morning and 1 capsule at noon and 1 capsule in the evening and 1 capsule before bedtime. Do all this for 7 days. , Disp-28 capsule, R-0Print      doxycycline hyclate (VIBRA-TABS) 100 MG tablet Take 1 tablet by mouth in the morning and 1 tablet before bedtime. Do all this for 7 days. , Disp-14 tablet, R-0Print               FINAL DISPOSITION     Final diagnoses:   Cellulitis of right lower extremity     Condition: condition: stable  Dispo: Discharge to home      This transcription was electronically signed. It was dictated by use of voice recognition software and electronically transcribed. The transcription may contain errors not detected in proofreading.         Kamari Santa, DO  07/26/22 [address], DO  07/30/22 4130

## 2022-07-26 NOTE — CONSULTS
Podiatric Surgery Consult    Patient Nehemiah BECERRA [facility] RECORD NUMBER:  [id number]  AGE: 49 y.o. GENDER: female  : 1965  EPISODE DATE:  2022    Reason for Consult: Right foot wound with cellulitis    CHIEF COMPLAINT:  <principal problem not specified>    HISTORY OF PRESENT ILLNESS:                The patient is a 49 y.o. female with significant past medical history of stroke, right-sided paraplegia, multiple sclerosis, and type 2 diabetes who is being seen at bedside in the ED on behalf of Dr. Rola Rizo. Patient states that about a month previously she developed a blister to the outside of her right foot. Patient states that she has right-sided weakness from a previous stroke and that her right foot rests awkwardly on her wheelchair. Patient states that 7 days ago she was seen in urgent care and given an oral antibiotic. Patient states that she does not recall the name of the antibiotic. She states that the blister popped and is now formed today black Overlying the area. Patient states that she noticed increased pain to the area and redness to her right leg. Patient states that she then came to the emergency department for evaluation. She denies any N/V/F/C/SOB/CP. No other pedal complaints at this time    Past Medical History:    Past Medical History:   Diagnosis Date    Anxiety     Anxiety and depression     Asthma     Bipolar 1 disorder ([address]     Bipolar 1 disorder with moderate sourav ([address]     Blood circulation, collateral     Breast cancer ([address]     diagnosed in 2021    Cancer [facility])      ?  cervical \"long time ago\"    Cancer ([address] 01/15/2021    Left Breast    Depression     Endometriosis     GERD (gastroesophageal reflux disease)     Kidney stones     Lupus (HCC)     Movement disorder     MS (multiple sclerosis) (HCC)     Schizophrenia ([address]     Thyroid disease     Type 2 diabetes mellitus with hyperglycemia, with long-term current use of insulin (HCC)     Type 2 diabetes mellitus without complication ([facility]     Unspecified cerebral artery occlusion with cerebral infarction 2014        Past Surgical History:    Past Surgical History:   Procedure Laterality Date    BREAST LUMPECTOMY Left 02/19/2021    LEFT BREAST MASTECTOMY, SENTINAL LYMPH NODE BIOPSY, PREOP NEEDLE LOC performed by Carlos Barragan MD at [address] Left 4/6/2021    DEBRIDEMENT LEFT BREAST MASTECTOMY WOUND performed by Carlos Barragan MD at [address] Left 4/27/2021    LEFT BREAST WOUND REVISION performed by Carlos Barragan MD at [facility]  01/2020    DILATION AND CURETTAGE OF UTERUS      BEN US GUID NDL BIOPSY LEFT Left 01/14/2021    BEN [address] LEFT 1/14/2021 Terrance Leal  [facility] SURGERY N/A 01/26/2021    EXCHANGE OF INTERSTIM SYSTEM performed by Anastasiia Mccall MD at [address] [facility]  03/23/2021    right breast i and d with closure Dr Josh Romero in the procedure room    PAIN MANAGEMENT PROCEDURE Left 8/31/2021    left T3 paravertebral block under fluoroscopy performed by Christen Lanes, DO at [facility] OFFICE/OUTPT VISIT,PROCEDURE ONLY N/A 11/21/2018    INTERSTIM DEVICE PLACEMENT MODEL 3058, ONLY HEAD ELIGIBLE FOR MRI WITH TRANSMIT/RECEIVE HEAD COIL    TUBAL LIGATION      10 years ago    [facility]  [facility] OF LEFT BREAST Left 02/19/2021    US GUIDED NEEDLE LOC OF LEFT BREAST 2/19/2021 [facility]  01/14/2021    US LYMPH NODE BIOPSY 1/14/2021 Terrance Leal MD [facility]        Current Medications:    Current Facility-Administered Medications: vancomycin (VANCOCIN) 1250 mg in dextrose 5 % 250 mL IVPB, 20 mg/kg, IntraVENous, Once  dextrose bolus 10% 125 mL, 125 mL, IntraVENous, PRN **OR** dextrose bolus 10% 250 mL, 250 mL, IntraVENous, PRN    Allergies:  Penicillins and Seasonal    Social History:    Social History     Socioeconomic History    Marital status:  bilaterally. IMAGING    XR FOOT RIGHT (MIN 3 VIEWS)    Result Date: 7/26/2022  PROCEDURE: XR FOOT RIGHT (MIN 3 VIEWS) CLINICAL INFORMATION: Right foot pain and swelling no injury . TECHNIQUE: 4 projections COMPARISON: No prior study. FINDINGS: No acute fracture or dislocation. Joint spaces are preserved. No periostitis or cortical erosions. Bones are markedly osteopenic. There is soft tissue swelling involving the entire foot and distal lower extremity. Soft tissue swelling. **This report has been created using voice recognition software. It may contain minor errors which are inherent in voice recognition technology. ** Final report electronically signed by Dr. Micha Jordan on 7/26/2022 2:16 PM       LABS:   Recent Labs     07/26/22  1330   WBC 4.6*   HGB 10.2*   HCT 29.0*   PLT 55*        Recent Labs     07/26/22  1330      K 3.2*      CO2 27   BUN 7   CREATININE 0.5      No results for input(s): PROT, INR, APTT in the last 72 hours. No results for input(s): CKTOTAL, CKMB, CKMBINDEX, TROPONINI in the last 72 hours. Treatment:   Orders Placed This Encounter   Procedures    Culture, Blood 1     Standing Status:   Standing     Number of Occurrences:   1    Culture, Blood 2     Standing Status:   Standing     Number of Occurrences:   1    Culture, Anaerobic and Aerobic     Swab from foot wound.      Standing Status:   Standing     Number of Occurrences:   1    XR FOOT RIGHT (MIN 3 VIEWS)     Standing Status:   Standing     Number of Occurrences:   1     Order Specific Question:   Reason for exam:     Answer:   osteomyolitis    CBC with Auto Differential     Standing Status:   Standing     Number of Occurrences:   1    Basic Metabolic Panel w/ Reflex to MG     Standing Status:   Standing     Number of Occurrences:   1    C-Reactive Protein     Standing Status:   Standing     Number of Occurrences:   1    Sedimentation Rate     Standing Status:   Standing     Number of Occurrences:   1 Lactic Acid     Standing Status:   Standing     Number of Occurrences:   1    Scan of Blood Smear     Standing Status:   Standing     Number of Occurrences:   1    Anion Gap     Standing Status:   Standing     Number of Occurrences:   1    Magnesium     Standing Status:   Standing     Number of Occurrences:   1    Osmolality     Standing Status:   Standing     Number of Occurrences:   1    Glomerular Filtration Rate, Estimated     Standing Status:   Standing     Number of Occurrences:   1    POCT Glucose     Standing Status:   Standing     Number of Occurrences:   2    POCT Glucose     Standing Status:   Standing     Number of Occurrences:   1       Assessment and Plan  Principle  1. Unstageable pressure sore, right foot    - Patient initially examined and evaluated  - WBC 4.6; ESR 44; CRP 1.86; patient currently afebrile. - Patient given IV ceftriaxone and vancomycin per ED.  - Dermal slough and hyperkeratotic tissue surrounding wound was sharply debrided with #15 blade without incident.  -Cultures taken of wound; ordered and aerobic and anaerobic. Results pending.  - Applied dressing consisting of: Betadine wet-to-dry, Kerlix, and Ace wrap. - X-rays reviewed; impression above. -Discussed with patient that due to nature of wound and cellulitis it would be beneficial for her to be admitted to the hospital for IV antibiotic therapy as she has apparently failed oral antibiotic therapy as an outpatient.  -Patient stated that she does not wish to be admitted and that she would try another course of outpatient oral antibiotic therapy. - Patient was given oral Keflex and doxycycline for 7 days each per ED provider.  -Discussed with patient that adding padding to her wheelchair foot rest would help with reducing the likelihood of another pressure sore. Recommended the use of a Prevalon boot and that she may receive this on an outpatient basis.   -Patient verbalized agreement understanding with treatment plan discussed. All of patient's questions and concerns addressed at today's encounter.  -Patient mis to  follow-up with Dr. Rola Rizo on an outpatient basis. DISPO: Discharged; patient is to follow-up with Dr. Rola Rizo on an outpatient basis. Thank you for the consultation allowing podiatry to assist in the medical welfare of this patient. Podiatry will continue to follow this patient throughout the duration of hospitalization.      Chance Bolden DPM -PGY1  Foot and Ankle Surgical Resident  7/26/2022 4:50 PM

## 2022-07-26 NOTE — ED TRIAGE NOTES
Pt presents to the ED via triage with c/o right leg swelling and pain. Pts right lower leg appears to be red and tender. Pt states she has not been able to use her right leg correctly since she had her stroke but states pain has been increasing in her leg over the last week. Pt states she is a diabetic and had a blister that has now turned into a wound on her right foot. Pt states she was given an antibiotic last week but unsure which one she has been taking. Pt states she was also leaning over in her wheelchair when she fell and hit her head around a week ago. Pt states she did lose consciousness when she hit her head but does not know how long she was out for. Pt states she gas been taking oxycodone for her pain, last time being yesterday. RR easy and unlabored.

## 2022-07-27 ENCOUNTER — TELEPHONE (OUTPATIENT)
Dept: UROLOGY | Age: 57
End: 2022-07-27

## 2022-07-27 DIAGNOSIS — N32.81 OAB (OVERACTIVE BLADDER): Primary | ICD-10-CM

## 2022-07-27 NOTE — TELEPHONE ENCOUNTER
Perfect serve message sent to Dr. Maria Guadalupe Pandey this [facility] ANAB[facility] II.[facility], Patient Lorna Siddiqi.- MRN [id number], she has an interstim and is scheduled to see Jose Ramon Pedersen the rep on 8/10/22 but she states that she has fallen out of her wheelchair about 6 times and now can not feel her interstim and is having pain. Would you like an Xray prior to her visit with the rep?       Per Dr. Maria Guadalupe Pandey order Xray Lm for the patient to call the office to inform her to go get the Xray pt has no VM

## 2022-07-28 DIAGNOSIS — G89.4 CHRONIC PAIN SYNDROME: ICD-10-CM

## 2022-07-28 NOTE — TELEPHONE ENCOUNTER
Huong Esquivel called requesting a refill on the following medications:  Requested Prescriptions     Pending Prescriptions Disp Refills    oxyCODONE-acetaminophen (PERCOCET) 5-325 MG per tablet 60 tablet 0     Sig: Take 1 tablet by mouth 2 times daily as needed for Pain for up to 30 days.      Pharmacy verified: [address] [place] [address]  .pv      Date of last visit: 5/16/2022  Date of next visit (if applicable): 9/3/7422

## 2022-07-28 NOTE — TELEPHONE ENCOUNTER
OARRS reviewed. UDS:no screen. Last seen: 5/16/2022.  Follow-up:   Future Appointments   Date Time Provider Rashard Farnsworthi   8/8/2022  9:20 AM Mayito Leigh DO N [facility] Pain [address]   8/10/2022 12:15 PM Cynthia Sung APRN - CNP [address]   8/16/2022  2:00 PM Bryant Bruner MD N Oncology [address]   9/1/2022  3:00 PM Chapito Moody MD N [facility] Pain [address]   12/1/2022  1:00 PM Manon Duane, MD [address] [place] [address]

## 2022-07-29 ENCOUNTER — HOSPITAL ENCOUNTER (INPATIENT)
Age: 57
LOS: 4 days | Discharge: HOME | DRG: 380 | End: 2022-08-02
Attending: STUDENT IN AN ORGANIZED HEALTH CARE EDUCATION/TRAINING PROGRAM | Admitting: STUDENT IN AN ORGANIZED HEALTH CARE EDUCATION/TRAINING PROGRAM
Payer: COMMERCIAL

## 2022-07-29 ENCOUNTER — APPOINTMENT (OUTPATIENT)
Dept: GENERAL RADIOLOGY | Age: 57
DRG: 380 | End: 2022-07-29
Payer: COMMERCIAL

## 2022-07-29 DIAGNOSIS — L03.115 CELLULITIS OF RIGHT LOWER EXTREMITY: ICD-10-CM

## 2022-07-29 DIAGNOSIS — L97.419 DIABETIC ULCER OF RIGHT MIDFOOT ASSOCIATED WITH TYPE 1 DIABETES MELLITUS, UNSPECIFIED ULCER STAGE (HCC): Primary | ICD-10-CM

## 2022-07-29 DIAGNOSIS — E10.621 DIABETIC ULCER OF RIGHT MIDFOOT ASSOCIATED WITH TYPE 1 DIABETES MELLITUS, UNSPECIFIED ULCER STAGE (HCC): Primary | ICD-10-CM

## 2022-07-29 LAB
AEROBIC CULTURE: NORMAL
ALBUMIN SERPL-MCNC: 2.4 G/DL (ref 3.5–5.1)
ALP BLD-CCNC: 136 U/L (ref 38–126)
ALT SERPL-CCNC: 23 U/L (ref 11–66)
ANAEROBIC CULTURE: NORMAL
ANION GAP SERPL CALCULATED.3IONS-SCNC: 8 MEQ/L (ref 8–16)
AST SERPL-CCNC: 40 U/L (ref 5–40)
BASOPHILS # BLD: 0.5 %
BASOPHILS ABSOLUTE: 0 THOU/MM3 (ref 0–0.1)
BILIRUB SERPL-MCNC: 1.5 MG/DL (ref 0.3–1.2)
BILIRUBIN DIRECT: 0.8 MG/DL (ref 0–0.3)
BUN BLDV-MCNC: 8 MG/DL (ref 7–22)
CALCIUM SERPL-MCNC: 8.8 MG/DL (ref 8.5–10.5)
CHLORIDE BLD-SCNC: 108 MEQ/L (ref 98–111)
CO2: 26 MEQ/L (ref 23–33)
CREAT SERPL-MCNC: 0.6 MG/DL (ref 0.4–1.2)
EOSINOPHIL # BLD: 2 %
EOSINOPHILS ABSOLUTE: 0.1 THOU/MM3 (ref 0–0.4)
ERYTHROCYTE [DISTWIDTH] IN BLOOD BY AUTOMATED COUNT: 21.4 % (ref 11.5–14.5)
ERYTHROCYTE [DISTWIDTH] IN BLOOD BY AUTOMATED COUNT: 61.3 FL (ref 35–45)
GFR SERPL CREATININE-BSD FRML MDRD: > 90 ML/MIN/1.73M2
GLUCOSE BLD-MCNC: 119 MG/DL (ref 70–108)
GLUCOSE BLD-MCNC: 139 MG/DL (ref 70–108)
GRAM STAIN RESULT: NORMAL
HCT VFR BLD CALC: 31.7 % (ref 37–47)
HEMOGLOBIN: 11 GM/DL (ref 12–16)
IMMATURE GRANS (ABS): 0.01 THOU/MM3 (ref 0–0.07)
IMMATURE GRANULOCYTES: 0.3 %
LACTIC ACID: 2 MMOL/L (ref 0.5–2)
LIPASE: 52.2 U/L (ref 5.6–51.3)
LYMPHOCYTES # BLD: 25 %
LYMPHOCYTES ABSOLUTE: 1 THOU/MM3 (ref 1–4.8)
MAGNESIUM: 1.7 MG/DL (ref 1.6–2.4)
MCH RBC QN AUTO: 28.3 PG (ref 26–33)
MCHC RBC AUTO-ENTMCNC: 34.7 GM/DL (ref 32.2–35.5)
MCV RBC AUTO: 81.5 FL (ref 81–99)
MONOCYTES # BLD: 12.2 %
MONOCYTES ABSOLUTE: 0.5 THOU/MM3 (ref 0.4–1.3)
NUCLEATED RED BLOOD CELLS: 0 /100 WBC
OSMOLALITY CALCULATION: 283.7 MOSMOL/KG (ref 275–300)
PLATELET # BLD: 57 THOU/MM3 (ref 130–400)
PMV BLD AUTO: ABNORMAL FL (ref 9.4–12.4)
POTASSIUM REFLEX MAGNESIUM: 3.4 MEQ/L (ref 3.5–5.2)
PRO-BNP: 658.5 PG/ML (ref 0–900)
PROCALCITONIN: 0.09 NG/ML (ref 0.01–0.09)
RBC # BLD: 3.89 MILL/MM3 (ref 4.2–5.4)
SEG NEUTROPHILS: 60 %
SEGMENTED NEUTROPHILS ABSOLUTE COUNT: 2.3 THOU/MM3 (ref 1.8–7.7)
SODIUM BLD-SCNC: 142 MEQ/L (ref 135–145)
TOTAL PROTEIN: 6.8 G/DL (ref 6.1–8)
TROPONIN T: 0.01 NG/ML
WBC # BLD: 3.9 THOU/MM3 (ref 4.8–10.8)

## 2022-07-29 PROCEDURE — 73630 X-RAY EXAM OF FOOT: CPT

## 2022-07-29 PROCEDURE — 1200000000 HC SEMI PRIVATE

## 2022-07-29 PROCEDURE — 80048 BASIC METABOLIC PNL TOTAL CA: CPT

## 2022-07-29 PROCEDURE — 96365 THER/PROPH/DIAG IV INF INIT: CPT

## 2022-07-29 PROCEDURE — 82948 REAGENT STRIP/BLOOD GLUCOSE: CPT

## 2022-07-29 PROCEDURE — 83735 ASSAY OF MAGNESIUM: CPT

## 2022-07-29 PROCEDURE — 6370000000 HC RX 637 (ALT 250 FOR IP): Performed by: NURSE PRACTITIONER

## 2022-07-29 PROCEDURE — 2500000003 HC RX 250 WO HCPCS: Performed by: NURSE PRACTITIONER

## 2022-07-29 PROCEDURE — 2580000003 HC RX 258: Performed by: NURSE PRACTITIONER

## 2022-07-29 PROCEDURE — 2580000003 HC RX 258: Performed by: STUDENT IN AN ORGANIZED HEALTH CARE EDUCATION/TRAINING PROGRAM

## 2022-07-29 PROCEDURE — 83605 ASSAY OF LACTIC ACID: CPT

## 2022-07-29 PROCEDURE — 96375 TX/PRO/DX INJ NEW DRUG ADDON: CPT

## 2022-07-29 PROCEDURE — 93005 ELECTROCARDIOGRAM TRACING: CPT | Performed by: NURSE PRACTITIONER

## 2022-07-29 PROCEDURE — 83880 ASSAY OF NATRIURETIC PEPTIDE: CPT

## 2022-07-29 PROCEDURE — 87040 BLOOD CULTURE FOR BACTERIA: CPT

## 2022-07-29 PROCEDURE — 80076 HEPATIC FUNCTION PANEL: CPT

## 2022-07-29 PROCEDURE — 99285 EMERGENCY DEPT VISIT HI MDM: CPT

## 2022-07-29 PROCEDURE — 84145 PROCALCITONIN (PCT): CPT

## 2022-07-29 PROCEDURE — 83690 ASSAY OF LIPASE: CPT

## 2022-07-29 PROCEDURE — 6360000002 HC RX W HCPCS: Performed by: NURSE PRACTITIONER

## 2022-07-29 PROCEDURE — 85025 COMPLETE CBC W/AUTO DIFF WBC: CPT

## 2022-07-29 PROCEDURE — 84484 ASSAY OF TROPONIN QUANT: CPT

## 2022-07-29 PROCEDURE — 36415 COLL VENOUS BLD VENIPUNCTURE: CPT

## 2022-07-29 RX ORDER — OXYCODONE HYDROCHLORIDE AND ACETAMINOPHEN 5; 325 MG/1; MG/1
1 TABLET ORAL 2 TIMES DAILY PRN
Status: DISCONTINUED | OUTPATIENT
Start: 2022-07-29 | End: 2022-08-02 | Stop reason: HOSPADM

## 2022-07-29 RX ORDER — ESCITALOPRAM OXALATE 10 MG/1
5 TABLET ORAL DAILY
Status: DISCONTINUED | OUTPATIENT
Start: 2022-07-30 | End: 2022-08-02 | Stop reason: HOSPADM

## 2022-07-29 RX ORDER — HYDROXYZINE PAMOATE 25 MG/1
50 CAPSULE ORAL 3 TIMES DAILY PRN
Status: DISCONTINUED | OUTPATIENT
Start: 2022-07-29 | End: 2022-08-02 | Stop reason: HOSPADM

## 2022-07-29 RX ORDER — LANOLIN ALCOHOL/MO/W.PET/CERES
400 CREAM (GRAM) TOPICAL DAILY
Status: DISCONTINUED | OUTPATIENT
Start: 2022-07-30 | End: 2022-08-02 | Stop reason: HOSPADM

## 2022-07-29 RX ORDER — SODIUM CHLORIDE 0.9 % (FLUSH) 0.9 %
5-40 SYRINGE (ML) INJECTION PRN
Status: DISCONTINUED | OUTPATIENT
Start: 2022-07-29 | End: 2022-08-02 | Stop reason: HOSPADM

## 2022-07-29 RX ORDER — POTASSIUM CHLORIDE 20 MEQ/1
40 TABLET, EXTENDED RELEASE ORAL ONCE
Status: COMPLETED | OUTPATIENT
Start: 2022-07-29 | End: 2022-07-29

## 2022-07-29 RX ORDER — CLINDAMYCIN PHOSPHATE 600 MG/50ML
600 INJECTION INTRAVENOUS EVERY 8 HOURS
Status: DISCONTINUED | OUTPATIENT
Start: 2022-07-30 | End: 2022-07-31

## 2022-07-29 RX ORDER — ONDANSETRON 4 MG/1
4 TABLET, ORALLY DISINTEGRATING ORAL EVERY 8 HOURS PRN
Status: DISCONTINUED | OUTPATIENT
Start: 2022-07-29 | End: 2022-08-02 | Stop reason: HOSPADM

## 2022-07-29 RX ORDER — ALBUTEROL SULFATE 90 UG/1
2 AEROSOL, METERED RESPIRATORY (INHALATION) EVERY 6 HOURS PRN
Status: DISCONTINUED | OUTPATIENT
Start: 2022-07-29 | End: 2022-08-02 | Stop reason: HOSPADM

## 2022-07-29 RX ORDER — PREGABALIN 75 MG/1
150 CAPSULE ORAL 2 TIMES DAILY
Status: DISCONTINUED | OUTPATIENT
Start: 2022-07-30 | End: 2022-08-02 | Stop reason: HOSPADM

## 2022-07-29 RX ORDER — ATORVASTATIN CALCIUM 40 MG/1
40 TABLET, FILM COATED ORAL NIGHTLY
Status: DISCONTINUED | OUTPATIENT
Start: 2022-07-30 | End: 2022-08-02 | Stop reason: HOSPADM

## 2022-07-29 RX ORDER — DEXTROSE MONOHYDRATE 100 MG/ML
INJECTION, SOLUTION INTRAVENOUS CONTINUOUS PRN
Status: DISCONTINUED | OUTPATIENT
Start: 2022-07-29 | End: 2022-08-02 | Stop reason: HOSPADM

## 2022-07-29 RX ORDER — ANASTROZOLE 1 MG/1
1 TABLET ORAL EVERY OTHER DAY
Status: DISCONTINUED | OUTPATIENT
Start: 2022-07-31 | End: 2022-08-02 | Stop reason: HOSPADM

## 2022-07-29 RX ORDER — BACLOFEN 10 MG/1
20 TABLET ORAL 3 TIMES DAILY
Status: DISCONTINUED | OUTPATIENT
Start: 2022-07-30 | End: 2022-08-02 | Stop reason: HOSPADM

## 2022-07-29 RX ORDER — SODIUM CHLORIDE 9 MG/ML
INJECTION, SOLUTION INTRAVENOUS PRN
Status: DISCONTINUED | OUTPATIENT
Start: 2022-07-29 | End: 2022-08-02 | Stop reason: HOSPADM

## 2022-07-29 RX ORDER — POTASSIUM CHLORIDE 20 MEQ/1
20 TABLET, EXTENDED RELEASE ORAL DAILY
Status: DISCONTINUED | OUTPATIENT
Start: 2022-07-30 | End: 2022-08-02 | Stop reason: HOSPADM

## 2022-07-29 RX ORDER — SODIUM CHLORIDE 0.9 % (FLUSH) 0.9 %
5-40 SYRINGE (ML) INJECTION EVERY 12 HOURS SCHEDULED
Status: DISCONTINUED | OUTPATIENT
Start: 2022-07-29 | End: 2022-08-02 | Stop reason: HOSPADM

## 2022-07-29 RX ORDER — INSULIN LISPRO 100 [IU]/ML
0-4 INJECTION, SOLUTION INTRAVENOUS; SUBCUTANEOUS NIGHTLY
Status: DISCONTINUED | OUTPATIENT
Start: 2022-07-30 | End: 2022-08-02 | Stop reason: HOSPADM

## 2022-07-29 RX ORDER — POLYETHYLENE GLYCOL 3350 17 G/17G
17 POWDER, FOR SOLUTION ORAL DAILY PRN
Status: DISCONTINUED | OUTPATIENT
Start: 2022-07-29 | End: 2022-08-02 | Stop reason: HOSPADM

## 2022-07-29 RX ORDER — MONTELUKAST SODIUM 10 MG/1
10 TABLET ORAL NIGHTLY
Status: DISCONTINUED | OUTPATIENT
Start: 2022-07-30 | End: 2022-08-02 | Stop reason: HOSPADM

## 2022-07-29 RX ORDER — INSULIN LISPRO 100 [IU]/ML
0-8 INJECTION, SOLUTION INTRAVENOUS; SUBCUTANEOUS
Status: DISCONTINUED | OUTPATIENT
Start: 2022-07-30 | End: 2022-08-02 | Stop reason: HOSPADM

## 2022-07-29 RX ORDER — SODIUM CHLORIDE 9 MG/ML
INJECTION, SOLUTION INTRAVENOUS CONTINUOUS
Status: DISCONTINUED | OUTPATIENT
Start: 2022-07-29 | End: 2022-08-01

## 2022-07-29 RX ORDER — CLINDAMYCIN PHOSPHATE 600 MG/50ML
600 INJECTION INTRAVENOUS ONCE
Status: COMPLETED | OUTPATIENT
Start: 2022-07-29 | End: 2022-07-29

## 2022-07-29 RX ORDER — DESVENLAFAXINE 50 MG/1
50 TABLET, EXTENDED RELEASE ORAL DAILY
Status: DISCONTINUED | OUTPATIENT
Start: 2022-07-30 | End: 2022-07-29

## 2022-07-29 RX ORDER — ONDANSETRON 2 MG/ML
4 INJECTION INTRAMUSCULAR; INTRAVENOUS EVERY 6 HOURS PRN
Status: DISCONTINUED | OUTPATIENT
Start: 2022-07-29 | End: 2022-08-02 | Stop reason: HOSPADM

## 2022-07-29 RX ORDER — PANTOPRAZOLE SODIUM 40 MG/1
40 TABLET, DELAYED RELEASE ORAL
Status: DISCONTINUED | OUTPATIENT
Start: 2022-07-30 | End: 2022-08-02 | Stop reason: HOSPADM

## 2022-07-29 RX ORDER — PALIPERIDONE 3 MG/1
3 TABLET, EXTENDED RELEASE ORAL NIGHTLY
Status: DISCONTINUED | OUTPATIENT
Start: 2022-07-30 | End: 2022-08-02 | Stop reason: HOSPADM

## 2022-07-29 RX ORDER — TRIAMTERENE AND HYDROCHLOROTHIAZIDE 37.5; 25 MG/1; MG/1
1 TABLET ORAL DAILY
Status: DISCONTINUED | OUTPATIENT
Start: 2022-07-30 | End: 2022-08-02 | Stop reason: HOSPADM

## 2022-07-29 RX ADMIN — POTASSIUM CHLORIDE 40 MEQ: 20 TABLET, EXTENDED RELEASE ORAL at 20:26

## 2022-07-29 RX ADMIN — SODIUM CHLORIDE: 9 INJECTION, SOLUTION INTRAVENOUS at 23:44

## 2022-07-29 RX ADMIN — VANCOMYCIN HYDROCHLORIDE 1000 MG: 1 INJECTION, POWDER, LYOPHILIZED, FOR SOLUTION INTRAVENOUS at 21:48

## 2022-07-29 RX ADMIN — CLINDAMYCIN PHOSPHATE 600 MG: 600 INJECTION, SOLUTION INTRAVENOUS at 20:32

## 2022-07-29 ASSESSMENT — ENCOUNTER SYMPTOMS
COUGH: 0
WHEEZING: 0
RHINORRHEA: 0
BLOOD IN STOOL: 0
ABDOMINAL PAIN: 0
NAUSEA: 0
VOMITING: 0
DIARRHEA: 0
EYE PAIN: 0
CONSTIPATION: 0
SHORTNESS OF BREATH: 0

## 2022-07-29 ASSESSMENT — PAIN DESCRIPTION - ORIENTATION: ORIENTATION: RIGHT

## 2022-07-29 ASSESSMENT — PAIN DESCRIPTION - ONSET: ONSET: ON-GOING

## 2022-07-29 ASSESSMENT — PAIN SCALES - GENERAL: PAINLEVEL_OUTOF10: 8

## 2022-07-29 ASSESSMENT — PAIN DESCRIPTION - LOCATION: LOCATION: LEG;BACK

## 2022-07-29 ASSESSMENT — PAIN - FUNCTIONAL ASSESSMENT: PAIN_FUNCTIONAL_ASSESSMENT: PREVENTS OR INTERFERES SOME ACTIVE ACTIVITIES AND ADLS

## 2022-07-29 ASSESSMENT — PAIN DESCRIPTION - FREQUENCY: FREQUENCY: CONTINUOUS

## 2022-07-29 ASSESSMENT — PAIN DESCRIPTION - DESCRIPTORS: DESCRIPTORS: ACHING;DISCOMFORT;CRAMPING

## 2022-07-29 NOTE — ED PROVIDER NOTES
[facility] [address] [address] EMERGENCY DEPT  EMERGENCY MEDICINE     Pt Name: Paige uY  MRN: [id number]  [place] 1965  Date of evaluation: 7/29/2022  PCP:    Sean Menendez MD  Provider: CHAS Coronado - CNP    CHIEF COMPLAINT       Chief Complaint   Patient presents with    Wound Check     Right foot           HISTORY OF PRESENT ILLNESS    Paige Yu is a 49 y.o. female patient that presents to ER with complaint of right foot wound. was seen on 7/26/2022 in ER for a right leg cellulitis and started on oral doxycycline. Patient states that she was seen at Dr. Rola Rizo office today and he told her to come to the ER because she needed admitted for her infected wound. Patient is diabetic and states that the wound started as a small blister and \"she let it go\" and it is now a pressure ulcer. There is an area of erythema along the wound. Patient does have a history of amputation on that same leg. Patient denies other symptoms including chest pain, shortness of breath, nausea, vomiting, diarrhea or fever. Triage notes and Nursing notes were reviewed by myself. Any discrepancies are addressed above. PAST MEDICAL HISTORY     Past Medical History:   Diagnosis Date    Anxiety     Anxiety and depression     Asthma     Bipolar 1 disorder ([address]     Bipolar 1 disorder with moderate sourav ([address]     Blood circulation, collateral     Breast cancer ([address]     diagnosed in jan 2021    Cancer [facility])      ?  cervical \"long time ago\"    Cancer ([address] 01/15/2021    Left Breast    Depression     Endometriosis     GERD (gastroesophageal reflux disease)     Kidney stones     Lupus (HCC)     Movement disorder     MS (multiple sclerosis) (HCC)     Schizophrenia ([address]     Thyroid disease     Type 2 diabetes mellitus with hyperglycemia, with long-term current use of insulin (HCC)     Type 2 diabetes mellitus without complication ([address]     Unspecified cerebral artery occlusion with cerebral infarction 2014 SURGICAL HISTORY       Past Surgical History:   Procedure Laterality Date    BREAST LUMPECTOMY Left 02/19/2021    LEFT BREAST MASTECTOMY, SENTINAL LYMPH NODE BIOPSY, PREOP NEEDLE LOC performed by Pat Lopez MD at [facility] [address] Left 4/6/2021    DEBRIDEMENT LEFT BREAST MASTECTOMY WOUND performed by Pat Lopez MD at [facility] [address] Left 4/27/2021    LEFT BREAST WOUND REVISION performed by Pat Lopez MD at [address] [place] [address]  01/2020    DILATION AND CURETTAGE OF UTERUS      BEN US GUID NDL BIOPSY LEFT Left 01/14/2021    BEN [address] LEFT 1/14/2021 Shy Jeong  [facility] N/A 01/26/2021    EXCHANGE OF INTERSTIM SYSTEM performed by Mack Nayak MD at [facility]  03/23/2021    right breast i and d with closure Dr Will Nayak in the procedure room    PAIN MANAGEMENT PROCEDURE Left 8/31/2021    left T3 paravertebral block under fluoroscopy performed by Millie Chen DO at [facility] OFFICE/OUTPT [address] [place] [address] N/A 11/21/2018    INTERSTIM DEVICE PLACEMENT MODEL 3058, ONLY HEAD ELIGIBLE FOR MRI WITH TRANSMIT/RECEIVE HEAD COIL    TUBAL LIGATION      10 years ago    [address] LOC OF LEFT BREAST Left 02/19/2021    US GUIDED NEEDLE LOC OF LEFT BREAST 2/19/2021 [address] [place] [address]  01/14/2021    US LYMPH NODE BIOPSY 1/14/2021 Shy Jeong MD [email or web address]       Previous Medications    AMITRIPTYLINE (ELAVIL) 75 MG TABLET    Take 1 tablet by mouth nightly    ANASTROZOLE (ARIMIDEX) 1 MG TABLET    Take 1 tablet by mouth every other day    ARTIFICIAL TEARS (ARTIFICIAL TEARS) OINT    as needed    ATORVASTATIN (LIPITOR) 40 MG TABLET    Take 1 tablet by mouth nightly    BACLOFEN (LIORESAL) 10 MG TABLET    Take 2 tablets by mouth 3 times daily    BD PEN NEEDLE JACOBO 2ND GEN 32G X 4 MM MISC    use as directed WITH INSULIN    CALCIUM CARBONATE (CALCIUM 600 PO)    Take 1 tablet by mouth 2 times daily    CEPHALEXIN (KEFLEX) 500 MG CAPSULE    Take 1 capsule by mouth in the morning and 1 capsule at noon and 1 capsule in the evening and 1 capsule before bedtime. Do all this for 7 days. CLOPIDOGREL (PLAVIX) 75 MG TABLET    Take 1 tablet by mouth daily    DESVENLAFAXINE SUCCINATE (PRISTIQ) 50 MG TB24 EXTENDED RELEASE TABLET    Take 50 mg by mouth daily    DOXYCYCLINE HYCLATE (VIBRA-TABS) 100 MG TABLET    Take 1 tablet by mouth in the morning and 1 tablet before bedtime. Do all this for 7 days. HANDICAP PLACARD MISC    by Does not apply route 2/08/22- 2/8/2027    HYDROXYZINE (VISTARIL) 50 MG CAPSULE    Take 50 mg by mouth 3 times daily as needed for Itching. INCONTINENCE SUPPLY DISPOSABLE (UNDERPADS) MISC    Cloth chux pads  Diagnosis: Paraplegia 344.1    INSULIN GLARGINE (LANTUS) 100 UNIT/ML INJECTION VIAL    Inject 70 Units into the skin nightly    INSULIN LISPRO (HUMALOG) 100 UNIT/ML INJECTION VIAL    Inject 10 Units into the skin 3 times daily (with meals)    INSULIN SYRINGES, DISPOSABLE, U-100 1 ML MISC    1 each by Does not apply route daily    LACTOBACILLUS ACID-PECTIN (ACIDOPHILUS/CITRUS PECTIN) TABS    take 1 tablet by mouth once daily    LIFITEGRAST (XIIDRA) 5 % SOLN    Place 1 drop into both eyes daily    LORATADINE (CLARITIN) 10 MG TABLET    Take 1 tablet by mouth daily as needed. MAGNESIUM OXIDE (MAG-OX) 400 (241.3 MG) MG TABS TABLET    Take 1 tablet by mouth daily    METFORMIN (GLUCOPHAGE) 1000 MG TABLET    Take 1,000 mg by mouth 2 times daily    MISC. DEVICES (TRANSFER BENCH) MISC    1 each by Does not apply route daily Tub transfer bench with back    MISC.  DEVICES [facility]) MISC    Wheelchair repairs- new seat cover, right side armrest replacement    Current body weight:  68 kg  Diagnosis: gait disturbance, chronic incomplete spastic paraplegia  Length of need: Lifetime    MONTELUKAST (SINGULAIR) 10 MG TABLET    Take 10 mg by mouth nightly     MUCINEX MAXIMUM STRENGTH 1200 MG TB12    Take 1 tablet by mouth 2 times daily    MULTIPLE VITAMIN (TAB-A-ISAAC PO)    Take 1 tablet by mouth daily    NALOXONE 4 MG/0.1ML LIQD NASAL SPRAY    1 spray by Nasal route as needed for Opioid Reversal    OMEPRAZOLE (PRILOSEC) 20 MG CAPSULE    Take 20 mg by mouth daily. OXYCODONE-ACETAMINOPHEN (PERCOCET) 5-325 MG PER TABLET    Take 1 tablet by mouth 2 times daily as needed for Pain for up to 30 days. PALIPERIDONE (INVEGA) 3 MG EXTENDED RELEASE TABLET    Take 3 mg by mouth nightly    POTASSIUM CHLORIDE (KLOR-CON M) 20 MEQ EXTENDED RELEASE TABLET    Take 1 tablet by mouth daily    PREGABALIN (LYRICA) 150 MG CAPSULE    Take 1 capsule by mouth 2 times daily for 30 days. PROAIR  (90 BASE) MCG/ACT INHALER    Inhale 2 puffs into the lungs every 6 hours as needed. SITAGLIPTIN (JANUVIA) 100 MG TABLET    Take 100 mg by mouth daily    TIOTROPIUM (SPIRIVA) 18 MCG INHALATION CAPSULE    Inhale 18 mcg into the lungs daily 2 puffs       ALLERGIES       Allergies   Allergen Reactions    Penicillins Shortness Of Breath     \"I almost \"  Other reaction(s): PENICILLINS    Seasonal Itching       FAMILY HISTORY       Family History   Problem Relation Age of Onset    Stroke Mother     Asthma Mother     Other Mother     No Known Problems Sister     Asthma Brother     No Known Problems Brother         SOCIAL HISTORY       Social History     Socioeconomic History    Marital status:      Spouse name: 4   Tobacco Use    Smoking status: Every Day     Packs/day: 0.50     Types: Cigarettes     Start date: 1979    Smokeless tobacco: Never   Vaping Use    Vaping Use: Never used   Substance and Sexual Activity    Alcohol use:  Yes     Alcohol/week: 0.0 standard drinks     Comment: social drinker    Drug use: No    Sexual activity: Never     Partners: Male   Social History Narrative    ** Merged History Encounter **            REVIEW OF SYSTEMS     Review of Systems   Constitutional:  Negative for appetite change, chills, fatigue, fever and unexpected weight change. HENT:  Negative for ear pain and rhinorrhea. Eyes:  Negative for pain and visual disturbance. Respiratory:  Negative for cough, shortness of breath and wheezing. Cardiovascular:  Negative for chest pain, palpitations and leg swelling. Gastrointestinal:  Negative for abdominal pain, blood in stool, constipation, diarrhea, nausea and vomiting. Genitourinary:  Negative for dysuria, frequency and hematuria. Musculoskeletal:  Negative for arthralgias, joint swelling and neck stiffness. Skin:  Positive for wound. Negative for rash. Neurological:  Negative for dizziness, syncope, weakness, light-headedness and headaches. Hematological:  Does not bruise/bleed easily. Except as noted above the remainder of the review of systems was reviewed and is negative. SCREENINGS                        PHYSICAL EXAM    (up to 7 for level 4, 8 or more for level 5)     ED Triage Vitals [02/19/22 1512]   BP Temp Temp Source Pulse Resp SpO2 Height Weight   (!) 134/95 98.2 °F (36.8 °C) Oral 124 16 100 % -- --       Physical Exam  Vitals and nursing note reviewed. Constitutional:       Appearance: She is well-developed. HENT:      Head: Normocephalic and atraumatic. Eyes:      Conjunctiva/sclera: Conjunctivae normal.      Pupils: Pupils are equal, round, and reactive to light. Cardiovascular:      Rate and Rhythm: Normal rate and regular rhythm. Heart sounds: Normal heart sounds. No murmur heard. No gallop. Pulmonary:      Effort: Pulmonary effort is normal. No respiratory distress. Breath sounds: Normal breath sounds. No wheezing or rales. Abdominal:      General: Bowel sounds are normal.      Palpations: Abdomen is soft. Musculoskeletal:         General: Normal range of motion. Cervical back: Normal range of motion.    Feet:      Right foot:      Skin integrity: Ulcer, ANION GAP   MAGNESIUM   OSMOLALITY   GLOMERULAR FILTRATION RATE, ESTIMATED   URINALYSIS WITH MICROSCOPIC       All other labs were within normal range or not returned as of this dictation. Please note, any cultures that may have been sent were not resulted at the time of this patient visit. EMERGENCY DEPARTMENT COURSE and Medical Decision Making:     Vitals:    Vitals:    07/29/22 1746   BP: (!) 100/59   Pulse: 99   Resp: 20   Temp: 98.6 °F (37 °C)   TempSrc: Oral   SpO2: 99%       PROCEDURES: (None if blank)  Procedures       MDM  Number of Diagnoses or Management Options  Cellulitis of right lower extremity: established, worsening  Diabetic ulcer of right midfoot associated with type 1 diabetes mellitus, unspecified ulcer stage ([facility]): established, worsening     Amount and/or Complexity of Data Reviewed  Clinical lab tests: ordered and reviewed  Tests in the radiology section of CPT®: ordered and reviewed  Tests in the medicine section of CPT®: ordered and reviewed  Review and summarize past medical records: yes  Discuss the patient with other providers: yes  Independent visualization of images, tracings, or specimens: yes    Risk of Complications, Morbidity, and/or Mortality  Presenting problems: low  Diagnostic procedures: moderate  Management options: low      Patient that presents to ER with complaint of right foot wound. Patient states that she was seen at Dr. Morelia Faye office today and he told her to come to the ER because she needed admitted for her infected wound. Differential diagnosis includes but not limited to diabetic foot ulcer, cellulitis, abscess, sepsis, osteomyelitis or blister. Labs reveal an elevated troponin 0.014. EKG will be completed. X-ray is reassuring. Patient will be admitted for definitive care of her cellulitis.     ED Medications administered this visit:    Medications   clindamycin (CLEOCIN) 600 mg in dextrose 5 % 50 mL IVPB (600 mg IntraVENous New Bag 7/29/22 2032) vancomycin (VANCOCIN) 1,000 mg in sodium chloride 0.9 % 250 mL IVPB ([id number]) (has no administration in time range)   potassium chloride (KLOR-CON M) extended release tablet 40 mEq (40 mEq Oral Given 7/29/22 2026)         FINAL IMPRESSION      1. Diabetic ulcer of right midfoot associated with type 1 diabetes mellitus, unspecified ulcer stage ([facility]    2. Cellulitis of right lower extremity          DISPOSITION/PLAN   DISPOSITION Decision To Admit 07/29/2022 07:57:27 PM      PATIENT REFERRED TO:  No follow-up provider specified.     DISCHARGE MEDICATIONS:  New Prescriptions    No medications on file              CHAS Kaufman CNP (electronically signed)           CHAS Kaufman CNP  07/29/22 2033

## 2022-07-30 LAB
BASOPHILS # BLD: 0.7 %
BASOPHILS ABSOLUTE: 0 THOU/MM3 (ref 0–0.1)
EKG ATRIAL RATE: 96 BPM
EKG P AXIS: 50 DEGREES
EKG P-R INTERVAL: 166 MS
EKG Q-T INTERVAL: 410 MS
EKG QRS DURATION: 88 MS
EKG QTC CALCULATION (BAZETT): 517 MS
EKG R AXIS: 54 DEGREES
EKG T AXIS: 40 DEGREES
EKG VENTRICULAR RATE: 96 BPM
EOSINOPHIL # BLD: 2.5 %
EOSINOPHILS ABSOLUTE: 0.1 THOU/MM3 (ref 0–0.4)
ERYTHROCYTE [DISTWIDTH] IN BLOOD BY AUTOMATED COUNT: 20.9 % (ref 11.5–14.5)
ERYTHROCYTE [DISTWIDTH] IN BLOOD BY AUTOMATED COUNT: 61.8 FL (ref 35–45)
GLUCOSE BLD-MCNC: 103 MG/DL (ref 70–108)
GLUCOSE BLD-MCNC: 113 MG/DL (ref 70–108)
GLUCOSE BLD-MCNC: 133 MG/DL (ref 70–108)
GLUCOSE BLD-MCNC: 141 MG/DL (ref 70–108)
HCT VFR BLD CALC: 26.9 % (ref 37–47)
HEMOGLOBIN: 9.3 GM/DL (ref 12–16)
IMMATURE GRANS (ABS): 0.01 THOU/MM3 (ref 0–0.07)
IMMATURE GRANULOCYTES: 0.4 %
LYMPHOCYTES # BLD: 34.6 %
LYMPHOCYTES ABSOLUTE: 1 THOU/MM3 (ref 1–4.8)
MAGNESIUM: 2.1 MG/DL (ref 1.6–2.4)
MCH RBC QN AUTO: 28.5 PG (ref 26–33)
MCHC RBC AUTO-ENTMCNC: 34.6 GM/DL (ref 32.2–35.5)
MCV RBC AUTO: 82.5 FL (ref 81–99)
MONOCYTES # BLD: 12.1 %
MONOCYTES ABSOLUTE: 0.3 THOU/MM3 (ref 0.4–1.3)
NUCLEATED RED BLOOD CELLS: 0 /100 WBC
PLATELET # BLD: 53 THOU/MM3 (ref 130–400)
PMV BLD AUTO: ABNORMAL FL (ref 9.4–12.4)
POTASSIUM SERPL-SCNC: 4.7 MEQ/L (ref 3.5–5.2)
RBC # BLD: 3.26 MILL/MM3 (ref 4.2–5.4)
SEG NEUTROPHILS: 49.7 %
SEGMENTED NEUTROPHILS ABSOLUTE COUNT: 1.4 THOU/MM3 (ref 1.8–7.7)
WBC # BLD: 2.8 THOU/MM3 (ref 4.8–10.8)

## 2022-07-30 PROCEDURE — 97530 THERAPEUTIC ACTIVITIES: CPT

## 2022-07-30 PROCEDURE — 2500000003 HC RX 250 WO HCPCS: Performed by: STUDENT IN AN ORGANIZED HEALTH CARE EDUCATION/TRAINING PROGRAM

## 2022-07-30 PROCEDURE — 93010 ELECTROCARDIOGRAM REPORT: CPT | Performed by: INTERNAL MEDICINE

## 2022-07-30 PROCEDURE — 83735 ASSAY OF MAGNESIUM: CPT

## 2022-07-30 PROCEDURE — 82948 REAGENT STRIP/BLOOD GLUCOSE: CPT

## 2022-07-30 PROCEDURE — 6360000002 HC RX W HCPCS: Performed by: STUDENT IN AN ORGANIZED HEALTH CARE EDUCATION/TRAINING PROGRAM

## 2022-07-30 PROCEDURE — 1200000000 HC SEMI PRIVATE

## 2022-07-30 PROCEDURE — 6370000000 HC RX 637 (ALT 250 FOR IP): Performed by: STUDENT IN AN ORGANIZED HEALTH CARE EDUCATION/TRAINING PROGRAM

## 2022-07-30 PROCEDURE — 85025 COMPLETE CBC W/AUTO DIFF WBC: CPT

## 2022-07-30 PROCEDURE — 36415 COLL VENOUS BLD VENIPUNCTURE: CPT

## 2022-07-30 PROCEDURE — 97162 PT EVAL MOD COMPLEX 30 MIN: CPT

## 2022-07-30 PROCEDURE — 94640 AIRWAY INHALATION TREATMENT: CPT

## 2022-07-30 PROCEDURE — 93005 ELECTROCARDIOGRAM TRACING: CPT | Performed by: STUDENT IN AN ORGANIZED HEALTH CARE EDUCATION/TRAINING PROGRAM

## 2022-07-30 PROCEDURE — 2580000003 HC RX 258: Performed by: STUDENT IN AN ORGANIZED HEALTH CARE EDUCATION/TRAINING PROGRAM

## 2022-07-30 PROCEDURE — 84132 ASSAY OF SERUM POTASSIUM: CPT

## 2022-07-30 RX ORDER — POTASSIUM CHLORIDE 7.45 MG/ML
10 INJECTION INTRAVENOUS PRN
Status: DISCONTINUED | OUTPATIENT
Start: 2022-07-30 | End: 2022-08-02 | Stop reason: HOSPADM

## 2022-07-30 RX ORDER — MAGNESIUM SULFATE IN WATER 40 MG/ML
4000 INJECTION, SOLUTION INTRAVENOUS ONCE
Status: COMPLETED | OUTPATIENT
Start: 2022-07-30 | End: 2022-07-30

## 2022-07-30 RX ORDER — POTASSIUM CHLORIDE 20 MEQ/1
40 TABLET, EXTENDED RELEASE ORAL PRN
Status: DISCONTINUED | OUTPATIENT
Start: 2022-07-30 | End: 2022-08-02 | Stop reason: HOSPADM

## 2022-07-30 RX ADMIN — POTASSIUM CHLORIDE 20 MEQ: 20 TABLET, EXTENDED RELEASE ORAL at 08:34

## 2022-07-30 RX ADMIN — PREGABALIN 150 MG: 75 CAPSULE ORAL at 08:34

## 2022-07-30 RX ADMIN — ESCITALOPRAM OXALATE 5 MG: 10 TABLET ORAL at 08:34

## 2022-07-30 RX ADMIN — MONTELUKAST SODIUM 10 MG: 10 TABLET ORAL at 20:14

## 2022-07-30 RX ADMIN — OXYCODONE AND ACETAMINOPHEN 1 TABLET: 5; 325 TABLET ORAL at 08:34

## 2022-07-30 RX ADMIN — Medication 400 MG: at 08:34

## 2022-07-30 RX ADMIN — CLINDAMYCIN PHOSPHATE 600 MG: 600 INJECTION, SOLUTION INTRAVENOUS at 16:03

## 2022-07-30 RX ADMIN — BACLOFEN 20 MG: 10 TABLET ORAL at 14:57

## 2022-07-30 RX ADMIN — ATORVASTATIN CALCIUM 40 MG: 40 TABLET, FILM COATED ORAL at 20:15

## 2022-07-30 RX ADMIN — RIVAROXABAN 10 MG: 10 TABLET, FILM COATED ORAL at 20:14

## 2022-07-30 RX ADMIN — VANCOMYCIN HYDROCHLORIDE 1000 MG: 1 INJECTION, POWDER, LYOPHILIZED, FOR SOLUTION INTRAVENOUS at 22:18

## 2022-07-30 RX ADMIN — VANCOMYCIN HYDROCHLORIDE 1000 MG: 1 INJECTION, POWDER, LYOPHILIZED, FOR SOLUTION INTRAVENOUS at 08:39

## 2022-07-30 RX ADMIN — MAGNESIUM SULFATE HEPTAHYDRATE 4000 MG: 40 INJECTION, SOLUTION INTRAVENOUS at 11:36

## 2022-07-30 RX ADMIN — POTASSIUM CHLORIDE 40 MEQ: 1500 TABLET, EXTENDED RELEASE ORAL at 11:35

## 2022-07-30 RX ADMIN — RIVAROXABAN 10 MG: 10 TABLET, FILM COATED ORAL at 01:11

## 2022-07-30 RX ADMIN — PREGABALIN 150 MG: 75 CAPSULE ORAL at 20:14

## 2022-07-30 RX ADMIN — PANTOPRAZOLE SODIUM 40 MG: 40 TABLET, DELAYED RELEASE ORAL at 08:34

## 2022-07-30 RX ADMIN — OXYCODONE AND ACETAMINOPHEN 1 TABLET: 5; 325 TABLET ORAL at 21:01

## 2022-07-30 RX ADMIN — CLINDAMYCIN PHOSPHATE 600 MG: 600 INJECTION, SOLUTION INTRAVENOUS at 21:01

## 2022-07-30 RX ADMIN — BACLOFEN 20 MG: 10 TABLET ORAL at 08:34

## 2022-07-30 RX ADMIN — BACLOFEN 20 MG: 10 TABLET ORAL at 20:14

## 2022-07-30 RX ADMIN — TIOTROPIUM BROMIDE INHALATION SPRAY 2 PUFF: 3.12 SPRAY, METERED RESPIRATORY (INHALATION) at 07:48

## 2022-07-30 RX ADMIN — CLINDAMYCIN PHOSPHATE 600 MG: 600 INJECTION, SOLUTION INTRAVENOUS at 04:24

## 2022-07-30 RX ADMIN — TRIAMTERENE AND HYDROCHLOROTHIAZIDE 1 TABLET: 37.5; 25 TABLET ORAL at 08:34

## 2022-07-30 ASSESSMENT — PAIN DESCRIPTION - DESCRIPTORS
DESCRIPTORS: SHARP
DESCRIPTORS: CRAMPING;DISCOMFORT
DESCRIPTORS: SHARP;ACHING

## 2022-07-30 ASSESSMENT — PAIN SCALES - GENERAL
PAINLEVEL_OUTOF10: 8
PAINLEVEL_OUTOF10: 6
PAINLEVEL_OUTOF10: 8
PAINLEVEL_OUTOF10: 3
PAINLEVEL_OUTOF10: 9
PAINLEVEL_OUTOF10: 8
PAINLEVEL_OUTOF10: 3
PAINLEVEL_OUTOF10: 9

## 2022-07-30 ASSESSMENT — PAIN DESCRIPTION - FREQUENCY
FREQUENCY: CONTINUOUS
FREQUENCY: CONTINUOUS

## 2022-07-30 ASSESSMENT — ENCOUNTER SYMPTOMS
CONSTIPATION: 0
VOMITING: 0
COLOR CHANGE: 0
SINUS PRESSURE: 0
BACK PAIN: 0
DIARRHEA: 0
SINUS PAIN: 0
NAUSEA: 0
COUGH: 0
TROUBLE SWALLOWING: 0
SORE THROAT: 0
CHEST TIGHTNESS: 0
ABDOMINAL PAIN: 0
ABDOMINAL DISTENTION: 0
SHORTNESS OF BREATH: 0

## 2022-07-30 ASSESSMENT — PAIN - FUNCTIONAL ASSESSMENT
PAIN_FUNCTIONAL_ASSESSMENT: ACTIVITIES ARE NOT PREVENTED
PAIN_FUNCTIONAL_ASSESSMENT: PREVENTS OR INTERFERES SOME ACTIVE ACTIVITIES AND ADLS
PAIN_FUNCTIONAL_ASSESSMENT: 0-10
PAIN_FUNCTIONAL_ASSESSMENT: PREVENTS OR INTERFERES SOME ACTIVE ACTIVITIES AND ADLS

## 2022-07-30 ASSESSMENT — PAIN DESCRIPTION - ONSET
ONSET: ON-GOING
ONSET: ON-GOING

## 2022-07-30 ASSESSMENT — PAIN DESCRIPTION - ORIENTATION
ORIENTATION: RIGHT
ORIENTATION: RIGHT

## 2022-07-30 ASSESSMENT — PAIN DESCRIPTION - LOCATION
LOCATION: FOOT
LOCATION: LEG

## 2022-07-30 ASSESSMENT — PAIN DESCRIPTION - PAIN TYPE: TYPE: ACUTE PAIN

## 2022-07-30 NOTE — ED NOTES
Pt resting in bed. Respirations even and unlabored. Pt medicated per MAR. EKG complete. Provided pt with warm blanket.       Juan Jose Ingram RN  07/29/22 2040

## 2022-07-30 NOTE — PLAN OF CARE
Problem: Respiratory - Adult  Goal: Achieves optimal ventilation and oxygenation  Outcome: Progressing   Pt does spiriva at home-- continue

## 2022-07-30 NOTE — PROGRESS NOTES
MRI called to notify them that patient answered yes to MRI screening question. Notified MRI tech that patient has an interstim bladder stimulator. MRI tech states she will verify if patient is okay for MRI or not and call this RN back.

## 2022-07-30 NOTE — H&P
Podiatric Surgery Consult    CC:  Right leg cellulitis    HPI:  The patient is a 49 y.o. female with significant past medical history of DM type 2, multiple sclerosis, spinal cord stroke, lupus, paraplegia, asthma, TIA, and breast cancer who was seen at bedside today on behalf of Dr. Donavon Robledo. Patient was seen in the ED on 7/26 on behalf of Dr. Donavon Robledo for RLE cellulitis; patient was encouraged at the time to be admitted as she failed PO antibiotic therapy but the patient refused. Patient followed up with Dr. Donavon Robledo at the office on 7/29, where she was told to return to the ED to be admitted for IV antibiotics. Patient currently denies any pain to BLE. She also denies any N/V/F/C/SOB/CP or bilateral calf tenderness. She has no new pedal complaints at this time. Past Medical History:        Diagnosis Date    Anxiety     Anxiety and depression     Asthma     Bipolar 1 disorder ([address]     Bipolar 1 disorder with moderate sourav ([address]     Blood circulation, collateral     Breast cancer ([address]     diagnosed in jan 2021    Cancer [facility])      ?  cervical \"long time ago\"    Cancer ([address] 01/15/2021    Left Breast    Depression     Endometriosis     GERD (gastroesophageal reflux disease)     Kidney stones     Lupus (HCC)     Movement disorder     MS (multiple sclerosis) (HCC)     Schizophrenia ([address]     Thyroid disease     Type 2 diabetes mellitus with hyperglycemia, with long-term current use of insulin ([address]     Type 2 diabetes mellitus without complication ([address]     Unspecified cerebral artery occlusion with cerebral infarction 2014       Past Surgical History:        Procedure Laterality Date    BREAST LUMPECTOMY Left 02/19/2021    LEFT BREAST MASTECTOMY, SENTINAL LYMPH NODE BIOPSY, PREOP NEEDLE LOC performed by Herman Dunn MD at [address] [place] [address] Left 4/6/2021    DEBRIDEMENT LEFT BREAST MASTECTOMY WOUND performed by Herman Dunn MD at [address] [place] [address] Left 4/27/2021    LEFT BREAST WOUND REVISION performed by Grey Woodard MD at [address] [facility]  01/2020    DILATION AND CURETTAGE OF UTERUS      BEN US GUID NDL BIOPSY LEFT Left 01/14/2021    BEN [address] LEFT 1/14/2021 Brooke Bernard MD [address] N/A 01/26/2021    EXCHANGE OF INTERSTIM SYSTEM performed by Delilah Webb MD at [facility]  03/23/2021    right breast i and d with closure Dr Headley Or in the procedure room    PAIN MANAGEMENT PROCEDURE Left 8/31/2021    left T3 paravertebral block under fluoroscopy performed by Lian Ferrer DO at [facility] OFFICE/OUTPT VISIT,PROCEDURE ONLY N/A 11/21/2018    INTERSTIM DEVICE PLACEMENT MODEL 3058, ONLY HEAD ELIGIBLE FOR MRI WITH TRANSMIT/RECEIVE HEAD COIL    TUBAL LIGATION      10 years ago    US GUIDED NEEDLE LOC OF LEFT BREAST Left 02/19/2021    US GUIDED NEEDLE LOC OF LEFT BREAST 2/19/2021 [facility] BIOPSY  01/14/2021    US LYMPH NODE BIOPSY 1/14/2021 Brooke Bernard MD [facility]       Current Medications:    Current Facility-Administered Medications: 0.9 % sodium chloride infusion, , IntraVENous, Continuous  sodium chloride flush 0.9 % injection 5-40 mL, 5-40 mL, IntraVENous, 2 times per day  sodium chloride flush 0.9 % injection 5-40 mL, 5-40 mL, IntraVENous, PRN  0.9 % sodium chloride infusion, , IntraVENous, PRN  ondansetron (ZOFRAN-ODT) disintegrating tablet 4 mg, 4 mg, Oral, Q8H PRN **OR** ondansetron (ZOFRAN) injection 4 mg, 4 mg, IntraVENous, Q6H PRN  polyethylene glycol (GLYCOLAX) packet 17 g, 17 g, Oral, Daily PRN  clindamycin (CLEOCIN) 600 mg in dextrose 5 % 50 mL IVPB, 600 mg, IntraVENous, Q8H  vancomycin (VANCOCIN) 1,000 mg in sodium chloride 0.9 % 250 mL IVPB ([id number]), 1,000 mg, IntraVENous, Q12H  anastrozole (ARIMIDEX) tablet 1 mg, 1 mg, Oral, Every Other Day  atorvastatin (LIPITOR) tablet 40 mg, 40 mg, Oral, Nightly  baclofen (LIORESAL) tablet 20 mg, 20 mg, Oral, TID  hydrOXYzine pamoate (VISTARIL) capsule 50 mg, 50 mg, Oral, TID PRN  magnesium oxide (MAG-OX) tablet 400 mg, 400 mg, Oral, Daily  montelukast (SINGULAIR) tablet 10 mg, 10 mg, Oral, Nightly  pantoprazole (PROTONIX) tablet 40 mg, 40 mg, Oral, QAM AC  oxyCODONE-acetaminophen (PERCOCET) 5-325 MG per tablet 1 tablet, 1 tablet, Oral, BID PRN  paliperidone (INVEGA) extended release tablet 3 mg, 3 mg, Oral, Nightly  potassium chloride (KLOR-CON M) extended release tablet 20 mEq, 20 mEq, Oral, Daily  pregabalin (LYRICA) capsule 150 mg, 150 mg, Oral, BID  albuterol sulfate HFA (PROVENTIL;VENTOLIN;PROAIR) 108 (90 Base) MCG/ACT inhaler 2 puff, 2 puff, Inhalation, Q6H PRN  tiotropium (SPIRIVA RESPIMAT) 2.5 MCG/ACT inhaler 2 puff, 2 puff, Inhalation, Daily  insulin lispro (HUMALOG) injection vial 0-8 Units, 0-8 Units, SubCUTAneous, TID WC  insulin lispro (HUMALOG) injection vial 0-4 Units, 0-4 Units, SubCUTAneous, Nightly  glucose chewable tablet 16 g, 4 tablet, Oral, PRN  dextrose bolus 10% 125 mL, 125 mL, IntraVENous, PRN **OR** dextrose bolus 10% 250 mL, 250 mL, IntraVENous, PRN  glucagon (rDNA) injection 1 mg, 1 mg, SubCUTAneous, PRN  dextrose 10 % infusion, , IntraVENous, Continuous PRN  triamterene-hydroCHLOROthiazide (MAXZIDE-25) 37.5-25 MG per tablet 1 tablet, 1 tablet, Oral, Daily  escitalopram (LEXAPRO) tablet 5 mg, 5 mg, Oral, Daily  rivaroxaban (XARELTO) tablet 10 mg, 10 mg, Oral, Daily    Allergies:  Penicillins and Seasonal    Social History:    TOBACCO:   reports that she has been smoking cigarettes. She started smoking about 42 years ago. She has been smoking an average of .5 packs per day. She has never used smokeless tobacco.  ETOH:   reports current alcohol use. DRUGS:   reports no history of drug use.     Family History:       Problem Relation Age of Onset    Stroke Mother     Asthma Mother     Other Mother     No Known Problems Sister     Asthma Brother     No Known Problems Brother        REVIEW OF 4/5 on the left. Minimal pain on palpation of right fifth metatarsal head. Foot and ankle in grossly rectus alignment bilaterally. Images from 7/26/2022            STUDIES:    CULTURES:    LABS:   Recent Labs     07/29/22 1830   WBC 3.9*   HGB 11.0*   HCT 31.7*   PLT 57*        Recent Labs     07/29/22 1830      K 3.4*      CO2 26   BUN 8   CREATININE 0.6        Recent Labs     07/29/22  1830   PROT 6.8      No results for input(s): CKTOTAL, CKMB, CKMBINDEX, TROPONINI in the last 72 hours. Assessment: 49 y.o. female with:  Principle  Cellulitis, right leg  Unstageable ulcer, right foot    Chronic  Patient Active Problem List   Diagnosis    Leg weakness, bilateral    Hypokalemia    Hyperglycemia    Multiple sclerosis ([facility])    Spinal cord stroke ([facility])    Hyponatremia    Lupus ([facility])    Lupus (systemic lupus erythematosus) ([facility])    Paraplegia ([facility])    Neurogenic bladder    Neurogenic bowel    Debility    Spinal cord infarction ([address]    Depression    Bipolar 1 disorder ([address]    Asthma without acute exacerbation    E. coli UTI    Skin ulcer of multiple sites ([address] bilateral inner thighs    History of left breast cancer    Anxiety    Transverse myelitis ([address]    Flexion contracture of right knee    Overactive bladder    Urgency-frequency syndrome    Carcinoma of upper-inner quadrant of left breast in female, estrogen receptor positive ([address]    Invasive ductal carcinoma of left breast ([facility])    TIA (transient ischemic attack)    Acute encephalopathy    Metabolic acidosis    Stroke-like symptoms    Hypomagnesemia    Type 2 diabetes mellitus with hyperglycemia, with long-term current use of insulin ([facility])    Elevated LFTs    Pancytopenia (HCC)    Schizophrenia ([facility])    Lactic acidosis    Chronic pain syndrome    Prolonged QT interval    Cellulitis of right leg       Plan  1) Cellulitis, right leg  2) Unstageable ulcer, right foot  -Patient initially examined and evaluated.   -WBC 3.9; patient afebrile  -Lactic acid 2.0; procalcitonin 0.09  -Blood cultures 1 and 2 pending  -Swab culture from 7/26/2022 shows moderate mixed growth of multiple enteric gram negative bacilli, including swarming Proteus species, Staphylococcus species (coagulase negative), [place] Streptococcus species, and light growth of gram positive bacilli most consistent with Corynebacterium species  -Patient received IV clindamycin and vancomycin in ED; IV antibiotics continued on the floor  -Consulted Infectious Diseases; appreciate antibiotics antibiotics recommendations  -Applied betadine to wound to RLE and covered with 4x4 gauze, Kerlix roll, and ACE bandage; dressings to be changed daily  -Ordered Prevalon (heel-offloading) boot; patient is to wear at all times while in bed  -Discussed with patient that as she has failed outpatient PO antibiotics, she will need to start IV antibiotic therapy; plan is for 3 day course of IV antibiotics  -Patient verbalized understanding and agreement with the plan as stated. All of her questions were answered to her satisfaction.     3) Diabetes mellitus Type 2  -Placed patient on medium dose sliding scale insulin; patient states her blood sugar can run low, so will consider placing on low dose sliding scale PRN hypoglycemia  -Consulted Hospitalist; appreciate input for further management/optimization    4) Asthma  -Restarted home medications  -Consulted Hospitalist; appreciate input for further management/optimization    5) Hypokalemia  -Restarted home medications  -Consulted Hospitalist; appreciate input for further management/optimization    6) Hypomagnesemia  -Restarted home medications  -Consulted Hospitalist; appreciate input for further management/optimization    7) Depression  -Restarted home medications  -Consulted Hospitalist; appreciate input for further management/optimization    8) Anxiety  -Restarted home medications  -Consulted Hospitalist; appreciate input for further management/optimization    10) Bipolar disorder  -Restarted home medications  -Consulted Hospitalist; appreciate input for further management/optimization          Kris Kang DPM, PGY-3  Foot and Ankle Surgical Resident  7/30/2022 2:08 AM     Patient examined independent of resident exam. I agree with above findings and plan. Will order MRI right foot.    Zeny Owen DPM

## 2022-07-30 NOTE — ED NOTES
ED to inpatient nurses report    Chief Complaint   Patient presents with    Wound Check     Right foot      Present to ED from home  LOC: alert and orientated to name, place, date  Vital signs   Vitals:    07/29/22 1746   BP: (!) 100/59   Pulse: 99   Resp: 20   Temp: 98.6 °F (37 °C)   TempSrc: Oral   SpO2: 99%      Oxygen Baseline 0    Current needs required 0 Bipap/Cpap No  LDAs:   Peripheral IV 07/29/22 Right Antecubital (Active)   Site Assessment Clean, dry & intact 07/29/22 7614     Mobility: Requires assistance * 1  Pending ED orders: IV antibiotics infusing  Present condition: stable    C-SSRS    Swallow Screening      Electronically signed by Debora Hilliard RN on 7/29/2022 at 10:34 PM       Debora Hilliard RN  07/29/22 6028

## 2022-07-30 NOTE — CONSULTS
Hospitalist Consult Note        Patient:  Radha Hoffmann  YOB: 1965  Date of Service: 7/30/2022  MRN: [id number]   Acct:  [de-identified]   Primary Care Physician: Nelly Green MD    Chief Complaint: Right lower extremity cellulitis  Reason for consult management of chronic conditions    Date of Service: Pt seen/examined in consultation on 7/30/2022     History Of Present Illness:      Musa Gunner y.o. female who we are asked to see/evaluate by Amelie Ahmadi DPM for medical management of chronic conditions. with significant past medical history of DM type 2, multiple sclerosis, spinal cord stroke, lupus, paraplegia, asthma, TIA, and breast cancer who was seen at bedside today on behalf of Dr. Mirian Navarrete. Patient was seen in the ED on 7/26 on behalf of Dr. Mirian Navarrete for RLE cellulitis; patient was encouraged at the time to be admitted as she failed PO antibiotic therapy but the patient refused. Patient followed up with Dr. Mirian Navarrete at the office on 7/29, where she was told to return to the ED to be admitted for IV antibiotics. Patient currently denies any pain to BLE. She also denies any N/V/F/C/SOB/CP or bilateral calf tenderness. She has no new pedal complaints at this time. Assessment and Plan:-  Right lower extremity cellulitis: Patient failed outpatient antibiotics. Currently receiving IV vancomycin and IV clindamycin. Infectious disease consulted. Right lower extremity x-ray shows no evidence of osteomyelitis. MRI pending. Podiatry following. Swab culture from 7/26 showed moderate growth of multiple enteric gram-negative bacilli including swarming Proteus species, Staphylococcus species (coagulase-negative), [place] Streptococcus base, and light growth of gram-positive bacilli most consistent with Corynebacterium species  Type 2 diabetes: Non insulin dependent.  Patient currently on sliding scale; will continue; accu checks; hypoglycemia protocol;   Hypokalemia: likely due to low intake and hypomag; will replace and recheck  Hypomagnesemia: likely due to low intake; replace with IV 4000 mg and resume 400 mg Mag oxide BID radha; recheck  Prolonged QTC: likely secondary to electrolyte disturbances. Patient is on however lot of antipsychotic medications that could also be contributing; will hold all qtc prolonging meds. Replace electrolytes to keep Mg>2 and K>4. Recheck EKG later today. Maintain tele  Chronic pancytopenia: unclear etiology ?due to psych drugs, ?arimidex; goes back to 1/2021; follows with oncology  Left breast cancer 1/2021: Status post left mastectomy. Follows with oncology. Is on prophylactic Arimidex. Resume  History of anxiety/depression: hold home meds due to prolonged QT  History of bipolar disorder: hold home meds currently due to prolonged QT; if QT improves radha can likely resume  History of neurogenic bladder with bladder stimulator: Noted  Chronic debility- Noted      Past Medical History:        Diagnosis Date    Anxiety     Anxiety and depression     Asthma     Bipolar 1 disorder (HCC)     Bipolar 1 disorder with moderate sourav ([facility])     Blood circulation, collateral     Breast cancer [facility]     diagnosed in jan 2021    Cancer [facility])      ?  cervical \"long time ago\"    Cancer ([facility] 01/15/2021    Left Breast    Depression     Endometriosis     GERD (gastroesophageal reflux disease)     Kidney stones     Lupus (HCC)     Movement disorder     MS (multiple sclerosis) (HCC)     Schizophrenia ([address]     Thyroid disease     Type 2 diabetes mellitus with hyperglycemia, with long-term current use of insulin ([address]     Type 2 diabetes mellitus without complication ([address]     Unspecified cerebral artery occlusion with cerebral infarction 2014       Past Surgical History:        Procedure Laterality Date    BREAST LUMPECTOMY Left 02/19/2021    LEFT BREAST MASTECTOMY, SENTINAL LYMPH NODE BIOPSY, PREOP NEEDLE LOC performed by Nancy Monroy MD at [address] Left 4/6/2021    DEBRIDEMENT LEFT BREAST MASTECTOMY WOUND performed by Alfonzo Kohler MD at [address] Left 4/27/2021    LEFT BREAST WOUND REVISION performed by Alfonzo Kohler MD at [address] [facility]  01/2020    DILATION AND CURETTAGE OF UTERUS      BEN US GUID NDL BIOPSY LEFT Left 01/14/2021    BEN US GUID NDL BIOPSY LEFT 1/14/2021 Christi Milian  [facility] SURGERY N/A 01/26/2021    EXCHANGE OF INTERSTIM SYSTEM performed by Chet Pinto MD at [address] [address]  03/23/2021    right breast i and d with closure Dr Lucien Ruiz in the procedure room    PAIN MANAGEMENT PROCEDURE Left 8/31/2021    left T3 paravertebral block under fluoroscopy performed by Ulysses Caldron, DO at [facility] OFFICE/OUTPT [address] [facility] N/A 11/21/2018    INTERSTIM [address] [facility], ONLY HEAD ELIGIBLE FOR MRI WITH TRANSMIT/RECEIVE HEAD COIL    TUBAL LIGATION      10 years ago    [address] LOC OF LEFT BREAST Left 02/19/2021    US GUIDED NEEDLE LOC OF LEFT BREAST 2/19/2021 [place] BIOPSY  01/14/2021    US LYMPH NODE BIOPSY 1/14/2021 Christi Milian  [facility] Medications:   No current facility-administered medications on file prior to encounter. Current Outpatient Medications on File Prior to Encounter   Medication Sig Dispense Refill    cephALEXin (KEFLEX) 500 MG capsule Take 1 capsule by mouth in the morning and 1 capsule at noon and 1 capsule in the evening and 1 capsule before bedtime. Do all this for 7 days. 28 capsule 0    doxycycline hyclate (VIBRA-TABS) 100 MG tablet Take 1 tablet by mouth in the morning and 1 tablet before bedtime. Do all this for 7 days. 14 tablet 0    oxyCODONE-acetaminophen (PERCOCET) 5-325 MG per tablet Take 1 tablet by mouth 2 times daily as needed for Pain for up to 30 days.  60 tablet 0    pregabalin (LYRICA) 150 MG capsule Take 1 capsule by mouth 2 times daily for 30 days. 60 capsule 2    amitriptyline (ELAVIL) 75 MG tablet Take 1 tablet by mouth nightly 30 tablet 2    Lactobacillus Acid-Pectin (ACIDOPHILUS/CITRUS PECTIN) TABS take 1 tablet by mouth once daily (Patient not taking: Reported on 5/19/2022) 30 tablet 0    Misc.  Devices (TRANSFER BENCH) MISC 1 each by Does not apply route daily Tub transfer bench with back 1 each 0    Insulin Syringes, Disposable, U-100 1 ML MISC 1 each by Does not apply route daily 100 each 3    atorvastatin (LIPITOR) 40 MG tablet Take 1 tablet by mouth nightly 30 tablet 3    clopidogrel (PLAVIX) 75 MG tablet Take 1 tablet by mouth daily 30 tablet 3    insulin glargine (LANTUS) 100 UNIT/ML injection vial Inject 70 Units into the skin nightly 10 mL 3    insulin lispro (HUMALOG) 100 UNIT/ML injection vial Inject 10 Units into the skin 3 times daily (with meals) 10 mL 3    magnesium oxide (MAG-OX) 400 (241.3 Mg) MG TABS tablet Take 1 tablet by mouth daily 30 tablet 3    potassium chloride (KLOR-CON M) 20 MEQ extended release tablet Take 1 tablet by mouth daily 180 tablet 1    Handicap Placard MISC by Does not apply route 2/08/22- 2/8/2027 1 each 0    anastrozole (ARIMIDEX) 1 MG tablet Take 1 tablet by mouth every other day 30 tablet 3    BD PEN NEEDLE JACOBO 2ND GEN 32G X 4 MM MISC use as directed WITH INSULIN      baclofen (LIORESAL) 10 MG tablet Take 2 tablets by mouth 3 times daily 135 tablet 5    naloxone 4 MG/0.1ML LIQD nasal spray 1 spray by Nasal route as needed for Opioid Reversal 1 each 5    metFORMIN (GLUCOPHAGE) 1000 MG tablet Take 1,000 mg by mouth 2 times daily      desvenlafaxine succinate (PRISTIQ) 50 MG TB24 extended release tablet Take 50 mg by mouth daily      MUCINEX MAXIMUM STRENGTH 1200 MG TB12 Take 1 tablet by mouth 2 times daily      paliperidone (INVEGA) 3 MG extended release tablet Take 3 mg by mouth nightly      SITagliptin (JANUVIA) 100 MG tablet Take 100 mg by mouth daily      Lifitegrast (XIIDRA) 5 % SOLN Place 1 drop into both eyes daily      Misc. Devices [facility] [address] [facility] Wheelchair repairs- new seat cover, right side armrest replacement    Current body weight:  68 kg  Diagnosis: gait disturbance, chronic incomplete spastic paraplegia  Length of need: Lifetime 1 each 0    tiotropium (SPIRIVA) 18 MCG inhalation capsule Inhale 18 mcg into the lungs daily 2 puffs      montelukast (SINGULAIR) 10 MG tablet Take 10 mg by mouth nightly   0    artificial tears (ARTIFICIAL TEARS) OINT as needed      Calcium Carbonate (CALCIUM 600 PO) Take 1 tablet by mouth 2 times daily      Multiple Vitamin (TAB-A-ISAAC PO) Take 1 tablet by mouth daily      loratadine (CLARITIN) 10 MG tablet Take 1 tablet by mouth daily as needed. 0    PROAIR  (90 BASE) MCG/ACT inhaler Inhale 2 puffs into the lungs every 6 hours as needed. 0    Incontinence Supply Disposable (UNDERPADS) MISC Cloth chux pads  Diagnosis: Paraplegia 344. 1 100 Bottle 11    hydrOXYzine (VISTARIL) 50 MG capsule Take 50 mg by mouth 3 times daily as needed for Itching. omeprazole (PRILOSEC) 20 MG capsule Take 20 mg by mouth daily. Allergies:    Penicillins and Seasonal    Social History:    reports that she has been smoking cigarettes. She started smoking about 42 years ago. She has been smoking an average of .5 packs per day. She has never used smokeless tobacco. She reports current alcohol use. She reports that she does not use drugs. Family History:       Problem Relation Age of Onset    Stroke Mother     Asthma Mother     Other Mother     No Known Problems Sister     Asthma Brother     No Known Problems Brother        Diet:  ADULT DIET; Regular; 3 carb choices (45 gm/meal)    Review of systems:   Pertinent positives as noted in the HPI. All other systems reviewed and negative. PHYSICAL EXAM:  BP (!) 101/50   Pulse 94   Temp 98.3 °F (36.8 °C) (Oral)   Resp 16   SpO2 98%   General appearance: No apparent distress, appears stated age and cooperative.   HEENT: Normal cephalic, atraumatic without obvious deformity. Pupils equal, round, and reactive to light. Extra ocular muscles intact. Conjunctivae/corneas clear. Neck: Supple, with full range of motion. No jugular venous distention. Trachea midline. Respiratory:  Normal respiratory effort. Clear to auscultation, bilaterally without Rales/Wheezes/Rhonchi. Cardiovascular: Regular rate and rhythm with normal S1/S2 without murmurs, rubs or gallops. Abdomen: Soft, non-tender, non-distended with normal bowel sounds. Musculoskeletal:  No clubbing, cyanosis or edema bilaterally. Skin: Skin color, texture, turgor normal. ulceration noted on the plantar aspect of the lateral right foot; No malodor or purulence noted  Neurologic:  Neurovascularly intact without any focal sensory/motor deficits. Cranial nerves: II-XII intact, grossly non-focal.  Psychiatric: Alert and oriented, thought content appropriate, normal insight  Capillary Refill: Brisk,< 3 seconds   Peripheral Pulses: +2 palpable, equal bilaterally     Labs:   Recent Labs     07/29/22  1830 07/30/22  0620   WBC 3.9* 2.8*   HGB 11.0* 9.3*   HCT 31.7* 26.9*   PLT 57* 53*     Recent Labs     07/29/22  1830      K 3.4*      CO2 26   BUN 8   CREATININE 0.6   CALCIUM 8.8     Recent Labs     07/29/22  1830   AST 40   ALT 23   BILIDIR 0.8*   BILITOT 1.5*   ALKPHOS 136*     No results for input(s): INR in the last 72 hours. No results for input(s): Ghazal Gazella in the last 72 hours. Urinalysis:    Lab Results   Component Value Date/Time    NITRU POSITIVE 02/17/2022 10:05 PM    WBCUA 50-75 02/17/2022 10:05 PM    BACTERIA MANY 02/17/2022 10:05 PM    RBCUA 0-2 02/17/2022 10:05 PM    BLOODU NEGATIVE 02/17/2022 10:05 PM    SPECGRAV 1.024 01/26/2021 10:43 AM    GLUCOSEU >= 1000 02/17/2022 10:05 PM       Radiology:   XR FOOT RIGHT (MIN 3 VIEWS)   Final Result   Impression:   No definite osteomyelitis.       This document has been electronically signed by: Jose Aid

## 2022-07-30 NOTE — PROGRESS NOTES
Podiatric Progress Note  aRni Mcdonnell  Subjective :   7/30/222  Patient seen bedside today on behalf of Dr. Jass Torres. Patient appeared pleasant, was oriented to person, place and time and in no acute distress. Patient relates that she did not sleep at all last night and she is very tired. She is curious what further can be done for her foot. She is curious what the plan is from here. Patient denies any N/V/F/C/SOB or CP. Patient has no further pedal concerns at this point in time.      Current Medications:    Current Facility-Administered Medications: potassium chloride (KLOR-CON M) extended release tablet 40 mEq, 40 mEq, Oral, PRN **OR** potassium bicarb-citric acid (EFFER-K) effervescent tablet 40 mEq, 40 mEq, Oral, PRN **OR** potassium chloride 10 mEq/100 mL IVPB (Peripheral Line), 10 mEq, IntraVENous, PRN  magnesium sulfate 4000 mg in 100 mL IVPB premix, 4,000 mg, IntraVENous, Once  0.9 % sodium chloride infusion, , IntraVENous, Continuous  sodium chloride flush 0.9 % injection 5-40 mL, 5-40 mL, IntraVENous, 2 times per day  sodium chloride flush 0.9 % injection 5-40 mL, 5-40 mL, IntraVENous, PRN  0.9 % sodium chloride infusion, , IntraVENous, PRN  [Held by provider] ondansetron (ZOFRAN-ODT) disintegrating tablet 4 mg, 4 mg, Oral, Q8H PRN **OR** [Held by provider] ondansetron (ZOFRAN) injection 4 mg, 4 mg, IntraVENous, Q6H PRN  polyethylene glycol (GLYCOLAX) packet 17 g, 17 g, Oral, Daily PRN  clindamycin (CLEOCIN) 600 mg in dextrose 5 % 50 mL IVPB, 600 mg, IntraVENous, Q8H  vancomycin (VANCOCIN) 1,000 mg in sodium chloride 0.9 % 250 mL IVPB (Saag2Ayb), 1,000 mg, IntraVENous, Q12H  [START ON 7/31/2022] anastrozole (ARIMIDEX) tablet 1 mg, 1 mg, Oral, Every Other Day  atorvastatin (LIPITOR) tablet 40 mg, 40 mg, Oral, Nightly  baclofen (LIORESAL) tablet 20 mg, 20 mg, Oral, TID  hydrOXYzine pamoate (VISTARIL) capsule 50 mg, 50 mg, Oral, TID PRN  [Held by provider] magnesium oxide (MAG-OX) tablet 400 mg, 400 mg, Oral, Daily  montelukast (SINGULAIR) tablet 10 mg, 10 mg, Oral, Nightly  pantoprazole (PROTONIX) tablet 40 mg, 40 mg, Oral, QAM AC  oxyCODONE-acetaminophen (PERCOCET) 5-325 MG per tablet 1 tablet, 1 tablet, Oral, BID PRN  [Held by provider] paliperidone (INVEGA) extended release tablet 3 mg, 3 mg, Oral, Nightly  potassium chloride (KLOR-CON M) extended release tablet 20 mEq, 20 mEq, Oral, Daily  pregabalin (LYRICA) capsule 150 mg, 150 mg, Oral, BID  albuterol sulfate HFA (PROVENTIL;VENTOLIN;PROAIR) 108 (90 Base) MCG/ACT inhaler 2 puff, 2 puff, Inhalation, Q6H PRN  tiotropium (SPIRIVA RESPIMAT) 2.5 MCG/ACT inhaler 2 puff, 2 puff, Inhalation, Daily  insulin lispro (HUMALOG) injection vial 0-8 Units, 0-8 Units, SubCUTAneous, TID WC  insulin lispro (HUMALOG) injection vial 0-4 Units, 0-4 Units, SubCUTAneous, Nightly  glucose chewable tablet 16 g, 4 tablet, Oral, PRN  dextrose bolus 10% 125 mL, 125 mL, IntraVENous, PRN **OR** dextrose bolus 10% 250 mL, 250 mL, IntraVENous, PRN  glucagon (rDNA) injection 1 mg, 1 mg, SubCUTAneous, PRN  dextrose 10 % infusion, , IntraVENous, Continuous PRN  triamterene-hydroCHLOROthiazide (MAXZIDE-25) 37.5-25 MG per tablet 1 tablet, 1 tablet, Oral, Daily  [Held by provider] escitalopram (LEXAPRO) tablet 5 mg, 5 mg, Oral, Daily  rivaroxaban (XARELTO) tablet 10 mg, 10 mg, Oral, Daily    Objective     BP (!) 94/51   Pulse 98   Temp 98.2 °F (36.8 °C) (Oral)   Resp 16   SpO2 99%      I/O:  Intake/Output Summary (Last 24 hours) at 7/30/2022 0912  Last data filed at 7/30/2022 0837  Gross per 24 hour   Intake 1005 ml   Output 0 ml   Net 1005 ml              Wt Readings from Last 3 Encounters:   07/26/22 150 lb (68 kg)   05/16/22 158 lb (71.7 kg)   05/16/22 158 lb (71.7 kg)       LABS:    Recent Labs     07/29/22  1830 07/30/22  0620   WBC 3.9* 2.8*   HGB 11.0* 9.3*   HCT 31.7* 26.9*   PLT 57* 53*        Recent Labs     07/29/22  1830      K 3.4*      CO2 26   BUN 8   CREATININE 0.6 Overactive bladder    Urgency-frequency syndrome    Carcinoma of upper-inner quadrant of left breast in female, estrogen receptor positive ([facility] [address]    Invasive ductal carcinoma of left breast (HCC)    TIA (transient ischemic attack)    Acute encephalopathy    Metabolic acidosis    Stroke-like symptoms    Hypomagnesemia    Type 2 diabetes mellitus with hyperglycemia, with long-term current use of insulin (HCC)    Elevated LFTs    Pancytopenia (HCC)    Schizophrenia (HCC)    Lactic acidosis    Chronic pain syndrome    Prolonged QT interval    Cellulitis of right leg       PLAN:   1) Cellulitis, right leg  2) Unstageable ulcer, right foot  -Patient initially examined and evaluated. -WBC 2.8; patient afebrile  -Lactic acid 2.0; procalcitonin 0.09  -Blood cultures 1 and 2 pending  -Swab culture from 7/26/2022 shows moderate mixed growth of multiple enteric gram negative bacilli, including swarming Proteus species, Staphylococcus species (coagulase negative), Viridans Streptococcus species, and light growth of gram positive bacilli most consistent with Corynebacterium species  -Patient received IV clindamycin and vancomycin in ED; IV antibiotics continued on the floor  -Consulted Infectious Diseases; appreciate antibiotics antibiotics recommendations  -Applied betadine to wound to RLE and covered with 4x4 gauze, Kerlix roll, and ACE bandage; dressings to be changed daily  -Ordered Prevalon (heel-offloading) boot; patient is to wear at all times while in bed  -Discussed with patient that as she has failed outpatient PO antibiotics, she will need to start IV antibiotic therapy; plan is for 3 day course of IV antibiotics  -MRI ordered, but patient cannot undergo MRI due to bladder stimulator. 3-phase bone scan of right foot ordered to r/o OM  -Patient verbalized understanding and agreement with the plan as stated. All of her questions were answered to her satisfaction.      3) Diabetes mellitus Type 2  -Placed patient on medium dose sliding scale insulin; patient states her blood sugar can run low, so will consider placing on low dose sliding scale PRN hypoglycemia  -Consulted Hospitalist; appreciate input for further management/optimization     4) Asthma  -Restarted home medications  -Consulted Hospitalist; appreciate input for further management/optimization     5) Hypokalemia  -Restarted home medications  -Consulted Hospitalist; appreciate input for further management/optimization     6) Hypomagnesemia  -Restarted home medications  -Consulted Hospitalist; appreciate input for further management/optimization     7) Depression  -Restarted home medications  -Consulted Hospitalist; appreciate input for further management/optimization     8) Anxiety  -Restarted home medications  -Consulted Hospitalist; appreciate input for further management/optimization     10) Bipolar disorder  -Restarted home medications  -Consulted Hospitalist; appreciate input for further management/optimization    DISPO: Pending response to iv abx, results of bone scan right foot    Vaishnavi Jiménez D.P.M. PGY3  Podiatric Surgical Resident  7/30/2022   9:12 AM

## 2022-07-30 NOTE — PLAN OF CARE
Problem: Discharge Planning  Goal: Discharge to home or other facility with appropriate resources  Outcome: Progressing  Flowsheets (Taken 7/30/2022 1200)  Discharge to home or other facility with appropriate resources:   Identify barriers to discharge with patient and caregiver   Identify discharge learning needs (meds, wound care, etc)   Arrange for needed discharge resources and transportation as appropriate     Problem: Pain  Goal: Verbalizes/displays adequate comfort level or baseline comfort level  Outcome: Progressing  Flowsheets (Taken 7/30/2022 1200)  Verbalizes/displays adequate comfort level or baseline comfort level:   Encourage patient to monitor pain and request assistance   Administer analgesics based on type and severity of pain and evaluate response   Consider cultural and social influences on pain and pain management   Assess pain using appropriate pain scale   Implement non-pharmacological measures as appropriate and evaluate response   Notify Licensed Independent Practitioner if interventions unsuccessful or patient reports new pain     Problem: Chronic Conditions and Co-morbidities  Goal: Patient's chronic conditions and co-morbidity symptoms are monitored and maintained or improved  Outcome: Progressing  Flowsheets (Taken 7/30/2022 1200)  Care Plan - Patient's Chronic Conditions and Co-Morbidity Symptoms are Monitored and Maintained or Improved:   Monitor and assess patient's chronic conditions and comorbid symptoms for stability, deterioration, or improvement   Collaborate with multidisciplinary team to address chronic and comorbid conditions and prevent exacerbation or deterioration     Problem: Safety - Adult  Goal: Free from fall injury  Outcome: Adequate for Discharge  Flowsheets (Taken 7/30/2022 1200)  Free From Fall Injury: Instruct family/caregiver on patient safety     Problem: Respiratory - Adult  Goal: Achieves optimal ventilation and oxygenation  7/30/2022 1200 by Cinthia Singh RN  Outcome: Completed  Flowsheets (Taken 7/30/2022 1200)  Achieves optimal ventilation and oxygenation:   Assess for changes in respiratory status   Position to facilitate oxygenation and minimize respiratory effort   Assess for changes in mentation and behavior   Oxygen supplementation based on oxygen saturation or arterial blood gases     Care plan reviewed with patient. Patient verbalizes understanding of plan of care and contributes to goal setting.

## 2022-07-30 NOTE — PROGRESS NOTES
[address] [address]  INPATIENT PHYSICAL THERAPY  EVALUATION  [facility] ORTHOPEDICS 7K - [id number]    Time In: 1400  Time Out: 1455  Timed Code Treatment Minutes: 40 Minutes  Minutes: 55          Date: 2022  Patient Name: Pablito Pack,  Gender:  female        MRN: [id number]  : 1965  (49 y.o.)  Referral Date : 22   Referring Practitioner: Paola Alegre DPM     Additional Pertinent Hx: Per H&P:The patient is a 49 y.o. female with significant past medical history of DM type 2, multiple sclerosis, spinal cord stroke, lupus, paraplegia, asthma, TIA, and breast cancer who was seen at bedside today on behalf of Dr. Zaria Kim. Patient was seen in the ED on  on behalf of Dr. Zaria Kim for RLE cellulitis; patient was encouraged at the time to be admitted as she failed PO antibiotic therapy but the patient refused. Patient followed up with Dr. Zaria Kim at the office on , where she was told to return to the ED to be admitted for IV antibiotics. Patient currently denies any pain to BLE. She also denies any N/V/F/C/SOB/CP or bilateral calf tenderness. She has no new pedal complaints at this time. Restrictions/Precautions:  Restrictions/Precautions: Fall Risk, Bedrest with Bathroom Privileges, Weight Bearing  Right Lower Extremity Weight Bearing: Non Weight Bearing  Position Activity Restriction  Other position/activity restrictions: wound right foot    Subjective:  Chart Reviewed: Yes  Patient assessed for rehabilitation services?: Yes  Family / Caregiver Present: No  Subjective: Patient in room in wheelchair, agreeable to PT. RN okay with PT session. Pt notes more comfortable in her wheelchair, unable to get comfortable in the bed.  Pt reports podiatry reporting she is non weight bearing to her right lower extremity    General:  Orientation Level: Oriented to place, Oriented to time, Oriented to situation, Oriented to person  Vision: Impaired  Vision Exceptions: Wears glasses at all times  Hearing: Within functional limits       Pain: right LE--not rated    Vitals: Vitals not assessed per clinical judgement, see nursing flowsheet    Social/Functional History:    Lives With: Significant other (Boyfriend)  Type of Home: Apartment  Home Layout: One level  Home Equipment: Aiming (reports she has never had an electric wheelchair)     Bathroom Shower/Tub: Tub/Shower unit  Bathroom Equipment: Grab bars in shower, Tub transfer bench, Grab bars around toilet, Commode  Bathroom Accessibility: Wheelchair accessible       ADL Assistance: Needs assistance  Homemaking Assistance: Needs assistance  Ambulation Assistance: Non-ambulatory  Transfer Assistance: Independent    Active : No     Additional Comments: Patient's boyfriend just retired and is able to assist pt. History inconsistent    OBJECTIVE:  Range of Motion:  Right Lower Extremity: WFL  Left Lower Extremity: [employer] [facility] [employer]  *Active assist    Strength:  Bilateral LE not tested. Pt requires assistance from another or assistance from pt's Ue's to assist LE's with movement. Balance:  Static Sitting Balance:  Supervision  Dynamic Sitting Balance: Contact Guard Assistance    Bed Mobility:  Not Tested--pt refused to transfer into bed  *Pt notes being up in chair since this morning. Educated pt on importance of transferring out of wheelchair to reduce pressure from bottom but pt refused stating she can't get comfortable in the bed, but can sleep in her wheelchair. Transfers:  Sit Pivot:Contact Guard Assistance x2 to CGA plus min of another wheelchair < > toilet. Recommend drop arm bedside commode that can be elevated/lowered to height of wheelchair. Pt's wheelchair needed stabilized with sit pivot transfer due to right brake not working properly. Pt notes that her break has been that way. Discussed this concern with nurse and that pt should have assistance with transfers.   **Pt reports transferring by herself from toilet several times this date already. PT recommended asking staff for assistance at least to stand by. PT also discussed this with RN. Chair alarm was placed in patient's wheelchair. *Pt maintained non weight bearing right LE. Increased time to complete transfers. Ambulation:  Pt is non-ambulatory    Wheelchair Mobility:  Modified Independent  Extremities Used: Bilateral Upper Extremities  Type of Chair:Manual  Surface: Level Tile  Distance: 125'  Quality: Slow Velocity, Short Strokes, and Decreased Fluidity   **Pt unable to trey/doff wheelchair leg rests without assistance, reports needing assistance with this at home    Functional Outcome Measures: Completed  AM-PAC Inpatient Mobility without Stair Climbing Raw Score : 8  AM-PAC Inpatient without Stair Climbing T-Scale Score : 30.65    ASSESSMENT:  Activity Tolerance:  Patient tolerance of  treatment: good. Treatment Initiated: Treatment and education initiated within context of evaluation. Evaluation time included review of current medical information, gathering information related to past medical, social and functional history, completion of standardized testing, formal and informal observation of tasks, assessment of data and development of plan of care and goals. Treatment time included skilled education and facilitation of tasks to increase safety and independence with functional mobility for improved independence and quality of life. Therapeutic activity completed to improve patient's ability to complete functional mobility. Assessment: Body Structures, Functions, Activity Limitations Requiring Skilled Therapeutic Intervention: Decreased functional mobility , Decreased balance, Decreased endurance, Decreased strength, Increased pain  Assessment: Patient is a 49year old with a significant PMH and admitted to hospital due to right LE cellulitis. Patient has wound on right foot. Pt was non-ambulatory at [facility] and completed sit pivots only.   Patient reports hx of falls from wheelchair. Pt requiring CGA to min assist to complete sit pivot transfer to and from toilet plus CGA of another and someone to stabilize wheelchair. Pt is able to propel her wheelchair independently. Ms Kate Wild would benefit from skilled PT services to improve her ability to complete functional mobility, reduce her risk for falls and allow patient to return to PLOF. Therapy Prognosis: Good  Co-morbidities: anxiety, bipolar disorder, depression, lupus, MS, DM, hx stroke    Requires PT Follow-Up: Yes    Discharge Recommendations:  Discharge Recommendations: Continue to assess pending progress, Patient would benefit from continued therapy after discharge    Patient Education:  Education  Education Given To: Patient, Family  Education Provided: Safety, Plan of Care, Role of Therapy   . Equipment Recommendations:  Equipment Needed: Yes (wheelchair brakes loose--may benefit from outpatient wheelchair evaluation)    Plan:  Current Treatment Recommendations: Strengthening, Balance training, Functional mobility training, Transfer training, Neuromuscular re-education, Wheelchair mobility training, Endurance training, Patient/Caregiver education & training, Safety education & training, Home exercise program, Equipment evaluation, education, & procurement, Therapeutic activities, Positioning  Plan:  (5-6xGM)    Goals:  Patient goals : go home  Short Term Goals  Time Frame for Short term goals: at discharge  Short term goal 1: Patient will complete sit pivot transfers with supervision to transfer surface to surface safely. Short term goal 2: Patient will trial slide board for increased ease with transfers. Short term goal 3: Patient will complete supine < > sit transfers with modified independence to transfer in and out of bed safely. Short term goal 4: Patient will identify two techniques to reduce pressure from buttocks to reduce risk for wound.   Long Term Goals  Time Frame for Long term goals : N/A due to short estimated length of stay    Following session, patient left in safe position with all fall risk precautions in place.

## 2022-07-31 ENCOUNTER — APPOINTMENT (OUTPATIENT)
Dept: INTERVENTIONAL RADIOLOGY/VASCULAR | Age: 57
DRG: 380 | End: 2022-07-31
Payer: COMMERCIAL

## 2022-07-31 PROBLEM — L97.419 DIABETIC ULCER OF RIGHT MIDFOOT ASSOCIATED WITH TYPE 1 DIABETES MELLITUS (HCC): Status: ACTIVE | Noted: 2022-07-31

## 2022-07-31 PROBLEM — E10.621 DIABETIC ULCER OF RIGHT MIDFOOT ASSOCIATED WITH TYPE 1 DIABETES MELLITUS (HCC): Status: ACTIVE | Noted: 2022-07-31

## 2022-07-31 LAB
ALBUMIN SERPL-MCNC: 2.2 G/DL (ref 3.5–5.1)
ALP BLD-CCNC: 120 U/L (ref 38–126)
ALT SERPL-CCNC: 18 U/L (ref 11–66)
ANION GAP SERPL CALCULATED.3IONS-SCNC: 8 MEQ/L (ref 8–16)
ANISOCYTOSIS: PRESENT
AST SERPL-CCNC: 37 U/L (ref 5–40)
AVERAGE GLUCOSE: 108 MG/DL (ref 70–126)
BASOPHILS # BLD: 0.9 %
BASOPHILS ABSOLUTE: 0 THOU/MM3 (ref 0–0.1)
BILIRUB SERPL-MCNC: 1 MG/DL (ref 0.3–1.2)
BLOOD CULTURE, ROUTINE: NORMAL
BLOOD CULTURE, ROUTINE: NORMAL
BUN BLDV-MCNC: 9 MG/DL (ref 7–22)
CALCIUM SERPL-MCNC: 7.8 MG/DL (ref 8.5–10.5)
CHLORIDE BLD-SCNC: 111 MEQ/L (ref 98–111)
CO2: 23 MEQ/L (ref 23–33)
CREAT SERPL-MCNC: 0.7 MG/DL (ref 0.4–1.2)
EKG ATRIAL RATE: 88 BPM
EKG P AXIS: 53 DEGREES
EKG P-R INTERVAL: 170 MS
EKG Q-T INTERVAL: 418 MS
EKG QRS DURATION: 88 MS
EKG QTC CALCULATION (BAZETT): 505 MS
EKG R AXIS: 46 DEGREES
EKG T AXIS: 50 DEGREES
EKG VENTRICULAR RATE: 88 BPM
EOSINOPHIL # BLD: 3 %
EOSINOPHILS ABSOLUTE: 0.1 THOU/MM3 (ref 0–0.4)
ERYTHROCYTE [DISTWIDTH] IN BLOOD BY AUTOMATED COUNT: 21.9 % (ref 11.5–14.5)
ERYTHROCYTE [DISTWIDTH] IN BLOOD BY AUTOMATED COUNT: 65.1 FL (ref 35–45)
GFR SERPL CREATININE-BSD FRML MDRD: 87 ML/MIN/1.73M2
GLUCOSE BLD-MCNC: 159 MG/DL (ref 70–108)
GLUCOSE BLD-MCNC: 198 MG/DL (ref 70–108)
GLUCOSE BLD-MCNC: 199 MG/DL (ref 70–108)
GLUCOSE BLD-MCNC: 207 MG/DL (ref 70–108)
HBA1C MFR BLD: 5.6 % (ref 4.4–6.4)
HCT VFR BLD CALC: 26.5 % (ref 37–47)
HEMOGLOBIN: 9 GM/DL (ref 12–16)
IMMATURE GRANS (ABS): 0.01 THOU/MM3 (ref 0–0.07)
IMMATURE GRANULOCYTES: 0.3 %
LYMPHOCYTES # BLD: 34.5 %
LYMPHOCYTES ABSOLUTE: 1.1 THOU/MM3 (ref 1–4.8)
MCH RBC QN AUTO: 28.4 PG (ref 26–33)
MCHC RBC AUTO-ENTMCNC: 34 GM/DL (ref 32.2–35.5)
MCV RBC AUTO: 83.6 FL (ref 81–99)
MONOCYTES # BLD: 18 %
MONOCYTES ABSOLUTE: 0.6 THOU/MM3 (ref 0.4–1.3)
NUCLEATED RED BLOOD CELLS: 0 /100 WBC
PLATELET # BLD: 54 THOU/MM3 (ref 130–400)
PMV BLD AUTO: ABNORMAL FL (ref 9.4–12.4)
POTASSIUM REFLEX MAGNESIUM: 3.8 MEQ/L (ref 3.5–5.2)
RBC # BLD: 3.17 MILL/MM3 (ref 4.2–5.4)
SEG NEUTROPHILS: 43.3 %
SEGMENTED NEUTROPHILS ABSOLUTE COUNT: 1.4 THOU/MM3 (ref 1.8–7.7)
SODIUM BLD-SCNC: 142 MEQ/L (ref 135–145)
TOTAL PROTEIN: 5.8 G/DL (ref 6.1–8)
WBC # BLD: 3.3 THOU/MM3 (ref 4.8–10.8)

## 2022-07-31 PROCEDURE — 36415 COLL VENOUS BLD VENIPUNCTURE: CPT

## 2022-07-31 PROCEDURE — 2580000003 HC RX 258: Performed by: INTERNAL MEDICINE

## 2022-07-31 PROCEDURE — 94640 AIRWAY INHALATION TREATMENT: CPT

## 2022-07-31 PROCEDURE — 1200000000 HC SEMI PRIVATE

## 2022-07-31 PROCEDURE — 80053 COMPREHEN METABOLIC PANEL: CPT

## 2022-07-31 PROCEDURE — 85025 COMPLETE CBC W/AUTO DIFF WBC: CPT

## 2022-07-31 PROCEDURE — 2580000003 HC RX 258: Performed by: STUDENT IN AN ORGANIZED HEALTH CARE EDUCATION/TRAINING PROGRAM

## 2022-07-31 PROCEDURE — 2500000003 HC RX 250 WO HCPCS: Performed by: STUDENT IN AN ORGANIZED HEALTH CARE EDUCATION/TRAINING PROGRAM

## 2022-07-31 PROCEDURE — 93971 EXTREMITY STUDY: CPT

## 2022-07-31 PROCEDURE — 93010 ELECTROCARDIOGRAM REPORT: CPT | Performed by: INTERNAL MEDICINE

## 2022-07-31 PROCEDURE — 6360000002 HC RX W HCPCS: Performed by: INTERNAL MEDICINE

## 2022-07-31 PROCEDURE — 82948 REAGENT STRIP/BLOOD GLUCOSE: CPT

## 2022-07-31 PROCEDURE — 6360000002 HC RX W HCPCS: Performed by: STUDENT IN AN ORGANIZED HEALTH CARE EDUCATION/TRAINING PROGRAM

## 2022-07-31 PROCEDURE — 83036 HEMOGLOBIN GLYCOSYLATED A1C: CPT

## 2022-07-31 PROCEDURE — 6370000000 HC RX 637 (ALT 250 FOR IP): Performed by: INTERNAL MEDICINE

## 2022-07-31 PROCEDURE — 99233 SBSQ HOSP IP/OBS HIGH 50: CPT | Performed by: INTERNAL MEDICINE

## 2022-07-31 PROCEDURE — 6370000000 HC RX 637 (ALT 250 FOR IP): Performed by: STUDENT IN AN ORGANIZED HEALTH CARE EDUCATION/TRAINING PROGRAM

## 2022-07-31 PROCEDURE — 99253 IP/OBS CNSLTJ NEW/EST LOW 45: CPT | Performed by: PSYCHIATRY & NEUROLOGY

## 2022-07-31 RX ORDER — BUPROPION HYDROCHLORIDE 150 MG/1
150 TABLET ORAL EVERY MORNING
Status: DISCONTINUED | OUTPATIENT
Start: 2022-08-01 | End: 2022-08-02 | Stop reason: HOSPADM

## 2022-07-31 RX ORDER — BUPROPION HYDROCHLORIDE 150 MG/1
150 TABLET ORAL DAILY
Status: DISCONTINUED | OUTPATIENT
Start: 2022-07-31 | End: 2022-07-31

## 2022-07-31 RX ORDER — METRONIDAZOLE 500 MG/1
500 TABLET ORAL EVERY 8 HOURS SCHEDULED
Status: DISCONTINUED | OUTPATIENT
Start: 2022-07-31 | End: 2022-08-02 | Stop reason: HOSPADM

## 2022-07-31 RX ADMIN — METRONIDAZOLE 500 MG: 500 TABLET ORAL at 22:47

## 2022-07-31 RX ADMIN — CLINDAMYCIN PHOSPHATE 600 MG: 600 INJECTION, SOLUTION INTRAVENOUS at 03:53

## 2022-07-31 RX ADMIN — MONTELUKAST SODIUM 10 MG: 10 TABLET ORAL at 20:09

## 2022-07-31 RX ADMIN — ATORVASTATIN CALCIUM 40 MG: 40 TABLET, FILM COATED ORAL at 22:47

## 2022-07-31 RX ADMIN — OXYCODONE AND ACETAMINOPHEN 1 TABLET: 5; 325 TABLET ORAL at 21:46

## 2022-07-31 RX ADMIN — BACLOFEN 20 MG: 10 TABLET ORAL at 08:51

## 2022-07-31 RX ADMIN — RIVAROXABAN 10 MG: 10 TABLET, FILM COATED ORAL at 17:44

## 2022-07-31 RX ADMIN — PREGABALIN 150 MG: 75 CAPSULE ORAL at 08:51

## 2022-07-31 RX ADMIN — BACLOFEN 20 MG: 10 TABLET ORAL at 14:13

## 2022-07-31 RX ADMIN — POTASSIUM CHLORIDE 20 MEQ: 20 TABLET, EXTENDED RELEASE ORAL at 08:51

## 2022-07-31 RX ADMIN — BACLOFEN 20 MG: 10 TABLET ORAL at 20:09

## 2022-07-31 RX ADMIN — OXYCODONE AND ACETAMINOPHEN 1 TABLET: 5; 325 TABLET ORAL at 09:55

## 2022-07-31 RX ADMIN — PREGABALIN 150 MG: 75 CAPSULE ORAL at 20:09

## 2022-07-31 RX ADMIN — METRONIDAZOLE 500 MG: 500 TABLET ORAL at 14:13

## 2022-07-31 RX ADMIN — TIOTROPIUM BROMIDE INHALATION SPRAY 2 PUFF: 3.12 SPRAY, METERED RESPIRATORY (INHALATION) at 09:05

## 2022-07-31 RX ADMIN — CEFEPIME 2000 MG: 2 INJECTION, POWDER, FOR SOLUTION INTRAVENOUS at 22:48

## 2022-07-31 RX ADMIN — CEFEPIME 2000 MG: 2 INJECTION, POWDER, FOR SOLUTION INTRAVENOUS at 09:59

## 2022-07-31 RX ADMIN — VANCOMYCIN HYDROCHLORIDE 1000 MG: 1 INJECTION, POWDER, LYOPHILIZED, FOR SOLUTION INTRAVENOUS at 08:54

## 2022-07-31 RX ADMIN — VANCOMYCIN HYDROCHLORIDE 1000 MG: 1 INJECTION, POWDER, LYOPHILIZED, FOR SOLUTION INTRAVENOUS at 21:42

## 2022-07-31 RX ADMIN — ANASTROZOLE 1 MG: 1 TABLET, COATED ORAL at 08:52

## 2022-07-31 RX ADMIN — PANTOPRAZOLE SODIUM 40 MG: 40 TABLET, DELAYED RELEASE ORAL at 08:51

## 2022-07-31 ASSESSMENT — PAIN SCALES - GENERAL
PAINLEVEL_OUTOF10: 6
PAINLEVEL_OUTOF10: 8
PAINLEVEL_OUTOF10: 7
PAINLEVEL_OUTOF10: 7
PAINLEVEL_OUTOF10: 8

## 2022-07-31 ASSESSMENT — PAIN DESCRIPTION - LOCATION
LOCATION: FOOT
LOCATION: FOOT

## 2022-07-31 ASSESSMENT — PAIN DESCRIPTION - ORIENTATION
ORIENTATION: RIGHT
ORIENTATION: RIGHT

## 2022-07-31 ASSESSMENT — PAIN DESCRIPTION - ONSET
ONSET: ON-GOING
ONSET: ON-GOING

## 2022-07-31 ASSESSMENT — PAIN DESCRIPTION - PAIN TYPE
TYPE: ACUTE PAIN
TYPE: ACUTE PAIN

## 2022-07-31 ASSESSMENT — PAIN DESCRIPTION - DESCRIPTORS
DESCRIPTORS: SHARP
DESCRIPTORS: SHARP

## 2022-07-31 ASSESSMENT — PAIN DESCRIPTION - FREQUENCY
FREQUENCY: CONTINUOUS
FREQUENCY: CONTINUOUS

## 2022-07-31 ASSESSMENT — PAIN - FUNCTIONAL ASSESSMENT
PAIN_FUNCTIONAL_ASSESSMENT: PREVENTS OR INTERFERES SOME ACTIVE ACTIVITIES AND ADLS
PAIN_FUNCTIONAL_ASSESSMENT: PREVENTS OR INTERFERES SOME ACTIVE ACTIVITIES AND ADLS

## 2022-07-31 NOTE — CONSULTS
Department of Psychiatry  Consult Service  Attending Consult Note        Reason for Consult:  Medication management  Requesting Physician:  Dr Vernell Melgoza    History obtained from:  patient      HISTORY OF PRESENT ILLNESS:      The patient is a 49 y.o. female with significant past medical history of Depression who presents with Rt Foot wound. Now treated with antibiotics. On EKG they founf prolongation of QT interval. Her lexapro and invega is put on hold. Reports feeling depressed and down.  Denies feeling suicidal.      Current Psychiatric Medications:  None    Medications:    Current Facility-Administered Medications: cefepime (MAXIPIME) 2000 mg IVPB minibag, 2,000 mg, IntraVENous, Q12H  metroNIDAZOLE (FLAGYL) tablet 500 mg, 500 mg, Oral, 3 times per day  potassium chloride (KLOR-CON M) extended release tablet 40 mEq, 40 mEq, Oral, PRN **OR** potassium bicarb-citric acid (EFFER-K) effervescent tablet 40 mEq, 40 mEq, Oral, PRN **OR** potassium chloride 10 mEq/100 mL IVPB (Peripheral Line), 10 mEq, IntraVENous, PRN  0.9 % sodium chloride infusion, , IntraVENous, Continuous  sodium chloride flush 0.9 % injection 5-40 mL, 5-40 mL, IntraVENous, 2 times per day  sodium chloride flush 0.9 % injection 5-40 mL, 5-40 mL, IntraVENous, PRN  0.9 % sodium chloride infusion, , IntraVENous, PRN  [Held by provider] ondansetron (ZOFRAN-ODT) disintegrating tablet 4 mg, 4 mg, Oral, Q8H PRN **OR** [Held by provider] ondansetron (ZOFRAN) injection 4 mg, 4 mg, IntraVENous, Q6H PRN  polyethylene glycol (GLYCOLAX) packet 17 g, 17 g, Oral, Daily PRN  vancomycin (VANCOCIN) 1,000 mg in sodium chloride 0.9 % 250 mL IVPB ([id number]), 1,000 mg, IntraVENous, Q12H  anastrozole (ARIMIDEX) tablet 1 mg, 1 mg, Oral, Every Other Day  atorvastatin (LIPITOR) tablet 40 mg, 40 mg, Oral, Nightly  baclofen (LIORESAL) tablet 20 mg, 20 mg, Oral, TID  hydrOXYzine pamoate (VISTARIL) capsule 50 mg, 50 mg, Oral, TID PRN  magnesium oxide (MAG-OX) tablet 400 mg, 400 mg, Oral, Daily  montelukast (SINGULAIR) tablet 10 mg, 10 mg, Oral, Nightly  pantoprazole (PROTONIX) tablet 40 mg, 40 mg, Oral, QAM AC  oxyCODONE-acetaminophen (PERCOCET) 5-325 MG per tablet 1 tablet, 1 tablet, Oral, BID PRN  [Held by provider] paliperidone (INVEGA) extended release tablet 3 mg, 3 mg, Oral, Nightly  potassium chloride (KLOR-CON M) extended release tablet 20 mEq, 20 mEq, Oral, Daily  pregabalin (LYRICA) capsule 150 mg, 150 mg, Oral, BID  albuterol sulfate HFA (PROVENTIL;VENTOLIN;PROAIR) 108 (90 Base) MCG/ACT inhaler 2 puff, 2 puff, Inhalation, Q6H PRN  tiotropium (SPIRIVA RESPIMAT) 2.5 MCG/ACT inhaler 2 puff, 2 puff, Inhalation, Daily  insulin lispro (HUMALOG) injection vial 0-8 Units, 0-8 Units, SubCUTAneous, TID WC  insulin lispro (HUMALOG) injection vial 0-4 Units, 0-4 Units, SubCUTAneous, Nightly  glucose chewable tablet 16 g, 4 tablet, Oral, PRN  dextrose bolus 10% 125 mL, 125 mL, IntraVENous, PRN **OR** dextrose bolus 10% 250 mL, 250 mL, IntraVENous, PRN  glucagon (rDNA) injection 1 mg, 1 mg, SubCUTAneous, PRN  dextrose 10 % infusion, , IntraVENous, Continuous PRN  [Held by provider] triamterene-hydroCHLOROthiazide (MAXZIDE-25) 37.5-25 MG per tablet 1 tablet, 1 tablet, Oral, Daily  [Held by provider] escitalopram (LEXAPRO) tablet 5 mg, 5 mg, Oral, Daily  rivaroxaban (XARELTO) tablet 10 mg, 10 mg, Oral, Daily    Drug and Alcohol History:  not significant    Most Recent Urine Drug Screen at [facility]:  No components found for: IAMMENTA, IBARBIT, IBENZO, ICOCAINE, IMARTHC, IOPIATES, IPHENCYC    PAST PSYCHIATRIC HISTORY:  Depression    Past psychiatric medications include:  lexapro, invega    Adverse reactions from psychotropic medications:  none    Past Medical History:        Diagnosis Date    Anxiety     Anxiety and depression     Asthma     Bipolar 1 disorder ([facility]     Bipolar 1 disorder with moderate sourav ([address]     Blood circulation, collateral     Breast cancer ([facility]     diagnosed in jan 2021 [facility])      ?  cervical \"long time ago\"    Cancer ([facility] 01/15/2021    Left Breast    Depression     Endometriosis     GERD (gastroesophageal reflux disease)     Kidney stones     Lupus (HCC)     Movement disorder     MS (multiple sclerosis) (HCC)     Schizophrenia ([facility]     Thyroid disease     Type 2 diabetes mellitus with hyperglycemia, with long-term current use of insulin ([address]     Type 2 diabetes mellitus without complication ([address]     Unspecified cerebral artery occlusion with cerebral infarction 2014     Past Surgical History:        Procedure Laterality Date    BREAST LUMPECTOMY Left 02/19/2021    LEFT BREAST MASTECTOMY, SENTINAL LYMPH NODE BIOPSY, PREOP NEEDLE LOC performed by Michael Gutiérrez MD at [facility] Left 4/6/2021    DEBRIDEMENT LEFT BREAST MASTECTOMY WOUND performed by Michael Gutiérrez MD at [facility] Left 4/27/2021    LEFT BREAST WOUND REVISION performed by Michael Gutiérrez MD at [address] [place] [address]  01/2020    DILATION AND CURETTAGE OF UTERUS      [facility] US GUID NDL BIOPSY LEFT Left 01/14/2021    BEN US GUID [address] LEFT 1/14/2021 Alessia Ponce  [facility] SURGERY N/A 01/26/2021    EXCHANGE OF INTERSTIM SYSTEM performed by Nicole Jenkins MD at [address] [place] [address]  03/23/2021    right breast i and d with closure Dr Elio Medley in the procedure room    PAIN MANAGEMENT PROCEDURE Left 8/31/2021    left T3 paravertebral block under fluoroscopy performed by Nini Walker DO at [facility] OFFICE/OUTPT [address] [place] [address] N/A 11/21/2018    INTERSTIM DEVICE PLACEMENT MODEL 3058, ONLY HEAD ELIGIBLE FOR MRI WITH TRANSMIT/RECEIVE HEAD COIL    TUBAL LIGATION      10 years ago    [address] LOC OF LEFT BREAST Left 02/19/2021    US GUIDED NEEDLE LOC OF LEFT BREAST 2/19/2021 [facility] BIOPSY  01/14/2021    US LYMPH NODE BIOPSY 1/14/2021 Alessia Pocne MD [facility] Allergies:  Penicillins and Seasonal      Family History:       Problem Relation Age of Onset    Stroke Mother     Asthma Mother     Other Mother     No Known Problems Sister     Asthma Brother     No Known Problems Brother      REVIEW OF SYSTEMS:    See History of Present Illness    PHYSICAL EXAM:    VITALS:  BP (!) 109/58   Pulse 90   Temp 98 °F (36.7 °C) (Oral)   Resp 16   SpO2 99%     Mental Status Examination:  Level of consciousness:  within normal limits  Appearance:  ill-appearing  Behavior/Motor:  no abnormalities noted  Attitude toward examiner:  cooperative and attentive  Speech:  spontaneous, normal rate, and normal volume  Mood:  sad  Affect:  mood congruent  Thought processes:  linear and goal directed  Thought content:  Homocidal ideation denies  Suicidal Ideation:  denies suicidal ideation  Delusions:  no evidence of delusions  Perceptual Disturbance:  denies any perceptual disturbance  Cognition:  oriented to person, place, and time  Concentration succeeded  Memory intact  Insight:  fair  Judgment:  fair        DSM-IV DIAGNOSIS:      Impression (Axis I): Major depressive disorder; moderate    PLAN:    Medications: Will start trial of Wellbutrin  mg po qam. Will continue to hold Lexapro and Invega.

## 2022-07-31 NOTE — PLAN OF CARE
Problem: Discharge Planning  Goal: Discharge to home or other facility with appropriate resources  7/31/2022 0947 by Lesley Siddiqi RN  Outcome: Progressing  Flowsheets (Taken 7/31/2022 0203)  Discharge to home or other facility with appropriate resources:   Identify barriers to discharge with patient and caregiver   Identify discharge learning needs (meds, wound care, etc)   Arrange for needed discharge resources and transportation as appropriate   Refer to discharge planning if patient needs post-hospital services based on physician order or complex needs related to functional status, cognitive ability or social support system     Problem: Pain  Goal: Verbalizes/displays adequate comfort level or baseline comfort level  7/31/2022 0947 by Lesley Siddiqi RN  Outcome: Progressing  Flowsheets (Taken 7/31/2022 0947)  Verbalizes/displays adequate comfort level or baseline comfort level:   Encourage patient to monitor pain and request assistance   Administer analgesics based on type and severity of pain and evaluate response   Consider cultural and social influences on pain and pain management   Assess pain using appropriate pain scale   Implement non-pharmacological measures as appropriate and evaluate response   Notify Licensed Independent Practitioner if interventions unsuccessful or patient reports new pain     Problem: Chronic Conditions and Co-morbidities  Goal: Patient's chronic conditions and co-morbidity symptoms are monitored and maintained or improved  7/31/2022 0947 by Lesley Siddiqi RN  Outcome: Progressing  Flowsheets (Taken 7/31/2022 0947)  Care Plan - Patient's Chronic Conditions and Co-Morbidity Symptoms are Monitored and Maintained or Improved: Monitor and assess patient's chronic conditions and comorbid symptoms for stability, deterioration, or improvement     Problem: Safety - Adult  Goal: Free from fall injury  7/31/2022 0947 by Lesley Siddiqi RN  Outcome: Progressing  Flowsheets (Taken 7/31/2022 0947)  Free From Fall Injury: Instruct family/caregiver on patient safety     Problem: Skin/Tissue Integrity  Goal: Absence of new skin breakdown  Description: 1. Monitor for areas of redness and/or skin breakdown  2. Assess vascular access sites hourly  3. Every 4-6 hours minimum:  Change oxygen saturation probe site  4. Every 4-6 hours:  If on nasal continuous positive airway pressure, respiratory therapy assess nares and determine need for appliance change or resting period. 7/31/2022 0947 by Noni Don RN  Outcome: Progressing     Care plan reviewed with patient. Patient verbalizes understanding of plan of care and contributes to goal setting.

## 2022-07-31 NOTE — PROGRESS NOTES
Hospitalist Consult Progress Note    Patient:  Jarrod Wen  YOB: 1965  MRN: [id number]     Acct: [de-identified]  Date of service:       ASSESSMENT:    Active Hospital Problems    Diagnosis Date Noted    Cellulitis of right leg [U13.409] 07/29/2022     Priority: Medium       PLAN:      Right lower extremity cellulitis / DFI: Patient failed outpatient antibiotics. S/p receiving IV vancomycin and IV clindamycin in ED. Unstageable on arrival.   Culture 7/26 shows polymicrobial infection    Stop clinda, cont Vanc, start Cefepime, and Flagyl  ID has been consulted  Doppler has ruled out RLE DVT, will obtain arterial dopplers as well  Podiatry is following, planning bone scan to rule out OM  Type 2 diabetes: Non insulin dependent. A1c 9.1 5 months ago. Will repeat A1C. On 70u lantus PTA and 10u lispro with meals. Patient currently on sliding scale; will continue since poor oral intake; accu checks; hypoglycemia protocol  May need to resume reduced dose of lantus at some point  Hypokalemia: likely due to low intake has resolved. Will monitor. Restart hoome supplementation. Prolonged QTC (chronically): likely worsened by electrolyte disturbances. Patient is on however lot of antipsychotic medications that could also be contributing; will hold all qtc prolonging meds. Replace electrolytes to keep Mg>2 and K>4. Recheck EKG later today. Maintain tele  Chronic pancytopenia: unclear etiology ?due to psych drugs vs lupus? Dates back to 1/2021; follows with oncology  Will monitor, stable for now. 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 (since 2014 - per Dr. Kavita Gilbert note seems to favor dx of infarction) / chronic pain and debility  Follows PMnR, gets botox to contractures  Per chart - history of Lupus, MS??  Will look into whether these are true diagnoses - patient denies  Left breast cancer 1/2021: Status post left mastectomy. Follows with oncology. Is on Arimidex. Resume  History of bipolar disorder/schizophrenia: hold home meds currently due to prolonged QT  Consulting Psychiatry to adjust meds if able  History of neurogenic bladder with bladder stimulator: Noted  Chronic debility and chronic pain: Noted. Follows with pain managmeent  Tobacco abuse: counseling. Patient declines NRT. Thank you, Terese Barajas DPM, for the consultation. Hospitalist service will  continue to follow along. Electronically signed by Murali Barker DO on 7/31/2022 at 8:42 AM      ===================================================================      Chief Complaint:  Right foot wound. Hospital Course: Per Consult HPI, \"Joanna Powell y.o. female who we are asked to see/evaluate by Terese Barajas DPM for medical management of chronic conditions. with significant past medical history of DM type 2, multiple sclerosis, spinal cord stroke, lupus, paraplegia, asthma, TIA, and breast cancer who was seen at bedside today on behalf of Dr. Zaria Kim. Patient was seen in the ED on 7/26 on behalf of Dr. Zaria Kim for RLE cellulitis; patient was encouraged at the time to be admitted as she failed PO antibiotic therapy but the patient refused. Patient followed up with Dr. Zaria Kim at the office on 7/29, where she was told to return to the ED to be admitted for IV antibiotics. Patient currently denies any pain to BLE. She also denies any N/V/F/C/SOB/CP or bilateral calf tenderness. She has no new pedal complaints at this time. \"    Subjective/24 hours: Patient seen in wheelchair. She is doing well at this time. Denies any significant pain, unless pressure applied to wound, no diarrhea, nausea, vomiting, fevers or chills. Mood is good. REVIEW OF SYSTEMS:   Pertinent positives as noted in the subjective portion. Otherwise complete ROS reviewed and negative/unchanged.     PHYSICAL EXAM:  BP (!) 93/55   Pulse 89   Temp 98 °F (36.7 °C) (Oral)   Resp 16   SpO2 96%     General appearance: No apparent distress, appears older than stated age. Eyes:  Pupils equal, round, and reactive to light. Conjunctivae/corneas clear. HENT: Head normal in appearance. External nares normal.  Oral mucosa moist without lesions. Hearing grossly intact. Neck: Supple, with full range of motion. Trachea midline. No gross JVD appreciated. Respiratory:  Normal respiratory effort. Clear to auscultation, bilaterally without rales or wheezes or rhonchi. Cardiovascular: Normal rate, regular rhythm with normal S1/S2 without murmurs. 1+ BLLE edema. Abdomen: Soft, non-tender, non-distended with normal bowel sounds. No guarding. Musculoskeletal: Right foot wrapped in ACE/bandage. Able to move toes. Skin: Warm and dry. No rashes or lesions. Erythema of the right lower leg. Neurologic:  No focal sensory/motor deficits in the upper and lower extremities. Cranial nerves:  grossly non-focal 2-12. Sensation intact distally. Psychiatric: Alert and oriented, normal insight and thought content. Capillary Refill: ~ 3 seconds BLLE. Labs:   Recent Labs     07/29/22 1830 07/30/22  0620 07/31/22  0557   WBC 3.9* 2.8* 3.3*   HGB 11.0* 9.3* 9.0*   HCT 31.7* 26.9* 26.5*   PLT 57* 53* 54*     Recent Labs     07/29/22 1830 07/30/22  1407     --    K 3.4* 4.7     --    CO2 26  --    BUN 8  --    CREATININE 0.6  --    CALCIUM 8.8  --      Recent Labs     07/29/22 1830   AST 40   ALT 23   BILIDIR 0.8*   BILITOT 1.5*   ALKPHOS 136*     No results for input(s): INR in the last 72 hours. No results for input(s): Adonica Hoose in the last 72 hours.   Lab Results   Component Value Date/Time    NITRU POSITIVE 02/17/2022 10:05 PM    WBCUA 50-75 02/17/2022 10:05 PM    BACTERIA MANY 02/17/2022 10:05 PM    RBCUA 0-2 02/17/2022 10:05 PM    BLOODU NEGATIVE 02/17/2022 10:05 PM    SPECGRAV 1.024 01/26/2021 10:43 AM    GLUCOSEU >= 1000 02/17/2022 10:05 PM       Radiology (last 48 hrs):  XR FOOT RIGHT (MIN 3 VIEWS)    Result Date: 7/29/2022  Impression: No definite osteomyelitis. This document has been electronically signed by: Yamila Mcmahon MD on 07/29/2022 06:54 PM         DVT prophylaxis:  per primary    Diet: ADULT DIET;  Regular; 3 carb choices (45 gm/meal)    Fluids:  per primary    Code Status: Full Code    PT/OT Eval Status: Active and ongoing    Electronically signed by Murali Barker DO on 7/31/2022 at 8:42 AM

## 2022-07-31 NOTE — PROGRESS NOTES
Podiatric Progress Note  Matthias Moncada  Subjective :   7/31/2022  Patient seen at bedside today on behalf of Dr. Vahid Segovia. Patient is pleasant, and is alert and oriented to person, place, and time. Patient recently returned from DVT ultrasound of RLE. Patient was seated in room with a large Ziploc bag filled with candy present at bedside. Patient currently denies any pain to the right lower leg. She endorsed mild pain after she had bumped her leg up earlier this morning, but states that the pain resolved. She currently denies any N/V/F/C/SOB/CP or bilateral calf tenderness. Patient has no other pedal complaints at this time. Patient wanted to know what the plan for her discharge is.    7/30/2022  Patient seen bedside today on behalf of Dr. Vahid Segovia. Patient appeared pleasant, was oriented to person, place and time and in no acute distress. Patient relates that she did not sleep at all last night and she is very tired. She is curious what further can be done for her foot. She is curious what the plan is from here. Patient denies any N/V/F/C/SOB or CP. Patient has no further pedal concerns at this point in time. HPI:  The patient is a 49 y.o. female with significant past medical history of DM type 2, multiple sclerosis, spinal cord stroke, lupus, paraplegia, asthma, TIA, and breast cancer who was seen at bedside today on behalf of Dr. Vahid Segovia. Patient was seen in the ED on 7/26 on behalf of Dr. Vahid Segovia for RLE cellulitis; patient was encouraged at the time to be admitted as she failed PO antibiotic therapy but the patient refused. Patient followed up with Dr. Vahid Segovia at the office on 7/29, where she was told to return to the ED to be admitted for IV antibiotics. Patient currently denies any pain to BLE. She also denies any N/V/F/C/SOB/CP or bilateral calf tenderness. She has no new pedal complaints at this time.     Current Medications:    Current Facility-Administered Medications: cefepime (MAXIPIME) 2000 mg IVPB minibag, 2,000 mg, IntraVENous, Q12H  metroNIDAZOLE (FLAGYL) tablet 500 mg, 500 mg, Oral, 3 times per day  potassium chloride (KLOR-CON M) extended release tablet 40 mEq, 40 mEq, Oral, PRN **OR** potassium bicarb-citric acid (EFFER-K) effervescent tablet 40 mEq, 40 mEq, Oral, PRN **OR** potassium chloride 10 mEq/100 mL IVPB (Peripheral Line), 10 mEq, IntraVENous, PRN  0.9 % sodium chloride infusion, , IntraVENous, Continuous  sodium chloride flush 0.9 % injection 5-40 mL, 5-40 mL, IntraVENous, 2 times per day  sodium chloride flush 0.9 % injection 5-40 mL, 5-40 mL, IntraVENous, PRN  0.9 % sodium chloride infusion, , IntraVENous, PRN  [Held by provider] ondansetron (ZOFRAN-ODT) disintegrating tablet 4 mg, 4 mg, Oral, Q8H PRN **OR** [Held by provider] ondansetron (ZOFRAN) injection 4 mg, 4 mg, IntraVENous, Q6H PRN  polyethylene glycol (GLYCOLAX) packet 17 g, 17 g, Oral, Daily PRN  vancomycin (VANCOCIN) 1,000 mg in sodium chloride 0.9 % 250 mL IVPB (Eoos0Mrz), 1,000 mg, IntraVENous, Q12H  anastrozole (ARIMIDEX) tablet 1 mg, 1 mg, Oral, Every Other Day  atorvastatin (LIPITOR) tablet 40 mg, 40 mg, Oral, Nightly  baclofen (LIORESAL) tablet 20 mg, 20 mg, Oral, TID  hydrOXYzine pamoate (VISTARIL) capsule 50 mg, 50 mg, Oral, TID PRN  [Held by provider] magnesium oxide (MAG-OX) tablet 400 mg, 400 mg, Oral, Daily  montelukast (SINGULAIR) tablet 10 mg, 10 mg, Oral, Nightly  pantoprazole (PROTONIX) tablet 40 mg, 40 mg, Oral, QAM AC  oxyCODONE-acetaminophen (PERCOCET) 5-325 MG per tablet 1 tablet, 1 tablet, Oral, BID PRN  [Held by provider] paliperidone (INVEGA) extended release tablet 3 mg, 3 mg, Oral, Nightly  potassium chloride (KLOR-CON M) extended release tablet 20 mEq, 20 mEq, Oral, Daily  pregabalin (LYRICA) capsule 150 mg, 150 mg, Oral, BID  albuterol sulfate HFA (PROVENTIL;VENTOLIN;PROAIR) 108 (90 Base) MCG/ACT inhaler 2 puff, 2 puff, Inhalation, Q6H PRN  tiotropium (SPIRIVA RESPIMAT) 2.5 MCG/ACT inhaler 2 puff, 2 aspect of the lateral right foot. The wound is surrounded by a surrounding portion of superficial thickness wound. There is mild surrounding erythema and edema. No malodor or purulence noted. Wound does not probe to bone. There is an area of redness to the anteromedial aspect of the right leg; redness is greatly improved and receded since patient was admitted and placed on IV antibiotics. Neurovascular: Light touch sensation is intact to the level of the toes bilaterally     Musculoskeletal: Muscle strength 2/5 for all plantarflexors, dorsiflexors, inverters and everters examined on the right, 4/5 on the left. Minimal pain on palpation of right fifth metatarsal head. Foot and ankle in grossly rectus alignment bilaterally. IMAGING:  Right lower extremity venous Doppler    Impression   No evidence of a DVT. **This report has been created using voice recognition software. It may contain minor errors which are inherent in voice recognition technology. **       Final report electronically signed by Dr Marian Mcnally on 7/31/2022 11:07 AM         ASSESSMENT  Principle  Cellulitis, right leg  Unstageable ulcer, right foot    Chronic  Patient Active Problem List   Diagnosis    Leg weakness, bilateral    Hypokalemia    Hyperglycemia    Multiple sclerosis (HCC)    Spinal cord stroke (HCC)    Hyponatremia    Lupus ([address]    Lupus (systemic lupus erythematosus) ([facility])    Paraplegia ([facility])    Neurogenic bladder    Neurogenic bowel    Debility    Spinal cord infarction ([address]    Depression    Bipolar 1 disorder ([address]    Asthma without acute exacerbation    E. coli UTI    Skin ulcer of multiple sites ([address] bilateral inner thighs    History of left breast cancer    Anxiety    Transverse myelitis ([address]    Flexion contracture of right knee    Overactive bladder    Urgency-frequency syndrome    Carcinoma of upper-inner quadrant of left breast in female, estrogen receptor positive ([address]    Invasive ductal carcinoma of left breast (HCC)    TIA (transient ischemic attack)    Acute encephalopathy    Metabolic acidosis    Stroke-like symptoms    Hypomagnesemia    Type 2 diabetes mellitus with hyperglycemia, with long-term current use of insulin (HCC)    Elevated LFTs    Pancytopenia (HCC)    Schizophrenia (HCC)    Lactic acidosis    Chronic pain syndrome    Prolonged QT interval    Cellulitis of right leg       PLAN:   1) Cellulitis, right leg  2) Unstageable ulcer, right foot  -Patient examined and evaluated. -WBC 3.3; patient afebrile  -Lactic acid 2.0; procalcitonin 0.09  -Blood cultures 1 and 2, preliminary results: No growth  -Swab culture from 7/26/2022 shows moderate mixed growth of multiple enteric gram negative bacilli, including swarming Proteus species, Staphylococcus species (coagulase negative), Viridans Streptococcus species, and light growth of gram positive bacilli most consistent with Corynebacterium species  -Patient currently on IV clindamycin and vancomycin  -Consulted Infectious Diseases; appreciate antibiotics recommendations  -Changed dressings with betadine to wound to RLE and covered with 4x4 gauze, Kerlix roll, and ACE bandage for edema control; dressings to be changed daily  -Continue with Prevalon (heel-offloading) boot to RLE at all times while in bed and wheelchair  -Discussed with patient that we will have a more concrete idea of when to discharge her after she has been seen by infectious diseases, and once we have antibiotics recommendations. Discussed with patient that her likely discharge will be early to middle of this week. -MRI ordered, but patient cannot undergo MRI due to bladder stimulator. 3-phase bone scan of right foot ordered to r/o OM; pending  -Patient verbalized understanding and agreement with the plan as stated. All of her questions were answered to her satisfaction.      3) Diabetes mellitus Type 2  -Placed patient on medium dose sliding scale insulin; patient states her blood sugar can run low, so will consider placing on low dose sliding scale PRN hypoglycemia  -Hospitalist following    4) Asthma  -Restarted home medications  -Hospitalist following     5) Hypokalemia  -Restarted home medications  -Hospitalist following     6) Hypomagnesemia  -Restarted home medications  -Hospitalist following    7) Depression  -Restarted home medications  -Hospitalist following     8) Anxiety  -Restarted home medications  -Hospitalist following     10) Bipolar disorder  -Restarted home medications  -Hospitalist following    DISPO: Pending response to IV abx, results of bone scan right foot    Hans Juarez D.P.M.  PGY-3  Podiatric Surgical Resident  7/31/2022   12:43 PM

## 2022-07-31 NOTE — PLAN OF CARE
Problem: Discharge Planning  Goal: Discharge to home or other facility with appropriate resources  7/31/2022 0158 by Enmanuel Weaver RN  Outcome: Progressing  Flowsheets  Taken 7/31/2022 0158  Discharge to home or other facility with appropriate resources: Identify barriers to discharge with patient and caregiver  Taken 7/30/2022 2000  Discharge to home or other facility with appropriate resources: Identify barriers to discharge with patient and caregiver     Problem: Pain  Goal: Verbalizes/displays adequate comfort level or baseline comfort level  7/31/2022 0158 by Enmanuel Weaver RN  Outcome: Progressing  Flowsheets (Taken 7/31/2022 0158)  Verbalizes/displays adequate comfort level or baseline comfort level:   Encourage patient to monitor pain and request assistance   Assess pain using appropriate pain scale  7/30/2022 1200 by Ayanna Ball RN  Outcome: Progressing  Flowsheets (Taken 7/30/2022 1200)  Verbalizes/displays adequate comfort level or baseline comfort level:   Encourage patient to monitor pain and request assistance   Administer analgesics based on type and severity of pain and evaluate response   Consider cultural and social influences on pain and pain management   Assess pain using appropriate pain scale   Implement non-pharmacological measures as appropriate and evaluate response   Notify Licensed Independent Practitioner if interventions unsuccessful or patient reports new pain     Problem: Chronic Conditions and Co-morbidities  Goal: Patient's chronic conditions and co-morbidity symptoms are monitored and maintained or improved  7/31/2022 0158 by Enmanuel Weaver RN  Outcome: Progressing  Flowsheets  Taken 7/31/2022 0158  Care Plan - Patient's Chronic Conditions and Co-Morbidity Symptoms are Monitored and Maintained or Improved:   Monitor and assess patient's chronic conditions and comorbid symptoms for stability, deterioration, or improvement   Collaborate with multidisciplinary team to address chronic and comorbid conditions and prevent exacerbation or deterioration  Taken 7/30/2022 2000  Care Plan - Patient's Chronic Conditions and Co-Morbidity Symptoms are Monitored and Maintained or Improved: Monitor and assess patient's chronic conditions and comorbid symptoms for stability, deterioration, or improvement     Problem: Safety - Adult  Goal: Free from fall injury  7/31/2022 0158 by Onur Tate RN  Outcome: Progressing  Flowsheets (Taken 7/31/2022 0158)  Free From Fall Injury:   Instruct family/caregiver on patient safety   Based on caregiver fall risk screen, instruct family/caregiver to ask for assistance with transferring infant if caregiver noted to have fall risk factors       Care plan reviewed with patient. Patient verbalizes understanding of the plan of care and contribute to goal setting.

## 2022-08-01 ENCOUNTER — APPOINTMENT (OUTPATIENT)
Dept: NUCLEAR MEDICINE | Age: 57
DRG: 380 | End: 2022-08-01
Payer: COMMERCIAL

## 2022-08-01 ENCOUNTER — APPOINTMENT (OUTPATIENT)
Dept: INTERVENTIONAL RADIOLOGY/VASCULAR | Age: 57
DRG: 380 | End: 2022-08-01
Payer: COMMERCIAL

## 2022-08-01 LAB
ALBUMIN SERPL-MCNC: 1.9 G/DL (ref 3.5–5.1)
ALP BLD-CCNC: 118 U/L (ref 38–126)
ALT SERPL-CCNC: 17 U/L (ref 11–66)
ANION GAP SERPL CALCULATED.3IONS-SCNC: 8 MEQ/L (ref 8–16)
AST SERPL-CCNC: 31 U/L (ref 5–40)
BASOPHILS # BLD: 0.6 %
BASOPHILS ABSOLUTE: 0 THOU/MM3 (ref 0–0.1)
BILIRUB SERPL-MCNC: 0.9 MG/DL (ref 0.3–1.2)
BUN BLDV-MCNC: 11 MG/DL (ref 7–22)
CALCIUM SERPL-MCNC: 7.5 MG/DL (ref 8.5–10.5)
CHLORIDE BLD-SCNC: 113 MEQ/L (ref 98–111)
CO2: 21 MEQ/L (ref 23–33)
CREAT SERPL-MCNC: 0.6 MG/DL (ref 0.4–1.2)
EOSINOPHIL # BLD: 2.3 %
EOSINOPHILS ABSOLUTE: 0.1 THOU/MM3 (ref 0–0.4)
ERYTHROCYTE [DISTWIDTH] IN BLOOD BY AUTOMATED COUNT: 21.7 % (ref 11.5–14.5)
ERYTHROCYTE [DISTWIDTH] IN BLOOD BY AUTOMATED COUNT: 64 FL (ref 35–45)
GFR SERPL CREATININE-BSD FRML MDRD: > 90 ML/MIN/1.73M2
GLUCOSE BLD-MCNC: 142 MG/DL (ref 70–108)
GLUCOSE BLD-MCNC: 161 MG/DL (ref 70–108)
GLUCOSE BLD-MCNC: 185 MG/DL (ref 70–108)
GLUCOSE BLD-MCNC: 268 MG/DL (ref 70–108)
GLUCOSE BLD-MCNC: 282 MG/DL (ref 70–108)
HCT VFR BLD CALC: 29.1 % (ref 37–47)
HEMOGLOBIN: 9.8 GM/DL (ref 12–16)
IMMATURE GRANS (ABS): 0.02 THOU/MM3 (ref 0–0.07)
IMMATURE GRANULOCYTES: 0.6 %
LYMPHOCYTES # BLD: 20.9 %
LYMPHOCYTES ABSOLUTE: 0.7 THOU/MM3 (ref 1–4.8)
MAGNESIUM: 1.8 MG/DL (ref 1.6–2.4)
MCH RBC QN AUTO: 28.1 PG (ref 26–33)
MCHC RBC AUTO-ENTMCNC: 33.7 GM/DL (ref 32.2–35.5)
MCV RBC AUTO: 83.4 FL (ref 81–99)
MONOCYTES # BLD: 10.9 %
MONOCYTES ABSOLUTE: 0.4 THOU/MM3 (ref 0.4–1.3)
NUCLEATED RED BLOOD CELLS: 0 /100 WBC
PLATELET # BLD: 56 THOU/MM3 (ref 130–400)
PMV BLD AUTO: ABNORMAL FL (ref 9.4–12.4)
POTASSIUM SERPL-SCNC: 3.5 MEQ/L (ref 3.5–5.2)
RBC # BLD: 3.49 MILL/MM3 (ref 4.2–5.4)
SEG NEUTROPHILS: 64.7 %
SEGMENTED NEUTROPHILS ABSOLUTE COUNT: 2.3 THOU/MM3 (ref 1.8–7.7)
SODIUM BLD-SCNC: 142 MEQ/L (ref 135–145)
TOTAL PROTEIN: 5.5 G/DL (ref 6.1–8)
WBC # BLD: 3.5 THOU/MM3 (ref 4.8–10.8)

## 2022-08-01 PROCEDURE — 80053 COMPREHEN METABOLIC PANEL: CPT

## 2022-08-01 PROCEDURE — 3430000000 HC RX DIAGNOSTIC RADIOPHARMACEUTICAL: Performed by: STUDENT IN AN ORGANIZED HEALTH CARE EDUCATION/TRAINING PROGRAM

## 2022-08-01 PROCEDURE — 85025 COMPLETE CBC W/AUTO DIFF WBC: CPT

## 2022-08-01 PROCEDURE — 1200000000 HC SEMI PRIVATE

## 2022-08-01 PROCEDURE — 6370000000 HC RX 637 (ALT 250 FOR IP): Performed by: PSYCHIATRY & NEUROLOGY

## 2022-08-01 PROCEDURE — 93926 LOWER EXTREMITY STUDY: CPT

## 2022-08-01 PROCEDURE — 6370000000 HC RX 637 (ALT 250 FOR IP): Performed by: INTERNAL MEDICINE

## 2022-08-01 PROCEDURE — 99233 SBSQ HOSP IP/OBS HIGH 50: CPT | Performed by: INTERNAL MEDICINE

## 2022-08-01 PROCEDURE — 97530 THERAPEUTIC ACTIVITIES: CPT

## 2022-08-01 PROCEDURE — 78315 BONE IMAGING 3 PHASE: CPT

## 2022-08-01 PROCEDURE — A9503 TC99M MEDRONATE: HCPCS | Performed by: STUDENT IN AN ORGANIZED HEALTH CARE EDUCATION/TRAINING PROGRAM

## 2022-08-01 PROCEDURE — 6360000002 HC RX W HCPCS: Performed by: STUDENT IN AN ORGANIZED HEALTH CARE EDUCATION/TRAINING PROGRAM

## 2022-08-01 PROCEDURE — 6370000000 HC RX 637 (ALT 250 FOR IP): Performed by: STUDENT IN AN ORGANIZED HEALTH CARE EDUCATION/TRAINING PROGRAM

## 2022-08-01 PROCEDURE — 2580000003 HC RX 258: Performed by: STUDENT IN AN ORGANIZED HEALTH CARE EDUCATION/TRAINING PROGRAM

## 2022-08-01 PROCEDURE — 82948 REAGENT STRIP/BLOOD GLUCOSE: CPT

## 2022-08-01 PROCEDURE — 6360000002 HC RX W HCPCS: Performed by: INTERNAL MEDICINE

## 2022-08-01 PROCEDURE — 83735 ASSAY OF MAGNESIUM: CPT

## 2022-08-01 PROCEDURE — 2580000003 HC RX 258: Performed by: INTERNAL MEDICINE

## 2022-08-01 PROCEDURE — 97542 WHEELCHAIR MNGMENT TRAINING: CPT

## 2022-08-01 PROCEDURE — 97535 SELF CARE MNGMENT TRAINING: CPT

## 2022-08-01 PROCEDURE — 97166 OT EVAL MOD COMPLEX 45 MIN: CPT

## 2022-08-01 PROCEDURE — 36415 COLL VENOUS BLD VENIPUNCTURE: CPT

## 2022-08-01 RX ORDER — AMITRIPTYLINE HYDROCHLORIDE 75 MG/1
75 TABLET, FILM COATED ORAL NIGHTLY
Status: DISCONTINUED | OUTPATIENT
Start: 2022-08-01 | End: 2022-08-02 | Stop reason: HOSPADM

## 2022-08-01 RX ORDER — INSULIN GLARGINE 100 [IU]/ML
30 INJECTION, SOLUTION SUBCUTANEOUS
Status: DISCONTINUED | OUTPATIENT
Start: 2022-08-01 | End: 2022-08-02 | Stop reason: HOSPADM

## 2022-08-01 RX ORDER — MINERAL OIL AND WHITE PETROLATUM 150; 830 MG/G; MG/G
OINTMENT OPHTHALMIC PRN
Status: DISCONTINUED | OUTPATIENT
Start: 2022-08-01 | End: 2022-08-02 | Stop reason: HOSPADM

## 2022-08-01 RX ORDER — NICOTINE 21 MG/24HR
1 PATCH, TRANSDERMAL 24 HOURS TRANSDERMAL DAILY
Status: DISCONTINUED | OUTPATIENT
Start: 2022-08-01 | End: 2022-08-02 | Stop reason: HOSPADM

## 2022-08-01 RX ORDER — ALOGLIPTIN 6.25 MG/1
6.25 TABLET, FILM COATED ORAL DAILY
Status: DISCONTINUED | OUTPATIENT
Start: 2022-08-01 | End: 2022-08-02 | Stop reason: HOSPADM

## 2022-08-01 RX ORDER — TC 99M MEDRONATE 20 MG/10ML
25 INJECTION, POWDER, LYOPHILIZED, FOR SOLUTION INTRAVENOUS
Status: COMPLETED | OUTPATIENT
Start: 2022-08-01 | End: 2022-08-01

## 2022-08-01 RX ADMIN — INSULIN GLARGINE 30 UNITS: 100 INJECTION, SOLUTION SUBCUTANEOUS at 10:18

## 2022-08-01 RX ADMIN — TC 99M MEDRONATE 28.1 MILLICURIE: 20 INJECTION, POWDER, LYOPHILIZED, FOR SOLUTION INTRAVENOUS at 08:35

## 2022-08-01 RX ADMIN — RIVAROXABAN 10 MG: 10 TABLET, FILM COATED ORAL at 18:41

## 2022-08-01 RX ADMIN — BUPROPION HYDROCHLORIDE 150 MG: 150 TABLET, FILM COATED, EXTENDED RELEASE ORAL at 10:17

## 2022-08-01 RX ADMIN — OXYCODONE AND ACETAMINOPHEN 1 TABLET: 5; 325 TABLET ORAL at 10:18

## 2022-08-01 RX ADMIN — POTASSIUM CHLORIDE 20 MEQ: 20 TABLET, EXTENDED RELEASE ORAL at 10:17

## 2022-08-01 RX ADMIN — VANCOMYCIN HYDROCHLORIDE 1000 MG: 1 INJECTION, POWDER, LYOPHILIZED, FOR SOLUTION INTRAVENOUS at 10:30

## 2022-08-01 RX ADMIN — INSULIN LISPRO 4 UNITS: 100 INJECTION, SOLUTION INTRAVENOUS; SUBCUTANEOUS at 10:18

## 2022-08-01 RX ADMIN — PANTOPRAZOLE SODIUM 40 MG: 40 TABLET, DELAYED RELEASE ORAL at 06:14

## 2022-08-01 RX ADMIN — PREGABALIN 150 MG: 75 CAPSULE ORAL at 22:21

## 2022-08-01 RX ADMIN — CEFEPIME 2000 MG: 2 INJECTION, POWDER, FOR SOLUTION INTRAVENOUS at 13:39

## 2022-08-01 RX ADMIN — METRONIDAZOLE 500 MG: 500 TABLET ORAL at 06:14

## 2022-08-01 RX ADMIN — METFORMIN HYDROCHLORIDE 1000 MG: 500 TABLET ORAL at 22:21

## 2022-08-01 RX ADMIN — METRONIDAZOLE 500 MG: 500 TABLET ORAL at 13:37

## 2022-08-01 RX ADMIN — MONTELUKAST SODIUM 10 MG: 10 TABLET ORAL at 22:21

## 2022-08-01 RX ADMIN — BACLOFEN 20 MG: 10 TABLET ORAL at 10:17

## 2022-08-01 RX ADMIN — ATORVASTATIN CALCIUM 40 MG: 40 TABLET, FILM COATED ORAL at 22:21

## 2022-08-01 RX ADMIN — METFORMIN HYDROCHLORIDE 1000 MG: 500 TABLET ORAL at 10:17

## 2022-08-01 RX ADMIN — ALOGLIPTIN 6.25 MG: 6.25 TABLET, FILM COATED ORAL at 10:17

## 2022-08-01 RX ADMIN — PREGABALIN 150 MG: 75 CAPSULE ORAL at 10:17

## 2022-08-01 RX ADMIN — AMITRIPTYLINE HYDROCHLORIDE 75 MG: 75 TABLET, FILM COATED ORAL at 22:21

## 2022-08-01 RX ADMIN — Medication 400 MG: at 10:17

## 2022-08-01 RX ADMIN — BACLOFEN 20 MG: 10 TABLET ORAL at 22:21

## 2022-08-01 RX ADMIN — METRONIDAZOLE 500 MG: 500 TABLET ORAL at 22:21

## 2022-08-01 RX ADMIN — SODIUM CHLORIDE, PRESERVATIVE FREE 10 ML: 5 INJECTION INTRAVENOUS at 10:19

## 2022-08-01 RX ADMIN — BACLOFEN 20 MG: 10 TABLET ORAL at 13:36

## 2022-08-01 ASSESSMENT — PAIN DESCRIPTION - ONSET
ONSET: ON-GOING
ONSET: ON-GOING

## 2022-08-01 ASSESSMENT — PAIN DESCRIPTION - ORIENTATION
ORIENTATION: RIGHT
ORIENTATION: RIGHT

## 2022-08-01 ASSESSMENT — PAIN DESCRIPTION - FREQUENCY
FREQUENCY: CONTINUOUS
FREQUENCY: CONTINUOUS

## 2022-08-01 ASSESSMENT — PAIN DESCRIPTION - LOCATION
LOCATION: FOOT
LOCATION: FOOT

## 2022-08-01 ASSESSMENT — PAIN SCALES - GENERAL
PAINLEVEL_OUTOF10: 8
PAINLEVEL_OUTOF10: 6

## 2022-08-01 ASSESSMENT — PAIN DESCRIPTION - PAIN TYPE
TYPE: ACUTE PAIN
TYPE: ACUTE PAIN

## 2022-08-01 ASSESSMENT — PAIN DESCRIPTION - DESCRIPTORS
DESCRIPTORS: THROBBING
DESCRIPTORS: THROBBING

## 2022-08-01 NOTE — PROGRESS NOTES
Caitlin Donald 62  INPATIENT OCCUPATIONAL THERAPY  [facility] 7K  EVALUATION    Time:   Time In:   Time Out: 9074  Timed Code Treatment Minutes: 39 Minutes  Minutes: 60          Date: 2022  Patient Name: Paige Yu,   Gender: female      MRN: [id number]  : 1965  (49 y.o.)  Referring Practitioner: Dr. Lizy Jordan DPM  Diagnosis: Cellulitis of R Leg  Additional Pertinent Hx: Pt with significant past medical history of DM type 2, multiple sclerosis, spinal cord stroke, lupus, paraplegia, asthma, TIA, and breast cancer. Patient was seen in the ED on  on behalf of Dr. Rola Rizo for RLE cellulitis; patient was encouraged at the time to be admitted as she failed PO antibiotic therapy but the patient refused. Patient followed up with Dr. Rola Rizo at the office on , where she was told to return to the ED to be admitted for IV antibiotics. Patient currently denies any pain to BLE. She also denies any N/V/F/C/SOB/CP or bilateral calf tenderness. She has no new pedal complaints at this time. Restrictions/Precautions:  Restrictions/Precautions: Fall Risk, Bedrest with Bathroom Privileges, Weight Bearing  Right Lower Extremity Weight Bearing: Non Weight Bearing  Position Activity Restriction  Other position/activity restrictions: wound right foot    Subjective  Chart Reviewed: Yes, Orders, History and Physical, Other (comment) (PT evaluation)  Patient assessed for rehabilitation services?: Yes    Subjective: Pleasant and cooperative  Comments: RN approved session. Pt agreed to use the bathroom. She also did lift off to get a cushion in place under her bottom. She C/O burning pain in her bottom. She has a Stage II pressure sore on her coccyx. Comments / Details: Lower bottom and coccyx    Pain: 6/10: Pain in her bottom at the coccyx; pt also has mild pain in her R foot.      Vitals: Vitals not assessed per clinical judgement, see nursing flowsheet    Social/Functional History:  Lives With: Significant other  Type of Home: Apartment  Home Layout: One level  Home Equipment: Wheelchair-manual   Bathroom Shower/Tub: Tub/Shower unit  Bathroom Equipment: Grab bars in shower, Tub transfer bench, Grab bars around toilet, Commode  Bathroom Accessibility: Wheelchair accessible    [employer] Help From: Family  ADL Assistance: Needs assistance  Homemaking Assistance: Needs assistance  Ambulation Assistance: Non-ambulatory  Transfer Assistance: Independent    Active : No  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 Pt goes to the grocery store with a family member. She stays in the W/C throughout. Pt spends alot of time in the W/C each day. She otherwise gets on the sofa. She will sleep in her bed. VISION:Corrected    HEARING:  WFL    COGNITION: Decreased Safety Awareness    RANGE OF MOTION:  Right Upper Extremity: WFL  Left Upper Extremity:  Impaired - Limited horizontal abduction and external rotation secondary to mastectomy     STRENGTH:  Right Upper Extremity: Impaired - 3+/5 deltoid; 3+/5 pectoral; 4/5 biceps and 4+/5 triceps  Left Upper Extremity:  3+/5 deltoid; pectoral NT; 4-/5 biceps and 4/5 triceps    SENSATION:   WFL    ADL:   Lower Extremity Dressing: Stand By Assistance. Crossed her L leg over her R to don her slipper sock; donned her shorts when finished with toileting. Toileting: Supervision. No difficulty noted with changing her Depends after having incontinence of urine  Toilet Transfer: Minimal Assistance. Sit pivot between W/C and standard toilet using a grabbar in front of her . Assist provided for stabilizing the W/C and the seat cushion. BALANCE:  Sitting Balance:  Supervision.  Lower body ADLs and completing sit pivot transfers    BED MOBILITY:  Not Tested    TRANSFERS:  Sit to Stand:  Partial lift off while having help with switching her waffle cushion and memory foam cushion. CGA provided. Exercise:  Pt completed BUE AROM exercises: pt demonstrated stretching of B shoulders with RUE assisting LUE with flexion, horizontal adduction and internal rotation x 4 reps each. Activity Tolerance:  Patient tolerance of  treatment: fair. Pt was able to complete transfer from toilet to W/C with mild fatigue noted. She completed the transfer with occasionally taking a brief rest break. Assessment:  Assessment: Patient would benefit from continued skilled OT services to address above deficits. She presents with cellulitis of Right Leg. Pt has hx of BLE weakness from a spinal stroke. She has residual weakness in her RLE. Pt had L mastectomy in 2021. Pt has been confined to a W/C for 8 years. She has been independent with self care. She has completed pivot transfers to/from a W/C or toilet seat. She has R foot wound and had to be admitted for IV antibiotics. Pt is completing sit-pivot transfers with no weight through her RLE with MIN A needed. She has a stage II pressure sore on her coccyx also at this time. Pt demonstrated doing sit-pivot transfers to/from the toilet with MIN A. Cues needed to protect the skin of her bottom while transferring onto or off of the W/C. She completed toileting with Supervison and lower body dressing with SBA. Pt had mild fatigue during the transfer and needed brief rest breaks while completing. Performance deficits / Impairments: Decreased functional mobility , Decreased ADL status, Decreased strength, Decreased sensation, Decreased endurance, Decreased balance  Prognosis: Good  REQUIRES OT FOLLOW-UP: Yes  Decision Making: Medium Complexity    Treatment Initiated: Treatment and education initiated within context of evaluation.   Evaluation time included review of current medical information, gathering information related to past medical, social and functional history, completion of standardized testing, formal and informal observation of tasks, assessment of data and development of plan of care and goals. Treatment time included skilled education and facilitation of tasks to increase safety and independence with ADL's for improved functional independence and quality of life. Pt described her home environment and how she does her self care. She has more help available at this time, she stated, secondary to her boyfriend having recently retired. She has a tub bench which will not fit into her tub unless it is put entirely inside the tub. She has to transfer to the rim of the tub and then across to the tub bench. Suggested that she get a shower chair which would do the same thing but be more safe as it allows more room for her legs in the tub. Pt verbalized understanding. Her kitchen set up was also described. She stated the microwave was placed on the countertop. She only uses it to cook because she is unable to see inside any pan or pot on the stove. She voiced being willing to work with therapy. She stated she did not want to have to go to a nursing home. Reviewed with pt some options for her if she needs to require IV antibiotics for her foot wound. Pt verbalized that she would prefer to have home visiting nurse or out patient appointments for her IV medication if needed.     Discharge Recommendations:  Patient would benefit from continued therapy after discharge, Home with Home health OT    Patient Education:     Patient Education  Education Given To: Patient  Education Provided: Role of Therapy, Plan of Care  Education Provided Comments: Importance of protecting her bottom while sitting for long periods of time and also while completing pivot transfers; adaptive strategies for her kitchen cabinets  Education Method: Verbal  Barriers to Learning: None  Education Outcome: Verbalized understanding, Continued education needed    Equipment Recommendations:  Equipment Needed: Yes  Other: Shower seat    Plan:  Times per Week: 5x  Current Treatment Recommendations: ROM, Balance training, Endurance training, Functional mobility training, Self-Care / ADL, Safety education & training  Plan Comment: Pt would benefit from continued skilled OT services when medically stable and discharged from Acute. HHOT recommended. Specific Instructions for Next Treatment: Functional transfers; ADLs and safety awareness; W/C level kitchen tasks; UE ROM. See long-term goal time frame for expected duration of plan of care. If no long-term goals established, a short length of stay is anticipated. Goals:  Patient goals : \"Get back to doing things better and be safe so my foot and bottom heal.\" pt states. Short Term Goals  Time Frame for Short term goals: By discharge  Short Term Goal 1: Pt will demonstrate functional transfers to/from the toilet seat or W/C with CGA while following WB precaution through her RLE to prepare for doing her toileting routine. Short Term Goal 2: Pt will complete BUE HEP while following good technique for mastectomy recovery and management of L shoulder girdle pain with cues as needed for ease of doing ADLs and funtional transfers. Short Term Goal 3: Pt will demonstrate kitchen activities from W/C height while following good safety awareness with supervision to prepare for doing light cooking or cleaning. Short Term Goal 4: Pt will complete lower body dressing including donning shorts and slipper socks with supervision and use of any adaptive equipment needed to increase her independence with self care. Additional Goals?: No         Following session, patient left in safe position with all fall risk precautions in place.

## 2022-08-01 NOTE — PROGRESS NOTES
[facility] Continence Nurse  Progress Note       Angie Frost  AGE: 49 y.o. GENDER: female  : 1965 UNIT: [id number]  TODAY'S DATE:  2022    Subjective:       Angie Frost is a 49 y.o. female referred by:   [] Physician/PA/APRN  [x] Nursing  [] Other:     Plan:     Wound ostomy consulted for stage 2 on sacrum, bleeding. Spoke with primary RN via phone. Wound not yet evaluated by primary RN. Recommend staff to utilize [facility] order sets- [facility] BID and PRN. If concerns arise, advised RN to call service for assistance. Service to sign off at this time. If wound evolution observed or concerns arise, please reconsult.      Treatment Recommendations:  -Turn every 2 hours and PRN  -Offload with pillows or wedges  -Ensure patient is on low air loss support surface ([place], SPR+, [place], Bariatric bed)  -Utilize moisture wicking underpads  -Limit use of depends, except when working with therapy  -If applicable, use waffle cushion when in chair    Specialty Bed Required :   [x] Low Air Loss   [x] Pressure Redistribution  [] Fluid Immersion- [place]  [] Bariatric  [] RotoProne   [] Other:

## 2022-08-01 NOTE — PLAN OF CARE
Problem: Discharge Planning  Goal: Discharge to home or other facility with appropriate resources  7/31/2022 0158 by Karely Hilliard RN  Outcome: Progressing  Flowsheets  Taken 7/31/2022 0158  Discharge to home or other facility with appropriate resources: Identify barriers to discharge with patient and caregiver  Taken 7/30/2022 2000  Discharge to home or other facility with appropriate resources: Identify barriers to discharge with patient and caregiver     Problem: Pain  Goal: Verbalizes/displays adequate comfort level or baseline comfort level  7/31/2022 0158 by Karely Hilliard RN  Outcome: Progressing  Flowsheets (Taken 7/31/2022 0158)  Verbalizes/displays adequate comfort level or baseline comfort level:   Encourage patient to monitor pain and request assistance   Assess pain using appropriate pain scale  7/30/2022 1200 by Daniel Phan RN  Outcome: Progressing  Flowsheets (Taken 7/30/2022 1200)  Verbalizes/displays adequate comfort level or baseline comfort level:   Encourage patient to monitor pain and request assistance   Administer analgesics based on type and severity of pain and evaluate response   Consider cultural and social influences on pain and pain management   Assess pain using appropriate pain scale   Implement non-pharmacological measures as appropriate and evaluate response   Notify Licensed Independent Practitioner if interventions unsuccessful or patient reports new pain     Problem: Chronic Conditions and Co-morbidities  Goal: Patient's chronic conditions and co-morbidity symptoms are monitored and maintained or improved  7/31/2022 0158 by Karely Hilliard RN  Outcome: Progressing  Flowsheets  Taken 7/31/2022 0158  Care Plan - Patient's Chronic Conditions and Co-Morbidity Symptoms are Monitored and Maintained or Improved:   Monitor and assess patient's chronic conditions and comorbid symptoms for stability, deterioration, or improvement   Collaborate with multidisciplinary team to address chronic and comorbid conditions and prevent exacerbation or deterioration  Taken 7/30/2022 2000  Care Plan - Patient's Chronic Conditions and Co-Morbidity Symptoms are Monitored and Maintained or Improved: Monitor and assess patient's chronic conditions and comorbid symptoms for stability, deterioration, or improvement     Problem: Safety - Adult  Goal: Free from fall injury  7/31/2022 0158 by Keith Fong RN  Outcome: Progressing  Flowsheets (Taken 7/31/2022 0158)  Free From Fall Injury:   Instruct family/caregiver on patient safety   Based on caregiver fall risk screen, instruct family/caregiver to ask for assistance with transferring infant if caregiver noted to have fall risk factors        Care plan reviewed with patient. Patient verbalizes understanding of the plan of care and contribute to goal setting.

## 2022-08-01 NOTE — CARE COORDINATION
8/1/22, 8:15 AM EDT  DISCHARGE PLANNING EVALUATION:    Nikky Mann       Admitted: 7/29/2022/ [address] day: 3   Location: -09/009-A Reason for admit: Cellulitis of right leg [L03.115]  Cellulitis of right lower extremity [L03.115]  Diabetic ulcer of right midfoot associated with type 1 diabetes mellitus, unspecified ulcer stage ([address] [E10.621, L97.419]   PMH:  has a past medical history of Anxiety, Anxiety and depression, Asthma, Bipolar 1 disorder ([address] Bipolar 1 disorder with moderate sourav ([address] Blood circulation, collateral, Breast cancer ([address] Cancer ([address] Cancer ([address] Depression, Endometriosis, GERD (gastroesophageal reflux disease), Kidney stones, Lupus ([address] Movement disorder, MS (multiple sclerosis) ([address] Schizophrenia ([address] Thyroid disease, Type 2 diabetes mellitus with hyperglycemia, with long-term current use of insulin ([address] Type 2 diabetes mellitus without complication ([address] and Unspecified cerebral artery occlusion with cerebral infarction. To ED with  complaint of right foot wound. was seen on 7/26/2022 in ER for a right leg cellulitis and started on oral doxycycline. Procedure:   LE doppler: neg for DVT  Right foot xray: neg for osteomyelitis    Barriers to Discharge:  podiatry and hospitalist, ID consulted, PT/OT, psychiatry consulted. IVF stopped, Cefepime IV q 12 hrs, dressing care to right ulcer on foot. DM management with Lantus and ISS. Wound and ostomy to see for stage 2 coccyx pressure wound. PCP: Natty Zimmerman MD  Readmission Risk Score: 19.2%  Patient's Healthcare Decision Maker: Legal Next of Kin    Patient Goals/Plan/Treatment Preferences: Met with Caterina Snyder this afternoon; she currently lives home with her fiance Kaveh Raymond. He provides transportation, she has wheelchair, paraplegic,and lives 1st level apartment on [address] here in [facility]. She has PCP and insurance was confirmed. She failed OP po antibiotic therapy.  Follows at wound clinic; will follow for needed DME along with antibiotic plan per ID.    1998 called [facility]; checking on benefits for possible home IV therapy. Transportation/Food Security/Housekeeping Addressed:  No issues identified.

## 2022-08-01 NOTE — PROGRESS NOTES
[address] [address]  INPATIENT PHYSICAL THERAPY  DAILY NOTE  [facility] ORTHOP[facility] 7K - [id number]    Time In: 0504  Time Out: 1014  Timed Code Treatment Minutes: 23 Minutes  Minutes: 23          Date: 2022  Patient Name: Rani Mcdonnell,  Gender:  female        MRN: [id number]  : 1965  (49 y.o.)  Referral Date : 22  Referring Practitioner: Barbara Moreno DPM     Additional Pertinent Hx: Per H&P:The patient is a 49 y.o. female with significant past medical history of DM type 2, multiple sclerosis, spinal cord stroke, lupus, paraplegia, asthma, TIA, and breast cancer who was seen at bedside today on behalf of Dr. Jass Torres. Patient was seen in the ED on  on behalf of Dr. Jass Torres for RLE cellulitis; patient was encouraged at the time to be admitted as she failed PO antibiotic therapy but the patient refused. Patient followed up with Dr. Jass Torres at the office on , where she was told to return to the ED to be admitted for IV antibiotics. Patient currently denies any pain to BLE. She also denies any N/V/F/C/SOB/CP or bilateral calf tenderness. She has no new pedal complaints at this time. Prior Level of Function:  Lives With: Significant other (Boyfriend)  Type of Home: Apartment  Home Layout: One level  Home Equipment: Archie Dash (reports she has never had an electric wheelchair)   Bathroom Shower/Tub: Tub/Shower unit  Bathroom Equipment: Grab bars in shower, Tub transfer bench, Grab bars around toilet, Commode  Bathroom Accessibility: Wheelchair accessible    ADL Assistance: Needs assistance  Homemaking Assistance: Needs assistance  Ambulation Assistance: Non-ambulatory  Transfer Assistance: Independent  Active : No  Additional Comments: Patient's boyfriend just retired and is able to assist pt.  History inconsistent    Restrictions/Precautions:  Restrictions/Precautions: Fall Risk, Bedrest with Bathroom Privileges, Weight Bearing  Right Lower Extremity Weight Bearing:  (Assume non weight bearing right foot--per pt podiatry discussed non weight bearing. Need to clarify)  Position Activity Restriction  Other position/activity restrictions: wound right foot     SUBJECTIVE: RN approved session. Patient in w/c upon arrival and agreeable to therapy. Patient requested to use restroom during session. PAIN: 8/10: RLE    Vitals: Vitals not assessed per clinical judgement, see nursing flowsheet    OBJECTIVE:  Bed Mobility:  Not Tested    Transfers:  Sit Pivot:Contact Guard Assistance, with increased time for completion, with verbal cues, completed x2 trials w/c<>toilet in bathroom. Wheelchair Mobility:  Supervision  Extremities Used: Bilateral Upper Extremities  Type of Chair:Manual  Surface: Level Tile  Distance: ~60ft, 110ft  Quality: Slow Velocity and Short Strokes    Balance:  Dynamic Sitting Balance: Stand By Assistance, on toilet seat    Exercise:  Patient was guided in 1 set(s) 10 reps of exercise to both lower extremities. Glut sets, Seated marches, Long arc quads, and Seated isometric hip adduction. Exercises were completed for increased independence with functional mobility. Functional Outcome Measures: Completed  AM-PAC Inpatient Mobility without Stair Climbing Raw Score : 10  -PAC Inpatient without Stair Climbing T-Scale Score : 34.07    ASSESSMENT:  Assessment: Patient progressing toward established goals. Activity Tolerance:  Patient tolerance of  treatment: good.       Equipment Recommendations:Equipment Needed: Yes (wheelchair brakes loose--may benefit from outpatient wheelchair evaluation)  Discharge Recommendations: Patient would benefit from continued PT at discharge  Plan: Current Treatment Recommendations: Strengthening, Balance training, Functional mobility training, Transfer training, Neuromuscular re-education, Wheelchair mobility training, Endurance training, Patient/Caregiver education & training, Safety education & training, Home exercise program, Equipment evaluation, education, & procurement, Therapeutic activities, Positioning  Plan:  (5-6xGM)    Patient Education  Patient Education: Plan of Care, Precautions/Restrictions, Transfers, Wheelchair Mobility, Up in Chair for All Meals, Verbal Exercise Instruction    Goals:  Patient goals : go home  Short Term Goals  Time Frame for Short term goals: at discharge  Short term goal 1: Patient will complete sit pivot transfers with supervision to transfer surface to surface safely. Short term goal 2: Patient will trial slide board for increased ease with transfers. Short term goal 3: Patient will complete supine < > sit transfers with modified independence to transfer in and out of bed safely. Short term goal 4: Patient will identify two techniques to reduce pressure from buttocks to reduce risk for wound. Long Term Goals  Time Frame for Long term goals : N/A due to short estimated length of stay    Following session, patient left in safe position with all fall risk precautions in place.

## 2022-08-01 NOTE — PROGRESS NOTES
Podiatric Progress Note  Abhi Kusum  Subjective :   8/1/2022  Patient seen at bedside this morning on behalf of Dr. Levy Lowery. Patient is pleasant, and is alert and oriented. Patient recently returned from arterial Doppler of RLE. Patient continues to deny any pain to the RLE. She also denies any N/V/F/C/SOB/CP or bilateral calf tenderness. Patient has no new pedal concerns. 7/31/2022  Patient seen at bedside today on behalf of Dr. Sunita Gonzalez. Patient is pleasant, and is alert and oriented to person, place, and time. Patient recently returned from DVT ultrasound of RLE. Patient was seated in room with a large Ziploc bag filled with candy present at bedside. Patient currently denies any pain to the right lower leg. She endorsed mild pain after she had bumped her leg up earlier this morning, but states that the pain resolved. She currently denies any N/V/F/C/SOB/CP or bilateral calf tenderness. Patient has no other pedal complaints at this time. Patient wanted to know what the plan for her discharge is.    7/30/2022  Patient seen bedside today on behalf of Dr. Sunita Gonzalez. Patient appeared pleasant, was oriented to person, place and time and in no acute distress. Patient relates that she did not sleep at all last night and she is very tired. She is curious what further can be done for her foot. She is curious what the plan is from here. Patient denies any N/V/F/C/SOB or CP. Patient has no further pedal concerns at this point in time. HPI:  The patient is a 49 y.o. female with significant past medical history of DM type 2, multiple sclerosis, spinal cord stroke, lupus, paraplegia, asthma, TIA, and breast cancer who was seen at bedside today on behalf of Dr. Sunita Gonzalez. Patient was seen in the ED on 7/26 on behalf of Dr. Sunita Gonzalez for RLE cellulitis; patient was encouraged at the time to be admitted as she failed PO antibiotic therapy but the patient refused.  Patient followed up with Dr. Sunita Gonzalez at the office on 7/29, where she was told to return to the ED to be admitted for IV antibiotics. Patient currently denies any pain to BLE. She also denies any N/V/F/C/SOB/CP or bilateral calf tenderness. She has no new pedal complaints at this time.     Current Medications:    Current Facility-Administered Medications: nicotine (NICODERM CQ) 14 MG/24HR 1 patch, 1 patch, TransDERmal, Daily  amitriptyline (ELAVIL) tablet 75 mg, 75 mg, Oral, Nightly  lubrifresh P.M. (artificial tears) ophthalmic ointment, , Ophthalmic, PRN  metFORMIN (GLUCOPHAGE) tablet 1,000 mg, 1,000 mg, Oral, BID  alogliptin (NESINA) tablet 6.25 mg, 6.25 mg, Oral, Daily  insulin glargine (LANTUS) injection vial 30 Units, 30 Units, SubCUTAneous, QAM AC  cefepime (MAXIPIME) 2000 mg IVPB minibag, 2,000 mg, IntraVENous, Q12H  metroNIDAZOLE (FLAGYL) tablet 500 mg, 500 mg, Oral, 3 times per day  buPROPion (WELLBUTRIN XL) extended release tablet 150 mg, 150 mg, Oral, QAM  potassium chloride (KLOR-CON M) extended release tablet 40 mEq, 40 mEq, Oral, PRN **OR** potassium bicarb-citric acid (EFFER-K) effervescent tablet 40 mEq, 40 mEq, Oral, PRN **OR** potassium chloride 10 mEq/100 mL IVPB (Peripheral Line), 10 mEq, IntraVENous, PRN  sodium chloride flush 0.9 % injection 5-40 mL, 5-40 mL, IntraVENous, 2 times per day  sodium chloride flush 0.9 % injection 5-40 mL, 5-40 mL, IntraVENous, PRN  0.9 % sodium chloride infusion, , IntraVENous, PRN  [Held by provider] ondansetron (ZOFRAN-ODT) disintegrating tablet 4 mg, 4 mg, Oral, Q8H PRN **OR** [Held by provider] ondansetron (ZOFRAN) injection 4 mg, 4 mg, IntraVENous, Q6H PRN  polyethylene glycol (GLYCOLAX) packet 17 g, 17 g, Oral, Daily PRN  vancomycin (VANCOCIN) 1,000 mg in sodium chloride 0.9 % 250 mL IVPB (Nupe4Kgz), 1,000 mg, IntraVENous, Q12H  anastrozole (ARIMIDEX) tablet 1 mg, 1 mg, Oral, Every Other Day  atorvastatin (LIPITOR) tablet 40 mg, 40 mg, Oral, Nightly  baclofen (LIORESAL) tablet 20 mg, 20 mg, Oral, TID  hydrOXYzine pamoate (VISTARIL) capsule 50 mg, 50 mg, Oral, TID PRN  magnesium oxide (MAG-OX) tablet 400 mg, 400 mg, Oral, Daily  montelukast (SINGULAIR) tablet 10 mg, 10 mg, Oral, Nightly  pantoprazole (PROTONIX) tablet 40 mg, 40 mg, Oral, QAM AC  oxyCODONE-acetaminophen (PERCOCET) 5-325 MG per tablet 1 tablet, 1 tablet, Oral, BID PRN  [Held by provider] paliperidone (INVEGA) extended release tablet 3 mg, 3 mg, Oral, Nightly  potassium chloride (KLOR-CON M) extended release tablet 20 mEq, 20 mEq, Oral, Daily  pregabalin (LYRICA) capsule 150 mg, 150 mg, Oral, BID  albuterol sulfate HFA (PROVENTIL;VENTOLIN;PROAIR) 108 (90 Base) MCG/ACT inhaler 2 puff, 2 puff, Inhalation, Q6H PRN  tiotropium (SPIRIVA RESPIMAT) 2.5 MCG/ACT inhaler 2 puff, 2 puff, Inhalation, Daily  insulin lispro (HUMALOG) injection vial 0-8 Units, 0-8 Units, SubCUTAneous, TID WC  insulin lispro (HUMALOG) injection vial 0-4 Units, 0-4 Units, SubCUTAneous, Nightly  glucose chewable tablet 16 g, 4 tablet, Oral, PRN  dextrose bolus 10% 125 mL, 125 mL, IntraVENous, PRN **OR** dextrose bolus 10% 250 mL, 250 mL, IntraVENous, PRN  glucagon (rDNA) injection 1 mg, 1 mg, SubCUTAneous, PRN  dextrose 10 % infusion, , IntraVENous, Continuous PRN  [Held by provider] triamterene-hydroCHLOROthiazide (MAXZIDE-25) 37.5-25 MG per tablet 1 tablet, 1 tablet, Oral, Daily  [Held by provider] escitalopram (LEXAPRO) tablet 5 mg, 5 mg, Oral, Daily  rivaroxaban (XARELTO) tablet 10 mg, 10 mg, Oral, Daily    Objective     BP (!) 102/55   Pulse 93   Temp 98.7 °F (37.1 °C) (Oral)   Resp 16   SpO2 96%      I/O:  Intake/Output Summary (Last 24 hours) at 8/1/2022 0928  Last data filed at 7/31/2022 2255  Gross per 24 hour   Intake 3570 ml   Output 700 ml   Net 2870 ml              Wt Readings from Last 3 Encounters:   07/26/22 150 lb (68 kg)   05/16/22 158 lb (71.7 kg)   05/16/22 158 lb (71.7 kg)       LABS:    Recent Labs     07/31/22  0557 08/01/22  0431   WBC 3.3* 3.5*   HGB 9. 0* 9.8*   HCT 26.5* 29.1*   PLT 54* 56*        Recent Labs     08/01/22  0431      K 3.5   *   CO2 21*   BUN 11   CREATININE 0.6        Recent Labs     07/31/22  0924 08/01/22  0431   PROT 5.8* 5.5*      No results for input(s): CKTOTAL, CKMB, CKMBINDEX, TROPONINI in the last 72 hours. Exam:   Dressing intact and well maintained. No apparent strike through noted. Vascular: Dorsalis pedis and posterior tibial pulses are palpable bilaterally. Skin temperature is warm to warm from proximal tibial tuberosity to distal digits bilaterally. CFT <3 seconds. to all 10 digits. Pitting edema present to RLE. Hair growth present. Quality of skin within normal limits. Dermatologic: There is an unstageable ulceration noted on the plantar aspect of the lateral right foot. The wound is surrounded by a surrounding portion of superficial thickness wound with mild serosanguineous drainage. There is mild surrounding erythema and edema. No malodor or purulence noted. Wound does not probe to bone. There is an area of redness to the anteromedial aspect of the right leg; redness is greatly improved and receded since patient was admitted and placed on IV antibiotics. Neurovascular: Light touch sensation is intact to the level of the toes bilaterally     Musculoskeletal: Muscle strength 2/5 for all plantarflexors, dorsiflexors, inverters and everters examined on the right, 4/5 on the left. Minimal pain on palpation of right fifth metatarsal head. Foot and ankle in grossly rectus alignment bilaterally. Images from 7/31/2022            IMAGING:  Right lower extremity arterial Doppler    Impression   Normal study. Excellent pulsatility throughout the right leg. **This report has been created using voice recognition software. It may contain minor errors which are inherent in voice recognition technology. **       Final report electronically signed by Dr. Jennie Childress on 8/1/2022 8:27 AM Right lower extremity venous Doppler    Impression   No evidence of a DVT. **This report has been created using voice recognition software. It may contain minor errors which are inherent in voice recognition technology. **       Final report electronically signed by Dr Johnny Martin on 7/31/2022 11:07 AM         ASSESSMENT  Principle  Cellulitis, right leg  Unstageable ulcer, right foot    Chronic  Patient Active Problem List   Diagnosis    Leg weakness, bilateral    Hypokalemia    Hyperglycemia    Multiple sclerosis ([address]    Spinal cord stroke (HCC)    Hyponatremia    Lupus ([address]    Lupus (systemic lupus erythematosus) ([address]    Paraplegia (HCC)    Neurogenic bladder    Neurogenic bowel    Debility    Spinal cord infarction ([address]    Depression    Bipolar 1 disorder ([address]    Asthma without acute exacerbation    E. coli UTI    Skin ulcer of multiple sites ([address] bilateral inner thighs    History of left breast cancer    Anxiety    Transverse myelitis ([address]    Flexion contracture of right knee    Overactive bladder    Urgency-frequency syndrome    Carcinoma of upper-inner quadrant of left breast in female, estrogen receptor positive ([address]    Invasive ductal carcinoma of left breast (HCC)    TIA (transient ischemic attack)    Acute encephalopathy    Metabolic acidosis    Stroke-like symptoms    Hypomagnesemia    Type 2 diabetes mellitus with hyperglycemia, with long-term current use of insulin (HCC)    Elevated LFTs    Pancytopenia (HCC)    Schizophrenia (HCC)    Lactic acidosis    Chronic pain syndrome    Prolonged QT interval    Cellulitis of right leg    Diabetic ulcer of right midfoot associated with type 1 diabetes mellitus (HCC)       PLAN:   1) Cellulitis, right leg  2) Unstageable ulcer, right foot  -Patient examined and evaluated.   -WBC 3.5; patient afebrile  -Lactic acid 2.0; procalcitonin 0.09  -Blood cultures 1 and 2, preliminary results: No growth  -Swab culture from 7/26/2022 shows moderate mixed growth of multiple enteric gram negative bacilli, including swarming Proteus species, Staphylococcus species (coagulase negative), Viridans Streptococcus species, and light growth of gram positive bacilli most consistent with Corynebacterium species  -Patient currently on IV cefepime, Flagyl, and vancomycin; IV clindamycin discontinued  -Consulted Infectious Diseases; appreciate antibiotics recommendations  -Changed dressings with betadine to wound to RLE and covered with 4x4 gauze, Kerlix roll, and ACE bandage for edema control; dressings to be changed daily  -Continue with Prevalon (heel-offloading) boot to RLE at all times while in bed and wheelchair  -Discussed with patient that we will have a more concrete idea of when to discharge her after she has been seen by infectious diseases, and once we have antibiotics recommendations. Discussed with patient that her likely discharge will be early to middle of this week. -MRI ordered, but patient cannot undergo MRI due to bladder stimulator. 3-phase bone scan of right foot ordered to r/o OM; pending  -Patient verbalized understanding and agreement with the plan as stated. All of her questions were answered to her satisfaction.      3) Diabetes mellitus Type 2  -Placed patient on medium dose sliding scale insulin; patient states her blood sugar can run low, so will consider placing on low dose sliding scale PRN hypoglycemia  -Hospitalist following    4) Asthma  -Restarted home medications  -Hospitalist following     5) Hypokalemia  -Restarted home medications  -Hospitalist following     6) Hypomagnesemia  -Restarted home medications  -Hospitalist following    7) Depression  -Restarted home medications  -Hospitalist following     8) Anxiety  -Restarted home medications  -Hospitalist following     10) Bipolar disorder  -Restarted home medications  -Hospitalist following    DISPO: Pending response to IV abx, results of bone scan right foot    Adnan

## 2022-08-01 NOTE — PROGRESS NOTES
Assessment and Plan:        Diabtic foot ulcer; on antibiotics; Podiatry managing no cultures yet. Consider ID eval if anticipate long term antibiotics  Diabetes- sugars increasing; not all her meds ordered; reviewed and made changes:   Pancytopenia- chronic- being followed by hematology. May be due to psych meds      CC:  pt with diabetes, developed foot ulcer several weeks ago; failed outpt management. Hospitalist Group following for medical issues  HPI: See above    ROS (12 point review of systems completed. Pertinent positives noted.  Otherwise ROS is negative) :   hx MS, cva involving right body    PMH:  Per HPI  SHX:  lives with s/o  FHX: cva, asthma  Allergies: See above    Medications:     sodium chloride 75 mL/hr at 07/29/22 2344    sodium chloride      dextrose        nicotine  1 patch TransDERmal Daily    amitriptyline  75 mg Oral Nightly    metFORMIN  1,000 mg Oral BID    alogliptin  6.25 mg Oral Daily    insulin glargine  30 Units SubCUTAneous QAM AC    cefepime  2,000 mg IntraVENous Q12H    metroNIDAZOLE  500 mg Oral 3 times per day    buPROPion  150 mg Oral QAM    sodium chloride flush  5-40 mL IntraVENous 2 times per day    vancomycin  1,000 mg IntraVENous Q12H    anastrozole  1 mg Oral Every Other Day    atorvastatin  40 mg Oral Nightly    baclofen  20 mg Oral TID    magnesium oxide  400 mg Oral Daily    montelukast  10 mg Oral Nightly    pantoprazole  40 mg Oral QAM AC    [Held by provider] paliperidone  3 mg Oral Nightly    potassium chloride  20 mEq Oral Daily    pregabalin  150 mg Oral BID    tiotropium  2 puff Inhalation Daily    insulin lispro  0-8 Units SubCUTAneous TID WC    insulin lispro  0-4 Units SubCUTAneous Nightly    [Held by provider] triamterene-hydroCHLOROthiazide  1 tablet Oral Daily    [Held by provider] escitalopram  5 mg Oral Daily    rivaroxaban  10 mg Oral Daily       Vital Signs:   BP (!) 102/55   Pulse 93   Temp 98.7 °F (37.1 °C) (Oral)   Resp 16   SpO2 96% Intake/Output Summary (Last 24 hours) at 8/1/2022 5641  Last data filed at 7/31/2022 2255  Gross per 24 hour   Intake 3570 ml   Output 700 ml   Net 2870 ml        Physical Examination: General appearance - alert, well appearing, and in no distress  Mental status - alert, oriented to person, place, and time  Neck - supple, no significant adenopathy, no JVD, or carotid bruits  Chest - clear to auscultation, no wheezes, rales or rhonchi, symmetric air entry  Heart - normal rate and regular rhythm, systolic murmur 2/6 at lower left sternal border  Abdomen - soft, nontender, nondistended, no masses or organomegaly  Neurological - alert, oriented, normal speech,   Musculoskeletal - no muscular tenderness noted  Extremities - no pedal edema noted, foot wrapped  Skin - normal coloration and turgor, no rashes, no suspicious skin lesions noted    Data: (All radiographs, tracings, PFTs, and imaging are personally viewed and interpreted unless otherwise noted).    Reviewed labs      Electronically signed by Clifford Yusuf MD on 8/1/2022 at 6:59 AM

## 2022-08-01 NOTE — CONSULTS
CONSULTATION NOTE :ID       Patient - Harika Martínez,  Age - 49 y.o.    - 1965      Room Number - 6J-09/672-S   N -  [id number]   [facility]  Date of Admission -  2022  5:51 PM  Patient's PCP: Marianna Earl MD     Requesting Physician: Zane Wright DPM    REASON FOR CONSULTATION   Right foot wound with cellulites    CHIEF COMPLAINT   Right foot wound of one wk duration    HISTORY OF PRESENT ILLNESS       This is a very pleasant 49 y.o. female who was admitted to the hospital with a chief complaints of one wk duration. She has hx of stroke with right side weakness noted a blister over the right lateral foot over a wk ago and she noted more swelling on the right leg. Admitted for cellulites. She is a smoker, has anxiety Bipolar disorder. She has been on iv cefepime and vancomycin  PAST MEDICAL  HISTORY       Past Medical History:   Diagnosis Date    Anxiety     Anxiety and depression     Asthma     Bipolar 1 disorder ([address]     Bipolar 1 disorder with moderate sourav (HCC)     Blood circulation, collateral     Breast cancer ([facility]     diagnosed in 2021    Cancer [facility])      ?  cervical \"long time ago\"    Cancer ([facility] 01/15/2021    Left Breast    Depression     Endometriosis     GERD (gastroesophageal reflux disease)     Kidney stones     Lupus (HCC)     Movement disorder     MS (multiple sclerosis) (HCC)     Schizophrenia ([address]     Thyroid disease     Type 2 diabetes mellitus with hyperglycemia, with long-term current use of insulin ([address]     Type 2 diabetes mellitus without complication ([address]     Unspecified cerebral artery occlusion with cerebral infarction        PAST SURGICAL HISTORY     Past Surgical History:   Procedure Laterality Date    BREAST LUMPECTOMY Left 2021    LEFT BREAST MASTECTOMY, SENTINAL LYMPH NODE BIOPSY, PREOP NEEDLE LOC performed by Sharon Salcedo MD at [place] [address] Left 2021    DEBRIDEMENT LEFT BREAST MASTECTOMY WOUND performed by Ahsley Hay MD at [facility] [address] Left 4/27/2021    LEFT BREAST WOUND REVISION performed by Ashley Hay MD at [address] [place] [address]  01/2020    DILATION AND CURETTAGE OF UTERUS      BEN US GUID NDL BIOPSY LEFT Left 01/14/2021    BEN [address] LEFT 1/14/2021 Linda Onofre  [facility] SURGERY N/A 01/26/2021    EXCHANGE OF INTERSTIM SYSTEM performed by Edwin Hackett MD at [facility]  03/23/2021    right breast i and d with nimo Demarco Monday in the procedure room    PAIN MANAGEMENT PROCEDURE Left 8/31/2021    left T3 paravertebral block under fluoroscopy performed by Lance Camacho DO at [facility] OFFICE/OUTPT [address] [place] [address] N/A 11/21/2018    INTERSTIM DEVICE PLACEMENT MODEL 3058, ONLY HEAD ELIGIBLE FOR MRI WITH TRANSMIT/RECEIVE HEAD COIL    TUBAL LIGATION      10 years ago    US GUIDED NEEDLE LOC OF LEFT BREAST Left 02/19/2021    US GUIDED NEEDLE LOC OF LEFT BREAST 2/19/2021 [facility] BIOPSY  01/14/2021    US LYMPH NODE BIOPSY 1/14/2021 Linda Onofre MD [address]:       Scheduled Meds:   nicotine  1 patch TransDERmal Daily    amitriptyline  75 mg Oral Nightly    metFORMIN  1,000 mg Oral BID    alogliptin  6.25 mg Oral Daily    insulin glargine  30 Units SubCUTAneous QAM AC    cefepime  2,000 mg IntraVENous Q12H    metroNIDAZOLE  500 mg Oral 3 times per day    buPROPion  150 mg Oral QAM    sodium chloride flush  5-40 mL IntraVENous 2 times per day    vancomycin  1,000 mg IntraVENous Q12H    anastrozole  1 mg Oral Every Other Day    atorvastatin  40 mg Oral Nightly    baclofen  20 mg Oral TID    magnesium oxide  400 mg Oral Daily    montelukast  10 mg Oral Nightly    pantoprazole  40 mg Oral QAM AC    [Held by provider] paliperidone  3 mg Oral Nightly    potassium chloride  20 mEq Oral Daily    pregabalin  150 mg Oral BID tiotropium  2 puff Inhalation Daily    insulin lispro  0-8 Units SubCUTAneous TID     insulin lispro  0-4 Units SubCUTAneous Nightly    [Held by provider] triamterene-hydroCHLOROthiazide  1 tablet Oral Daily    [Held by provider] escitalopram  5 mg Oral Daily    rivaroxaban  10 mg Oral Daily     Continuous Infusions:   sodium chloride      dextrose       PRN Meds:lubrifresh P.M., potassium chloride **OR** potassium alternative oral replacement **OR** potassium chloride, sodium chloride flush, sodium chloride, [Held by provider] ondansetron **OR** [Held by provider] ondansetron, polyethylene glycol, hydrOXYzine pamoate, oxyCODONE-acetaminophen, albuterol sulfate HFA, glucose, dextrose bolus **OR** dextrose bolus, glucagon (rDNA), dextrose  Allergies:   ALLERGIES:    Penicillins and Seasonal        SOCIAL HISTORY:     TOBACCO:   reports that she has been smoking cigarettes. She started smoking about 42 years ago. She has been smoking an average of .5 packs per day. She has never used smokeless tobacco.     ETOH:   reports current alcohol use. Patient currently lives with family        FAMILY HISTORY:         Problem Relation Age of Onset    Stroke Mother     Asthma Mother     Other Mother     No Known Problems Sister     Asthma Brother     No Known Problems Brother        REVIEW OF SYSTEMS:     Constitutional: no fever, no night sweats, no fatigue, no weight loss. Head: no head ache , no head injury, no migranes. Eye: no eye discharge, blurring of vision, no double vision,no eye pain. Ears: no hearing difficulty, no tinnitus  Mouth/throat: no ulceration, dental caries , dysphagia, no hoarseness and voice change  Respiratory: no cough no chest pain,no shortness of breath,no wheezing  CVS: no palpitation, no chest pain,   GI: no abdominal pain, no nausea , no vomiting, no constipation,no diarrhea.   LEELA: no dysuria, frequency and urgency, no hematuria, no kidney stones  Musculoskeletal: weakness of the right leg  Endocrine: no polyuria, polydipsia, no cold or heat intolerance  Hematology: no anemia, no easy brusing or bleeding, no hx of clotting disorder  Dermatology: no skin rash, no skin lesions, no pruritis,  Neurological:no headaches,no dizziness, no seizure, no numbness. Psychiatry: +anxiety,     PHYSICAL EXAM:     BP (!) 98/47   Pulse 95   Temp 98.4 °F (36.9 °C) (Oral)   Resp 16   SpO2 97%   General apperance:  Awake, alert, chronically sick looking, appears older than the stated age  HEENT: pale conjunctiva, unicteric sclera, moist oral mucosa. Chest:  Bilateral air entry  Cardiovascular:  RRR ,S1S2, no murmur or gallop. Abdomen:  Soft, non tender to palpation. Extremities: the right lower leg is swollen and red, has open necrotic wound on the right lateral foot  Skin:  Warm and dry. CNS:awake and oriented        LABS:     CBC:   Recent Labs     07/30/22  0620 07/31/22  0557 08/01/22  0431   WBC 2.8* 3.3* 3.5*   HGB 9.3* 9.0* 9.8*   PLT 53* 54* 56*     BMP:    Recent Labs     07/29/22  1830 07/30/22  1407 07/31/22  0924 08/01/22  0431     --  142 142   K 3.4* 4.7 3.8 3.5     --  111 113*   CO2 26  --  23 21*   BUN 8  --  9 11   CREATININE 0.6  --  0.7 0.6   GLUCOSE 139*  --  159* 268*     Calcium:  Recent Labs     08/01/22  0431   CALCIUM 7.5*     Ionized Calcium:No results for input(s): IONCA in the last 72 hours. Magnesium:  Recent Labs     08/01/22  0431   MG 1.8     Phosphorus:No results for input(s): PHOS in the last 72 hours. BNP:No results for input(s): BNP in the last 72 hours. Glucose:  Recent Labs     07/31/22  1610 07/31/22  1935 08/01/22  0535   POCGLU 199* 207* 282*     HgbA1C:   Recent Labs     07/31/22  0620   LABA1C 5.6     INR: No results for input(s): INR in the last 72 hours.   Hepatic:   Recent Labs     07/29/22  1830 07/31/22  0924 08/01/22  0431   ALKPHOS 136*   < > 118   ALT 23   < > 17   AST 40   < > 31   PROT 6.8   < > 5.5*   BILITOT 1.5*   < > 0.9   BILIDIR 0.8* --   --    LABALBU 2.4*   < > 1.9*    < > = values in this interval not displayed. Amylase and Lipase:  Recent Labs     07/29/22 1830   LACTA 2.0     Lactic Acid:   Recent Labs     07/29/22 1830   LACTA 2.0     Troponin: No results for input(s): CKTOTAL, CKMB, TROPONINI in the last 72 hours. BNP: No results for input(s): BNP in the last 72 hours. Lipids: No results for input(s): CHOL, TRIG, HDL, LDL, LDLCALC in the last 72 hours. ABGs: No results found for: PHART, PO2ART, OOK0GFJ, BLY0OGF, BEART    Cultures:      CXR:       UA: No results for input(s): SPECGRAV, PHUR, COLORU, CLARITYU, MUCUS, PROTEINU, BLOODU, RBCUA, [address], BACTERIA, NITRU, GLUCOSEU, BILIRUBINUR, UROBILINOGEN, KETUA, LABCAST, LABCASTTY, AMORPHOS in the last 72 hours. Invalid input(s): CRYSTALS      IMAGING:    Micro:   Lab Results   Component Value Date/Time    BC No growth 24 hours. No growth 48 hours. 07/29/2022 06:38 PM       Problem list of patient      Patient Active Problem List   Diagnosis Code    Leg weakness, bilateral R29.898    Hypokalemia E87.6    Hyperglycemia R73.9    Multiple sclerosis ([address] G35    Spinal cord stroke ([facility]) G95.11    Hyponatremia E87.1    Lupus ([address] M32.9    Lupus (systemic lupus erythematosus) ([address] M32.9    Paraplegia ([facility]) G82.20    Neurogenic bladder N31.9    Neurogenic bowel K59.2    Debility R53.81    Spinal cord infarction ([address] G95.11    Depression F32. A    Bipolar 1 disorder (HCC) F31.9    Asthma without acute exacerbation J45.909    E. coli UTI N39.0, B96.20    Skin ulcer of multiple sites [facility]), bilateral inner thighs L98.499    History of left breast cancer Z85.3    Anxiety F41.9    Transverse myelitis (HCC) G37.3    Flexion contracture of right knee M24.561    Overactive bladder N32.81    Urgency-frequency syndrome N32.81    Carcinoma of upper-inner quadrant of left breast in female, estrogen receptor positive ([facility] C50.212, Z17.0    Invasive ductal carcinoma of left breast ([facility] C50.912 TIA (transient ischemic attack) G45.9    Acute encephalopathy B00.63    Metabolic acidosis M98.2    Stroke-like symptoms R29.90    Hypomagnesemia E83.42    Type 2 diabetes mellitus with hyperglycemia, with long-term current use of insulin ([facility]) E11.65, Z79.4    Elevated LFTs R79.89    Pancytopenia ([facility]) D61.818    Schizophrenia ([facility]) F20.9    Lactic acidosis E87.2    Chronic pain syndrome G89.4    Prolonged QT interval R94.31    Cellulitis of right leg L03.115    Diabetic ulcer of right midfoot associated with type 1 diabetes mellitus ([facility] E10.621, L97.419           Impression and Recommendation:   Right lower leg cellulites with necrotic foot wound: advised to continue iv antibiotic 2-3 days as the leg is warm red and swollen. She is insisting she wants to go home. Hx of stroke with debilitation, on wheelchair bound  No plan to quit smoking       Thank you Philip Tran DPM for allowing me to participate in this patient's care.     Chun Iraheta MD, MD,FACP 8/1/2022 12:54 PM

## 2022-08-02 VITALS
TEMPERATURE: 98.6 F | RESPIRATION RATE: 18 BRPM | HEART RATE: 104 BPM | DIASTOLIC BLOOD PRESSURE: 54 MMHG | SYSTOLIC BLOOD PRESSURE: 109 MMHG | OXYGEN SATURATION: 97 %

## 2022-08-02 LAB
GLUCOSE BLD-MCNC: 119 MG/DL (ref 70–108)
GLUCOSE BLD-MCNC: 135 MG/DL (ref 70–108)
GLUCOSE BLD-MCNC: 158 MG/DL (ref 70–108)

## 2022-08-02 PROCEDURE — 6370000000 HC RX 637 (ALT 250 FOR IP): Performed by: INTERNAL MEDICINE

## 2022-08-02 PROCEDURE — 86038 ANTINUCLEAR ANTIBODIES: CPT

## 2022-08-02 PROCEDURE — 94760 N-INVAS EAR/PLS OXIMETRY 1: CPT

## 2022-08-02 PROCEDURE — 99239 HOSP IP/OBS DSCHRG MGMT >30: CPT | Performed by: INTERNAL MEDICINE

## 2022-08-02 PROCEDURE — 97542 WHEELCHAIR MNGMENT TRAINING: CPT

## 2022-08-02 PROCEDURE — 6370000000 HC RX 637 (ALT 250 FOR IP): Performed by: PSYCHIATRY & NEUROLOGY

## 2022-08-02 PROCEDURE — 2580000003 HC RX 258: Performed by: STUDENT IN AN ORGANIZED HEALTH CARE EDUCATION/TRAINING PROGRAM

## 2022-08-02 PROCEDURE — 2580000003 HC RX 258: Performed by: INTERNAL MEDICINE

## 2022-08-02 PROCEDURE — 36415 COLL VENOUS BLD VENIPUNCTURE: CPT

## 2022-08-02 PROCEDURE — 97530 THERAPEUTIC ACTIVITIES: CPT

## 2022-08-02 PROCEDURE — 6360000002 HC RX W HCPCS: Performed by: INTERNAL MEDICINE

## 2022-08-02 PROCEDURE — 6370000000 HC RX 637 (ALT 250 FOR IP): Performed by: STUDENT IN AN ORGANIZED HEALTH CARE EDUCATION/TRAINING PROGRAM

## 2022-08-02 PROCEDURE — 94640 AIRWAY INHALATION TREATMENT: CPT

## 2022-08-02 PROCEDURE — 6360000002 HC RX W HCPCS: Performed by: STUDENT IN AN ORGANIZED HEALTH CARE EDUCATION/TRAINING PROGRAM

## 2022-08-02 PROCEDURE — 82948 REAGENT STRIP/BLOOD GLUCOSE: CPT

## 2022-08-02 RX ORDER — INSULIN GLARGINE 100 [IU]/ML
30 INJECTION, SOLUTION SUBCUTANEOUS
Qty: 10 ML | Refills: 3
Start: 2022-08-02

## 2022-08-02 RX ORDER — CEFUROXIME AXETIL 500 MG/1
250 TABLET ORAL 2 TIMES DAILY
Qty: 10 TABLET | Refills: 0 | Status: SHIPPED | OUTPATIENT
Start: 2022-08-02 | End: 2022-08-12

## 2022-08-02 RX ORDER — NICOTINE 21 MG/24HR
1 PATCH, TRANSDERMAL 24 HOURS TRANSDERMAL DAILY
Qty: 30 PATCH | Refills: 3 | COMMUNITY
Start: 2022-08-03

## 2022-08-02 RX ORDER — METRONIDAZOLE 500 MG/1
500 TABLET ORAL 3 TIMES DAILY
Qty: 30 TABLET | Refills: 0 | Status: SHIPPED | OUTPATIENT
Start: 2022-08-02 | End: 2022-08-12

## 2022-08-02 RX ORDER — OXYCODONE HYDROCHLORIDE AND ACETAMINOPHEN 5; 325 MG/1; MG/1
1 TABLET ORAL 2 TIMES DAILY PRN
Qty: 60 TABLET | Refills: 0 | Status: SHIPPED | OUTPATIENT
Start: 2022-08-05 | End: 2022-09-06 | Stop reason: SDUPTHER

## 2022-08-02 RX ADMIN — METRONIDAZOLE 500 MG: 500 TABLET ORAL at 13:55

## 2022-08-02 RX ADMIN — POTASSIUM CHLORIDE 20 MEQ: 20 TABLET, EXTENDED RELEASE ORAL at 10:05

## 2022-08-02 RX ADMIN — VANCOMYCIN HYDROCHLORIDE 1000 MG: 1 INJECTION, POWDER, LYOPHILIZED, FOR SOLUTION INTRAVENOUS at 01:10

## 2022-08-02 RX ADMIN — INSULIN GLARGINE 30 UNITS: 100 INJECTION, SOLUTION SUBCUTANEOUS at 10:05

## 2022-08-02 RX ADMIN — CEFEPIME 2000 MG: 2 INJECTION, POWDER, FOR SOLUTION INTRAVENOUS at 03:39

## 2022-08-02 RX ADMIN — OXYCODONE AND ACETAMINOPHEN 1 TABLET: 5; 325 TABLET ORAL at 10:05

## 2022-08-02 RX ADMIN — METRONIDAZOLE 500 MG: 500 TABLET ORAL at 06:11

## 2022-08-02 RX ADMIN — Medication 400 MG: at 10:05

## 2022-08-02 RX ADMIN — PANTOPRAZOLE SODIUM 40 MG: 40 TABLET, DELAYED RELEASE ORAL at 06:11

## 2022-08-02 RX ADMIN — ANASTROZOLE 1 MG: 1 TABLET, COATED ORAL at 10:05

## 2022-08-02 RX ADMIN — METFORMIN HYDROCHLORIDE 1000 MG: 500 TABLET ORAL at 10:05

## 2022-08-02 RX ADMIN — BACLOFEN 20 MG: 10 TABLET ORAL at 10:05

## 2022-08-02 RX ADMIN — TIOTROPIUM BROMIDE INHALATION SPRAY 2 PUFF: 3.12 SPRAY, METERED RESPIRATORY (INHALATION) at 08:29

## 2022-08-02 RX ADMIN — PREGABALIN 150 MG: 75 CAPSULE ORAL at 10:05

## 2022-08-02 RX ADMIN — BACLOFEN 20 MG: 10 TABLET ORAL at 13:55

## 2022-08-02 RX ADMIN — CEFEPIME 2000 MG: 2 INJECTION, POWDER, FOR SOLUTION INTRAVENOUS at 09:53

## 2022-08-02 RX ADMIN — ALOGLIPTIN 6.25 MG: 6.25 TABLET, FILM COATED ORAL at 10:05

## 2022-08-02 RX ADMIN — BUPROPION HYDROCHLORIDE 150 MG: 150 TABLET, FILM COATED, EXTENDED RELEASE ORAL at 10:05

## 2022-08-02 ASSESSMENT — PAIN - FUNCTIONAL ASSESSMENT
PAIN_FUNCTIONAL_ASSESSMENT: ACTIVITIES ARE NOT PREVENTED
PAIN_FUNCTIONAL_ASSESSMENT: PREVENTS OR INTERFERES SOME ACTIVE ACTIVITIES AND ADLS

## 2022-08-02 ASSESSMENT — PAIN DESCRIPTION - PAIN TYPE
TYPE: ACUTE PAIN
TYPE: ACUTE PAIN

## 2022-08-02 ASSESSMENT — PAIN DESCRIPTION - LOCATION
LOCATION: FOOT
LOCATION: BUTTOCKS;BACK;LEG
LOCATION: FOOT

## 2022-08-02 ASSESSMENT — PAIN DESCRIPTION - ONSET
ONSET: ON-GOING
ONSET: ON-GOING

## 2022-08-02 ASSESSMENT — PAIN SCALES - GENERAL
PAINLEVEL_OUTOF10: 8
PAINLEVEL_OUTOF10: 5
PAINLEVEL_OUTOF10: 8

## 2022-08-02 ASSESSMENT — PAIN DESCRIPTION - FREQUENCY
FREQUENCY: CONTINUOUS
FREQUENCY: CONTINUOUS

## 2022-08-02 ASSESSMENT — PAIN DESCRIPTION - DESCRIPTORS
DESCRIPTORS: THROBBING
DESCRIPTORS: ACHING

## 2022-08-02 ASSESSMENT — PAIN DESCRIPTION - ORIENTATION
ORIENTATION: RIGHT
ORIENTATION: MID;RIGHT
ORIENTATION: RIGHT

## 2022-08-02 NOTE — PROGRESS NOTES
today on behalf of Dr. Rogelio Enrique. Patient was seen in the ED on 7/26 on behalf of Dr. Rogelio Enrique for RLE cellulitis; patient was encouraged at the time to be admitted as she failed PO antibiotic therapy but the patient refused. Patient followed up with Dr. Rogelio Enrique at the office on 7/29, where she was told to return to the ED to be admitted for IV antibiotics. Patient currently denies any pain to BLE. She also denies any N/V/F/C/SOB/CP or bilateral calf tenderness. She has no new pedal complaints at this time.     Current Medications:    Current Facility-Administered Medications: nicotine (NICODERM CQ) 14 MG/24HR 1 patch, 1 patch, TransDERmal, Daily  amitriptyline (ELAVIL) tablet 75 mg, 75 mg, Oral, Nightly  lubrifresh P.M. (artificial tears) ophthalmic ointment, , Ophthalmic, PRN  metFORMIN (GLUCOPHAGE) tablet 1,000 mg, 1,000 mg, Oral, BID  alogliptin (NESINA) tablet 6.25 mg, 6.25 mg, Oral, Daily  insulin glargine (LANTUS) injection vial 30 Units, 30 Units, SubCUTAneous, QAM AC  cefepime (MAXIPIME) 2000 mg IVPB minibag, 2,000 mg, IntraVENous, Q12H  metroNIDAZOLE (FLAGYL) tablet 500 mg, 500 mg, Oral, 3 times per day  buPROPion (WELLBUTRIN XL) extended release tablet 150 mg, 150 mg, Oral, QAM  potassium chloride (KLOR-CON M) extended release tablet 40 mEq, 40 mEq, Oral, PRN **OR** potassium bicarb-citric acid (EFFER-K) effervescent tablet 40 mEq, 40 mEq, Oral, PRN **OR** potassium chloride 10 mEq/100 mL IVPB (Peripheral Line), 10 mEq, IntraVENous, PRN  sodium chloride flush 0.9 % injection 5-40 mL, 5-40 mL, IntraVENous, 2 times per day  sodium chloride flush 0.9 % injection 5-40 mL, 5-40 mL, IntraVENous, PRN  0.9 % sodium chloride infusion, , IntraVENous, PRN  [Held by provider] ondansetron (ZOFRAN-ODT) disintegrating tablet 4 mg, 4 mg, Oral, Q8H PRN **OR** [Held by provider] ondansetron (ZOFRAN) injection 4 mg, 4 mg, IntraVENous, Q6H PRN  polyethylene glycol (GLYCOLAX) packet 17 g, 17 g, Oral, Daily PRN  anastrozole (ARIMIDEX) tablet 1 mg, 1 mg, Oral, Every Other Day  atorvastatin (LIPITOR) tablet 40 mg, 40 mg, Oral, Nightly  baclofen (LIORESAL) tablet 20 mg, 20 mg, Oral, TID  hydrOXYzine pamoate (VISTARIL) capsule 50 mg, 50 mg, Oral, TID PRN  magnesium oxide (MAG-OX) tablet 400 mg, 400 mg, Oral, Daily  montelukast (SINGULAIR) tablet 10 mg, 10 mg, Oral, Nightly  pantoprazole (PROTONIX) tablet 40 mg, 40 mg, Oral, QAM AC  oxyCODONE-acetaminophen (PERCOCET) 5-325 MG per tablet 1 tablet, 1 tablet, Oral, BID PRN  [Held by provider] paliperidone (INVEGA) extended release tablet 3 mg, 3 mg, Oral, Nightly  potassium chloride (KLOR-CON M) extended release tablet 20 mEq, 20 mEq, Oral, Daily  pregabalin (LYRICA) capsule 150 mg, 150 mg, Oral, BID  albuterol sulfate HFA (PROVENTIL;VENTOLIN;PROAIR) 108 (90 Base) MCG/ACT inhaler 2 puff, 2 puff, Inhalation, Q6H PRN  tiotropium (SPIRIVA RESPIMAT) 2.5 MCG/ACT inhaler 2 puff, 2 puff, Inhalation, Daily  insulin lispro (HUMALOG) injection vial 0-8 Units, 0-8 Units, SubCUTAneous, TID WC  insulin lispro (HUMALOG) injection vial 0-4 Units, 0-4 Units, SubCUTAneous, Nightly  glucose chewable tablet 16 g, 4 tablet, Oral, PRN  dextrose bolus 10% 125 mL, 125 mL, IntraVENous, PRN **OR** dextrose bolus 10% 250 mL, 250 mL, IntraVENous, PRN  glucagon (rDNA) injection 1 mg, 1 mg, SubCUTAneous, PRN  dextrose 10 % infusion, , IntraVENous, Continuous PRN  [Held by provider] triamterene-hydroCHLOROthiazide (MAXZIDE-25) 37.5-25 MG per tablet 1 tablet, 1 tablet, Oral, Daily  [Held by provider] escitalopram (LEXAPRO) tablet 5 mg, 5 mg, Oral, Daily  rivaroxaban (XARELTO) tablet 10 mg, 10 mg, Oral, Daily    Objective     BP (!) 102/58   Pulse (!) 113   Temp 99 °F (37.2 °C) (Oral)   Resp 16   SpO2 98%      I/O:  Intake/Output Summary (Last 24 hours) at 8/2/2022 0849  Last data filed at 8/2/2022 6468  Gross per 24 hour   Intake 540 ml   Output 100 ml   Net 440 ml              Wt Readings from Last 3 Encounters: 07/26/22 150 lb (68 kg)   05/16/22 158 lb (71.7 kg)   05/16/22 158 lb (71.7 kg)       LABS:    Recent Labs     07/31/22  0557 08/01/22  0431   WBC 3.3* 3.5*   HGB 9.0* 9.8*   HCT 26.5* 29.1*   PLT 54* 56*        Recent Labs     08/01/22  0431      K 3.5   *   CO2 21*   BUN 11   CREATININE 0.6        Recent Labs     07/31/22  0924 08/01/22  0431   PROT 5.8* 5.5*      No results for input(s): CKTOTAL, CKMB, CKMBINDEX, TROPONINI in the last 72 hours. Exam:   Dressing intact and well maintained. No apparent strike through noted. Vascular: Dorsalis pedis and posterior tibial pulses are palpable bilaterally. Skin temperature is warm to warm from proximal tibial tuberosity to distal digits bilaterally. CFT <3 seconds. to all 10 digits. Pitting edema present to RLE. Hair growth present. Quality of skin within normal limits. Dermatologic: There is an unstageable ulceration noted on the plantar aspect of the lateral right foot. The wound is surrounded by a surrounding portion of superficial thickness wound with mild serosanguineous drainage. There is mild surrounding erythema and edema. No malodor or purulence noted. Wound does not probe to bone. There is an area of redness to the anteromedial aspect of the right leg; redness is greatly improved and receded since patient was admitted and placed on IV antibiotics. Neurovascular: Light touch sensation is intact to the level of the toes bilaterally     Musculoskeletal: Muscle strength 2/5 for all plantarflexors, dorsiflexors, inverters and everters examined on the right, 4/5 on the left. Minimal pain on palpation of right fifth metatarsal head. Foot and ankle in grossly rectus alignment bilaterally. Images from 7/31/2022              IMAGING:  Right lower extremity arterial Doppler    Impression   Normal study. Excellent pulsatility throughout the right leg. **This report has been created using voice recognition software.   It may contain minor errors which are inherent in voice recognition technology. **       Final report electronically signed by Dr. Peyton Lawler on 8/1/2022 8:27 AM       Right lower extremity venous Doppler    Impression   No evidence of a DVT. **This report has been created using voice recognition software. It may contain minor errors which are inherent in voice recognition technology. **       Final report electronically signed by Dr Shalonda Velez on 7/31/2022 11:07 AM         ASSESSMENT  Principle  Cellulitis, right leg  Unstageable ulcer, right foot    Chronic  Patient Active Problem List   Diagnosis    Leg weakness, bilateral    Hypokalemia    Hyperglycemia    Multiple sclerosis ([address]    Spinal cord stroke (HCC)    Hyponatremia    Lupus ([address]    Lupus (systemic lupus erythematosus) ([address]    Paraplegia (HCC)    Neurogenic bladder    Neurogenic bowel    Debility    Spinal cord infarction ([address]    Depression    Bipolar 1 disorder ([address]    Asthma without acute exacerbation    E. coli UTI    Skin ulcer of multiple sites ([address] bilateral inner thighs    History of left breast cancer    Anxiety    Transverse myelitis ([address]    Flexion contracture of right knee    Overactive bladder    Urgency-frequency syndrome    Carcinoma of upper-inner quadrant of left breast in female, estrogen receptor positive ([address]    Invasive ductal carcinoma of left breast (HCC)    TIA (transient ischemic attack)    Acute encephalopathy    Metabolic acidosis    Stroke-like symptoms    Hypomagnesemia    Type 2 diabetes mellitus with hyperglycemia, with long-term current use of insulin (HCC)    Elevated LFTs    Pancytopenia (HCC)    Schizophrenia (HCC)    Lactic acidosis    Chronic pain syndrome    Prolonged QT interval    Cellulitis of right leg    Diabetic ulcer of right midfoot associated with type 1 diabetes mellitus (HCC)       PLAN:   1) Cellulitis, right leg  2) Unstageable ulcer, right foot  -Patient examined and evaluated. -WBC 3.5; patient afebrile  -Lactic acid 2.0; procalcitonin 0.09  -Blood cultures 1 and 2, preliminary results: No growth  -Swab culture from 7/26/2022 shows moderate mixed growth of multiple enteric gram negative bacilli, including swarming Proteus species, Staphylococcus species (coagulase negative), Viridans Streptococcus species, and light growth of gram positive bacilli most consistent with Corynebacterium species  -Patient currently on IV cefepime, Flagyl   -Consult Infectious Diseases; appreciate antibiotics recommendations  -Changed dressings with betadine to wound to RLE and covered with 4x4 gauze, Kerlix roll, and ACE bandage for edema control; dressings to be changed daily  -Continue with Prevalon (heel-offloading) boot to RLE at all times while in bed and wheelchair  -Discussed with patient that we will have a more concrete idea of when to discharge her after she has been seen by infectious diseases, and once we have antibiotics recommendations. Discussed with patient that her likely discharge will be early to middle of this week. -3-phase bone scan reveals delayed phase uptake to right lateral foot, possible   -Patient verbalized understanding and agreement with the plan as stated. All of her questions were answered to her satisfaction.      3) Diabetes mellitus Type 2  -Placed patient on medium dose sliding scale insulin; patient states her blood sugar can run low, so will consider placing on low dose sliding scale PRN hypoglycemia  -Patient's BG ranging 135-185,  -Hospitalist following    4) Asthma  -Restarted home medications  -Hospitalist following     5) Hypokalemia  -Restarted home medications  -Hospitalist following     6) Hypomagnesemia  -Restarted home medications  -Hospitalist following    7) Depression  -Restarted home medications  -Hospitalist following     8) Anxiety  -Restarted home medications  -Hospitalist following     10) Bipolar disorder  -Restarted home medications  -Hospitalist following    DISPO: Pending response to IV abx, ID recommendations    Addendum: Seen patient at bedside with family member present. Discussed with patient the need for her to continue IV antibiotics while in the hospital.  Discussed with patient that infectious disease will see patient for antibiotics recommendation to discharge on. Discussed risks of the patient leaving AMA without antibiotics, which could potentially result in severe infection putting her at a high risk for amputation. Discussed with patient the need for wound care to her foot wound.      Rowena Mahmood DPM PGY-2  Podiatric Surgical Resident  8/2/2022   8:49 AM

## 2022-08-02 NOTE — PROGRESS NOTES
Delia Faith was evaluated today and a DME order was entered for a foam cushion for her wheelchair because she requires for skin integrity for optimal pressure relief due to a   Stage II pressure wound on her coccyx .

## 2022-08-02 NOTE — DISCHARGE SUMMARY
Discharge Summary    Patient:  Gera Potter  YOB: 1965    MRN: [id number]   Acct: [de-identified]    Primary Care Physician: Candance Nett, MD    Admit date:  7/29/2022    Discharge date:   8/2/2022    PT SIGNED OUT AMA      Discharge Diagnoses:   Cellulitis of right leg  Principal Problem:    Cellulitis of right leg  Active Problems:    Diabetic ulcer of right midfoot associated with type 1 diabetes mellitus ([address]    Paraplegia ([facility]    Type 2 diabetes mellitus with hyperglycemia, with long-term current use of insulin (HCC)    Pancytopenia ([address]  Resolved Problems:    * No resolved hospital problems. *        Admitted for: (HPI) recurrent cellulitis    Hospital Course: This is a pt with diabetes, paraplegia, breast cancer who had been treated for cellulitis as an outpt but failed therapy and was advised admission. She came to ER and was admitted to [address] [place] [address]. Hospitalist Service was consulted to manage medical issues and her diabetes. .     She was admitted and started on IV antibiotics. She not only had a cellulitis but also a large ulcer of her foot(see pictures). No debridement was felt necessary. She was also followed by Dr Jennifer Paiz. She was seen by Podiatry today and was felt improved although she still had a very large foot ulcer. She was encouraged to stay in hospital but was adamant on going home despite being advised of the dangers of such  and signed out AMA. She was discharged on po antibiotics by Dr Jennifer Paiz. She will presumably follow up with Podiatry and her pcp. It would not be surprising if she needs readmission. She was noted to have pancytopenia which appears to be chronic; she is on an antipsychotic which could be associated; however it was continued till she can be reassessed re its need by her pcp or the prescribing physician.      Consultants:  Podiatry, ID    Discharge Medications:       Medication List        START taking these medications      cefUROXime 500 MG tablet  Commonly known as: CEFTIN  Take 0.5 tablets by mouth in the morning and 0.5 tablets before bedtime. Do all this for 10 days. metroNIDAZOLE 500 MG tablet  Commonly known as: FLAGYL  Take 1 tablet by mouth in the morning and 1 tablet at noon and 1 tablet before bedtime. Do all this for 10 days. nicotine 14 MG/24HR  Commonly known as: NICODERM CQ  Place 1 patch onto the skin in the morning. Start taking on: August 3, 2022            CHANGE how you take these medications      insulin glargine 100 UNIT/ML injection vial  Commonly known as: LANTUS  Inject 30 Units into the skin every morning (before breakfast)  What changed:   how much to take  when to take this     oxyCODONE-acetaminophen 5-325 MG per tablet  Commonly known as: Percocet  Take 1 tablet by mouth 2 times daily as needed for Pain for up to 30 days. Start taking on: August 5, 2022  What changed: These instructions start on August 5, 2022. If you are unsure what to do until then, ask your doctor or other care provider.             CONTINUE taking these medications      amitriptyline 75 MG tablet  Commonly known as: ELAVIL  Take 1 tablet by mouth nightly     anastrozole 1 MG tablet  Commonly known as: Arimidex  Take 1 tablet by mouth every other day     artificial tears Oint     atorvastatin 40 MG tablet  Commonly known as: LIPITOR  Take 1 tablet by mouth nightly     baclofen 10 MG tablet  Commonly known as: LIORESAL  Take 2 tablets by mouth 3 times daily     BD Pen Needle Anu 2nd Gen 32G X 4 MM Misc  Generic drug: Insulin Pen Needle     CALCIUM 600 PO     clopidogrel 75 MG tablet  Commonly known as: PLAVIX  Take 1 tablet by mouth daily     desvenlafaxine succinate 50 MG Tb24 extended release tablet  Commonly known as: PRISTIQ     Handicap Placard Misc  by Does not apply route 2/08/22- 2/8/2027     hydrOXYzine pamoate 50 MG capsule  Commonly known as: VISTARIL     insulin lispro 100 UNIT/ML injection vial  Commonly known as: HUMALOG  Inject 10 Units into the skin 3 times daily (with meals)     Insulin Syringes (Disposable) U-100 1 ML Misc  1 each by Does not apply route daily     Januvia 100 MG tablet  Generic drug: SITagliptin     magnesium oxide 400 (241.3 Mg) MG Tabs tablet  Commonly known as: MAG-OX  Take 1 tablet by mouth daily     metFORMIN 1000 MG tablet  Commonly known as: GLUCOPHAGE     montelukast 10 MG tablet  Commonly known as: SINGULAIR     Mucinex Maximum Strength 1200 MG Tb12  Generic drug: guaiFENesin     omeprazole 20 MG delayed release capsule  Commonly known as: PRILOSEC     paliperidone 3 MG extended release tablet  Commonly known as: INVEGA     potassium chloride 20 MEQ extended release tablet  Commonly known as: KLOR-CON M  Take 1 tablet by mouth daily     pregabalin 150 MG capsule  Commonly known as: LYRICA  Take 1 capsule by mouth 2 times daily for 30 days. ProAir  (90 Base) MCG/ACT inhaler  Generic drug: albuterol sulfate HFA     TAB-A-ISAAC PO     tiotropium 18 MCG inhalation capsule  Commonly known as: SPIRIVA     Underpads Misc  Cloth chux pads  Diagnosis: Paraplegia 344.1     * Wheelchair Misc  Wheelchair repairs- new seat cover, right side armrest replacement    Current body weight:  68 kg  Diagnosis: gait disturbance, chronic incomplete spastic paraplegia  Length of need: Lifetime     * Transfer Bench Misc  1 each by Does not apply route daily Tub transfer bench with back     Xiidra 5 % Soln  Generic drug: Lifitegrast           * This list has 2 medication(s) that are the same as other medications prescribed for you. Read the directions carefully, and ask your doctor or other care provider to review them with you.                 STOP taking these medications      acidophilus/citrus pectin Tabs     cephALEXin 500 MG capsule  Commonly known as: KEFLEX     doxycycline hyclate 100 MG tablet  Commonly known as: VIBRA-TABS     loratadine 10 MG tablet  Commonly known as: CLARITIN     naloxone 4 MG/0.1ML Liqd nasal spray               Where to Get Your Medications        These medications were sent to [address] [place] [address] #[id number] - [place], [address] [phone] - f [phone]  [address] [place] [address]      Phone: [phone]   cefUROXime 500 MG tablet  metroNIDAZOLE 500 MG tablet  oxyCODONE-acetaminophen 5-325 MG per tablet       You can get these medications from any pharmacy    You don't need a prescription for these medications  nicotine 14 MG/24HR       Information about where to get these medications is not yet available    Ask your nurse or doctor about these medications  insulin glargine 100 UNIT/ML injection vial           Physical Exam:    Vitals:  Vitals:    08/02/22 0330 08/02/22 0829 08/02/22 0945 08/02/22 1506   BP: (!) 102/58  (!) 108/57 (!) 109/54   Pulse: (!) 113  (!) 113 (!) 104   Resp: 16  16 18   Temp: 99 °F (37.2 °C)  98.2 °F (36.8 °C) 98.6 °F (37 °C)   TempSrc: Oral  Oral Oral   SpO2: 96% 98% 97% 97%     Weight:       24 hour intake/output:  Intake/Output Summary (Last 24 hours) at 8/2/2022 1600  Last data filed at 8/2/2022 1350  Gross per 24 hour   Intake 540 ml   Output --   Net 540 ml       General appearance - chronically ill appearing  Chest - clear to auscultation, no wheezes, rales or rhonchi, symmetric air entry  Heart - normal rate, regular rhythm, normal S1, S2, no murmurs, rubs, clicks or gallops  Abdomen - soft, nontender, nondistended, no masses or organomegaly  Obese: No; Protuberant: No   Neurological - paraplegia  Extremities - Bilateral leg puffiness  Skin - see pictures        Labs can be found in the Lab tab of Chart Review    Diet:  ADULT DIET;  Regular; 3 carb choices (45 gm/meal)    Activity:  Activity as tolerated (Patient may move about with assist as indicated or with supervision.)    Follow-up:  in the next few days with Ayaka Rodriguez MD,  in 2 weeks with Podiatry    Disposition: home    Condition: Stable      Time Spent: 35 minutes    Electronically signed by Asad Etienne MD on 8/2/2022 at 4:00 PM    Discharging Hospitalist

## 2022-08-02 NOTE — PROGRESS NOTES
Called down to patients room as she wants to leave AMA. She states there isn't anything I can do to get her to stay to finish off her IV ATB for her right leg cellulitis. She states she has already called her neighbor to come get her. Nursing supervisor, hospitalist, and Dr. Lisa Iraheta notified of patient leaving AMA. Patient educated on the importance of IV ATB for her infection and the risks that could happen if she goes home. Education provided to call her providers to follow up with them once she gets home. Patient educated that she will not be going home with pain medication script and verbalizes understanding. States she has help at home. All belongings sent home with patient.

## 2022-08-02 NOTE — PLAN OF CARE
Problem: Discharge Planning  Goal: Discharge to home or other facility with appropriate resources  7/31/2022 0158 by Alesha Hall RN  Outcome: Progressing  Flowsheets  Taken 7/31/2022 0158  Discharge to home or other facility with appropriate resources: Identify barriers to discharge with patient and caregiver  Taken 7/30/2022 2000  Discharge to home or other facility with appropriate resources: Identify barriers to discharge with patient and caregiver     Problem: Pain  Goal: Verbalizes/displays adequate comfort level or baseline comfort level  7/31/2022 0158 by Alesha Hall RN  Outcome: Progressing  Flowsheets (Taken 7/31/2022 0158)  Verbalizes/displays adequate comfort level or baseline comfort level:   Encourage patient to monitor pain and request assistance   Assess pain using appropriate pain scale  7/30/2022 1200 by Rizwan Chávez RN  Outcome: Progressing  Flowsheets (Taken 7/30/2022 1200)  Verbalizes/displays adequate comfort level or baseline comfort level:   Encourage patient to monitor pain and request assistance   Administer analgesics based on type and severity of pain and evaluate response   Consider cultural and social influences on pain and pain management   Assess pain using appropriate pain scale   Implement non-pharmacological measures as appropriate and evaluate response   Notify Licensed Independent Practitioner if interventions unsuccessful or patient reports new pain     Problem: Chronic Conditions and Co-morbidities  Goal: Patient's chronic conditions and co-morbidity symptoms are monitored and maintained or improved  7/31/2022 0158 by Alesha Hall RN  Outcome: Progressing  Flowsheets  Taken 7/31/2022 0158  Care Plan - Patient's Chronic Conditions and Co-Morbidity Symptoms are Monitored and Maintained or Improved:   Monitor and assess patient's chronic conditions and comorbid symptoms for stability, deterioration, or improvement   Collaborate with multidisciplinary team to address chronic and comorbid conditions and prevent exacerbation or deterioration  Taken 7/30/2022 2000  Care Plan - Patient's Chronic Conditions and Co-Morbidity Symptoms are Monitored and Maintained or Improved: Monitor and assess patient's chronic conditions and comorbid symptoms for stability, deterioration, or improvement     Problem: Safety - Adult  Goal: Free from fall injury  7/31/2022 0158 by Enmanuel Weaver RN  Outcome: Progressing  Flowsheets (Taken 7/31/2022 0158)  Free From Fall Injury:   Instruct family/caregiver on patient safety   Based on caregiver fall risk screen, instruct family/caregiver to ask for assistance with transferring infant if caregiver noted to have fall risk factors        Care plan reviewed with patient. Patient verbalizes understanding of the plan of care and contribute to goal setting.

## 2022-08-02 NOTE — PROGRESS NOTES
Patient has decided to leave against medical advise. She is aware of the risks she is taking by leaving as the treatment team has made her aware. Discharge instructions reviewed with patient. Verbalized understanding. Family at bedside. AMA paper signed. Transported via personal wheelchair down to discharge lobby.

## 2022-08-02 NOTE — CARE COORDINATION
8/2/22, 1:19 PM EDT    DISCHARGE ON GOING [facility] day: 4  Location: -09/009-A Reason for admit: Cellulitis of right leg [L03.115]  Cellulitis of right lower extremity [P11.437]  Diabetic ulcer of right midfoot associated with type 1 diabetes mellitus, unspecified ulcer stage ([facility] [E10.621, L97.419]   Procedure: na  Barriers to Discharge: Swab culture from 7/26/2022 shows moderate mixed growth of multiple enteric gram negative bacilli,  ID follows. IV Cefepime, Flagyl. DM management with med. ISS. Dressing care by podiatry. Elevate right foot. Stage 2 pressure ulcer on sacrum, bleeding with wound ostomy consulted. Patient Goals/Plan/Treatment Preferences: from home with fiance; pt is wheelchair bound due to hx sroke. New W/C cushion ordered per hospitalist ; [facility] DME notified.

## 2022-08-02 NOTE — PROGRESS NOTES
Assessment and Plan:        Diabtic foot ulcer; on antibiotics; Podiatry managing . Dr Silvia Cushing has seen. Reviewed pictures today. Diabetes- sugars improved with med adjustments; continue to follow. Pancytopenia- chronic- being followed by hematology. May be due to psych meds  Hx transverse myelitis, thought to be due to lupus; will check NAKITA. Pressure ulcer from sitting; cushion ordered for sitting to offload pressure. CC:  pt with diabetes, developed foot ulcer several weeks ago; failed outpt management. Hospitalist Group following for medical issues  HPI: See above    ROS (12 point review of systems completed. Pertinent positives noted.  Otherwise ROS is negative) :   hx MS, cva involving right body    PMH:  Per HPI  SHX:  lives with s/o  FHX: cva, asthma  Allergies: See above    Medications:     sodium chloride      dextrose        nicotine  1 patch TransDERmal Daily    amitriptyline  75 mg Oral Nightly    metFORMIN  1,000 mg Oral BID    alogliptin  6.25 mg Oral Daily    insulin glargine  30 Units SubCUTAneous QAM AC    cefepime  2,000 mg IntraVENous Q12H    metroNIDAZOLE  500 mg Oral 3 times per day    buPROPion  150 mg Oral QAM    sodium chloride flush  5-40 mL IntraVENous 2 times per day    anastrozole  1 mg Oral Every Other Day    atorvastatin  40 mg Oral Nightly    baclofen  20 mg Oral TID    magnesium oxide  400 mg Oral Daily    montelukast  10 mg Oral Nightly    pantoprazole  40 mg Oral QAM AC    [Held by provider] paliperidone  3 mg Oral Nightly    potassium chloride  20 mEq Oral Daily    pregabalin  150 mg Oral BID    tiotropium  2 puff Inhalation Daily    insulin lispro  0-8 Units SubCUTAneous TID WC    insulin lispro  0-4 Units SubCUTAneous Nightly    [Held by provider] triamterene-hydroCHLOROthiazide  1 tablet Oral Daily    [Held by provider] escitalopram  5 mg Oral Daily    rivaroxaban  10 mg Oral Daily       Vital Signs:   BP (!) 108/57   Pulse (!) 113   Temp 98.2 °F (36.8 °C) (Oral) Resp 16   SpO2 97%      Intake/Output Summary (Last 24 hours) at 8/2/2022 1121  Last data filed at 8/2/2022 0609  Gross per 24 hour   Intake 540 ml   Output 100 ml   Net 440 ml        Physical Examination: General appearance - chronically ill appearing  Mental status - alert, oriented to person, place, and time  Neck - supple, no significant adenopathy, no JVD, or carotid bruits  Chest - clear to auscultation, no wheezes, rales or rhonchi, symmetric air entry  Heart - normal rate and regular rhythm, systolic murmur 2/6 at lower left sternal border  Abdomen - soft, nontender, nondistended, no masses or organomegaly  Neurological - alert, oriented, normal speech,   Musculoskeletal - no muscular tenderness noted  Extremities - legs puffy,  foot wrapped. Albumin very low, suggesting malnutrition  Skin - normal coloration and turgor, no rashes, no suspicious skin lesions noted    Data: (All radiographs, tracings, PFTs, and imaging are personally viewed and interpreted unless otherwise noted).    Reviewed labs      Electronically signed by Surinder Gaspar MD on 8/2/2022 at 11:21 AM

## 2022-08-02 NOTE — PROGRESS NOTES
[facility]  INPATIENT PHYSICAL THERAPY  DAILY NOTE  [facility] ORTHOPEDICS 7K - 1H-98/485-O      Time In: 0208  Time Out: 1159  Timed Code Treatment Minutes: 39 Minutes  Minutes: 45          Date: 2022  Patient Name: Carissa Chong,  Gender:  female        MRN: [id number]  : 1965  (49 y.o.)  Referral Date :   Referring Practitioner: Geraldo Smith DPM     Additional Pertinent Hx: Per H&P:The patient is a 49 y.o. female with significant past medical history of DM type 2, multiple sclerosis, spinal cord stroke, lupus, paraplegia, asthma, TIA, and breast cancer who was seen at bedside today on behalf of Dr. Ghazal Holloway. Patient was seen in the ED on  on behalf of Dr. Ghazal Holloway for RLE cellulitis; patient was encouraged at the time to be admitted as she failed PO antibiotic therapy but the patient refused. Patient followed up with Dr. Ghazal Holloway at the office on , where she was told to return to the ED to be admitted for IV antibiotics. Patient currently denies any pain to BLE. She also denies any N/V/F/C/SOB/CP or bilateral calf tenderness. She has no new pedal complaints at this time. Prior Level of Function:  Lives With: Significant other  Type of Home: Apartment  Home Layout: One level  Home Equipment: Wheelchair-manual   Bathroom Shower/Tub: Tub/Shower unit  Bathroom Equipment: Grab bars in shower, Tub transfer bench, Grab bars around toilet, Commode  Bathroom Accessibility: Wheelchair accessible    [place] [address] Help From: Family  ADL Assistance: Needs assistance  Homemaking Assistance: Needs assistance  Ambulation Assistance: Non-ambulatory  Transfer Assistance: Independent  Active : No  Additional Comments: Patient's boyfriend just retired and is able to assist pt. Pt goes to the grocery store with a family member. She stays in the W/C throughout. Pt spends alot of time in the W/C each day. She otherwise gets on the sofa.   She will sleep in her bed.    Restrictions/Precautions:  Restrictions/Precautions: Fall Risk, Weight Bearing  Right Lower Extremity Weight Bearing: Partial Weight Bearing (8/2- clarified with podiatry pt is allowed right heel wbing for transfers only-)  Position Activity Restriction  Other position/activity restrictions: wound right foot- stage II buttocks wound       SUBJECTIVE: clarified wbing status with podiatry and pt is allowed heel wbing for transfers only- nursing notified and updated pts orders- pt up in chair on arrival she had c/o of her w/c about the breaks and the wheel skipping so she was agreeable to wheel in the halls to talk to home medical about her w/c and he was agreeable to adjust her breaks today- pt needed to use bathroom during session   - discussed pressure relief with pt and to complete every 15 min due to pressure sore     PAIN: pt didn't voice any concerns     Vitals: Vitals not assessed per clinical judgement, see nursing flowsheet    OBJECTIVE:  Bed Mobility:  Not Tested    Transfers:  Sit pivot transfer from w/c to toilet with CGA with a couple of scoots to complete and pt reaching for grab bar on her left side - discussed a slide board as she does have one at home but reported that she doesn't use it- discussed it may improve her ability to keep wbing off her foot during transfers however she wasn't open to trying a slide board   Wheelchair Mobility:  Modified Independent  Extremities Used: Bilateral Upper Extremities  Type of Chair:Manual  Surface: Level Tile  Distance: 150x2  Quality: Slow Velocity, Short Strokes, and pt needed assist for w/c set up prior to transfers     Balance:  Sitting on toilet with SBA to supervision while pt leaning side to side for clothing management and for barak care - pt impulsive at times- pt completed this activity 2 times during session as she had gotten cleaned up and then needed to use bathroom again       Functional Outcome Measures: Completed ASSESSMENT:  Assessment: Patient progressing toward established goals. Activity Tolerance:  Patient tolerance of  treatment: fair. Equipment Recommendations:Equipment Needed: Yes (wheelchair brakes loose--may benefit from outpatient wheelchair evaluation)  Discharge Recommendations: Continue to assess pending progress and anticipate pt will require 24 hour hands on assist   Plan: Current Treatment Recommendations: Strengthening, Balance training, Functional mobility training, Transfer training, Neuromuscular re-education, Wheelchair mobility training, Endurance training, Patient/Caregiver education & training, Safety education & training, Home exercise program, Equipment evaluation, education, & procurement, Therapeutic activities, Positioning  Plan:  (5-6xGM)    Patient Education  Patient Education: Plan of Care    Goals:  Patient goals : go home  Short Term Goals  Time Frame for Short term goals: at discharge  Short term goal 1: Patient will complete sit pivot transfers with supervision to transfer surface to surface safely. Short term goal 2: Patient will trial slide board for increased ease with transfers. Short term goal 3: Patient will complete supine < > sit transfers with modified independence to transfer in and out of bed safely. Short term goal 4: Patient will identify two techniques to reduce pressure from buttocks to reduce risk for wound. Long Term Goals  Time Frame for Long term goals : N/A due to short estimated length of stay    Following session, patient left in safe position with all fall risk precautions in place.

## 2022-08-02 NOTE — PLAN OF CARE
Problem: Respiratory - Adult  Goal: Clear lung sounds  Description: Clear lung sounds  Outcome: Progressing    Continue breathing tx's to improve breath sounds, increase aeration and decrease WOB.

## 2022-08-02 NOTE — PROGRESS NOTES
Progress note: Infectious diseases    Patient - Radha Hoffmann,  Age - 49 y.o.    - 1965      Room Number - 3C-38/639-V   MRN -  [id number]   Acct # - [de-identified]  Date of Admission -  2022  5:51 PM    SUBJECTIVE:   She wants to go home  Wound cx report noted. OBJECTIVE   VITALS    oral temperature is 98.6 °F (37 °C). Her blood pressure is 109/54 (abnormal) and her pulse is 104 (abnormal). Her respiration is 18 and oxygen saturation is 97%.        Wt Readings from Last 3 Encounters:   22 150 lb (68 kg)   22 158 lb (71.7 kg)   22 158 lb (71.7 kg)       I/O (24 Hours)    Intake/Output Summary (Last 24 hours) at 2022 1512  Last data filed at 2022 1350  Gross per 24 hour   Intake 540 ml   Output --   Net 540 ml       General Appearance  Awake, alert, oriented,  chronically sick looking  HEENT - normocephalic, atraumatic, pale conjunctiva,  anicteric sclera  Neck - Supple, no mass  Lungs -  Bilateral   air entry, no rhonchi, no wheeze  Cardiovascular - Heart sounds are normal.     Abdomen - soft, not distended, nontender,   Neurologic -oriented  Skin - No bruising or bleeding  Extremities - edematous lower leg  The redness is less  Has full thickness open wound with purlplsih wound bed      MEDICATIONS:      nicotine  1 patch TransDERmal Daily    amitriptyline  75 mg Oral Nightly    metFORMIN  1,000 mg Oral BID    alogliptin  6.25 mg Oral Daily    insulin glargine  30 Units SubCUTAneous QAM AC    cefepime  2,000 mg IntraVENous Q12H    metroNIDAZOLE  500 mg Oral 3 times per day    buPROPion  150 mg Oral QAM    sodium chloride flush  5-40 mL IntraVENous 2 times per day    anastrozole  1 mg Oral Every Other Day    atorvastatin  40 mg Oral Nightly    baclofen  20 mg Oral TID    magnesium oxide  400 mg Oral Daily    montelukast  10 mg Oral Nightly    pantoprazole  40 mg Oral QAM AC    [Held by hours.  Troponin: No results for input(s): CKTOTAL, CKMB, TROPONINI in the last 72 hours. BNP: No results for input(s): BNP in the last 72 hours. CULTURES:   UA: No results for input(s): SPECGRAV, PHUR, COLORU, CLARITYU, MUCUS, PROTEINU, BLOODU, RBCUA, WBCUA, BACTERIA, NITRU, GLUCOSEU, BILIRUBINUR, UROBILINOGEN, KETUA, LABCAST, LABCASTTY, AMORPHOS in the last 72 hours. Invalid input(s): CRYSTALS  Micro:   Lab Results   Component Value Date/Time    BC No growth 24 hours. No growth 48 hours. 07/29/2022 06:38 PM         IMAGING:         Problem list of patient:     Patient Active Problem List   Diagnosis Code    Leg weakness, bilateral R29.898    Hypokalemia E87.6    Hyperglycemia R73.9    Multiple sclerosis ([facility] G35    Spinal cord stroke ([facility]) G95.11    Hyponatremia E87.1    Lupus ([address] M32.9    Lupus (systemic lupus erythematosus) ([address] M32.9    Paraplegia ([facility]) G82.20    Neurogenic bladder N31.9    Neurogenic bowel K59.2    Debility R53.81    Spinal cord infarction ([address] G95.11    Depression F32. A    Bipolar 1 disorder ([facility]) F31.9    Asthma without acute exacerbation J45.909    E. coli UTI N39.0, B96.20    Skin ulcer of multiple sites [facility]), bilateral inner thighs L98.499    History of left breast cancer Z85.3    Anxiety F41.9    Transverse myelitis ([facility]) G37.3    Flexion contracture of right knee M24.561    Overactive bladder N32.81    Urgency-frequency syndrome N32.81    Carcinoma of upper-inner quadrant of left breast in female, estrogen receptor positive ([address] C50.212, Z17.0    Invasive ductal carcinoma of left breast ([facility]) C50.912    TIA (transient ischemic attack) G45.9    Acute encephalopathy D06.36    Metabolic acidosis C97.8    Stroke-like symptoms R29.90    Hypomagnesemia E83.42    Type 2 diabetes mellitus with hyperglycemia, with long-term current use of insulin ([facility]) E11.65, Z79.4    Elevated LFTs R79.89    Pancytopenia ([facility]) D61.818    Schizophrenia ([facility]) F20.9    Lactic acidosis E87.2 Chronic pain syndrome G89.4    Prolonged QT interval R94.31    Cellulitis of right leg L03.115    Diabetic ulcer of right midfoot associated with type 1 diabetes mellitus (HCC) E10.621, L97.419         ASSESSMENT/PLAN   Right lower leg cellulites with necrotic foot wound:  she wants to go home.  She will be discharged with oral antibiotics  Hx of stroke with debilitation, on wheelchair bound  No plan to quit smoking  Explained to patient about the risk of limb loss, risk of worsening infection and need for admission and more surgery         Armando Jaramillo MD, MD, FACP 8/2/2022 3:12 PM

## 2022-08-03 LAB
BLOOD CULTURE, ROUTINE: NORMAL
BLOOD CULTURE, ROUTINE: NORMAL

## 2022-08-04 LAB — ANA SCREEN: NORMAL

## 2022-08-08 DIAGNOSIS — G89.29 OTHER CHRONIC PAIN: ICD-10-CM

## 2022-08-08 RX ORDER — AMITRIPTYLINE HYDROCHLORIDE 25 MG/1
TABLET, FILM COATED ORAL
Qty: 30 TABLET | Refills: 5 | OUTPATIENT
Start: 2022-08-08

## 2022-08-15 ENCOUNTER — TELEPHONE (OUTPATIENT)
Dept: ONCOLOGY | Age: 57
End: 2022-08-15

## 2022-08-16 ENCOUNTER — TELEPHONE (OUTPATIENT)
Dept: ONCOLOGY | Age: 57
End: 2022-08-16

## 2022-08-16 NOTE — TELEPHONE ENCOUNTER
I called and spoke with patient regarding Botox appt and her Botox prior authorization. I told her Dr. Rocio Escudero does not have any available openings before 9/1/22. Patient voiced understanding.

## 2022-09-06 DIAGNOSIS — G89.4 CHRONIC PAIN SYNDROME: ICD-10-CM

## 2022-09-06 RX ORDER — OXYCODONE HYDROCHLORIDE AND ACETAMINOPHEN 5; 325 MG/1; MG/1
1 TABLET ORAL 2 TIMES DAILY PRN
Qty: 60 TABLET | Refills: 0 | Status: SHIPPED | OUTPATIENT
Start: 2022-09-06 | End: 2022-10-04 | Stop reason: SDUPTHER

## 2022-09-06 NOTE — TELEPHONE ENCOUNTER
OARRS reviewed. UDS: negative   Last seen: 5/16/2022.  Follow-up:   Future Appointments   Date Time Provider Rashard Rosamaria   9/9/2022 11:40 AM DO JONNY Espinoza [facility] Pain [facility] [address] [facility]   9/28/2022 12:30 PM CHAS Egan - CNP [id number]   12/1/2022  1:00 PM Anju Edwards MD [address] [place] [address]

## 2022-09-07 DIAGNOSIS — G89.29 OTHER CHRONIC PAIN: ICD-10-CM

## 2022-09-07 RX ORDER — AMITRIPTYLINE HYDROCHLORIDE 25 MG/1
TABLET, FILM COATED ORAL
Qty: 30 TABLET | Refills: 5 | OUTPATIENT
Start: 2022-09-07

## 2022-09-15 RX ORDER — ATORVASTATIN CALCIUM 40 MG/1
TABLET, FILM COATED ORAL
Qty: 30 TABLET | Refills: 3 | OUTPATIENT
Start: 2022-09-15

## 2022-09-29 DIAGNOSIS — G89.29 OTHER CHRONIC PAIN: ICD-10-CM

## 2022-09-29 DIAGNOSIS — G89.4 CHRONIC PAIN SYNDROME: ICD-10-CM

## 2022-09-29 RX ORDER — CLOPIDOGREL BISULFATE 75 MG/1
75 TABLET ORAL DAILY
Qty: 30 TABLET | Refills: 3 | OUTPATIENT
Start: 2022-09-29

## 2022-09-29 NOTE — TELEPHONE ENCOUNTER
Kaity Billings called requesting a refill on the following medications:  Requested Prescriptions     Pending Prescriptions Disp Refills    oxyCODONE-acetaminophen (PERCOCET) 5-325 MG per tablet 60 tablet 0     Sig: Take 1 tablet by mouth 2 times daily as needed for Pain for up to 30 days. Pharmacy verified: [facility]      Date of last visit: 5/16/2022  Date of next visit (if applicable): 49/18/1127

## 2022-10-03 RX ORDER — PREGABALIN 150 MG/1
CAPSULE ORAL
Qty: 60 CAPSULE | Refills: 0 | Status: SHIPPED | OUTPATIENT
Start: 2022-10-03 | End: 2022-10-26

## 2022-10-03 NOTE — TELEPHONE ENCOUNTER
OARRS reviewed. UDS: negative   Last seen: 5/16/2022.  Follow-up:   Future Appointments   Date Time Provider Rashard Rosamaria   10/26/2022 10:40 AM DO JONNY Hurtado [facility] [place]   12/1/2022  1:00 PM Bhavin Guzman MD [id number] [address]

## 2022-10-03 NOTE — TELEPHONE ENCOUNTER
OARRS reviewed. UDS: negative   Last seen: 5/16/2022.  Follow-up:   Future Appointments   Date Time Provider Rashard Rosamaria   10/26/2022 10:40 AM DO JONNY Simms [facility] [place]   12/1/2022  1:00 PM Jonh Silva [place] [address] [address]

## 2022-10-04 RX ORDER — OXYCODONE HYDROCHLORIDE AND ACETAMINOPHEN 5; 325 MG/1; MG/1
1 TABLET ORAL 2 TIMES DAILY PRN
Qty: 60 TABLET | Refills: 0 | Status: SHIPPED | OUTPATIENT
Start: 2022-10-06 | End: 2022-11-03 | Stop reason: SDUPTHER

## 2022-10-07 DIAGNOSIS — G89.29 OTHER CHRONIC PAIN: ICD-10-CM

## 2022-10-10 RX ORDER — AMITRIPTYLINE HYDROCHLORIDE 25 MG/1
TABLET, FILM COATED ORAL
Qty: 30 TABLET | Refills: 5 | OUTPATIENT
Start: 2022-10-10

## 2022-10-20 ENCOUNTER — TELEPHONE (OUTPATIENT)
Dept: PHYSICAL MEDICINE AND REHAB | Age: 57
End: 2022-10-20

## 2022-10-20 NOTE — TELEPHONE ENCOUNTER
Patient states that she is having swelling in both of her legs and wants to know if she would still be able to get the botox injections when they are swollen?   Please advise  [phone]

## 2022-10-26 ENCOUNTER — OFFICE VISIT (OUTPATIENT)
Dept: PHYSICAL MEDICINE AND REHAB | Age: 57
End: 2022-10-26
Payer: COMMERCIAL

## 2022-10-26 VITALS
DIASTOLIC BLOOD PRESSURE: 66 MMHG | WEIGHT: 150 LBS | BODY MASS INDEX: 29.45 KG/M2 | HEIGHT: 60 IN | SYSTOLIC BLOOD PRESSURE: 108 MMHG

## 2022-10-26 DIAGNOSIS — G82.22 CHRONIC INCOMPLETE SPASTIC PARAPLEGIA (HCC): ICD-10-CM

## 2022-10-26 DIAGNOSIS — G35 MULTIPLE SCLEROSIS (HCC): ICD-10-CM

## 2022-10-26 DIAGNOSIS — G37.3 TRANSVERSE MYELITIS (HCC): ICD-10-CM

## 2022-10-26 DIAGNOSIS — G89.29 OTHER CHRONIC PAIN: Primary | ICD-10-CM

## 2022-10-26 PROCEDURE — G8484 FLU IMMUNIZE NO ADMIN: HCPCS | Performed by: ANESTHESIOLOGY

## 2022-10-26 PROCEDURE — 4004F PT TOBACCO SCREEN RCVD TLK: CPT | Performed by: ANESTHESIOLOGY

## 2022-10-26 PROCEDURE — G8417 CALC BMI ABV UP PARAM F/U: HCPCS | Performed by: ANESTHESIOLOGY

## 2022-10-26 PROCEDURE — G9899 SCRN MAM PERF RSLTS DOC: HCPCS | Performed by: ANESTHESIOLOGY

## 2022-10-26 PROCEDURE — G8427 DOCREV CUR MEDS BY ELIG CLIN: HCPCS | Performed by: ANESTHESIOLOGY

## 2022-10-26 PROCEDURE — 3017F COLORECTAL CA SCREEN DOC REV: CPT | Performed by: ANESTHESIOLOGY

## 2022-10-26 PROCEDURE — 99214 OFFICE O/P EST MOD 30 MIN: CPT | Performed by: ANESTHESIOLOGY

## 2022-10-26 RX ORDER — PREGABALIN 150 MG/1
150 CAPSULE ORAL 3 TIMES DAILY
Qty: 90 CAPSULE | Refills: 2 | Status: SHIPPED | OUTPATIENT
Start: 2022-10-26 | End: 2022-11-25

## 2022-10-26 NOTE — PROGRESS NOTES
Chronic Pain/PM&R Clinic Note     Encounter Date: 10/26/22    Subjective:   Chief Complaint:   Chief Complaint   Patient presents with    Follow-up    Discuss Medications     Stronger pain medication        History of Present Illness:   Ronda Reyez is a 56 y.o. female seen in the clinic initially on 08/09/21 for her history of left sided chest wall pain. She has a medical history of left breast mastectomy in February 2021 secondary to breast cancer with subsequent wound dehiscence and surgical debridement and spinal cord injury secondary to transverse myelitis. She has been dealing with left-sided chest wall pain at the site of her incision ever since her mastectomy and this is been exacerbated after most recent revision surgery in June 2021. She describes his pain is a constant stabbing, electrical type pain right on her incision scar. She rates this pain is a constant 6/10 pain that can get up to a 10/10. She denies any drainage or areas of erythema around the scar. She states that this area is so sensitive that is difficult for her to wear even a supportive bra. She reports not getting much relief with her other medications including Lyrica 150 mg twice a day, Norco 5 mg twice a day, and Elavil 50 mg at night. She states that this is interfering with her everyday activities and really interfering with sleep. Today, 10/26/2022, patient presents for planned follow-up for chronic left-sided chest wall pain. She states she is unsure why she missed her appointment back in August.  She feels like the pain she is having in her foot has become increasingly bothersome. She thinks that she is taking her Lyrica but was perhaps hesitant about whether she is taking this medication or not. She is wondering about changing her pain medication as she feels that this will become less effective for her. She denies any side effects from her Percocet. She states her last dose was this morning.     History of Interventions:   Surgery: Left breast mastectomy and surgical debridement  Injections: Left T3 PVP (8/31/21)-no relief    Current Treatment Medications:   Percocet 5 mg twice daily as needed  Lyrica 150 mg twice daily  Elavil 75 mg nightly    Historical Medications:  Norco - ineffective (less effective than Norco)    Imaging:  None    Past Medical History:   Diagnosis Date    Anxiety     Anxiety and depression     Asthma     Bipolar 1 disorder (HCC)     Bipolar 1 disorder with moderate sourav ([address]     Blood circulation, collateral     Breast cancer ([address]     diagnosed in jan 2021    Cancer ([address]      ?  cervical \"long time ago\"    Cancer ([address] 01/15/2021    Left Breast    Depression     Endometriosis     GERD (gastroesophageal reflux disease)     Kidney stones     Lupus (HCC)     Movement disorder     MS (multiple sclerosis) ([facility])     Schizophrenia ([address]     Thyroid disease     Type 2 diabetes mellitus with hyperglycemia, with long-term current use of insulin ([address]     Type 2 diabetes mellitus without complication ([address]     Unspecified cerebral artery occlusion with cerebral infarction 2014       Past Surgical History:   Procedure Laterality Date    BREAST LUMPECTOMY Left 02/19/2021    LEFT BREAST MASTECTOMY, SENTINAL LYMPH NODE BIOPSY, PREOP NEEDLE LOC performed by Jaimie Mahan MD at [address] Left 4/6/2021    DEBRIDEMENT LEFT BREAST MASTECTOMY WOUND performed by Jaimie Mahan MD at [address] Left 4/27/2021    LEFT BREAST WOUND REVISION performed by Jaimie Mahan MD at [address] [place] [address]  01/2020    DILATION AND CURETTAGE OF UTERUS      BEN US GUID NDL BIOPSY LEFT Left 01/14/2021    BEN [address] LEFT 1/14/2021 Suzan Oden  [facility] SURGERY N/A 01/26/2021    EXCHANGE OF INTERSTIM SYSTEM performed by Ashwini Lee MD at [address] [place] [address]  03/23/2021    right breast i and d with closure Dr Dylon Yang in the procedure room PAIN MANAGEMENT PROCEDURE Left 8/31/2021    left T3 paravertebral block under fluoroscopy performed by Bronwyn Negron DO at [facility] OFFICE/OUTPT VISIT,PROCEDURE ONLY N/A 11/21/2018    INTERSTIM DEVICE PLACEMENT MODEL 3058, ONLY HEAD ELIGIBLE FOR MRI WITH TRANSMIT/RECEIVE HEAD COIL    TUBAL LIGATION      10 years ago    [address] LOC OF LEFT BREAST Left 02/19/2021    US GUIDED NEEDLE LOC OF LEFT BREAST 2/19/2021 [address] LYMPH NODE BIOPSY  01/14/2021    US LYMPH NODE BIOPSY 1/14/2021 Clifford Donohue MD [facility]       Family History   Problem Relation Age of Onset    Stroke Mother     Asthma Mother     Other Mother     No Known Problems Sister     Asthma Brother     No Known Problems Brother        Social History     Socioeconomic History    Marital status:      Spouse name: 4    Number of children: Not on file    Years of education: Not on file    Highest education level: Not on file   Occupational History    Not on file   Tobacco Use    Smoking status: Every Day     Packs/day: 0.50     Types: Cigarettes     Start date: 12/6/1979    Smokeless tobacco: Never   Vaping Use    Vaping Use: Never used   Substance and Sexual Activity    Alcohol use:  Yes     Alcohol/week: 0.0 standard drinks     Comment: social drinker    Drug use: No    Sexual activity: Never     Partners: Male   Other Topics Concern    Not on file   Social History Narrative    ** Merged History Encounter **          Social Determinants of Health     Financial Resource Strain: Not on file   Food Insecurity: Not on file   Transportation Needs: Not on file   Physical Activity: Not on file   Stress: Not on file   Social Connections: Not on file   Intimate Partner Violence: Not on file   Housing Stability: Not on file       Medications & Allergies:   Current Outpatient Medications   Medication Instructions    amitriptyline (ELAVIL) 75 mg, Oral, NIGHTLY    anastrozole (ARIMIDEX) 1 mg, Oral, EVERY OTHER DAY    artificial tears (ARTIFICIAL TEARS) OINT PRN    atorvastatin (LIPITOR) 40 mg, Oral, NIGHTLY    baclofen (LIORESAL) 20 mg, Oral, 3 TIMES DAILY    BD PEN NEEDLE JACOBO 2ND GEN 32G X 4 MM MISC use as directed WITH INSULIN    Calcium Carbonate (CALCIUM 600 PO) 1 tablet, Oral, 2 TIMES DAILY    clopidogrel (PLAVIX) 75 mg, Oral, DAILY    desvenlafaxine succinate (PRISTIQ) 50 mg, Oral, DAILY    Handicap Placard MISC Does not apply, 2/08/22- 2/8/2027    hydrOXYzine pamoate (VISTARIL) 50 mg, Oral, 3 TIMES DAILY PRN    Incontinence Supply Disposable (UNDERPADS) MISC Cloth chux pads<BR>Diagnosis: Paraplegia 344.1    insulin glargine (LANTUS) 30 Units, SubCUTAneous, DAILY BEFORE BREAKFAST    insulin lispro (HUMALOG) 10 Units, SubCUTAneous, 3 TIMES DAILY WITH MEALS    Insulin Syringes, Disposable, U-100 1 ML MISC 1 each, Does not apply, DAILY    Lifitegrast (XIIDRA) 5 % SOLN 1 drop, Both Eyes, DAILY    magnesium oxide (MAG-OX) 400 mg, Oral, DAILY    metFORMIN (GLUCOPHAGE) 1,000 mg, Oral, 2 TIMES DAILY    Misc. Devices (TRANSFER BENCH) MISC 1 each, Does not apply, DAILY, Tub transfer bench with back    Misc.  Devices [facility]) [address] [facility] Wheelchair repairs- new seat cover, right side armrest replacement<BR><BR>Current body weight:  68 kg<BR>Diagnosis: gait disturbance, chronic incomplete spastic paraplegia<BR>Length of need: Lifetime    montelukast (SINGULAIR) 10 mg, Oral, NIGHTLY    MUCINEX MAXIMUM STRENGTH 1200 MG TB12 1 tablet, Oral, 2 TIMES DAILY    Multiple Vitamin (TAB-A-ISAAC PO) 1 tablet, Oral, DAILY    nicotine (NICODERM CQ) 14 MG/24HR 1 patch, TransDERmal, DAILY    omeprazole (PRILOSEC) 20 mg, Oral, DAILY    oxyCODONE-acetaminophen (PERCOCET) 5-325 MG per tablet 1 tablet, Oral, 2 TIMES DAILY PRN    paliperidone (INVEGA) 3 mg, Oral, NIGHTLY    potassium chloride (KLOR-CON M) 20 MEQ extended release tablet 20 mEq, Oral, DAILY    pregabalin (LYRICA) 150 mg, Oral, 3 times daily    PROAIR  (90 BASE) MCG/ACT inhaler 2 puffs, Inhalation, EVERY 6 HOURS PRN    SITagliptin (JANUVIA) 100 mg, Oral, DAILY    tiotropium (SPIRIVA) 18 mcg, Inhalation, DAILY, 2 puffs        Allergies   Allergen Reactions    Penicillins Shortness Of Breath     \"I almost \"  Other reaction(s): PENICILLINS    Seasonal Itching       Review of Systems:   Constitutional: negative for weight changes or fevers  Genitourinary: negative for bowel/bladder incontinence   Musculoskeletal: positive for left chest wall pain  Neurological:  Positive for numbness/tingling over left chest incision scar  Behavioral/Psych: negative for anxiety/depression   All other systems reviewed and are negative    Objective:     Vitals:    10/26/22 1107   BP: 108/66     Constitutional: Pleasant, no acute distress   Head: Normocephalic, atraumatic   Eyes: Conjunctivae normal   Neck: Supple, symmetrical   Respiration: Non-labored breathing   Cardiovascular: Limbs warm and well perfused   Musculoskeletal: Full cervical ROM in all planes. Negative Spurling maneuver bilaterally. Neuro: Alert, oriented. CN II-XII appear grossly intact. No focal motor deficits appreciated in upper limbs. Hyperesthesia and allodynia appreciated over surgical incision scar left chest wall  Skin: Healed surgical incision scar of left chest wall (near nipple line)  Psychological: Cooperative, no exaggerated pain behaviors       Assessment:    Diagnosis Orders   1. Other chronic pain  Urine Drug Screen    pregabalin (LYRICA) 150 MG capsule      2. Multiple sclerosis ([address]        3. Chronic incomplete spastic paraplegia ([address]        4. Transverse myelitis ([address]                Yvette Duke is a 56 y. o.female presenting to the pain clinic for evaluation of postmastectomy pain. Her clinical history of his examination is consistent with severe neuropathic pain secondary to her mastectomy surgery. I have set her up for a left T3 paravertebral block under fluoroscopy.   We will continue her locations at this point and follow-up after her injection to determine next steps of therapy. She failed to respond to her left T3 paravertebral block. She has noticed some improvement from switching from [place] to [facility] for breakthrough pain. I have increased her Lyrica to 3 times daily. We will follow-up in patient must stay compliant with her clinic visits. Plan: The following treatment recommendations and plan were discussed in detail with Kirby Patient. Imaging:   None    Analgesics:   Continued chronic pain, we will continue her Percocet at 5 mg that patient can take up to twice a day as needed for severe pain (pain greater than 7/10). Patient is advised take this medication as prescribed as taking more than prescribed and the development of tolerance, physical dependence, addiction. OARRS reviewed and is appropriate. Pain Contract signed. UDS ordered today. Adjuvants: In light of the presence of a neuropathic component of pain, I have increased her Lyrica to 150 mg three times a day. Interventions:   None    Anticoagulation/NPO Recommendations:   None    Multidisciplinary Pain Management:   In the presence of complex, chronic, and multi-factorial pain, the importance of a multidisciplinary approach to pain management in the patients management regimen was emphasized and discussed in great detail.      Referrals:  None    Prescriptions Written This Visit:   Lyrica 150 mg     Follow-up: 12 weeks      Brendan Toledo, DO  Interventional Pain Management/PM&R   [place]

## 2022-11-01 NOTE — PROGRESS NOTES
Physical Medicine & Rehabilitation Outpatient Follow Up Note    Chief Complaint:    Chief Complaint   Patient presents with    Botox Injection    Follow-up       Subjective:    Carleen Denver is a 56 y.o.   female with history of breast cancer (s/p mastectomy in 2021)  MS, chronic incomplete spastic paraplegia, neurogenic bowel/bladder and transverse myelitis who presents today for follow up visit and repeat Botox injections. Patient has been following with PMR since 2014. Patient was initially followed by Dr. Chelsie Reyes in [place] [address] clinic and began seeing Dr. Dudley Gonzalez in 2016. She has been following with Dr. Dudley Gonzalez since that time for ongoing management of her lower extremity spasticity. In 2016 patient began receiving Botox injection to her RLE which has been helpful in managing her tone. Patient is currently following with Dr. Hue Brennan, pain management for post-mastectomy pain. Patient was last seen in PM&R clinic on 3/10/2022. Since her last admission, patient was admitted to the hospital in July 2022 for a large foot ulcer and recurrent cellulitis of her lower extremity- however, patient signed out AMA on PO antibiotics. She has followed up with primary and she reports near total healing. Today, patient present in manual wheelchair. She reports that overall she has been doing well. She continues to endorse RLE tightness most significant with KE. She states that Botox has not been helping. She feels like her relief is so short lived that it is not worth continuing injections. We discussed the possibility of increasing/adjusting the number of units, however, patient stated that she would prefer to discontinue injections. She does report that she feels like the baclofen helps more than the Botox     Spasticity: On Baclofen 20mg TID.  Has been receiving Botox injections to RLE- however declines repeat (see above)    Bowel: Reports good sensation and control of bowels     Bladder: Patient follows with Urology,  Ryan. Reports history of intersim placement but states it is \"not connected\". She continues to report intermittent incontinence with variable sensation of bladder. Pain: Continues to follow with Dr. Kristofer ray on Percocet 5mg BID prn and Lyrica 150mg TID managed by Dr. Kristofer Escobar. Additionally     Functional Status: Patient reports that she is modified independent with ADLs and mobility at home at wheelchair level. She lives with her  in a wheelchair accessible apartment. She is not consistent with any HEP or stretching program     Past Medical History:   Diagnosis Date    Anxiety     Anxiety and depression     Asthma     Bipolar 1 disorder ([facility]     Bipolar 1 disorder with moderate sourav (HCC)     Blood circulation, collateral     [facility] ([facility]     diagnosed in jan 2021    Cancer [facility])      ? cervical \"long time ago\"    Cancer ([facility] 01/15/2021    Left Breast    Depression     Endometriosis     GERD (gastroesophageal reflux disease)     Kidney stones     Lupus (HCC)     Movement disorder     MS (multiple sclerosis) (HCC)     Schizophrenia (HCC)     Thyroid disease     Type 2 diabetes mellitus with hyperglycemia, with long-term current use of insulin (HCC)     Type 2 diabetes mellitus without complication (HCC)     Unspecified cerebral artery occlusion with cerebral infarction 2014        Family History   Problem Relation Age of Onset    Stroke Mother     Asthma Mother     Other Mother     No Known Problems Sister     Asthma Brother     No Known Problems Brother           Medication:  Current Outpatient Medications   Medication Sig Dispense Refill    pregabalin (LYRICA) 150 MG capsule Take 1 capsule by mouth in the morning, at noon, and at bedtime for 30 days. 90 capsule 2    oxyCODONE-acetaminophen (PERCOCET) 5-325 MG per tablet Take 1 tablet by mouth 2 times daily as needed for Pain for up to 30 days.  60 tablet 0    insulin glargine (LANTUS) 100 UNIT/ML injection vial Inject 30 Units into the skin every morning (before breakfast) 10 mL 3    nicotine (NICODERM CQ) 14 MG/24HR Place 1 patch onto the skin in the morning. 30 patch 3    Misc. Devices (TRANSFER BENCH) MISC 1 each by Does not apply route daily Tub transfer bench with back 1 each 0    Insulin Syringes, Disposable, U-100 1 ML MISC 1 each by Does not apply route daily 100 each 3    atorvastatin (LIPITOR) 40 MG tablet Take 1 tablet by mouth nightly 30 tablet 3    clopidogrel (PLAVIX) 75 MG tablet Take 1 tablet by mouth daily 30 tablet 3    insulin lispro (HUMALOG) 100 UNIT/ML injection vial Inject 10 Units into the skin 3 times daily (with meals) 10 mL 3    potassium chloride (KLOR-CON M) 20 MEQ extended release tablet Take 1 tablet by mouth daily 180 tablet 1    Handicap Placard MISC by Does not apply route 2/08/22- 2/8/2027 1 each 0    anastrozole (ARIMIDEX) 1 MG tablet Take 1 tablet by mouth every other day 30 tablet 3    BD PEN NEEDLE JACOBO 2ND GEN 32G X 4 MM MISC use as directed WITH INSULIN      baclofen (LIORESAL) 10 MG tablet Take 2 tablets by mouth 3 times daily 135 tablet 5    metFORMIN (GLUCOPHAGE) 1000 MG tablet Take 1,000 mg by mouth 2 times daily      desvenlafaxine succinate (PRISTIQ) 50 MG TB24 extended release tablet Take 50 mg by mouth daily      MUCINEX MAXIMUM STRENGTH 1200 MG TB12 Take 1 tablet by mouth 2 times daily      paliperidone (INVEGA) 3 MG extended release tablet Take 3 mg by mouth nightly      SITagliptin (JANUVIA) 100 MG tablet Take 100 mg by mouth daily      Lifitegrast (XIIDRA) 5 % SOLN Place 1 drop into both eyes daily      Misc.  Devices [facility]) [address] [facility] Wheelchair repairs- new seat cover, right side armrest replacement    Current body weight:  68 kg  Diagnosis: gait disturbance, chronic incomplete spastic paraplegia  Length of need: Lifetime 1 each 0    tiotropium (SPIRIVA) 18 MCG inhalation capsule Inhale 18 mcg into the lungs daily 2 puffs      montelukast (SINGULAIR) 10 MG tablet Take 10 mg by mouth nightly 0    artificial tears (ARTIFICIAL TEARS) OINT as needed      Calcium Carbonate (CALCIUM 600 PO) Take 1 tablet by mouth 2 times daily      Multiple Vitamin (TAB-A-ISAAC PO) Take 1 tablet by mouth daily      PROAIR  (90 BASE) MCG/ACT inhaler Inhale 2 puffs into the lungs every 6 hours as needed. 0    Incontinence Supply Disposable (UNDERPADS) MISC Cloth chux pads  Diagnosis: Paraplegia 344. 1 100 Bottle 11    hydrOXYzine (VISTARIL) 50 MG capsule Take 50 mg by mouth 3 times daily as needed for Itching. omeprazole (PRILOSEC) 20 MG capsule Take 20 mg by mouth daily. amitriptyline (ELAVIL) 75 MG tablet Take 1 tablet by mouth nightly 30 tablet 2    magnesium oxide (MAG-OX) 400 (241.3 Mg) MG TABS tablet Take 1 tablet by mouth daily 30 tablet 3     No current facility-administered medications for this visit. Review of Systems:  Review of Systems   Constitutional:  Negative for chills and fever. Respiratory:  Negative for shortness of breath and wheezing. Cardiovascular:  Positive for leg swelling. Negative for chest pain and palpitations. Gastrointestinal:  Negative for constipation and diarrhea. Genitourinary:  Positive for difficulty urinating.        +intermittent incontinence    Musculoskeletal:  Positive for arthralgias and gait problem. Skin:  Positive for wound. Negative for rash. Positive wound to right foot- following with PCP   Neurological:  Positive for weakness and numbness. +spasticity      Objective:  BP (!) 102/48 (Site: Right Upper Arm, Position: Sitting)   Ht 5' (1.524 m)   Wt 150 lb (68 kg)   BMI 29.29 kg/m²   Patient is awake and alert  Orientation: oriented to person, place, time  Mood: within normal limits  Affect: calm  General appearance: Patient is well nourished, well developed, well groomed and in no acute distress, appearing stated age    Limited ROM in RLE secondary to tone  Motor Exam:  2/5 right knee extension and ADF.  3/5 right knee flexion  Tone:  abnormal; 2-3/4 right leg/ankle  Muscle bulk: within normal limits  Sensory:  Decreased sensation  Skin: warm and dry, no rash or erythema on exposed skin    Cardiac: Well perfused extremities  Lungs: Breathing comfortably on room air without any increased WOB    Diagnostics:   No results found for this or any previous visit (from the past 24 hour(s)). Diagnosis Orders   1. Multiple sclerosis ([address]        2. Chronic incomplete spastic paraplegia ([facility]        3. Neurogenic bladder            Assessment:  Ms. Anayeli Payne is a 53yo F with history of breast cancer (s/p mastectomy in 2021)  MS, chronic incomplete spastic paraplegia, neurogenic bowel/bladder and transverse myelitis who presents today for ongoing management of lower extremity spasticity. Today, patient reports that she does not feel like the Botox injections are providing any significant improvement in relief. We discussed the typical time course of Botox (2-3 days unitl onset, 2-3 weeks until peak efficacy, 2-3 month duration). She has not received injections in 8 months but is sure that even during the 2-3 month window following injections, she did not experience relief. We discussed the possibility of increasing number of units but patient would prefer to discontinue Botox injections at this time. Would rather maximize oral agents    Plan: Will discontinue Botox for now, can revisit in the future if patient wishes   Will increase Baclofen to 25mg TID- script sent today  Recommend home stretching program in conjunction with Baclofen for management of tone  Continue follow up with Urology for management of neurogenic bladder   Will follow up in 3 months to further assess efficacy of increased Baclofen dose    Return in about 3 months (around 2/3/2023). No orders of the defined types were placed in this encounter.     Approximately 30 minutes were spent reviewing patient's chart/provider notes, obtaining history from patient, performing physical exam, and creating treatment plan with >50% of time spent face to face with patient providing counseling and education     Sky Nichole DO

## 2022-11-02 DIAGNOSIS — G89.4 CHRONIC PAIN SYNDROME: ICD-10-CM

## 2022-11-02 NOTE — TELEPHONE ENCOUNTER
Raquel Tovar called requesting a refill on the following medications:  Requested Prescriptions     Pending Prescriptions Disp Refills    oxyCODONE-acetaminophen (PERCOCET) 5-325 MG per tablet 60 tablet 0     Sig: Take 1 tablet by mouth 2 times daily as needed for Pain for up to 30 days.      Pharmacy verified:[facility] [address]  [place]      Date of last visit:   Date of next visit (if applicable): 70/9/7150

## 2022-11-03 ENCOUNTER — OFFICE VISIT (OUTPATIENT)
Dept: PHYSICAL MEDICINE AND REHAB | Age: 57
End: 2022-11-03
Payer: COMMERCIAL

## 2022-11-03 VITALS
DIASTOLIC BLOOD PRESSURE: 48 MMHG | BODY MASS INDEX: 29.45 KG/M2 | HEIGHT: 60 IN | SYSTOLIC BLOOD PRESSURE: 102 MMHG | WEIGHT: 150 LBS

## 2022-11-03 DIAGNOSIS — G35 MULTIPLE SCLEROSIS (HCC): Primary | ICD-10-CM

## 2022-11-03 DIAGNOSIS — N31.9 NEUROGENIC BLADDER: ICD-10-CM

## 2022-11-03 DIAGNOSIS — G82.22 CHRONIC INCOMPLETE SPASTIC PARAPLEGIA (HCC): ICD-10-CM

## 2022-11-03 PROCEDURE — 99213 OFFICE O/P EST LOW 20 MIN: CPT | Performed by: STUDENT IN AN ORGANIZED HEALTH CARE EDUCATION/TRAINING PROGRAM

## 2022-11-03 PROCEDURE — G9899 SCRN MAM PERF RSLTS DOC: HCPCS | Performed by: STUDENT IN AN ORGANIZED HEALTH CARE EDUCATION/TRAINING PROGRAM

## 2022-11-03 PROCEDURE — 4004F PT TOBACCO SCREEN RCVD TLK: CPT | Performed by: STUDENT IN AN ORGANIZED HEALTH CARE EDUCATION/TRAINING PROGRAM

## 2022-11-03 PROCEDURE — G8427 DOCREV CUR MEDS BY ELIG CLIN: HCPCS | Performed by: STUDENT IN AN ORGANIZED HEALTH CARE EDUCATION/TRAINING PROGRAM

## 2022-11-03 PROCEDURE — G8417 CALC BMI ABV UP PARAM F/U: HCPCS | Performed by: STUDENT IN AN ORGANIZED HEALTH CARE EDUCATION/TRAINING PROGRAM

## 2022-11-03 PROCEDURE — 3017F COLORECTAL CA SCREEN DOC REV: CPT | Performed by: STUDENT IN AN ORGANIZED HEALTH CARE EDUCATION/TRAINING PROGRAM

## 2022-11-03 PROCEDURE — G8484 FLU IMMUNIZE NO ADMIN: HCPCS | Performed by: STUDENT IN AN ORGANIZED HEALTH CARE EDUCATION/TRAINING PROGRAM

## 2022-11-03 RX ORDER — BACLOFEN 10 MG/1
25 TABLET ORAL 3 TIMES DAILY
Qty: 225 TABLET | Refills: 2 | Status: SHIPPED | OUTPATIENT
Start: 2022-11-03

## 2022-11-03 RX ORDER — OXYCODONE HYDROCHLORIDE AND ACETAMINOPHEN 5; 325 MG/1; MG/1
1 TABLET ORAL 2 TIMES DAILY PRN
Qty: 60 TABLET | Refills: 0 | Status: SHIPPED | OUTPATIENT
Start: 2022-11-05 | End: 2022-12-05

## 2022-11-03 ASSESSMENT — ENCOUNTER SYMPTOMS
WHEEZING: 0
SHORTNESS OF BREATH: 0
CONSTIPATION: 0
DIARRHEA: 0

## 2022-11-03 NOTE — TELEPHONE ENCOUNTER
OARRS reviewed. UDS: + for  Risperdal, Trazodone, Desvenlafaxine, Baclofen, Pregabalin, Hydroxyzine, Elavil, Oxycodone. Last seen: 10/26/2022.  Follow-up: 12/19/22

## 2022-11-09 DIAGNOSIS — G89.29 OTHER CHRONIC PAIN: ICD-10-CM

## 2022-11-09 RX ORDER — AMITRIPTYLINE HYDROCHLORIDE 25 MG/1
TABLET, FILM COATED ORAL
Qty: 30 TABLET | Refills: 5 | OUTPATIENT
Start: 2022-11-09

## 2022-12-05 DIAGNOSIS — G89.4 CHRONIC PAIN SYNDROME: ICD-10-CM

## 2022-12-05 RX ORDER — OXYCODONE HYDROCHLORIDE AND ACETAMINOPHEN 5; 325 MG/1; MG/1
1 TABLET ORAL 2 TIMES DAILY PRN
Qty: 60 TABLET | Refills: 0 | Status: SHIPPED | OUTPATIENT
Start: 2022-12-05 | End: 2023-01-04

## 2022-12-05 NOTE — TELEPHONE ENCOUNTER
Kenneth Ruiz called requesting a refill on the following medications:  Requested Prescriptions     Pending Prescriptions Disp Refills    oxyCODONE-acetaminophen (PERCOCET) 5-325 MG per tablet 60 tablet 0     Sig: Take 1 tablet by mouth 2 times daily as needed for Pain for up to 30 days.      Pharmacy verified:[facility]  [facility]      Date of last visit: 11-3-22  Date of next visit (if applicable):12-19-22

## 2022-12-05 NOTE — TELEPHONE ENCOUNTER
OARRS reviewed. UDS: + for Desvenlafaxine, Baclofen, Pregabalin, Hydroxyzine, Elavil, Oxycodone, Risperdal, Trazodone. Last seen: 10/26/2022.  Follow-up:   Future Appointments   Date Time Provider Rashard Blank   12/19/2022  2:40 PM DO JONNY Chery [facility] - [address] [facility]   2/8/2023  2:00 PM DO JONNY Meyers [facility] [address] [facility]

## 2022-12-09 DIAGNOSIS — G89.29 OTHER CHRONIC PAIN: ICD-10-CM

## 2022-12-11 ENCOUNTER — APPOINTMENT (OUTPATIENT)
Dept: GENERAL RADIOLOGY | Age: 57
DRG: 469 | End: 2022-12-11
Payer: MEDICAID

## 2022-12-11 ENCOUNTER — HOSPITAL ENCOUNTER (INPATIENT)
Age: 57
LOS: 1 days | Discharge: LEFT BEFORE DISCHARGE | DRG: 469 | End: 2022-12-11
Attending: STUDENT IN AN ORGANIZED HEALTH CARE EDUCATION/TRAINING PROGRAM | Admitting: FAMILY MEDICINE
Payer: MEDICAID

## 2022-12-11 VITALS
HEART RATE: 80 BPM | OXYGEN SATURATION: 97 % | RESPIRATION RATE: 18 BRPM | WEIGHT: 150 LBS | SYSTOLIC BLOOD PRESSURE: 100 MMHG | BODY MASS INDEX: 29.45 KG/M2 | TEMPERATURE: 98.2 F | HEIGHT: 60 IN | DIASTOLIC BLOOD PRESSURE: 50 MMHG

## 2022-12-11 DIAGNOSIS — R07.89 OTHER CHEST PAIN: Primary | ICD-10-CM

## 2022-12-11 DIAGNOSIS — I21.4 NSTEMI (NON-ST ELEVATED MYOCARDIAL INFARCTION) (HCC): ICD-10-CM

## 2022-12-11 PROBLEM — N17.9 AKI (ACUTE KIDNEY INJURY) (HCC): Status: ACTIVE | Noted: 2022-12-11

## 2022-12-11 PROBLEM — R79.1 ELEVATED INR: Status: ACTIVE | Noted: 2022-12-11

## 2022-12-11 PROBLEM — R79.89 ELEVATED TROPONIN: Status: ACTIVE | Noted: 2022-12-11

## 2022-12-11 PROBLEM — F99 CO-OCCURRENCE OF MULTIPLE PSYCHIATRIC DISORDERS: Status: ACTIVE | Noted: 2022-12-11

## 2022-12-11 PROBLEM — R77.8 ELEVATED TROPONIN: Status: ACTIVE | Noted: 2022-12-11

## 2022-12-11 PROBLEM — R17 ELEVATED BILIRUBIN: Status: ACTIVE | Noted: 2022-12-11

## 2022-12-11 PROBLEM — G93.41 METABOLIC ENCEPHALOPATHY: Status: ACTIVE | Noted: 2022-12-11

## 2022-12-11 PROBLEM — R07.9 CHEST PAIN: Status: ACTIVE | Noted: 2022-12-11

## 2022-12-11 LAB
ALBUMIN SERPL-MCNC: 2.8 G/DL (ref 3.5–5.1)
ALP BLD-CCNC: 111 U/L (ref 38–126)
ALT SERPL-CCNC: 27 U/L (ref 11–66)
AMORPHOUS: ABNORMAL
AMPHETAMINE+METHAMPHETAMINE URINE SCREEN: NEGATIVE
ANION GAP SERPL CALCULATED.3IONS-SCNC: 15 MEQ/L (ref 8–16)
APTT: 30.5 SECONDS (ref 22–38)
AST SERPL-CCNC: 40 U/L (ref 5–40)
AVERAGE GLUCOSE: 189 MG/DL (ref 70–126)
BACTERIA: ABNORMAL
BARBITURATE QUANTITATIVE URINE: NEGATIVE
BASOPHILS # BLD: 0.3 %
BASOPHILS ABSOLUTE: 0 THOU/MM3 (ref 0–0.1)
BENZODIAZEPINE QUANTITATIVE URINE: NEGATIVE
BILIRUB SERPL-MCNC: 2 MG/DL (ref 0.3–1.2)
BILIRUBIN DIRECT: 1.1 MG/DL (ref 0–0.3)
BILIRUBIN URINE: NEGATIVE
BLOOD, URINE: ABNORMAL
BUN BLDV-MCNC: 29 MG/DL (ref 7–22)
CALCIUM SERPL-MCNC: 8.9 MG/DL (ref 8.5–10.5)
CANNABINOID QUANTITATIVE URINE: NEGATIVE
CASTS: ABNORMAL /LPF
CASTS: ABNORMAL /LPF
CHARACTER, URINE: ABNORMAL
CHLORIDE BLD-SCNC: 102 MEQ/L (ref 98–111)
CHLORIDE, URINE: 66 MEQ/L
CHOLESTEROL, TOTAL: 96 MG/DL (ref 100–199)
CO2: 24 MEQ/L (ref 23–33)
COCAINE METABOLITE QUANTITATIVE URINE: NEGATIVE
COLOR: YELLOW
CREAT SERPL-MCNC: 5.7 MG/DL (ref 0.4–1.2)
CREATININE, URINE: 95.3 MG/DL
CRYSTALS: ABNORMAL
EKG ATRIAL RATE: 97 BPM
EKG P AXIS: 55 DEGREES
EKG P-R INTERVAL: 158 MS
EKG Q-T INTERVAL: 382 MS
EKG QRS DURATION: 84 MS
EKG QTC CALCULATION (BAZETT): 485 MS
EKG R AXIS: 47 DEGREES
EKG T AXIS: 58 DEGREES
EKG VENTRICULAR RATE: 97 BPM
EOSINOPHIL # BLD: 2.2 %
EOSINOPHILS ABSOLUTE: 0.1 THOU/MM3 (ref 0–0.4)
EPITHELIAL CELLS, UA: ABNORMAL /HPF
ERYTHROCYTE [DISTWIDTH] IN BLOOD BY AUTOMATED COUNT: 19 % (ref 11.5–14.5)
ERYTHROCYTE [DISTWIDTH] IN BLOOD BY AUTOMATED COUNT: 59 FL (ref 35–45)
FENTANYL: NEGATIVE
FOLATE: 15.8 NG/ML (ref 4.8–24.2)
GFR SERPL CREATININE-BSD FRML MDRD: 8 ML/MIN/1.73M2
GLUCOSE BLD-MCNC: 125 MG/DL (ref 70–108)
GLUCOSE BLD-MCNC: 202 MG/DL (ref 70–108)
GLUCOSE, URINE: NEGATIVE MG/DL
HBA1C MFR BLD: 8.3 % (ref 4.4–6.4)
HCT VFR BLD CALC: 30.6 % (ref 37–47)
HDLC SERPL-MCNC: 32 MG/DL
HEMOGLOBIN: 10.8 GM/DL (ref 12–16)
HEPARIN UNFRACTIONATED: < 0.04 U/ML (ref 0.3–0.7)
HEPATITIS B SURFACE ANTIGEN: NEGATIVE
HEPATITIS C ANTIBODY: NEGATIVE
IMMATURE GRANS (ABS): 0.01 THOU/MM3 (ref 0–0.07)
IMMATURE GRANULOCYTES: 0.3 %
INFLUENZA A: NOT DETECTED
INFLUENZA B: NOT DETECTED
INR BLD: 1.37 (ref 0.85–1.13)
KETONES, URINE: NEGATIVE
LDL CHOLESTEROL CALCULATED: 45 MG/DL
LEUKOCYTE EST, POC: ABNORMAL
LYMPHOCYTES # BLD: 29.6 %
LYMPHOCYTES ABSOLUTE: 1.1 THOU/MM3 (ref 1–4.8)
MCH RBC QN AUTO: 31 PG (ref 26–33)
MCHC RBC AUTO-ENTMCNC: 35.3 GM/DL (ref 32.2–35.5)
MCV RBC AUTO: 87.9 FL (ref 81–99)
MICROALBUMIN UR-MCNC: 3.88 MG/DL
MICROALBUMIN/CREAT UR-RTO: 41 MG/G (ref 0–30)
MISCELLANEOUS LAB TEST RESULT: ABNORMAL
MONOCYTES # BLD: 12.4 %
MONOCYTES ABSOLUTE: 0.5 THOU/MM3 (ref 0.4–1.3)
NITRITE, URINE: POSITIVE
NUCLEATED RED BLOOD CELLS: 0 /100 WBC
OPIATES, URINE: NEGATIVE
OSMOLALITY CALCULATION: 288.6 MOSMOL/KG (ref 275–300)
OXYCODONE: POSITIVE
PH UA: 5 (ref 5–9)
PHENCYCLIDINE QUANTITATIVE URINE: NEGATIVE
PLATELET # BLD: 54 THOU/MM3 (ref 130–400)
PMV BLD AUTO: ABNORMAL FL (ref 9.4–12.4)
POTASSIUM SERPL-SCNC: 4.1 MEQ/L (ref 3.5–5.2)
PRO-BNP: 2476 PG/ML (ref 0–900)
PROTEIN UA: 30 MG/DL
RBC # BLD: 3.48 MILL/MM3 (ref 4.2–5.4)
RBC URINE: ABNORMAL /HPF
RENAL EPITHELIAL, UA: ABNORMAL
SARS-COV-2 RNA, RT PCR: NOT DETECTED
SEG NEUTROPHILS: 55.2 %
SEGMENTED NEUTROPHILS ABSOLUTE COUNT: 2 THOU/MM3 (ref 1.8–7.7)
SODIUM BLD-SCNC: 141 MEQ/L (ref 135–145)
SODIUM URINE: 89 MEQ/L
SPECIFIC GRAVITY UA: 1.01 (ref 1–1.03)
TOTAL PROTEIN: 6.7 G/DL (ref 6.1–8)
TRIGL SERPL-MCNC: 97 MG/DL (ref 0–199)
TROPONIN T: 0.04 NG/ML
TROPONIN T: 0.05 NG/ML
TSH SERPL DL<=0.05 MIU/L-ACNC: 3.43 UIU/ML (ref 0.4–4.2)
UROBILINOGEN, URINE: 1 EU/DL (ref 0–1)
VITAMIN B-12: 1564 PG/ML (ref 211–911)
WBC # BLD: 3.7 THOU/MM3 (ref 4.8–10.8)
WBC UA: ABNORMAL /HPF
YEAST: ABNORMAL

## 2022-12-11 PROCEDURE — 86038 ANTINUCLEAR ANTIBODIES: CPT

## 2022-12-11 PROCEDURE — 1200000003 HC TELEMETRY R&B

## 2022-12-11 PROCEDURE — 87636 SARSCOV2 & INF A&B AMP PRB: CPT

## 2022-12-11 PROCEDURE — 93005 ELECTROCARDIOGRAM TRACING: CPT | Performed by: STUDENT IN AN ORGANIZED HEALTH CARE EDUCATION/TRAINING PROGRAM

## 2022-12-11 PROCEDURE — 82948 REAGENT STRIP/BLOOD GLUCOSE: CPT

## 2022-12-11 PROCEDURE — 80307 DRUG TEST PRSMV CHEM ANLYZR: CPT

## 2022-12-11 PROCEDURE — 2580000003 HC RX 258: Performed by: FAMILY MEDICINE

## 2022-12-11 PROCEDURE — 85610 PROTHROMBIN TIME: CPT

## 2022-12-11 PROCEDURE — 84484 ASSAY OF TROPONIN QUANT: CPT

## 2022-12-11 PROCEDURE — 82043 UR ALBUMIN QUANTITATIVE: CPT

## 2022-12-11 PROCEDURE — 82746 ASSAY OF FOLIC ACID SERUM: CPT

## 2022-12-11 PROCEDURE — 86160 COMPLEMENT ANTIGEN: CPT

## 2022-12-11 PROCEDURE — 84443 ASSAY THYROID STIM HORMONE: CPT

## 2022-12-11 PROCEDURE — 82607 VITAMIN B-12: CPT

## 2022-12-11 PROCEDURE — 85730 THROMBOPLASTIN TIME PARTIAL: CPT

## 2022-12-11 PROCEDURE — 87340 HEPATITIS B SURFACE AG IA: CPT

## 2022-12-11 PROCEDURE — 83516 IMMUNOASSAY NONANTIBODY: CPT

## 2022-12-11 PROCEDURE — 82784 ASSAY IGA/IGD/IGG/IGM EACH: CPT

## 2022-12-11 PROCEDURE — 80076 HEPATIC FUNCTION PANEL: CPT

## 2022-12-11 PROCEDURE — 99223 1ST HOSP IP/OBS HIGH 75: CPT | Performed by: FAMILY MEDICINE

## 2022-12-11 PROCEDURE — 6370000000 HC RX 637 (ALT 250 FOR IP): Performed by: STUDENT IN AN ORGANIZED HEALTH CARE EDUCATION/TRAINING PROGRAM

## 2022-12-11 PROCEDURE — 86255 FLUORESCENT ANTIBODY SCREEN: CPT

## 2022-12-11 PROCEDURE — 36415 COLL VENOUS BLD VENIPUNCTURE: CPT

## 2022-12-11 PROCEDURE — 86334 IMMUNOFIX E-PHORESIS SERUM: CPT

## 2022-12-11 PROCEDURE — 86803 HEPATITIS C AB TEST: CPT

## 2022-12-11 PROCEDURE — 93010 ELECTROCARDIOGRAM REPORT: CPT | Performed by: INTERNAL MEDICINE

## 2022-12-11 PROCEDURE — 84155 ASSAY OF PROTEIN SERUM: CPT

## 2022-12-11 PROCEDURE — 80061 LIPID PANEL: CPT

## 2022-12-11 PROCEDURE — 99285 EMERGENCY DEPT VISIT HI MDM: CPT

## 2022-12-11 PROCEDURE — 2580000003 HC RX 258: Performed by: STUDENT IN AN ORGANIZED HEALTH CARE EDUCATION/TRAINING PROGRAM

## 2022-12-11 PROCEDURE — 81001 URINALYSIS AUTO W/SCOPE: CPT

## 2022-12-11 PROCEDURE — 71045 X-RAY EXAM CHEST 1 VIEW: CPT

## 2022-12-11 PROCEDURE — 84165 PROTEIN E-PHORESIS SERUM: CPT

## 2022-12-11 PROCEDURE — 6360000002 HC RX W HCPCS: Performed by: FAMILY MEDICINE

## 2022-12-11 PROCEDURE — 80048 BASIC METABOLIC PNL TOTAL CA: CPT

## 2022-12-11 PROCEDURE — 85520 HEPARIN ASSAY: CPT

## 2022-12-11 PROCEDURE — 82436 ASSAY OF URINE CHLORIDE: CPT

## 2022-12-11 PROCEDURE — 85025 COMPLETE CBC W/AUTO DIFF WBC: CPT

## 2022-12-11 PROCEDURE — 84300 ASSAY OF URINE SODIUM: CPT

## 2022-12-11 PROCEDURE — 83036 HEMOGLOBIN GLYCOSYLATED A1C: CPT

## 2022-12-11 PROCEDURE — 83880 ASSAY OF NATRIURETIC PEPTIDE: CPT

## 2022-12-11 RX ORDER — INSULIN GLARGINE 100 [IU]/ML
30 INJECTION, SOLUTION SUBCUTANEOUS
Status: DISCONTINUED | OUTPATIENT
Start: 2022-12-11 | End: 2022-12-11 | Stop reason: HOSPADM

## 2022-12-11 RX ORDER — MONTELUKAST SODIUM 10 MG/1
10 TABLET ORAL NIGHTLY
Status: DISCONTINUED | OUTPATIENT
Start: 2022-12-11 | End: 2022-12-11 | Stop reason: HOSPADM

## 2022-12-11 RX ORDER — ASPIRIN 81 MG/1
324 TABLET, CHEWABLE ORAL ONCE
Status: COMPLETED | OUTPATIENT
Start: 2022-12-11 | End: 2022-12-11

## 2022-12-11 RX ORDER — ACETAMINOPHEN 650 MG/1
650 SUPPOSITORY RECTAL EVERY 6 HOURS PRN
Status: DISCONTINUED | OUTPATIENT
Start: 2022-12-11 | End: 2022-12-11 | Stop reason: HOSPADM

## 2022-12-11 RX ORDER — HEPARIN SODIUM 5000 [USP'U]/ML
5000 INJECTION, SOLUTION INTRAVENOUS; SUBCUTANEOUS EVERY 8 HOURS SCHEDULED
Status: DISCONTINUED | OUTPATIENT
Start: 2022-12-11 | End: 2022-12-11

## 2022-12-11 RX ORDER — SODIUM CHLORIDE 0.9 % (FLUSH) 0.9 %
5-40 SYRINGE (ML) INJECTION EVERY 12 HOURS SCHEDULED
Status: DISCONTINUED | OUTPATIENT
Start: 2022-12-11 | End: 2022-12-11 | Stop reason: HOSPADM

## 2022-12-11 RX ORDER — ALBUTEROL SULFATE 90 UG/1
2 AEROSOL, METERED RESPIRATORY (INHALATION) EVERY 6 HOURS PRN
Status: DISCONTINUED | OUTPATIENT
Start: 2022-12-11 | End: 2022-12-11 | Stop reason: HOSPADM

## 2022-12-11 RX ORDER — ATORVASTATIN CALCIUM 40 MG/1
40 TABLET, FILM COATED ORAL NIGHTLY
Status: DISCONTINUED | OUTPATIENT
Start: 2022-12-11 | End: 2022-12-11 | Stop reason: HOSPADM

## 2022-12-11 RX ORDER — SODIUM CHLORIDE 9 MG/ML
INJECTION, SOLUTION INTRAVENOUS PRN
Status: DISCONTINUED | OUTPATIENT
Start: 2022-12-11 | End: 2022-12-11 | Stop reason: HOSPADM

## 2022-12-11 RX ORDER — INSULIN LISPRO 100 [IU]/ML
0-4 INJECTION, SOLUTION INTRAVENOUS; SUBCUTANEOUS NIGHTLY
Status: DISCONTINUED | OUTPATIENT
Start: 2022-12-11 | End: 2022-12-11 | Stop reason: HOSPADM

## 2022-12-11 RX ORDER — ONDANSETRON 2 MG/ML
4 INJECTION INTRAMUSCULAR; INTRAVENOUS EVERY 6 HOURS PRN
Status: DISCONTINUED | OUTPATIENT
Start: 2022-12-11 | End: 2022-12-11 | Stop reason: HOSPADM

## 2022-12-11 RX ORDER — ACETAMINOPHEN 325 MG/1
650 TABLET ORAL EVERY 6 HOURS PRN
Status: DISCONTINUED | OUTPATIENT
Start: 2022-12-11 | End: 2022-12-11 | Stop reason: HOSPADM

## 2022-12-11 RX ORDER — POLYETHYLENE GLYCOL 3350 17 G/17G
17 POWDER, FOR SOLUTION ORAL DAILY PRN
Status: DISCONTINUED | OUTPATIENT
Start: 2022-12-11 | End: 2022-12-11 | Stop reason: HOSPADM

## 2022-12-11 RX ORDER — SODIUM CHLORIDE 0.9 % (FLUSH) 0.9 %
5-40 SYRINGE (ML) INJECTION PRN
Status: DISCONTINUED | OUTPATIENT
Start: 2022-12-11 | End: 2022-12-11 | Stop reason: HOSPADM

## 2022-12-11 RX ORDER — HEPARIN SODIUM 1000 [USP'U]/ML
4000 INJECTION, SOLUTION INTRAVENOUS; SUBCUTANEOUS ONCE
Status: DISCONTINUED | OUTPATIENT
Start: 2022-12-11 | End: 2022-12-11

## 2022-12-11 RX ORDER — HEPARIN SODIUM 10000 [USP'U]/100ML
5-30 INJECTION, SOLUTION INTRAVENOUS CONTINUOUS
Status: DISCONTINUED | OUTPATIENT
Start: 2022-12-11 | End: 2022-12-11

## 2022-12-11 RX ORDER — ANASTROZOLE 1 MG/1
1 TABLET ORAL EVERY OTHER DAY
Status: DISCONTINUED | OUTPATIENT
Start: 2022-12-12 | End: 2022-12-11 | Stop reason: HOSPADM

## 2022-12-11 RX ORDER — BACLOFEN 10 MG/1
25 TABLET ORAL 3 TIMES DAILY
Status: DISCONTINUED | OUTPATIENT
Start: 2022-12-11 | End: 2022-12-11 | Stop reason: HOSPADM

## 2022-12-11 RX ORDER — ONDANSETRON 4 MG/1
4 TABLET, ORALLY DISINTEGRATING ORAL EVERY 8 HOURS PRN
Status: DISCONTINUED | OUTPATIENT
Start: 2022-12-11 | End: 2022-12-11 | Stop reason: HOSPADM

## 2022-12-11 RX ORDER — OMEPRAZOLE 20 MG/1
20 CAPSULE, DELAYED RELEASE ORAL DAILY
Status: DISCONTINUED | OUTPATIENT
Start: 2022-12-12 | End: 2022-12-11 | Stop reason: HOSPADM

## 2022-12-11 RX ORDER — INSULIN LISPRO 100 [IU]/ML
0-4 INJECTION, SOLUTION INTRAVENOUS; SUBCUTANEOUS
Status: DISCONTINUED | OUTPATIENT
Start: 2022-12-11 | End: 2022-12-11 | Stop reason: HOSPADM

## 2022-12-11 RX ORDER — HEPARIN SODIUM 1000 [USP'U]/ML
2000 INJECTION, SOLUTION INTRAVENOUS; SUBCUTANEOUS PRN
Status: DISCONTINUED | OUTPATIENT
Start: 2022-12-11 | End: 2022-12-11

## 2022-12-11 RX ORDER — DEXTROSE MONOHYDRATE 100 MG/ML
INJECTION, SOLUTION INTRAVENOUS CONTINUOUS PRN
Status: DISCONTINUED | OUTPATIENT
Start: 2022-12-11 | End: 2022-12-11 | Stop reason: HOSPADM

## 2022-12-11 RX ORDER — HEPARIN SODIUM 1000 [USP'U]/ML
4000 INJECTION, SOLUTION INTRAVENOUS; SUBCUTANEOUS PRN
Status: DISCONTINUED | OUTPATIENT
Start: 2022-12-11 | End: 2022-12-11

## 2022-12-11 RX ORDER — CLOPIDOGREL BISULFATE 75 MG/1
75 TABLET ORAL DAILY
Status: DISCONTINUED | OUTPATIENT
Start: 2022-12-11 | End: 2022-12-11

## 2022-12-11 RX ADMIN — BACLOFEN 25 MG: 10 TABLET ORAL at 12:04

## 2022-12-11 RX ADMIN — INSULIN LISPRO 1 UNITS: 100 INJECTION, SOLUTION INTRAVENOUS; SUBCUTANEOUS at 12:25

## 2022-12-11 RX ADMIN — SODIUM CHLORIDE, PRESERVATIVE FREE 10 ML: 5 INJECTION INTRAVENOUS at 12:04

## 2022-12-11 RX ADMIN — CEFTRIAXONE SODIUM 1000 MG: 1 INJECTION, POWDER, FOR SOLUTION INTRAMUSCULAR; INTRAVENOUS at 12:02

## 2022-12-11 RX ADMIN — ASPIRIN 324 MG: 81 TABLET, CHEWABLE ORAL at 08:17

## 2022-12-11 ASSESSMENT — PAIN SCALES - GENERAL
PAINLEVEL_OUTOF10: 2
PAINLEVEL_OUTOF10: 0

## 2022-12-11 ASSESSMENT — PAIN - FUNCTIONAL ASSESSMENT
PAIN_FUNCTIONAL_ASSESSMENT: 0-10
PAIN_FUNCTIONAL_ASSESSMENT: NONE - DENIES PAIN
PAIN_FUNCTIONAL_ASSESSMENT: NONE - DENIES PAIN

## 2022-12-11 ASSESSMENT — PAIN DESCRIPTION - DESCRIPTORS: DESCRIPTORS: DULL

## 2022-12-11 ASSESSMENT — PAIN DESCRIPTION - LOCATION: LOCATION: CHEST

## 2022-12-11 NOTE — PLAN OF CARE
Problem: Discharge Planning  Goal: Discharge to home or other facility with appropriate resources  Outcome: Adequate for Discharge     Problem: Skin/Tissue Integrity  Goal: Absence of new skin breakdown  Outcome: Adequate for Discharge     Problem: Safety - Adult  Goal: Free from fall injury  Outcome: Adequate for Discharge

## 2022-12-11 NOTE — RT PROTOCOL NOTE
RT Inhaler-Nebulizer Bronchodilator Protocol Note    There is a bronchodilator order in the chart from a provider indicating to follow the RT Bronchodilator Protocol and there is an Initiate RT Inhaler-Nebulizer Bronchodilator Protocol order as well (see protocol at bottom of note). CXR Findings:  XR CHEST PORTABLE    Result Date: 12/11/2022  The constellation of findings suggests low-grade congestive heart failure. This document has been electronically signed by: Barbara Heath. Kamila Graham on 12/11/2022 07:12 AM      The findings from the last RT Protocol Assessment were as follows:   History Pulmonary Disease: Chronic pulmonary disease  Respiratory Pattern: Regular pattern and RR 12-20 bpm  Breath Sounds: Clear breath sounds  Cough: Strong, spontaneous, non-productive  Indication for Bronchodilator Therapy: On home bronchodilators  Bronchodilator Assessment Score: 2    Aerosolized bronchodilator medication orders have been revised according to the RT Inhaler-Nebulizer Bronchodilator Protocol below. Respiratory Therapist to perform RT Therapy Protocol Assessment initially then follow the protocol. Repeat RT Therapy Protocol Assessment PRN for score 0-3 or on second treatment, BID, and PRN for scores above 3. No Indications - adjust the frequency to every 6 hours PRN wheezing or bronchospasm, if no treatments needed after 48 hours then discontinue using Per Protocol order mode. If indication present, adjust the RT bronchodilator orders based on the Bronchodilator Assessment Score as indicated below. Use Inhaler orders unless patient has one or more of the following: on home nebulizer, not able to hold breath for 10 seconds, is not alert and oriented, cannot activate and use MDI correctly, or respiratory rate 25 breaths per minute or more, then use the equivalent nebulizer order(s) with same Frequency and PRN reasons based on the score.   If a patient is on this medication at home then do not decrease Frequency below that used at home. 0-3 - enter or revise RT bronchodilator order(s) to equivalent RT Bronchodilator order with Frequency of every 4 hours PRN for wheezing or increased work of breathing using Per Protocol order mode. 4-6 - enter or revise RT Bronchodilator order(s) to two equivalent RT bronchodilator orders with one order with BID Frequency and one order with Frequency of every 4 hours PRN wheezing or increased work of breathing using Per Protocol order mode. 7-10 - enter or revise RT Bronchodilator order(s) to two equivalent RT bronchodilator orders with one order with TID Frequency and one order with Frequency of every 4 hours PRN wheezing or increased work of breathing using Per Protocol order mode. 11-13 - enter or revise RT Bronchodilator order(s) to one equivalent RT bronchodilator order with QID Frequency and an Albuterol order with Frequency of every 4 hours PRN wheezing or increased work of breathing using Per Protocol order mode. Greater than 13 - enter or revise RT Bronchodilator order(s) to one equivalent RT bronchodilator order with every 4 hours Frequency and an Albuterol order with Frequency of every 2 hours PRN wheezing or increased work of breathing using Per Protocol order mode. RT to enter RT Home Evaluation for COPD & MDI Assessment order using Per Protocol order mode.     Electronically signed by Conrado Noe RCP on 12/11/2022 at 1:59 PM

## 2022-12-11 NOTE — H&P
History & Physical       Patient: Lizy Ceja  YOB: 1965    MRN: [id number]     Acct: [de-identified]    PCP: Donte Ponce MD    Date of Admission: 12/11/2022    Date of Service: Patient seen / examined on 12/11/22 and admitted to inpatient with expected LOS greater than two midnights due to medical therapy. ASSESSMENT / PLAN:    Acute kidney injury -creatinine level on presentation 5.7 with eGFR 8, last creatinine 0.6 with eGFR 0.6 8/1/22 eGFR > 90. Suspecting this is intrinsic etiology as BUN to creatinine ratio is 5. Plan -nephrology consulted (Dr. Royal Shape notified), appreciate recommendations. Continue to monitor renal function with daily BMP. Renally dose medications. Avoid nephrotoxic agents. Elevated proBNP -proBNP 2476.0, (previous 658 on 7/29/22). Chest x-ray reveals low-grade congestive heart failure. Patient does not appear volume overloaded on physical exam.  She is on room air, SPO2 greater than 95%. Previous echo 2/18/22 reveals EF 55% with no regional left wall abnormalities. Plan -we will obtain limited echo to evaluate heart structure and function. We will hold off on consulting cardiology at this time. Strict intake and output. Daily weights. Elevated troponin -troponin level 0.046 (previously elevated 0.014 7/29/22). EKG does not reveal any acute ST segment elevation or ST depressions. It does reveal QT prolongation. This could be secondary to underlying acute kidney injury. Continue to monitor her clinical status for now. Chest pain -now resolved. Patient unable to provide good history in regards to her chest pain but reports it was a very sudden onset and mild in nature. Per chart review, patient has had chronic left-sided chest pain since she underwent a left breast mastectomy since 2021. Plan -continue to monitor. Pancytopenia -noted to have WBC 3.7, hemoglobin 10.8, platelet count 54.   Per chart review, patient has had chronic anemia as well as chronic thrombocytopenia. No infection at this time, no signs of bleeding or bruising. This could be related to underlying chronic conditions, alcohol use, she is on Arimidex . Plan -continue to monitor CBC daily, transfuse PRBC if hemoglobin less than 7. NIDDM type II -last hemoglobin A1c 5.6 7/31/22. Patient is on metformin 1 g twice a day /Januvia 100 mg daily /Lantus 60 units daily /homolog 10 units with meals. Plan -check hemoglobin A1c now. Resume Lantus at 30 units due to possibly not eating as much while inpatient and being on controlled diabetic. Initiate low dose sliding scale. Hold metformin/Januvia. Hypoglycemic protocol. Accu checks. Hypercholesterolemia, history of-patient is on Lipitor and Plavix 75 mg daily. Unknown of why she is on Plavix 75 mg daily, last filled prescription is in 6/2022. No history of stents placement. We will resume Lipitor but not Plavix (no indication for Plavix). We will check a lipid level. Elevated INR -INR noted to be 1.37. Previously elevated 1.58 2/17/22. No signs of bleeding on physical exam. C  Plan - Check hepatic panel. Patient with history of alcohol use. Invasive ductal carcinoma of left breast s/p mastectomy -patient follows with heme-onc as outpatient. She is on Arimidex daily for prophylactic use. Will resume Arimidex. Anxiety/depression -hold home meds due to prolonged QT. History of bipolar disorder -hold home meds currently due to prolonged QT, if QT improves tomorrow, will resume her medications. History of neurogenic bladder with bladder stimulator -noted. Patient follows with urology as outpatient. Chronic pain -noted. Will hold her Norco as patient is very drowsy on physical exam.     DVT prophylaxis - maintain on SCDs. Unable to be on Heparin TID or Lovenox due to PILLO (Lovenox), Thrombocytopenia (Heparin)    Chief Complaint: Chest pain    History of Present Illness:  56 y. o. female who presented to [address] [place] [address] for evaluation of chest pain. PMHx significant for IDDM type II, left side breast cancer (s/p mastectomy in 2021), chronic incomplete spastic paraplegia, neurogenic bowel/bladder, chronic pain, psychiatric conditions. Patient is a poor historian, during the interview she is falling asleep, history obtained mostly by chart review,  is not at bedside. Patient states that she came because of chest pain and during my interview she declined having any chest pain. She states that the chest pain was mild but could not characterize the type of chest pain. Per chart review, patient has had chronic left-sided chest pain after her mastectomy in 2021 and has been on chronic pain medications. States that she is \" peeing a lot\". Denies lightheadedness or dizziness. Denies shortness of breath. Patient denies abdominal pain / emesis / vomiting / diarrhea. On presentation initial vital signs reveal temp 98.5, respiratory rate 18, heart rate 75, blood pressure 137/57, SPO2 96% on room air. Initial lab work is significant for BUN 29, creatinine 5.7, EGFR 8, glucose 125, proBNP 2476.0, troponin level 0.046. WBC 3.7, hemoglobin 10.8, hematocrit 30.6 platelet count 54. Chest x-ray reveals low-grade congestive heart failure. EKG reveals prolonged QT interval.     Patient was admitted to hospitalist service for further management. Past Medical Histor   Past Medical History:   Diagnosis Date    Anxiety     Anxiety and depression     Asthma     Bipolar 1 disorder ([facility] [address]     Bipolar 1 disorder with moderate sourav ([facility] [facility]     Blood circulation, collateral     Breast cancer ([facility] [address]     diagnosed in jan 2021    [facility])      ?  cervical \"long time ago\"    [facility]) 01/15/2021    Left Breast    Depression     Endometriosis     GERD (gastroesophageal reflux disease)     Kidney stones     Lupus (HCC)     Movement disorder     MS (multiple sclerosis) ([facility] [address] Schizophrenia ([facility] [address]     Thyroid disease     Type 2 diabetes mellitus with hyperglycemia, with long-term current use of insulin ([facility] [address]     Type 2 diabetes mellitus without complication ([facility] [address] [facility]     Unspecified cerebral artery occlusion with cerebral infarction 2014     Past Surgical History:    Past Surgical History:   Procedure Laterality Date    BREAST LUMPECTOMY Left 02/19/2021    LEFT BREAST MASTECTOMY, SENTINAL LYMPH NODE BIOPSY, PREOP NEEDLE LOC performed by Daniel Milan MD at [facility] [address] Left 4/6/2021    DEBRIDEMENT LEFT BREAST MASTECTOMY WOUND performed by Daniel Milan MD at [facility] [address] Left 4/27/2021    LEFT BREAST WOUND REVISION performed by Daniel Milan MD at [address] [place] [address]  01/2020    DILATION AND CURETTAGE OF UTERUS      BEN US GUID NDL BIOPSY LEFT Left 01/14/2021    BEN [address] LEFT 1/14/2021 Deondre Ricardo  [facility] SURGERY N/A 01/26/2021    EXCHANGE OF INTERSTIM SYSTEM performed by Asif Plunkett MD at [address] [facility]  03/23/2021    right breast i and d with closure Dr Lizette Esparza in the procedure room    PAIN MANAGEMENT PROCEDURE Left 8/31/2021    left T3 paravertebral block under fluoroscopy performed by Alex Reynoso DO at [facility] OFFICE/OUTPT [address] [place] [address] N/A 11/21/2018    INTERSTIM [address] [facility], ONLY HEAD ELIGIBLE FOR MRI WITH TRANSMIT/RECEIVE HEAD COIL    TUBAL LIGATION      10 years ago    [address] LOC OF LEFT BREAST Left 02/19/2021    US GUIDED NEEDLE LOC OF LEFT BREAST 2/19/2021 [place] BIOPSY  01/14/2021    US LYMPH NODE BIOPSY 1/14/2021 Deondre Ricardo MD [facility]      Medications Prior to Admission:   No current facility-administered medications on file prior to encounter.      Current Outpatient Medications on File Prior to Encounter   Medication Sig Dispense Refill    oxyCODONE-acetaminophen (PERCOCET) 5-325 MG per tablet Take 1 tablet by mouth 2 times daily as needed for Pain for up to 30 days. 60 tablet 0    baclofen (LIORESAL) 10 MG tablet Take 2.5 tablets by mouth 3 times daily 225 tablet 2    pregabalin (LYRICA) 150 MG capsule Take 1 capsule by mouth in the morning, at noon, and at bedtime for 30 days. 90 capsule 2    insulin glargine (LANTUS) 100 UNIT/ML injection vial Inject 30 Units into the skin every morning (before breakfast) 10 mL 3    nicotine (NICODERM CQ) 14 MG/24HR Place 1 patch onto the skin in the morning. 30 patch 3    amitriptyline (ELAVIL) 75 MG tablet Take 1 tablet by mouth nightly 30 tablet 2    Misc.  Devices (TRANSFER BENCH) MISC 1 each by Does not apply route daily Tub transfer bench with back 1 each 0    Insulin Syringes, Disposable, U-100 1 ML MISC 1 each by Does not apply route daily 100 each 3    atorvastatin (LIPITOR) 40 MG tablet Take 1 tablet by mouth nightly 30 tablet 3    clopidogrel (PLAVIX) 75 MG tablet Take 1 tablet by mouth daily 30 tablet 3    insulin lispro (HUMALOG) 100 UNIT/ML injection vial Inject 10 Units into the skin 3 times daily (with meals) 10 mL 3    magnesium oxide (MAG-OX) 400 (241.3 Mg) MG TABS tablet Take 1 tablet by mouth daily 30 tablet 3    potassium chloride (KLOR-CON M) 20 MEQ extended release tablet Take 1 tablet by mouth daily 180 tablet 1    Handicap Placard MISC by Does not apply route 2/08/22- 2/8/2027 1 each 0    anastrozole (ARIMIDEX) 1 MG tablet Take 1 tablet by mouth every other day 30 tablet 3    BD PEN NEEDLE JACOBO 2ND GEN 32G X 4 MM MISC use as directed WITH INSULIN      metFORMIN (GLUCOPHAGE) 1000 MG tablet Take 1,000 mg by mouth 2 times daily      desvenlafaxine succinate (PRISTIQ) 50 MG TB24 extended release tablet Take 50 mg by mouth daily      MUCINEX MAXIMUM STRENGTH 1200 MG TB12 Take 1 tablet by mouth 2 times daily      paliperidone (INVEGA) 3 MG extended release tablet Take 3 mg by mouth nightly      SITagliptin (JANUVIA) 100 MG tablet Take 100 mg by mouth daily      Lifitegrast (XIIDRA) 5 % SOLN Place 1 drop into both eyes daily      Misc. Devices [facility] [address] Wheelchair repairs- new seat cover, right side armrest replacement    Current body weight:  68 kg  Diagnosis: gait disturbance, chronic incomplete spastic paraplegia  Length of need: Lifetime 1 each 0    tiotropium (SPIRIVA) 18 MCG inhalation capsule Inhale 18 mcg into the lungs daily 2 puffs      montelukast (SINGULAIR) 10 MG tablet Take 10 mg by mouth nightly   0    artificial tears (ARTIFICIAL TEARS) OINT as needed      Calcium Carbonate (CALCIUM 600 PO) Take 1 tablet by mouth 2 times daily      Multiple Vitamin (TAB-A-ISAAC PO) Take 1 tablet by mouth daily      PROAIR  (90 BASE) MCG/ACT inhaler Inhale 2 puffs into the lungs every 6 hours as needed. 0    Incontinence Supply Disposable (UNDERPADS) MISC Cloth chux pads  Diagnosis: Paraplegia 344. 1 100 Bottle 11    hydrOXYzine (VISTARIL) 50 MG capsule Take 50 mg by mouth 3 times daily as needed for Itching. omeprazole (PRILOSEC) 20 MG capsule Take 20 mg by mouth daily. Allergies:   Penicillins and Seasonal    Social History:   Social History     Socioeconomic History    Marital status:      Spouse name: 4    Number of children: Not on file    Years of education: Not on file    Highest education level: Not on file   Occupational History    Not on file   Tobacco Use    Smoking status: Every Day     Packs/day: 0.50     Types: Cigarettes     Start date: 12/6/1979    Smokeless tobacco: Never   Vaping Use    Vaping Use: Never used   Substance and Sexual Activity    Alcohol use:  Yes     Alcohol/week: 0.0 standard drinks     Comment: social drinker    Drug use: No    Sexual activity: Never     Partners: Male   Other Topics Concern    Not on file   Social History Narrative    ** Merged History Encounter **          Social Determinants of Health     Financial Resource Strain: Not on file   Food Insecurity: Not on file Transportation Needs: Not on file   Physical Activity: Not on file   Stress: Not on file   Social Connections: Not on file   Intimate Partner Violence: Not on file   Housing Stability: Not on file     Family History:    Family History   Problem Relation Age of Onset    Stroke Mother     Asthma Mother     Other Mother     No Known Problems Sister     Asthma Brother     No Known Problems Brother      REVIEW OF SYSTEMS:    ROS -unable to obtain    PHYSICAL EXAM:  Vitals:    12/11/22 0545 12/11/22 0715   BP: (!) 137/57 119/66   Pulse: 75 94   Resp: 18 18   Temp: 98.5 °F (36.9 °C)    TempSrc: Oral    SpO2: 96% 96%   Weight: 150 lb (68 kg)    Height: 5' (1.524 m)      General appearance: Chronically ill-appearing female  HEENT:  Normocephalic / atraumatic. PERRL. EOM intact. Conjunctivae appear normal.  Neck: Supple. No JVD. Respiratory: Normal respiratory effort on RA. CTAB. No wheezes / rales / rhonchi. Cardiovascular: RRR. Normal S1/S2. No murmurs / rubs / gallops. Abdomen: Soft /distended  Musculoskeletal: No cyanosis or edema. Skin: Warm / dry. Normal turgor. Neurologic: A/O x1.   Drowsy appearing    Labs:   Results for orders placed or performed during the hospital encounter of 12/11/22   COVID-19 & Influenza Combo    Specimen: Nasopharyngeal Swab   Result Value Ref Range    SARS-CoV-2 RNA, RT PCR NOT DETECTED NOT DETECTED    INFLUENZA A NOT DETECTED NOT DETECTED    INFLUENZA B NOT DETECTED NOT DETECTED   BMP   Result Value Ref Range    Sodium 141 135 - 145 meq/L    Potassium 4.1 3.5 - 5.2 meq/L    Chloride 102 98 - 111 meq/L    CO2 24 23 - 33 meq/L    Glucose 125 (H) 70 - 108 mg/dL    BUN 29 (H) 7 - 22 mg/dL    Creatinine 5.7 (HH) 0.4 - 1.2 mg/dL    Calcium 8.9 8.5 - 10.5 mg/dL   CBC with Auto Differential   Result Value Ref Range    WBC 3.7 (L) 4.8 - 10.8 thou/mm3    RBC 3.48 (L) 4.20 - 5.40 mill/mm3    Hemoglobin 10.8 (L) 12.0 - 16.0 gm/dl    Hematocrit 30.6 (L) 37.0 - 47.0 %    MCV 87.9 81.0 - 99.0 fL MCH 31.0 26.0 - 33.0 pg    MCHC 35.3 32.2 - 35.5 gm/dl    RDW-CV 19.0 (H) 11.5 - 14.5 %    RDW-SD 59.0 (H) 35.0 - 45.0 fL    Platelets 54 (L) 239 - 400 thou/mm3    MPV ---- 9.4 - 12.4 fL    Seg Neutrophils 55.2 %    Lymphocytes 29.6 %    Monocytes 12.4 %    Eosinophils 2.2 %    Basophils 0.3 %    Immature Granulocytes 0.3 %    Segs Absolute 2.0 1.8 - 7.7 thou/mm3    Lymphocytes Absolute 1.1 1.0 - 4.8 thou/mm3    Monocytes Absolute 0.5 0.4 - 1.3 thou/mm3    Eosinophils Absolute 0.1 0.0 - 0.4 thou/mm3    Basophils Absolute 0.0 0.0 - 0.1 thou/mm3    Immature Grans (Abs) 0.01 0.00 - 0.07 thou/mm3    nRBC 0 /100 wbc   Troponin   Result Value Ref Range    Troponin T 0.046 (A) ng/ml   Brain Natriuretic Peptide   Result Value Ref Range    Pro-BNP 2476.0 (H) 0.0 - 900.0 pg/mL   APTT   Result Value Ref Range    aPTT 30.5 22.0 - 38.0 seconds   Protime-INR   Result Value Ref Range    INR 1.37 (H) 0.85 - 1.13   Anion Gap   Result Value Ref Range    Anion Gap 15.0 8.0 - 16.0 meq/L   Glomerular Filtration Rate, Estimated   Result Value Ref Range    Est, Glom Filt Rate 8 (A) >60 ml/min/1.73m2   Osmolality   Result Value Ref Range    Osmolality Calc 288.6 275.0 - 300.0 mOsmol/kg   EKG 12 Lead   Result Value Ref Range    Ventricular Rate 97 BPM    Atrial Rate 97 BPM    P-R Interval 158 ms    QRS Duration 84 ms    Q-T Interval 382 ms    QTc Calculation (Bazett) 485 ms    P Axis 55 degrees    R Axis 47 degrees    T Axis 58 degrees       EKG / Radiology:     EKG:  Reviewed by me --    CXR:   Reviewed by me --    XR CHEST PORTABLE    Result Date: 12/11/2022  Portable upright AP chest at 0613. Comparison: 02/17/2022 portable chest. Findings: Cardiomegaly. Mild central pulmonary venous congestion. Prominent diffuse interstitial markings bilaterally. Surgical clips superimposed over the left chest and lower left axillary region. No defined pleural effusion. No airspace consolidation or pneumothorax. No suspicious bony lesions.      The constellation of findings suggests low-grade congestive heart failure. This document has been electronically signed by: Adonay Niño on 12/11/2022 07:12 AM    FEN/GI/DVT:  IVF: None  Electrolytes: Monitor and replace per protocols  Diet: Cardiac-Diabetic  GI PPX: Yes  DVT Prophylaxis: Subcutaneous heparin    CODE STATUS:  Full    Thank you Hal Norman MD for the opportunity to be involved in this patient's care.     Electronically signed by Anjana Tovar MD on 12/11/2022 at 8:33 AM

## 2022-12-11 NOTE — ED NOTES
Pt in stable condition. Going to  25. Floor notified before transport. Spoke with [employer].      [employer]  12/11/22 5438

## 2022-12-11 NOTE — CONSULTS
[facility]    [facility] OFFMAYRA DUNBAR.YAQUELIN, [address]  [facility]  12/11/2022 12:56 PM    Pt Name:    Lauren Aguirre  MRN:     [id number]   [id number]  YOB: 1965  Admit Date:    12/11/2022  5:42 AM  Primary Care Physician:  Kirsten Daley MD    CSN Number:   [id number]    Reason for Consult: PILLO  Requesting provider:  Hospitalist    History:   The patient is a 56 y.o. with history of diabetes, hypertension, breast cancer status post mastectomy, history of alcohol use, what is reported in her medical chart as bipolar disorder. Patient is a poor historian. Family member at bedside. Patient presented to the emergency room for evaluation of left-sided chest pain which started earlier this morning prior to her arrival to the emergency room. Symptoms have since subsided. Family member reports that chest pain was about 3-4 out of 10. Patient would not answer some of my questions. She is falling asleep sometimes during the interview. Not a very good historian. Denies any shortness of breath. No fever or chills reported. Denies any nonsteroidal use. Denies any dysuria or any gross hematuria. No diarrhea reported. Nephrology was consulted for management of elevated serum creatinine. Past Medical History:  Past Medical History:   Diagnosis Date    Anxiety     Anxiety and depression     Asthma     Bipolar 1 disorder ([address]     Bipolar 1 disorder with moderate sourav ([address]     Blood circulation, collateral     Breast cancer ([address]     diagnosed in jan 2021    Cancer [facility])      ?  cervical \"long time ago\"    Cancer ([address] 01/15/2021    Left Breast    Depression     Endometriosis     GERD (gastroesophageal reflux disease)     Kidney stones     Lupus (HCC)     Movement disorder     MS (multiple sclerosis) (HCC)     Schizophrenia ([address]     Thyroid disease     Type 2 diabetes mellitus with hyperglycemia, with long-term current use of insulin ([address] Type 2 diabetes mellitus without complication (HCC)     Unspecified cerebral artery occlusion with cerebral infarction 2014       Past Surgical History:  Past Surgical History:   Procedure Laterality Date    BREAST LUMPECTOMY Left 02/19/2021    LEFT BREAST MASTECTOMY, SENTINAL LYMPH NODE BIOPSY, PREOP NEEDLE LOC performed by Alycia Verma MD at [facility] [address] Left 4/6/2021    DEBRIDEMENT LEFT BREAST MASTECTOMY WOUND performed by Alycia Verma MD at [facility] [address] Left 4/27/2021    LEFT BREAST WOUND REVISION performed by Alycia Verma MD at [address] [place] [address]  01/2020    DILATION AND CURETTAGE OF UTERUS      BEN US GUID NDL BIOPSY LEFT Left 01/14/2021    BEN US GUID NDL BIOPSY LEFT 1/14/2021 Isidra Sotelo MD [address] [place] [address] SURGERY N/A 01/26/2021    EXCHANGE OF INTERSTIM SYSTEM performed by Maye Charles MD at [facility]  03/23/2021    right breast i and d with closure Dr Belen Wu in the procedure room    PAIN MANAGEMENT PROCEDURE Left 8/31/2021    left T3 paravertebral block under fluoroscopy performed by Oneil Tanner DO at [facility] OFFICE/OUTPT VISIT,PROCEDURE ONLY N/A 11/21/2018    INTERSTIM [address] [facility], ONLY HEAD ELIGIBLE FOR MRI WITH TRANSMIT/RECEIVE HEAD COIL    TUBAL LIGATION      10 years ago    [address] LOC OF LEFT BREAST Left 02/19/2021    US GUIDED NEEDLE LOC OF LEFT BREAST 2/19/2021 [facility] BIOPSY  01/14/2021    US LYMPH NODE BIOPSY 1/14/2021 Isidra Sotelo MD [facility]       Family History:  Family History   Problem Relation Age of Onset    Stroke Mother     Asthma Mother     Other Mother     No Known Problems Sister     Asthma Brother     No Known Problems Brother        Social History:  Social History     Socioeconomic History    Marital status: Legally      Spouse name: 4    Number of children: Not on file    Years of education: Not on file    Highest education level: Not on file   Occupational History    Not on file   Tobacco Use    Smoking status: Every Day     Packs/day: 0.50     Types: Cigarettes     Start date: 12/6/1979    Smokeless tobacco: Never   Vaping Use    Vaping Use: Never used   Substance and Sexual Activity    Alcohol use: Yes     Alcohol/week: 0.0 standard drinks     Comment: social drinker    Drug use: No    Sexual activity: Never     Partners: Male   Other Topics Concern    Not on file   Social History Narrative    ** Merged History Encounter **          Social Determinants of Health     Financial Resource Strain: Not on file   Food Insecurity: Not on file   Transportation Needs: Not on file   Physical Activity: Not on file   Stress: Not on file   Social Connections: Not on file   Intimate Partner Violence: Not on file   Housing Stability: Not on file       Home Meds:  Prior to Admission medications    Medication Sig Start Date End Date Taking? Authorizing Provider   oxyCODONE-acetaminophen (PERCOCET) 5-325 MG per tablet Take 1 tablet by mouth 2 times daily as needed for Pain for up to 30 days. Patient not taking: Reported on 12/11/2022 12/5/22 1/4/23  Mayito Lloyd,    baclofen (LIORESAL) 10 MG tablet Take 2.5 tablets by mouth 3 times daily 11/3/22   Gerald Cassette, DO   pregabalin (LYRICA) 150 MG capsule Take 1 capsule by mouth in the morning, at noon, and at bedtime for 30 days. 10/26/22 11/25/22  Mayito Lloyd DO   insulin glargine (LANTUS) 100 UNIT/ML injection vial Inject 30 Units into the skin every morning (before breakfast)  Patient not taking: Reported on 12/11/2022 8/2/22   Adrian Roberto MD   nicotine (NICODERM CQ) 14 MG/24HR Place 1 patch onto the skin in the morning. 8/3/22   Adrian Roberto MD   amitriptyline (ELAVIL) 75 MG tablet Take 1 tablet by mouth nightly 5/10/22 10/26/22  CHAS Benson - CNP   Misc.  Devices (TRANSFER BENCH) MISC 1 each by Does not apply route daily Tub transfer bench with back 2/24/22   Kerri Rankin MD   Insulin Syringes, Disposable, U-100 1 ML MISC 1 each by Does not apply route daily 2/23/22   Pilar Bux, DO   atorvastatin (LIPITOR) 40 MG tablet Take 1 tablet by mouth nightly 2/20/22   Pilar Bux, DO   clopidogrel (PLAVIX) 75 MG tablet Take 1 tablet by mouth daily 2/21/22   Pilar Bux, DO   insulin lispro (HUMALOG) 100 UNIT/ML injection vial Inject 10 Units into the skin 3 times daily (with meals)  Patient not taking: Reported on 12/11/2022 2/20/22   Pilar Bux, DO   magnesium oxide (MAG-OX) 400 (241.3 Mg) MG TABS tablet Take 1 tablet by mouth daily 2/21/22 10/26/22  Pilar Bux, DO   potassium chloride (KLOR-CON M) 20 MEQ extended release tablet Take 1 tablet by mouth daily 2/20/22   Pilar Bux, DO   Handicap Placard MISC by Does not apply route 2/08/22- 2/8/2027 2/8/22   Mayito Lloyd,    anastrozole (ARIMIDEX) 1 MG tablet Take 1 tablet by mouth every other day 1/5/22   Charisse Jarvis MD   BD PEN NEEDLE JACOBO 2ND GEN 32G X 4 MM MISC use as directed WITH INSULIN 6/28/21   Historical Provider, MD   metFORMIN (GLUCOPHAGE) 1000 MG tablet Take 1,000 mg by mouth 2 times daily 12/7/20   Historical Provider, MD   desvenlafaxine succinate (PRISTIQ) 50 MG TB24 extended release tablet Take 50 mg by mouth daily 12/16/20   Historical Provider, MD   MUCINEX MAXIMUM STRENGTH 1200 MG TB12 Take 1 tablet by mouth 2 times daily 1/11/21   Historical Provider, MD   paliperidone (INVEGA) 3 MG extended release tablet Take 3 mg by mouth nightly  Patient not taking: Reported on 12/11/2022    Historical Provider, MD   SITagliptin (JANUVIA) 100 MG tablet Take 100 mg by mouth daily    Historical Provider, MD   Lifitegrast Ethelene [place]) 5 % SOLN Place 1 drop into both eyes daily    Historical Provider, MD [place]  Devices [facility]) [address] [facility] Wheelchair repairs- new seat cover, right side armrest replacement    Current body weight:  68 kg  Diagnosis: gait disturbance, chronic incomplete spastic paraplegia  Length of need: Lifetime 10/22/20   Azam Leon, APRN - CNP   tiotropium (SPIRIVA) 18 MCG inhalation capsule Inhale 18 mcg into the lungs daily 2 puffs    Historical Provider, MD   montelukast (SINGULAIR) 10 MG tablet Take 10 mg by mouth nightly  10/26/16   Historical Provider, MD   artificial tears (ARTIFICIAL TEARS) OINT as needed    Historical Provider, MD   Calcium Carbonate (CALCIUM 600 PO) Take 1 tablet by mouth 2 times daily    Historical Provider, MD   Multiple Vitamin (TAB-A-ISAAC PO) Take 1 tablet by mouth daily    Historical Provider, MD   PROAIR  (90 BASE) MCG/ACT inhaler Inhale 2 puffs into the lungs every 6 hours as needed. 1/6/15   Historical Provider, MD   Incontinence Supply Disposable (UNDERPADS) MISC Cloth chux pads  Diagnosis: Paraplegia 344.1 3/10/15   Nolan Billy MD   hydrOXYzine (VISTARIL) 50 MG capsule Take 50 mg by mouth 3 times daily as needed for Itching. Historical Provider, MD   omeprazole (PRILOSEC) 20 MG capsule Take 20 mg by mouth daily. Historical Provider, MD       Review of Systems:  Constitutional: Positive for generalized weakness, fatigue, chest pain  Head: Negative for headaches  Eyes: Negative for blurry vision or discharge  Ears: Negative for ear pain or hearing changes  Nose: Negative for runny nose or epistaxis  Respiratory: Negative for shortness of breath. Negative for cough or sputum production. Negative for hemoptysis  Cardiovascular: Positive for chest pain. GI: Negative for nausea, vomiting and diarrhea.   Negative for hematochezia and melena  : Negative for discharge, dysuria, or hematuria  Skin: Negative for rash  Musculoskeletal: Negative for joint pain, moves all ext  Neuro: Negative for numbness or tingling  Psychiatric: Reports stable mood, negative for depression or insomnia    All other review of systems were reviewed and negative    Current Meds:  Infusion:    sodium chloride      dextrose       Meds:    sodium chloride flush  5-40 mL IntraVENous 2 times per day    [START ON 12/12/2022] anastrozole  1 mg Oral Every Other Day    atorvastatin  40 mg Oral Nightly    baclofen  25 mg Oral TID    insulin glargine  30 Units SubCUTAneous QAM AC    montelukast  10 mg Oral Nightly    [START ON 12/12/2022] omeprazole  20 mg Oral Daily    cefTRIAXone (ROCEPHIN) IV  1,000 mg IntraVENous Q24H    tiotropium  2 puff Inhalation Daily    insulin lispro  0-4 Units SubCUTAneous TID WC    insulin lispro  0-4 Units SubCUTAneous Nightly     Meds prn: sodium chloride flush, sodium chloride, ondansetron **OR** ondansetron, polyethylene glycol, acetaminophen **OR** acetaminophen, albuterol sulfate HFA, glucose, dextrose bolus **OR** dextrose bolus, glucagon (rDNA), dextrose     Allergies/Intolerances: ALLERGIES: Penicillins and Seasonal    24HR INTAKE/OUTPUT:  No intake or output data in the 24 hours ending 12/11/22 1256  No intake/output data recorded. No intake/output data recorded. Admission weight: 150 lb (68 kg)  Wt Readings from Last 3 Encounters:   12/11/22 150 lb (68 kg)   11/03/22 150 lb (68 kg)   10/26/22 150 lb (68 kg)     Body mass index is 29.29 kg/m². Physical Examination:  VITALS:   Vitals:    12/11/22 0715 12/11/22 0849 12/11/22 1000 12/11/22 1152   BP: 119/66 106/80 (!) 105/58 (!) 100/50   Pulse: 94 88 94 80   Resp: 18 18 18 18   Temp:   98.1 °F (36.7 °C) 98.2 °F (36.8 °C)   TempSrc:   Oral Oral   SpO2: 96% 97% 92% 97%   Weight:       Height:         Weight:   Wt Readings from Last 3 Encounters:   12/11/22 150 lb (68 kg)   11/03/22 150 lb (68 kg)   10/26/22 150 lb (68 kg)     Constitutional and General Appearance: Patient is awake, answering some questions, no acute distress, poor historian  Eyes: no icteric sclera in left eye or right eye,  no pallor conjunctiva in left or right eye, no discharge seen from left eye or right eye  Ears and Nose: normal external appearance of left and right ear. Both ear lobules are nontender to palpation.  Normal external appearance of nose. No active drainage from nose. Oral: Dry oral mucus membranes  Neck: No jugular venous distention, appears symmetric, good ROM  Lungs: Air entry B/L, no crackles or rales, no use of accessory muscles or labored breathing  Extremities: No LE edema, no tenderness  GI: soft, non-tender, no guarding, no distention  Skin: no rash seen on exposed extremities, warm to touch  Musculo: moves all extremities  Neuro: no slurred speech, no facial drooping  Psychiatric: Normal mood and affect, Not agitated    Lab Data  CBC:   Recent Labs     12/11/22  0555   WBC 3.7*   HGB 10.8*   HCT 30.6*   PLT 54*     BMP:  Recent Labs     12/11/22  0555      K 4.1      CO2 24   BUN 29*   CREATININE 5.7*   GLUCOSE 125*   CALCIUM 8.9     Hepatic:   Recent Labs     12/11/22  0555   LABALBU 2.8*   AST 40   ALT 27   BILITOT 2.0*   ALKPHOS 111         Additional Labs: UA positive for blood, trace protein, positive for nitrites and leukocyte esterase, 0-2 RBCs  Diagnostics: Chest x-ray shows pulmonary venous congestion, diffuse vascular markings    Old tests reviewed. Echo: EF 55% February 2022  Labs: Serum creatinine 0.6 August 2022      Impression and Plan:  PILLO. Unclear etiology. UA shows trace protein. However no red blood cells seen. Only 0-2 RBCs reported. Will obtain kidney ultrasound to evaluate for further and to ensure no underlying obstructive pathology. Start gentle IV fluid hydration. Currently on room air. Chest x-ray findings noted. However has no findings of volume overload by peripheral examination. Follow echo. Will send brief serological work-up including serum protein electrophoresis, immunofixation electrophoresis. Patient does have history of what is reported in her medical chart as lupus. We will send NAKITA, complements, antidouble-stranded DNA, anti-Keen. Further recommendations depending on evolving clinical course.   May even need to consider kidney biopsy if no significant improvement in renal function. May even need renal replacement therapy/dialysis if no improvement in renal function over next several days. Dipstick hematuria with only 0-2 RBCs. Check UPC. Recheck UA. Chronic thrombocytopenia  History of lupus. Check NAKITA, antidouble-stranded DNA, complements. History of IDDM  Borderline hypotension. Start IV fluids. Elevated troponin  Chest pain  History of breast cancer  Bipolar disorder  History of neurogenic bladder with bladder stimulator  Discussed with patient family member in detail  Discussed with nursing staff    Thank you for the consult. Please feel free to call me if you have any questions. Guido Herrera MD  [facility]    This report has been created using voice recognition software. It may contain minor errors which are inherent in voice recognition technology.

## 2022-12-11 NOTE — ED PROVIDER NOTES
[address] \[facility]\"" [address] [address] EMERGENCY DEPT  EMERGENCY DEPARTMENT     Pt Name: Shane Tamez  MRN: [id number]  [place] 1965  Date of evaluation: 12/11/2022  Provider: Vito Lombardo MD,    [facility]       Chief Complaint   Patient presents with    Chest Pain       HISTORY OF PRESENT ILLNESS    Shane Tamez is a 56 y.o. female who presents to the emergency department from home with a chief complaint of left-sided chest pain onset around 15 minutes prior to ED arrival but reports the chest pain has completely resolved. She reports the chest pain was 2/10 in severity at the time of onset. She denies lower extremity edema, erythema, pain. She denies pleuritic pain. She denies abdominal pain, nausea, vomiting. Triage notes and Nursing notes were reviewed by myself. Any discrepancies are addressed above. PAST MEDICAL HISTORY     Past Medical History:   Diagnosis Date    Anxiety     Anxiety and depression     Asthma     Bipolar 1 disorder ([address]     Bipolar 1 disorder with moderate sourav ([address]     Blood circulation, collateral     Breast cancer ([address]     diagnosed in jan 2021    Cancer [facility])      ?  cervical \"long time ago\"    Cancer ([address] 01/15/2021    Left Breast    Depression     Endometriosis     GERD (gastroesophageal reflux disease)     Kidney stones     Lupus (HCC)     Movement disorder     MS (multiple sclerosis) (HCC)     Schizophrenia ([address]     Thyroid disease     Type 2 diabetes mellitus with hyperglycemia, with long-term current use of insulin ([address]     Type 2 diabetes mellitus without complication ([address]     Unspecified cerebral artery occlusion with cerebral infarction 2014       SURGICAL HISTORY       Past Surgical History:   Procedure Laterality Date    BREAST LUMPECTOMY Left 02/19/2021    LEFT BREAST MASTECTOMY, SENTINAL LYMPH NODE BIOPSY, PREOP NEEDLE LOC performed by Aida Layton MD at [address] Left 4/6/2021    DEBRIDEMENT LEFT BREAST MASTECTOMY WOUND [facility] Left 4/27/2021    LEFT BREAST WOUND REVISION performed by Sophy Pozo MD at [address]  01/2020    DILATION AND CURETTAGE OF UTERUS      BEN US GUID NDL BIOPSY LEFT Left 01/14/2021    BEN [address] LEFT 1/14/2021 Carlos Hernandez  [facility] SURGERY N/A 01/26/2021    EXCHANGE OF INTERSTIM SYSTEM performed by Nicanor Goodwin MD at [address]  03/23/2021    right breast i and d with closure Dr Christy John in the procedure room    PAIN MANAGEMENT PROCEDURE Left 8/31/2021    left T3 paravertebral block under fluoroscopy performed by Abdias Lemon DO at [address] OFFICE/OUTPT VISIT,PROCEDURE ONLY N/A 11/21/2018    INTERSTIM DEVICE PLACEMENT MODEL 3058, ONLY HEAD ELIGIBLE FOR MRI WITH TRANSMIT/RECEIVE HEAD COIL    TUBAL LIGATION      10 years ago    [address] LOC OF LEFT BREAST Left 02/19/2021    US GUIDED NEEDLE LOC OF LEFT BREAST 2/19/2021 [place] BIOPSY  01/14/2021    US LYMPH NODE BIOPSY 1/14/2021 Carlos Hernandez MD [facility]       CURRENT MEDICATIONS       Previous Medications    AMITRIPTYLINE (ELAVIL) 75 MG TABLET    Take 1 tablet by mouth nightly    ANASTROZOLE (ARIMIDEX) 1 MG TABLET    Take 1 tablet by mouth every other day    ARTIFICIAL TEARS (ARTIFICIAL TEARS) OINT    as needed    ATORVASTATIN (LIPITOR) 40 MG TABLET    Take 1 tablet by mouth nightly    BACLOFEN (LIORESAL) 10 MG TABLET    Take 2.5 tablets by mouth 3 times daily    BD PEN NEEDLE JACOBO 2ND GEN 32G X 4 MM MISC    use as directed WITH INSULIN    CALCIUM CARBONATE (CALCIUM 600 PO)    Take 1 tablet by mouth 2 times daily    CLOPIDOGREL (PLAVIX) 75 MG TABLET    Take 1 tablet by mouth daily    DESVENLAFAXINE SUCCINATE (PRISTIQ) 50 MG TB24 EXTENDED RELEASE TABLET    Take 50 mg by mouth daily    HANDICAP PLACARD MISC    by Does not apply route 2/08/22- 2/8/2027    HYDROXYZINE (VISTARIL) 50 MG CAPSULE    Take 50 mg by mouth 3 times daily as needed for Itching. INCONTINENCE SUPPLY DISPOSABLE (UNDERPADS) MISC    Cloth chux pads  Diagnosis: Paraplegia 344.1    INSULIN GLARGINE (LANTUS) 100 UNIT/ML INJECTION VIAL    Inject 30 Units into the skin every morning (before breakfast)    INSULIN LISPRO (HUMALOG) 100 UNIT/ML INJECTION VIAL    Inject 10 Units into the skin 3 times daily (with meals)    INSULIN SYRINGES, DISPOSABLE, U-100 1 ML MISC    1 each by Does not apply route daily    LIFITEGRAST (XIIDRA) 5 % SOLN    Place 1 drop into both eyes daily    MAGNESIUM OXIDE (MAG-OX) 400 (241.3 MG) MG TABS TABLET    Take 1 tablet by mouth daily    METFORMIN (GLUCOPHAGE) 1000 MG TABLET    Take 1,000 mg by mouth 2 times daily    MISC. DEVICES (TRANSFER BENCH) MISC    1 each by Does not apply route daily Tub transfer bench with back    MISC. DEVICES [facility]) MISC    Wheelchair repairs- new seat cover, right side armrest replacement    Current body weight:  68 kg  Diagnosis: gait disturbance, chronic incomplete spastic paraplegia  Length of need: Lifetime    MONTELUKAST (SINGULAIR) 10 MG TABLET    Take 10 mg by mouth nightly     MUCINEX MAXIMUM STRENGTH 1200 MG TB12    Take 1 tablet by mouth 2 times daily    MULTIPLE VITAMIN (TAB-A-ISAAC PO)    Take 1 tablet by mouth daily    NICOTINE (NICODERM CQ) 14 MG/24HR    Place 1 patch onto the skin in the morning. OMEPRAZOLE (PRILOSEC) 20 MG CAPSULE    Take 20 mg by mouth daily. OXYCODONE-ACETAMINOPHEN (PERCOCET) 5-325 MG PER TABLET    Take 1 tablet by mouth 2 times daily as needed for Pain for up to 30 days. PALIPERIDONE (INVEGA) 3 MG EXTENDED RELEASE TABLET    Take 3 mg by mouth nightly    POTASSIUM CHLORIDE (KLOR-CON M) 20 MEQ EXTENDED RELEASE TABLET    Take 1 tablet by mouth daily    PREGABALIN (LYRICA) 150 MG CAPSULE    Take 1 capsule by mouth in the morning, at noon, and at bedtime for 30 days.     PROAIR  (90 BASE) MCG/ACT INHALER Inhale 2 puffs into the lungs every 6 hours as needed. SITAGLIPTIN (JANUVIA) 100 MG TABLET    Take 100 mg by mouth daily    TIOTROPIUM (SPIRIVA) 18 MCG INHALATION CAPSULE    Inhale 18 mcg into the lungs daily 2 puffs       ALLERGIES     Penicillins and Seasonal    FAMILY HISTORY       Family History   Problem Relation Age of Onset    Stroke Mother     Asthma Mother     Other Mother     No Known Problems Sister     Asthma Brother     No Known Problems Brother         SOCIAL HISTORY       Social History     Socioeconomic History    Marital status:      Spouse name: 4   Tobacco Use    Smoking status: Every Day     Packs/day: 0.50     Types: Cigarettes     Start date: 12/6/1979    Smokeless tobacco: Never   Vaping Use    Vaping Use: Never used   Substance and Sexual Activity    Alcohol use: Yes     Alcohol/week: 0.0 standard drinks     Comment: social drinker    Drug use: No    Sexual activity: Never     Partners: Male   Social History Narrative    ** Merged History Encounter **            REVIEW OF SYSTEMS     Review of Systems  - CONSTITUTIONAL: Denies weight loss, fever and chills. - HEENT: Denies changes in vision and hearing.    -   RESPIRATORY: Denies SOB and cough. - CV: Denies palpitations and CP at this time. Reports chest pain that prompted her to dial 911      - GI: Denies abdominal pain, nausea, vomiting and diarrhea.      - : Denies dysuria and urinary frequency. - MSK: Denies myalgia and joint pain. - SKIN: Denies rash and pruritus.    -   NEUROLOGICAL: Denies headache and syncope. - PSYCHIATRIC: Denies recent changes in mood. Denies anxiety and depression. Except as noted above the remainder of the review of systems was reviewed and is.    PHYSICAL EXAM    (up to 7 for level 4, 8 or more for level 5)     ED Triage Vitals [12/11/22 0545]   BP Temp Temp Source Heart Rate Resp SpO2 Height Weight   (!) 137/57 98.5 °F (36.9 °C) Oral 75 18 96 % 5' (1.524 m) 150 lb (68 kg) Physical Exam  Constitutional:  Well developed, well nourished, no acute distress, non-toxic appearance   Eyes:  PERRL, conjunctiva normal   HENT:  Atraumatic, external ears normal, nose normal, oropharynx moist, no pharyngeal exudates. Neck- normal range of motion, no tenderness, supple, no JVD  Respiratory:  No respiratory distress, normal breath sounds, no rales, no wheezing   Cardiovascular:  Normal rate, normal rhythm, no murmurs, no gallops, no rubs   GI:  Soft, nondistended, normal bowel sounds, nontender, no organomegaly, no mass, no rebound, no guarding   :  No costovertebral angle tenderness   Musculoskeletal:  No edema, no tenderness, no deformities.  Back- no tenderness  Integument:  Well hydrated, no rash   Lymphatic:  No lymphadenopathy noted   Neurologic:  Alert & oriented x 3, CN 2-12 normal, baseline right-sided deficits  Psychiatric:  Speech and behavior appropriate, occasionally will trail off at the end of a sentence however reports she is sleepy and denies drug use or alcohol use  DIAGNOSTIC RESULTS     EKG:(none if blank)  All EKG's are interpreted by [facility] who either signs or Co-signs this chart in the absence of a cardiologist.    Normal sinus rhythm, normal axis, good R wave progression, no ST elevations or depressions, no T wave inversions, slightly prolonged QT otherwise normal intervals, no Wellens wave, no Brugada wave, no epsilon waves, no delta waves    RADIOLOGY: (none if blank)   Interpretation per the Radiologistbelow, if available at the time of this note:    XR CHEST PORTABLE    (Results Pending)       LABS:  Labs Reviewed   CBC WITH AUTO DIFFERENTIAL - Abnormal; Notable for the following components:       Result Value    WBC 3.7 (*)     RBC 3.48 (*)     Hemoglobin 10.8 (*)     Hematocrit 30.6 (*)     RDW-CV 19.0 (*)     RDW-SD 59.0 (*)     Platelets 54 (*)     All other components within normal limits   TROPONIN - Abnormal; Notable for the following components:    Troponin T 0.046 (*)     All other components within normal limits   BRAIN NATRIURETIC PEPTIDE - Abnormal; Notable for the following components:    Pro-BNP 2476.0 (*)     All other components within normal limits   COVID-19 & INFLUENZA COMBO   BASIC METABOLIC PANEL   APTT   PROTIME-INR   ANTI-XA, UNFRACTIONATED HEPARIN       All other labs were within normal range or not returned as of this dictation. Please note, any cultures that may have been sent were not resulted at the time of this patient visit. EMERGENCY DEPARTMENT COURSE andMedical Decision Making:     MDM  /   Overall well-appearing and chest pain-free. The EKG is without acute ischemic changes. Resting comfortably with normal vital signs in the emergency department. Low concern for pulmonary embolism or aortic dissection as the underlying etiology to her presentation. Troponin is elevated at 0.047. BNP is elevated as well. No increased oxygen requirements at this time. Full dose aspirin given. Chest pain-free hence no nitro or morphine given. Heparin GTT initiated. Admit to medicine    ED Medications administered this visit:  Medications - No data to display      Procedures: (None if blank)       CLINICAL       1. Other chest pain    2. NSTEMI (non-ST elevated myocardial infarction) [facility])          DISPOSITION/PLAN   DISPOSITION      Admission      (Please note that portions of this note were completed with a voice recognition program.  Efforts were made to edit the dictations but occasionallywords are mis-transcribed. )      Anant Brar MD, (electronically signed)  Attending Physician, Emergency Department         Anant Brar MD  12/11/22 2315

## 2022-12-11 NOTE — ED TRIAGE NOTES
PT to the ED via EMS for complaint of dull chest pain. PT states she was sleeping when it started, just before coming here. PT states pain is 2/10. PT states she was short of breath earlier but denies any currently. PT trey told EMS she has been acting differently today. Upon arrival, pt respirations are even and unlabored. PT states chest pain is 2/10. Pt will trail off in sentences and lose train of thought. PT has hx of MS. Respirations even and unlabored. PT states she is wheel chair bound at home.

## 2022-12-11 NOTE — PROGRESS NOTES
Patient put light on and stated she needed her brief changed because she has pooped. Took brief off and there is no poop. Patient states are you sure? Became very upset, pulls her pur wic out and starts yelling for a Dr. Marika Valentin not let this RN put the brief back on. Charge nurse Michael Conrad aware. Order is in for gibbs, patient refused to let me in room. [facility] RN attempted to bladder scan, patient on phone with boyfriend. Patient states everyone is being mean and she is leaving. Boyfriend on phone states he is \" on the way to fuck them up\". Patient pleasant with [facility], tried to convince patient to stay but patient refused and ripped tele off. [place] police called. Charge RN notified. Dr. Rohini Gomez notified of patient wanting to leave AMA. Dr. Shanon Patel in patient's room to talk to patient. [place] police arrived. Boyfriend arrived with wheelchair. Patient still wants to leave AMA. IV taken out. AMA paper signed by patient.

## 2022-12-12 LAB
C3 COMPLEMENT: 78 MG/DL (ref 90–180)
COMPLEMENT C4: 10 MG/DL (ref 10–40)

## 2022-12-13 ENCOUNTER — TELEPHONE (OUTPATIENT)
Dept: NEPHROLOGY | Age: 57
End: 2022-12-13

## 2022-12-13 RX ORDER — AMITRIPTYLINE HYDROCHLORIDE 25 MG/1
TABLET, FILM COATED ORAL
Qty: 30 TABLET | Refills: 5 | OUTPATIENT
Start: 2022-12-13

## 2022-12-13 NOTE — TELEPHONE ENCOUNTER
----- Message from Rizwana Cavazos MD sent at 12/13/2022  3:36 PM EST -----  Pt left the hospital AMA  Pls call and let her know that kidneys were in bad shape and she should consider coming back to hospital- she will need to go thru ED  Also needs follow-up appt

## 2022-12-13 NOTE — RESULT ENCOUNTER NOTE
Pt left the hospital AMA  Pls call and let her know that kidneys were in bad shape and she should consider coming back to hospital- she will need to go thru ED  Also needs follow-up appt

## 2022-12-14 LAB
ANA SCREEN: NORMAL
GLOMERULAR BASEMENT MEMBRANE ANTIBODY: NORMAL
MYELOPEROXIDASE AB, IGG: 0 AU/ML (ref 0–19)
SERINE PROTEASE 3, IGG: 0 AU/ML (ref 0–19)

## 2022-12-15 LAB — IMMUNOFIXATION ELECTROPHORESIS: NORMAL

## 2023-01-08 DIAGNOSIS — G89.29 OTHER CHRONIC PAIN: ICD-10-CM

## 2023-01-09 RX ORDER — AMITRIPTYLINE HYDROCHLORIDE 25 MG/1
TABLET, FILM COATED ORAL
Qty: 30 TABLET | Refills: 5 | OUTPATIENT
Start: 2023-01-09

## 2023-01-10 PROBLEM — R77.8 ELEVATED TROPONIN: Status: RESOLVED | Noted: 2022-12-11 | Resolved: 2023-01-10

## 2023-01-10 PROBLEM — R79.89 ELEVATED TROPONIN: Status: RESOLVED | Noted: 2022-12-11 | Resolved: 2023-01-10

## 2023-02-07 DIAGNOSIS — G89.29 OTHER CHRONIC PAIN: ICD-10-CM

## 2023-02-07 RX ORDER — AMITRIPTYLINE HYDROCHLORIDE 25 MG/1
TABLET, FILM COATED ORAL
Qty: 30 TABLET | Refills: 5 | OUTPATIENT
Start: 2023-02-07

## 2023-03-09 DIAGNOSIS — G89.29 OTHER CHRONIC PAIN: ICD-10-CM

## 2023-03-09 RX ORDER — AMITRIPTYLINE HYDROCHLORIDE 25 MG/1
TABLET, FILM COATED ORAL
Qty: 30 TABLET | Refills: 5 | OUTPATIENT
Start: 2023-03-09

## 2023-03-20 ENCOUNTER — OFFICE VISIT (OUTPATIENT)
Dept: PHYSICAL MEDICINE AND REHAB | Age: 58
End: 2023-03-20

## 2023-03-20 VITALS
WEIGHT: 150 LBS | HEIGHT: 60 IN | SYSTOLIC BLOOD PRESSURE: 114 MMHG | DIASTOLIC BLOOD PRESSURE: 62 MMHG | BODY MASS INDEX: 29.45 KG/M2

## 2023-03-20 DIAGNOSIS — G89.29 OTHER CHRONIC PAIN: ICD-10-CM

## 2023-03-20 DIAGNOSIS — G35 MULTIPLE SCLEROSIS (HCC): ICD-10-CM

## 2023-03-20 DIAGNOSIS — G82.22 CHRONIC INCOMPLETE SPASTIC PARAPLEGIA (HCC): ICD-10-CM

## 2023-03-20 DIAGNOSIS — G89.4 CHRONIC PAIN SYNDROME: Primary | ICD-10-CM

## 2023-03-20 RX ORDER — OXYCODONE HYDROCHLORIDE AND ACETAMINOPHEN 5; 325 MG/1; MG/1
1 TABLET ORAL 2 TIMES DAILY PRN
Qty: 60 TABLET | Refills: 0 | Status: SHIPPED | OUTPATIENT
Start: 2023-03-20 | End: 2023-04-19

## 2023-03-20 NOTE — PROGRESS NOTES
DO Pre[facility] at [facility] OFFICE/OUTPT VISIT,PROCEDURE ONLY N/A 11/21/2018    INTERSTIM DEVICE PLACEMENT MODEL 3058, ONLY HEAD ELIGIBLE FOR MRI WITH TRANSMIT/RECEIVE HEAD COIL    TUBAL LIGATION      10 years ago    [address] LOC OF LEFT BREAST Left 02/19/2021    US GUIDED NEEDLE LOC OF LEFT BREAST 2/19/2021 [address] LYMPH NODE BIOPSY  01/14/2021    US LYMPH NODE BIOPSY 1/14/2021 Belinda Carmen MD [facility]       Family History   Problem Relation Age of Onset    Stroke Mother     Asthma Mother     Other Mother     No Known Problems Sister     Asthma Brother     No Known Problems Brother        Social History     Socioeconomic History    Marital status: Legally      Spouse name: 4    Number of children: Not on file    Years of education: Not on file    Highest education level: Not on file   Occupational History    Not on file   Tobacco Use    Smoking status: Every Day     Packs/day: 0.50     Types: Cigarettes     Start date: 12/6/1979    Smokeless tobacco: Never   Vaping Use    Vaping Use: Never used   Substance and Sexual Activity    Alcohol use:  Yes     Alcohol/week: 0.0 standard drinks     Comment: social drinker    Drug use: No    Sexual activity: Never     Partners: Male   Other Topics Concern    Not on file   Social History Narrative    ** Merged History Encounter **          Social Determinants of Health     Financial Resource Strain: Not on file   Food Insecurity: Not on file   Transportation Needs: Not on file   Physical Activity: Not on file   Stress: Not on file   Social Connections: Not on file   Intimate Partner Violence: Not on file   Housing Stability: Not on file       Medications & Allergies:   Current Outpatient Medications   Medication Instructions    amitriptyline (ELAVIL) 75 mg, Oral, NIGHTLY    anastrozole (ARIMIDEX) 1 mg, Oral, EVERY OTHER DAY    artificial tears (ARTIFICIAL TEARS) OINT PRN    atorvastatin (LIPITOR) 40 mg, Oral,

## 2023-03-22 ENCOUNTER — OFFICE VISIT (OUTPATIENT)
Dept: PHYSICAL MEDICINE AND REHAB | Age: 58
End: 2023-03-22
Payer: MEDICAID

## 2023-03-22 ENCOUNTER — TELEPHONE (OUTPATIENT)
Dept: PHYSICAL MEDICINE AND REHAB | Age: 58
End: 2023-03-22

## 2023-03-22 VITALS
BODY MASS INDEX: 29.45 KG/M2 | SYSTOLIC BLOOD PRESSURE: 108 MMHG | WEIGHT: 150 LBS | HEIGHT: 60 IN | DIASTOLIC BLOOD PRESSURE: 62 MMHG

## 2023-03-22 DIAGNOSIS — G35 MULTIPLE SCLEROSIS (HCC): Primary | ICD-10-CM

## 2023-03-22 DIAGNOSIS — G82.22 CHRONIC INCOMPLETE SPASTIC PARAPLEGIA (HCC): ICD-10-CM

## 2023-03-22 PROCEDURE — 99213 OFFICE O/P EST LOW 20 MIN: CPT | Performed by: STUDENT IN AN ORGANIZED HEALTH CARE EDUCATION/TRAINING PROGRAM

## 2023-03-22 PROCEDURE — G8484 FLU IMMUNIZE NO ADMIN: HCPCS | Performed by: STUDENT IN AN ORGANIZED HEALTH CARE EDUCATION/TRAINING PROGRAM

## 2023-03-22 PROCEDURE — G8427 DOCREV CUR MEDS BY ELIG CLIN: HCPCS | Performed by: STUDENT IN AN ORGANIZED HEALTH CARE EDUCATION/TRAINING PROGRAM

## 2023-03-22 PROCEDURE — G8417 CALC BMI ABV UP PARAM F/U: HCPCS | Performed by: STUDENT IN AN ORGANIZED HEALTH CARE EDUCATION/TRAINING PROGRAM

## 2023-03-22 PROCEDURE — 4004F PT TOBACCO SCREEN RCVD TLK: CPT | Performed by: STUDENT IN AN ORGANIZED HEALTH CARE EDUCATION/TRAINING PROGRAM

## 2023-03-22 PROCEDURE — 3017F COLORECTAL CA SCREEN DOC REV: CPT | Performed by: STUDENT IN AN ORGANIZED HEALTH CARE EDUCATION/TRAINING PROGRAM

## 2023-03-22 ASSESSMENT — ENCOUNTER SYMPTOMS
SHORTNESS OF BREATH: 0
WHEEZING: 0
CONSTIPATION: 0
DIARRHEA: 0

## 2023-03-22 NOTE — PROGRESS NOTES
Known Problems Sister     Asthma Brother     No Known Problems Brother           Medication:  Current Outpatient Medications   Medication Sig Dispense Refill    oxyCODONE-acetaminophen (PERCOCET) 5-325 MG per tablet Take 1 tablet by mouth 2 times daily as needed for Pain for up to 30 days. Max Daily Amount: 2 tablets 60 tablet 0    baclofen (LIORESAL) 10 MG tablet Take 2.5 tablets by mouth 3 times daily 225 tablet 2    insulin glargine (LANTUS) 100 UNIT/ML injection vial Inject 30 Units into the skin every morning (before breakfast) 10 mL 3    nicotine (NICODERM CQ) 14 MG/24HR Place 1 patch onto the skin in the morning. 30 patch 3    Misc.  Devices (TRANSFER BENCH) MISC 1 each by Does not apply route daily Tub transfer bench with back 1 each 0    Insulin Syringes, Disposable, U-100 1 ML MISC 1 each by Does not apply route daily 100 each 3    atorvastatin (LIPITOR) 40 MG tablet Take 1 tablet by mouth nightly 30 tablet 3    clopidogrel (PLAVIX) 75 MG tablet Take 1 tablet by mouth daily 30 tablet 3    insulin lispro (HUMALOG) 100 UNIT/ML injection vial Inject 10 Units into the skin 3 times daily (with meals) 10 mL 3    potassium chloride (KLOR-CON M) 20 MEQ extended release tablet Take 1 tablet by mouth daily 180 tablet 1    Handicap Placard MISC by Does not apply route 2/08/22- 2/8/2027 1 each 0    anastrozole (ARIMIDEX) 1 MG tablet Take 1 tablet by mouth every other day 30 tablet 3    BD PEN NEEDLE JACOBO 2ND GEN 32G X 4 MM MISC use as directed WITH INSULIN      metFORMIN (GLUCOPHAGE) 1000 MG tablet Take 1,000 mg by mouth 2 times daily      desvenlafaxine succinate (PRISTIQ) 50 MG TB24 extended release tablet Take 50 mg by mouth daily      MUCINEX MAXIMUM STRENGTH 1200 MG TB12 Take 1 tablet by mouth 2 times daily      paliperidone (INVEGA) 3 MG extended release tablet Take 3 mg by mouth nightly      SITagliptin (JANUVIA) 100 MG tablet Take 100 mg by mouth daily      Lifitegrast (XIIDRA) 5 % SOLN Place 1 drop into both

## 2023-03-23 NOTE — TELEPHONE ENCOUNTER
Recalled pt. And informed still awaiting PA approval and encouraged to call insurance herself to expediate response.

## 2023-03-23 NOTE — TELEPHONE ENCOUNTER
(Key: [id number])    Sent to Plan today    The plan will fax you a determination, typically within 1 to 5 business days.     oxyCODONE-Acetaminophen 5-325MG tablets    [facility] [employer] Short-Acting or Long-Acting Opioid Medication Pharmacy Prior Authorization Form

## 2023-04-08 DIAGNOSIS — G89.29 OTHER CHRONIC PAIN: ICD-10-CM

## 2023-04-10 RX ORDER — AMITRIPTYLINE HYDROCHLORIDE 25 MG/1
TABLET, FILM COATED ORAL
Qty: 30 TABLET | Refills: 5 | OUTPATIENT
Start: 2023-04-10

## 2023-04-26 ENCOUNTER — OFFICE VISIT (OUTPATIENT)
Dept: PHYSICAL MEDICINE AND REHAB | Age: 58
End: 2023-04-26
Payer: MEDICAID

## 2023-04-26 VITALS
BODY MASS INDEX: 29.45 KG/M2 | DIASTOLIC BLOOD PRESSURE: 56 MMHG | SYSTOLIC BLOOD PRESSURE: 116 MMHG | WEIGHT: 150 LBS | HEIGHT: 60 IN

## 2023-04-26 DIAGNOSIS — G82.22 CHRONIC INCOMPLETE SPASTIC PARAPLEGIA (HCC): ICD-10-CM

## 2023-04-26 DIAGNOSIS — G35 MULTIPLE SCLEROSIS (HCC): ICD-10-CM

## 2023-04-26 DIAGNOSIS — G89.29 OTHER CHRONIC PAIN: Primary | ICD-10-CM

## 2023-04-26 DIAGNOSIS — G89.4 CHRONIC PAIN SYNDROME: ICD-10-CM

## 2023-04-26 PROCEDURE — 3017F COLORECTAL CA SCREEN DOC REV: CPT | Performed by: ANESTHESIOLOGY

## 2023-04-26 PROCEDURE — G8427 DOCREV CUR MEDS BY ELIG CLIN: HCPCS | Performed by: ANESTHESIOLOGY

## 2023-04-26 PROCEDURE — 4004F PT TOBACCO SCREEN RCVD TLK: CPT | Performed by: ANESTHESIOLOGY

## 2023-04-26 PROCEDURE — 99214 OFFICE O/P EST MOD 30 MIN: CPT | Performed by: ANESTHESIOLOGY

## 2023-04-26 PROCEDURE — G8417 CALC BMI ABV UP PARAM F/U: HCPCS | Performed by: ANESTHESIOLOGY

## 2023-04-26 RX ORDER — PREGABALIN 150 MG/1
150 CAPSULE ORAL 3 TIMES DAILY
Qty: 90 CAPSULE | Refills: 2 | Status: SHIPPED | OUTPATIENT
Start: 2023-04-26 | End: 2023-05-26

## 2023-04-26 RX ORDER — OXYCODONE HYDROCHLORIDE AND ACETAMINOPHEN 5; 325 MG/1; MG/1
1 TABLET ORAL 2 TIMES DAILY PRN
Qty: 60 TABLET | Refills: 0 | Status: SHIPPED | OUTPATIENT
Start: 2023-04-29 | End: 2023-05-29

## 2023-04-26 NOTE — PROGRESS NOTES
Chronic Pain/PM&R Clinic Note     Encounter Date: 4/26/23    Subjective:   Chief Complaint:   Chief Complaint   Patient presents with    Follow-up    Medication Refill     Norco and Pregabalin        History of Present Illness:   José Rodriguez is a 56 y.o. female seen in the clinic initially on 08/09/21 for her history of left sided chest wall pain. She has a medical history of left breast mastectomy in February 2021 secondary to breast cancer with subsequent wound dehiscence and surgical debridement and spinal cord injury secondary to transverse myelitis. She has been dealing with left-sided chest wall pain at the site of her incision ever since her mastectomy and this is been exacerbated after most recent revision surgery in June 2021. She describes his pain is a constant stabbing, electrical type pain right on her incision scar. She rates this pain is a constant 6/10 pain that can get up to a 10/10. She denies any drainage or areas of erythema around the scar. She states that this area is so sensitive that is difficult for her to wear even a supportive bra. She reports not getting much relief with her other medications including Lyrica 150 mg twice a day, Norco 5 mg twice a day, and Elavil 50 mg at night. She states that this is interfering with her everyday activities and really interfering with sleep. Today, 4/26/2023, patient presents for planned follow-up for chronic left-sided chest wall pain. She continues to obtain significant relief from her current medications including her Percocet and Lyrica. She denies any side effects on these medications. She states that the Percocet continues to give her relief when she really needs to take this medication. She states that her last dose this medication was this morning. She denies any other new symptoms at this point and other medication changes.     History of Interventions:   Surgery: Left breast mastectomy and surgical debridement  Injections:

## 2023-05-08 DIAGNOSIS — G89.29 OTHER CHRONIC PAIN: ICD-10-CM

## 2023-05-08 RX ORDER — AMITRIPTYLINE HYDROCHLORIDE 25 MG/1
TABLET, FILM COATED ORAL
Qty: 30 TABLET | Refills: 5 | OUTPATIENT
Start: 2023-05-08

## 2023-05-30 DIAGNOSIS — G89.29 OTHER CHRONIC PAIN: ICD-10-CM

## 2023-06-05 RX ORDER — OXYCODONE HYDROCHLORIDE AND ACETAMINOPHEN 5; 325 MG/1; MG/1
1 TABLET ORAL 2 TIMES DAILY PRN
Qty: 60 TABLET | Refills: 0 | OUTPATIENT
Start: 2023-06-05 | End: 2023-07-05

## 2023-06-07 DIAGNOSIS — G89.29 OTHER CHRONIC PAIN: ICD-10-CM

## 2023-06-08 RX ORDER — AMITRIPTYLINE HYDROCHLORIDE 25 MG/1
TABLET, FILM COATED ORAL
Qty: 30 TABLET | Refills: 1 | OUTPATIENT
Start: 2023-06-08 | End: 2023-08-07

## 2023-06-08 NOTE — TELEPHONE ENCOUNTER
OARRS reviewed. UDS: + for Atarax, Baclofen, Amitriptyline, Oxycodone, Trazodone, Risperdal  Inconsistent EtS/EtG  Last seen: 3/22/23.  Follow-up: 10/4/23

## 2023-06-08 NOTE — TELEPHONE ENCOUNTER
It does not appear that we have filled this medication in the last year- I reached out to patient's pharmacy and they said this was last picked up on 4/13/2023 but the script was from patient's PCP, Dr. Devang Hyde- Additionally, pharmacy states that they have refills available for patient

## 2023-07-07 DIAGNOSIS — G89.29 OTHER CHRONIC PAIN: ICD-10-CM

## 2023-07-07 RX ORDER — AMITRIPTYLINE HYDROCHLORIDE 25 MG/1
TABLET, FILM COATED ORAL
Qty: 30 TABLET | Refills: 5 | OUTPATIENT
Start: 2023-07-07

## 2023-07-07 NOTE — DISCHARGE INSTRUCTIONS
Return to the emergency department if you develop fevers, chills, extension of the redness above your knee, or worsening pain in your foot. Take your antibiotics as prescribed starting this evening. You can take Tylenol 1000 mg every 8 hours as needed for pain. Continue monitor blood glucose. Detail Level: Generalized Detail Level: Zone Topical Salicylic Acid Recommendations: Wart Stick apply qhs Detail Level: Detailed

## 2023-07-15 NOTE — PROGRESS NOTES
[facility], [address] [facility] [address] 200  [address] [address]  Dept: [phone]  Dept Fax: [phone]: [phone]      Visit Date:5/19/2022     Sundeep German is a 49 y.o. female who presents today for:   Chief Complaint   Patient presents with    Follow-up     Invasive ductal carcinoma of left breast         HPI:   Sundeep German is a 49 y.o. female who follows in our office with Dr. Lloyd Weir with invasive ductal carcinoma of left breast.  HPI per Dr. Dax Connors note on 1/5/2022: This is a 57-year old postmenopausal patient with h/o left breast cancer. Joanna has multiple co morbidities including paraplegia secondary to transverse myelitis since 2014, she is wheelchair-bound. She gets Botox injections for contractures in her lower extremities. She has also multiple sclerosis, neurogenic bowel and bladder, bipolar disorder. She carries history of cervical cancer. Also has mild chronic thrombocytopenia going back to 2014.   Bear presented for diagnostic breast imaging after she felt a mass in the left breast.  Mammogram on January 4, 2021 showed irregular high density mass in the left breast central to the nipple and numerous lymph nodes in the right axilla.  Ultrasounds of both axilla and the breast were performed and ultimately ultrasound-guided biopsies of the mass in the lymph node on the left.  On ultrasound of the right axilla there were no abnormalities seen and no abnormal lymph nodes.  Ultrasound imaging revealed a 2.2 x 2.2 x 2.9 cm mass in the left breast in the retroareolar area.  A single lymph node in the left axilla appeared to have a moderate suspicion for malignancy.  Ultrasound-guided biopsies were performed on January 14, 2021.  Pathology revealed a grade 1 well-differentiated invasive adenocarcinoma with mucinous features, the lymph node was negative for malignancy.    Breast cancer cells were estrogen receptor 100% positive, progesterone receptor 80% positive.  Ki-67 was 6%,  HER-2 by IHC was negative.    Sequently she met with the surgeon Dr. Caity gross and after discussing her surgical treatment options she decided to proceed with a left breast mastectomy and axillary lymph node excision. She had her surgery on February 19, 2021 final pathology report showed: She gets Botox injections for contractures in her lower extremities.  Recent onset of thrombocytopenia. A.  Left sentinel lymph node, excision:    Metastatic carcinoma in 2 of 4 lymph nodes (2/4). B.  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  Lymphovascular space invasion is present.    Margins are negative.    C.  Left axillary lymph nodes, excision:    Two lymph nodes, negative for malignancy (0/2). Oncotype DX Breast Cancer Assay (RT-PCR) performed by [employer]   Breast Cancer Recurrence Score:   8      Her post mastectomy incision is complicated by seroma formation. The patient required aspiration. Her radiation treatment has been delayed due postmastectomy surgical wound dehiscence and delayed healing. She started radiation treatment on August 13, 2021 and completed on 10/08/2021. She received 6000 cGy in 30 fractions. Interval History 5/20/2022:   The patient presents to the office today for follow up and evaluation of invasive ductal carcinoma of left breast.  The patient reports she has still only been taking Arimidex every other day. Advised her to start taking daily as she was directed at last appnt. She reports chronic joint pain tolerable. She reports hot flashes at times. Recommended Vit E for this. She denies issues with her breasts or chest wall, no edema, pain, nipple changes/inversion/discharge, palpable changes. She reports abdominal pain with diarrhea, relates to a \"GI bug\" right now. She has mammogram scheduled 5/31/2022.   She denies recurrent fevers or infections, early satiety, unintentional weight loss.          PMH, SH, and FH:  I reviewed the patient's medication and allergy lists as noted on the electronic medical record. The PMH, SH, and FH were also reviewed as noted on the EMR. Past Medical History:   Diagnosis Date    Anxiety     Anxiety and depression     Asthma     Bipolar 1 disorder ([address]     Bipolar 1 disorder with moderate sourav ([address]     Blood circulation, collateral     Breast cancer ([address]     diagnosed in jan 2021    Cancer [facility])      ?  cervical \"long time ago\"    Cancer ([address] 01/15/2021    Left Breast    Depression     Endometriosis     GERD (gastroesophageal reflux disease)     Kidney stones     Lupus (HCC)     Movement disorder     MS (multiple sclerosis) ([address]     Schizophrenia ([address]     Thyroid disease     Type 2 diabetes mellitus with hyperglycemia, with long-term current use of insulin (HCC)     Type 2 diabetes mellitus without complication ([address]     Unspecified cerebral artery occlusion with cerebral infarction 2014      Past Surgical History:   Procedure Laterality Date    BREAST LUMPECTOMY Left 02/19/2021    LEFT BREAST MASTECTOMY, SENTINAL LYMPH NODE BIOPSY, PREOP NEEDLE LOC performed by Mario Watkins MD at [address] [place] [address] 4/6/2021    DEBRIDEMENT LEFT BREAST MASTECTOMY WOUND performed by Mario Watkins MD at [address] [place] [address] 4/27/2021    LEFT BREAST WOUND REVISION performed by Mario Watkins MD at [address] [place] [address]  01/2020    DILATION AND CURETTAGE OF UTERUS      BEN US GUID NDL BIOPSY LEFT Left 01/14/2021    BEN US GUID NDL BIOPSY LEFT 1/14/2021 [facility]    NERVE SURGERY N/A 01/26/2021    EXCHANGE OF INTERSTIM SYSTEM performed by Judith Carlson MD at [facility]  03/23/2021    right breast i and d with closure Dr Freddi Burkitt in the procedure room    PAIN MANAGEMENT PROCEDURE Left 8/31/2021    left T3 paravertebral block under fluoroscopy performed by Vu Kelly DO [place] at [address] [place] OFFICE/OUTPT VISIT,PROCEDURE ONLY N/A 11/21/2018    INTERSTIM DEVICE PLACEMENT MODEL 3058, ONLY HEAD ELIGIBLE FOR MRI WITH TRANSMIT/RECEIVE HEAD COIL    TUBAL LIGATION      10 years ago    US GUIDED NEEDLE LOC OF LEFT BREAST Left 02/19/2021    US GUIDED NEEDLE LOC OF LEFT BREAST 2/19/2021 [facility]    US LYMPH NODE BIOPSY  01/14/2021    US LYMPH NODE BIOPSY 1/14/2021 Viraj Contreras MD [facility]      Family History   Problem Relation Age of Onset   Michela Lee Stroke Mother     Asthma Mother     Other Mother     No Known Problems Sister     Asthma Brother     No Known Problems Brother       Social History     Tobacco Use    Smoking status: Current Every Day Smoker     Packs/day: 0.50     Types: Cigarettes     Start date: 12/6/1979    Smokeless tobacco: Never Used   Substance Use Topics    Alcohol use: Yes     Alcohol/week: 0.0 standard drinks     Comment: social drinker      Current Outpatient Medications   Medication Sig Dispense Refill    [START ON 6/1/2022] oxyCODONE-acetaminophen (PERCOCET) 5-325 MG per tablet Take 1 tablet by mouth 2 times daily as needed for Pain for up to 30 days. 60 tablet 0    amitriptyline (ELAVIL) 75 MG tablet Take 1 tablet by mouth nightly 30 tablet 2    Misc.  Devices (TRANSFER BENCH) MISC 1 each by Does not apply route daily Tub transfer bench with back 1 each 0    Insulin Syringes, Disposable, U-100 1 ML MISC 1 each by Does not apply route daily 100 each 3    atorvastatin (LIPITOR) 40 MG tablet Take 1 tablet by mouth nightly 30 tablet 3    insulin glargine (LANTUS) 100 UNIT/ML injection vial Inject 70 Units into the skin nightly 10 mL 3    insulin lispro (HUMALOG) 100 UNIT/ML injection vial Inject 10 Units into the skin 3 times daily (with meals) 10 mL 3    magnesium oxide (MAG-OX) 400 (241.3 Mg) MG TABS tablet Take 1 tablet by mouth daily 30 tablet 3    potassium chloride (KLOR-CON M) 20 MEQ extended release tablet Take 1 tablet by mouth daily 180 tablet 1    Handicap Placard MISC by Does not apply route 2/08/22- 2/8/2027 1 each 0    pregabalin (LYRICA) 150 MG capsule Take 1 capsule by mouth 2 times daily for 30 days. 60 capsule 2    anastrozole (ARIMIDEX) 1 MG tablet Take 1 tablet by mouth every other day 30 tablet 3    BD PEN NEEDLE JACOBO 2ND GEN 32G X 4 MM MISC use as directed WITH INSULIN      baclofen (LIORESAL) 10 MG tablet Take 2 tablets by mouth 3 times daily 135 tablet 5    metFORMIN (GLUCOPHAGE) 1000 MG tablet Take 1,000 mg by mouth 2 times daily      desvenlafaxine succinate (PRISTIQ) 50 MG TB24 extended release tablet Take 50 mg by mouth daily      MUCINEX MAXIMUM STRENGTH 1200 MG TB12 Take 1 tablet by mouth 2 times daily      paliperidone (INVEGA) 3 MG extended release tablet Take 3 mg by mouth nightly      SITagliptin (JANUVIA) 100 MG tablet Take 100 mg by mouth daily      Lifitegrast (XIIDRA) 5 % SOLN Place 1 drop into both eyes daily      Misc. Devices [facility]) [facility] Wheelchair repairs- new seat cover, right side armrest replacement    Current body weight:  68 kg  Diagnosis: gait disturbance, chronic incomplete spastic paraplegia  Length of need: Lifetime 1 each 0    tiotropium (SPIRIVA) 18 MCG inhalation capsule Inhale 18 mcg into the lungs daily 2 puffs      montelukast (SINGULAIR) 10 MG tablet Take 10 mg by mouth nightly   0    artificial tears (ARTIFICIAL TEARS) OINT as needed      Calcium Carbonate (CALCIUM 600 PO) Take 1 tablet by mouth 2 times daily      Multiple Vitamin (TAB-A-ISAAC PO) Take 1 tablet by mouth daily      loratadine (CLARITIN) 10 MG tablet Take 1 tablet by mouth daily as needed. 0    PROAIR  (90 BASE) MCG/ACT inhaler Inhale 2 puffs into the lungs every 6 hours as needed. 0    Incontinence Supply Disposable (UNDERPADS) MISC Cloth chux pads  Diagnosis: Paraplegia 344. 1 100 Bottle 11    hydrOXYzine (VISTARIL) 50 MG capsule Take 50 mg by mouth 3 times daily as needed for Itching.  omeprazole (PRILOSEC) 20 MG capsule Take 20 mg by mouth daily.  Lactobacillus Acid-Pectin (ACIDOPHILUS/CITRUS PECTIN) TABS take 1 tablet by mouth once daily (Patient not taking: Reported on 2022) 30 tablet 0    clopidogrel (PLAVIX) 75 MG tablet Take 1 tablet by mouth daily (Patient not taking: Reported on 2022) 30 tablet 3    naloxone 4 MG/0.1ML LIQD nasal spray 1 spray by Nasal route as needed for Opioid Reversal 1 each 5     No current facility-administered medications for this visit. Allergies   Allergen Reactions    Penicillins Shortness Of Breath     \"I almost \"  Other reaction(s): PENICILLINS    Seasonal Itching          Review of Systems:   Review of Systems   Pertinent review of systems noted in HPI, all other ROS negative. Objective:   Physical Exam   /60 (Site: Right Upper Arm, Position: Sitting, Cuff Size: Medium Adult)   Pulse 109   Temp 98.4 °F (36.9 °C) (Oral)   Resp 16   Ht 5' (1.524 m)   SpO2 96%   BMI 30.86 kg/m²    General appearance: No apparent distress, calm and cooperative. HEENT: Pupils equal, round, and reactive to light. Conjunctivae/corneas clear. Oral mucosa moist.  Neck: Supple, with full range of motion. Trachea midline. Respiratory:  Normal respiratory effort. Clear to auscultation all lung fields. Cardiovascular: RRR, S1/S2. Abdomen: Soft, non-tender, non-distended with active BS x 4. Musculoskeletal: No clubbing, cyanosis or edema bilaterally. Skin: Skin color, texture, turgor normal.  No visible rashes or lesions. Neurologic:  Neurovascularly intact without any focal sensory/motor deficits.  Cranial nerves: II-XII intact, grossly non-focal.  Psychiatric: Alert and oriented x 3, thought content appropriate, normal insight  Capillary Refill: Brisk,< 3 seconds   Peripheral Pulses: +2 palpable, equal bilaterally       Imaging Studies and Labs:   CBC:   Lab Results   Component Value Date    WBC 1.9 (L) 02/20/2022    HGB 11.0 (L) 02/20/2022    HCT 32.1 (L) 02/20/2022    MCV 82.3 02/20/2022    PLT 44 (L) 02/20/2022     BMP:   Lab Results   Component Value Date     02/20/2022    K 3.7 02/20/2022    K 3.2 02/19/2022     02/20/2022    CO2 27 02/20/2022    BUN 5 02/20/2022    CREATININE 0.4 02/20/2022    GLUCOSE 97 02/20/2022    CALCIUM 8.0 02/20/2022      LFT:   Lab Results   Component Value Date    ALT 18 02/19/2022    AST 31 02/19/2022    ALKPHOS 120 02/19/2022    BILITOT 0.9 02/19/2022         Assessment and Plan:     1. Invasive ductal carcinoma of left breast [facility])  S/p left breast mastectomy with axillary lymph node excision 2/2021. Oncotype DX score 8. Per Dr. Gerry Soulier note: The RS for those with one to three positive nodes, using cutoffs as for those with node-negative disease, can be used to make decision about adjuvant chemotherapy. This approach is included in the ASCO guidelines and in the NCCN. Rationale for this approach is that, in the modern era of more effective local therapy, and widespread application of adjuvant aromatase inhibitors, lymph node positivity might not confer the same degree of heightened risk as it once did. The SWOG  phase III study in women with hormone responsive, HER-2/maegan negative breast cancer with 1-3 axillary lymph nodes involvement showed no overall benefit from adjuvant chemotherapy for postmenopausal women with recurrence score between 0 and 25. In addition due to patient's multiple comorbidities she is poor candidate for systemic chemotherapy. As such my recommendation is to proceed with postmastectomy radiation treatment. The patient did not start radiation treatment due to need for revision of wound with excision of skin and subcutaneous fat 16 cm2 with tissue advancement flaps inferior and superior, totaling 18 cm skin and subcutaneous fat with wound closure on 4/27/21.    The patient started delayed radiation treatment to the chest wall on August 13, 2021 and completed on October 8, 2021. She received 6000 cGy in 30 fractions. 2. Prophylactic use of anastrozole (Arimidex)  She tolerates every other day dosing and was instructed to increase dosing to daily at last appnt. She is only taking every other day. Instructed pt to increase dose to daily. Return in about 3 months (around 8/19/2022). All patient questions answered. Pt voiced understanding. Patient agreed with treatment plan. Follow up as directed. Patient instructed to call for questions or concerns.      Electronically signed by   CHAS Rogers CNP LOS data to display yes

## 2023-07-17 ENCOUNTER — OFFICE VISIT (OUTPATIENT)
Dept: PHYSICAL MEDICINE AND REHAB | Age: 58
End: 2023-07-17
Payer: MEDICAID

## 2023-07-17 VITALS
WEIGHT: 150 LBS | BODY MASS INDEX: 29.45 KG/M2 | DIASTOLIC BLOOD PRESSURE: 58 MMHG | SYSTOLIC BLOOD PRESSURE: 130 MMHG | HEIGHT: 60 IN

## 2023-07-17 DIAGNOSIS — G35 MULTIPLE SCLEROSIS (HCC): ICD-10-CM

## 2023-07-17 DIAGNOSIS — G89.29 OTHER CHRONIC PAIN: ICD-10-CM

## 2023-07-17 DIAGNOSIS — G82.22 CHRONIC INCOMPLETE SPASTIC PARAPLEGIA (HCC): ICD-10-CM

## 2023-07-17 DIAGNOSIS — G89.4 CHRONIC PAIN SYNDROME: Primary | ICD-10-CM

## 2023-07-17 PROCEDURE — G8427 DOCREV CUR MEDS BY ELIG CLIN: HCPCS | Performed by: ANESTHESIOLOGY

## 2023-07-17 PROCEDURE — 3017F COLORECTAL CA SCREEN DOC REV: CPT | Performed by: ANESTHESIOLOGY

## 2023-07-17 PROCEDURE — 4004F PT TOBACCO SCREEN RCVD TLK: CPT | Performed by: ANESTHESIOLOGY

## 2023-07-17 PROCEDURE — 99214 OFFICE O/P EST MOD 30 MIN: CPT | Performed by: ANESTHESIOLOGY

## 2023-07-17 PROCEDURE — G8417 CALC BMI ABV UP PARAM F/U: HCPCS | Performed by: ANESTHESIOLOGY

## 2023-07-17 NOTE — PROGRESS NOTES
4/27/2021    LEFT BREAST WOUND REVISION performed by Tammi Cheung MD at [address]  01/2020    DILATION AND CURETTAGE OF UTERUS      BEN US GUID NDL BIOPSY LEFT Left 01/14/2021    BEN [address] LEFT 1/14/2021 Krystin Cabral  [address] SURGERY N/A 01/26/2021    EXCHANGE OF INTERSTIM SYSTEM performed by Laura Billingsley MD at [address]  03/23/2021    right breast i and d with closure Dr Mahmood Case in the procedure room    PAIN MANAGEMENT PROCEDURE Left 8/31/2021    left T3 paravertebral block under fluoroscopy performed by Nat Ordonez DO at [facility] OFFICE/OUTPT [address] [facility] N/A 11/21/2018    INTERSTIM [place], ONLY HEAD ELIGIBLE FOR MRI WITH TRANSMIT/RECEIVE HEAD COIL    TUBAL LIGATION      10 years ago    [address] [facility] LOC OF LEFT BREAST Left 02/19/2021    US GUIDED NEEDLE LOC OF LEFT BREAST 2/19/2021 [place] LYMPH NODE BIOPSY  01/14/2021    US LYMPH NODE BIOPSY 1/14/2021 Krystin Cabral MD [facility]       Family History   Problem Relation Age of Onset    Stroke Mother     Asthma Mother     Other Mother     No Known Problems Sister     Asthma Brother     No Known Problems Brother        Social History     Socioeconomic History    Marital status: Legally      Spouse name: 4    Number of children: Not on file    Years of education: Not on file    Highest education level: Not on file   Occupational History    Not on file   Tobacco Use    Smoking status: Every Day     Packs/day: 0.50     Types: Cigarettes     Start date: 12/6/1979    Smokeless tobacco: Never   Vaping Use    Vaping Use: Never used   Substance and Sexual Activity    Alcohol use:  Yes     Alcohol/week: 0.0 standard drinks     Comment: social drinker    Drug use: No    Sexual activity: Never     Partners: Male   Other Topics Concern    Not on file   Social History Narrative    ** Merged History

## 2023-07-24 DIAGNOSIS — G89.29 OTHER CHRONIC PAIN: ICD-10-CM

## 2023-07-24 RX ORDER — OXYCODONE HYDROCHLORIDE AND ACETAMINOPHEN 5; 325 MG/1; MG/1
1 TABLET ORAL 2 TIMES DAILY PRN
Qty: 60 TABLET | Refills: 0 | OUTPATIENT
Start: 2023-07-24 | End: 2023-08-23

## 2023-07-24 NOTE — TELEPHONE ENCOUNTER
Boy Almazan called requesting a refill on the following medications:  Requested Prescriptions     Pending Prescriptions Disp Refills    oxyCODONE-acetaminophen (PERCOCET) 5-325 MG per tablet 60 tablet 0     Sig: Take 1 tablet by mouth 2 times daily as needed for Pain for up to 30 days. Max Daily Amount: 2 tablets     [facility]   . pv      Date of last visit:   Date of next visit (if applicable): 9/49/6714

## 2023-07-24 NOTE — TELEPHONE ENCOUNTER
We will need to discuss medications patient is on in clinic chirag aguila what she is actually taking for pain and clean up her med list before any medication refill

## 2023-07-24 NOTE — TELEPHONE ENCOUNTER
OARRS reviewed. UDS: + for  Hydroxyzine, Baclofen, Pregabalin, Trazodone, Risperidone/Paliperidone. Negative for Oxy   Last seen: 7/17/2023.  Follow-up: 8/28/2023

## 2023-08-01 NOTE — PROGRESS NOTES
Adaptic, 4x4 gauze, ABD pad to left breast. Action 1: Continue Hide Differin Products: No Detail Level: Zone Start Regimen: CeraVe BP Cleanser: Apply BID to face\\nNeutrogena Hydroboost with SPF: Apply QAM to face\\nNeutrogena Hydroboost: Apply QHS to face Samples Given: CeraVe \\nNeutrogena

## 2023-08-06 DIAGNOSIS — G89.29 OTHER CHRONIC PAIN: ICD-10-CM

## 2023-08-07 RX ORDER — AMITRIPTYLINE HYDROCHLORIDE 25 MG/1
TABLET, FILM COATED ORAL
Qty: 30 TABLET | Refills: 5 | OUTPATIENT
Start: 2023-08-07

## 2023-08-11 ENCOUNTER — TELEPHONE (OUTPATIENT)
Dept: UROLOGY | Age: 58
End: 2023-08-11

## 2023-08-11 ENCOUNTER — OFFICE VISIT (OUTPATIENT)
Dept: UROLOGY | Age: 58
End: 2023-08-11
Payer: MEDICAID

## 2023-08-11 VITALS — BODY MASS INDEX: 29.45 KG/M2 | RESPIRATION RATE: 16 BRPM | WEIGHT: 150 LBS | HEIGHT: 60 IN

## 2023-08-11 DIAGNOSIS — N32.81 OAB (OVERACTIVE BLADDER): ICD-10-CM

## 2023-08-11 DIAGNOSIS — N31.9 NEUROGENIC BLADDER: ICD-10-CM

## 2023-08-11 DIAGNOSIS — Z01.818 PRE-OP TESTING: ICD-10-CM

## 2023-08-11 DIAGNOSIS — R35.0 FREQUENCY OF URINATION: ICD-10-CM

## 2023-08-11 DIAGNOSIS — R32 URINARY INCONTINENCE, UNSPECIFIED TYPE: Primary | ICD-10-CM

## 2023-08-11 DIAGNOSIS — R35.0 URINARY FREQUENCY: ICD-10-CM

## 2023-08-11 PROCEDURE — 3017F COLORECTAL CA SCREEN DOC REV: CPT | Performed by: UROLOGY

## 2023-08-11 PROCEDURE — G8427 DOCREV CUR MEDS BY ELIG CLIN: HCPCS | Performed by: UROLOGY

## 2023-08-11 PROCEDURE — G8417 CALC BMI ABV UP PARAM F/U: HCPCS | Performed by: UROLOGY

## 2023-08-11 PROCEDURE — 4004F PT TOBACCO SCREEN RCVD TLK: CPT | Performed by: UROLOGY

## 2023-08-11 PROCEDURE — 99214 OFFICE O/P EST MOD 30 MIN: CPT | Performed by: UROLOGY

## 2023-08-11 RX ORDER — TOLTERODINE 4 MG/1
4 CAPSULE, EXTENDED RELEASE ORAL DAILY
Qty: 90 CAPSULE | Refills: 3 | Status: SHIPPED | OUTPATIENT
Start: 2023-08-11

## 2023-08-11 NOTE — PROGRESS NOTES
Simi Peter MD  Urology Clinic Progress Note      Patient:  Nael Babin  YOB: 1965  Date: 8/11/2023    HISTORY OF PRESENT ILLNESS:   The patient is a 66 y.o. female who presents today for follow-up for the following problem(s):      1. Urinary incontinence, unspecified type    2. Frequency of urination    3. OAB (overactive bladder)    4. Urinary frequency    5. Neurogenic bladder         Overall the problem(s) : are worsening. Associated Symptoms: No dysuria, gross hematuria. Pain Severity:  1/10    Summary of old records:   Eugenia David is here today for OAB follow up. She underwent placement of permanent sacral nerve stimulator with leads (stage 1 and 2) on 11/21/18 per Dr. Lyla Fatima. She is down to 2-3 depends per day (previously 9 daily). She still reports frequency every 1 hour, sometimes every 30 minutes. Prior to surgery she failed multiple anti-cholinergics & Myrbetriq. Hx of DM, MS, and recurrent UTI. Wheelchair bound. Additional History: Onset many years  Worsening of her mixed incontinence and urinary symptoms. Her device was recently interrogated which showed low battery life. Recommended to have the battery replaced. She is here to discuss the procedure  Severity is described as moderate. The course of symptoms over time is chronic. Alleviating factors: none  Worsening factors: none  Lower urinary tract symptoms: Mixed urinary symptoms complicated by diabetes and multiple sclerosis. Patient had rechargeable battery  Last seen 2020 by me    Imaging Reviewed during this Office Visit:   (results were independently reviewed by physician and radiology report verified)    Urinalysis today:  No results found for this visit on 08/11/23.     Last BUN and creatinine:  Lab Results   Component Value Date    BUN 29 (H) 12/11/2022     Lab Results   Component Value Date    CREATININE 5.7 ([facility]) 12/11/2022       PAST MEDICAL, FAMILY AND SOCIAL HISTORY UPDATE:  Past

## 2023-08-11 NOTE — TELEPHONE ENCOUNTER
DO NOT TAKE  FISH OIL, MOBIC, IBUPROFEN, MOTRIN-LIKE DRUGS AND ANY MULTIVITAMINS OR OVER THE COUNTER SUPPLEMENTS 14 DAYS PRIOR TO SURGERY. HOLD THE ASPIRIN 5 DAYS PRIOR TO THE SURGERY    MUST HAVE AN ADULT OVER THE AGE OF 18 WITH YOU AT THE TIME OF THE DISCHARGE AND WITH YOU AT HOME AFTER THE PROCEDURE FOR 24 HOURS          Tasha Cabello 1965 Diagnosis:     Surgical Physician: Dr. Melanie Donahue have been scheduled for the procedure marked below:      Surgery: Removal of Interstim         Date: 9/26/23     Anesthesia:  MAC      Place of Service: [address] Second Floor Same Day Surgery         Arrive to same day surgery at:  8:00 am  (Surgery time is subject to change)      INSTRUCTIONS AS MARKED BELOW:    1.  DO NOT eat or drink anything after midnight before surgery. 2.  We prefer you shower or bathe with an antibacterial soap (Dial) the morning of surgery. 3  Please bring a current medication list, photo ID and insurance card(s) with you  4. Okay to take Tylenol  5. If you take Glucophage, Metformin or Janumet, hold 48-hours prior to surgery  6. Hold the Lantus, Insulin and Januvia the  morning of surgery. 7.  Take blood pressure or heart medication as directed, if taken in the morning take with a small sip of water  8. The office will call you in 1-2 days after your procedure to schedule a follow up. DATE SENSITIVE TESTING-DO ON THE DATE LISTED *WALK IN *NO APPOINTMENT    DO THE PRE OP URINE CULTURE AND FASTING LABS ON 9/14/23. ORDERS GIVEN.             Date: 8/11/2023

## 2023-08-16 ENCOUNTER — TELEPHONE (OUTPATIENT)
Dept: UROLOGY | Age: 58
End: 2023-08-16

## 2023-08-28 ENCOUNTER — OFFICE VISIT (OUTPATIENT)
Dept: PHYSICAL MEDICINE AND REHAB | Age: 58
End: 2023-08-28
Payer: MEDICAID

## 2023-08-28 VITALS
BODY MASS INDEX: 29.45 KG/M2 | SYSTOLIC BLOOD PRESSURE: 118 MMHG | HEIGHT: 60 IN | DIASTOLIC BLOOD PRESSURE: 62 MMHG | WEIGHT: 150 LBS

## 2023-08-28 DIAGNOSIS — G82.22 CHRONIC INCOMPLETE SPASTIC PARAPLEGIA (HCC): ICD-10-CM

## 2023-08-28 DIAGNOSIS — G89.29 OTHER CHRONIC PAIN: ICD-10-CM

## 2023-08-28 DIAGNOSIS — N31.9 NEUROGENIC BLADDER: ICD-10-CM

## 2023-08-28 DIAGNOSIS — G35 MULTIPLE SCLEROSIS (HCC): ICD-10-CM

## 2023-08-28 DIAGNOSIS — G89.4 CHRONIC PAIN SYNDROME: Primary | ICD-10-CM

## 2023-08-28 PROCEDURE — 99214 OFFICE O/P EST MOD 30 MIN: CPT | Performed by: ANESTHESIOLOGY

## 2023-08-28 PROCEDURE — 3017F COLORECTAL CA SCREEN DOC REV: CPT | Performed by: ANESTHESIOLOGY

## 2023-08-28 PROCEDURE — 4004F PT TOBACCO SCREEN RCVD TLK: CPT | Performed by: ANESTHESIOLOGY

## 2023-08-28 PROCEDURE — G8417 CALC BMI ABV UP PARAM F/U: HCPCS | Performed by: ANESTHESIOLOGY

## 2023-08-28 PROCEDURE — G8427 DOCREV CUR MEDS BY ELIG CLIN: HCPCS | Performed by: ANESTHESIOLOGY

## 2023-08-28 RX ORDER — PREGABALIN 150 MG/1
150 CAPSULE ORAL
Qty: 30 CAPSULE | Refills: 1 | Status: SHIPPED | OUTPATIENT
Start: 2023-08-28 | End: 2023-10-27

## 2023-08-28 RX ORDER — AMITRIPTYLINE HYDROCHLORIDE 75 MG/1
75 TABLET ORAL NIGHTLY
Qty: 30 TABLET | Refills: 2 | Status: SHIPPED | OUTPATIENT
Start: 2023-08-28 | End: 2023-11-26

## 2023-08-28 NOTE — PROGRESS NOTES
Functionality Assessment/Goals Worksheet     On a scale of 0 (Does not Interfere) to 10 (Completely Interferes)     1. Which number describes how during the past week pain has interfered with           the following:  A. General Activity:  9  B. Mood: 10  C. Walking Ability:  10  D. Normal Work (Includes both work outside the home and housework):  10  E. Relations with Other People:   10  F. Sleep:   10  G. Enjoyment of Life:   10    2. Patient Prefers to Take their Pain Medications:     []  On a regular basis   []  Only when necessary    [x]  Does not take pain medications    3. What are the Patient's Goals/Expectations for Visiting Pain Management?      []  Learn about my pain    [x]  Receive Medication   []  Physical Therapy     []  Treat Depression   []  Receive Injections    []  Treat Sleep   []  Deal with Anxiety and Stress   []  Treat Opoid Dependence/Addiction   []  Other:
and is unsure of some of the other medications. History of Interventions:   Surgery: Left breast mastectomy and surgical debridement  Injections: Left T3 PVP (8/31/21)-no relief    Current Treatment Medications:   Lyrica 150 mg QHS (unable to tolerate during day)  Elavil 75 mg nightly?? Historical Medications:  Norco - ineffective   Percocet - stopped due to failed UDS (EtOH in it)    Imaging:  None    Past Medical History:   Diagnosis Date    Anxiety     Anxiety and depression     Asthma     Bipolar 1 disorder ([address])     Bipolar 1 disorder with moderate sourav (HCC)     Blood circulation, collateral     Breast cancer ([address])     diagnosed in jan 2021    Cancer [facility])      ?  cervical \"long time ago\"    Cancer ([address]) 01/15/2021    Left Breast    Depression     Endometriosis     GERD (gastroesophageal reflux disease)     Kidney stones     Lupus (HCC)     Movement disorder     MS (multiple sclerosis) (HCC)     Schizophrenia ([address])     Thyroid disease     Type 2 diabetes mellitus with hyperglycemia, with long-term current use of insulin ([address])     Type 2 diabetes mellitus without complication ([address])     Unspecified cerebral artery occlusion with cerebral infarction 2014       Past Surgical History:   Procedure Laterality Date    BREAST LUMPECTOMY Left 02/19/2021    LEFT BREAST MASTECTOMY, SENTINAL LYMPH NODE BIOPSY, PREOP NEEDLE LOC performed by Mic Brock MD at [address] Left 4/6/2021    DEBRIDEMENT LEFT BREAST MASTECTOMY WOUND performed by Mic Brock MD at [address] Left 4/27/2021    LEFT BREAST WOUND REVISION performed by Mic Brock MD at [address]  01/2020    DILATION AND CURETTAGE OF UTERUS      [facility] US GUID NDL BIOPSY LEFT Left 01/14/2021    [facility] [address] LEFT 1/14/2021 Orestes Sanchez  [address] SURGERY N/A 01/26/2021    EXCHANGE OF INTERSTIM SYSTEM performed by Janith Kawasaki, MD at [address]

## 2023-08-29 ENCOUNTER — TELEPHONE (OUTPATIENT)
Dept: PHYSICAL MEDICINE AND REHAB | Age: 58
End: 2023-08-29

## 2023-08-29 NOTE — TELEPHONE ENCOUNTER
Pt .called and reports the Amitriptyline  caused her to have hallucinations and so she will not take. Will put as an allergy.

## 2023-08-31 ENCOUNTER — TELEPHONE (OUTPATIENT)
Dept: UROLOGY | Age: 58
End: 2023-08-31

## 2023-08-31 RX ORDER — SOLIFENACIN SUCCINATE 10 MG/1
10 TABLET, FILM COATED ORAL DAILY
Qty: 90 TABLET | Refills: 3 | Status: SHIPPED | OUTPATIENT
Start: 2023-08-31

## 2023-08-31 NOTE — TELEPHONE ENCOUNTER
Boris Mejia denied tolterodine as well as the appeal I asked for. Pt has tried myrbetriq and oxybutynin. Can try solifenacin or toviaz. Please advise.

## 2023-09-01 ENCOUNTER — PREP FOR PROCEDURE (OUTPATIENT)
Dept: UROLOGY | Age: 58
End: 2023-09-01

## 2023-09-05 ENCOUNTER — TELEPHONE (OUTPATIENT)
Dept: PHYSICAL MEDICINE AND REHAB | Age: 58
End: 2023-09-05

## 2023-09-05 DIAGNOSIS — G89.29 OTHER CHRONIC PAIN: ICD-10-CM

## 2023-09-05 NOTE — TELEPHONE ENCOUNTER
Pt called in left  for Dr Benny Sarmiento to see if he can order something for her pain in both of her feet. I tried calling but could only leave a  to give us a call back.

## 2023-09-06 RX ORDER — AMITRIPTYLINE HYDROCHLORIDE 25 MG/1
TABLET, FILM COATED ORAL
Qty: 30 TABLET | Refills: 5 | OUTPATIENT
Start: 2023-09-06

## 2023-09-06 NOTE — TELEPHONE ENCOUNTER
This was a patient of mine when I was working in [employer]. This request would need to go to whoever she is currently seeing.

## 2023-09-14 ENCOUNTER — NURSE ONLY (OUTPATIENT)
Dept: LAB | Age: 58
End: 2023-09-14

## 2023-09-14 DIAGNOSIS — N32.81 OAB (OVERACTIVE BLADDER): ICD-10-CM

## 2023-09-14 DIAGNOSIS — Z01.818 PRE-OP TESTING: ICD-10-CM

## 2023-09-14 DIAGNOSIS — R35.0 FREQUENCY OF URINATION: ICD-10-CM

## 2023-09-14 DIAGNOSIS — R32 URINARY INCONTINENCE, UNSPECIFIED TYPE: ICD-10-CM

## 2023-09-14 LAB
ANION GAP SERPL CALC-SCNC: 12 MEQ/L (ref 8–16)
ANISOCYTOSIS BLD QL SMEAR: PRESENT
BASOPHILS ABSOLUTE: 0 THOU/MM3 (ref 0–0.1)
BASOPHILS NFR BLD AUTO: 0.9 %
BUN SERPL-MCNC: 8 MG/DL (ref 7–22)
CALCIUM SERPL-MCNC: 9.5 MG/DL (ref 8.5–10.5)
CHLORIDE SERPL-SCNC: 102 MEQ/L (ref 98–111)
CO2 SERPL-SCNC: 24 MEQ/L (ref 23–33)
CREAT SERPL-MCNC: 0.6 MG/DL (ref 0.4–1.2)
DEPRECATED RDW RBC AUTO: 53.7 FL (ref 35–45)
EOSINOPHIL NFR BLD AUTO: 1.5 %
EOSINOPHILS ABSOLUTE: 0 THOU/MM3 (ref 0–0.4)
ERYTHROCYTE [DISTWIDTH] IN BLOOD BY AUTOMATED COUNT: 17.7 % (ref 11.5–14.5)
GFR SERPL CREATININE-BSD FRML MDRD: > 60 ML/MIN/1.73M2
GLUCOSE SERPL-MCNC: 193 MG/DL (ref 70–108)
HCT VFR BLD AUTO: 35.7 % (ref 37–47)
HGB BLD-MCNC: 12.8 GM/DL (ref 12–16)
IMM GRANULOCYTES # BLD AUTO: 0.01 THOU/MM3 (ref 0–0.07)
IMM GRANULOCYTES NFR BLD AUTO: 0.3 %
LYMPHOCYTES ABSOLUTE: 0.9 THOU/MM3 (ref 1–4.8)
LYMPHOCYTES NFR BLD AUTO: 26 %
MCH RBC QN AUTO: 30 PG (ref 26–33)
MCHC RBC AUTO-ENTMCNC: 35.9 GM/DL (ref 32.2–35.5)
MCV RBC AUTO: 83.6 FL (ref 81–99)
MONOCYTES ABSOLUTE: 0.3 THOU/MM3 (ref 0.4–1.3)
MONOCYTES NFR BLD AUTO: 9.3 %
NEUTROPHILS NFR BLD AUTO: 62 %
NRBC BLD AUTO-RTO: 0 /100 WBC
PLATELET # BLD AUTO: 64 THOU/MM3 (ref 130–400)
PLATELET BLD QL SMEAR: ABNORMAL
PMV BLD AUTO: ABNORMAL FL (ref 9.4–12.4)
POLYCHROMASIA BLD QL SMEAR: ABNORMAL
POTASSIUM SERPL-SCNC: 4 MEQ/L (ref 3.5–5.2)
RBC # BLD AUTO: 4.27 MILL/MM3 (ref 4.2–5.4)
SCAN OF BLOOD SMEAR: NORMAL
SEGMENTED NEUTROPHILS ABSOLUTE COUNT: 2 THOU/MM3 (ref 1.8–7.7)
SODIUM SERPL-SCNC: 138 MEQ/L (ref 135–145)
WBC # BLD AUTO: 3.3 THOU/MM3 (ref 4.8–10.8)

## 2023-09-15 ENCOUNTER — NURSE ONLY (OUTPATIENT)
Dept: LAB | Age: 58
End: 2023-09-15

## 2023-09-15 DIAGNOSIS — Z01.818 PRE-OP TESTING: ICD-10-CM

## 2023-09-15 DIAGNOSIS — N32.81 OAB (OVERACTIVE BLADDER): ICD-10-CM

## 2023-09-15 DIAGNOSIS — R32 URINARY INCONTINENCE, UNSPECIFIED TYPE: ICD-10-CM

## 2023-09-17 LAB
BACTERIA UR CULT: ABNORMAL
ORGANISM: ABNORMAL

## 2023-09-17 RX ORDER — CIPROFLOXACIN 500 MG/1
500 TABLET, FILM COATED ORAL 2 TIMES DAILY
Qty: 14 TABLET | Refills: 0 | Status: SHIPPED | OUTPATIENT
Start: 2023-09-17 | End: 2023-09-24

## 2023-09-18 ENCOUNTER — TELEPHONE (OUTPATIENT)
Dept: UROLOGY | Age: 58
End: 2023-09-18

## 2023-09-19 NOTE — TELEPHONE ENCOUNTER
Attempted to call the patient.  The mailbox is full and not accepting messages    My chart message sent to patient

## 2023-09-19 NOTE — PROGRESS NOTES
PAT Call Date: attempted 9/19   Surgery Date: 9/26    Surgeon: Charmayne Skelton   Surgery: removal of interstim    Is patient from a nursing home? No   Any Isolation Precautions? No   Any Pacemaker or ICD? No If YES, has it been checked recently and where? Has the rep been notified? No     On Snapboard? No     Hard Copy on Chart  In EPIC Pending/Notes   Consent -   Within 30 days; signed, dated & timed by patient and physician     [] On Arrival     [] Blood    Additional Consent Needs:     H&P - Within 30 days    [] Physician To Do     [] H&P Update - If H&P is older then 24 hours    Clearance -  Medical, Cardiac, Pulmonary, etc.    [place]   Orders - Signed and Dated    Copy Sent to Pharm []    [] Physician To Do    Labs - Within 3 months   9/14          9/15  [x] CBC-ok    [x] BMP-ok   [x] GFR-ok   [] INR    [] PTT    [x] Urine rx Cipro   [] Liver Enzymes    [] Kidney Function    [] MRSA Nasal   [] MSSA      Others:    Radiology Studies-   Within 1 year  12/11  [x] P.  Chest X-Ray   [] MRI    [] CT    EKG -   Within 1 year, unless hx of HTN  12/11 No change   Cardiac Workup -   Stress Test, Echo, Cath within 18 months      2/18  [] Cath                                [] Stress Test                      [x] Echo 55%   [] Holter Monitor    [] RICHARD

## 2023-09-26 ENCOUNTER — ANESTHESIA (OUTPATIENT)
Dept: OPERATING ROOM | Age: 58
End: 2023-09-26
Payer: MEDICAID

## 2023-09-26 ENCOUNTER — HOSPITAL ENCOUNTER (OUTPATIENT)
Age: 58
Setting detail: OUTPATIENT SURGERY
Discharge: HOME | End: 2023-09-26
Attending: UROLOGY | Admitting: UROLOGY
Payer: MEDICAID

## 2023-09-26 ENCOUNTER — ANESTHESIA EVENT (OUTPATIENT)
Dept: OPERATING ROOM | Age: 58
End: 2023-09-26
Payer: MEDICAID

## 2023-09-26 VITALS
TEMPERATURE: 96.4 F | HEART RATE: 79 BPM | RESPIRATION RATE: 18 BRPM | HEIGHT: 59 IN | BODY MASS INDEX: 31.65 KG/M2 | WEIGHT: 157 LBS | OXYGEN SATURATION: 96 % | DIASTOLIC BLOOD PRESSURE: 60 MMHG | SYSTOLIC BLOOD PRESSURE: 129 MMHG

## 2023-09-26 DIAGNOSIS — G89.18 POST-OP PAIN: Primary | ICD-10-CM

## 2023-09-26 LAB — GLUCOSE BLD STRIP.AUTO-MCNC: 234 MG/DL (ref 70–108)

## 2023-09-26 PROCEDURE — 2709999900 HC NON-CHARGEABLE SUPPLY: Performed by: UROLOGY

## 2023-09-26 PROCEDURE — 3700000001 HC ADD 15 MINUTES (ANESTHESIA): Performed by: UROLOGY

## 2023-09-26 PROCEDURE — 6360000002 HC RX W HCPCS: Performed by: UROLOGY

## 2023-09-26 PROCEDURE — 82948 REAGENT STRIP/BLOOD GLUCOSE: CPT

## 2023-09-26 PROCEDURE — 2580000003 HC RX 258: Performed by: UROLOGY

## 2023-09-26 PROCEDURE — 2500000003 HC RX 250 WO HCPCS: Performed by: UROLOGY

## 2023-09-26 PROCEDURE — 7100000010 HC PHASE II RECOVERY - FIRST 15 MIN: Performed by: UROLOGY

## 2023-09-26 PROCEDURE — 3700000000 HC ANESTHESIA ATTENDED CARE: Performed by: UROLOGY

## 2023-09-26 PROCEDURE — 3600000002 HC SURGERY LEVEL 2 BASE: Performed by: UROLOGY

## 2023-09-26 PROCEDURE — 2500000003 HC RX 250 WO HCPCS

## 2023-09-26 PROCEDURE — 6370000000 HC RX 637 (ALT 250 FOR IP): Performed by: ANESTHESIOLOGY

## 2023-09-26 PROCEDURE — 6360000002 HC RX W HCPCS

## 2023-09-26 PROCEDURE — 3600000012 HC SURGERY LEVEL 2 ADDTL 15MIN: Performed by: UROLOGY

## 2023-09-26 PROCEDURE — 7100000011 HC PHASE II RECOVERY - ADDTL 15 MIN: Performed by: UROLOGY

## 2023-09-26 RX ORDER — SODIUM CHLORIDE 9 MG/ML
INJECTION, SOLUTION INTRAVENOUS PRN
Status: CANCELLED | OUTPATIENT
Start: 2023-09-26

## 2023-09-26 RX ORDER — DOXYCYCLINE 100 MG/1
100 CAPSULE ORAL 2 TIMES DAILY
Qty: 14 CAPSULE | Refills: 0 | Status: SHIPPED | OUTPATIENT
Start: 2023-09-26

## 2023-09-26 RX ORDER — SODIUM CHLORIDE 0.9 % (FLUSH) 0.9 %
5-40 SYRINGE (ML) INJECTION EVERY 12 HOURS SCHEDULED
Status: CANCELLED | OUTPATIENT
Start: 2023-09-26

## 2023-09-26 RX ORDER — SODIUM CHLORIDE 0.9 % (FLUSH) 0.9 %
5-40 SYRINGE (ML) INJECTION EVERY 12 HOURS SCHEDULED
Status: DISCONTINUED | OUTPATIENT
Start: 2023-09-26 | End: 2023-09-26 | Stop reason: HOSPADM

## 2023-09-26 RX ORDER — PROPOFOL 10 MG/ML
INJECTION, EMULSION INTRAVENOUS CONTINUOUS PRN
Status: DISCONTINUED | OUTPATIENT
Start: 2023-09-26 | End: 2023-09-26 | Stop reason: SDUPTHER

## 2023-09-26 RX ORDER — HYDROCODONE BITARTRATE AND ACETAMINOPHEN 5; 325 MG/1; MG/1
1 TABLET ORAL EVERY 6 HOURS PRN
Qty: 12 TABLET | Refills: 0 | Status: SHIPPED | OUTPATIENT
Start: 2023-09-26 | End: 2023-09-29

## 2023-09-26 RX ORDER — MIDAZOLAM HYDROCHLORIDE 1 MG/ML
INJECTION INTRAMUSCULAR; INTRAVENOUS PRN
Status: DISCONTINUED | OUTPATIENT
Start: 2023-09-26 | End: 2023-09-26 | Stop reason: SDUPTHER

## 2023-09-26 RX ORDER — SODIUM CHLORIDE 9 MG/ML
INJECTION, SOLUTION INTRAVENOUS PRN
Status: DISCONTINUED | OUTPATIENT
Start: 2023-09-26 | End: 2023-09-26 | Stop reason: HOSPADM

## 2023-09-26 RX ORDER — LIDOCAINE HYDROCHLORIDE 10 MG/ML
INJECTION, SOLUTION EPIDURAL; INFILTRATION; INTRACAUDAL; PERINEURAL PRN
Status: DISCONTINUED | OUTPATIENT
Start: 2023-09-26 | End: 2023-09-26 | Stop reason: ALTCHOICE

## 2023-09-26 RX ORDER — OXYCODONE HYDROCHLORIDE AND ACETAMINOPHEN 5; 325 MG/1; MG/1
1 TABLET ORAL EVERY 6 HOURS PRN
Qty: 10 TABLET | Refills: 0 | Status: SHIPPED | OUTPATIENT
Start: 2023-09-26 | End: 2023-10-01

## 2023-09-26 RX ORDER — PHENYLEPHRINE HCL IN 0.9% NACL 1 MG/10 ML
SYRINGE (ML) INTRAVENOUS PRN
Status: DISCONTINUED | OUTPATIENT
Start: 2023-09-26 | End: 2023-09-26 | Stop reason: SDUPTHER

## 2023-09-26 RX ORDER — SODIUM CHLORIDE 0.9 % (FLUSH) 0.9 %
5-40 SYRINGE (ML) INJECTION PRN
Status: CANCELLED | OUTPATIENT
Start: 2023-09-26

## 2023-09-26 RX ORDER — SODIUM CHLORIDE 0.9 % (FLUSH) 0.9 %
5-40 SYRINGE (ML) INJECTION PRN
Status: DISCONTINUED | OUTPATIENT
Start: 2023-09-26 | End: 2023-09-26 | Stop reason: HOSPADM

## 2023-09-26 RX ORDER — LIDOCAINE HYDROCHLORIDE 20 MG/ML
INJECTION, SOLUTION INTRAVENOUS PRN
Status: DISCONTINUED | OUTPATIENT
Start: 2023-09-26 | End: 2023-09-26 | Stop reason: SDUPTHER

## 2023-09-26 RX ORDER — PROPOFOL 10 MG/ML
INJECTION, EMULSION INTRAVENOUS PRN
Status: DISCONTINUED | OUTPATIENT
Start: 2023-09-26 | End: 2023-09-26 | Stop reason: SDUPTHER

## 2023-09-26 RX ORDER — SODIUM CHLORIDE 9 MG/ML
INJECTION, SOLUTION INTRAVENOUS CONTINUOUS
Status: DISCONTINUED | OUTPATIENT
Start: 2023-09-26 | End: 2023-09-26 | Stop reason: HOSPADM

## 2023-09-26 RX ORDER — KETAMINE HCL IN NACL, ISO-OSM 100MG/10ML
SYRINGE (ML) INJECTION PRN
Status: DISCONTINUED | OUTPATIENT
Start: 2023-09-26 | End: 2023-09-26 | Stop reason: SDUPTHER

## 2023-09-26 RX ADMIN — Medication 20 MG: at 11:40

## 2023-09-26 RX ADMIN — MIDAZOLAM 2 MG: 1 INJECTION INTRAMUSCULAR; INTRAVENOUS at 11:36

## 2023-09-26 RX ADMIN — Medication 100 MCG: at 12:07

## 2023-09-26 RX ADMIN — PROPOFOL 20 MG: 10 INJECTION, EMULSION INTRAVENOUS at 11:40

## 2023-09-26 RX ADMIN — Medication 4 UNITS: at 09:00

## 2023-09-26 RX ADMIN — LIDOCAINE HYDROCHLORIDE 70 MG: 20 INJECTION, SOLUTION INTRAVENOUS at 11:40

## 2023-09-26 RX ADMIN — Medication 2000 MG: at 11:41

## 2023-09-26 RX ADMIN — SODIUM CHLORIDE: 9 INJECTION, SOLUTION INTRAVENOUS at 09:02

## 2023-09-26 RX ADMIN — Medication 10 MG: at 12:02

## 2023-09-26 RX ADMIN — PROPOFOL 100 MCG/KG/MIN: 10 INJECTION, EMULSION INTRAVENOUS at 11:42

## 2023-09-26 RX ADMIN — PROPOFOL 20 MG: 10 INJECTION, EMULSION INTRAVENOUS at 11:41

## 2023-09-26 ASSESSMENT — PAIN DESCRIPTION - DESCRIPTORS: DESCRIPTORS: ACHING

## 2023-09-26 ASSESSMENT — PAIN SCALES - GENERAL
PAINLEVEL_OUTOF10: 0

## 2023-09-26 ASSESSMENT — PAIN - FUNCTIONAL ASSESSMENT: PAIN_FUNCTIONAL_ASSESSMENT: 0-10

## 2023-09-26 NOTE — PROGRESS NOTES
Patient oriented to Same Day department and admitted to Same Day Surgery room 08. Patient verbalized approval for first name, last initial with physician name on unit whiteboard. Plan of care reviewed with patient. Patient room whiteboard filled out and discussed with patient and responsible adult. Patient and responsible adult offered Same Day Welcome Packet to review. Call light in reach. Bed in lowest position, locked, with one bed rail up. SCDs and warming blanket in place. Appropriate arm bands on patient. Bathroom offered. All questions and concerns of patient addressed. Meds to Beds:   Patient informed of  Melissa's Meds to [facility] program during admission.  Patient is agreeable to program.   Contact information for the pharmacy and the Meds to [facility] program:   Name: rodney   Relationship to patient:spouse/significant other   Phone number: [phone]

## 2023-09-26 NOTE — PROGRESS NOTES
Pt returned to [facility] room 8. Vitals and assessment as charted. 0.9 infusing, @600ml to count from PACU. Pt has irma and ginger ale. Family at the bedside. Pt and family verbalized understanding of discharge criteria and call light use. Call light in reach.

## 2023-09-26 NOTE — DISCHARGE INSTRUCTIONS
General Discharge Instructions:      No heavy lifting, >20 lbs for 4-6 week or till cleared by surgeon  Pt should avoid strenuous activity for 4-6 weeks  Pt should walk moderately at home  Pt ok to shower in 24 hrs after discharge while keeping incision clean / dry. Pt may resume diet as tolerated  Pt should take Rx as directed  No driving while on narcotics  Please call attending physician or hospital  with questions  Call or Present to ED if fever (> 101F), intractable nausea vomiting or pain, if incisions become red/swollen or drain pus/fluid, or if calves become red swollen and tender.   Follow up will be arranged  Resume blood thinners in 5 days

## 2023-09-26 NOTE — ANESTHESIA PRE PROCEDURE
Take 1 tablet by mouth daily 2/21/22 10/26/22  Pilar Bakre,    potassium chloride (KLOR-CON M) 20 MEQ extended release tablet Take 1 tablet by mouth daily  Patient not taking: Reported on 9/26/2023 2/20/22   Pilar Baker DO   Handicap Placard MISC by Does not apply route 2/08/22- 2/8/2027 2/8/22   Mayito Lloyd,    anastrozole (ARIMIDEX) 1 MG tablet Take 1 tablet by mouth every other day 1/5/22   Fany Chase MD   BD PEN NEEDLE JACOBO 2ND GEN 32G X 4 MM MISC use as directed WITH INSULIN 6/28/21   Historical Provider, MD   metFORMIN (GLUCOPHAGE) 1000 MG tablet Take 1 tablet by mouth 2 times daily 12/7/20   Historical Provider, MD   desvenlafaxine succinate (PRISTIQ) 50 MG TB24 extended release tablet Take 1 tablet by mouth daily 12/16/20   Historical Provider, MD   MUCINEX MAXIMUM STRENGTH 1200 MG TB12 Take 1 tablet by mouth 2 times daily 1/11/21   Historical Provider, MD   paliperidone (INVEGA) 3 MG extended release tablet Take 1 tablet by mouth nightly    Historical Provider, MD   SITagliptin (JANUVIA) 100 MG tablet Take 1 tablet by mouth daily    Historical Provider, MD   Lifitegrast Neha Roberson) 5 % SOLN Place 1 drop into both eyes daily    Historical Provider, MD   Misc.  Devices [facility]) [facility] Wheelchair repairs- new seat cover, right side armrest replacement    Current body weight:  68 kg  Diagnosis: gait disturbance, chronic incomplete spastic paraplegia  Length of need: Lifetime 10/22/20   CHAS Hallman - CNP   tiotropium (SPIRIVA) 18 MCG inhalation capsule Inhale 1 capsule into the lungs daily 2 puffs  Patient not taking: Reported on 9/26/2023    Historical Provider, MD   montelukast (SINGULAIR) 10 MG tablet Take 1 tablet by mouth nightly  Patient not taking: Reported on 9/26/2023 10/26/16   Historical Provider, MD   artificial tears (ARTIFICIAL TEARS) OINT as needed    Historical Provider, MD   Calcium Carbonate (CALCIUM 600 PO) Take 1 tablet by mouth 2 times daily    Historical Provider, MD

## 2023-09-26 NOTE — PROGRESS NOTES
Notified Dr. Chaya Alaniz that patient voiced Luiza Diaz does not help her with pain. Patient voiced she usually gets prescribed Percocet. Dr. Chaya Alaniz voiced he will order her something different for home. Patient updated that Dr. Meño Briscoe call her something in.

## 2023-09-26 NOTE — H&P
History and Physical    Patient:  Boy Almazan  MRN: [id number]  YOB: 1965    CHIEF COMPLAINT:  neurogenic bladder    HISTORY OF PRESENT ILLNESS:   The patient is a 66 y.o. female who presents with as above, here for surgery    Patient's old records, notes and chart reviewed and summarized above. Past Medical History:    Past Medical History:   Diagnosis Date    Anxiety     Anxiety and depression     Asthma     Bipolar 1 disorder ([address])     Bipolar 1 disorder with moderate sourav ([address])     Blood circulation, collateral     Breast cancer ([address])     diagnosed in jan 2021    Cancer [facility])      ?  cervical \"long time ago\"    Cancer ([address]) 01/15/2021    Left Breast    COPD (chronic obstructive pulmonary disease) (HCC)     Depression     Endometriosis     GERD (gastroesophageal reflux disease)     Kidney stones     Lupus (HCC)     Movement disorder     MS (multiple sclerosis) (HCC)     Schizophrenia (HCC)     Thyroid disease     Type 2 diabetes mellitus with hyperglycemia, with long-term current use of insulin (HCC)     Type 2 diabetes mellitus without complication ([address])     Unspecified cerebral artery occlusion with cerebral infarction 2014       Past Surgical History:    Past Surgical History:   Procedure Laterality Date    BREAST LUMPECTOMY Left 02/19/2021    LEFT BREAST MASTECTOMY, SENTINAL LYMPH NODE BIOPSY, PREOP NEEDLE LOC performed by Flora Colon MD at [address] Left 4/6/2021    DEBRIDEMENT LEFT BREAST MASTECTOMY WOUND performed by Flora Colon MD at [address] Left 4/27/2021    LEFT BREAST WOUND REVISION performed by Flora Colon MD at [address]  01/2020    DILATION AND CURETTAGE OF UTERUS      [facility] NDL BIOPSY LEFT Left 01/14/2021    [facility] [address] LEFT 1/14/2021 Ileana Friedman  [address] SURGERY N/A 01/26/2021    EXCHANGE OF INTERSTIM SYSTEM performed by Jihan Barnes MD at [address]

## 2023-09-27 NOTE — OP NOTE
Operative Note      Patient: Dontae Barger  YOB: 1965  MRN: [id number]    Date of Procedure: 9/26/2023    Pre-Op Diagnosis Codes:     * Urinary incontinence, unspecified type [R32]    Post-Op Diagnosis: Same       Procedure(s):  Removal of Interstim Device    Surgeon(s):  Ibeth Crocker MD    Assistant:   * No surgical staff found *    Anesthesia: Monitor Anesthesia Care    Estimated Blood Loss (mL): Minimal    Complications: None    Specimens:   * No specimens in log *    Implants:  * No implants in log *      Drains: * No LDAs found *      Detailed Description of Procedure:   Patient was brought back to the operating room and laid in supine position. She was then positioned and prone positioning. MAC anesthesia was inducted. Timeout is performed. Antibiotics were administered. She was prepped and draped in sterile fashion. I then injected her old scar with lidocaine anesthetic. I made an incision approximately 5 cm in length. I dissected down to her device battery and was able to remove this intact. I then found the lead and tugged on this until we found the indentation of the skin more midline. I made a counter incision approximately 8 mm in length. I dissected down and was able to find the lead. I was able to pull the lead intact with all 4 tines removed. This was sent off. The wound was irrigated. The incisions were closed in multiple layers with Vicryl and Monocryl. All counts correct she tolerated procedure well. Patient was taken to recovery in stable condition. Follow-up in the clinic for wound check and review of urinary symptoms.     Electronically signed by Ana Leigh MD on 9/27/2023 at 11:47 AM

## 2023-10-04 ENCOUNTER — OFFICE VISIT (OUTPATIENT)
Dept: PHYSICAL MEDICINE AND REHAB | Age: 58
End: 2023-10-04
Payer: MEDICAID

## 2023-10-04 VITALS
BODY MASS INDEX: 31.65 KG/M2 | DIASTOLIC BLOOD PRESSURE: 52 MMHG | SYSTOLIC BLOOD PRESSURE: 92 MMHG | HEIGHT: 59 IN | WEIGHT: 157 LBS

## 2023-10-04 DIAGNOSIS — M25.531 RIGHT WRIST PAIN: ICD-10-CM

## 2023-10-04 DIAGNOSIS — N31.9 NEUROGENIC BLADDER: ICD-10-CM

## 2023-10-04 DIAGNOSIS — G82.22 CHRONIC INCOMPLETE SPASTIC PARAPLEGIA (HCC): Primary | ICD-10-CM

## 2023-10-04 PROCEDURE — 99213 OFFICE O/P EST LOW 20 MIN: CPT | Performed by: STUDENT IN AN ORGANIZED HEALTH CARE EDUCATION/TRAINING PROGRAM

## 2023-10-04 PROCEDURE — G8417 CALC BMI ABV UP PARAM F/U: HCPCS | Performed by: STUDENT IN AN ORGANIZED HEALTH CARE EDUCATION/TRAINING PROGRAM

## 2023-10-04 PROCEDURE — 4004F PT TOBACCO SCREEN RCVD TLK: CPT | Performed by: STUDENT IN AN ORGANIZED HEALTH CARE EDUCATION/TRAINING PROGRAM

## 2023-10-04 PROCEDURE — G8427 DOCREV CUR MEDS BY ELIG CLIN: HCPCS | Performed by: STUDENT IN AN ORGANIZED HEALTH CARE EDUCATION/TRAINING PROGRAM

## 2023-10-04 PROCEDURE — 3017F COLORECTAL CA SCREEN DOC REV: CPT | Performed by: STUDENT IN AN ORGANIZED HEALTH CARE EDUCATION/TRAINING PROGRAM

## 2023-10-04 PROCEDURE — G8484 FLU IMMUNIZE NO ADMIN: HCPCS | Performed by: STUDENT IN AN ORGANIZED HEALTH CARE EDUCATION/TRAINING PROGRAM

## 2023-10-04 ASSESSMENT — ENCOUNTER SYMPTOMS
WHEEZING: 0
SHORTNESS OF BREATH: 0
DIARRHEA: 0
CONSTIPATION: 0

## 2023-10-04 NOTE — PROGRESS NOTES
Physical Medicine & Rehabilitation Outpatient Follow Up Note    Chief Complaint:    Chief Complaint   Patient presents with    Follow-up       Subjective:    Abundio Quiñonez is a 66 y.o.   female with history of breast cancer (s/p mastectomy in 2021)  MS, chronic incomplete spastic paraplegia, neurogenic bowel/bladder and transverse myelitis who presents today for follow up visit and repeat Botox injections. Patient has been following with PMR since 2014. Patient was initially followed by Dr. Page Willson in [id number] [place] [address] and began seeing Dr. David Grove in 2016. She has been following with Dr. David Grove since that time for ongoing management of her lower extremity spasticity. In 2016 patient began receiving Botox injection to her RLE which has been helpful in managing her tone. Patient is currently following with Dr. Esla Munson, pain management for post-mastectomy pain. Patient was last seen in PM&R clinic on 03/22/2023. At that time, patient felt that baclofen was helpful in managing her tone. She had previously received botox injections but these were discontinued as she did not feel that they were providing much additional benefit. She had recently received a PWC which she is using for community and manual WC for household distances. Since last visit, patient has followed up with Dr. Elsa Munson (note reviewed from 8/28/23)- she is currently on Lyrica 150 mg QHS and Elavil 75 mg QHS for management of neuropathic pain. Additionally, patient recently underwent removal of interstim device on 9/26/2023 by Dr. Morales Jimenez. Today, patient presents alone in a . She reports denies any significant medical or functional changes since her last visit. We reviewed her baclofen and she states that she is only taking it at bedtime because it makes her sleepy during the day. She is unable to confirm the dose she is currently taking. She denies any recent falls.  Patient is complaining of right sided wrist pain x1 month- she denies any

## 2023-10-12 ENCOUNTER — TELEPHONE (OUTPATIENT)
Dept: PHYSICAL MEDICINE AND REHAB | Age: 58
End: 2023-10-12

## 2023-10-12 ENCOUNTER — OFFICE VISIT (OUTPATIENT)
Dept: PHYSICAL MEDICINE AND REHAB | Age: 58
End: 2023-10-12
Payer: MEDICAID

## 2023-10-12 VITALS
WEIGHT: 157 LBS | BODY MASS INDEX: 31.65 KG/M2 | SYSTOLIC BLOOD PRESSURE: 118 MMHG | DIASTOLIC BLOOD PRESSURE: 58 MMHG | HEIGHT: 59 IN

## 2023-10-12 DIAGNOSIS — G89.4 CHRONIC PAIN SYNDROME: Primary | ICD-10-CM

## 2023-10-12 PROCEDURE — 4004F PT TOBACCO SCREEN RCVD TLK: CPT | Performed by: NURSE PRACTITIONER

## 2023-10-12 PROCEDURE — G8417 CALC BMI ABV UP PARAM F/U: HCPCS | Performed by: NURSE PRACTITIONER

## 2023-10-12 PROCEDURE — G8484 FLU IMMUNIZE NO ADMIN: HCPCS | Performed by: NURSE PRACTITIONER

## 2023-10-12 PROCEDURE — 99215 OFFICE O/P EST HI 40 MIN: CPT | Performed by: NURSE PRACTITIONER

## 2023-10-12 PROCEDURE — 3017F COLORECTAL CA SCREEN DOC REV: CPT | Performed by: NURSE PRACTITIONER

## 2023-10-12 PROCEDURE — G8427 DOCREV CUR MEDS BY ELIG CLIN: HCPCS | Performed by: NURSE PRACTITIONER

## 2023-10-12 RX ORDER — ACETAMINOPHEN 160 MG
TABLET,DISINTEGRATING ORAL
COMMUNITY

## 2023-10-12 RX ORDER — METOLAZONE 5 MG/1
TABLET ORAL
COMMUNITY
Start: 2022-12-12

## 2023-10-12 RX ORDER — PALIPERIDONE 3 MG/1
3 TABLET, EXTENDED RELEASE ORAL EVERY MORNING
COMMUNITY
End: 2023-10-12 | Stop reason: CLARIF

## 2023-10-12 RX ORDER — SPIRONOLACTONE 50 MG/1
TABLET, FILM COATED ORAL
COMMUNITY
Start: 2022-12-12

## 2023-10-12 RX ORDER — GLUCOSAM/CHONDRO/HERB 149/HYAL 750-100 MG
TABLET ORAL
COMMUNITY

## 2023-10-12 RX ORDER — BACLOFEN 10 MG/1
10 TABLET ORAL 3 TIMES DAILY
COMMUNITY
Start: 2023-09-09

## 2023-10-12 RX ORDER — LORATADINE 10 MG/1
TABLET ORAL
COMMUNITY
Start: 2022-12-12

## 2023-10-12 RX ORDER — ASPIRIN 81 MG/1
TABLET ORAL
COMMUNITY
Start: 2022-12-12

## 2023-10-12 RX ORDER — TRAZODONE HYDROCHLORIDE 50 MG/1
TABLET ORAL
COMMUNITY
Start: 2023-09-28

## 2023-10-12 RX ORDER — CYANOCOBALAMIN (VITAMIN B-12) 500 MCG
500 TABLET ORAL DAILY
COMMUNITY

## 2023-10-12 RX ORDER — ESCITALOPRAM OXALATE 10 MG/1
10 TABLET ORAL DAILY
COMMUNITY

## 2023-10-12 NOTE — TELEPHONE ENCOUNTER
Attempted to call patient to ask her to bring medications to appointment as requested by KORI Flores CNP. No answer and voicemail was full. Will try again at a later time.

## 2023-10-12 NOTE — PROGRESS NOTES
Functionality Assessment/Goals Worksheet     On a scale of 0 (Does not Interfere) to 10 (Completely Interferes)     1. Which number describes how during the past week pain has interfered with           the following:  A. General Activity:  10  B. Mood: 10  C. Walking Ability:  10  D. Normal Work (Includes both work outside the home and housework):  10  E. Relations with Other People:   10  F. Sleep:   10  G. Enjoyment of Life:   10    2. Patient Prefers to Take their Pain Medications:     [x]  On a regular basis   [x]  Only when necessary    []  Does not take pain medications    3. What are the Patient's Goals/Expectations for Visiting Pain Management?      []  Learn about my pain    [x]  Receive Medication   []  Physical Therapy     []  Treat Depression   []  Receive Injections    [x]  Treat Sleep   []  Deal with Anxiety and Stress   []  Treat Opoid Dependence/Addiction   []  Other:
maneuver bilaterally. Neuro: Alert, oriented. CN II-XII appear grossly intact. No focal motor deficits appreciated in upper limbs. Hyperesthesia and allodynia appreciated over surgical incision scar left chest wall  Skin: Healed surgical incision scar of left chest wall (near nipple line)  Psychological: Cooperative, no exaggerated pain behaviors     Assessment:    Diagnosis Orders   1. Chronic pain syndrome  Urine Drug Screen           Audrey Juarez is a 66 y. o.female presenting to the pain clinic for evaluation of postmastectomy pain. Her clinical history of his examination is consistent with severe neuropathic pain secondary to her mastectomy surgery. I have set her up for a left T3 paravertebral block under fluoroscopy. We will continue her locations at this point and follow-up after her injection to determine next steps of therapy. She failed to respond to her left T3 paravertebral block. She needs to continue remain compliant with her follow-up visits. She is really confused about the medication she is taking currently for chronic pain. I have restarted her on her amitriptyline to continue her Lyrica for better neuropathic pain coverage and try to clear out her med list.  We will hold off on any refills on Percocet due to this initially exacerbating her mental state. I did discuss with her concern of multiple medications at night, side effects, not providing enough relief for her pain, and concerns that she is unsure of certain medications yet. She is a poor historian with her medications and providers who she sees. I will obtain a urine drug screen today, as well as reach out to the pharmacy to determine what she is been filling and what she should be on. I did educate her to reduce her baclofen down to 1 tab nightly. I will have her come back in 2 weeks to discuss further and evaluate, possible wean medications. Plan:    The following treatment recommendations and plan were

## 2023-10-26 ENCOUNTER — OFFICE VISIT (OUTPATIENT)
Dept: PHYSICAL MEDICINE AND REHAB | Age: 58
End: 2023-10-26
Payer: MEDICAID

## 2023-10-26 VITALS
WEIGHT: 157 LBS | SYSTOLIC BLOOD PRESSURE: 106 MMHG | DIASTOLIC BLOOD PRESSURE: 68 MMHG | HEIGHT: 59 IN | BODY MASS INDEX: 31.65 KG/M2

## 2023-10-26 DIAGNOSIS — G95.11 SPINAL CORD INFARCTION (HCC): ICD-10-CM

## 2023-10-26 DIAGNOSIS — G82.22 CHRONIC INCOMPLETE SPASTIC PARAPLEGIA (HCC): ICD-10-CM

## 2023-10-26 DIAGNOSIS — R07.89 LEFT-SIDED CHEST WALL PAIN: ICD-10-CM

## 2023-10-26 DIAGNOSIS — N31.9 NEUROGENIC BLADDER: ICD-10-CM

## 2023-10-26 DIAGNOSIS — G89.4 CHRONIC PAIN SYNDROME: Primary | ICD-10-CM

## 2023-10-26 DIAGNOSIS — G35 MULTIPLE SCLEROSIS (HCC): ICD-10-CM

## 2023-10-26 PROCEDURE — 3017F COLORECTAL CA SCREEN DOC REV: CPT | Performed by: NURSE PRACTITIONER

## 2023-10-26 PROCEDURE — 4004F PT TOBACCO SCREEN RCVD TLK: CPT | Performed by: NURSE PRACTITIONER

## 2023-10-26 PROCEDURE — 99214 OFFICE O/P EST MOD 30 MIN: CPT | Performed by: NURSE PRACTITIONER

## 2023-10-26 PROCEDURE — G8417 CALC BMI ABV UP PARAM F/U: HCPCS | Performed by: NURSE PRACTITIONER

## 2023-10-26 PROCEDURE — G8484 FLU IMMUNIZE NO ADMIN: HCPCS | Performed by: NURSE PRACTITIONER

## 2023-10-26 PROCEDURE — G8427 DOCREV CUR MEDS BY ELIG CLIN: HCPCS | Performed by: NURSE PRACTITIONER

## 2023-10-26 NOTE — PROGRESS NOTES
Chronic Pain/PM&R Clinic Note     Encounter Date: 10/26/23    Subjective:   Chief Complaint:   Chief Complaint   Patient presents with    Follow-up     Pt reports that cutting back on baclofen made her \"feel Better\" but did not help the pain. History of Present Illness:   Jose Daniel Ritter is a 66 y.o. female seen in the clinic initially on 08/09/21 for her history of left sided chest wall pain. She has a medical history of left breast mastectomy in February 2021 secondary to breast cancer with subsequent wound dehiscence and surgical debridement and spinal cord injury secondary to transverse myelitis. She has been dealing with left-sided chest wall pain at the site of her incision ever since her mastectomy and this is been exacerbated after most recent revision surgery in June 2021. She describes his pain is a constant stabbing, electrical type pain right on her incision scar. She rates this pain is a constant 6/10 pain that can get up to a 10/10. She denies any drainage or areas of erythema around the scar. She states that this area is so sensitive that is difficult for her to wear even a supportive bra. She reports not getting much relief with her other medications including Lyrica 150 mg twice a day, Norco 5 mg twice a day, and Elavil 50 mg at night. She states that this is interfering with her everyday activities and really interfering with sleep. Today, 8/28/2023, patient presents for planned follow-up for chronic left-sided chest wall pain. She states that she is wondering why her nerve pain in her feet has gotten worse. She states that she is not sure exactly what medication she is on for pain relief at this point. She has a difficult time remembering what medications she is currently on to help out with her pain. She states that she does not really know what she is taking overall for her pain relief and is unsure if the baclofen is helpful.   She states that she does not know that she

## 2023-10-26 NOTE — PROGRESS NOTES
Functionality Assessment/Goals Worksheet     On a scale of 0 (Does not Interfere) to 10 (Completely Interferes)     1. Which number describes how during the past week pain has interfered with           the following:  A. General Activity:  10  B. Mood: 10  C. Walking Ability:  10  D. Normal Work (Includes both work outside the home and housework):  10  E. Relations with Other People:   10  F. Sleep:   10  G. Enjoyment of Life:   10    2. Patient Prefers to Take their Pain Medications:     [x]  On a regular basis   []  Only when necessary    []  Does not take pain medications    3. What are the Patient's Goals/Expectations for Visiting Pain Management?      []  Learn about my pain    []  Receive Medication   []  Physical Therapy     []  Treat Depression   []  Receive Injections    []  Treat Sleep   []  Deal with Anxiety and Stress   []  Treat Opoid Dependence/Addiction   []  Other:

## 2023-10-30 ENCOUNTER — TELEPHONE (OUTPATIENT)
Dept: PHYSICAL MEDICINE AND REHAB | Age: 58
End: 2023-10-30

## 2023-10-30 NOTE — TELEPHONE ENCOUNTER
----- Message from Carlos Skinner DO sent at 10/4/2023  4:46 PM EDT -----  Evangelist Coffey,    According to your message, patient reported that she is taking baclofen 10 mg 2.5 tablets twice daily. Her prescription was originally written for 3 times daily dosing. We had discussed the potential of weaning. However, at this point since she is down to twice a day dosing I would advise her to just stay on this dose for now to help with the tightness in her right lower extremity. We can revisit this at the next appointment to determine if she wants to try decreasing the dose further. Do you mind just relaying that message to her. Additionally, can you update her medication list to reflect how she is currently taking the medication. Thanks!     [facility]

## 2023-11-13 ENCOUNTER — OFFICE VISIT (OUTPATIENT)
Dept: UROLOGY | Age: 58
End: 2023-11-13
Payer: MEDICAID

## 2023-11-13 VITALS — HEIGHT: 59 IN | WEIGHT: 157 LBS | RESPIRATION RATE: 16 BRPM | BODY MASS INDEX: 31.65 KG/M2

## 2023-11-13 DIAGNOSIS — N32.81 OAB (OVERACTIVE BLADDER): ICD-10-CM

## 2023-11-13 DIAGNOSIS — R32 URINARY INCONTINENCE, UNSPECIFIED TYPE: Primary | ICD-10-CM

## 2023-11-13 DIAGNOSIS — R35.0 FREQUENCY OF URINATION: ICD-10-CM

## 2023-11-13 DIAGNOSIS — R35.0 FREQUENCY OF URINATION: Primary | ICD-10-CM

## 2023-11-13 DIAGNOSIS — N31.9 NEUROGENIC BLADDER: ICD-10-CM

## 2023-11-13 LAB — POST VOID RESIDUAL (PVR): 115 ML

## 2023-11-13 PROCEDURE — 99214 OFFICE O/P EST MOD 30 MIN: CPT | Performed by: NURSE PRACTITIONER

## 2023-11-13 PROCEDURE — G8484 FLU IMMUNIZE NO ADMIN: HCPCS | Performed by: NURSE PRACTITIONER

## 2023-11-13 PROCEDURE — 4004F PT TOBACCO SCREEN RCVD TLK: CPT | Performed by: NURSE PRACTITIONER

## 2023-11-13 PROCEDURE — G8427 DOCREV CUR MEDS BY ELIG CLIN: HCPCS | Performed by: NURSE PRACTITIONER

## 2023-11-13 PROCEDURE — G8417 CALC BMI ABV UP PARAM F/U: HCPCS | Performed by: NURSE PRACTITIONER

## 2023-11-13 PROCEDURE — 3017F COLORECTAL CA SCREEN DOC REV: CPT | Performed by: NURSE PRACTITIONER

## 2023-11-13 PROCEDURE — 51798 US URINE CAPACITY MEASURE: CPT | Performed by: NURSE PRACTITIONER

## 2023-11-13 RX ORDER — VIBEGRON 75 MG/1
75 TABLET, FILM COATED ORAL DAILY
Qty: 28 TABLET | Refills: 0 | Status: SHIPPED | COMMUNITY
Start: 2023-11-13

## 2023-11-13 NOTE — PROGRESS NOTES
[address] [facility].  [address]  [facility] [address]  Dept: [phone]  Loc: [phone]    Visit Date: 11/13/2023        HPI:     Roman Smith is a 66 y.o. female who presents today for:  Chief Complaint   Patient presents with    Post-Op Check       HPI  Patient presents to urology clinic for post-operative follow-up. Jaqueline Meredith is s/p removal of Interstim on 9/26/2023 by Dr. Zachary James. She reports urinary incontinence is her biggest complaint. Going through 200 pull ups a month. PVR 115ml in office today. Denies flank pain, suprapubic pressure, gross hematuria, dysuria, fever or chills. She previously failed Vesicare, Ditropan and Myrbetriq. Summary of old records:   Jaqueline Meredith is here today for OAB follow up. She underwent placement of permanent sacral nerve stimulator with leads (stage 1 and 2) on 11/21/18 per Dr. Shahzad Colon. She is down to 2-3 depends per day (previously 9 daily). She still reports frequency every 1 hour, sometimes every 30 minutes. Prior to surgery she failed multiple anti-cholinergics & Myrbetriq. Hx of DM, MS, and recurrent UTI. Wheelchair bound.      Current Outpatient Medications   Medication Sig Dispense Refill    vibegron (GEMTESA) 75 MG TABS tablet Take 1 tablet by mouth daily 90 each 0    baclofen (LIORESAL) 10 MG tablet Take 1 tablet by mouth 3 times daily      Cholecalciferol (VITAMIN D3) 50 MCG (2000 UT) CAPS Take by mouth      escitalopram (LEXAPRO) 10 MG tablet Take 1 tablet by mouth daily      Coenzyme Q10 (CO Q 10) 100 MG CAPS Take by mouth      Cyanocobalamin (VITAMIN B 12) 500 MCG TABS Take 500 mcg by mouth daily      spironolactone (ALDACTONE) 50 MG tablet Take by mouth      aspirin 81 MG EC tablet Take by mouth      Misc Natural Products (GLUCOSAMINE CHOND CMP ADVANCED) TABS Take by mouth      metOLazone (ZAROXOLYN) 5 MG tablet Take by mouth      traZODone

## 2023-11-13 NOTE — PATIENT INSTRUCTIONS
Patient instructed to avoid bladder irritants in diet such as coffee, tea, caffeine, alcohol, carbonated beverages, spicy/acidic foods.

## 2023-11-14 ENCOUNTER — TELEPHONE (OUTPATIENT)
Dept: UROLOGY | Age: 58
End: 2023-11-14

## 2023-12-13 ENCOUNTER — OFFICE VISIT (OUTPATIENT)
Dept: PHYSICAL MEDICINE AND REHAB | Age: 58
End: 2023-12-13
Payer: MEDICAID

## 2023-12-13 VITALS
SYSTOLIC BLOOD PRESSURE: 102 MMHG | BODY MASS INDEX: 31.64 KG/M2 | HEIGHT: 59 IN | WEIGHT: 156.97 LBS | DIASTOLIC BLOOD PRESSURE: 58 MMHG

## 2023-12-13 DIAGNOSIS — G89.4 CHRONIC PAIN SYNDROME: ICD-10-CM

## 2023-12-13 DIAGNOSIS — G35 MULTIPLE SCLEROSIS (HCC): ICD-10-CM

## 2023-12-13 DIAGNOSIS — M79.2 NEUROPATHIC PAIN: Primary | ICD-10-CM

## 2023-12-13 DIAGNOSIS — G82.22 CHRONIC INCOMPLETE SPASTIC PARAPLEGIA (HCC): ICD-10-CM

## 2023-12-13 PROCEDURE — G8484 FLU IMMUNIZE NO ADMIN: HCPCS | Performed by: ANESTHESIOLOGY

## 2023-12-13 PROCEDURE — 99214 OFFICE O/P EST MOD 30 MIN: CPT | Performed by: ANESTHESIOLOGY

## 2023-12-13 PROCEDURE — G8427 DOCREV CUR MEDS BY ELIG CLIN: HCPCS | Performed by: ANESTHESIOLOGY

## 2023-12-13 PROCEDURE — 3017F COLORECTAL CA SCREEN DOC REV: CPT | Performed by: ANESTHESIOLOGY

## 2023-12-13 PROCEDURE — 4004F PT TOBACCO SCREEN RCVD TLK: CPT | Performed by: ANESTHESIOLOGY

## 2023-12-13 PROCEDURE — G8417 CALC BMI ABV UP PARAM F/U: HCPCS | Performed by: ANESTHESIOLOGY

## 2023-12-13 RX ORDER — PREGABALIN 50 MG/1
50 CAPSULE ORAL 2 TIMES DAILY
Qty: 60 CAPSULE | Refills: 2 | Status: SHIPPED | OUTPATIENT
Start: 2023-12-13 | End: 2024-03-12

## 2023-12-13 NOTE — PROGRESS NOTES
Functionality Assessment/Goals Worksheet     On a scale of 0 (Does not Interfere) to 10 (Completely Interferes)     1. Which number describes how during the past week pain has interfered with           the following:  A. General Activity:  10  B. Mood: 10  C. Walking Ability:  0  D. Normal Work (Includes both work outside the home and housework):  10  E. Relations with Other People:   10  F. Sleep:   10  G. Enjoyment of Life:   10    2. Patient Prefers to Take their Pain Medications:     [x]  On a regular basis   []  Only when necessary    []  Does not take pain medications    3. What are the Patient's Goals/Expectations for Visiting Pain Management?      [x]  Learn about my pain    []  Receive Medication   []  Physical Therapy     []  Treat Depression   []  Receive Injections    []  Treat Sleep   []  Deal with Anxiety and Stress   []  Treat Opoid Dependence/Addiction   []  Other:
Lupus ([address])     Movement disorder     MS (multiple sclerosis) ([address])     Schizophrenia ([address])     Thyroid disease     Type 2 diabetes mellitus with hyperglycemia, with long-term current use of insulin ([address])     Type 2 diabetes mellitus without complication ([address])     Unspecified cerebral artery occlusion with cerebral infarction 2014       Past Surgical History:   Procedure Laterality Date    BREAST LUMPECTOMY Left 02/19/2021    LEFT BREAST MASTECTOMY, SENTINAL LYMPH NODE BIOPSY, PREOP NEEDLE LOC performed by Adam Paredes MD at [address] Left 4/6/2021    DEBRIDEMENT LEFT BREAST MASTECTOMY WOUND performed by Adam Paredes MD at [address] Left 4/27/2021    LEFT BREAST WOUND REVISION performed by Adam Paredes MD at [address]  01/2020    DILATION AND CURETTAGE OF UTERUS      BEN US GUID NDL BIOPSY LEFT Left 01/14/2021    BEN [address] LEFT 1/14/2021 Cassandra Orozco  [address] SURGERY N/A 01/26/2021    EXCHANGE OF INTERSTIM SYSTEM performed by Jessie Heath MD at [address]  03/23/2021    right breast i and d with closure Dr Melvin Cisneros in the procedure room    PAIN MANAGEMENT PROCEDURE Left 8/31/2021    left T3 paravertebral block under fluoroscopy performed by Maddy Roberson DO at [facility] OFFICE/OUTPT VISIT,PROCEDURE ONLY N/A 11/21/2018    INTERSTIM DEVICE PLACEMENT MODEL 3058, ONLY HEAD ELIGIBLE FOR MRI WITH TRANSMIT/RECEIVE HEAD COIL    STIMULATOR SURGERY N/A 9/26/2023    Removal of Interstim performed by Jessie Heath MD at [address]      10 years ago    [id number] [facility] 43 LOC OF LEFT BREAST Left 02/19/2021    US GUIDED NEEDLE LOC OF LEFT BREAST 2/19/2021 [address]  01/14/2021    US LYMPH NODE BIOPSY 1/14/2021 Cassandra Orozco MD [id number]       Family History   Problem Relation Age of Onset    Stroke Mother     Asthma Mother     Other

## 2024-02-08 ENCOUNTER — OFFICE VISIT (OUTPATIENT)
Dept: SURGERY | Age: 59
End: 2024-02-08

## 2024-02-08 VITALS
OXYGEN SATURATION: 92 % | SYSTOLIC BLOOD PRESSURE: 108 MMHG | HEART RATE: 83 BPM | RESPIRATION RATE: 18 BRPM | DIASTOLIC BLOOD PRESSURE: 64 MMHG

## 2024-02-08 DIAGNOSIS — G89.4 CHRONIC PAIN SYNDROME: ICD-10-CM

## 2024-02-08 DIAGNOSIS — M32.9 SYSTEMIC LUPUS ERYTHEMATOSUS, UNSPECIFIED SLE TYPE, UNSPECIFIED ORGAN INVOLVEMENT STATUS (HCC): ICD-10-CM

## 2024-02-08 DIAGNOSIS — G35 MULTIPLE SCLEROSIS (HCC): ICD-10-CM

## 2024-02-08 DIAGNOSIS — Z85.3 HISTORY OF LEFT BREAST CANCER: ICD-10-CM

## 2024-02-08 DIAGNOSIS — C50.912 INVASIVE DUCTAL CARCINOMA OF LEFT BREAST (HCC): ICD-10-CM

## 2024-02-08 DIAGNOSIS — Z12.31 SCREENING MAMMOGRAM FOR BREAST CANCER: Primary | ICD-10-CM

## 2024-02-14 ENCOUNTER — HOSPITAL ENCOUNTER (OUTPATIENT)
Dept: WOMENS IMAGING | Age: 59
Discharge: HOME | End: 2024-02-14
Payer: MEDICAID

## 2024-02-14 ENCOUNTER — HOSPITAL ENCOUNTER (OUTPATIENT)
Dept: WOMENS IMAGING | Age: 59
Discharge: HOME | End: 2024-02-14
Attending: SURGERY
Payer: MEDICAID

## 2024-02-14 DIAGNOSIS — Z85.3 HISTORY OF LEFT BREAST CANCER: ICD-10-CM

## 2024-02-14 DIAGNOSIS — Z12.31 SCREENING MAMMOGRAM FOR BREAST CANCER: ICD-10-CM

## 2024-02-14 PROCEDURE — 77063 BREAST TOMOSYNTHESIS BI: CPT

## 2024-02-14 PROCEDURE — 76641 ULTRASOUND BREAST COMPLETE: CPT

## 2024-02-15 ENCOUNTER — TELEPHONE (OUTPATIENT)
Dept: SURGERY | Age: 59
End: 2024-02-15

## 2024-02-15 NOTE — TELEPHONE ENCOUNTER
----- Message from Chloe Amezcua MD sent at 2/15/2024  7:47 AM EST -----  Imaging concordant with benign clinical exam. Follow up in surgery clinic in 6 months

## 2024-02-22 DIAGNOSIS — R35.0 FREQUENCY OF URINATION: ICD-10-CM

## 2024-02-22 DIAGNOSIS — N32.81 OAB (OVERACTIVE BLADDER): ICD-10-CM

## 2024-02-22 RX ORDER — VIBEGRON 75 MG/1
75 TABLET, FILM COATED ORAL DAILY
Qty: 28 TABLET | Refills: 0 | Status: SHIPPED | OUTPATIENT
Start: 2024-02-22

## 2024-02-22 NOTE — TELEPHONE ENCOUNTER
Joanna Delarosa called requesting a refill on the following medications:  Requested Prescriptions     Pending Prescriptions Disp Refills    vibegron (GEMTESA) 75 MG TABS tablet 28 tablet 0     Sig: Take 1 tablet by mouth daily     Pharmacy verified:[facility]   .reji      Date of last visit: 11/13/23   Date of next visit (if applicable): Visit date not found

## 2024-03-25 ENCOUNTER — OFFICE VISIT (OUTPATIENT)
Dept: PHYSICAL MEDICINE AND REHAB | Age: 59
End: 2024-03-25
Payer: MEDICAID

## 2024-03-25 VITALS
DIASTOLIC BLOOD PRESSURE: 70 MMHG | WEIGHT: 156.97 LBS | HEIGHT: 59 IN | BODY MASS INDEX: 31.64 KG/M2 | SYSTOLIC BLOOD PRESSURE: 126 MMHG

## 2024-03-25 DIAGNOSIS — M79.2 NEUROPATHIC PAIN: ICD-10-CM

## 2024-03-25 DIAGNOSIS — M54.17 RADICULOPATHY, LUMBOSACRAL REGION: Primary | ICD-10-CM

## 2024-03-25 DIAGNOSIS — G89.4 CHRONIC PAIN SYNDROME: ICD-10-CM

## 2024-03-25 DIAGNOSIS — G82.22 CHRONIC INCOMPLETE SPASTIC PARAPLEGIA (HCC): ICD-10-CM

## 2024-03-25 PROCEDURE — 4004F PT TOBACCO SCREEN RCVD TLK: CPT | Performed by: ANESTHESIOLOGY

## 2024-03-25 PROCEDURE — G8484 FLU IMMUNIZE NO ADMIN: HCPCS | Performed by: ANESTHESIOLOGY

## 2024-03-25 PROCEDURE — G8417 CALC BMI ABV UP PARAM F/U: HCPCS | Performed by: ANESTHESIOLOGY

## 2024-03-25 PROCEDURE — 3017F COLORECTAL CA SCREEN DOC REV: CPT | Performed by: ANESTHESIOLOGY

## 2024-03-25 PROCEDURE — 99214 OFFICE O/P EST MOD 30 MIN: CPT | Performed by: ANESTHESIOLOGY

## 2024-03-25 PROCEDURE — G8427 DOCREV CUR MEDS BY ELIG CLIN: HCPCS | Performed by: ANESTHESIOLOGY

## 2024-03-25 RX ORDER — PREGABALIN 100 MG/1
100 CAPSULE ORAL 2 TIMES DAILY
Qty: 60 CAPSULE | Refills: 1 | Status: SHIPPED | OUTPATIENT
Start: 2024-03-25 | End: 2024-05-24

## 2024-03-25 NOTE — PROGRESS NOTES
Chronic Pain/PM&R Clinic Note     Encounter Date: 3/25/24    Subjective:   Chief Complaint:   Chief Complaint   Patient presents with    Follow-up     Pt states pain in right leg is starting to get really bad, pt can't sleep        History of Present Illness:   Joanna Delarosa is a 58 y.o. female seen in the clinic initially on 08/09/21 for her history of left sided chest wall pain.  She has a medical history of left breast mastectomy in February 2021 secondary to breast cancer with subsequent wound dehiscence and surgical debridement and spinal cord injury secondary to transverse myelitis.  She has been dealing with left-sided chest wall pain at the site of her incision ever since her mastectomy and this is been exacerbated after most recent revision surgery in June 2021.  She describes his pain is a constant stabbing, electrical type pain right on her incision scar.  She rates this pain is a constant 6/10 pain that can get up to a 10/10.  She denies any drainage or areas of erythema around the scar.  She states that this area is so sensitive that is difficult for her to wear even a supportive bra.  She reports not getting much relief with her other medications including Lyrica 150 mg twice a day, Norco 5 mg twice a day, and Elavil 50 mg at night.  She states that this is interfering with her everyday activities and really interfering with sleep.     10/12/2023, patient presents for planned follow up to discuss medication.  She presents today in a wheelchair, with her medications in a bag to review.  She states she is not taking Elavil due to side effects of hallucination with it.  She states she continues take the Lyrica at night, states she has plenty as she is only taking it nightly.  She did have a fill from her primary care provider on 7/31/23 for twice daily, and a fill from Dr. Lloyd on 8/23/23 for once daily.  She presents with a full bottle of this to her appointment.  She also has a full bottle of

## 2024-03-25 NOTE — PROGRESS NOTES
[facility] PROFESSIONAL SERVS  [facility] NEUROSCIENCE AND REHABILITATION [address]  [facility] [address]  Dept: [phone]  Dept Fax: [phone]  Loc: [phone]    Visit Date: 3/25/2024    Functionality Assessment/Goals Worksheet     On a scale of 0 (Does not Interfere) to 10 (Completely Interferes)     1.  Which number describes how during the past week pain has interfered with       the following:  A.  General Activity:  9  B.  Mood: 9  C.  Walking Ability:  9  D.  Normal Work (Includes both work outside the home and housework):  9  E.  Relations with Other People:   9  F.  Sleep:   9  G.  Enjoyment of Life:   9    2.  Patient Prefers to Take their Pain Medications:     []  On a regular basis   []  Only when necessary    [x]  Does not take pain medications    3.  What are the Patient's Goals/Expectations for Visiting Pain Management?     [x]  Learn about my pain    []  Receive Medication   []  Physical Therapy     []  Treat Depression   []  Receive Injections    []  Treat Sleep   []  Deal with Anxiety and Stress   []  Treat Opoid Dependence/Addiction   []  Other:

## 2024-03-27 ENCOUNTER — TRANSCRIBE ORDERS (OUTPATIENT)
Dept: ADMINISTRATIVE | Age: 59
End: 2024-03-27

## 2024-03-27 DIAGNOSIS — R16.1 SPLENOMEGALY: Primary | ICD-10-CM

## 2024-04-02 ENCOUNTER — PATIENT MESSAGE (OUTPATIENT)
Dept: PHYSICAL MEDICINE AND REHAB | Age: 59
End: 2024-04-02

## 2024-04-03 NOTE — PROGRESS NOTES
Detail Level: Simple Pt has met discharge criteria and states she is ready for discharge to home. IV removed, gauze and tape applied. Dressed in own clothes and personal belongings gathered. Discharge instructions (with opioid medication education information) given to pt and family; pt and family verbalized understanding of discharge instructions, prescriptions and follow up appointments. Pt transported to discharge lobby by Rina Rubio Principal [facility] staff.

## 2024-04-23 ENCOUNTER — PATIENT MESSAGE (OUTPATIENT)
Dept: PHYSICAL MEDICINE AND REHAB | Age: 59
End: 2024-04-23

## 2024-04-23 ENCOUNTER — TELEPHONE (OUTPATIENT)
Dept: PHYSICAL MEDICINE AND REHAB | Age: 59
End: 2024-04-23

## 2024-04-23 NOTE — TELEPHONE ENCOUNTER
Pt. Had called and LVM saying her pain in her legs ,feet arms and left chest is severe and needs something stronger for pain. Had attempted to recall and sent her a my chart message that meds will not be adjusted till seen in clinic after her MRI. She did not have an aptm scheduled so asked her to get scheduled and if her pain becomes intolerable to go to the ER. If any other suggestions let me know.

## 2024-04-29 ENCOUNTER — HOSPITAL ENCOUNTER (OUTPATIENT)
Dept: ULTRASOUND IMAGING | Age: 59
Discharge: HOME | End: 2024-04-29
Attending: SPECIALIST
Payer: MEDICAID

## 2024-04-29 DIAGNOSIS — R16.1 SPLENOMEGALY: ICD-10-CM

## 2024-04-29 PROCEDURE — 76705 ECHO EXAM OF ABDOMEN: CPT

## 2024-05-10 ENCOUNTER — HOSPITAL ENCOUNTER (OUTPATIENT)
Dept: MRI IMAGING | Age: 59
Discharge: HOME | End: 2024-05-10
Attending: ANESTHESIOLOGY
Payer: MEDICAID

## 2024-05-10 DIAGNOSIS — M54.17 RADICULOPATHY, LUMBOSACRAL REGION: ICD-10-CM

## 2024-05-10 PROCEDURE — 72148 MRI LUMBAR SPINE W/O DYE: CPT

## 2024-05-13 ENCOUNTER — OFFICE VISIT (OUTPATIENT)
Dept: PHYSICAL MEDICINE AND REHAB | Age: 59
End: 2024-05-13
Payer: MEDICAID

## 2024-05-13 VITALS
WEIGHT: 156 LBS | SYSTOLIC BLOOD PRESSURE: 112 MMHG | HEIGHT: 59 IN | DIASTOLIC BLOOD PRESSURE: 66 MMHG | BODY MASS INDEX: 31.45 KG/M2

## 2024-05-13 DIAGNOSIS — G89.29 OTHER CHRONIC PAIN: ICD-10-CM

## 2024-05-13 DIAGNOSIS — G89.4 CHRONIC PAIN SYNDROME: ICD-10-CM

## 2024-05-13 DIAGNOSIS — M54.17 RADICULOPATHY, LUMBOSACRAL REGION: Primary | ICD-10-CM

## 2024-05-13 DIAGNOSIS — M79.2 NEUROPATHIC PAIN: ICD-10-CM

## 2024-05-13 PROCEDURE — G8427 DOCREV CUR MEDS BY ELIG CLIN: HCPCS | Performed by: NURSE PRACTITIONER

## 2024-05-13 PROCEDURE — 99214 OFFICE O/P EST MOD 30 MIN: CPT | Performed by: NURSE PRACTITIONER

## 2024-05-13 PROCEDURE — 3017F COLORECTAL CA SCREEN DOC REV: CPT | Performed by: NURSE PRACTITIONER

## 2024-05-13 PROCEDURE — G8417 CALC BMI ABV UP PARAM F/U: HCPCS | Performed by: NURSE PRACTITIONER

## 2024-05-13 PROCEDURE — 4004F PT TOBACCO SCREEN RCVD TLK: CPT | Performed by: NURSE PRACTITIONER

## 2024-05-13 RX ORDER — GLIPIZIDE 10 MG/1
10 TABLET, FILM COATED, EXTENDED RELEASE ORAL DAILY
COMMUNITY
Start: 2024-05-09

## 2024-05-13 RX ORDER — PREGABALIN 100 MG/1
200 CAPSULE ORAL NIGHTLY
Qty: 60 CAPSULE | Refills: 2 | Status: SHIPPED | OUTPATIENT
Start: 2024-05-13 | End: 2024-08-11

## 2024-05-13 RX ORDER — BLOOD-GLUCOSE SENSOR
EACH MISCELLANEOUS
COMMUNITY
Start: 2024-04-21

## 2024-05-13 RX ORDER — OXYCODONE HYDROCHLORIDE AND ACETAMINOPHEN 5; 325 MG/1; MG/1
1 TABLET ORAL DAILY PRN
Qty: 18 TABLET | Refills: 0 | Status: SHIPPED | OUTPATIENT
Start: 2024-05-13 | End: 2024-05-31

## 2024-05-13 RX ORDER — DAPAGLIFLOZIN 10 MG/1
10 TABLET, FILM COATED ORAL DAILY
COMMUNITY

## 2024-05-13 NOTE — PROGRESS NOTES
Chronic Pain/PM&R Clinic Note     Encounter Date: 5/13/24    Subjective:   Chief Complaint:   Chief Complaint   Patient presents with    Follow-up     Discuss MRI and increased pain in rt. Foot and hands        History of Present Illness:   Joanna Delarosa is a 58 y.o. female seen in the clinic initially on 08/09/21 for her history of left sided chest wall pain.  She has a medical history of left breast mastectomy in February 2021 secondary to breast cancer with subsequent wound dehiscence and surgical debridement and spinal cord injury secondary to transverse myelitis.  She has been dealing with left-sided chest wall pain at the site of her incision ever since her mastectomy and this is been exacerbated after most recent revision surgery in June 2021.  She describes his pain is a constant stabbing, electrical type pain right on her incision scar.  She rates this pain is a constant 6/10 pain that can get up to a 10/10.  She denies any drainage or areas of erythema around the scar.  She states that this area is so sensitive that is difficult for her to wear even a supportive bra.  She reports not getting much relief with her other medications including Lyrica 150 mg twice a day, Norco 5 mg twice a day, and Elavil 50 mg at night.  She states that this is interfering with her everyday activities and really interfering with sleep.     10/12/2023, patient presents for planned follow up to discuss medication.  She presents today in a wheelchair, with her medications in a bag to review.  She states she is not taking Elavil due to side effects of hallucination with it.  She states she continues take the Lyrica at night, states she has plenty as she is only taking it nightly.  She did have a fill from her primary care provider on 7/31/23 for twice daily, and a fill from Dr. Lloyd on 8/23/23 for once daily.  She presents with a full bottle of this to her appointment.  She also has a full bottle of baclofen 10 mg that was

## 2024-05-13 NOTE — PROGRESS NOTES
Functionality Assessment/Goals Worksheet     On a scale of 0 (Does not Interfere) to 10 (Completely Interferes)     1.  Which number describes how during the past week pain has interfered with           the following:  A.  General Activity:  10  B.  Mood: 10  C.  Walking Ability:  10  D.  Normal Work (Includes both work outside the home and housework):  10  E.  Relations with Other People:   10  F.  Sleep:   10  G.  Enjoyment of Life:   10    2.  Patient Prefers to Take their Pain Medications:     []  On a regular basis   [x]  Only when necessary    []  Does not take pain medications    3.  What are the Patient's Goals/Expectations for Visiting Pain Management?     [x]  Learn about my pain    []  Receive Medication   []  Physical Therapy     []  Treat Depression   []  Receive Injections    []  Treat Sleep   []  Deal with Anxiety and Stress   []  Treat Opoid Dependence/Addiction   []  Other:

## 2024-05-20 ENCOUNTER — TELEPHONE (OUTPATIENT)
Dept: PHYSICAL MEDICINE AND REHAB | Age: 59
End: 2024-05-20

## 2024-05-20 NOTE — TELEPHONE ENCOUNTER
Need a PA done on Percocet per pt and pharmacy.  Pt. Aware could take up to a week to get approval.

## 2024-05-21 NOTE — TELEPHONE ENCOUNTER
PA sent to plan  (Key: [id number])  PA Case ID #: [id number]  Electronic prior authorization not required for NDC. If requesting a quantity above allowable limits, please submit via other method.  oxyCODONE-Acetaminophen 5-325MG tablets    Spoke with pharmacist at [facility] who states this patient filled a 7 day script for percocet yesterday with no PA needed.

## 2024-05-23 ENCOUNTER — OFFICE VISIT (OUTPATIENT)
Dept: UROLOGY | Age: 59
End: 2024-05-23
Payer: MEDICAID

## 2024-05-23 VITALS — WEIGHT: 156 LBS | BODY MASS INDEX: 31.45 KG/M2 | HEIGHT: 59 IN | RESPIRATION RATE: 18 BRPM

## 2024-05-23 DIAGNOSIS — R39.16 STRAINING DURING URINATION: ICD-10-CM

## 2024-05-23 DIAGNOSIS — R35.0 FREQUENCY OF URINATION: Primary | ICD-10-CM

## 2024-05-23 DIAGNOSIS — N32.81 OAB (OVERACTIVE BLADDER): ICD-10-CM

## 2024-05-23 LAB — POST VOID RESIDUAL (PVR): 118 ML

## 2024-05-23 PROCEDURE — 51798 US URINE CAPACITY MEASURE: CPT

## 2024-05-23 PROCEDURE — 3017F COLORECTAL CA SCREEN DOC REV: CPT

## 2024-05-23 PROCEDURE — G8427 DOCREV CUR MEDS BY ELIG CLIN: HCPCS

## 2024-05-23 PROCEDURE — 4004F PT TOBACCO SCREEN RCVD TLK: CPT

## 2024-05-23 PROCEDURE — G8417 CALC BMI ABV UP PARAM F/U: HCPCS

## 2024-05-23 PROCEDURE — 99214 OFFICE O/P EST MOD 30 MIN: CPT

## 2024-05-23 RX ORDER — TAMSULOSIN HYDROCHLORIDE 0.4 MG/1
0.4 CAPSULE ORAL DAILY
Qty: 90 CAPSULE | Refills: 3 | Status: SHIPPED | OUTPATIENT
Start: 2024-05-23

## 2024-05-23 NOTE — PROGRESS NOTES
Disposable (UNDERPADS) MISC Cloth chux pads  Diagnosis: Paraplegia 344.1 100 Bottle 11    hydrOXYzine (VISTARIL) 50 MG capsule Take 1 capsule by mouth 3 times daily as needed for Itching      omeprazole (PRILOSEC) 20 MG capsule Take 1 capsule by mouth daily      magnesium oxide (MAG-OX) 400 (241.3 Mg) MG TABS tablet Take 1 tablet by mouth daily 30 tablet 3     No current facility-administered medications for this visit.       Past Medical History  Joanna  has a past medical history of Anxiety, Anxiety and depression, Asthma, Bipolar 1 disorder (HCC), Bipolar 1 disorder with moderate sourav (HCC), Blood circulation, collateral, Breast cancer ([facility]), Cancer (HCC), Cancer (HCC), COPD (chronic obstructive pulmonary disease) ([facility]), Depression, Endometriosis, GERD (gastroesophageal reflux disease), History of therapeutic radiation, Kidney stones, Lupus ([facility]), Movement disorder, MS (multiple sclerosis) ([facility]), Schizophrenia ([facility]), Thyroid disease, Type 2 diabetes mellitus with hyperglycemia, with long-term current use of insulin ([facility]), Type 2 diabetes mellitus without complication ([facility]), and Unspecified cerebral artery occlusion with cerebral infarction.    Past Surgical History  The patient  has a past surgical history that includes Tubal ligation; Colonoscopy (01/2020); Dilation and curettage of uterus; pr office/outpt visit,procedure only (N/A, 11/21/2018); [facility] US GUIDED BREAST BIOPSY W LOC DEVICE 1ST LESION LEFT (Left, 01/14/2021); US BIOPSY LYMPH NODE (01/14/2021); Nerve Surgery (N/A, 01/26/2021); US PLACE BREAST LOC DEVICE 1ST LESION LEFT (Left, 02/19/2021); Breast lumpectomy (Left, 02/19/2021); other surgical history (03/23/2021); Breast surgery (Left, 04/06/2021); Breast surgery (Left, 04/27/2021); Pain management procedure (Left, 08/31/2021); Stimulator Surgery (N/A, 09/26/2023); and Mastectomy (Left, 2021).    Family History  This patient's family history includes Asthma in her brother and mother; No Known Problems

## 2024-05-28 DIAGNOSIS — M79.2 NEUROPATHIC PAIN: ICD-10-CM

## 2024-05-28 DIAGNOSIS — M54.17 RADICULOPATHY, LUMBOSACRAL REGION: ICD-10-CM

## 2024-05-28 RX ORDER — OXYCODONE HYDROCHLORIDE AND ACETAMINOPHEN 5; 325 MG/1; MG/1
1 TABLET ORAL DAILY PRN
Qty: 7 TABLET | Refills: 0 | Status: SHIPPED | OUTPATIENT
Start: 2024-05-28 | End: 2024-05-30 | Stop reason: SDUPTHER

## 2024-05-28 NOTE — TELEPHONE ENCOUNTER
Joanna is requesting a refill of their   Requested Prescriptions     Pending Prescriptions Disp Refills    oxyCODONE-acetaminophen (PERCOCET) 5-325 MG per tablet 18 tablet 0     Sig: Take 1 tablet by mouth daily as needed for Pain for up to 18 days. Max Daily Amount: 1 tablet   . Please advise.      Last Appt:  Visit date not found  Next Appt:   Visit date not found  Preferred pharmacy:   [id number] - [place], [place] - [address] [facility] [phone] -  [phone]   
OARRS reviewed. UDS: + for Atarax, Risperidone, Trazodone.   Last seen: 5/13/2024. Follow-up: 5/30/24    
154

## 2024-05-30 ENCOUNTER — OFFICE VISIT (OUTPATIENT)
Dept: PHYSICAL MEDICINE AND REHAB | Age: 59
End: 2024-05-30
Payer: MEDICAID

## 2024-05-30 ENCOUNTER — TELEPHONE (OUTPATIENT)
Dept: PHYSICAL MEDICINE AND REHAB | Age: 59
End: 2024-05-30

## 2024-05-30 VITALS
SYSTOLIC BLOOD PRESSURE: 112 MMHG | HEIGHT: 59 IN | WEIGHT: 156 LBS | BODY MASS INDEX: 31.45 KG/M2 | HEART RATE: 62 BPM | DIASTOLIC BLOOD PRESSURE: 64 MMHG

## 2024-05-30 DIAGNOSIS — G37.3 TRANSVERSE MYELITIS (HCC): ICD-10-CM

## 2024-05-30 DIAGNOSIS — M54.17 RADICULOPATHY, LUMBOSACRAL REGION: ICD-10-CM

## 2024-05-30 DIAGNOSIS — G89.4 CHRONIC PAIN SYNDROME: Primary | ICD-10-CM

## 2024-05-30 DIAGNOSIS — G95.11 SPINAL CORD INFARCTION (HCC): ICD-10-CM

## 2024-05-30 DIAGNOSIS — R07.89 LEFT-SIDED CHEST WALL PAIN: ICD-10-CM

## 2024-05-30 DIAGNOSIS — M79.2 NEUROPATHIC PAIN: ICD-10-CM

## 2024-05-30 PROCEDURE — 99214 OFFICE O/P EST MOD 30 MIN: CPT | Performed by: NURSE PRACTITIONER

## 2024-05-30 PROCEDURE — G8427 DOCREV CUR MEDS BY ELIG CLIN: HCPCS | Performed by: NURSE PRACTITIONER

## 2024-05-30 PROCEDURE — 3017F COLORECTAL CA SCREEN DOC REV: CPT | Performed by: NURSE PRACTITIONER

## 2024-05-30 PROCEDURE — 4004F PT TOBACCO SCREEN RCVD TLK: CPT | Performed by: NURSE PRACTITIONER

## 2024-05-30 PROCEDURE — G8417 CALC BMI ABV UP PARAM F/U: HCPCS | Performed by: NURSE PRACTITIONER

## 2024-05-30 RX ORDER — OXYCODONE HYDROCHLORIDE AND ACETAMINOPHEN 5; 325 MG/1; MG/1
1 TABLET ORAL DAILY PRN
Qty: 30 TABLET | Refills: 0 | Status: SHIPPED | OUTPATIENT
Start: 2024-06-04 | End: 2024-07-04

## 2024-05-30 NOTE — PROGRESS NOTES
Chronic Pain/PM&R Clinic Note     Encounter Date: 5/30/24    Subjective:   Chief Complaint:   Chief Complaint   Patient presents with    Follow-up     F/U        History of Present Illness:   Joanna Delarosa is a 58 y.o. female seen in the clinic initially on 08/09/21 for her history of left sided chest wall pain.  She has a medical history of left breast mastectomy in February 2021 secondary to breast cancer with subsequent wound dehiscence and surgical debridement and spinal cord injury secondary to transverse myelitis.  She has been dealing with left-sided chest wall pain at the site of her incision ever since her mastectomy and this is been exacerbated after most recent revision surgery in June 2021.  She describes his pain is a constant stabbing, electrical type pain right on her incision scar.  She rates this pain is a constant 6/10 pain that can get up to a 10/10.  She denies any drainage or areas of erythema around the scar.  She states that this area is so sensitive that is difficult for her to wear even a supportive bra.  She reports not getting much relief with her other medications including Lyrica 150 mg twice a day, Norco 5 mg twice a day, and Elavil 50 mg at night.  She states that this is interfering with her everyday activities and really interfering with sleep.     10/12/2023, patient presents for planned follow up to discuss medication.  She presents today in a wheelchair, with her medications in a bag to review.  She states she is not taking Elavil due to side effects of hallucination with it.  She states she continues take the Lyrica at night, states she has plenty as she is only taking it nightly.  She did have a fill from her primary care provider on 7/31/23 for twice daily, and a fill from Dr. Lloyd on 8/23/23 for once daily.  She presents with a full bottle of this to her appointment.  She also has a full bottle of baclofen 10 mg that was prescribed by CHAS May.  She states

## 2024-05-30 NOTE — PROGRESS NOTES
Functionality Assessment/Goals Worksheet     On a scale of 0 (Does not Interfere) to 10 (Completely Interferes)     1.  Which number describes how during the past week pain has interfered with           the following:  A.  General Activity:  9  B.  Mood: 9  C.  Walking Ability:  9  D.  Normal Work (Includes both work outside the home and housework):  9  E.  Relations with Other People:   9  F.  Sleep:   9  G.  Enjoyment of Life:   9    2.  Patient Prefers to Take their Pain Medications:     [x]  On a regular basis   []  Only when necessary    []  Does not take pain medications    3.  What are the Patient's Goals/Expectations for Visiting Pain Management?     [x]  Learn about my pain    []  Receive Medication   []  Physical Therapy     []  Treat Depression   []  Receive Injections    []  Treat Sleep   []  Deal with Anxiety and Stress   []  Treat Opoid Dependence/Addiction   []  Other:

## 2024-06-06 ENCOUNTER — TELEPHONE (OUTPATIENT)
Dept: PHYSICAL MEDICINE AND REHAB | Age: 59
End: 2024-06-06

## 2024-06-06 DIAGNOSIS — M79.2 NEUROPATHIC PAIN: Primary | ICD-10-CM

## 2024-06-06 RX ORDER — PREGABALIN 150 MG/1
300 CAPSULE ORAL NIGHTLY
Qty: 60 CAPSULE | Refills: 0 | Status: SHIPPED | OUTPATIENT
Start: 2024-06-06 | End: 2024-07-06

## 2024-06-06 NOTE — TELEPHONE ENCOUNTER
Drug screen + Lyrica. (It was negative last time and she said she was taking it so this is good). Received kidney function and that looks good as well. Tell patient to increase Lyrica to 300 mg nightly. Sent Lyrica 150 mg caps, take 2 caps at bedtime to her pharmacy.

## 2024-06-27 ENCOUNTER — OFFICE VISIT (OUTPATIENT)
Dept: PHYSICAL MEDICINE AND REHAB | Age: 59
End: 2024-06-27
Payer: MEDICAID

## 2024-06-27 VITALS
DIASTOLIC BLOOD PRESSURE: 68 MMHG | HEIGHT: 59 IN | BODY MASS INDEX: 31.45 KG/M2 | SYSTOLIC BLOOD PRESSURE: 126 MMHG | WEIGHT: 156 LBS

## 2024-06-27 DIAGNOSIS — G89.4 CHRONIC PAIN SYNDROME: ICD-10-CM

## 2024-06-27 DIAGNOSIS — R07.89 LEFT-SIDED CHEST WALL PAIN: ICD-10-CM

## 2024-06-27 DIAGNOSIS — G95.11 SPINAL CORD INFARCTION (HCC): ICD-10-CM

## 2024-06-27 DIAGNOSIS — M79.2 NEUROPATHIC PAIN: Primary | ICD-10-CM

## 2024-06-27 DIAGNOSIS — M54.17 RADICULOPATHY, LUMBOSACRAL REGION: ICD-10-CM

## 2024-06-27 PROCEDURE — 3017F COLORECTAL CA SCREEN DOC REV: CPT | Performed by: NURSE PRACTITIONER

## 2024-06-27 PROCEDURE — G8417 CALC BMI ABV UP PARAM F/U: HCPCS | Performed by: NURSE PRACTITIONER

## 2024-06-27 PROCEDURE — 4004F PT TOBACCO SCREEN RCVD TLK: CPT | Performed by: NURSE PRACTITIONER

## 2024-06-27 PROCEDURE — 99214 OFFICE O/P EST MOD 30 MIN: CPT | Performed by: NURSE PRACTITIONER

## 2024-06-27 PROCEDURE — G8427 DOCREV CUR MEDS BY ELIG CLIN: HCPCS | Performed by: NURSE PRACTITIONER

## 2024-06-27 RX ORDER — LIDOCAINE 50 MG/G
OINTMENT TOPICAL
Qty: 30 G | Refills: 2 | Status: SHIPPED | OUTPATIENT
Start: 2024-06-27

## 2024-06-27 RX ORDER — PREGABALIN 150 MG/1
300 CAPSULE ORAL NIGHTLY
Qty: 60 CAPSULE | Refills: 0 | Status: SHIPPED | OUTPATIENT
Start: 2024-06-27 | End: 2024-07-27

## 2024-06-27 RX ORDER — OXYCODONE HYDROCHLORIDE AND ACETAMINOPHEN 5; 325 MG/1; MG/1
1 TABLET ORAL DAILY PRN
Qty: 30 TABLET | Refills: 0 | Status: SHIPPED | OUTPATIENT
Start: 2024-07-04 | End: 2024-08-03

## 2024-06-27 NOTE — PROGRESS NOTES
Chronic Pain/PM&R Clinic Note     Encounter Date: 6/27/24    Subjective:   Chief Complaint:   Chief Complaint   Patient presents with    Follow-up     4 week follow up   Tooth ache - had to take pain medication during the day for that.        History of Present Illness:   Joanna Delarosa is a 58 y.o. female seen in the clinic initially on 08/09/21 for her history of left sided chest wall pain.  She has a medical history of left breast mastectomy in February 2021 secondary to breast cancer with subsequent wound dehiscence and surgical debridement and spinal cord injury secondary to transverse myelitis.  She has been dealing with left-sided chest wall pain at the site of her incision ever since her mastectomy and this is been exacerbated after most recent revision surgery in June 2021.  She describes his pain is a constant stabbing, electrical type pain right on her incision scar.  She rates this pain is a constant 6/10 pain that can get up to a 10/10.  She denies any drainage or areas of erythema around the scar.  She states that this area is so sensitive that is difficult for her to wear even a supportive bra.  She reports not getting much relief with her other medications including Lyrica 150 mg twice a day, Norco 5 mg twice a day, and Elavil 50 mg at night.  She states that this is interfering with her everyday activities and really interfering with sleep.     10/12/2023, patient presents for planned follow up to discuss medication.  She presents today in a wheelchair, with her medications in a bag to review.  She states she is not taking Elavil due to side effects of hallucination with it.  She states she continues take the Lyrica at night, states she has plenty as she is only taking it nightly.  She did have a fill from her primary care provider on 7/31/23 for twice daily, and a fill from Dr. Lloyd on 8/23/23 for once daily.  She presents with a full bottle of this to her appointment.  She also has a full

## 2024-06-27 NOTE — PROGRESS NOTES
Functionality Assessment/Goals Worksheet     On a scale of 0 (Does not Interfere) to 10 (Completely Interferes)     1.  Which number describes how during the past week pain has interfered with           the following:  A.  General Activity:  10  B.  Mood: 8  C.  Walking Ability:  N/A  D.  Normal Work (Includes both work outside the home and housework):  N/A  E.  Relations with Other People:   8  F.  Sleep:   9  G.  Enjoyment of Life:   10    2.  Patient Prefers to Take their Pain Medications:     [x]  On a regular basis   []  Only when necessary    []  Does not take pain medications    3.  What are the Patient's Goals/Expectations for Visiting Pain Management?     [x]  Learn about my pain    [x]  Receive Medication   []  Physical Therapy     []  Treat Depression   []  Receive Injections    [x]  Treat Sleep   []  Deal with Anxiety and Stress   []  Treat Opoid Dependence/Addiction   []  Other:

## 2024-06-27 NOTE — PATIENT INSTRUCTIONS
Devante Stevenson   Address: [address] [place] [address] [address], [place] [address]  Phone: [phone]

## 2024-08-05 ENCOUNTER — TELEPHONE (OUTPATIENT)
Dept: PHYSICAL MEDICINE AND REHAB | Age: 59
End: 2024-08-05

## 2024-08-05 NOTE — TELEPHONE ENCOUNTER
----- Message from CHAS Maciel CNP sent at 6/27/2024 11:11 AM EDT -----  Check if we have EMG from [facility]

## 2024-08-05 NOTE — TELEPHONE ENCOUNTER
Called Dr. Stevenson office and pt. Is not scheduled for the EMG till Sept. 26. Do you want her to reschedule her aptm. Till after? Last atpm was 6/27. Will be a little over 3 months

## 2024-08-06 DIAGNOSIS — M54.17 RADICULOPATHY, LUMBOSACRAL REGION: ICD-10-CM

## 2024-08-06 DIAGNOSIS — M79.2 NEUROPATHIC PAIN: ICD-10-CM

## 2024-08-06 RX ORDER — OXYCODONE HYDROCHLORIDE AND ACETAMINOPHEN 5; 325 MG/1; MG/1
1 TABLET ORAL DAILY PRN
Qty: 30 TABLET | Refills: 0 | Status: SHIPPED | OUTPATIENT
Start: 2024-08-06 | End: 2024-09-05

## 2024-08-06 NOTE — TELEPHONE ENCOUNTER
OARRS reviewed. UDS: + for  .hydroxyzine, risperidone, trazodone, pregabalin,oxycodone. Negative devenlafaxine, amitriptyline   Last seen: 6/27/2024. Follow-up:   Future Appointments   Date Time Provider Department Center   8/8/2024  1:30 PM Chloe Amezcua MD N [facility]   9/23/2024 10:45 AM Nabila Morse APRN - CNP N [place] [facility]   10/1/2024 10:20 AM Goldie Snowden APRN - CNP N [facility] [place]

## 2024-08-08 ENCOUNTER — OFFICE VISIT (OUTPATIENT)
Dept: SURGERY | Age: 59
End: 2024-08-08
Payer: MEDICAID

## 2024-08-08 ENCOUNTER — TELEPHONE (OUTPATIENT)
Dept: PHYSICAL MEDICINE AND REHAB | Age: 59
End: 2024-08-08

## 2024-08-08 VITALS
RESPIRATION RATE: 18 BRPM | HEIGHT: 59 IN | TEMPERATURE: 98 F | SYSTOLIC BLOOD PRESSURE: 110 MMHG | OXYGEN SATURATION: 99 % | HEART RATE: 80 BPM | BODY MASS INDEX: 31.51 KG/M2 | DIASTOLIC BLOOD PRESSURE: 60 MMHG

## 2024-08-08 DIAGNOSIS — G89.4 CHRONIC PAIN SYNDROME: ICD-10-CM

## 2024-08-08 DIAGNOSIS — M32.9 SYSTEMIC LUPUS ERYTHEMATOSUS, UNSPECIFIED SLE TYPE, UNSPECIFIED ORGAN INVOLVEMENT STATUS (HCC): ICD-10-CM

## 2024-08-08 DIAGNOSIS — C50.912 INVASIVE DUCTAL CARCINOMA OF LEFT BREAST (HCC): Primary | ICD-10-CM

## 2024-08-08 DIAGNOSIS — Z90.12 H/O LEFT MASTECTOMY: ICD-10-CM

## 2024-08-08 PROCEDURE — G8428 CUR MEDS NOT DOCUMENT: HCPCS | Performed by: SURGERY

## 2024-08-08 PROCEDURE — G8417 CALC BMI ABV UP PARAM F/U: HCPCS | Performed by: SURGERY

## 2024-08-08 PROCEDURE — 99213 OFFICE O/P EST LOW 20 MIN: CPT | Performed by: SURGERY

## 2024-08-08 PROCEDURE — 4004F PT TOBACCO SCREEN RCVD TLK: CPT | Performed by: SURGERY

## 2024-08-08 PROCEDURE — 3017F COLORECTAL CA SCREEN DOC REV: CPT | Performed by: SURGERY

## 2024-08-08 ASSESSMENT — ENCOUNTER SYMPTOMS
BLOOD IN STOOL: 0
ABDOMINAL PAIN: 0
WHEEZING: 0
VOICE CHANGE: 0
VOMITING: 0
COLOR CHANGE: 0
TROUBLE SWALLOWING: 0
BACK PAIN: 1
SORE THROAT: 0
COUGH: 0
SHORTNESS OF BREATH: 0
NAUSEA: 0

## 2024-08-08 NOTE — TELEPHONE ENCOUNTER
Pt called to pass on she had her teeth removed by the dentist and got a script for norco for 7 days. Reviewed to hold percocet from you while taking and will effect refill date by extending it by 7 days d/t the norco. She verbalized understanding.

## 2024-08-08 NOTE — PROGRESS NOTES
Chloe Amezcua MD   General Surgery  Follow up Patient Evaluation in Office  Pt Name: Joanna Delarosa  Date of Birth 1965   Today's Date: 8/8/2024  Medical Record Number: [id number]  Referring Provider: No ref. provider found  Primary Care Provider: Orlando Cooley MD  Chief Complaint:  Chief Complaint   Patient presents with    6 Month Follow-Up     Invasive ductal carcinoma of left breast-Last seen 2/08/24-On Arimidex-Mammo and US left breast done 2/14/24     Pathologic stage Ib ER positive MD positive HER-2 negative.  Oncotype DX score 8  ASSESSMENT      1. Invasive ductal carcinoma of left breast (HCC)    2. Systemic lupus erythematosus, unspecified SLE type, unspecified organ involvement status (HCC)    3. Chronic pain syndrome    4. H/O left mastectomy    5.  Splenomegaly  6. Medical noncompliance.     PLANS   Assessment & Plan   1.  Examination of the mastectomy site is benign.  No clinical evidence of lymphedema.  Status post radiation therapy  with benign chest wall examination  2.  Was on Arimidex . Has not followed with medical oncology for breast cancer.  Has more recently seen Dr. Ramírez for splenomegaly.  Evaluating for iron deficiency anemia.  3.  Follow-up surgical clinic in 6 months.    4.   Mammography right breast February 2024 no suspicious findings.  5. Chronic total body pain. Pt sees pain management  SUBJECTIVE   History of present illness:  Joanna is a 59 y.o. year old female who is presenting today in the office for follow-up evaluation of left breast cancer.  Xuan has multiple medical comorbidities.  She underwent a mastectomy in February 2021.  She presented with a mass behind her left nipple.  Ultrasound revealed a 2.2 x 2.2 x 2.9 cm mass and an ultrasound-guided biopsy was performed.  Tumor was ER and MD strongly positive.  Ki-67 was low HER-2 was negative.  She is in a wheelchair has MS.  She opted for a mastectomy.  At surgery for lymph nodes were removed.  The tumor

## 2024-08-19 DIAGNOSIS — M79.2 NEUROPATHIC PAIN: ICD-10-CM

## 2024-08-19 RX ORDER — PREGABALIN 150 MG/1
300 CAPSULE ORAL NIGHTLY
Qty: 60 CAPSULE | Refills: 0 | Status: SHIPPED | OUTPATIENT
Start: 2024-08-19 | End: 2024-09-18

## 2024-08-19 NOTE — TELEPHONE ENCOUNTER
OARRS reviewed. Rx sent. Make sure patient is taking consistently 2 caps per night as she has not filled in 60 days and should have been out in 30.

## 2024-08-19 NOTE — TELEPHONE ENCOUNTER
Tried calling pt. Pt didn't answer. Pt doesn't a voiceamil set up on [employer] machine. Going to send pt a [employer] message.

## 2024-08-19 NOTE — TELEPHONE ENCOUNTER
Joanna Delarosa called requesting a refill on the following medications:  Requested Prescriptions     Pending Prescriptions Disp Refills    pregabalin (LYRICA) 150 MG capsule 60 capsule 0     Sig: Take 2 capsules by mouth at bedtime for 30 days. Max Daily Amount: 300 mg     Pharmacy verified:  [facility]      Date of last visit: 06-27-24  Date of next visit (if applicable): 10/1/2024

## 2024-08-22 DIAGNOSIS — N32.81 OAB (OVERACTIVE BLADDER): ICD-10-CM

## 2024-08-22 DIAGNOSIS — R35.0 FREQUENCY OF URINATION: ICD-10-CM

## 2024-08-22 RX ORDER — VIBEGRON 75 MG/1
75 TABLET, FILM COATED ORAL DAILY
Qty: 28 TABLET | Refills: 0 | Status: SHIPPED | OUTPATIENT
Start: 2024-08-22

## 2024-08-22 NOTE — TELEPHONE ENCOUNTER
Joanna Delarosa called requesting a refill on the following medications:  Requested Prescriptions     Pending Prescriptions Disp Refills    vibegron (GEMTESA) 75 MG TABS tablet 28 tablet 0     Sig: Take 1 tablet by mouth daily     Pharmacy verified: [facility]   .pv      Date of last visit: 5/23/2024  Date of next visit (if applicable): Visit date not found

## 2024-08-26 DIAGNOSIS — N32.81 OAB (OVERACTIVE BLADDER): ICD-10-CM

## 2024-08-26 DIAGNOSIS — R35.0 FREQUENCY OF URINATION: ICD-10-CM

## 2024-08-26 RX ORDER — VIBEGRON 75 MG/1
75 TABLET, FILM COATED ORAL DAILY
Qty: 90 TABLET | OUTPATIENT
Start: 2024-08-26

## 2024-08-28 DIAGNOSIS — M54.17 RADICULOPATHY, LUMBOSACRAL REGION: ICD-10-CM

## 2024-08-28 DIAGNOSIS — M79.2 NEUROPATHIC PAIN: ICD-10-CM

## 2024-08-28 RX ORDER — OXYCODONE AND ACETAMINOPHEN 5; 325 MG/1; MG/1
1 TABLET ORAL DAILY PRN
Qty: 30 TABLET | Refills: 0 | Status: SHIPPED | OUTPATIENT
Start: 2024-09-07 | End: 2024-10-01 | Stop reason: SDUPTHER

## 2024-08-28 RX ORDER — HYDROXYZINE PAMOATE 50 MG/1
50 CAPSULE ORAL 3 TIMES DAILY PRN
Status: CANCELLED | OUTPATIENT
Start: 2024-08-28

## 2024-08-28 NOTE — TELEPHONE ENCOUNTER
OARRS reviewed. UDS: + for  Hydroxyzine, Risperidone, Trazodone, Pregabalin, Oxycodone.   Last seen: 6/27/2024. Follow-up: 10/1/2024

## 2024-08-28 NOTE — TELEPHONE ENCOUNTER
Joanna is requesting a refill of their   Requested Prescriptions     Pending Prescriptions Disp Refills    oxyCODONE-acetaminophen (PERCOCET) 5-325 MG per tablet 30 tablet 0     Sig: Take 1 tablet by mouth daily as needed for Pain for up to 30 days. Max Daily Amount: 1 tablet    hydrOXYzine pamoate (VISTARIL) 50 MG capsule       Sig: Take 1 capsule by mouth 3 times daily as needed for Itching   . Please advise.    Last Appt:  Visit date not found  Next Appt:   Visit date not found  Preferred pharmacy:   [facility] [address] [place] [address] Floor - P [phone] - F [phone]

## 2024-09-23 DIAGNOSIS — M79.2 NEUROPATHIC PAIN: ICD-10-CM

## 2024-09-24 RX ORDER — PREGABALIN 150 MG/1
300 CAPSULE ORAL DAILY
Qty: 60 CAPSULE | Refills: 0 | Status: SHIPPED | OUTPATIENT
Start: 2024-09-24 | End: 2024-10-01 | Stop reason: SDUPTHER

## 2024-10-01 ENCOUNTER — OFFICE VISIT (OUTPATIENT)
Dept: PHYSICAL MEDICINE AND REHAB | Age: 59
End: 2024-10-01
Payer: MEDICAID

## 2024-10-01 VITALS
WEIGHT: 156 LBS | DIASTOLIC BLOOD PRESSURE: 60 MMHG | HEIGHT: 59 IN | BODY MASS INDEX: 31.45 KG/M2 | SYSTOLIC BLOOD PRESSURE: 122 MMHG

## 2024-10-01 DIAGNOSIS — M79.2 NEUROPATHIC PAIN: ICD-10-CM

## 2024-10-01 DIAGNOSIS — G37.3 TRANSVERSE MYELITIS (HCC): ICD-10-CM

## 2024-10-01 DIAGNOSIS — G89.29 OTHER CHRONIC PAIN: ICD-10-CM

## 2024-10-01 DIAGNOSIS — M54.17 RADICULOPATHY, LUMBOSACRAL REGION: ICD-10-CM

## 2024-10-01 DIAGNOSIS — G95.11 SPINAL CORD INFARCTION (HCC): ICD-10-CM

## 2024-10-01 DIAGNOSIS — M54.17 RADICULOPATHY, LUMBOSACRAL REGION: Primary | ICD-10-CM

## 2024-10-01 DIAGNOSIS — G89.4 CHRONIC PAIN SYNDROME: ICD-10-CM

## 2024-10-01 PROCEDURE — 99214 OFFICE O/P EST MOD 30 MIN: CPT | Performed by: NURSE PRACTITIONER

## 2024-10-01 RX ORDER — OXYCODONE AND ACETAMINOPHEN 5; 325 MG/1; MG/1
1 TABLET ORAL DAILY PRN
Qty: 30 TABLET | Refills: 0 | Status: SHIPPED | OUTPATIENT
Start: 2024-10-09 | End: 2024-10-01 | Stop reason: SDUPTHER

## 2024-10-01 RX ORDER — PREGABALIN 150 MG/1
300 CAPSULE ORAL DAILY
Qty: 60 CAPSULE | Refills: 2 | Status: SHIPPED | OUTPATIENT
Start: 2024-10-24 | End: 2024-10-01 | Stop reason: SDUPTHER

## 2024-10-01 RX ORDER — LIDOCAINE 50 MG/G
OINTMENT TOPICAL
Qty: 30 G | Refills: 2 | Status: SHIPPED | OUTPATIENT
Start: 2024-10-01

## 2024-10-01 RX ORDER — OXYCODONE AND ACETAMINOPHEN 5; 325 MG/1; MG/1
1 TABLET ORAL DAILY PRN
Qty: 30 TABLET | Refills: 0 | Status: SHIPPED | OUTPATIENT
Start: 2024-10-09 | End: 2024-11-08

## 2024-10-01 RX ORDER — PREGABALIN 150 MG/1
300 CAPSULE ORAL DAILY
Qty: 60 CAPSULE | Refills: 2 | Status: SHIPPED | OUTPATIENT
Start: 2024-10-24 | End: 2025-01-22

## 2024-10-01 NOTE — PROGRESS NOTES
Functionality Assessment/Goals Worksheet     On a scale of 0 (Does not Interfere) to 10 (Completely Interferes)     1.  Which number describes how during the past week pain has interfered with           the following:  A.  General Activity:  10  B.  Mood: 9  C.  Walking Ability:  10  D.  Normal Work (Includes both work outside the home and housework):  10  E.  Relations with Other People:   10  F.  Sleep:   10  G.  Enjoyment of Life:   10    2.  Patient Prefers to Take their Pain Medications:     [x]  On a regular basis   []  Only when necessary    []  Does not take pain medications    3.  What are the Patient's Goals/Expectations for Visiting Pain Management?     [x]  Learn about my pain    []  Receive Medication   []  Physical Therapy     []  Treat Depression   []  Receive Injections    []  Treat Sleep   []  Deal with Anxiety and Stress   []  Treat Opoid Dependence/Addiction   []  Other:

## 2024-10-01 NOTE — PROGRESS NOTES
Chronic Pain/PM&R Clinic Note     Encounter Date: 10/1/24    Subjective:   Chief Complaint:   Chief Complaint   Patient presents with    Follow-up     Discuss EMG results        History of Present Illness:   Joanna Delarosa is a 58 y.o. female seen in the clinic initially on 08/09/21 for her history of left sided chest wall pain.  She has a medical history of left breast mastectomy in February 2021 secondary to breast cancer with subsequent wound dehiscence and surgical debridement and spinal cord injury secondary to transverse myelitis.  She has been dealing with left-sided chest wall pain at the site of her incision ever since her mastectomy and this is been exacerbated after most recent revision surgery in June 2021.  She describes his pain is a constant stabbing, electrical type pain right on her incision scar.  She rates this pain is a constant 6/10 pain that can get up to a 10/10.  She denies any drainage or areas of erythema around the scar.  She states that this area is so sensitive that is difficult for her to wear even a supportive bra.  She reports not getting much relief with her other medications including Lyrica 150 mg twice a day, Norco 5 mg twice a day, and Elavil 50 mg at night.  She states that this is interfering with her everyday activities and really interfering with sleep.     10/12/2023, patient presents for planned follow up to discuss medication.  She presents today in a wheelchair, with her medications in a bag to review.  She states she is not taking Elavil due to side effects of hallucination with it.  She states she continues take the Lyrica at night, states she has plenty as she is only taking it nightly.  She did have a fill from her primary care provider on 7/31/23 for twice daily, and a fill from Dr. Lloyd on 8/23/23 for once daily.  She presents with a full bottle of this to her appointment.  She also has a full bottle of baclofen 10 mg that was prescribed by Sheryl Denson

## 2024-10-01 NOTE — TELEPHONE ENCOUNTER
Received call from [facility] pharmacy that they cannot provide scripts for percocet. Called pt. And she wants percocet and pregabalin sent to [facility] outpt pharmacy. Called [facility] back and canceled both scripts.

## 2024-10-18 NOTE — DISCHARGE INSTRUCTIONS
Post procedure Instructions:    No driving or making significant decisions for the remainder of the day.   Be cautions with walking and activity for 24 hours, do not over exert yourself.  Ok to resume pre-procedure activity level today.  Apply ice to site of injection site if you have pain or discomfort.  Do not submerge sit of injection during bath or pool activities for 48 hours post-procedure.  Resume blood thinning medications in 24 hours.     Call office [phone] if you have:  Temperature greater than 100.4  Persistent nausea and vomiting  Severe uncontrolled pain  Redness, tenderness, or signs of infection (pain, swelling, redness, odor or green/yellow discharge around the site)  Difficulty breathing, headache or visual disturbances  Hives  Persistent dizziness or light-headedness  Extreme fatigue  Any other questions or concerns you may have after discharge    In an emergency, call 911 or go to an Emergency Department at a nearby hospital    “Surgical Site Infections”      How can we work together to prevent Surgical Site Infections?   We would like to thank you for choosing [facility] for your Surgical Care.  Below you will find helpful information on how we can work together to prevent Surgical Site Infections.    What is a Surgical Site Infection (SSI)?  A surgical site infection is an infection that occurs after surgery in the part of the body where the surgery took place. Most patients who have surgery do not develop an infection. However, infections develop in about 1 to 3 out of every 100 patients who have surgery.  Some of the common symptoms of a surgical site infection are:  Redness and pain around the area where you had surgery  Drainage of cloudy fluid from your surgical wound  Fever    Can SSIs be treated?  Yes. Most surgical site infections can be treated with antibiotics. The antibiotic given to you depends on the bacteria (germs) causing the infection. Sometimes patients with

## 2024-10-23 NOTE — H&P
Today, patient presents for planned lumbar epidural steroid injection approach at Lumbar 4/5 paramedian right    This note is reflective of the patient's previous visit for evaluation. We will proceed with today's planned procedure. Since patient's last visit for evaluation, there have been no interval changes in medical history. Patient has no new numbness, weakness, or focal neurological deficit since evaluation. Patient has no contraindications to injection (no anticoagulation or recent antibiotic intake for active infections), and has a  present or is able to drive themselves (as discussed and cleared by physician).  Allergies to latex, contrast dye, and steroid medications have been confirmed with the patient prior to the procedure.  NPO necessity has been assessed and accepted based on procedure complexity. The risks and benefits of the procedure have been explained including but are not limited to infection, bleeding, paralysis, immediate post procedure weakness, and dizziness; the patient acknowledges understanding and desires to proceed with the procedure. Patient has signed consent for same procedure as discussed in previous clinic encounter. All other questions and concerns were addressed at bedside. See procedure note for full details.       Post procedure Instructions: The patient was advised not to drive during the day of the procedure and not to engage in any significant decision making (unless otherwise states by physician). The patient was also advised to be cautious with walking/activity for 24 hours following today's visit and asked not to engage in over-exertion (unless otherwise states by physician). After this time, it is ok to resume pre-procedure level of activity. Patient advised to apply ice to site of injection in situations of pain and discomfort. Patient advised to not submerge site of injection during bath or pool activities for approximately 24 hours post-procedure. Patient

## 2024-10-24 ENCOUNTER — HOSPITAL ENCOUNTER (OUTPATIENT)
Age: 59
Setting detail: OUTPATIENT SURGERY
Discharge: HOME | End: 2024-10-24
Attending: ANESTHESIOLOGY | Admitting: ANESTHESIOLOGY
Payer: MEDICAID

## 2024-10-24 ENCOUNTER — APPOINTMENT (OUTPATIENT)
Dept: GENERAL RADIOLOGY | Age: 59
End: 2024-10-24
Attending: ANESTHESIOLOGY
Payer: MEDICAID

## 2024-10-24 VITALS
RESPIRATION RATE: 14 BRPM | SYSTOLIC BLOOD PRESSURE: 122 MMHG | HEART RATE: 89 BPM | TEMPERATURE: 97.2 F | DIASTOLIC BLOOD PRESSURE: 58 MMHG | WEIGHT: 156 LBS | HEIGHT: 60 IN | BODY MASS INDEX: 30.63 KG/M2 | OXYGEN SATURATION: 95 %

## 2024-10-24 LAB — GLUCOSE BLD STRIP.AUTO-MCNC: 186 MG/DL (ref 70–108)

## 2024-10-24 PROCEDURE — 7100000010 HC PHASE II RECOVERY - FIRST 15 MIN: Performed by: ANESTHESIOLOGY

## 2024-10-24 PROCEDURE — 6360000002 HC RX W HCPCS: Performed by: ANESTHESIOLOGY

## 2024-10-24 PROCEDURE — 2709999900 HC NON-CHARGEABLE SUPPLY: Performed by: ANESTHESIOLOGY

## 2024-10-24 PROCEDURE — 6360000004 HC RX CONTRAST MEDICATION: Performed by: ANESTHESIOLOGY

## 2024-10-24 PROCEDURE — 3600000054 HC PAIN LEVEL 3 BASE: Performed by: ANESTHESIOLOGY

## 2024-10-24 PROCEDURE — 82948 REAGENT STRIP/BLOOD GLUCOSE: CPT

## 2024-10-24 PROCEDURE — 62323 NJX INTERLAMINAR LMBR/SAC: CPT | Performed by: ANESTHESIOLOGY

## 2024-10-24 PROCEDURE — 7100000011 HC PHASE II RECOVERY - ADDTL 15 MIN: Performed by: ANESTHESIOLOGY

## 2024-10-24 PROCEDURE — 2500000003 HC RX 250 WO HCPCS: Performed by: ANESTHESIOLOGY

## 2024-10-24 PROCEDURE — 99152 MOD SED SAME PHYS/QHP 5/>YRS: CPT | Performed by: ANESTHESIOLOGY

## 2024-10-24 RX ORDER — LIDOCAINE HYDROCHLORIDE 10 MG/ML
INJECTION, SOLUTION EPIDURAL; INFILTRATION; INTRACAUDAL; PERINEURAL PRN
Status: DISCONTINUED | OUTPATIENT
Start: 2024-10-24 | End: 2024-10-24 | Stop reason: ALTCHOICE

## 2024-10-24 RX ORDER — MIDAZOLAM HYDROCHLORIDE 1 MG/ML
INJECTION, SOLUTION INTRAMUSCULAR; INTRAVENOUS PRN
Status: DISCONTINUED | OUTPATIENT
Start: 2024-10-24 | End: 2024-10-24 | Stop reason: ALTCHOICE

## 2024-10-24 RX ORDER — BUPIVACAINE HYDROCHLORIDE 5 MG/ML
INJECTION, SOLUTION EPIDURAL; INTRACAUDAL PRN
Status: DISCONTINUED | OUTPATIENT
Start: 2024-10-24 | End: 2024-10-24 | Stop reason: ALTCHOICE

## 2024-10-24 RX ORDER — DEXAMETHASONE SODIUM PHOSPHATE 4 MG/ML
INJECTION, SOLUTION INTRA-ARTICULAR; INTRALESIONAL; INTRAMUSCULAR; INTRAVENOUS; SOFT TISSUE PRN
Status: DISCONTINUED | OUTPATIENT
Start: 2024-10-24 | End: 2024-10-24 | Stop reason: ALTCHOICE

## 2024-10-24 RX ORDER — IOPAMIDOL 612 MG/ML
INJECTION, SOLUTION INTRAVASCULAR PRN
Status: DISCONTINUED | OUTPATIENT
Start: 2024-10-24 | End: 2024-10-24 | Stop reason: ALTCHOICE

## 2024-10-24 RX ORDER — ACYCLOVIR 200 MG/1
CAPSULE ORAL PRN
Status: DISCONTINUED | OUTPATIENT
Start: 2024-10-24 | End: 2024-10-24 | Stop reason: ALTCHOICE

## 2024-10-24 RX ORDER — FENTANYL CITRATE 50 UG/ML
INJECTION, SOLUTION INTRAMUSCULAR; INTRAVENOUS PRN
Status: DISCONTINUED | OUTPATIENT
Start: 2024-10-24 | End: 2024-10-24 | Stop reason: ALTCHOICE

## 2024-10-24 ASSESSMENT — PAIN DESCRIPTION - ORIENTATION: ORIENTATION: RIGHT

## 2024-10-24 ASSESSMENT — PAIN DESCRIPTION - LOCATION: LOCATION: LEG

## 2024-10-24 ASSESSMENT — PAIN SCALES - GENERAL: PAINLEVEL_OUTOF10: 7

## 2024-10-24 NOTE — PRE SEDATION
Mark Rose MD   omeprazole (PRILOSEC) 20 MG capsule Take 1 capsule by mouth daily   Yes Mark Rose MD   lidocaine (XYLOCAINE) 5 % ointment Apply 4 times daily as needed. 10/1/24   Goldie Snowden APRN - CNP   tamsulosin (FLOMAX) 0.4 MG capsule Take 1 capsule by mouth daily  Patient not taking: Reported on 10/24/2024 5/23/24   Leodan Salvador PA-C   Continuous Glucose Sensor (FREESTYLE TAMARA 3 SENSOR) MISC USE AS DIRECTED four times a day CHANGE EVERY 14 DAYS 4/21/24   Mark Rose MD   spironolactone (ALDACTONE) 50 MG tablet Take by mouth  Patient not taking: Reported on 10/24/2024 12/12/22   Mark Rose MD   aspirin 81 MG EC tablet Take by mouth  Patient not taking: Reported on 10/24/2024 12/12/22   Provider, Historical, MD   Misc Natural Products (GLUCOSAMINE CHOND CMP ADVANCED) TABS Take by mouth  Patient not taking: Reported on 10/24/2024    Mark Rose MD   metOLazone (ZAROXOLYN) 5 MG tablet Take by mouth 12/12/22   Mark Rose MD   loratadine (CLARITIN) 10 MG tablet Take by mouth  Patient not taking: Reported on 10/24/2024 12/12/22   Mark Rose MD   insulin glargine (LANTUS) 100 UNIT/ML injection vial Inject 30 Units into the skin every morning (before breakfast) 8/2/22   Fercho Waite MD   nicotine (NICODERM CQ) 14 MG/24HR Place 1 patch onto the skin in the morning.  Patient not taking: Reported on 10/24/2024 8/3/22   Fercho Waite MD Misc. Devices (TRANSFER BENCH) MISC 1 each by Does not apply route daily Tub transfer bench with back 2/24/22   James Berger MD   Insulin Syringes, Disposable, U-100 1 ML MISC 1 each by Does not apply route daily 2/23/22   Pilar Baker,    magnesium oxide (MAG-OX) 400 (241.3 Mg) MG TABS tablet Take 1 tablet by mouth daily 2/21/22 5/30/24  Pilar Baker, DO   Handicap Placard MISC by Does not apply route 2/08/22- 2/8/2027 2/8/22   Mayito Lloyd,    BD PEN NEEDLE JACOBO 2ND GEN 32G X 4 MM MISC

## 2024-10-24 NOTE — PROCEDURES
Pre-operative Diagnosis: Radicular leg pain     Post-operative Diagnosis: Radicular leg pain     Procedure: Lumbar epidural steroid injection    Procedure Description:  After having obtained a signed informed consent, the patient was taken to the fluoroscopy suite and placed in the prone position. The patient's back was prepped with chloraprep and draped in a sterile fashion.  A total of 1.5 cc of 1 % lidocaine was used to anesthetize the skin and underlying tissues.  Under fluoroscopic guidance, a single 20G Tuohy needle was advanced using midline approach at the L4/L5 interspace until gaining the epidural space using the loss of resistance to saline syringe technique.  There were no paresthesias, heme, or CSF aspiration.  A total of 0.25 cc of Omnipaque 300 were injected having had adequate dye spread within the epidural space. Needle placement and contrast spread was confirmed using the AP and contralateral views.  10 mg of Dexamethasone with 1 cc of saline solution were injected in the epidural space. The needle was flushed and removed without any complication.  The patient tolerated the procedure well and was transported to the recovery room. The patient was observed for 15 minutes to then discharged in an ambulatory fashion.    Procedural Complications: None  Estimated Blood Loss: 0 mL      IV sedation was used during the procedure:  - Moderate intravenous conscious sedation was supervised by Dr. Lloyd  - The patient was independently monitored by a Registered Nurse assigned to the procedure room  - Monitoring included automated blood pressure, continuous EKG, and continuous pulse oximetry  - The detailed conscious record is permanently stored in the Hospital Information System  - The following is the conscious sedation record:  Start Time: 09:04  End Time : 09:19  Duration: 15 minutes   Medications Administered: 2 mg Versed, 50 mcg Fentanyl       Mayito Lloyd DO  Interventional Pain Management/PM&R  intact

## 2024-10-24 NOTE — PROGRESS NOTES
0909: Patient arrived to Phase II recovery via cart. Awake and alert. Report received from YASH Fernandez.   0912: VSS. Patient denies pain. HOB elevated. Snack and drink provided. Call light placed in reach.  0930: IV removed. Patient dressed and transferred to wheelchair. Awaiting transportation.  0937: Patient escorted to vehicle via wheelchair and discharged home in stable condition via [employer] transport.

## 2024-10-24 NOTE — POST SEDATION
[facility]  Sedation/Analgesia Post Sedation Record    Pt Name: Joanna Delarosa  MRN: [id number]  YOB: 1965  Procedure Performed By: Mayito Lloyd DO  Primary Care Physician: Orlando Cooley MD    POST-PROCEDURE    Physicians/Assistants: Mayito Lloyd DO  Procedure Performed: See Procedure Note   Sedation/Anesthesia: Versed and Fentanyl (See procedure note for amount and duration)  Estimated Blood Loss:     0  ml  Specimens Removed: None        Complications: None           Mayito Lloyd DO  Electronically signed 10/24/2024 at 10:55 AM

## 2024-10-29 ENCOUNTER — TELEPHONE (OUTPATIENT)
Dept: PHYSICAL MEDICINE AND REHAB | Age: 59
End: 2024-10-29

## 2024-10-29 NOTE — TELEPHONE ENCOUNTER
Pt called and said since 10/24 when had LES injection the back pain and down the leg burning on the right has gotten worse than before . Also developed a severe headache with dizziness that is their all the time.(Not positional). Said she has been taking otc meds, percocet and at times taking 2 a day instead of 1 to make the pain more tolerable. Advised med changes are not done after injections and cannot get early fill. Advised not to take more than prescribed.   Sepsis Criteria were met:

## 2024-10-29 NOTE — TELEPHONE ENCOUNTER
The Pt is having is what she is calling side effects from the Procedure she had with Dr Lloyd on 10/24/24. She would like to speak to a Nurse about her [id number]. She is requesting a return Phone call today.

## 2024-10-29 NOTE — TELEPHONE ENCOUNTER
It can take up to 7 days to obtain relief. I would give it more time before making adjustments. Pain medication needs to be taken as prescribed and will not be filled early.

## 2024-11-05 DIAGNOSIS — N32.81 OAB (OVERACTIVE BLADDER): ICD-10-CM

## 2024-11-05 DIAGNOSIS — R35.0 FREQUENCY OF URINATION: ICD-10-CM

## 2024-11-05 NOTE — TELEPHONE ENCOUNTER
Joanna is requesting a refill of their   Requested Prescriptions     Pending Prescriptions Disp Refills    vibegron (GEMTESA) 75 MG TABS tablet 28 tablet 0     Sig: Take 1 tablet by mouth daily   . Please advise.      Last Appt:  Visit date not found  Next Appt:   Visit date not found  Preferred pharmacy:   [facility] - [address] [phone] - F [phone]

## 2024-11-06 RX ORDER — VIBEGRON 75 MG/1
75 TABLET, FILM COATED ORAL DAILY
Qty: 28 TABLET | Refills: 0 | OUTPATIENT
Start: 2024-11-06

## 2024-11-08 DIAGNOSIS — M79.2 NEUROPATHIC PAIN: ICD-10-CM

## 2024-11-08 DIAGNOSIS — M54.17 RADICULOPATHY, LUMBOSACRAL REGION: ICD-10-CM

## 2024-11-08 RX ORDER — OXYCODONE AND ACETAMINOPHEN 5; 325 MG/1; MG/1
1 TABLET ORAL DAILY PRN
Qty: 30 TABLET | Refills: 0 | Status: SHIPPED | OUTPATIENT
Start: 2024-11-08 | End: 2024-12-08

## 2024-11-08 NOTE — TELEPHONE ENCOUNTER
OARRS reviewed. UDS: + for  hydroxyzine,risperidone,trazodone,pregabalin,oxycodone. Desvenlafaxine and amitriptyline is non-compliant.  Last seen: 10/1/2024. Follow-up: 12/5/2024

## 2024-11-08 NOTE — TELEPHONE ENCOUNTER
Joanna Delarosa called requesting a refill on the following medications:  Requested Prescriptions     Pending Prescriptions Disp Refills    oxyCODONE-acetaminophen (PERCOCET) 5-325 MG per tablet 30 tablet 0     Sig: Take 1 tablet by mouth daily as needed for Pain for up to 30 days. Max Daily Amount: 1 tablet     Pharmacy verified:    [facility] [address] - P [phone] - F [phone]     Date of last visit: 10/01/2024  Date of next visit (if applicable): 12/5/2024

## 2024-12-09 ENCOUNTER — APPOINTMENT (OUTPATIENT)
Dept: GENERAL RADIOLOGY | Age: 59
DRG: 340 | End: 2024-12-09
Payer: MEDICAID

## 2024-12-09 ENCOUNTER — HOSPITAL ENCOUNTER (INPATIENT)
Age: 59
LOS: 4 days | Discharge: HOME HEALTH | DRG: 340 | End: 2024-12-13
Attending: EMERGENCY MEDICINE | Admitting: ORTHOPAEDIC SURGERY
Payer: MEDICAID

## 2024-12-09 DIAGNOSIS — S72.352A CLOSED DISPLACED COMMINUTED FRACTURE OF SHAFT OF LEFT FEMUR, INITIAL ENCOUNTER (HCC): Primary | ICD-10-CM

## 2024-12-09 DIAGNOSIS — S72.452P: ICD-10-CM

## 2024-12-09 LAB
ALBUMIN SERPL BCG-MCNC: 3.3 G/DL (ref 3.5–5.1)
ALP SERPL-CCNC: 102 U/L (ref 38–126)
ALT SERPL W/O P-5'-P-CCNC: 34 U/L (ref 11–66)
ANION GAP SERPL CALC-SCNC: 15 MEQ/L (ref 8–16)
AST SERPL-CCNC: 50 U/L (ref 5–40)
BASOPHILS ABSOLUTE: 0 THOU/MM3 (ref 0–0.1)
BASOPHILS NFR BLD AUTO: 0.5 %
BILIRUB SERPL-MCNC: 1.9 MG/DL (ref 0.3–1.2)
BUN SERPL-MCNC: 12 MG/DL (ref 7–22)
CALCIUM SERPL-MCNC: 9.2 MG/DL (ref 8.5–10.5)
CHLORIDE SERPL-SCNC: 105 MEQ/L (ref 98–111)
CO2 SERPL-SCNC: 19 MEQ/L (ref 23–33)
CREAT SERPL-MCNC: 0.7 MG/DL (ref 0.4–1.2)
DEPRECATED RDW RBC AUTO: 53.6 FL (ref 35–45)
EOSINOPHIL NFR BLD AUTO: 0.4 %
EOSINOPHILS ABSOLUTE: 0 THOU/MM3 (ref 0–0.4)
ERYTHROCYTE [DISTWIDTH] IN BLOOD BY AUTOMATED COUNT: 17 % (ref 11.5–14.5)
GFR SERPL CREATININE-BSD FRML MDRD: > 90 ML/MIN/1.73M2
GLUCOSE SERPL-MCNC: 172 MG/DL (ref 70–108)
HCT VFR BLD AUTO: 39.8 % (ref 37–47)
HGB BLD-MCNC: 14.1 GM/DL (ref 12–16)
IMM GRANULOCYTES # BLD AUTO: 0.02 THOU/MM3 (ref 0–0.07)
IMM GRANULOCYTES NFR BLD AUTO: 0.4 %
LYMPHOCYTES ABSOLUTE: 0.8 THOU/MM3 (ref 1–4.8)
LYMPHOCYTES NFR BLD AUTO: 14.1 %
MCH RBC QN AUTO: 30.9 PG (ref 26–33)
MCHC RBC AUTO-ENTMCNC: 35.4 GM/DL (ref 32.2–35.5)
MCV RBC AUTO: 87.3 FL (ref 81–99)
MONOCYTES ABSOLUTE: 0.5 THOU/MM3 (ref 0.4–1.3)
MONOCYTES NFR BLD AUTO: 8.8 %
NEUTROPHILS ABSOLUTE: 4.2 THOU/MM3 (ref 1.8–7.7)
NEUTROPHILS NFR BLD AUTO: 75.8 %
NRBC BLD AUTO-RTO: 0 /100 WBC
OSMOLALITY SERPL CALC.SUM OF ELEC: 281.4 MOSMOL/KG (ref 275–300)
PLATELET # BLD AUTO: 63 THOU/MM3 (ref 130–400)
PMV BLD AUTO: 10.8 FL (ref 9.4–12.4)
POTASSIUM SERPL-SCNC: 4.4 MEQ/L (ref 3.5–5.2)
PROT SERPL-MCNC: 6.5 G/DL (ref 6.1–8)
RBC # BLD AUTO: 4.56 MILL/MM3 (ref 4.2–5.4)
SCAN OF BLOOD SMEAR: NORMAL
SODIUM SERPL-SCNC: 139 MEQ/L (ref 135–145)
WBC # BLD AUTO: 5.5 THOU/MM3 (ref 4.8–10.8)

## 2024-12-09 PROCEDURE — 73590 X-RAY EXAM OF LOWER LEG: CPT

## 2024-12-09 PROCEDURE — 72170 X-RAY EXAM OF PELVIS: CPT

## 2024-12-09 PROCEDURE — 36415 COLL VENOUS BLD VENIPUNCTURE: CPT

## 2024-12-09 PROCEDURE — 99285 EMERGENCY DEPT VISIT HI MDM: CPT

## 2024-12-09 PROCEDURE — 80053 COMPREHEN METABOLIC PANEL: CPT

## 2024-12-09 PROCEDURE — 1200000000 HC SEMI PRIVATE

## 2024-12-09 PROCEDURE — 71045 X-RAY EXAM CHEST 1 VIEW: CPT

## 2024-12-09 PROCEDURE — 6360000002 HC RX W HCPCS

## 2024-12-09 PROCEDURE — 93005 ELECTROCARDIOGRAM TRACING: CPT

## 2024-12-09 PROCEDURE — 73552 X-RAY EXAM OF FEMUR 2/>: CPT

## 2024-12-09 PROCEDURE — 99223 1ST HOSP IP/OBS HIGH 75: CPT

## 2024-12-09 PROCEDURE — 2580000003 HC RX 258

## 2024-12-09 PROCEDURE — 85025 COMPLETE CBC W/AUTO DIFF WBC: CPT

## 2024-12-09 RX ORDER — POLYETHYLENE GLYCOL 3350 17 G/17G
17 POWDER, FOR SOLUTION ORAL DAILY PRN
Status: DISCONTINUED | OUTPATIENT
Start: 2024-12-09 | End: 2024-12-13 | Stop reason: HOSPADM

## 2024-12-09 RX ORDER — ACETAMINOPHEN 325 MG/1
650 TABLET ORAL EVERY 6 HOURS PRN
Status: DISCONTINUED | OUTPATIENT
Start: 2024-12-09 | End: 2024-12-13 | Stop reason: HOSPADM

## 2024-12-09 RX ORDER — SODIUM CHLORIDE 9 MG/ML
INJECTION, SOLUTION INTRAVENOUS PRN
Status: DISCONTINUED | OUTPATIENT
Start: 2024-12-09 | End: 2024-12-13 | Stop reason: HOSPADM

## 2024-12-09 RX ORDER — ACETAMINOPHEN 650 MG/1
650 SUPPOSITORY RECTAL EVERY 6 HOURS PRN
Status: DISCONTINUED | OUTPATIENT
Start: 2024-12-09 | End: 2024-12-13 | Stop reason: HOSPADM

## 2024-12-09 RX ORDER — ONDANSETRON 2 MG/ML
4 INJECTION INTRAMUSCULAR; INTRAVENOUS ONCE
Status: COMPLETED | OUTPATIENT
Start: 2024-12-09 | End: 2024-12-09

## 2024-12-09 RX ORDER — GUAIFENESIN 600 MG/1
1200 TABLET, EXTENDED RELEASE ORAL 2 TIMES DAILY
COMMUNITY

## 2024-12-09 RX ORDER — MONTELUKAST SODIUM 10 MG/1
10 TABLET ORAL NIGHTLY
COMMUNITY

## 2024-12-09 RX ORDER — ONDANSETRON 4 MG/1
4 TABLET, ORALLY DISINTEGRATING ORAL EVERY 8 HOURS PRN
Status: DISCONTINUED | OUTPATIENT
Start: 2024-12-09 | End: 2024-12-13 | Stop reason: HOSPADM

## 2024-12-09 RX ORDER — HYDROCODONE BITARTRATE AND ACETAMINOPHEN 5; 325 MG/1; MG/1
2 TABLET ORAL EVERY 4 HOURS PRN
Status: DISCONTINUED | OUTPATIENT
Start: 2024-12-09 | End: 2024-12-13 | Stop reason: HOSPADM

## 2024-12-09 RX ORDER — MORPHINE SULFATE 4 MG/ML
4 INJECTION, SOLUTION INTRAMUSCULAR; INTRAVENOUS ONCE
Status: COMPLETED | OUTPATIENT
Start: 2024-12-09 | End: 2024-12-09

## 2024-12-09 RX ORDER — SODIUM CHLORIDE 0.9 % (FLUSH) 0.9 %
5-40 SYRINGE (ML) INJECTION EVERY 12 HOURS SCHEDULED
Status: DISCONTINUED | OUTPATIENT
Start: 2024-12-09 | End: 2024-12-13 | Stop reason: HOSPADM

## 2024-12-09 RX ORDER — HYDROCODONE BITARTRATE AND ACETAMINOPHEN 5; 325 MG/1; MG/1
1 TABLET ORAL EVERY 4 HOURS PRN
Status: DISCONTINUED | OUTPATIENT
Start: 2024-12-09 | End: 2024-12-13 | Stop reason: HOSPADM

## 2024-12-09 RX ORDER — MORPHINE SULFATE 2 MG/ML
2 INJECTION, SOLUTION INTRAMUSCULAR; INTRAVENOUS
Status: DISCONTINUED | OUTPATIENT
Start: 2024-12-09 | End: 2024-12-12

## 2024-12-09 RX ORDER — ONDANSETRON 2 MG/ML
4 INJECTION INTRAMUSCULAR; INTRAVENOUS EVERY 6 HOURS PRN
Status: DISCONTINUED | OUTPATIENT
Start: 2024-12-09 | End: 2024-12-13 | Stop reason: HOSPADM

## 2024-12-09 RX ORDER — MAGNESIUM SULFATE IN WATER 40 MG/ML
2000 INJECTION, SOLUTION INTRAVENOUS PRN
Status: DISCONTINUED | OUTPATIENT
Start: 2024-12-09 | End: 2024-12-13 | Stop reason: HOSPADM

## 2024-12-09 RX ORDER — SODIUM CHLORIDE 0.9 % (FLUSH) 0.9 %
5-40 SYRINGE (ML) INJECTION PRN
Status: DISCONTINUED | OUTPATIENT
Start: 2024-12-09 | End: 2024-12-13 | Stop reason: HOSPADM

## 2024-12-09 RX ORDER — POTASSIUM CHLORIDE 7.45 MG/ML
10 INJECTION INTRAVENOUS PRN
Status: DISCONTINUED | OUTPATIENT
Start: 2024-12-09 | End: 2024-12-13 | Stop reason: HOSPADM

## 2024-12-09 RX ORDER — POTASSIUM CHLORIDE 1500 MG/1
40 TABLET, EXTENDED RELEASE ORAL PRN
Status: DISCONTINUED | OUTPATIENT
Start: 2024-12-09 | End: 2024-12-13 | Stop reason: HOSPADM

## 2024-12-09 RX ADMIN — ONDANSETRON 4 MG: 2 INJECTION INTRAMUSCULAR; INTRAVENOUS at 22:05

## 2024-12-09 RX ADMIN — HYDROMORPHONE HYDROCHLORIDE 1 MG: 1 INJECTION, SOLUTION INTRAMUSCULAR; INTRAVENOUS; SUBCUTANEOUS at 23:04

## 2024-12-09 RX ADMIN — METHYLPREDNISOLONE SODIUM SUCCINATE 125 MG: 125 INJECTION INTRAMUSCULAR; INTRAVENOUS at 23:03

## 2024-12-09 RX ADMIN — MORPHINE SULFATE 4 MG: 4 INJECTION, SOLUTION INTRAMUSCULAR; INTRAVENOUS at 22:05

## 2024-12-09 ASSESSMENT — PAIN DESCRIPTION - DESCRIPTORS: DESCRIPTORS: ACHING

## 2024-12-09 ASSESSMENT — PAIN DESCRIPTION - LOCATION
LOCATION: HIP
LOCATION: KNEE

## 2024-12-09 ASSESSMENT — PAIN DESCRIPTION - ORIENTATION
ORIENTATION: LEFT
ORIENTATION: LEFT

## 2024-12-09 ASSESSMENT — PAIN SCALES - GENERAL
PAINLEVEL_OUTOF10: 8
PAINLEVEL_OUTOF10: 10

## 2024-12-09 ASSESSMENT — PAIN - FUNCTIONAL ASSESSMENT
PAIN_FUNCTIONAL_ASSESSMENT: 0-10
PAIN_FUNCTIONAL_ASSESSMENT: PREVENTS OR INTERFERES SOME ACTIVE ACTIVITIES AND ADLS

## 2024-12-09 ASSESSMENT — PAIN DESCRIPTION - FREQUENCY: FREQUENCY: CONTINUOUS

## 2024-12-09 ASSESSMENT — PAIN DESCRIPTION - PAIN TYPE: TYPE: ACUTE PAIN

## 2024-12-09 ASSESSMENT — PAIN DESCRIPTION - ONSET: ONSET: ON-GOING

## 2024-12-10 ENCOUNTER — TELEPHONE (OUTPATIENT)
Dept: PHYSICAL MEDICINE AND REHAB | Age: 59
End: 2024-12-10

## 2024-12-10 DIAGNOSIS — M79.2 NEUROPATHIC PAIN: ICD-10-CM

## 2024-12-10 DIAGNOSIS — M54.17 RADICULOPATHY, LUMBOSACRAL REGION: ICD-10-CM

## 2024-12-10 LAB
ANION GAP SERPL CALC-SCNC: 13 MEQ/L (ref 8–16)
BASOPHILS ABSOLUTE: 0 THOU/MM3 (ref 0–0.1)
BASOPHILS NFR BLD AUTO: 0.2 %
BUN SERPL-MCNC: 16 MG/DL (ref 7–22)
CALCIUM SERPL-MCNC: 8.7 MG/DL (ref 8.5–10.5)
CHLORIDE SERPL-SCNC: 102 MEQ/L (ref 98–111)
CO2 SERPL-SCNC: 19 MEQ/L (ref 23–33)
CREAT SERPL-MCNC: 0.8 MG/DL (ref 0.4–1.2)
DEPRECATED MEAN GLUCOSE BLD GHB EST-ACNC: 144 MG/DL (ref 70–126)
DEPRECATED RDW RBC AUTO: 54.9 FL (ref 35–45)
EKG ATRIAL RATE: 86 BPM
EKG P AXIS: 77 DEGREES
EKG P-R INTERVAL: 138 MS
EKG Q-T INTERVAL: 412 MS
EKG QRS DURATION: 84 MS
EKG QTC CALCULATION (BAZETT): 493 MS
EKG R AXIS: 70 DEGREES
EKG T AXIS: 71 DEGREES
EKG VENTRICULAR RATE: 86 BPM
EOSINOPHIL NFR BLD AUTO: 0 %
EOSINOPHILS ABSOLUTE: 0 THOU/MM3 (ref 0–0.4)
ERYTHROCYTE [DISTWIDTH] IN BLOOD BY AUTOMATED COUNT: 16.9 % (ref 11.5–14.5)
GFR SERPL CREATININE-BSD FRML MDRD: 86 ML/MIN/1.73M2
GLUCOSE BLD STRIP.AUTO-MCNC: 196 MG/DL (ref 70–108)
GLUCOSE BLD STRIP.AUTO-MCNC: 229 MG/DL (ref 70–108)
GLUCOSE BLD STRIP.AUTO-MCNC: 244 MG/DL (ref 70–108)
GLUCOSE BLD STRIP.AUTO-MCNC: 260 MG/DL (ref 70–108)
GLUCOSE BLD STRIP.AUTO-MCNC: 306 MG/DL (ref 70–108)
GLUCOSE SERPL-MCNC: 209 MG/DL (ref 70–108)
HBA1C MFR BLD HPLC: 6.8 % (ref 4.4–6.4)
HCT VFR BLD AUTO: 38.4 % (ref 37–47)
HGB BLD-MCNC: 13.6 GM/DL (ref 12–16)
IMM GRANULOCYTES # BLD AUTO: 0.03 THOU/MM3 (ref 0–0.07)
IMM GRANULOCYTES NFR BLD AUTO: 0.5 %
LYMPHOCYTES ABSOLUTE: 0.6 THOU/MM3 (ref 1–4.8)
LYMPHOCYTES NFR BLD AUTO: 11.1 %
MCH RBC QN AUTO: 31.4 PG (ref 26–33)
MCHC RBC AUTO-ENTMCNC: 35.4 GM/DL (ref 32.2–35.5)
MCV RBC AUTO: 88.7 FL (ref 81–99)
MONOCYTES ABSOLUTE: 0.1 THOU/MM3 (ref 0.4–1.3)
MONOCYTES NFR BLD AUTO: 1.8 %
NEUTROPHILS ABSOLUTE: 4.8 THOU/MM3 (ref 1.8–7.7)
NEUTROPHILS NFR BLD AUTO: 86.4 %
NRBC BLD AUTO-RTO: 0 /100 WBC
PLATELET # BLD AUTO: 60 THOU/MM3 (ref 130–400)
PMV BLD AUTO: ABNORMAL FL (ref 9.4–12.4)
POTASSIUM SERPL-SCNC: 4.8 MEQ/L (ref 3.5–5.2)
RBC # BLD AUTO: 4.33 MILL/MM3 (ref 4.2–5.4)
SODIUM SERPL-SCNC: 134 MEQ/L (ref 135–145)
WBC # BLD AUTO: 5.5 THOU/MM3 (ref 4.8–10.8)

## 2024-12-10 PROCEDURE — 82948 REAGENT STRIP/BLOOD GLUCOSE: CPT

## 2024-12-10 PROCEDURE — 99233 SBSQ HOSP IP/OBS HIGH 50: CPT

## 2024-12-10 PROCEDURE — 6370000000 HC RX 637 (ALT 250 FOR IP)

## 2024-12-10 PROCEDURE — 83036 HEMOGLOBIN GLYCOSYLATED A1C: CPT

## 2024-12-10 PROCEDURE — 6370000000 HC RX 637 (ALT 250 FOR IP): Performed by: PHYSICIAN ASSISTANT

## 2024-12-10 PROCEDURE — 93010 ELECTROCARDIOGRAM REPORT: CPT | Performed by: NUCLEAR MEDICINE

## 2024-12-10 PROCEDURE — 2580000003 HC RX 258: Performed by: PHYSICIAN ASSISTANT

## 2024-12-10 PROCEDURE — 1200000000 HC SEMI PRIVATE

## 2024-12-10 PROCEDURE — 80048 BASIC METABOLIC PNL TOTAL CA: CPT

## 2024-12-10 PROCEDURE — 36415 COLL VENOUS BLD VENIPUNCTURE: CPT

## 2024-12-10 PROCEDURE — 6360000002 HC RX W HCPCS: Performed by: PHYSICIAN ASSISTANT

## 2024-12-10 PROCEDURE — 85025 COMPLETE CBC W/AUTO DIFF WBC: CPT

## 2024-12-10 RX ORDER — ESCITALOPRAM OXALATE 10 MG/1
10 TABLET ORAL DAILY
Status: DISCONTINUED | OUTPATIENT
Start: 2024-12-10 | End: 2024-12-13 | Stop reason: HOSPADM

## 2024-12-10 RX ORDER — ANASTROZOLE 1 MG/1
1 TABLET ORAL EVERY OTHER DAY
Status: DISCONTINUED | OUTPATIENT
Start: 2024-12-10 | End: 2024-12-13 | Stop reason: HOSPADM

## 2024-12-10 RX ORDER — PREGABALIN 75 MG/1
300 CAPSULE ORAL DAILY
Status: DISCONTINUED | OUTPATIENT
Start: 2024-12-10 | End: 2024-12-13 | Stop reason: HOSPADM

## 2024-12-10 RX ORDER — SPIRONOLACTONE 25 MG/1
50 TABLET ORAL DAILY
Status: DISCONTINUED | OUTPATIENT
Start: 2024-12-10 | End: 2024-12-13 | Stop reason: HOSPADM

## 2024-12-10 RX ORDER — INSULIN GLARGINE 100 [IU]/ML
10 INJECTION, SOLUTION SUBCUTANEOUS
Status: DISCONTINUED | OUTPATIENT
Start: 2024-12-10 | End: 2024-12-13 | Stop reason: HOSPADM

## 2024-12-10 RX ORDER — TRAZODONE HYDROCHLORIDE 100 MG/1
100 TABLET ORAL NIGHTLY PRN
Status: DISCONTINUED | OUTPATIENT
Start: 2024-12-10 | End: 2024-12-13 | Stop reason: HOSPADM

## 2024-12-10 RX ORDER — MINERAL OIL AND WHITE PETROLATUM 150; 830 MG/G; MG/G
OINTMENT OPHTHALMIC PRN
Status: DISCONTINUED | OUTPATIENT
Start: 2024-12-10 | End: 2024-12-13 | Stop reason: HOSPADM

## 2024-12-10 RX ORDER — GLUCAGON 1 MG/ML
1 KIT INJECTION PRN
Status: DISCONTINUED | OUTPATIENT
Start: 2024-12-10 | End: 2024-12-13 | Stop reason: HOSPADM

## 2024-12-10 RX ORDER — DEXTROSE MONOHYDRATE 100 MG/ML
INJECTION, SOLUTION INTRAVENOUS CONTINUOUS PRN
Status: DISCONTINUED | OUTPATIENT
Start: 2024-12-10 | End: 2024-12-13 | Stop reason: HOSPADM

## 2024-12-10 RX ORDER — INSULIN LISPRO 100 [IU]/ML
0-4 INJECTION, SOLUTION INTRAVENOUS; SUBCUTANEOUS
Status: DISCONTINUED | OUTPATIENT
Start: 2024-12-10 | End: 2024-12-13 | Stop reason: HOSPADM

## 2024-12-10 RX ORDER — TROSPIUM CHLORIDE 20 MG/1
20 TABLET, FILM COATED ORAL
Status: DISCONTINUED | OUTPATIENT
Start: 2024-12-10 | End: 2024-12-13 | Stop reason: HOSPADM

## 2024-12-10 RX ORDER — PALIPERIDONE 3 MG/1
3 TABLET, EXTENDED RELEASE ORAL NIGHTLY
Status: DISCONTINUED | OUTPATIENT
Start: 2024-12-10 | End: 2024-12-13 | Stop reason: HOSPADM

## 2024-12-10 RX ORDER — HYDROXYZINE PAMOATE 50 MG/1
50 CAPSULE ORAL 3 TIMES DAILY PRN
Status: DISCONTINUED | OUTPATIENT
Start: 2024-12-10 | End: 2024-12-13 | Stop reason: HOSPADM

## 2024-12-10 RX ORDER — MONTELUKAST SODIUM 10 MG/1
10 TABLET ORAL NIGHTLY
Status: DISCONTINUED | OUTPATIENT
Start: 2024-12-10 | End: 2024-12-13 | Stop reason: HOSPADM

## 2024-12-10 RX ADMIN — INSULIN LISPRO 1 UNITS: 100 INJECTION, SOLUTION INTRAVENOUS; SUBCUTANEOUS at 02:15

## 2024-12-10 RX ADMIN — HYDROCODONE BITARTRATE AND ACETAMINOPHEN 2 TABLET: 5; 325 TABLET ORAL at 00:05

## 2024-12-10 RX ADMIN — TROSPIUM CHLORIDE 20 MG: 20 TABLET, FILM COATED ORAL at 08:56

## 2024-12-10 RX ADMIN — MORPHINE SULFATE 2 MG: 2 INJECTION, SOLUTION INTRAMUSCULAR; INTRAVENOUS at 02:13

## 2024-12-10 RX ADMIN — INSULIN GLARGINE 10 UNITS: 100 INJECTION, SOLUTION SUBCUTANEOUS at 08:57

## 2024-12-10 RX ADMIN — HYDROCODONE BITARTRATE AND ACETAMINOPHEN 2 TABLET: 5; 325 TABLET ORAL at 04:32

## 2024-12-10 RX ADMIN — SODIUM CHLORIDE, PRESERVATIVE FREE 10 ML: 5 INJECTION INTRAVENOUS at 20:50

## 2024-12-10 RX ADMIN — HYDROCODONE BITARTRATE AND ACETAMINOPHEN 2 TABLET: 5; 325 TABLET ORAL at 14:27

## 2024-12-10 RX ADMIN — INSULIN LISPRO 3 UNITS: 100 INJECTION, SOLUTION INTRAVENOUS; SUBCUTANEOUS at 16:27

## 2024-12-10 RX ADMIN — TROSPIUM CHLORIDE 20 MG: 20 TABLET, FILM COATED ORAL at 16:24

## 2024-12-10 RX ADMIN — HYDROCODONE BITARTRATE AND ACETAMINOPHEN 2 TABLET: 5; 325 TABLET ORAL at 23:27

## 2024-12-10 RX ADMIN — ANASTROZOLE 1 MG: 1 TABLET ORAL at 18:27

## 2024-12-10 RX ADMIN — INSULIN LISPRO 1 UNITS: 100 INJECTION, SOLUTION INTRAVENOUS; SUBCUTANEOUS at 08:57

## 2024-12-10 RX ADMIN — HYDROCODONE BITARTRATE AND ACETAMINOPHEN 2 TABLET: 5; 325 TABLET ORAL at 08:56

## 2024-12-10 RX ADMIN — PREGABALIN 300 MG: 75 CAPSULE ORAL at 08:57

## 2024-12-10 RX ADMIN — MONTELUKAST 10 MG: 10 TABLET, FILM COATED ORAL at 20:50

## 2024-12-10 RX ADMIN — ESCITALOPRAM OXALATE 10 MG: 10 TABLET ORAL at 08:56

## 2024-12-10 RX ADMIN — PALIPERIDONE 3 MG: 3 TABLET, EXTENDED RELEASE ORAL at 02:16

## 2024-12-10 RX ADMIN — PALIPERIDONE 3 MG: 3 TABLET, EXTENDED RELEASE ORAL at 20:50

## 2024-12-10 RX ADMIN — SPIRONOLACTONE 50 MG: 25 TABLET ORAL at 08:56

## 2024-12-10 RX ADMIN — INSULIN LISPRO 2 UNITS: 100 INJECTION, SOLUTION INTRAVENOUS; SUBCUTANEOUS at 12:13

## 2024-12-10 RX ADMIN — SODIUM CHLORIDE, PRESERVATIVE FREE 10 ML: 5 INJECTION INTRAVENOUS at 08:57

## 2024-12-10 RX ADMIN — INSULIN LISPRO 1 UNITS: 100 INJECTION, SOLUTION INTRAVENOUS; SUBCUTANEOUS at 20:53

## 2024-12-10 RX ADMIN — MORPHINE SULFATE 2 MG: 2 INJECTION, SOLUTION INTRAMUSCULAR; INTRAVENOUS at 16:24

## 2024-12-10 ASSESSMENT — PAIN DESCRIPTION - DESCRIPTORS
DESCRIPTORS: ACHING
DESCRIPTORS: ACHING
DESCRIPTORS: ACHING;SHARP
DESCRIPTORS: ACHING

## 2024-12-10 ASSESSMENT — PAIN DESCRIPTION - ORIENTATION
ORIENTATION: LEFT

## 2024-12-10 ASSESSMENT — PAIN SCALES - GENERAL
PAINLEVEL_OUTOF10: 9
PAINLEVEL_OUTOF10: 0
PAINLEVEL_OUTOF10: 9
PAINLEVEL_OUTOF10: 7
PAINLEVEL_OUTOF10: 6
PAINLEVEL_OUTOF10: 9
PAINLEVEL_OUTOF10: 8
PAINLEVEL_OUTOF10: 9
PAINLEVEL_OUTOF10: 9
PAINLEVEL_OUTOF10: 8
PAINLEVEL_OUTOF10: 8
PAINLEVEL_OUTOF10: 9
PAINLEVEL_OUTOF10: 6
PAINLEVEL_OUTOF10: 9
PAINLEVEL_OUTOF10: 9
PAINLEVEL_OUTOF10: 7
PAINLEVEL_OUTOF10: 7

## 2024-12-10 ASSESSMENT — PAIN DESCRIPTION - ONSET
ONSET: ON-GOING

## 2024-12-10 ASSESSMENT — PAIN DESCRIPTION - LOCATION
LOCATION: KNEE
LOCATION: KNEE
LOCATION: HIP
LOCATION: KNEE
LOCATION: KNEE
LOCATION: HIP

## 2024-12-10 ASSESSMENT — PAIN - FUNCTIONAL ASSESSMENT
PAIN_FUNCTIONAL_ASSESSMENT: PREVENTS OR INTERFERES SOME ACTIVE ACTIVITIES AND ADLS
PAIN_FUNCTIONAL_ASSESSMENT: PREVENTS OR INTERFERES SOME ACTIVE ACTIVITIES AND ADLS
PAIN_FUNCTIONAL_ASSESSMENT: ACTIVITIES ARE NOT PREVENTED
PAIN_FUNCTIONAL_ASSESSMENT: PREVENTS OR INTERFERES SOME ACTIVE ACTIVITIES AND ADLS

## 2024-12-10 ASSESSMENT — PAIN DESCRIPTION - PAIN TYPE
TYPE: ACUTE PAIN

## 2024-12-10 ASSESSMENT — PAIN DESCRIPTION - FREQUENCY
FREQUENCY: CONTINUOUS

## 2024-12-10 NOTE — CONSULTS
time.\"      Review of systems:   Pertinent positives as noted in the HPI. All other systems reviewed and negative.    Past Medical History:        Diagnosis Date    Anxiety     Anxiety and depression     Asthma     Bipolar 1 disorder (HCC)     Bipolar 1 disorder with moderate sourav (HCC)     Blood circulation, collateral     Breast cancer (HCC)     diagnosed in jan 2021    Cancer (HCC)     1982    Cancer (HCC) 01/15/2021    Left Breast    COPD (chronic obstructive pulmonary disease) (HCC)     Depression     Endometriosis     GERD (gastroesophageal reflux disease)     History of therapeutic radiation     2021    Kidney stones     Lupus     Movement disorder     MS (multiple sclerosis) (HCC)     Schizophrenia (HCC)     Thyroid disease     Type 2 diabetes mellitus with hyperglycemia, with long-term current use of insulin (HCC)     Type 2 diabetes mellitus without complication (HCC)     Unspecified cerebral artery occlusion with cerebral infarction 2014       Past Surgical History:        Procedure Laterality Date    BREAST LUMPECTOMY Left 02/19/2021    LEFT BREAST MASTECTOMY, SENTINAL LYMPH NODE BIOPSY, PREOP NEEDLE LOC performed by Chloe Amezcua MD at [facility] OR    BREAST SURGERY Left 04/06/2021    DEBRIDEMENT LEFT BREAST MASTECTOMY WOUND performed by Chloe Amezcua MD at [facility] OR    BREAST SURGERY Left 04/27/2021    LEFT BREAST WOUND REVISION performed by Chloe Amezcua MD at [facility] OR    COLONOSCOPY  01/2020    DILATION AND CURETTAGE OF UTERUS      BEN US GUID NDL BIOPSY LEFT Left 01/14/2021    BEN US GUID NDL BIOPSY LEFT 1/14/2021 Tracy Dillon MD [facility]    MASTECTOMY Left 2021    NERVE SURGERY N/A 01/26/2021    EXCHANGE OF INTERSTIM SYSTEM performed by Cricket Davis MD at [facility] OR    OTHER SURGICAL HISTORY  03/23/2021    right breast i and d with closure Dr Amezcua in the procedure room    PAIN MANAGEMENT PROCEDURE Left 08/31/2021    left T3 paravertebral block under fluoroscopy performed by Mayito LOPEZ

## 2024-12-10 NOTE — TELEPHONE ENCOUNTER
Patient is requesting a refill on     oxyCODONE-acetaminophen (PERCOCET) 5-325 MG per tablet      Pharmacy [facility]
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
## 2024-12-10 NOTE — PLAN OF CARE
Problem: Chronic Conditions and Co-morbidities  Goal: Patient's chronic conditions and co-morbidity symptoms are monitored and maintained or improved  12/10/2024 1316 by Whitney Multani LPN  Outcome: Progressing  Flowsheets (Taken 12/10/2024 1316)  Care Plan - Patient's Chronic Conditions and Co-Morbidity Symptoms are Monitored and Maintained or Improved:   Monitor and assess patient's chronic conditions and comorbid symptoms for stability, deterioration, or improvement   Collaborate with multidisciplinary team to address chronic and comorbid conditions and prevent exacerbation or deterioration   Update acute care plan with appropriate goals if chronic or comorbid symptoms are exacerbated and prevent overall improvement and discharge     Problem: Discharge Planning  Goal: Discharge to home or other facility with appropriate resources  12/10/2024 1316 by Whitney Multani LPN  Outcome: Progressing  Flowsheets (Taken 12/10/2024 1316)  Discharge to home or other facility with appropriate resources:   Identify barriers to discharge with patient and caregiver   Identify discharge learning needs (meds, wound care, etc)   Refer to discharge planning if patient needs post-hospital services based on physician order or complex needs related to functional status, cognitive ability or social support system   Arrange for needed discharge resources and transportation as appropriate     Problem: Pain  Goal: Verbalizes/displays adequate comfort level or baseline comfort level  12/10/2024 1316 by Whitney Multani LPN  Outcome: Progressing  Flowsheets (Taken 12/10/2024 1316)  Verbalizes/displays adequate comfort level or baseline comfort level:   Encourage patient to monitor pain and request assistance   Administer analgesics based on type and severity of pain and evaluate response   Consider cultural and social influences on pain and pain management   Assess pain using appropriate pain scale   Implement non-pharmacological measures as
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
## 2024-12-11 LAB
GLUCOSE BLD STRIP.AUTO-MCNC: 263 MG/DL (ref 70–108)
GLUCOSE BLD STRIP.AUTO-MCNC: 275 MG/DL (ref 70–108)
GLUCOSE BLD STRIP.AUTO-MCNC: 288 MG/DL (ref 70–108)
GLUCOSE BLD STRIP.AUTO-MCNC: 298 MG/DL (ref 70–108)

## 2024-12-11 PROCEDURE — 97535 SELF CARE MNGMENT TRAINING: CPT

## 2024-12-11 PROCEDURE — 97530 THERAPEUTIC ACTIVITIES: CPT

## 2024-12-11 PROCEDURE — 6370000000 HC RX 637 (ALT 250 FOR IP)

## 2024-12-11 PROCEDURE — 6370000000 HC RX 637 (ALT 250 FOR IP): Performed by: PHYSICIAN ASSISTANT

## 2024-12-11 PROCEDURE — 99233 SBSQ HOSP IP/OBS HIGH 50: CPT

## 2024-12-11 PROCEDURE — 1200000000 HC SEMI PRIVATE

## 2024-12-11 PROCEDURE — 97542 WHEELCHAIR MNGMENT TRAINING: CPT

## 2024-12-11 PROCEDURE — 82948 REAGENT STRIP/BLOOD GLUCOSE: CPT

## 2024-12-11 PROCEDURE — 2580000003 HC RX 258: Performed by: PHYSICIAN ASSISTANT

## 2024-12-11 PROCEDURE — 97162 PT EVAL MOD COMPLEX 30 MIN: CPT

## 2024-12-11 PROCEDURE — 6360000002 HC RX W HCPCS: Performed by: PHYSICIAN ASSISTANT

## 2024-12-11 PROCEDURE — 97166 OT EVAL MOD COMPLEX 45 MIN: CPT

## 2024-12-11 RX ORDER — ASPIRIN 81 MG/1
81 TABLET ORAL 2 TIMES DAILY
Status: DISCONTINUED | OUTPATIENT
Start: 2024-12-11 | End: 2024-12-13 | Stop reason: HOSPADM

## 2024-12-11 RX ADMIN — INSULIN LISPRO 2 UNITS: 100 INJECTION, SOLUTION INTRAVENOUS; SUBCUTANEOUS at 12:17

## 2024-12-11 RX ADMIN — TROSPIUM CHLORIDE 20 MG: 20 TABLET, FILM COATED ORAL at 14:45

## 2024-12-11 RX ADMIN — INSULIN LISPRO 2 UNITS: 100 INJECTION, SOLUTION INTRAVENOUS; SUBCUTANEOUS at 17:24

## 2024-12-11 RX ADMIN — INSULIN GLARGINE 10 UNITS: 100 INJECTION, SOLUTION SUBCUTANEOUS at 08:39

## 2024-12-11 RX ADMIN — MORPHINE SULFATE 2 MG: 2 INJECTION, SOLUTION INTRAMUSCULAR; INTRAVENOUS at 14:45

## 2024-12-11 RX ADMIN — PALIPERIDONE 3 MG: 3 TABLET, EXTENDED RELEASE ORAL at 20:55

## 2024-12-11 RX ADMIN — ESCITALOPRAM OXALATE 10 MG: 10 TABLET ORAL at 08:37

## 2024-12-11 RX ADMIN — MONTELUKAST 10 MG: 10 TABLET, FILM COATED ORAL at 20:56

## 2024-12-11 RX ADMIN — HYDROCODONE BITARTRATE AND ACETAMINOPHEN 2 TABLET: 5; 325 TABLET ORAL at 17:23

## 2024-12-11 RX ADMIN — TROSPIUM CHLORIDE 20 MG: 20 TABLET, FILM COATED ORAL at 05:31

## 2024-12-11 RX ADMIN — SODIUM CHLORIDE, PRESERVATIVE FREE 10 ML: 5 INJECTION INTRAVENOUS at 08:37

## 2024-12-11 RX ADMIN — PREGABALIN 300 MG: 75 CAPSULE ORAL at 08:37

## 2024-12-11 RX ADMIN — SPIRONOLACTONE 50 MG: 25 TABLET ORAL at 08:37

## 2024-12-11 RX ADMIN — SODIUM CHLORIDE, PRESERVATIVE FREE 10 ML: 5 INJECTION INTRAVENOUS at 20:56

## 2024-12-11 RX ADMIN — INSULIN LISPRO 2 UNITS: 100 INJECTION, SOLUTION INTRAVENOUS; SUBCUTANEOUS at 08:39

## 2024-12-11 RX ADMIN — HYDROCODONE BITARTRATE AND ACETAMINOPHEN 2 TABLET: 5; 325 TABLET ORAL at 13:08

## 2024-12-11 RX ADMIN — ASPIRIN 81 MG: 81 TABLET, COATED ORAL at 20:55

## 2024-12-11 RX ADMIN — INSULIN LISPRO 2 UNITS: 100 INJECTION, SOLUTION INTRAVENOUS; SUBCUTANEOUS at 20:55

## 2024-12-11 RX ADMIN — ASPIRIN 81 MG: 81 TABLET, COATED ORAL at 08:37

## 2024-12-11 RX ADMIN — HYDROCODONE BITARTRATE AND ACETAMINOPHEN 2 TABLET: 5; 325 TABLET ORAL at 03:43

## 2024-12-11 RX ADMIN — HYDROCODONE BITARTRATE AND ACETAMINOPHEN 2 TABLET: 5; 325 TABLET ORAL at 08:38

## 2024-12-11 ASSESSMENT — PAIN DESCRIPTION - DESCRIPTORS
DESCRIPTORS: ACHING;SHARP;THROBBING
DESCRIPTORS: ACHING;STABBING
DESCRIPTORS: ACHING;STABBING;POUNDING

## 2024-12-11 ASSESSMENT — PATIENT HEALTH QUESTIONNAIRE - PHQ9
SUM OF ALL RESPONSES TO PHQ QUESTIONS 1-9: 0
1. LITTLE INTEREST OR PLEASURE IN DOING THINGS: NOT AT ALL
SUM OF ALL RESPONSES TO PHQ QUESTIONS 1-9: 0
SUM OF ALL RESPONSES TO PHQ9 QUESTIONS 1 & 2: 0
2. FEELING DOWN, DEPRESSED OR HOPELESS: NOT AT ALL

## 2024-12-11 ASSESSMENT — PAIN DESCRIPTION - ORIENTATION
ORIENTATION: LEFT

## 2024-12-11 ASSESSMENT — LIFESTYLE VARIABLES
HOW MANY STANDARD DRINKS CONTAINING ALCOHOL DO YOU HAVE ON A TYPICAL DAY: PATIENT DOES NOT DRINK
HOW OFTEN DO YOU HAVE A DRINK CONTAINING ALCOHOL: NEVER

## 2024-12-11 ASSESSMENT — PAIN SCALES - GENERAL
PAINLEVEL_OUTOF10: 9
PAINLEVEL_OUTOF10: 9
PAINLEVEL_OUTOF10: 4
PAINLEVEL_OUTOF10: 9
PAINLEVEL_OUTOF10: 8
PAINLEVEL_OUTOF10: 0
PAINLEVEL_OUTOF10: 4

## 2024-12-11 ASSESSMENT — PAIN DESCRIPTION - LOCATION
LOCATION: LEG;KNEE
LOCATION: LEG
LOCATION: LEG

## 2024-12-11 ASSESSMENT — PAIN SCALES - WONG BAKER: WONGBAKER_NUMERICALRESPONSE: NO HURT

## 2024-12-11 NOTE — PLAN OF CARE
Problem: Chronic Conditions and Co-morbidities  Goal: Patient's chronic conditions and co-morbidity symptoms are monitored and maintained or improved  12/10/2024 2125 by Sammi Ortega RN  Outcome: Progressing  Flowsheets (Taken 12/10/2024 2125)  Care Plan - Patient's Chronic Conditions and Co-Morbidity Symptoms are Monitored and Maintained or Improved:   Monitor and assess patient's chronic conditions and comorbid symptoms for stability, deterioration, or improvement   Collaborate with multidisciplinary team to address chronic and comorbid conditions and prevent exacerbation or deterioration   Update acute care plan with appropriate goals if chronic or comorbid symptoms are exacerbated and prevent overall improvement and discharge     Problem: Discharge Planning  Goal: Discharge to home or other facility with appropriate resources  12/10/2024 2125 by Sammi Ortega RN  Outcome: Progressing  Flowsheets (Taken 12/10/2024 2125)  Discharge to home or other facility with appropriate resources:   Identify barriers to discharge with patient and caregiver   Identify discharge learning needs (meds, wound care, etc)   Arrange for needed discharge resources and transportation as appropriate     Problem: Pain  Goal: Verbalizes/displays adequate comfort level or baseline comfort level  12/10/2024 2125 by Sammi Ortega RN  Outcome: Progressing  Flowsheets (Taken 12/10/2024 2125)  Verbalizes/displays adequate comfort level or baseline comfort level:   Encourage patient to monitor pain and request assistance   Administer analgesics based on type and severity of pain and evaluate response   Assess pain using appropriate pain scale   Implement non-pharmacological measures as appropriate and evaluate response     Problem: Safety - Adult  Goal: Free from fall injury  12/10/2024 2125 by Sammi Ortega RN  Outcome: Progressing  Flowsheets (Taken 12/10/2024 2125)  Free From Fall Injury: Instruct family/caregiver on patient safety     Problem:

## 2024-12-11 NOTE — CARE COORDINATION
12/11/24, 4:20 PM EST    DISCHARGE ON GOING EVALUATION    Joanna BOWDEN [facility] day: 2  Location: -20/020-A Reason for admit: Closed displaced comminuted fracture of shaft of left femur, initial encounter ([facility]) [S72.094A]  Closed displaced supracondylar fracture of distal end of left femur without intracondylar extension with malunion, subsequent encounter [S72.058D]     Procedures: none  Closed treatment L distal femur fracture   Imaging since last note: none    Barriers to Discharge: needs more PT/OT, NWB to LLE, continue splint per ortho.    PCP: Orlando Cooley MD  Readmission Risk Score: 14.3    Patient Goals/Plan/Treatment Preferences:  OT Odalys recommends rehab and pt not agreeable. Planning home with HH; see SW note.

## 2024-12-11 NOTE — CARE COORDINATION
12/11/24, 10:01 AM EST    DISCHARGE PLANNING EVALUATION    Waiting PT/OT recommendations, from home.

## 2024-12-11 NOTE — PLAN OF CARE
Problem: Chronic Conditions and Co-morbidities  Goal: Patient's chronic conditions and co-morbidity symptoms are monitored and maintained or improved  Outcome: Progressing  Flowsheets (Taken 12/10/2024 2125 by Sammi Ortega, RN)  Care Plan - Patient's Chronic Conditions and Co-Morbidity Symptoms are Monitored and Maintained or Improved:   Monitor and assess patient's chronic conditions and comorbid symptoms for stability, deterioration, or improvement   Collaborate with multidisciplinary team to address chronic and comorbid conditions and prevent exacerbation or deterioration   Update acute care plan with appropriate goals if chronic or comorbid symptoms are exacerbated and prevent overall improvement and discharge     Problem: Discharge Planning  Goal: Discharge to home or other facility with appropriate resources  12/11/2024 1550 by Behrns, Kailey, LPN  Outcome: Progressing  Flowsheets (Taken 12/10/2024 2125 by Sammi Ortega, RN)  Discharge to home or other facility with appropriate resources:   Identify barriers to discharge with patient and caregiver   Identify discharge learning needs (meds, wound care, etc)   Arrange for needed discharge resources and transportation as appropriate     Problem: Pain  Goal: Verbalizes/displays adequate comfort level or baseline comfort level  Outcome: Progressing  Flowsheets (Taken 12/10/2024 2125 by Sammi Ortega, RN)  Verbalizes/displays adequate comfort level or baseline comfort level:   Encourage patient to monitor pain and request assistance   Administer analgesics based on type and severity of pain and evaluate response   Assess pain using appropriate pain scale   Implement non-pharmacological measures as appropriate and evaluate response     Problem: Safety - Adult  Goal: Free from fall injury  Outcome: Progressing  Flowsheets (Taken 12/10/2024 2125 by Sammi Ortega, RN)  Free From Fall Injury: Instruct family/caregiver on patient safety     Problem: ABCDS Injury

## 2024-12-11 NOTE — CARE COORDINATION
12/11/24, 4:47 PM EST    DISCHARGE PLANNING EVALUATION    [facility] has accepted, pt plans home with partner at discharge

## 2024-12-12 LAB
GLUCOSE BLD STRIP.AUTO-MCNC: 201 MG/DL (ref 70–108)
GLUCOSE BLD STRIP.AUTO-MCNC: 265 MG/DL (ref 70–108)
GLUCOSE BLD STRIP.AUTO-MCNC: 288 MG/DL (ref 70–108)
GLUCOSE BLD STRIP.AUTO-MCNC: 324 MG/DL (ref 70–108)

## 2024-12-12 PROCEDURE — 97535 SELF CARE MNGMENT TRAINING: CPT

## 2024-12-12 PROCEDURE — 97550 CAREGIVER TRAING 1ST 30 MIN: CPT

## 2024-12-12 PROCEDURE — 1200000000 HC SEMI PRIVATE

## 2024-12-12 PROCEDURE — 6370000000 HC RX 637 (ALT 250 FOR IP)

## 2024-12-12 PROCEDURE — 6370000000 HC RX 637 (ALT 250 FOR IP): Performed by: PHYSICIAN ASSISTANT

## 2024-12-12 PROCEDURE — 82948 REAGENT STRIP/BLOOD GLUCOSE: CPT

## 2024-12-12 PROCEDURE — 97530 THERAPEUTIC ACTIVITIES: CPT

## 2024-12-12 PROCEDURE — 2580000003 HC RX 258: Performed by: PHYSICIAN ASSISTANT

## 2024-12-12 RX ORDER — CYCLOBENZAPRINE HCL 10 MG
10 TABLET ORAL 3 TIMES DAILY PRN
Status: DISCONTINUED | OUTPATIENT
Start: 2024-12-12 | End: 2024-12-13 | Stop reason: HOSPADM

## 2024-12-12 RX ORDER — ASPIRIN 81 MG/1
81 TABLET ORAL 2 TIMES DAILY
Qty: 42 TABLET | Refills: 0 | Status: SHIPPED | OUTPATIENT
Start: 2024-12-12 | End: 2025-01-02

## 2024-12-12 RX ORDER — HYDROCODONE BITARTRATE AND ACETAMINOPHEN 5; 325 MG/1; MG/1
1 TABLET ORAL EVERY 8 HOURS PRN
Qty: 21 TABLET | Refills: 0 | Status: SHIPPED | OUTPATIENT
Start: 2024-12-12 | End: 2024-12-19

## 2024-12-12 RX ADMIN — PALIPERIDONE 3 MG: 3 TABLET, EXTENDED RELEASE ORAL at 20:34

## 2024-12-12 RX ADMIN — INSULIN LISPRO 2 UNITS: 100 INJECTION, SOLUTION INTRAVENOUS; SUBCUTANEOUS at 17:12

## 2024-12-12 RX ADMIN — PREGABALIN 300 MG: 75 CAPSULE ORAL at 09:13

## 2024-12-12 RX ADMIN — HYDROCODONE BITARTRATE AND ACETAMINOPHEN 2 TABLET: 5; 325 TABLET ORAL at 02:27

## 2024-12-12 RX ADMIN — HYDROCODONE BITARTRATE AND ACETAMINOPHEN 2 TABLET: 5; 325 TABLET ORAL at 20:34

## 2024-12-12 RX ADMIN — HYDROXYZINE PAMOATE 50 MG: 50 CAPSULE ORAL at 20:37

## 2024-12-12 RX ADMIN — INSULIN LISPRO 1 UNITS: 100 INJECTION, SOLUTION INTRAVENOUS; SUBCUTANEOUS at 09:14

## 2024-12-12 RX ADMIN — SODIUM CHLORIDE, PRESERVATIVE FREE 30 ML: 5 INJECTION INTRAVENOUS at 09:12

## 2024-12-12 RX ADMIN — INSULIN LISPRO 3 UNITS: 100 INJECTION, SOLUTION INTRAVENOUS; SUBCUTANEOUS at 20:40

## 2024-12-12 RX ADMIN — TROSPIUM CHLORIDE 20 MG: 20 TABLET, FILM COATED ORAL at 15:46

## 2024-12-12 RX ADMIN — ASPIRIN 81 MG: 81 TABLET, COATED ORAL at 20:34

## 2024-12-12 RX ADMIN — MONTELUKAST 10 MG: 10 TABLET, FILM COATED ORAL at 20:34

## 2024-12-12 RX ADMIN — HYDROCODONE BITARTRATE AND ACETAMINOPHEN 2 TABLET: 5; 325 TABLET ORAL at 11:20

## 2024-12-12 RX ADMIN — ASPIRIN 81 MG: 81 TABLET, COATED ORAL at 09:13

## 2024-12-12 RX ADMIN — TROSPIUM CHLORIDE 20 MG: 20 TABLET, FILM COATED ORAL at 06:40

## 2024-12-12 RX ADMIN — INSULIN GLARGINE 10 UNITS: 100 INJECTION, SOLUTION SUBCUTANEOUS at 09:14

## 2024-12-12 RX ADMIN — SODIUM CHLORIDE, PRESERVATIVE FREE 10 ML: 5 INJECTION INTRAVENOUS at 20:33

## 2024-12-12 RX ADMIN — CYCLOBENZAPRINE 10 MG: 10 TABLET, FILM COATED ORAL at 12:47

## 2024-12-12 RX ADMIN — INSULIN LISPRO 2 UNITS: 100 INJECTION, SOLUTION INTRAVENOUS; SUBCUTANEOUS at 11:20

## 2024-12-12 RX ADMIN — POLYETHYLENE GLYCOL 3350 17 G: 17 POWDER, FOR SOLUTION ORAL at 09:54

## 2024-12-12 RX ADMIN — ESCITALOPRAM OXALATE 10 MG: 10 TABLET ORAL at 09:13

## 2024-12-12 RX ADMIN — SPIRONOLACTONE 50 MG: 25 TABLET ORAL at 09:13

## 2024-12-12 RX ADMIN — TRAZODONE HYDROCHLORIDE 100 MG: 100 TABLET ORAL at 20:37

## 2024-12-12 RX ADMIN — ANASTROZOLE 1 MG: 1 TABLET ORAL at 20:34

## 2024-12-12 RX ADMIN — HYDROCODONE BITARTRATE AND ACETAMINOPHEN 2 TABLET: 5; 325 TABLET ORAL at 15:44

## 2024-12-12 RX ADMIN — HYDROCODONE BITARTRATE AND ACETAMINOPHEN 2 TABLET: 5; 325 TABLET ORAL at 06:42

## 2024-12-12 ASSESSMENT — PAIN SCALES - GENERAL
PAINLEVEL_OUTOF10: 5
PAINLEVEL_OUTOF10: 9
PAINLEVEL_OUTOF10: 8
PAINLEVEL_OUTOF10: 10
PAINLEVEL_OUTOF10: 10
PAINLEVEL_OUTOF10: 8
PAINLEVEL_OUTOF10: 5
PAINLEVEL_OUTOF10: 0
PAINLEVEL_OUTOF10: 9
PAINLEVEL_OUTOF10: 6
PAINLEVEL_OUTOF10: 4
PAINLEVEL_OUTOF10: 8
PAINLEVEL_OUTOF10: 10
PAINLEVEL_OUTOF10: 7
PAINLEVEL_OUTOF10: 6

## 2024-12-12 ASSESSMENT — PAIN SCALES - WONG BAKER
WONGBAKER_NUMERICALRESPONSE: NO HURT
WONGBAKER_NUMERICALRESPONSE: NO HURT

## 2024-12-12 ASSESSMENT — PAIN DESCRIPTION - FREQUENCY: FREQUENCY: CONTINUOUS

## 2024-12-12 ASSESSMENT — PAIN DESCRIPTION - ORIENTATION
ORIENTATION: LEFT

## 2024-12-12 ASSESSMENT — PAIN DESCRIPTION - LOCATION
LOCATION: KNEE;LEG
LOCATION: KNEE;LEG
LOCATION: LEG;KNEE
LOCATION: LEG;KNEE
LOCATION: KNEE;LEG
LOCATION: LEG;KNEE
LOCATION: LEG;KNEE

## 2024-12-12 ASSESSMENT — PAIN DESCRIPTION - DESCRIPTORS
DESCRIPTORS: ACHING;THROBBING
DESCRIPTORS: ACHING;DISCOMFORT
DESCRIPTORS: ACHING
DESCRIPTORS: ACHING;POUNDING
DESCRIPTORS: ACHING;SPASM
DESCRIPTORS: ACHING
DESCRIPTORS: ACHING;THROBBING

## 2024-12-12 ASSESSMENT — PAIN - FUNCTIONAL ASSESSMENT: PAIN_FUNCTIONAL_ASSESSMENT: PREVENTS OR INTERFERES SOME ACTIVE ACTIVITIES AND ADLS

## 2024-12-12 ASSESSMENT — PAIN DESCRIPTION - ONSET: ONSET: ON-GOING

## 2024-12-12 ASSESSMENT — PAIN DESCRIPTION - PAIN TYPE: TYPE: ACUTE PAIN

## 2024-12-12 NOTE — CARE COORDINATION
12/12/24, 10:23 AM EST    DISCHARGE ON GOING EVALUATION    Joanna Delarosa       Hospital day: 3  Location: -20/020-A Reason for admit: Closed displaced comminuted fracture of shaft of left femur, initial encounter ([facility]) [S72.537A]  Closed displaced supracondylar fracture of distal end of left femur without intracondylar extension with malunion, subsequent encounter [Z52.955X]      Procedures: none  Closed treatment L distal femur fracture     Imaging since last note: na    Barriers to Discharge: needs DME ordered, continue PT/OT. Pain control.    PCP: Orlando Cooley MD  Readmission Risk Score: 14.4    Patient Goals/Plan/Treatment Preferences: plans home with new  Isabelle .      Pt has W/C pta (see assessment from 12/10), needs slide board to get into wheelchair as pt is non weight bearing to her LLE due to fracture.    Joanna Delarosa requires a bedside commode due to being confined to a single room, and is physically incapable of utilizing regular toilet facilities due to her non weight bearing status to LLE and difficulty to get to her bathroom.   Current body weight: Weight - Scale: 67.2 kg (148 lb 2.4 oz).

## 2024-12-12 NOTE — PLAN OF CARE
Problem: Chronic Conditions and Co-morbidities  Goal: Patient's chronic conditions and co-morbidity symptoms are monitored and maintained or improved  12/12/2024 1429 by Behrns, Kailey, LPN  Outcome: Progressing  Flowsheets (Taken 12/10/2024 2125 by Sammi Ortega, RN)  Care Plan - Patient's Chronic Conditions and Co-Morbidity Symptoms are Monitored and Maintained or Improved:   Monitor and assess patient's chronic conditions and comorbid symptoms for stability, deterioration, or improvement   Collaborate with multidisciplinary team to address chronic and comorbid conditions and prevent exacerbation or deterioration   Update acute care plan with appropriate goals if chronic or comorbid symptoms are exacerbated and prevent overall improvement and discharge     Problem: Chronic Conditions and Co-morbidities  Goal: Patient's chronic conditions and co-morbidity symptoms are monitored and maintained or improved  12/12/2024 1428 by Behrns, Kailey, LPN  Outcome: Progressing     Problem: Discharge Planning  Goal: Discharge to home or other facility with appropriate resources  12/12/2024 1429 by Behrns, Kailey, LPN  Outcome: Progressing  Flowsheets (Taken 12/10/2024 2125 by Sammi Ortega RN)  Discharge to home or other facility with appropriate resources:   Identify barriers to discharge with patient and caregiver   Identify discharge learning needs (meds, wound care, etc)   Arrange for needed discharge resources and transportation as appropriate     Problem: Discharge Planning  Goal: Discharge to home or other facility with appropriate resources  12/12/2024 1428 by Behrns, Kailey, LPN  Outcome: Progressing     Problem: Pain  Goal: Verbalizes/displays adequate comfort level or baseline comfort level  12/12/2024 1429 by Behrns, Kailey, LPN  Outcome: Progressing  Flowsheets (Taken 12/10/2024 2125 by Sammi Ortega, AYSH)  Verbalizes/displays adequate comfort level or baseline comfort level:   Encourage patient to monitor pain and

## 2024-12-13 VITALS
OXYGEN SATURATION: 96 % | TEMPERATURE: 98.6 F | HEART RATE: 102 BPM | RESPIRATION RATE: 18 BRPM | SYSTOLIC BLOOD PRESSURE: 136 MMHG | WEIGHT: 148.15 LBS | BODY MASS INDEX: 29.87 KG/M2 | DIASTOLIC BLOOD PRESSURE: 67 MMHG | HEIGHT: 59 IN

## 2024-12-13 DIAGNOSIS — M54.17 RADICULOPATHY, LUMBOSACRAL REGION: ICD-10-CM

## 2024-12-13 DIAGNOSIS — M79.2 NEUROPATHIC PAIN: ICD-10-CM

## 2024-12-13 LAB
GLUCOSE BLD STRIP.AUTO-MCNC: 294 MG/DL (ref 70–108)
GLUCOSE BLD STRIP.AUTO-MCNC: 395 MG/DL (ref 70–108)

## 2024-12-13 PROCEDURE — 2580000003 HC RX 258: Performed by: PHYSICIAN ASSISTANT

## 2024-12-13 PROCEDURE — 6370000000 HC RX 637 (ALT 250 FOR IP)

## 2024-12-13 PROCEDURE — 6370000000 HC RX 637 (ALT 250 FOR IP): Performed by: PHYSICIAN ASSISTANT

## 2024-12-13 PROCEDURE — 97535 SELF CARE MNGMENT TRAINING: CPT

## 2024-12-13 PROCEDURE — 82948 REAGENT STRIP/BLOOD GLUCOSE: CPT

## 2024-12-13 RX ORDER — OXYCODONE AND ACETAMINOPHEN 5; 325 MG/1; MG/1
1 TABLET ORAL DAILY PRN
Qty: 30 TABLET | Refills: 0 | Status: CANCELLED | OUTPATIENT
Start: 2024-12-13 | End: 2025-01-12

## 2024-12-13 RX ADMIN — HYDROCODONE BITARTRATE AND ACETAMINOPHEN 2 TABLET: 5; 325 TABLET ORAL at 06:22

## 2024-12-13 RX ADMIN — CYCLOBENZAPRINE 10 MG: 10 TABLET, FILM COATED ORAL at 02:23

## 2024-12-13 RX ADMIN — HYDROCODONE BITARTRATE AND ACETAMINOPHEN 2 TABLET: 5; 325 TABLET ORAL at 02:23

## 2024-12-13 RX ADMIN — SPIRONOLACTONE 50 MG: 25 TABLET ORAL at 09:23

## 2024-12-13 RX ADMIN — ESCITALOPRAM OXALATE 10 MG: 10 TABLET ORAL at 09:21

## 2024-12-13 RX ADMIN — ASPIRIN 81 MG: 81 TABLET, COATED ORAL at 09:21

## 2024-12-13 RX ADMIN — TROSPIUM CHLORIDE 20 MG: 20 TABLET, FILM COATED ORAL at 06:20

## 2024-12-13 RX ADMIN — INSULIN LISPRO 4 UNITS: 100 INJECTION, SOLUTION INTRAVENOUS; SUBCUTANEOUS at 12:10

## 2024-12-13 RX ADMIN — PREGABALIN 300 MG: 75 CAPSULE ORAL at 09:22

## 2024-12-13 RX ADMIN — INSULIN GLARGINE 10 UNITS: 100 INJECTION, SOLUTION SUBCUTANEOUS at 09:24

## 2024-12-13 RX ADMIN — SODIUM CHLORIDE, PRESERVATIVE FREE 10 ML: 5 INJECTION INTRAVENOUS at 09:23

## 2024-12-13 RX ADMIN — CYCLOBENZAPRINE 10 MG: 10 TABLET, FILM COATED ORAL at 12:10

## 2024-12-13 RX ADMIN — INSULIN LISPRO 2 UNITS: 100 INJECTION, SOLUTION INTRAVENOUS; SUBCUTANEOUS at 09:22

## 2024-12-13 RX ADMIN — HYDROCODONE BITARTRATE AND ACETAMINOPHEN 2 TABLET: 5; 325 TABLET ORAL at 12:10

## 2024-12-13 ASSESSMENT — PAIN SCALES - GENERAL
PAINLEVEL_OUTOF10: 9
PAINLEVEL_OUTOF10: 8
PAINLEVEL_OUTOF10: 5
PAINLEVEL_OUTOF10: 6
PAINLEVEL_OUTOF10: 5

## 2024-12-13 ASSESSMENT — PAIN DESCRIPTION - DESCRIPTORS
DESCRIPTORS: ACHING;THROBBING
DESCRIPTORS: ACHING;SHOOTING;THROBBING
DESCRIPTORS: ACHING;SPASM
DESCRIPTORS: ACHING

## 2024-12-13 ASSESSMENT — PAIN DESCRIPTION - FREQUENCY
FREQUENCY: CONTINUOUS

## 2024-12-13 ASSESSMENT — PAIN DESCRIPTION - LOCATION
LOCATION: KNEE;LEG

## 2024-12-13 ASSESSMENT — PAIN DESCRIPTION - ONSET
ONSET: ON-GOING

## 2024-12-13 ASSESSMENT — PAIN - FUNCTIONAL ASSESSMENT
PAIN_FUNCTIONAL_ASSESSMENT: PREVENTS OR INTERFERES SOME ACTIVE ACTIVITIES AND ADLS

## 2024-12-13 ASSESSMENT — PAIN DESCRIPTION - ORIENTATION
ORIENTATION: LEFT

## 2024-12-13 ASSESSMENT — PAIN DESCRIPTION - PAIN TYPE
TYPE: ACUTE PAIN
TYPE: ACUTE PAIN

## 2024-12-13 NOTE — PLAN OF CARE
Problem: Chronic Conditions and Co-morbidities  Goal: Patient's chronic conditions and co-morbidity symptoms are monitored and maintained or improved  Outcome: Completed  Flowsheets (Taken 12/13/2024 1604)  Care Plan - Patient's Chronic Conditions and Co-Morbidity Symptoms are Monitored and Maintained or Improved:   Monitor and assess patient's chronic conditions and comorbid symptoms for stability, deterioration, or improvement   Update acute care plan with appropriate goals if chronic or comorbid symptoms are exacerbated and prevent overall improvement and discharge   Collaborate with multidisciplinary team to address chronic and comorbid conditions and prevent exacerbation or deterioration     Problem: Discharge Planning  Goal: Discharge to home or other facility with appropriate resources  Outcome: Completed  Flowsheets (Taken 12/13/2024 1604)  Discharge to home or other facility with appropriate resources:   Identify barriers to discharge with patient and caregiver   Arrange for needed discharge resources and transportation as appropriate   Identify discharge learning needs (meds, wound care, etc)     Problem: Pain  Goal: Verbalizes/displays adequate comfort level or baseline comfort level  Outcome: Completed  Flowsheets (Taken 12/13/2024 1604)  Verbalizes/displays adequate comfort level or baseline comfort level:   Assess pain using appropriate pain scale   Implement non-pharmacological measures as appropriate and evaluate response   Administer analgesics based on type and severity of pain and evaluate response     Problem: Safety - Adult  Goal: Free from fall injury  Outcome: Completed  Flowsheets (Taken 12/13/2024 1604)  Free From Fall Injury: Instruct family/caregiver on patient safety     Problem: ABCDS Injury Assessment  Goal: Absence of physical injury  Outcome: Completed  Flowsheets (Taken 12/13/2024 1604)  Absence of Physical Injury: Implement safety measures based on patient assessment     Problem:

## 2024-12-13 NOTE — CARE COORDINATION
12/13/24, 10:42 AM EST    Patient goals/plan/ treatment preferences discussed by  and .  Patient goals/plan/ treatment preferences reviewed with patient/ family.  Patient/ family verbalize understanding of discharge plan and are in agreement with goal/plan/treatment preferences.  Understanding was demonstrated using the teach back method.  AVS provided by RN at time of discharge, which includes all necessary medical information pertaining to the patients current course of illness, treatment, post-discharge goals of care, and treatment preferences.     Services At/After Discharge: Home Health, Aide services, Nursing service, OT, and PT       Home today; new [facility], has DME delivered, needs transportation. Spoke with pt, she uses [employer] services. Has slide bd and BSC to get home; BF does not drive (walks here).    Seth BERRIOS updated.

## 2024-12-13 NOTE — PROGRESS NOTES
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 increase indep with 
12/10/24  0519    134*   K 4.4 4.8    102   CO2 19* 19*   BUN 12 16   CREATININE 0.7 0.8   CALCIUM 9.2 8.7     Recent Labs     12/09/24  2151   AST 50*   ALT 34   BILITOT 1.9*   ALKPHOS 102     No results for input(s): \"INR\" in the last 72 hours.  No results for input(s): \"CKTOTAL\", \"TROPONINI\" in the last 72 hours.    Urinalysis:    Lab Results   Component Value Date/Time    NITRU POSITIVE 12/11/2022 09:00 AM    WBCUA 5-9 12/11/2022 09:00 AM    BACTERIA MANY 12/11/2022 09:00 AM    RBCUA 0-2 12/11/2022 09:00 AM    BLOODU MODERATE 12/11/2022 09:00 AM    GLUCOSEU NEGATIVE 12/11/2022 09:00 AM    GLUCOSEU >= 1000 02/17/2022 10:05 PM       Radiology:   XR PELVIS (1-2 VIEWS)   Final Result   1. Chronic changes without acute findings.      This document has been electronically signed by: Gian Santos MD on    12/09/2024 10:03 PM      XR TIBIA FIBULA LEFT (2 VIEWS)   Final Result   No acute tibia and fibula fractures.      This document has been electronically signed by: Gian Santos MD on    12/09/2024 10:04 PM      XR FEMUR LEFT (MIN 2 VIEWS)   Final Result   Distal femoral shaft fracture.      This document has been electronically signed by: Gian Santos MD on    12/09/2024 08:10 PM      XR CHEST 1 VIEW   Final Result   1. No acute findings.      This document has been electronically signed by: Gian Santos MD on    12/09/2024 08:18 PM        XR TIBIA FIBULA LEFT (2 VIEWS)    Result Date: 12/9/2024  2 view left tibia-fibula Comparison: CR/SR - XR FEMUR LEFT (MIN 2 VIEWS) - 12/09/2024 07:19 PM EST Findings Osteopenia. No acute tibia and fibula fractures. Redemonstrated distal femoral fracture, please see prior report. No joint effusion. No significant arthritic change. No radiopaque foreign body. Mildly diffuse soft tissue edema.     No acute tibia and fibula fractures. This document has been electronically signed by: Gian Santos MD on 12/09/2024 10:04 PM    XR PELVIS (1-2 VIEWS)    Result Date:

## 2024-12-13 NOTE — PLAN OF CARE
Problem: Chronic Conditions and Co-morbidities  Goal: Patient's chronic conditions and co-morbidity symptoms are monitored and maintained or improved  12/12/2024 2123 by Kaye Ortega, RN  Outcome: Progressing  Flowsheets (Taken 12/12/2024 2123)  Care Plan - Patient's Chronic Conditions and Co-Morbidity Symptoms are Monitored and Maintained or Improved:   Monitor and assess patient's chronic conditions and comorbid symptoms for stability, deterioration, or improvement   Collaborate with multidisciplinary team to address chronic and comorbid conditions and prevent exacerbation or deterioration   Update acute care plan with appropriate goals if chronic or comorbid symptoms are exacerbated and prevent overall improvement and discharge     Problem: Discharge Planning  Goal: Discharge to home or other facility with appropriate resources  12/12/2024 2123 by Kaye Ortega, RN  Outcome: Progressing  Flowsheets (Taken 12/12/2024 2123)  Discharge to home or other facility with appropriate resources:   Identify discharge learning needs (meds, wound care, etc)   Identify barriers to discharge with patient and caregiver   Refer to discharge planning if patient needs post-hospital services based on physician order or complex needs related to functional status, cognitive ability or social support system   Arrange for needed discharge resources and transportation as appropriate     Problem: Pain  Goal: Verbalizes/displays adequate comfort level or baseline comfort level  12/12/2024 2123 by Kaye Ortega, RN  Outcome: Progressing  Flowsheets (Taken 12/12/2024 2123)  Verbalizes/displays adequate comfort level or baseline comfort level:   Administer analgesics based on type and severity of pain and evaluate response   Implement non-pharmacological measures as appropriate and evaluate response   Assess pain using appropriate pain scale   Encourage patient to monitor pain and request assistance     Problem: Safety - Adult  Goal:

## 2024-12-13 NOTE — TELEPHONE ENCOUNTER
[facility] reviewed. UDS: + for  .oxycodone, trazadone, negative desvenlafaxine, amtriptyline   Last seen: 10/1/2024. Follow-up: No future appointments.   Pt was in hospital for fracture left femur 12/9 till today 12/13. Fill for percocet is due as last fill was 11/08. Called nursing unit 7[facility] and they say she is being sent home with script for norco for 7 days. Told them to let Joanna know we will not send in script yet for the percocet and for her to call office mon, for refill and then we can fill once done with norco.

## 2024-12-13 NOTE — CARE COORDINATION
12/13/24, 7:32 AM EST    Patient goals/plan/ treatment preferences discussed by  and .  Patient goals/plan/ treatment preferences reviewed with patient/ family.  Patient/ family verbalize understanding of discharge plan and are in agreement with goal/plan/treatment preferences.  Understanding was demonstrated using the teach back method.  AVS provided by RN at time of discharge, which includes all necessary medical information pertaining to the patients current course of illness, treatment, post-discharge goals of care, and treatment preferences.     Services At/After Discharge: Home Health [facility]    Pt dismissing to home with home health.

## 2024-12-13 NOTE — DISCHARGE SUMMARY
CONTINUE these medications which have CHANGED    Details   aspirin 81 MG EC tablet Take 1 tablet by mouth in the morning and at bedtime for 21 days  Qty: 42 tablet, Refills: 0           CONTINUE these medications which have NOT CHANGED    Details   montelukast (SINGULAIR) 10 MG tablet Take 1 tablet by mouth nightly      guaiFENesin (MUCINEX) 600 MG extended release tablet Take 2 tablets by mouth 2 times daily      pregabalin (LYRICA) 150 MG capsule Take 2 capsules by mouth daily for 90 days. Max Daily Amount: 300 mg  Qty: 60 capsule, Refills: 2    Associated Diagnoses: Neuropathic pain      vibegron (GEMTESA) 75 MG TABS tablet Take 1 tablet by mouth daily  Qty: 28 tablet, Refills: 0    Associated Diagnoses: Frequency of urination; OAB (overactive bladder)      FARXIGA 10 MG tablet Take 1 tablet by mouth daily      glipiZIDE (GLUCOTROL XL) 10 MG extended release tablet Take 1 tablet by mouth daily      Cholecalciferol (VITAMIN D3) 50 MCG (2000 UT) CAPS Take by mouth      escitalopram (LEXAPRO) 10 MG tablet Take 1 tablet by mouth daily      Cyanocobalamin (VITAMIN B 12) 500 MCG TABS Take 500 mcg by mouth daily      spironolactone (ALDACTONE) 50 MG tablet Take by mouth      traZODone (DESYREL) 50 MG tablet TAKE 1 TO 2 TABLETS BY MOUTH EVERY NIGHT AT BEDTIME AS NEEDED for insomnia      anastrozole (ARIMIDEX) 1 MG tablet Take 1 tablet by mouth every other day  Qty: 30 tablet, Refills: 3      paliperidone (INVEGA) 3 MG extended release tablet Take 1 tablet by mouth nightly      artificial tears (ARTIFICIAL TEARS) OINT as needed      Multiple Vitamin (TAB-A-ISAAC PO) Take 1 tablet by mouth daily      hydrOXYzine (VISTARIL) 50 MG capsule Take 1 capsule by mouth 3 times daily as needed for Itching      lidocaine (XYLOCAINE) 5 % ointment Apply 4 times daily as needed.  Qty: 30 g, Refills: 2      Continuous Glucose Sensor (FREESTYLE TAMARA 3 SENSOR) MISC USE AS DIRECTED four times a day CHANGE EVERY 14 DAYS

## 2024-12-19 DIAGNOSIS — M54.17 RADICULOPATHY, LUMBOSACRAL REGION: Primary | ICD-10-CM

## 2024-12-19 DIAGNOSIS — S72.352A CLOSED DISPLACED COMMINUTED FRACTURE OF SHAFT OF LEFT FEMUR, INITIAL ENCOUNTER (HCC): ICD-10-CM

## 2024-12-19 RX ORDER — HYDROCODONE BITARTRATE AND ACETAMINOPHEN 5; 325 MG/1; MG/1
1 TABLET ORAL EVERY 8 HOURS PRN
Qty: 21 TABLET | Refills: 0 | Status: CANCELLED | OUTPATIENT
Start: 2024-12-19 | End: 2024-12-26

## 2024-12-19 NOTE — TELEPHONE ENCOUNTER
[facility] reviewed. UDS: + for Hydroxyzine, Risperidone, Trazodone, Pregabalin and Oxycodone.   Last seen: 10/1/2024. Follow-up: No future appointments.

## 2024-12-19 NOTE — TELEPHONE ENCOUNTER
Joanna is requesting a refill of their   Requested Prescriptions     Pending Prescriptions Disp Refills    HYDROcodone-acetaminophen (NORCO) 5-325 MG per tablet 21 tablet 0     Sig: Take 1 tablet by mouth every 8 hours as needed for Pain for up to 7 days. Max Daily Amount: 3 tablets   . Please advise.      Last Appt:  Visit date not found  Next Appt:   Visit date not found  Preferred pharmacy:   [facility] - [facility] [address] [place] [address] -  [phone] - F [phone]

## 2024-12-20 RX ORDER — OXYCODONE AND ACETAMINOPHEN 5; 325 MG/1; MG/1
1 TABLET ORAL DAILY PRN
Qty: 30 TABLET | Refills: 0 | Status: SHIPPED | OUTPATIENT
Start: 2024-12-20 | End: 2025-01-04

## 2024-12-20 NOTE — TELEPHONE ENCOUNTER
Can we verify she will no longer be getting medication form [facility] for the broken femur. Also, she will need an appt. With me before next fill 1/1/2025. I will not be increasing her medication for the fracture, that would come from ortho for acute pain.

## 2024-12-20 NOTE — TELEPHONE ENCOUNTER
Patient called asking about this. I told her that the provider had some questions and she would need to speak with a nurse and she was also needing scheduled for an appt. I attempted to schedule a f/u but she told me she can't go anywhere because of her leg. Call transferred to nurse line for her to leave a .

## 2025-01-02 ENCOUNTER — HOSPITAL ENCOUNTER (INPATIENT)
Age: 60
LOS: 1 days | DRG: 720 | End: 2025-01-03
Attending: STUDENT IN AN ORGANIZED HEALTH CARE EDUCATION/TRAINING PROGRAM
Payer: MEDICAID

## 2025-01-02 DIAGNOSIS — R65.21 SEPTIC SHOCK (HCC): ICD-10-CM

## 2025-01-02 DIAGNOSIS — A41.9 SEPTIC SHOCK (HCC): ICD-10-CM

## 2025-01-02 DIAGNOSIS — N76.82 FOURNIER'S GANGRENE IN FEMALE: Primary | ICD-10-CM

## 2025-01-02 LAB — GLUCOSE BLD STRIP.AUTO-MCNC: 326 MG/DL (ref 70–108)

## 2025-01-02 PROCEDURE — 2580000003 HC RX 258: Performed by: STUDENT IN AN ORGANIZED HEALTH CARE EDUCATION/TRAINING PROGRAM

## 2025-01-02 PROCEDURE — 2500000003 HC RX 250 WO HCPCS

## 2025-01-02 PROCEDURE — 5A1935Z RESPIRATORY VENTILATION, LESS THAN 24 CONSECUTIVE HOURS: ICD-10-PCS

## 2025-01-02 PROCEDURE — 93005 ELECTROCARDIOGRAM TRACING: CPT | Performed by: STUDENT IN AN ORGANIZED HEALTH CARE EDUCATION/TRAINING PROGRAM

## 2025-01-02 PROCEDURE — 96365 THER/PROPH/DIAG IV INF INIT: CPT

## 2025-01-02 PROCEDURE — 0BH17EZ INSERTION OF ENDOTRACHEAL AIRWAY INTO TRACHEA, VIA NATURAL OR ARTIFICIAL OPENING: ICD-10-PCS

## 2025-01-02 PROCEDURE — 87040 BLOOD CULTURE FOR BACTERIA: CPT

## 2025-01-02 PROCEDURE — 87077 CULTURE AEROBIC IDENTIFY: CPT

## 2025-01-02 PROCEDURE — 30233N1 TRANSFUSION OF NONAUTOLOGOUS RED BLOOD CELLS INTO PERIPHERAL VEIN, PERCUTANEOUS APPROACH: ICD-10-PCS

## 2025-01-02 PROCEDURE — 82948 REAGENT STRIP/BLOOD GLUCOSE: CPT

## 2025-01-02 RX ORDER — 0.9 % SODIUM CHLORIDE 0.9 %
1000 INTRAVENOUS SOLUTION INTRAVENOUS ONCE
Status: COMPLETED | OUTPATIENT
Start: 2025-01-03 | End: 2025-01-03

## 2025-01-02 RX ORDER — METRONIDAZOLE 500 MG/100ML
500 INJECTION, SOLUTION INTRAVENOUS ONCE
Status: COMPLETED | OUTPATIENT
Start: 2025-01-03 | End: 2025-01-03

## 2025-01-02 RX ORDER — NOREPINEPHRINE BITARTRATE 0.06 MG/ML
1-100 INJECTION, SOLUTION INTRAVENOUS CONTINUOUS
Status: DISCONTINUED | OUTPATIENT
Start: 2025-01-03 | End: 2025-01-03 | Stop reason: HOSPADM

## 2025-01-02 RX ORDER — NOREPINEPHRINE BITARTRATE 0.06 MG/ML
INJECTION, SOLUTION INTRAVENOUS
Status: COMPLETED
Start: 2025-01-02 | End: 2025-01-02

## 2025-01-02 RX ADMIN — Medication 5 MCG/MIN: at 23:46

## 2025-01-02 RX ADMIN — SODIUM CHLORIDE 1000 ML: 9 INJECTION, SOLUTION INTRAVENOUS at 23:58

## 2025-01-03 ENCOUNTER — APPOINTMENT (OUTPATIENT)
Dept: GENERAL RADIOLOGY | Age: 60
DRG: 720 | End: 2025-01-03
Payer: MEDICAID

## 2025-01-03 ENCOUNTER — APPOINTMENT (OUTPATIENT)
Dept: CT IMAGING | Age: 60
DRG: 720 | End: 2025-01-03
Payer: MEDICAID

## 2025-01-03 VITALS
TEMPERATURE: 97 F | RESPIRATION RATE: 18 BRPM | HEART RATE: 56 BPM | BODY MASS INDEX: 29.89 KG/M2 | OXYGEN SATURATION: 99 % | SYSTOLIC BLOOD PRESSURE: 80 MMHG | WEIGHT: 148 LBS | DIASTOLIC BLOOD PRESSURE: 53 MMHG

## 2025-01-03 PROBLEM — R40.20 COMA (HCC): Status: ACTIVE | Noted: 2025-01-03

## 2025-01-03 LAB
ALBUMIN SERPL BCG-MCNC: 1.8 G/DL (ref 3.5–5.1)
ALP SERPL-CCNC: 139 U/L (ref 38–126)
ALT SERPL W/O P-5'-P-CCNC: 37 U/L (ref 11–66)
AMPHETAMINES UR QL SCN: NEGATIVE
ANION GAP SERPL CALC-SCNC: 16 MEQ/L (ref 8–16)
ANION GAP SERPL CALC-SCNC: 17 MEQ/L (ref 8–16)
APTT PPP: 105.7 SECONDS (ref 22–38)
ARTERIAL PATENCY WRIST A: ABNORMAL
ARTERIAL PATENCY WRIST A: NEGATIVE
AST SERPL-CCNC: 122 U/L (ref 5–40)
B-OH-BUTYR SERPL-MSCNC: 1.99 MG/DL (ref 0.2–2.81)
BACTERIA URNS QL MICRO: ABNORMAL /HPF
BARBITURATES UR QL SCN: NEGATIVE
BASE EXCESS BLDA CALC-SCNC: -17.1 MMOL/L (ref -2.5–2.5)
BASE EXCESS BLDA CALC-SCNC: -17.1 MMOL/L (ref -2–3)
BASE EXCESS BLDA CALC-SCNC: -18.7 MMOL/L (ref -2.5–2.5)
BASOPHILS ABSOLUTE: 0 THOU/MM3 (ref 0–0.1)
BASOPHILS NFR BLD AUTO: 0.3 %
BDY SITE: ABNORMAL
BDY SITE: ABNORMAL
BENZODIAZ UR QL SCN: NEGATIVE
BILIRUB CONJ SERPL-MCNC: 2.4 MG/DL (ref 0.1–13.8)
BILIRUB SERPL-MCNC: 3.2 MG/DL (ref 0.3–1.2)
BILIRUB UR QL STRIP.AUTO: NEGATIVE
BREATHS SETTING VENT: 24 BPM
BREATHS SETTING VENT: 24 BPM
BUN SERPL-MCNC: 28 MG/DL (ref 7–22)
BUN SERPL-MCNC: 30 MG/DL (ref 7–22)
BZE UR QL SCN: NEGATIVE
CA-I BLD ISE-SCNC: 0.98 MMOL/L (ref 1.12–1.32)
CA-I BLD ISE-SCNC: 1.14 MMOL/L (ref 1.12–1.32)
CALCIUM SERPL-MCNC: 6.7 MG/DL (ref 8.5–10.5)
CALCIUM SERPL-MCNC: 6.7 MG/DL (ref 8.5–10.5)
CANNABINOIDS UR QL SCN: NEGATIVE
CASTS #/AREA URNS LPF: ABNORMAL /LPF
CASTS 2: ABNORMAL /LPF
CHARACTER UR: ABNORMAL
CHLORIDE BLD-SCNC: 110 MEQ/L (ref 98–109)
CHLORIDE BLD-SCNC: 113 MEQ/L (ref 98–109)
CHLORIDE SERPL-SCNC: 108 MEQ/L (ref 98–111)
CHLORIDE SERPL-SCNC: 108 MEQ/L (ref 98–111)
CO2 SERPL-SCNC: 11 MEQ/L (ref 23–33)
CO2 SERPL-SCNC: 12 MEQ/L (ref 23–33)
COLLECTED BY:: ABNORMAL
COLOR, UA: YELLOW
CORTIS SERPL-MCNC: 83.9 UG/DL
CORTISOL COLLECTION INFO: NORMAL
CREAT SERPL-MCNC: 1.2 MG/DL (ref 0.4–1.2)
CREAT SERPL-MCNC: 1.5 MG/DL (ref 0.4–1.2)
CREAT UR-MCNC: 60.1 MG/DL
CRP SERPL-MCNC: 7.82 MG/DL (ref 0–1)
CRYSTALS URNS MICRO: ABNORMAL
DEPRECATED RDW RBC AUTO: 57.6 FL (ref 35–45)
DEPRECATED RDW RBC AUTO: 58.9 FL (ref 35–45)
DEVICE: ABNORMAL
EKG ATRIAL RATE: 135 BPM
EKG P AXIS: 57 DEGREES
EKG P-R INTERVAL: 132 MS
EKG Q-T INTERVAL: 284 MS
EKG QRS DURATION: 76 MS
EKG QTC CALCULATION (BAZETT): 426 MS
EKG R AXIS: 52 DEGREES
EKG T AXIS: 46 DEGREES
EKG VENTRICULAR RATE: 135 BPM
EOSINOPHIL NFR BLD AUTO: 0.1 %
EOSINOPHILS ABSOLUTE: 0 THOU/MM3 (ref 0–0.4)
EPITHELIAL CELLS, UA: ABNORMAL /HPF
ERYTHROCYTE [DISTWIDTH] IN BLOOD BY AUTOMATED COUNT: 17.2 % (ref 11.5–14.5)
ERYTHROCYTE [DISTWIDTH] IN BLOOD BY AUTOMATED COUNT: 17.3 % (ref 11.5–14.5)
ERYTHROCYTE [SEDIMENTATION RATE] IN BLOOD BY WESTERGREN METHOD: 25 MM/HR (ref 0–20)
FENTANYL: NEGATIVE
FIO2 ON VENT O2 ANALYZER: 70 %
FIO2 ON VENT O2 ANALYZER: 70 %
GFR SERPL CREATININE-BSD FRML MDRD: 40 ML/MIN/1.73M2
GFR SERPL CREATININE-BSD FRML MDRD: 53 ML/MIN/1.73M2
GLUCOSE BLD-MCNC: 277 MG/DL (ref 70–108)
GLUCOSE BLD-MCNC: 289 MG/DL (ref 70–108)
GLUCOSE SERPL-MCNC: 243 MG/DL (ref 70–108)
GLUCOSE SERPL-MCNC: 265 MG/DL (ref 70–108)
GLUCOSE UR QL STRIP.AUTO: >= 1000 MG/DL
HCO3 BLDA-SCNC: 10 MMOL/L (ref 23–28)
HCO3 BLDA-SCNC: 12 MMOL/L (ref 23–28)
HCO3 BLDA-SCNC: 15 MMOL/L (ref 23–28)
HCT VFR BLD AUTO: 27.8 % (ref 37–47)
HCT VFR BLD AUTO: 30 % (ref 37–47)
HGB BLD-MCNC: 10.1 GM/DL (ref 12–16)
HGB BLD-MCNC: 8.9 GM/DL (ref 12–16)
HGB UR QL STRIP.AUTO: ABNORMAL
IMM GRANULOCYTES # BLD AUTO: 0.09 THOU/MM3 (ref 0–0.07)
IMM GRANULOCYTES NFR BLD AUTO: 1.3 %
INR PPP: 3.61 (ref 0.85–1.13)
KETONES UR QL STRIP.AUTO: NEGATIVE
LACTATE BLD-SCNC: 11.3 MMOL/L (ref 0.5–1.9)
LACTATE BLD-SCNC: 7.6 MMOL/L (ref 0.5–1.9)
LACTATE BLD-SCNC: 9.9 MMOL/L (ref 0.5–1.9)
LACTATE SERPL-SCNC: 10.6 MMOL/L (ref 0.5–2)
LYMPHOCYTES ABSOLUTE: 0.6 THOU/MM3 (ref 1–4.8)
LYMPHOCYTES NFR BLD AUTO: 9.1 %
MAGNESIUM SERPL-MCNC: 2.2 MG/DL (ref 1.6–2.4)
MCH RBC QN AUTO: 29.8 PG (ref 26–33)
MCH RBC QN AUTO: 31.1 PG (ref 26–33)
MCHC RBC AUTO-ENTMCNC: 32 GM/DL (ref 32.2–35.5)
MCHC RBC AUTO-ENTMCNC: 33.7 GM/DL (ref 32.2–35.5)
MCV RBC AUTO: 92.3 FL (ref 81–99)
MCV RBC AUTO: 93 FL (ref 81–99)
MISCELLANEOUS 2: ABNORMAL
MONOCYTES ABSOLUTE: 0.4 THOU/MM3 (ref 0.4–1.3)
MONOCYTES NFR BLD AUTO: 5.1 %
MRSA DNA SPEC QL NAA+PROBE: NEGATIVE
NEUTROPHILS ABSOLUTE: 6 THOU/MM3 (ref 1.8–7.7)
NEUTROPHILS NFR BLD AUTO: 84.1 %
NITRITE UR QL STRIP: NEGATIVE
NRBC BLD AUTO-RTO: 0 /100 WBC
OPIATES UR QL SCN: NEGATIVE
OSMOLALITY SERPL CALC.SUM OF ELEC: 287.4 MOSMOL/KG (ref 275–300)
OXYCODONE: POSITIVE
PATHOLOGIST REVIEW: ABNORMAL
PCO2 BLDA: 37 MMHG (ref 35–45)
PCO2 BLDA: 67 MMHG (ref 35–45)
PCO2 TEMP ADJ BLDMV: 37 MMHG (ref 41–51)
PCP UR QL SCN: NEGATIVE
PEEP SETTING VENT: 10 MMHG
PEEP SETTING VENT: 8 MMHG
PH BLDA: 6.95 [PH] (ref 7.35–7.45)
PH BLDA: 7.06 [PH] (ref 7.35–7.45)
PH BLDMV: 7.1 [PH] (ref 7.31–7.41)
PH UR STRIP.AUTO: 5 [PH] (ref 5–9)
PIP: 26 CMH2O
PIP: 28 CMH2O
PLATELET # BLD AUTO: 110 THOU/MM3 (ref 130–400)
PLATELET # BLD AUTO: 30 THOU/MM3 (ref 130–400)
PMV BLD AUTO: ABNORMAL FL (ref 9.4–12.4)
PMV BLD AUTO: ABNORMAL FL (ref 9.4–12.4)
PO2 BLDA: 85 MMHG (ref 71–104)
PO2 BLDA: 89 MMHG (ref 71–104)
PO2 BLDMV: 69 MMHG (ref 25–40)
POC CREATININE WHOLE BLOOD: 1.7 MG/DL (ref 0.5–1.2)
POC CREATININE WHOLE BLOOD: 1.7 MG/DL (ref 0.5–1.2)
POTASSIUM BLD-SCNC: 5.7 MEQ/L (ref 3.5–4.9)
POTASSIUM BLD-SCNC: 6.4 MEQ/L (ref 3.5–4.9)
POTASSIUM SERPL-SCNC: 5.1 MEQ/L (ref 3.5–5.2)
POTASSIUM SERPL-SCNC: 5.5 MEQ/L (ref 3.5–5.2)
PROT SERPL-MCNC: 4.6 G/DL (ref 6.1–8)
PROT UR STRIP.AUTO-MCNC: ABNORMAL MG/DL
RBC # BLD AUTO: 2.99 MILL/MM3 (ref 4.2–5.4)
RBC # BLD AUTO: 3.25 MILL/MM3 (ref 4.2–5.4)
RBC URINE: ABNORMAL /HPF
RENAL EPI CELLS #/AREA URNS HPF: ABNORMAL /[HPF]
SAO2 % BLDA: 89 %
SAO2 % BLDA: 91 %
SAO2 % BLDMV: 86 %
SITE: ABNORMAL
SODIUM BLD-SCNC: 136 MEQ/L (ref 138–146)
SODIUM BLD-SCNC: 141 MEQ/L (ref 138–146)
SODIUM SERPL-SCNC: 136 MEQ/L (ref 135–145)
SODIUM SERPL-SCNC: 136 MEQ/L (ref 135–145)
SODIUM UR-SCNC: 33 MEQ/L
SP GR UR REFRACT.AUTO: 1.02 (ref 1–1.03)
T4 FREE SERPL-MCNC: 0.79 NG/DL (ref 0.93–1.68)
TROPONIN, HIGH SENSITIVITY: 39 NG/L (ref 0–12)
TSH SERPL DL<=0.005 MIU/L-ACNC: 1.6 UIU/ML (ref 0.4–4.2)
UROBILINOGEN, URINE: 0.2 EU/DL (ref 0–1)
UUN 24H UR-MCNC: 217 MG/DL
VENTILATION MODE VENT: ABNORMAL
WBC # BLD AUTO: 3.2 THOU/MM3 (ref 4.8–10.8)
WBC # BLD AUTO: 7.1 THOU/MM3 (ref 4.8–10.8)
WBC #/AREA URNS HPF: ABNORMAL /HPF
WBC #/AREA URNS HPF: ABNORMAL /[HPF]
YEAST LIKE FUNGI URNS QL MICRO: ABNORMAL

## 2025-01-03 PROCEDURE — 82533 TOTAL CORTISOL: CPT

## 2025-01-03 PROCEDURE — P9041 ALBUMIN (HUMAN),5%, 50ML: HCPCS

## 2025-01-03 PROCEDURE — 74174 CTA ABD&PLVS W/CONTRAST: CPT

## 2025-01-03 PROCEDURE — 6360000002 HC RX W HCPCS

## 2025-01-03 PROCEDURE — 2000000000 HC ICU R&B

## 2025-01-03 PROCEDURE — 3E033XZ INTRODUCTION OF VASOPRESSOR INTO PERIPHERAL VEIN, PERCUTANEOUS APPROACH: ICD-10-PCS

## 2025-01-03 PROCEDURE — 87641 MR-STAPH DNA AMP PROBE: CPT

## 2025-01-03 PROCEDURE — 85651 RBC SED RATE NONAUTOMATED: CPT

## 2025-01-03 PROCEDURE — P9016 RBC LEUKOCYTES REDUCED: HCPCS

## 2025-01-03 PROCEDURE — 83605 ASSAY OF LACTIC ACID: CPT

## 2025-01-03 PROCEDURE — 96366 THER/PROPH/DIAG IV INF ADDON: CPT

## 2025-01-03 PROCEDURE — 2580000003 HC RX 258

## 2025-01-03 PROCEDURE — 82565 ASSAY OF CREATININE: CPT

## 2025-01-03 PROCEDURE — 71045 X-RAY EXAM CHEST 1 VIEW: CPT

## 2025-01-03 PROCEDURE — 6360000002 HC RX W HCPCS: Performed by: STUDENT IN AN ORGANIZED HEALTH CARE EDUCATION/TRAINING PROGRAM

## 2025-01-03 PROCEDURE — 84540 ASSAY OF URINE/UREA-N: CPT

## 2025-01-03 PROCEDURE — 2700000000 HC OXYGEN THERAPY PER DAY

## 2025-01-03 PROCEDURE — 82435 ASSAY OF BLOOD CHLORIDE: CPT

## 2025-01-03 PROCEDURE — 2500000003 HC RX 250 WO HCPCS: Performed by: STUDENT IN AN ORGANIZED HEALTH CARE EDUCATION/TRAINING PROGRAM

## 2025-01-03 PROCEDURE — 96367 TX/PROPH/DG ADDL SEQ IV INF: CPT

## 2025-01-03 PROCEDURE — 86140 C-REACTIVE PROTEIN: CPT

## 2025-01-03 PROCEDURE — 2500000003 HC RX 250 WO HCPCS

## 2025-01-03 PROCEDURE — 94002 VENT MGMT INPAT INIT DAY: CPT

## 2025-01-03 PROCEDURE — 83735 ASSAY OF MAGNESIUM: CPT

## 2025-01-03 PROCEDURE — 82803 BLOOD GASES ANY COMBINATION: CPT

## 2025-01-03 PROCEDURE — 82010 KETONE BODYS QUAN: CPT

## 2025-01-03 PROCEDURE — 51702 INSERT TEMP BLADDER CATH: CPT

## 2025-01-03 PROCEDURE — 71275 CT ANGIOGRAPHY CHEST: CPT

## 2025-01-03 PROCEDURE — 82947 ASSAY GLUCOSE BLOOD QUANT: CPT

## 2025-01-03 PROCEDURE — 2580000003 HC RX 258: Performed by: STUDENT IN AN ORGANIZED HEALTH CARE EDUCATION/TRAINING PROGRAM

## 2025-01-03 PROCEDURE — 84300 ASSAY OF URINE SODIUM: CPT

## 2025-01-03 PROCEDURE — 31500 INSERT EMERGENCY AIRWAY: CPT

## 2025-01-03 PROCEDURE — 80307 DRUG TEST PRSMV CHEM ANLYZR: CPT

## 2025-01-03 PROCEDURE — 36415 COLL VENOUS BLD VENIPUNCTURE: CPT

## 2025-01-03 PROCEDURE — 85027 COMPLETE CBC AUTOMATED: CPT

## 2025-01-03 PROCEDURE — APPSS30 APP SPLIT SHARED TIME 16-30 MINUTES: Performed by: NURSE PRACTITIONER

## 2025-01-03 PROCEDURE — 84484 ASSAY OF TROPONIN QUANT: CPT

## 2025-01-03 PROCEDURE — 84443 ASSAY THYROID STIM HORMONE: CPT

## 2025-01-03 PROCEDURE — 82248 BILIRUBIN DIRECT: CPT

## 2025-01-03 PROCEDURE — 37799 UNLISTED PX VASCULAR SURGERY: CPT

## 2025-01-03 PROCEDURE — 94761 N-INVAS EAR/PLS OXIMETRY MLT: CPT

## 2025-01-03 PROCEDURE — 93010 ELECTROCARDIOGRAM REPORT: CPT | Performed by: INTERNAL MEDICINE

## 2025-01-03 PROCEDURE — 70450 CT HEAD/BRAIN W/O DYE: CPT

## 2025-01-03 PROCEDURE — 80053 COMPREHEN METABOLIC PANEL: CPT

## 2025-01-03 PROCEDURE — 85610 PROTHROMBIN TIME: CPT

## 2025-01-03 PROCEDURE — 82570 ASSAY OF URINE CREATININE: CPT

## 2025-01-03 PROCEDURE — 84132 ASSAY OF SERUM POTASSIUM: CPT

## 2025-01-03 PROCEDURE — 36620 INSERTION CATHETER ARTERY: CPT

## 2025-01-03 PROCEDURE — 84439 ASSAY OF FREE THYROXINE: CPT

## 2025-01-03 PROCEDURE — 6360000004 HC RX CONTRAST MEDICATION: Performed by: STUDENT IN AN ORGANIZED HEALTH CARE EDUCATION/TRAINING PROGRAM

## 2025-01-03 PROCEDURE — 82330 ASSAY OF CALCIUM: CPT

## 2025-01-03 PROCEDURE — 85025 COMPLETE CBC W/AUTO DIFF WBC: CPT

## 2025-01-03 PROCEDURE — 84295 ASSAY OF SERUM SODIUM: CPT

## 2025-01-03 PROCEDURE — 85730 THROMBOPLASTIN TIME PARTIAL: CPT

## 2025-01-03 PROCEDURE — 99291 CRITICAL CARE FIRST HOUR: CPT

## 2025-01-03 PROCEDURE — 81001 URINALYSIS AUTO W/SCOPE: CPT

## 2025-01-03 PROCEDURE — 36430 TRANSFUSION BLD/BLD COMPNT: CPT

## 2025-01-03 RX ORDER — ONDANSETRON 2 MG/ML
4 INJECTION INTRAMUSCULAR; INTRAVENOUS EVERY 6 HOURS PRN
Status: DISCONTINUED | OUTPATIENT
Start: 2025-01-03 | End: 2025-01-03 | Stop reason: HOSPADM

## 2025-01-03 RX ORDER — POTASSIUM CHLORIDE 7.45 MG/ML
10 INJECTION INTRAVENOUS PRN
Status: DISCONTINUED | OUTPATIENT
Start: 2025-01-03 | End: 2025-01-03 | Stop reason: HOSPADM

## 2025-01-03 RX ORDER — FENTANYL CITRATE 50 UG/ML
INJECTION, SOLUTION INTRAMUSCULAR; INTRAVENOUS
Status: DISCONTINUED
Start: 2025-01-03 | End: 2025-01-03 | Stop reason: HOSPADM

## 2025-01-03 RX ORDER — FENTANYL CITRATE-0.9 % NACL/PF 20 MCG/2ML
50 SYRINGE (ML) INTRAVENOUS EVERY 30 MIN PRN
Status: DISCONTINUED | OUTPATIENT
Start: 2025-01-03 | End: 2025-01-03 | Stop reason: HOSPADM

## 2025-01-03 RX ORDER — IOPAMIDOL 755 MG/ML
80 INJECTION, SOLUTION INTRAVASCULAR
Status: COMPLETED | OUTPATIENT
Start: 2025-01-03 | End: 2025-01-03

## 2025-01-03 RX ORDER — DOPAMINE HYDROCHLORIDE 160 MG/100ML
INJECTION, SOLUTION INTRAVENOUS
Status: COMPLETED
Start: 2025-01-03 | End: 2025-01-03

## 2025-01-03 RX ORDER — CALCIUM GLUCONATE 20 MG/ML
1000 INJECTION, SOLUTION INTRAVENOUS ONCE
Status: COMPLETED | OUTPATIENT
Start: 2025-01-03 | End: 2025-01-03

## 2025-01-03 RX ORDER — FENTANYL CITRATE 50 UG/ML
INJECTION, SOLUTION INTRAMUSCULAR; INTRAVENOUS DAILY PRN
Status: COMPLETED | OUTPATIENT
Start: 2025-01-03 | End: 2025-01-03

## 2025-01-03 RX ORDER — DEXMEDETOMIDINE HYDROCHLORIDE 4 UG/ML
.1-1.5 INJECTION, SOLUTION INTRAVENOUS CONTINUOUS
Status: DISCONTINUED | OUTPATIENT
Start: 2025-01-03 | End: 2025-01-03 | Stop reason: HOSPADM

## 2025-01-03 RX ORDER — ETOMIDATE 2 MG/ML
INJECTION INTRAVENOUS DAILY PRN
Status: COMPLETED | OUTPATIENT
Start: 2025-01-03 | End: 2025-01-03

## 2025-01-03 RX ORDER — MAGNESIUM SULFATE IN WATER 40 MG/ML
2000 INJECTION, SOLUTION INTRAVENOUS PRN
Status: DISCONTINUED | OUTPATIENT
Start: 2025-01-03 | End: 2025-01-03 | Stop reason: HOSPADM

## 2025-01-03 RX ORDER — HYDROCORTISONE SODIUM SUCCINATE 100 MG/2ML
100 INJECTION INTRAMUSCULAR; INTRAVENOUS EVERY 8 HOURS
Status: DISCONTINUED | OUTPATIENT
Start: 2025-01-03 | End: 2025-01-03 | Stop reason: HOSPADM

## 2025-01-03 RX ORDER — POLYETHYLENE GLYCOL 3350 17 G/17G
17 POWDER, FOR SOLUTION ORAL DAILY PRN
Status: DISCONTINUED | OUTPATIENT
Start: 2025-01-03 | End: 2025-01-03 | Stop reason: HOSPADM

## 2025-01-03 RX ORDER — INSULIN LISPRO 100 [IU]/ML
0-8 INJECTION, SOLUTION INTRAVENOUS; SUBCUTANEOUS
Status: DISCONTINUED | OUTPATIENT
Start: 2025-01-03 | End: 2025-01-03 | Stop reason: HOSPADM

## 2025-01-03 RX ORDER — DEXTROSE MONOHYDRATE 100 MG/ML
INJECTION, SOLUTION INTRAVENOUS CONTINUOUS PRN
Status: DISCONTINUED | OUTPATIENT
Start: 2025-01-03 | End: 2025-01-03 | Stop reason: HOSPADM

## 2025-01-03 RX ORDER — POTASSIUM CHLORIDE 29.8 MG/ML
20 INJECTION INTRAVENOUS PRN
Status: DISCONTINUED | OUTPATIENT
Start: 2025-01-03 | End: 2025-01-03 | Stop reason: HOSPADM

## 2025-01-03 RX ORDER — ALBUMIN HUMAN 50 G/1000ML
25 SOLUTION INTRAVENOUS ONCE
Status: COMPLETED | OUTPATIENT
Start: 2025-01-03 | End: 2025-01-03

## 2025-01-03 RX ORDER — 0.9 % SODIUM CHLORIDE 0.9 %
1000 INTRAVENOUS SOLUTION INTRAVENOUS ONCE
Status: COMPLETED | OUTPATIENT
Start: 2025-01-03 | End: 2025-01-03

## 2025-01-03 RX ORDER — FENTANYL CITRATE-0.9 % NACL/PF 10 MCG/ML
25-200 PLASTIC BAG, INJECTION (ML) INTRAVENOUS CONTINUOUS
Status: DISCONTINUED | OUTPATIENT
Start: 2025-01-03 | End: 2025-01-03 | Stop reason: HOSPADM

## 2025-01-03 RX ORDER — CALCIUM GLUCONATE 94 MG/ML
1000 INJECTION, SOLUTION INTRAVENOUS ONCE
Status: COMPLETED | OUTPATIENT
Start: 2025-01-03 | End: 2025-01-03

## 2025-01-03 RX ORDER — CLINDAMYCIN PHOSPHATE 600 MG/50ML
600 INJECTION, SOLUTION INTRAVENOUS ONCE
Status: COMPLETED | OUTPATIENT
Start: 2025-01-03 | End: 2025-01-03

## 2025-01-03 RX ORDER — ALBUMIN (HUMAN) 12.5 G/50ML
50 SOLUTION INTRAVENOUS ONCE
Status: DISCONTINUED | OUTPATIENT
Start: 2025-01-03 | End: 2025-01-03 | Stop reason: HOSPADM

## 2025-01-03 RX ORDER — ACETAMINOPHEN 650 MG/1
650 SUPPOSITORY RECTAL EVERY 6 HOURS PRN
Status: DISCONTINUED | OUTPATIENT
Start: 2025-01-03 | End: 2025-01-03 | Stop reason: HOSPADM

## 2025-01-03 RX ORDER — ACETAMINOPHEN 325 MG/1
650 TABLET ORAL EVERY 6 HOURS PRN
Status: DISCONTINUED | OUTPATIENT
Start: 2025-01-03 | End: 2025-01-03 | Stop reason: HOSPADM

## 2025-01-03 RX ORDER — ACETAMINOPHEN 650 MG/1
650 SUPPOSITORY RECTAL ONCE
Status: DISCONTINUED | OUTPATIENT
Start: 2025-01-03 | End: 2025-01-03 | Stop reason: HOSPADM

## 2025-01-03 RX ORDER — GLUCAGON 1 MG/ML
1 KIT INJECTION PRN
Status: DISCONTINUED | OUTPATIENT
Start: 2025-01-03 | End: 2025-01-03 | Stop reason: HOSPADM

## 2025-01-03 RX ORDER — ONDANSETRON 4 MG/1
4 TABLET, ORALLY DISINTEGRATING ORAL EVERY 8 HOURS PRN
Status: DISCONTINUED | OUTPATIENT
Start: 2025-01-03 | End: 2025-01-03 | Stop reason: HOSPADM

## 2025-01-03 RX ORDER — SODIUM CHLORIDE 0.9 % (FLUSH) 0.9 %
5-40 SYRINGE (ML) INJECTION PRN
Status: DISCONTINUED | OUTPATIENT
Start: 2025-01-03 | End: 2025-01-03 | Stop reason: HOSPADM

## 2025-01-03 RX ORDER — SODIUM CHLORIDE, SODIUM LACTATE, POTASSIUM CHLORIDE, CALCIUM CHLORIDE 600; 310; 30; 20 MG/100ML; MG/100ML; MG/100ML; MG/100ML
INJECTION, SOLUTION INTRAVENOUS CONTINUOUS
Status: DISCONTINUED | OUTPATIENT
Start: 2025-01-03 | End: 2025-01-03 | Stop reason: HOSPADM

## 2025-01-03 RX ORDER — SODIUM CHLORIDE 9 MG/ML
INJECTION, SOLUTION INTRAVENOUS PRN
Status: DISCONTINUED | OUTPATIENT
Start: 2025-01-03 | End: 2025-01-03 | Stop reason: HOSPADM

## 2025-01-03 RX ORDER — INSULIN GLARGINE 100 [IU]/ML
20 INJECTION, SOLUTION SUBCUTANEOUS DAILY
Status: DISCONTINUED | OUTPATIENT
Start: 2025-01-03 | End: 2025-01-03 | Stop reason: HOSPADM

## 2025-01-03 RX ORDER — ALBUMIN HUMAN 50 G/1000ML
SOLUTION INTRAVENOUS
Status: COMPLETED
Start: 2025-01-03 | End: 2025-01-03

## 2025-01-03 RX ORDER — ROCURONIUM BROMIDE 10 MG/ML
INJECTION, SOLUTION INTRAVENOUS DAILY PRN
Status: COMPLETED | OUTPATIENT
Start: 2025-01-03 | End: 2025-01-03

## 2025-01-03 RX ORDER — DOPAMINE HYDROCHLORIDE 160 MG/100ML
1-20 INJECTION, SOLUTION INTRAVENOUS CONTINUOUS
Status: DISCONTINUED | OUTPATIENT
Start: 2025-01-03 | End: 2025-01-03 | Stop reason: HOSPADM

## 2025-01-03 RX ORDER — SODIUM CHLORIDE 0.9 % (FLUSH) 0.9 %
5-40 SYRINGE (ML) INJECTION EVERY 12 HOURS SCHEDULED
Status: DISCONTINUED | OUTPATIENT
Start: 2025-01-03 | End: 2025-01-03 | Stop reason: HOSPADM

## 2025-01-03 RX ADMIN — CLINDAMYCIN PHOSPHATE 600 MG: 600 INJECTION, SOLUTION INTRAVENOUS at 01:56

## 2025-01-03 RX ADMIN — FENTANYL CITRATE 100 MCG: 50 INJECTION, SOLUTION INTRAMUSCULAR; INTRAVENOUS at 00:09

## 2025-01-03 RX ADMIN — Medication 50 MCG/HR: at 00:41

## 2025-01-03 RX ADMIN — ALBUMIN HUMAN 25 G: 50 SOLUTION INTRAVENOUS at 03:04

## 2025-01-03 RX ADMIN — Medication 50 MEQ: at 02:58

## 2025-01-03 RX ADMIN — DOPAMINE HYDROCHLORIDE 10 MCG/KG/MIN: 160 INJECTION, SOLUTION INTRAVENOUS at 05:41

## 2025-01-03 RX ADMIN — IOPAMIDOL 80 ML: 755 INJECTION, SOLUTION INTRAVENOUS at 02:14

## 2025-01-03 RX ADMIN — SODIUM BICARBONATE: 84 INJECTION, SOLUTION INTRAVENOUS at 01:26

## 2025-01-03 RX ADMIN — PHENYLEPHRINE HYDROCHLORIDE 30 MCG/MIN: 10 INJECTION INTRAVENOUS at 03:38

## 2025-01-03 RX ADMIN — ETOMIDATE 20 MG: 2 INJECTION INTRAVENOUS at 00:06

## 2025-01-03 RX ADMIN — METRONIDAZOLE 500 MG: 500 INJECTION, SOLUTION INTRAVENOUS at 02:48

## 2025-01-03 RX ADMIN — ALBUMIN (HUMAN) 25 G: 12.5 INJECTION, SOLUTION INTRAVENOUS at 03:04

## 2025-01-03 RX ADMIN — VASOPRESSIN 0.03 UNITS/MIN: 20 INJECTION INTRAVENOUS at 02:50

## 2025-01-03 RX ADMIN — Medication 50 MEQ: at 04:58

## 2025-01-03 RX ADMIN — HYDROCORTISONE SODIUM SUCCINATE 100 MG: 100 INJECTION, POWDER, FOR SOLUTION INTRAMUSCULAR; INTRAVENOUS at 04:10

## 2025-01-03 RX ADMIN — SODIUM BICARBONATE: 84 INJECTION, SOLUTION INTRAVENOUS at 05:18

## 2025-01-03 RX ADMIN — CEFEPIME 2000 MG: 2 INJECTION, POWDER, FOR SOLUTION INTRAVENOUS at 01:28

## 2025-01-03 RX ADMIN — SODIUM BICARBONATE 50 MEQ: 84 INJECTION, SOLUTION INTRAVENOUS at 02:58

## 2025-01-03 RX ADMIN — MEROPENEM 1000 MG: 1 INJECTION INTRAVENOUS at 05:20

## 2025-01-03 RX ADMIN — SODIUM CHLORIDE 1000 ML: 9 INJECTION, SOLUTION INTRAVENOUS at 00:24

## 2025-01-03 RX ADMIN — CALCIUM GLUCONATE 1000 MG: 98 INJECTION, SOLUTION INTRAVENOUS at 00:36

## 2025-01-03 RX ADMIN — SODIUM CHLORIDE, POTASSIUM CHLORIDE, SODIUM LACTATE AND CALCIUM CHLORIDE: 600; 310; 30; 20 INJECTION, SOLUTION INTRAVENOUS at 03:06

## 2025-01-03 RX ADMIN — SODIUM BICARBONATE 50 MEQ: 84 INJECTION, SOLUTION INTRAVENOUS at 04:58

## 2025-01-03 RX ADMIN — Medication 1750 MG: at 01:35

## 2025-01-03 RX ADMIN — Medication 1 MCG/MIN: at 01:31

## 2025-01-03 RX ADMIN — Medication 100 MCG/MIN: at 05:00

## 2025-01-03 RX ADMIN — ROCURONIUM BROMIDE 100 MG: 10 INJECTION INTRAVENOUS at 00:07

## 2025-01-03 RX ADMIN — CALCIUM GLUCONATE 1000 MG: 20 INJECTION, SOLUTION INTRAVENOUS at 03:10

## 2025-01-03 ASSESSMENT — PULMONARY FUNCTION TESTS
PIF_VALUE: 25
PIF_VALUE: 27

## 2025-01-03 NOTE — CONSULTS
[facility]  General Surgery Consultation - Arlet Ann, APRN - CNP  On behalf of Dr. Felipe    Pt Name: Joanna Delarosa  MRN: [id number]  YOB: 1965  Date of evaluation: 1/3/2025  Primary Care Physician: Orlando Coolye MD  Patient evaluated at the request of  Dr. Caba  Reason for evaluation: CT findings concerning for necrotizing fascitis   IMPRESSIONS:   Lexis's gangrene   has a past medical history of Anxiety, Anxiety and depression, Asthma, Bipolar 1 disorder (HCC), Bipolar 1 disorder with moderate sourav (HCC), Blood circulation, collateral, Breast cancer (HCC), Cancer (HCC), Cancer (HCC), COPD (chronic obstructive pulmonary disease) (HCC), Depression, Endometriosis, GERD (gastroesophageal reflux disease), History of therapeutic radiation, Kidney stones, Lupus, Movement disorder, MS (multiple sclerosis) (HCC), Schizophrenia (HCC), Thyroid disease, Type 2 diabetes mellitus with hyperglycemia, with long-term current use of insulin (HCC), Type 2 diabetes mellitus without complication (HCC), and Unspecified cerebral artery occlusion with cerebral infarction.  RECOMMENDATIONS:   Not a surgical candidate at this time due to hemodynamic instability requiring multiple vasopressors  Correct metabolic derangements  Check coags  Continue antibiotics  Surgical procedure would be extensive with a long post-op course.  Recommend medical stability prior to any intervention.  Will continue to follow along.   SUBJECTIVE:   History of Chief Complaint:    Joanna is a 59 y.o.female who presents with abdominal pain. History is per chart review as the patient is unresponsive and intubated.  She lives at home with her spouse.  She had been recently discharged from the hospital on 12/13/2024 after sustaining a left femur fracture that was treated non operatively.  Spouse called EMS tonight for reports of abdominal pain.  Upon EMS arrival, the patient was found to be hypoxic with an SpO2 of 75%.     BILITOT 3.2*  --   --   --    BILIDIR 2.4  --   --   --    LACTA 10.6*  --   --   --    NITRU  --   --   --  NEGATIVE   COLORU  --   --   --  YELLOW   BACTERIA  --   --   --  MANY     RADIOLOGY:     CTA Chest W W/O Aortic Dissection   Final Result   Impression:   1. Normal caliber thoracic aorta. No definite aortic dissection within the    limitation as above.   2. No acute pulmonary embolus.   3. Malpositioned right endotracheal tube with tip in the right mainstem    bronchus. Recommend repositioning.   4. Mild patchy ground-glass opacities in both lungs could be inflammatory    or infectious in etiology.   5. Hepatic venous air. Please refer to separately dictated CT    abdomen/pelvis.   6. Soft tissue emphysema posterior left neck base.      This document has been electronically signed by: Martell Welch MD on    01/03/2025 04:08 AM      All CTs at this facility use dose modulation techniques and iterative    reconstructions, and/or weight-based dosing   when appropriate to reduce radiation to a low as reasonably achievable.      3D Post-processing was performed on this study.      CTA ABDOMEN PELVIS W WO CONTRAST   Final Result   Impression:   1. Extensive emphysema in the lower abdomen and pelvis involving multiple    compartments, including subcutaneous fat, muscles, extraperitoneal and    retroperitoneal spaces as well as within the bones and uterus. Bilateral    iliac and hepatic venous air. Air also present in some of the gluteal    vessels. Inferior extent of the soft tissue emphysema in both thighs are    off image. Findings highly concerning for infection such as gas    gangrene/necrotizing fasciitis. Other etiologies not excluded.   2. Malpositioned endotracheal tube with tip in the right mainstem    bronchus. Recommend repositioning.      This document has been electronically signed by: Martell Welch MD on    01/03/2025 04:01 AM      All CTs at this facility use dose modulation techniques and iterative

## 2025-01-03 NOTE — PROGRESS NOTES
[facility]  Notice of Patient Passing      Patient Name- Joanna Delarosa   Acct Number- [id number]   Attending Physician- Darin Caba DO    Admitted on-1/2/2025 11:38 PM     On 1/3/2025 at 0551 patient was found in 4D15 with:   Absence of vital signs.   Absence of neurological response.    Confirmed time of death at 0551.   Physician or On-call Physician notified of time of death- yes    Family present at time of death- yes   Spiritual care present at time of death- no    Physician was notified and orders were obtained to release the body.   Post-Mortem documentation completed; form printed, signed, and given to admitting.    Kaye David RN RN Nursing Supervisor/ Manager  1/3/25   6:29 AM    ________________________________________________________________________    Complete information below if 's Case only:    Death Notification    Reported ________________   [facility]’s Office  ’s Case __________   Internal Use Only   Autopsy       Y ____    N____  ’s & Investigator’s Notes  Agency    ________________  FAX:  [phone]   Agency #   _______________     Call Date: 1/3/2025   Call Time: 0613  Notified by: Kaye David RN House Supervisor   Of: [facility]    Type of Death:   [x] Notification  []Hospice  [x]Hospital < 24 Hour  [] ED Death  []Home  []Nursing Home      Pt Name: Joanna Delarosa   Address: [address] [place], [place] [address]  Phone: [phone] (home)    SSN: [id number]    MRN: [id number]    AGE: 59 y.o.   YOB: 1965    /  GENDER: female     Primary Care Physician: Orlando Cooley MD   Attending Physician Name: Darin Caba     Date of Death: 01/03/25  Time of Death: 0551  Pronounced By: Kaye David RN House Supervisor      Emergency Contact:   Name of Family Notified of Death: Jose Guadalupe Delarosa   Relationship to Patient: Son   Phone

## 2025-01-03 NOTE — ED NOTES
[facility] Authorization    The [facility] Review Committee has reviewed this case and the patient IS APPROVED for discharge to a facility for Short Term Skilled once the following procedure is followed:     - The physician discharge instructions (contained within the FRANCOIS note for SNF) must inlcude the below appropriate and approved COVID instructions to the facility    For questions regarding CLRC approval process, please contact the CM assigned to the case.  For questions regarding RN discharge workflow, please contact the unit Clinical Leader.   X ray at bedside.

## 2025-01-03 NOTE — PROGRESS NOTES
Patient arrived to unit from ED via cart. Patient transferred to ICU bed and placed on continuous ICU bedside monitor. 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 Vitals obtained. See flowsheets. Patient's IV access includes triple lumen CVC in patient's right IJm, . Current infusions and rates of infusion include 20 g peripheral IV in patient's right wrist, 22 g peripheral in patient's right forearm, and a 20 g peripheral IV in patient's right AC. Assessment completed by YASH Nathan. Two nurse skin assessment completed by YASH Nathan and YASH Reed. See flowsheets for assessment details. Policies and procedures of ICU unable to be explained to patient at this time. Family member(s)/representative(s) present at time of admission include patient's spouse. Patient rights explained to family member(s)/representatives and patient, as able. Patient/patient's family member(s)/representative(s) N/A to have physician notified of their admission. All questions posed by patient's family member(s)/representative(s) and patient answered at this time.

## 2025-01-03 NOTE — H&P
Once    albumin human 25%  50 g IntraVENous Once    calcium gluconate  1,000 mg IntraVENous Once    albumin human 5%  25 g IntraVENous Once    meropenem  1,000 mg IntraVENous Once    Followed by    meropenem  1,000 mg IntraVENous Q12H    hydrocortisone sodium succinate PF  100 mg IntraVENous Q8H    sodium chloride flush  5-40 mL IntraVENous 2 times per day    insulin glargine  20 Units SubCUTAneous Daily    insulin lispro  0-8 Units SubCUTAneous 4x Daily AC & HS     Continuous Infusions:   fentaNYL Stopped (01/03/25 0247)    dexmedeTOMIDine      sodium bicarbonate 150 mEq in dextrose 5 % 1,000 mL infusion 150 mL/hr at 01/03/25 0301    VASOpressin 20 Units in sodium chloride 0.9 % 100 mL infusion 0.03 Units/min (01/03/25 0301)    phenylephrine (LISET-SYNEPHRINE) 50 mg in sodium chloride 0.9 % 250 mL infusion 30 mcg/min (01/03/25 0338)    lactated ringers 250 mL/hr at 01/03/25 0306    sodium chloride      dextrose      norepinephrine 75 mcg/min (01/03/25 0300)       PHYSICAL EXAMINATION:  T: 102.9.  P: 100. RR: 18. B/P: 95/70.  FiO2: 100%. O2 Sat: 98 with.  I/O: Pending  Body mass index is 29.89 kg/m².   GCS:   Intubated sedated on the vent  Mode: PVC+ Rate: 18 pressure support: 18 PEEP: 10 FiO2 100%  General:   Acutely ill looking.  Acute intubated sedated on vent  HEENT:  normocephalic and atraumatic.  No scleral icterus. PERR  Neck: supple.  No Thyromegaly.  Lungs: On auscultation bibasilarly crackles.  No wheezing  Cardiac: Distant heart sounds, no overt gallop/murmur heart  Abdomen: soft.  Skin bullae and superficial lacerations seen on lower abdomen below umbilicus..  Extremities:  No clubbing, cyanosis, or edema x 4.    Vasculature: capillary refill > 3 seconds. Palpable dorsalis pedis pulses.  Skin:  warm and dry.    Lymph:  No supraclavicular adenopathy.  Data: (All radiographs, tracings, PFTs, and imaging are personally viewed and interpreted unless otherwise noted).   Sodium 136, potassium 5.1, chloride 108,

## 2025-01-03 NOTE — PROGRESS NOTES
Family members at bedside. Dr Caba explains that we are approaching the maximum amount of vasopressors that we can use and that the patient is not responding.  Family verbalizes understanding.    0551 Patient in PEA, Dr Caba at bedside and uses ultrasound to pronounce patient.  House Supr notified.

## 2025-01-03 NOTE — PROGRESS NOTES
Pharmacy Note - Extended Infusion Beta-Lactam Dose Adjustment    Meropenem 1000 mg q8h extended infusion for treatment of Bloodstream infection. Per [facility] Extended Infusion Beta-Lactam Policy, meropenem will be changed to 1000 mg loading dose followed by 1000 mg q8h extended infusion    Estimated Creatinine Clearance: Estimated Creatinine Clearance: 37 mL/min (A) (based on SCr of 1.5 mg/dL (H)).    Dialysis Status, PILLO, CKD: N/A    BMI: Body mass index is 29.89 kg/m².    Rationale for Adjustment: Dose adjusted per [facility] Extended Infusion Policy based on renal function and indication. The above medication is renally eliminated and demonstrates time-dependent effects on bacterial eradication. Extended-infusion dosing strategy aims to enhance microbiologic and clinical efficacy.     Pharmacy will monitor renal function daily and adjust dose as necessary.      Please call with any questions.    Thank you,  Augusta Waite [facility], BCPS, BCGP  1/3/2025     3:22 AM

## 2025-01-03 NOTE — SEDATION DOCUMENTATION
Pt intubated at this time. Positive color change. Bilateral breath sounds. RT securing with holster at this time.

## 2025-01-03 NOTE — DISCHARGE SUMMARY
DEATH SUMMARY      Patient:  Joanna Delarosa  YOB: 1965  MRN: [id number]   Acct: [id number]    Primary Care Physician: Orlando Coloey MD    Admit date:  1/2/2025    Discharge date:      Admission Condition:critical    Discharge Diagnoses:   Severe septic shock with multiorgan failure.  Acute hypoxic respiratory failure   Suspected anoxic brain injury.  High anion gap metabolic acidosis  Lactic acidosis  Suspected necrotizing fasciitis of lower abdomen, upper left side thigh  Pancytopenia  Suspected DIC  Suspected acute liver injury/status, and AST elevation  Coma (HCC)  Principal Problem:    Coma (HCC)  Resolved Problems:    * No resolved hospital problems. *      Consultations:-  [] NONE [] Cardiology  [] Nephrology  [] Hemo onco   [] GI   [] ID  [] Endocrine  [] Pulm    [] Neuro    [] Psych   [] Urology  [] ENT   [x] G SURGERY   []Ortho    []CV surg    [] Palliative  [] Hospice [] Pain management   []    []TCU   [] PT/OT  OTHERS:-Orthopedic surgery    Significant Diagnostic Studies:    XR CHEST PORTABLE    Result Date: 1/3/2025  Chest x-ray: 1 view. Indication: Repositioned ETT. Comparison: CT/SR - CTA CHEST W WO CONTRAST - 1/3/25 02:21 EST Findings: Retracted endotracheal tube with tip 2.4 cm above the sandeep. Enteric tube extends into stomach with tip off the image. Right IJ catheter with tip in the SVC, similar to previous study. Bibasilar atelectasis, greater on the left. The cardiac silhouette is normal in size. Surgical clips left axilla and chest from prior left mastectomy and dena dissection. Bony thorax is grossly intact.     Impression: 1. Retracted ET tube in position, with tip 2.4 cm above the sandeep. 2. Bibasilar atelectasis, similar to prior study. This document has been electronically signed by: Martell Welch MD on 01/03/2025 04:41 AM    CTA Chest W W/O Aortic Dissection    Result Date: 1/3/2025   ADDENDUM #1  Receipt of this report by the clinical  and daughter in law and daughter.  They voiced back understanding given circumstances and patient's unstable/critical condition and said that patient did not want having CPR, shocks in the case of cardiac arrest.  Extensively discussed regarding different types of CODE STATUS including DNR CCA DNR CCA limited code and full code.  Questions answered and patient's family members requested DNR CCA.    Patient's family declined  services    Resuscitation measures continued, patient continued to deteriorate, increased vasopressor requirement and total of 5 vasopressors maxed.  Additionally, patient is given bicarb drip, continuous IVF, electrolyte replacement, calcium replacement, albumin, hydrocortisone 100 mg IV and other medications.    Despite interventions, patient  on January 3, 2025,  at 05.5 1 AM.      Cause of Death:   Cardiopulmonary arrest secondary due to severe septic shock with multiorgan failure secondary due to necrotizing fasciitis    Time of Death:  05.51 AM, January 3, 2025    Disposition:      Autopsy Requested: No    Time Spent:  35 minutes    Electronically signed by Darin Caba DO on 1/3/25 at 5:58 AM EST

## 2025-01-03 NOTE — ED PROVIDER NOTES
[facility] EMERGENCY DEPARTMENT    EMERGENCY MEDICINE     Patient Name: Joanna Delarosa  MRN: [id number]  YOB: 1965  Date of Evaluation: 1/2/2025  Treating Resident Physician: Basilio Bruner DO  Supervising Physician: Dr. Orlando Alfonso DO    CHIEF COMPLAINT       Chief Complaint   Patient presents with    unresponsive       HISTORY OF PRESENT ILLNESS      History obtained from unobtainable from patient due to mental status.    Joanna Delarosa is a 59 y.o. female who presents to the emergency department from home brought in by EMS for evaluation of unresponsive.   EMS reports patient's spouse called EMS for patient abdominal pain. Upon arrival EMS found patient unresponsive.   On arrival she was hypoxic, hypotensive, and unresponsive GCS 3, tachycardic and febrile. No reported trauma.     Airway obtained by BMV   Bilateral breath sounds   Circulation started two pressure bags NS and peripheral levophed   Disability GCS 3   Exposure patient has a known left femur fracture that splinted. Lower abdominal and pelvic dusky appearing, marked swelling, blistering with crepitus    POCUS fast negative.       Pertinent previous and/or external records reviewed: Non-contributory    PAST MEDICAL AND SURGICAL HISTORY     Past Medical History:   Diagnosis Date    Anxiety     Anxiety and depression     Asthma     Bipolar 1 disorder (HCC)     Bipolar 1 disorder with moderate sourav (HCC)     Blood circulation, collateral     Breast cancer (HCC)     diagnosed in jan 2021    Cancer (HCC)     1982    Cancer (HCC) 01/15/2021    Left Breast    COPD (chronic obstructive pulmonary disease) (HCC)     Depression     Endometriosis     GERD (gastroesophageal reflux disease)     History of therapeutic radiation     2021    Kidney stones     Lupus     Movement disorder     MS (multiple sclerosis) (HCC)     Schizophrenia (HCC)     Thyroid disease     Type 2 diabetes mellitus with hyperglycemia, with long-term current use of  TABLET    Take 1 tablet by mouth every other day    ARTIFICIAL TEARS (ARTIFICIAL TEARS) OINT    as needed    ASPIRIN 81 MG EC TABLET    Take 1 tablet by mouth in the morning and at bedtime for 21 days    BD PEN NEEDLE JACOBO 2ND GEN 32G X 4 MM MISC    use as directed WITH INSULIN    CHOLECALCIFEROL (VITAMIN D3) 50 MCG (2000 UT) CAPS    Take by mouth    CONTINUOUS GLUCOSE SENSOR (FREESTYLE TAMARA 3 SENSOR) MISC    USE AS DIRECTED four times a day CHANGE EVERY 14 DAYS    CYANOCOBALAMIN (VITAMIN B 12) 500 MCG TABS    Take 500 mcg by mouth daily    ESCITALOPRAM (LEXAPRO) 10 MG TABLET    Take 1 tablet by mouth daily    FARXIGA 10 MG TABLET    Take 1 tablet by mouth daily    GLIPIZIDE (GLUCOTROL XL) 10 MG EXTENDED RELEASE TABLET    Take 1 tablet by mouth daily    GUAIFENESIN (MUCINEX) 600 MG EXTENDED RELEASE TABLET    Take 2 tablets by mouth 2 times daily    HANDICAP PLACARD MISC    by Does not apply route 2/08/22- 2/8/2027    HYDROXYZINE (VISTARIL) 50 MG CAPSULE    Take 1 capsule by mouth 3 times daily as needed for Itching    INCONTINENCE SUPPLY DISPOSABLE (UNDERPADS) MISC    Cloth chux pads  Diagnosis: Paraplegia 344.1    INSULIN GLARGINE (LANTUS) 100 UNIT/ML INJECTION VIAL    Inject 30 Units into the skin every morning (before breakfast)    INSULIN SYRINGES, DISPOSABLE, U-100 1 ML MISC    1 each by Does not apply route daily    LIDOCAINE (XYLOCAINE) 5 % OINTMENT    Apply 4 times daily as needed.    LIFITEGRAST (XIIDRA) 5 % SOLN    Place 1 drop into both eyes daily    MISC. DEVICES (TRANSFER BENCH) MISC    1 each by Does not apply route daily Tub transfer bench with back    MISC. DEVICES (WHEELCHAIR) MISC    Wheelchair repairs- new seat cover, right side armrest replacement    Current body weight:  68 kg  Diagnosis: gait disturbance, chronic incomplete spastic paraplegia  Length of need: Lifetime    MONTELUKAST (SINGULAIR) 10 MG TABLET    Take 1 tablet by mouth nightly    MULTIPLE VITAMIN (TAB-A-ISAAC PO)    Take 1 tablet

## 2025-01-03 NOTE — CONSULTS
contrast. Coronal and sagittal reformations. Comparison: CT head 2/17/22. Findings: The ventricular system is midline in position. Air locule up to 1.2 x 1 cm in the medial left temporal lobe. Additional air locule in the left cerebellum approximately 1.8 x 1.7 cm. Additional air also present in the suprasellar/pre pontine cistern regions. Decreased gray-white matter differentiation. No acute intraparenchymal hemorrhage. No abnormal hyperdense extra-axial fluid collections. The calvarium is intact. Minor mucosal thickening. The mastoid air cells and visualized paranasal sinuses are well-aerated.     Impression: 1. Air locules located in the medial left temporal lobe, left cerebellum and basal cisterns. Differential diagnosis include cerebral air embolization, less likely abscess or other etiologies. 2. Decreased gray-white matter differentiation, raising possibility of anoxic brain injury. This document has been electronically signed by: Martell Welch MD on 01/03/2025 03:41 AM All CTs at this facility use dose modulation techniques and iterative reconstructions, and/or weight-based dosing when appropriate to reduce radiation to a low as reasonably achievable.    XR CHEST PORTABLE    Result Date: 1/3/2025  ** ADDENDUM #1 ** This report was discussed with Derek Ochoa RN on Jan 03, 2025 02:14:00 EST. This document has been electronically signed by: Tea Day on 01/03/2025 02:15 AM ** ORIGINAL REPORT  Exam: 1 view of the chest Comparison: 12/09/2024 Findings: Patient has been intubated. Endotracheal tube tip is in the right mainstem bronchus. 1.5 cm distal from the sandeep. Tube should be pulled back. Right IJ central venous catheter has its tip in the distal SVC . No pneumothorax. Cardiac silhouette is not enlarged. No pneumothorax. No pleural fluid collection. Left retrocardiac density may represent atelectasis or pneumonia.     Impression: 1. Right mainstem intubation. Endotracheal tube tip is 1.5 cm distal from the  sandeep. Tube should be pulled back. 2. New right IJ central venous catheter with tip in the distal SVC. No pneumothorax. 3. Left retrocardiac density may represent atelectasis or pneumonia. This document has been electronically signed by: Shikha Alaniz MD on 01/03/2025 02:11 AM     XR TIBIA FIBULA LEFT (2 VIEWS)    Result Date: 12/9/2024  2 view left tibia-fibula Comparison: CR/SR - XR FEMUR LEFT (MIN 2 VIEWS) - 12/09/2024 07:19 PM EST Findings Osteopenia. No acute tibia and fibula fractures. Redemonstrated distal femoral fracture, please see prior report. No joint effusion. No significant arthritic change. No radiopaque foreign body. Mildly diffuse soft tissue edema.     No acute tibia and fibula fractures. This document has been electronically signed by: Gian Santos MD on 12/09/2024 10:04 PM    XR PELVIS (1-2 VIEWS)    Result Date: 12/9/2024  1 view pelvis Comparison: None Findings: Osteopenia. No acute fracture or malalignment. Degenerative changes throughout the SI joints, pubic symphysis and hips bilaterally. Soft tissues are unremarkable. Rotation limits evaluation.     1. Chronic changes without acute findings. This document has been electronically signed by: Gian Santos MD on 12/09/2024 10:03 PM    XR CHEST 1 VIEW    Result Date: 12/9/2024  1 view chest x-ray Comparison: CR/SR - XR CHEST PORTABLE - 12/11/2022 06:11 AM EST Findings: The lungs are clear. Heart size is normal. No acute fracture. Left axillary clips.     1. No acute findings. This document has been electronically signed by: Gian Santos MD on 12/09/2024 08:18 PM    XR FEMUR LEFT (MIN 2 VIEWS)    Result Date: 12/9/2024  2 view left femur Comparison: None Findings: Comminuted impacted distal femoral shaft fracture extending near the femoral condyles involving the meta diaphysis. Associated anterior angulation/displacement of the distal bone. Osteopenia. Moderate osteoarthritic changes of the knee. No radiopaque foreign body. [facility]

## 2025-01-03 NOTE — ED NOTES
This RN spoke with son and significant other about patient condition. Significant other lives with patient and states that about 30-40 minutes prior to calling EMS pt was only answering yes or no questions. States she then went pale and was intermittently shivering. States that she was entirely normal about 2 days ago.

## 2025-01-03 NOTE — DEATH NOTES
Death Pronouncement Note  Patient's Name: Joanna Delarosa   Patient's YOB: 1965  MRN Number: [id number]    Admitting Provider: Darin Caba DO  Attending Provider: Darin Caba DO    Patient was examined and the following were absent: Pulses, Blood Pressure, and Respiratory effort.       I declared the patient dead on  at 5.51 am, January 3, 2025.    Preliminary Cause of Death: Sepsis with multi-organ dysfunction (HCC)    Electronically signed by Darin Caba DO on 1/3/25 at 5:57 AM EST

## 2025-01-03 NOTE — PROCEDURES
PROCEDURE NOTE  Date: 1/3/2025   Name: Joanna Delarosa  YOB: 1965    Central Line    Date/Time: 1/3/2025 1:02 AM    Performed by: Faisal Diaz MD  Authorized by: Orlando Alfonso DO    Consent:     Consent obtained:  Emergent situation  Universal protocol:     Required blood products, implants, devices, and special equipment available: yes    Pre-procedure details:     Indication(s): central venous access      Hand hygiene: Hand hygiene performed prior to insertion      Sterile barrier technique: All elements of maximal sterile technique followed      Skin preparation:  Chlorhexidine    Skin preparation agent: Skin preparation agent completely dried prior to procedure    Sedation:     Sedation type: post-intubation sedation.  Procedure details:     Location:  R internal jugular    Patient position:  Supine    Procedural supplies:  Triple lumen    Catheter size:  7 Fr    Ultrasound guidance: yes      Ultrasound guidance timing: real time      Sterile ultrasound techniques: Sterile gel and sterile probe covers were used      Number of attempts:  1    Successful placement: yes    Post-procedure details:     Post-procedure:  Dressing applied and line sutured    Assessment:  Blood return through all ports, free fluid flow, no pneumothorax on x-ray and placement verified by x-ray    Procedure completion:  Tolerated

## 2025-01-05 LAB
BACTERIA BLD AEROBE CULT: ABNORMAL
BACTERIA BLD AEROBE CULT: ABNORMAL
ORGANISM: ABNORMAL
